# Patient Record
Sex: FEMALE | Race: WHITE | NOT HISPANIC OR LATINO | URBAN - METROPOLITAN AREA
[De-identification: names, ages, dates, MRNs, and addresses within clinical notes are randomized per-mention and may not be internally consistent; named-entity substitution may affect disease eponyms.]

---

## 2017-07-05 ENCOUNTER — OFFICE (OUTPATIENT)
Dept: URBAN - METROPOLITAN AREA CLINIC 75 | Facility: CLINIC | Age: 67
End: 2017-07-05

## 2017-07-05 VITALS
WEIGHT: 129 LBS | HEIGHT: 67 IN | SYSTOLIC BLOOD PRESSURE: 122 MMHG | HEART RATE: 80 BPM | DIASTOLIC BLOOD PRESSURE: 70 MMHG

## 2017-07-05 DIAGNOSIS — Z86.010 PERSONAL HISTORY OF COLONIC POLYPS: ICD-10-CM

## 2017-07-05 DIAGNOSIS — R19.4 CHANGE IN BOWEL HABIT: ICD-10-CM

## 2017-07-05 DIAGNOSIS — R63.4 ABNORMAL WEIGHT LOSS: ICD-10-CM

## 2017-07-05 DIAGNOSIS — K59.1 FUNCTIONAL DIARRHEA: ICD-10-CM

## 2017-07-05 PROCEDURE — 99204 OFFICE O/P NEW MOD 45 MIN: CPT

## 2017-07-05 RX ORDER — CHOLESTYRAMINE 4 G/9G
POWDER, FOR SUSPENSION ORAL
Qty: 90 | Refills: 11 | Status: ACTIVE

## 2017-08-11 ENCOUNTER — OFFICE (OUTPATIENT)
Dept: URBAN - METROPOLITAN AREA CLINIC 75 | Facility: CLINIC | Age: 67
End: 2017-08-11

## 2017-08-11 VITALS
DIASTOLIC BLOOD PRESSURE: 70 MMHG | HEIGHT: 67 IN | WEIGHT: 127 LBS | HEART RATE: 72 BPM | SYSTOLIC BLOOD PRESSURE: 120 MMHG

## 2017-08-11 DIAGNOSIS — R63.4 ABNORMAL WEIGHT LOSS: ICD-10-CM

## 2017-08-11 DIAGNOSIS — Z86.010 PERSONAL HISTORY OF COLONIC POLYPS: ICD-10-CM

## 2017-08-11 DIAGNOSIS — R19.4 CHANGE IN BOWEL HABIT: ICD-10-CM

## 2017-08-11 DIAGNOSIS — K59.1 FUNCTIONAL DIARRHEA: ICD-10-CM

## 2017-08-11 PROCEDURE — 99214 OFFICE O/P EST MOD 30 MIN: CPT

## 2017-10-31 ENCOUNTER — TELEPHONE (OUTPATIENT)
Dept: GENERAL RADIOLOGY | Facility: HOSPITAL | Age: 67
End: 2017-10-31

## 2017-10-31 ENCOUNTER — APPOINTMENT (OUTPATIENT)
Dept: ONCOLOGY | Facility: CLINIC | Age: 67
End: 2017-10-31

## 2017-10-31 ENCOUNTER — LAB (OUTPATIENT)
Dept: LAB | Facility: HOSPITAL | Age: 67
End: 2017-10-31

## 2017-10-31 ENCOUNTER — CONSULT (OUTPATIENT)
Dept: ONCOLOGY | Facility: CLINIC | Age: 67
End: 2017-10-31

## 2017-10-31 VITALS
WEIGHT: 131 LBS | HEIGHT: 66 IN | BODY MASS INDEX: 21.05 KG/M2 | OXYGEN SATURATION: 92 % | RESPIRATION RATE: 20 BRPM | SYSTOLIC BLOOD PRESSURE: 118 MMHG | HEART RATE: 88 BPM | TEMPERATURE: 97.8 F | DIASTOLIC BLOOD PRESSURE: 54 MMHG

## 2017-10-31 DIAGNOSIS — C34.92 ADENOCARCINOMA OF LEFT LUNG (HCC): Primary | ICD-10-CM

## 2017-10-31 DIAGNOSIS — J43.1 PANLOBULAR EMPHYSEMA (HCC): ICD-10-CM

## 2017-10-31 LAB
ALBUMIN SERPL-MCNC: 4.1 G/DL (ref 3.5–5.2)
ALBUMIN/GLOB SERPL: 1.5 G/DL (ref 1.1–2.4)
ALP SERPL-CCNC: 63 U/L (ref 38–116)
ALT SERPL W P-5'-P-CCNC: 11 U/L (ref 0–33)
ANION GAP SERPL CALCULATED.3IONS-SCNC: 11.6 MMOL/L
AST SERPL-CCNC: 16 U/L (ref 0–32)
BASOPHILS # BLD AUTO: 0.02 10*3/MM3 (ref 0–0.1)
BASOPHILS NFR BLD AUTO: 0.2 % (ref 0–1.1)
BILIRUB SERPL-MCNC: 0.6 MG/DL (ref 0.1–1.2)
BUN BLD-MCNC: 8 MG/DL (ref 6–20)
BUN/CREAT SERPL: 9.5 (ref 7.3–30)
CALCIUM SPEC-SCNC: 9.4 MG/DL (ref 8.5–10.2)
CEA SERPL-MCNC: 58.17 NG/ML
CHLORIDE SERPL-SCNC: 98 MMOL/L (ref 98–107)
CO2 SERPL-SCNC: 26.4 MMOL/L (ref 22–29)
CREAT BLD-MCNC: 0.84 MG/DL (ref 0.6–1.1)
DEPRECATED RDW RBC AUTO: 38.6 FL (ref 37–49)
EOSINOPHIL # BLD AUTO: 0.06 10*3/MM3 (ref 0–0.36)
EOSINOPHIL NFR BLD AUTO: 0.7 % (ref 1–5)
ERYTHROCYTE [DISTWIDTH] IN BLOOD BY AUTOMATED COUNT: 12.7 % (ref 11.7–14.5)
GFR SERPL CREATININE-BSD FRML MDRD: 67 ML/MIN/1.73
GLOBULIN UR ELPH-MCNC: 2.7 GM/DL (ref 1.8–3.5)
GLUCOSE BLD-MCNC: 91 MG/DL (ref 74–124)
HCT VFR BLD AUTO: 36 % (ref 34–45)
HGB BLD-MCNC: 12.4 G/DL (ref 11.5–14.9)
IMM GRANULOCYTES # BLD: 0.03 10*3/MM3 (ref 0–0.03)
IMM GRANULOCYTES NFR BLD: 0.4 % (ref 0–0.5)
LYMPHOCYTES # BLD AUTO: 1.15 10*3/MM3 (ref 1–3.5)
LYMPHOCYTES NFR BLD AUTO: 13.9 % (ref 20–49)
MCH RBC QN AUTO: 29 PG (ref 27–33)
MCHC RBC AUTO-ENTMCNC: 34.4 G/DL (ref 32–35)
MCV RBC AUTO: 84.3 FL (ref 83–97)
MONOCYTES # BLD AUTO: 0.89 10*3/MM3 (ref 0.25–0.8)
MONOCYTES NFR BLD AUTO: 10.7 % (ref 4–12)
NEUTROPHILS # BLD AUTO: 6.13 10*3/MM3 (ref 1.5–7)
NEUTROPHILS NFR BLD AUTO: 74.1 % (ref 39–75)
NRBC BLD MANUAL-RTO: 0 /100 WBC (ref 0–0)
PLATELET # BLD AUTO: 205 10*3/MM3 (ref 150–375)
PMV BLD AUTO: 10.2 FL (ref 8.9–12.1)
POTASSIUM BLD-SCNC: 4 MMOL/L (ref 3.5–4.7)
PROT SERPL-MCNC: 6.8 G/DL (ref 6.3–8)
RBC # BLD AUTO: 4.27 10*6/MM3 (ref 3.9–5)
SODIUM BLD-SCNC: 136 MMOL/L (ref 134–145)
WBC NRBC COR # BLD: 8.28 10*3/MM3 (ref 4–10)

## 2017-10-31 PROCEDURE — 80053 COMPREHEN METABOLIC PANEL: CPT | Performed by: INTERNAL MEDICINE

## 2017-10-31 PROCEDURE — 99205 OFFICE O/P NEW HI 60 MIN: CPT | Performed by: INTERNAL MEDICINE

## 2017-10-31 PROCEDURE — 36416 COLLJ CAPILLARY BLOOD SPEC: CPT | Performed by: INTERNAL MEDICINE

## 2017-10-31 PROCEDURE — 82378 CARCINOEMBRYONIC ANTIGEN: CPT | Performed by: INTERNAL MEDICINE

## 2017-10-31 PROCEDURE — 85025 COMPLETE CBC W/AUTO DIFF WBC: CPT | Performed by: INTERNAL MEDICINE

## 2017-10-31 PROCEDURE — 36415 COLL VENOUS BLD VENIPUNCTURE: CPT | Performed by: INTERNAL MEDICINE

## 2017-10-31 RX ORDER — BUPROPION HYDROCHLORIDE 300 MG/1
300 TABLET ORAL EVERY EVENING
Status: ON HOLD | COMMUNITY
End: 2019-01-01

## 2017-10-31 RX ORDER — ALBUTEROL SULFATE 90 UG/1
AEROSOL, METERED RESPIRATORY (INHALATION)
Refills: 0 | COMMUNITY
Start: 2017-10-26 | End: 2018-01-05 | Stop reason: SDUPTHER

## 2017-10-31 RX ORDER — ASPIRIN 81 MG/1
81 TABLET ORAL DAILY
COMMUNITY
End: 2018-05-02

## 2017-10-31 RX ORDER — ESCITALOPRAM OXALATE 20 MG/1
20 TABLET ORAL EVERY EVENING
COMMUNITY
End: 2018-01-01 | Stop reason: HOSPADM

## 2017-10-31 RX ORDER — IPRATROPIUM BROMIDE 17 UG/1
AEROSOL, METERED RESPIRATORY (INHALATION)
Refills: 3 | Status: ON HOLD | COMMUNITY
Start: 2017-10-11 | End: 2017-12-06

## 2017-10-31 RX ORDER — CHOLECALCIFEROL (VITAMIN D3) 50 MCG
2000 TABLET ORAL EVERY OTHER DAY
COMMUNITY

## 2017-10-31 RX ORDER — SIMVASTATIN 20 MG
20 TABLET ORAL NIGHTLY
Status: ON HOLD | COMMUNITY
End: 2019-01-01

## 2017-10-31 RX ORDER — DIPHENOXYLATE HYDROCHLORIDE AND ATROPINE SULFATE 2.5; .025 MG/1; MG/1
1 TABLET ORAL
Status: ON HOLD | COMMUNITY
End: 2017-12-06

## 2017-10-31 RX ORDER — INFLUENZA A VIRUS A/MICHIGAN/45/2015 X-275 (H1N1) ANTIGEN (FORMALDEHYDE INACTIVATED), INFLUENZA A VIRUS A/SINGAPORE/INFIMH-16-0019/2016 IVR-186 (H3N2) ANTIGEN (FORMALDEHYDE INACTIVATED), AND INFLUENZA B VIRUS B/MARYLAND/15/2016 BX-69A (A B/COLORADO/6/2017-LIKE VIRUS) ANTIGEN (FORMALDEHYDE INACTIVATED) 60; 60; 60 UG/.5ML; UG/.5ML; UG/.5ML
INJECTION, SUSPENSION INTRAMUSCULAR
Refills: 0 | COMMUNITY
Start: 2017-10-04 | End: 2018-01-05

## 2017-10-31 NOTE — TELEPHONE ENCOUNTER
----- Message from Christin Guzmán RN sent at 10/31/2017 12:35 PM EDT -----  Regarding: PATIENT JUST SCHED TODAY FOR FRIDAY WILL NEED TO MOVE ANANDT  CHET LUX PET SCHEDULED JUST TODAY FOR THIS PATIENT FOR THIS Friday 11/03/17.     PATIENT HAS [facility]. AND I WILL NOT HAVE ANSWER MOST LIKELY UNTIL THE END OF THE BUSINESS DAY Friday.    PLEASE MOVE PATIENT TO NEXT Tuesday 11/07/2017 IF POSSIBLE.      THANK YOU ~

## 2017-11-07 ENCOUNTER — HOSPITAL ENCOUNTER (OUTPATIENT)
Dept: PET IMAGING | Facility: HOSPITAL | Age: 67
Discharge: HOME | End: 2017-11-07
Attending: INTERNAL MEDICINE

## 2017-11-07 ENCOUNTER — HOSPITAL ENCOUNTER (OUTPATIENT)
Dept: PET IMAGING | Facility: HOSPITAL | Age: 67
Discharge: HOME | End: 2017-11-07
Attending: INTERNAL MEDICINE | Admitting: INTERNAL MEDICINE

## 2017-11-07 DIAGNOSIS — C34.92 ADENOCARCINOMA OF LEFT LUNG (HCC): ICD-10-CM

## 2017-11-07 PROCEDURE — 0 FLUDEOXYGLUCOSE F18 SOLUTION: Performed by: INTERNAL MEDICINE

## 2017-11-07 PROCEDURE — 78815 PET IMAGE W/CT SKULL-THIGH: CPT

## 2017-11-07 PROCEDURE — 82962 GLUCOSE BLOOD TEST: CPT

## 2017-11-07 PROCEDURE — A9552 F18 FDG: HCPCS | Performed by: INTERNAL MEDICINE

## 2017-11-07 RX ADMIN — FLUDEOXYGLUCOSE F18 1 DOSE: 300 INJECTION INTRAVENOUS at 09:56

## 2017-11-08 LAB — GLUCOSE BLDC GLUCOMTR-MCNC: 94 MG/DL (ref 70–130)

## 2017-11-14 ENCOUNTER — APPOINTMENT (OUTPATIENT)
Dept: ONCOLOGY | Facility: CLINIC | Age: 67
End: 2017-11-14

## 2017-11-14 ENCOUNTER — OFFICE VISIT (OUTPATIENT)
Dept: ONCOLOGY | Facility: CLINIC | Age: 67
End: 2017-11-14

## 2017-11-14 ENCOUNTER — LAB (OUTPATIENT)
Dept: LAB | Facility: HOSPITAL | Age: 67
End: 2017-11-14

## 2017-11-14 VITALS
BODY MASS INDEX: 20.7 KG/M2 | OXYGEN SATURATION: 93 % | HEIGHT: 66 IN | RESPIRATION RATE: 16 BRPM | TEMPERATURE: 98.1 F | WEIGHT: 128.8 LBS | HEART RATE: 82 BPM | SYSTOLIC BLOOD PRESSURE: 144 MMHG | DIASTOLIC BLOOD PRESSURE: 72 MMHG

## 2017-11-14 DIAGNOSIS — R93.89 ABNORMAL FINDING ON IMAGING: ICD-10-CM

## 2017-11-14 DIAGNOSIS — J43.1 PANLOBULAR EMPHYSEMA (HCC): ICD-10-CM

## 2017-11-14 DIAGNOSIS — C34.92 ADENOCARCINOMA OF LEFT LUNG (HCC): Primary | ICD-10-CM

## 2017-11-14 DIAGNOSIS — C34.92 ADENOCARCINOMA OF LEFT LUNG (HCC): ICD-10-CM

## 2017-11-14 LAB
ALBUMIN SERPL-MCNC: 4.2 G/DL (ref 3.5–5.2)
ALBUMIN/GLOB SERPL: 1.7 G/DL (ref 1.1–2.4)
ALP SERPL-CCNC: 60 U/L (ref 38–116)
ALT SERPL W P-5'-P-CCNC: 12 U/L (ref 0–33)
ANION GAP SERPL CALCULATED.3IONS-SCNC: 12.3 MMOL/L
AST SERPL-CCNC: 18 U/L (ref 0–32)
BASOPHILS # BLD AUTO: 0.05 10*3/MM3 (ref 0–0.1)
BASOPHILS NFR BLD AUTO: 0.8 % (ref 0–1.1)
BILIRUB SERPL-MCNC: 0.3 MG/DL (ref 0.1–1.2)
BUN BLD-MCNC: 13 MG/DL (ref 6–20)
BUN/CREAT SERPL: 13.7 (ref 7.3–30)
CALCIUM SPEC-SCNC: 9.7 MG/DL (ref 8.5–10.2)
CHLORIDE SERPL-SCNC: 98 MMOL/L (ref 98–107)
CO2 SERPL-SCNC: 27.7 MMOL/L (ref 22–29)
CREAT BLD-MCNC: 0.95 MG/DL (ref 0.6–1.1)
DEPRECATED RDW RBC AUTO: 40.4 FL (ref 37–49)
EOSINOPHIL # BLD AUTO: 0.21 10*3/MM3 (ref 0–0.36)
EOSINOPHIL NFR BLD AUTO: 3.2 % (ref 1–5)
ERYTHROCYTE [DISTWIDTH] IN BLOOD BY AUTOMATED COUNT: 12.8 % (ref 11.7–14.5)
GFR SERPL CREATININE-BSD FRML MDRD: 58 ML/MIN/1.73
GLOBULIN UR ELPH-MCNC: 2.5 GM/DL (ref 1.8–3.5)
GLUCOSE BLD-MCNC: 94 MG/DL (ref 74–124)
HCT VFR BLD AUTO: 39.8 % (ref 34–45)
HGB BLD-MCNC: 13.4 G/DL (ref 11.5–14.9)
IMM GRANULOCYTES # BLD: 0.02 10*3/MM3 (ref 0–0.03)
IMM GRANULOCYTES NFR BLD: 0.3 % (ref 0–0.5)
LYMPHOCYTES # BLD AUTO: 1.3 10*3/MM3 (ref 1–3.5)
LYMPHOCYTES NFR BLD AUTO: 19.7 % (ref 20–49)
MCH RBC QN AUTO: 29 PG (ref 27–33)
MCHC RBC AUTO-ENTMCNC: 33.7 G/DL (ref 32–35)
MCV RBC AUTO: 86.1 FL (ref 83–97)
MONOCYTES # BLD AUTO: 0.71 10*3/MM3 (ref 0.25–0.8)
MONOCYTES NFR BLD AUTO: 10.7 % (ref 4–12)
NEUTROPHILS # BLD AUTO: 4.32 10*3/MM3 (ref 1.5–7)
NEUTROPHILS NFR BLD AUTO: 65.3 % (ref 39–75)
NRBC BLD MANUAL-RTO: 0 /100 WBC (ref 0–0)
PLATELET # BLD AUTO: 238 10*3/MM3 (ref 150–375)
PMV BLD AUTO: 9.9 FL (ref 8.9–12.1)
POTASSIUM BLD-SCNC: 4.4 MMOL/L (ref 3.5–4.7)
PROT SERPL-MCNC: 6.7 G/DL (ref 6.3–8)
RBC # BLD AUTO: 4.62 10*6/MM3 (ref 3.9–5)
SODIUM BLD-SCNC: 138 MMOL/L (ref 134–145)
WBC NRBC COR # BLD: 6.61 10*3/MM3 (ref 4–10)

## 2017-11-14 PROCEDURE — 36415 COLL VENOUS BLD VENIPUNCTURE: CPT | Performed by: INTERNAL MEDICINE

## 2017-11-14 PROCEDURE — 85025 COMPLETE CBC W/AUTO DIFF WBC: CPT | Performed by: INTERNAL MEDICINE

## 2017-11-14 PROCEDURE — 99214 OFFICE O/P EST MOD 30 MIN: CPT | Performed by: INTERNAL MEDICINE

## 2017-11-14 PROCEDURE — 80053 COMPREHEN METABOLIC PANEL: CPT | Performed by: INTERNAL MEDICINE

## 2017-11-14 RX ORDER — HYDROCODONE BITARTRATE AND ACETAMINOPHEN 7.5; 325 MG/1; MG/1
TABLET ORAL
Refills: 0 | COMMUNITY
Start: 2017-11-03 | End: 2018-01-05 | Stop reason: SDUPTHER

## 2017-11-14 RX ORDER — DIAZEPAM 5 MG/1
TABLET ORAL
Refills: 2 | Status: ON HOLD | COMMUNITY
Start: 2017-11-02 | End: 2018-01-09 | Stop reason: SDUPTHER

## 2017-11-14 NOTE — PROGRESS NOTES
Subjective     REASON FOR FOLLOW UP:   1.  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 Moon Bowen on 10/6/2017.   2.  Tumor is negative for PDL 1.  Also negative for ALK, EGFR, and ROS 1 mutations.  3.  Abnormal uptake in the distal esophagus noted on PET scan 11/7/2017.    HISTORY OF PRESENT ILLNESS:  The patient is a 67 y.o. year old female who has a history of smoking and COPD.  She had an abnormal CT scan in February of this year showing a 3.5 cm mass in the left suprahilar area.  A repeat CT scan was performed at [facility] 7/13/2017 and show that the mass had increased in size from about 3.5 cm up to 6.5 cm.    She underwent navigational bronchoscopy and endobronchial ultrasound and biopsy with Dr. Moon Bowen on 10/6/2017.  The pathology on the biopsy showed well-differentiated adenocarcinoma.  She is referred to us now by her pulmonologist Dr. Daniel Oconnor for further staging evaluation and treatment recommendations.    She has not had a PET scan for staging.  She had a PDL 1 staining on her bronchoscopy specimen which was negative for PDL 1.  EGFR, ALK, and ROS 1 were also negative.    She is still smoking but is trying to quit.  She had pulmonary function tests performed on 10/25/2017 showing a DLCO of 33% and FEV1 37%.    History of Present Illness     Past Medical History:   Diagnosis Date   • CAD (coronary artery disease)    • COPD (chronic obstructive pulmonary disease)    • Depression    • Diabetes    • Emphysema of lung    • H/O Pulmonary nodule    • History of chronic pain    • History of colon polyps    • History of pneumonia    • Hyperlipidemia    • Hypertension    • Lung cancer 2017   • RLS (restless legs syndrome)    • Sleep apnea         Past Surgical History:   Procedure Laterality Date   • BRONCHOSCOPY     • CHOLECYSTECTOMY  1999   • COLON SURGERY  2016   • COLONOSCOPY     • FOOT SURGERY  10/2010    [place]   • KNEE  SURGERY     • SALIVARY GLAND SURGERY     • SHOULDER ARTHROSCOPY Left 1995. 2001   • VASCULAR SURGERY      stent        Current Outpatient Prescriptions on File Prior to Visit   Medication Sig Dispense Refill   • aspirin 81 MG EC tablet Take 81 mg by mouth.     • ATROVENT HFA 17 MCG/ACT inhaler INHALE 2 PUFFS PO BID  3   • buPROPion XL (WELLBUTRIN XL) 300 MG 24 hr tablet Take 300 mg by mouth.     • Cholecalciferol (VITAMIN D) 2000 units tablet Take 2,000 Units by mouth Daily.     • cyanocobalamin (VITAMIN B-12) 2000 MCG tablet Take 2,000 mcg by mouth Daily.     • diphenoxylate-atropine (LOMOTIL) 2.5-0.025 MG per tablet Take 1 tablet by mouth.     • escitalopram (LEXAPRO) 20 MG tablet Take 20 mg by mouth.     • FLUZONE HIGH-DOSE 0.5 ML suspension prefilled syringe injection ADM 0.5ML IM UTD  0   • Multiple Vitamins-Minerals (CENTRUM SILVER PO) Take  by mouth.     • Probiotic Product (PROBIOTIC PO) Take  by mouth.     • Psyllium (METAMUCIL FIBER PO) Take 3 capsules by mouth.     • simvastatin (ZOCOR) 20 MG tablet Take 20 mg by mouth.     • VENTOLIN  (90 Base) MCG/ACT inhaler INL 2 PFS PO Q 6 H PRN  0     No current facility-administered medications on file prior to visit.         ALLERGIES:    Allergies   Allergen Reactions   • Penicillins Hives        Social History     Social History   • Marital status:      Spouse name: N/A   • Number of children: N/A   • Years of education: High school     Occupational History   •  Retired     Social History Main Topics   • Smoking status: Former Smoker     Packs/day: 1.00     Years: 30.00     Types: Cigarettes   • Smokeless tobacco: Never Used   • Alcohol use No   • Drug use: No   • Sexual activity: Not on file     Other Topics Concern   • Not on file     Social History Narrative        Family History   Problem Relation Age of Onset   • Cancer Sister    • Leukemia Cousin         Review of Systems   Constitutional: Negative for activity change, appetite change,  "fatigue, fever and unexpected weight change.   HENT: Negative for hearing loss, nosebleeds, trouble swallowing and voice change.    Eyes: Negative for visual disturbance.   Respiratory: Positive for cough and shortness of breath. Negative for wheezing.    Cardiovascular: Negative for chest pain and palpitations.   Gastrointestinal: Positive for diarrhea. Negative for abdominal pain, nausea and vomiting.        Short bowel symptoms after partial colectomy   Genitourinary: Negative for difficulty urinating, frequency, hematuria and urgency.   Musculoskeletal: Negative for back pain and neck pain.   Skin: Negative for rash.   Neurological: Negative for dizziness, seizures, syncope and headaches.   Hematological: Negative for adenopathy. Does not bruise/bleed easily.   Psychiatric/Behavioral: Negative for behavioral problems. The patient is not nervous/anxious.         Objective     Vitals:    11/14/17 1021   BP: 144/72   Pulse: 82   Resp: 16   Temp: 98.1 °F (36.7 °C)   TempSrc: Oral   SpO2: 93%   Weight: 128 lb 12.8 oz (58.4 kg)   Height: 65.55\" (166.5 cm)   PainSc: 0-No pain     Current Status 11/14/2017   ECOG score 0       Physical Exam   Constitutional: She is oriented to person, place, and time. She appears well-developed and well-nourished. No distress.   HENT:   Head: Normocephalic.   Eyes: Conjunctivae and EOM are normal. Pupils are equal, round, and reactive to light. No scleral icterus.   Neck: Normal range of motion. Neck supple. No JVD present. No thyromegaly present.   Cardiovascular: Normal rate and regular rhythm.  Exam reveals no gallop and no friction rub.    No murmur heard.  Pulmonary/Chest: Effort normal and breath sounds normal. She has no wheezes. She has no rales.   Kyphosis and decreased air movement bilaterally.   Abdominal: Soft. She exhibits no distension and no mass. There is no tenderness.   Musculoskeletal: Normal range of motion. She exhibits no edema or deformity.   Lymphadenopathy:     " "She has no cervical adenopathy.   Neurological: She is alert and oriented to person, place, and time. She has normal reflexes. No cranial nerve deficit.   Skin: Skin is warm and dry. No rash noted. No erythema.   Psychiatric: She has a normal mood and affect. Her behavior is normal. Judgment normal.         RECENT LABS:  Hematology WBC   Date Value Ref Range Status   11/14/2017 6.61 4.00 - 10.00 10*3/mm3 Final     RBC   Date Value Ref Range Status   11/14/2017 4.62 3.90 - 5.00 10*6/mm3 Final     Hemoglobin   Date Value Ref Range Status   11/14/2017 13.4 11.5 - 14.9 g/dL Final     Hematocrit   Date Value Ref Range Status   11/14/2017 39.8 34.0 - 45.0 % Final     Platelets   Date Value Ref Range Status   11/14/2017 238 150 - 375 10*3/mm3 Final        PET 11/7/2017  IMPRESSION:  Left upper lobe lung carcinoma showing significant increase  in size since the preceding CT scan of the chest dated 07/13/2017. The  tumor demonstrates moderate hypermetabolism. There is no metabolic  evidence of metastatic disease within the neck, chest, abdomen or  pelvis. Heart, there are is a tiny left parotid nodule that demonstrates  intense hypermetabolism. Further evaluation with ultrasound-guided  biopsy is suggested. Also noted is localized soft tissue fullness at the  gastroesophageal junction extending focally into the cardia of the  stomach that demonstrates moderate hypermetabolism. Esophageal carcinoma  is suspected. Further evaluation with endoscopy is recommended.      PATHOLOGY FROM BRONCH AND EBUS 10/6/2017  Patient Name: MARYCHUY SHELL  Accession#: [id number]    DIAGNOSIS:  A.     SUBMITTED AS \"LEFT UPPER LOBE BIOPSY\":       1.     NEGATIVE FOR MALIGNANCY.       2.     PREDOMINANTLY BRONCHIAL TISSUE.         B.     SUBMITTED AS \"LEFT UPPER LOBE BIOPSY\":       1.     WELL DIFFERENTIATED ADENOCARCINOMA, PREDOMINANTLY WITH LIPIDIC  GROWTH IN           MATERIAL REVIEWED.       2.     NO LYMPHVASCULAR INVASION IDENTIFIED.       " "3.      MOLECULAR STUDIES AND PDL-1 STATUS WILL FOLLOW IN ADDENDUM.     C.     SUBMITTED AS \"LEFT UPPER LOBE BIOPSY\":       1.     WELL DIFFERENTIATED ADENOCARCINOMA, PREDOMINANTLY WITH LIPIDIC  GROWTH IN             MATERIAL REVIEWED.       2.     NO LYMPHVASCULAR INVASION IDENTIFIED.    PD-L1 Testing Results:  Tumor Proportion Score (TPS):           0  Intensity of staining of tumor cells:           N/A     Reference Range:  TPS d 50% - High PD-L1 expression  TPS 1-49% - Low PD-L1 expression  TPS < 1% - No PD-L1 expression    PFTs 10/25/2017 scanned under media  FEV1 37%  DLCO 33%      Assessment/Plan   1.  Well differentiated adenocarcinoma presumably from lung origin in a long-time smoker.  The biopsy was made from navigational bronchoscopy and endobronchial ultrasound at [place] on 10/6/2017.  Staging PET scan showed no mediastinal adenopathy or obvious distant metastases.  She did have uptake in the lower esophagus which will need to be evaluated  2.  Emphysema due to smoking.  3.  Tumor tissue was negative for PDL 1 expression, EGFR, ALK, or ROS 1 mutations.      Plan  1.  We discussed the results of the PET scan with the patient and her sister at length in the office today and reviewed the images with them on the computer.  2.  We also shared with them that we have presented her case in the multidisciplinary thoracic oncology conference last week and it was determined that she was not a good surgical candidate due to her poor lung function.  3.  We plan to refer her to radiation oncology for radiation therapy to the left lung mass.  4.  She also will be referred to gastroenterology for EGD assessment of the hypermetabolic gastroesophageal junction noted on PET scan to rule out a second malignancy in that area.  5.  She'll be scheduled to return to our office in 4 weeks for reassessment and review of the EGD results.  6.  Most likely she'll need to undergo follow-up imaging about 4-6 weeks after completion of " radiation therapy.

## 2017-11-16 ENCOUNTER — DOCUMENTATION (OUTPATIENT)
Dept: ONCOLOGY | Facility: CLINIC | Age: 67
End: 2017-11-16

## 2017-11-16 NOTE — PROGRESS NOTES
JYOTSNA met with the patient and her sister Gricelda after her consultation with Dr. Collier on 17. She has lung cancer in an upper lobe, she could not recall if it was in the left or right lung. A radiation appointment is being set up, as she is not a surgical candidate. A GI referral is also being arranged to check her esophagus. A sister had cancer 20 years ago, and recovered well from that. She apparently  of another cause 1 year ago.     Pt had colon surgery 1 year ago, and has had problems since then. 1 year ago, she had a very high fever and apparently passed out while waiting to  her grandchildren from school. Her grandchildren were able to get someone to call 911 for her. This episode led to her eventual colon cancer diagnosis and surgery.    Pt has been a  for 20 years. She has 1 son, with whom she does not get along. She has 4 grandchildren, and seems devoted to them. Her goal is to live long enough to see them all grow up.     Pt said she has always been a nervous type of person. Since her   20 years ago, she has been depressed. She takes several antidepressant medications, which are prescribed by her general practitioner. Her sister pressed the patient to discontinue her antidepressants, and the pt was quite firm in telling her that she benefits from the medication, and that this certainly was not the time to stop them.     Pt lives along in her own home. She is retired. 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     Pt initially seemed disengaged or distracted during our visit. However, with a few questions, she began to self-disclose comfortably. She said there is so much to think about from her consultation with Dr. Collier. She was quite animated during her disagreement with her sister over her medications. Pt was neatly groomed and had tasteful make-up. She is oriented x 3.  services were  explained and assistance offered as needed.

## 2017-11-20 ENCOUNTER — OFFICE VISIT (OUTPATIENT)
Dept: GASTROENTEROLOGY | Facility: CLINIC | Age: 67
End: 2017-11-20

## 2017-11-20 VITALS
BODY MASS INDEX: 20.59 KG/M2 | SYSTOLIC BLOOD PRESSURE: 132 MMHG | WEIGHT: 131.2 LBS | HEIGHT: 67 IN | TEMPERATURE: 98.3 F | DIASTOLIC BLOOD PRESSURE: 74 MMHG

## 2017-11-20 DIAGNOSIS — R93.89 ABNORMAL FINDING ON IMAGING: Primary | ICD-10-CM

## 2017-11-20 PROCEDURE — 99204 OFFICE O/P NEW MOD 45 MIN: CPT | Performed by: INTERNAL MEDICINE

## 2017-11-20 NOTE — PROGRESS NOTES
Chief Complaint   Patient presents with   • discuss possible EGD     lung cancer     Subjective   HPI     Dilma Abad is a 67 y.o. female who presents for evaluation of abnormal PET scan.  The patient was recently diagnosed with primary lung cancer, and PET scan showed hypermatabolic activity at the distal esophagus extending in to the cardia.  Pt denies any upper GI symptoms. She denies any dysphagia/odynophgia.  She is uncertain if she has had prior upper endoscopy.  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  She is on nocturnal oxygen at 2L due to moderately severe emphysema.      Past Medical History:   Diagnosis Date   • Adenocarcinoma of lung    • CAD (coronary artery disease)    • COPD (chronic obstructive pulmonary disease)    • Depression    • Diabetes    • Emphysema of lung    • H/O Pulmonary nodule    • History of chronic pain    • History of colon polyps    • History of pneumonia    • Hyperlipidemia    • Hypertension    • Lung cancer 2017   • RLS (restless legs syndrome)    • Sleep apnea        Current Outpatient Prescriptions:   •  ANORO ELLIPTA 62.5-25 MCG/INH aerosol powder , INL 1 PUFF PO QD, Disp: , Rfl: 5  •  aspirin 81 MG EC tablet, Take 81 mg by mouth., Disp: , Rfl:   •  buPROPion XL (WELLBUTRIN XL) 300 MG 24 hr tablet, Take 300 mg by mouth., Disp: , Rfl:   •  Cholecalciferol (VITAMIN D) 2000 units tablet, Take 2,000 Units by mouth Daily., Disp: , Rfl:   •  cyanocobalamin (VITAMIN B-12) 2000 MCG tablet, Take 2,000 mcg by mouth Daily., Disp: , Rfl:   •  diazePAM (VALIUM) 5 MG tablet, TK ONE T PO QID, Disp: , Rfl: 2  •  escitalopram (LEXAPRO) 20 MG tablet, Take 20 mg by mouth., Disp: , Rfl:   •  FLUZONE HIGH-DOSE 0.5 ML suspension prefilled syringe injection, ADM 0.5ML IM UTD, Disp: , Rfl: 0  •  HYDROcodone-acetaminophen (NORCO) 7.5-325 MG per tablet, TK 1 T PO  QID PRN P, Disp: , Rfl: 0  •  Loperamide HCl (IMODIUM A-D PO), Take  by mouth., Disp: , Rfl:   •   Multiple Vitamins-Minerals (CENTRUM SILVER PO), Take  by mouth., Disp: , Rfl:   •  Probiotic Product (PROBIOTIC PO), Take  by mouth., Disp: , Rfl:   •  Psyllium (METAMUCIL FIBER PO), Take 3 capsules by mouth., Disp: , Rfl:   •  simvastatin (ZOCOR) 20 MG tablet, Take 20 mg by mouth., Disp: , Rfl:   •  VENTOLIN  (90 Base) MCG/ACT inhaler, INL 2 PFS PO Q 6 H PRN, Disp: , Rfl: 0  •  ATROVENT HFA 17 MCG/ACT inhaler, INHALE 2 PUFFS PO BID, Disp: , Rfl: 3  •  diphenoxylate-atropine (LOMOTIL) 2.5-0.025 MG per tablet, Take 1 tablet by mouth., Disp: , Rfl:   Allergies   Allergen Reactions   • Penicillins Hives     Social History     Social History   • Marital status:      Spouse name: N/A   • Number of children: N/A   • Years of education: High school     Occupational History   •  Retired     Social History Main Topics   • Smoking status: Former Smoker     Packs/day: 1.00     Years: 30.00     Types: Cigarettes     Quit date: 10/2017   • Smokeless tobacco: Never Used   • Alcohol use No   • Drug use: No   • Sexual activity: Not on file     Other Topics Concern   • Not on file     Social History Narrative     Family History   Problem Relation Age of Onset   • Cancer Sister    • Leukemia Cousin      Review of Systems   Constitutional: Positive for fatigue and unexpected weight change.   HENT: Negative for trouble swallowing.    Respiratory: Positive for shortness of breath.    Gastrointestinal: Negative.    All other systems reviewed and are negative.    Objective   Vitals:    11/20/17 1410   BP: 132/74   Temp: 98.3 °F (36.8 °C)     Physical Exam   Constitutional: She is oriented to person, place, and time. She appears well-developed and well-nourished.   HENT:   Head: Normocephalic and atraumatic.   Mouth/Throat: Oropharynx is clear and moist.   Eyes: EOM are normal. No scleral icterus.   Neck: Normal range of motion. Neck supple. No thyromegaly present.   Cardiovascular: Normal rate, regular rhythm and normal  heart sounds.  Exam reveals no gallop and no friction rub.    No murmur heard.  Pulmonary/Chest: Effort normal. She has no wheezes. She has no rales. She exhibits no tenderness.   Decreased air exchange bilaterally   Abdominal: Soft. Bowel sounds are normal. She exhibits no distension. There is no tenderness. There is no rebound and no guarding. No hernia.   Musculoskeletal: Normal range of motion. She exhibits no edema.   Lymphadenopathy:     She has no cervical adenopathy.   Neurological: She is alert and oriented to person, place, and time.   Skin: Skin is warm and dry.   Psychiatric: She has a normal mood and affect. Judgment and thought content normal.   Vitals reviewed.    Assessment/Plan   Assessment:     1. Abnormal finding on imaging    2.      Lung cancer    Plan:   EGD will be scheduled to evaluate the area of hypermetabolic activity at distal esophagus on recent PET scan.          Jayant Robles M.D.  [facility]  [address] [place] [address]  Office: [phone]

## 2017-11-21 ENCOUNTER — APPOINTMENT (OUTPATIENT)
Dept: RADIATION ONCOLOGY | Facility: HOSPITAL | Age: 67
End: 2017-11-21

## 2017-11-21 ENCOUNTER — CONSULT (OUTPATIENT)
Dept: RADIATION ONCOLOGY | Facility: HOSPITAL | Age: 67
End: 2017-11-21

## 2017-11-21 VITALS
SYSTOLIC BLOOD PRESSURE: 134 MMHG | DIASTOLIC BLOOD PRESSURE: 88 MMHG | TEMPERATURE: 98.7 F | RESPIRATION RATE: 16 BRPM | HEART RATE: 87 BPM | WEIGHT: 131 LBS | BODY MASS INDEX: 20.52 KG/M2 | OXYGEN SATURATION: 97 %

## 2017-11-21 DIAGNOSIS — C34.92 ADENOCARCINOMA OF LEFT LUNG (HCC): Primary | ICD-10-CM

## 2017-11-21 PROCEDURE — 77290 THER RAD SIMULAJ FIELD CPLX: CPT | Performed by: RADIOLOGY

## 2017-11-21 PROCEDURE — 77334 RADIATION TREATMENT AID(S): CPT | Performed by: RADIOLOGY

## 2017-11-21 PROCEDURE — G0463 HOSPITAL OUTPT CLINIC VISIT: HCPCS | Performed by: RADIOLOGY

## 2017-11-21 PROCEDURE — 77263 THER RADIOLOGY TX PLNG CPLX: CPT | Performed by: RADIOLOGY

## 2017-11-21 PROCEDURE — 77370 RADIATION PHYSICS CONSULT: CPT | Performed by: RADIOLOGY

## 2017-11-21 PROCEDURE — 99204 OFFICE O/P NEW MOD 45 MIN: CPT | Performed by: RADIOLOGY

## 2017-11-21 NOTE — PROGRESS NOTES
DIAGNOSIS and REASON FOR CONSULTATION: Adenocarcinoma of left lung - for advice and recommendations regarding the diagnosis    Referring Provider:  Bienvenido Collier MD  Patient Care Team:  Mary Taylor MD as PCP - General (Family Medicine)  Bienvenido Collier MD as Consulting Physician (Hematology and Oncology)  Daniel Oconnor MD as Referring Physician (Pulmonary Disease)  Shazia Mcdonald MD as Consulting Physician (Radiation Oncology)    CHIEF COMPLAINT:  For advice and recommendations regarding Adenocarcinoma of left lung  HISTORY OF PRESENT ILLNESS:  The patient is a 67 y.o. year old female who was found to have an abnormality in the left upper lobe on CAT scan in February, 2017. Surveillance was recommended and on CAT scan dated July 13, 2017 the left upper lobe lesion measured 4.2 x 6.4 cm, increased from 3.4 x 3.6 cm in February, 2017.    She underwent bronchoscopy on October 6, 2017 which revealed bronchial brushings highly suspicious for well-differentiated adenocarcinoma and biopsy from the left upper lobe returned positive for well-differentiated adenocarcinoma predominantly with lipidic growth, no lymphovascular space invasion was identified.    She was sent for pulmonary function tests which revealed an FVC of 2.09 L, and FEV1 of 0.96 L and an FEV1 over FEC of 46%.    She was seen by the [facility] physicians who recommended a PET scan and referral to the multidisciplinary thoracic oncology clinic.  Her PET scan was completed on November 7, 2017 and showed a 5.3 x 5.2 cm lobulated mass in the left upper lobe with intense hypermetabolism with an SUV of 11.4, significantly increased from the previous CT where measured 3.7 x 4.0 cm.  No other evidence of metastatic disease was appreciated within the chest, particularly in the mediastinum or hilar regions.  Note was also made of hypermetabolism with in the GE junction region and an area of soft tissue fullness extending into the cardia of the stomach which  was suspicious.  Additionally, she was found to have a 7 mm nodule in the superficial lobe of the left parotid gland with hypermetabolism up to 12.5.    It was determined that she was not a good surgical or chemotherapy therapy candidate.  She also required further workup for the esophageal abnormality and was referred on to gastroenterology for EGD. She is scheduled to have that on December 6, 2017.  I was asked to see the patient at the request of the referring provider noted above for advice and recommendations regarding the lung diagnosis.     Clinically, she is doing very well. She has stable shortness of breath and wears oxygen at 2L at night which is not new. She is able to eat without difficulty and denies any new cough, hemoptysis, dysphagia, weight loss. She is anxious about the parotid abnormality as well but I have assured her we will investigate that once the lung and esophagus are dealt with and she agrees.    Past Medical History: she  has a past medical history of Adenocarcinoma of lung; Benign parotid tumor (2012); CAD (coronary artery disease); COPD (chronic obstructive pulmonary disease); Depression; Diabetes; Emphysema of lung; H/O Pulmonary nodule; History of chronic pain; History of colon polyps; History of pneumonia; Hyperlipidemia; Hypertension; Lung cancer (2017); RLS (restless legs syndrome); and Sleep apnea.    Past Surgical History:  she has a past surgical history that includes Bronchoscopy; Colonoscopy (04/13/2016); Salivary gland surgery; Shoulder arthroscopy (Left, 1995. 2001); Knee surgery; Vascular surgery; Foot surgery (10/2010); Cholecystectomy (1999); Colon surgery (2016); and Colonoscopy (06/14/2016).        Meds:    Current Outpatient Prescriptions:   •  ANORO ELLIPTA 62.5-25 MCG/INH aerosol powder , INL 1 PUFF PO QD, Disp: , Rfl: 5  •  aspirin 81 MG EC tablet, Take 81 mg by mouth., Disp: , Rfl:   •  ATROVENT HFA 17 MCG/ACT inhaler, INHALE 2 PUFFS PO BID, Disp: , Rfl: 3  •   buPROPion XL (WELLBUTRIN XL) 300 MG 24 hr tablet, Take 300 mg by mouth., Disp: , Rfl:   •  Cholecalciferol (VITAMIN D) 2000 units tablet, Take 2,000 Units by mouth Daily., Disp: , Rfl:   •  cyanocobalamin (VITAMIN B-12) 2000 MCG tablet, Take 2,000 mcg by mouth Daily., Disp: , Rfl:   •  diazePAM (VALIUM) 5 MG tablet, TK ONE T PO QID, Disp: , Rfl: 2  •  diphenoxylate-atropine (LOMOTIL) 2.5-0.025 MG per tablet, Take 1 tablet by mouth., Disp: , Rfl:   •  escitalopram (LEXAPRO) 20 MG tablet, Take 20 mg by mouth., Disp: , Rfl:   •  FLUZONE HIGH-DOSE 0.5 ML suspension prefilled syringe injection, ADM 0.5ML IM UTD, Disp: , Rfl: 0  •  HYDROcodone-acetaminophen (NORCO) 7.5-325 MG per tablet, TK 1 T PO  QID PRN P, Disp: , Rfl: 0  •  Loperamide HCl (IMODIUM A-D PO), Take  by mouth., Disp: , Rfl:   •  Multiple Vitamins-Minerals (CENTRUM SILVER PO), Take  by mouth., Disp: , Rfl:   •  Probiotic Product (PROBIOTIC PO), Take  by mouth., Disp: , Rfl:   •  Psyllium (METAMUCIL FIBER PO), Take 3 capsules by mouth., Disp: , Rfl:   •  simvastatin (ZOCOR) 20 MG tablet, Take 20 mg by mouth., Disp: , Rfl:   •  VENTOLIN  (90 Base) MCG/ACT inhaler, INL 2 PFS PO Q 6 H PRN, Disp: , Rfl: 0    Allergies:    Allergies   Allergen Reactions   • Penicillins Hives       Family History:  her family history includes Cancer in her sister; Leukemia in her cousin.    Social History:  she  reports that she quit smoking about 7 weeks ago. Her smoking use included Cigarettes. She has a 30.00 pack-year smoking history. She has never used smokeless tobacco. She reports that she does not drink alcohol or use illicit drugs.    Pertinent Findings on   Review of Systems   Constitutional: Positive for fatigue. Negative for appetite change, chills, diaphoresis, fever and unexpected weight change.   HENT:   Negative for hearing loss, lump/mass, mouth sores, nosebleeds, sore throat, tinnitus, trouble swallowing and voice change.    Eyes: Negative for eye problems  and icterus.   Respiratory: Positive for shortness of breath and wheezing. Negative for chest tightness, cough, dizziness on exertion and hemoptysis.    Cardiovascular: Negative for chest pain, leg swelling and palpitations.   Gastrointestinal: Negative for abdominal distention, abdominal pain, blood in stool, constipation, diarrhea, nausea, rectal pain and vomiting.   Endocrine: Negative for hot flashes.   Genitourinary: Negative for bladder incontinence, difficulty urinating, dyspareunia, dysuria, frequency, hematuria, menstrual problem, nocturia, pelvic pain, vaginal bleeding and vaginal discharge.    Musculoskeletal: Negative for arthralgias, back pain, flank pain, gait problem, myalgias, neck pain and neck stiffness.   Skin: Negative for itching, rash and wound.   Neurological: Negative for dizziness, extremity weakness, gait problem, headaches, light-headedness, numbness, seizures and speech difficulty.   Hematological: Negative for adenopathy. Does not bruise/bleed easily.   Psychiatric/Behavioral: Negative for confusion, decreased concentration, depression, sleep disturbance and suicidal ideas. The patient is nervous/anxious.    :  Vitals:    11/21/17 1012   BP: 134/88   Pulse: 87   Resp: 16   Temp: 98.7 °F (37.1 °C)   TempSrc: Oral   SpO2: 97%   Weight: 131 lb (59.4 kg)   PainSc: 0-No pain       Performance Status: (2) Ambulatory and capable of self care, unable to carry out work activity, up and about > 50% or waking hours    Pertinent Findings on:  Physical Exam   Constitutional: She is oriented to person, place, and time. She appears well-developed and well-nourished. She is active and cooperative. No distress.   HENT:   Head: Normocephalic and atraumatic.   Nose: Nose normal.   Mouth/Throat: Mucous membranes are normal. Normal dentition.   Eyes: Conjunctivae and EOM are normal.   Neck: Normal range of motion.   No palpable parotid abnormality   Pulmonary/Chest: Effort normal.   Abdominal: Normal  appearance. There is no hepatosplenomegaly.   Musculoskeletal: Normal range of motion.       Vascular Status -  Her exam exhibits no right foot edema. Her exam exhibits no left foot edema.  Neurological: She is alert and oriented to person, place, and time.   Skin: Skin is warm and dry.   Psychiatric: She has a normal mood and affect. Her behavior is normal. Judgment and thought content normal.       Assessment:   1. Adenocarcinoma of left lung     T2 N0  This assessment comes from my review of the imaging, pathology, physician notes and other pertinent information as mentioned.    Plan:   We reviewed today the details of the pathology and imaging studies up to this point and the clinical stage of the disease and all questions were answered in that regard. We discussed the treatment options for this early stage lung cancer along with the effects of surgery and chemotherapy on the respiratory and performance status. We then discussed the role of stereotactic radiation therapy given to this solitary lesion and I do believe this is a perfect treatment option in this case, especially given the location and size of the tumor.    I recommended a definitive course of stereotactic treatment, delivering approximately 4500 cGy to the solitary nodule in 3 treatments.  We discussed the logistics of the daily treatment as well as our treatment planning process.  We then discussed the acute side effects, specifically mild irritation of the skin in the treatment area and the small likelihood of increased cough and decreased appetite and fatigue.  We discussed the anticipated time frame for the resolution of the above-mentioned symptoms. We then discussed the small but possible long-term possibility of radiation pneumonitis, fibrosis and the possibility of being more short of air at the conclusion of the radiation therapy simply from those benign changes.  We again discussed the importance of our treatment planning process as well  as our respiratory gating procedure in limiting the volume of lung treated as much as possible and I believe all questions were answered.     We will still need to evaluate the esophagus and parotid and we can adjust or add treatment plans as needed thereafter. We were able to complete our initial treatment planning scan today. I will be fusing the most recent CT and PET scans onto our treatment planning scan to insure adequate coverage of the mass. We will get back to the patient with the treatment number and schedule once the plan is developed and approved. I anticipate that will be within a week to ten days.    I spent greater than 45 minutes in face-to-face time with the patient and 30 minutes of that time was spent in counseling and coordination of care, including review of imaging and pathology; prognosis and differential diagnosis; indications, goals, logistics, benefits and risks of treatment as well as alternatives and surveillance options.

## 2017-11-28 PROCEDURE — 77293 RESPIRATOR MOTION MGMT SIMUL: CPT | Performed by: RADIOLOGY

## 2017-11-28 PROCEDURE — 77301 RADIOTHERAPY DOSE PLAN IMRT: CPT | Performed by: RADIOLOGY

## 2017-11-29 PROCEDURE — 77300 RADIATION THERAPY DOSE PLAN: CPT | Performed by: RADIOLOGY

## 2017-11-29 PROCEDURE — 77470 SPECIAL RADIATION TREATMENT: CPT | Performed by: RADIOLOGY

## 2017-12-04 ENCOUNTER — APPOINTMENT (OUTPATIENT)
Dept: RADIATION ONCOLOGY | Facility: HOSPITAL | Age: 67
End: 2017-12-04

## 2017-12-04 ENCOUNTER — RADIATION ONCOLOGY WEEKLY ASSESSMENT (OUTPATIENT)
Dept: RADIATION ONCOLOGY | Facility: HOSPITAL | Age: 67
End: 2017-12-04

## 2017-12-04 DIAGNOSIS — C34.92 ADENOCARCINOMA OF LEFT LUNG (HCC): Primary | ICD-10-CM

## 2017-12-04 PROCEDURE — 77338 DESIGN MLC DEVICE FOR IMRT: CPT | Performed by: RADIOLOGY

## 2017-12-04 PROCEDURE — 77373 STRTCTC BDY RAD THER TX DLVR: CPT | Performed by: RADIOLOGY

## 2017-12-04 PROCEDURE — 77435 SBRT MANAGEMENT: CPT | Performed by: RADIOLOGY

## 2017-12-04 NOTE — PROGRESS NOTES
"  Physician Stereotactic Management Note    Diagnosis:     1. Adenocarcinoma of left lung      Doing well pretreatment, no problems.    Treatment Number and Dose: 1/5 - 1000/5000    I was personally present at the machine for the delivery of the above treatment.     I provided direct supervision of the patient's set up, treatment parameters and image guidance. I provided corrections and approval of the above prior to the treatment, in real time. I was present for any adjustments required due to patient motion, target movement or equipment issues.    The ongoing images for localization, tumor tracking, gating and beam's eye view localization images will also be approved.     Notes on treatment course, films, medical progress and plan:    Doing well. Tolerated treatment without difficulty. No problems or questions.    Problem added:  None  Medications added:   None  Ancillary referrals made: None    I have reviewed and marked as \"reviewed\" the current medications, allergies and problem list in the patients EMR.    Patient's Care Team:  Patient Care Team:  Mary Taylor MD as PCP - General (Family Medicine)  Bienvenido Collier MD as Consulting Physician (Hematology and Oncology)  Daniel Oconnor MD as Referring Physician (Pulmonary Disease)  Shazia Mcdonald MD as Consulting Physician (Radiation Oncology)    Seen and approved by:  Shazia Mcdonald MD, 12/04/2017  "

## 2017-12-05 ENCOUNTER — TELEPHONE (OUTPATIENT)
Dept: RADIATION ONCOLOGY | Facility: HOSPITAL | Age: 67
End: 2017-12-05

## 2017-12-05 RX ORDER — FLUTICASONE PROPIONATE 50 MCG
SPRAY, SUSPENSION (ML) NASAL
Refills: 2 | COMMUNITY
Start: 2017-11-15 | End: 2018-01-05 | Stop reason: SDUPTHER

## 2017-12-05 NOTE — TELEPHONE ENCOUNTER
Received call from patient today at 201pm regarding nausea. Attempted calling her back at number she left and there was no voice mail available only message saying not available.

## 2017-12-06 ENCOUNTER — ANESTHESIA EVENT (OUTPATIENT)
Dept: GASTROENTEROLOGY | Facility: HOSPITAL | Age: 67
End: 2017-12-06

## 2017-12-06 ENCOUNTER — ANESTHESIA (OUTPATIENT)
Dept: GASTROENTEROLOGY | Facility: HOSPITAL | Age: 67
End: 2017-12-06

## 2017-12-06 ENCOUNTER — HOSPITAL ENCOUNTER (OUTPATIENT)
Facility: HOSPITAL | Age: 67
Setting detail: HOSPITAL OUTPATIENT SURGERY
Discharge: HOME | End: 2017-12-06
Attending: INTERNAL MEDICINE | Admitting: INTERNAL MEDICINE

## 2017-12-06 VITALS
DIASTOLIC BLOOD PRESSURE: 85 MMHG | TEMPERATURE: 98.1 F | WEIGHT: 128.13 LBS | RESPIRATION RATE: 16 BRPM | BODY MASS INDEX: 20.07 KG/M2 | HEART RATE: 82 BPM | SYSTOLIC BLOOD PRESSURE: 124 MMHG | OXYGEN SATURATION: 98 %

## 2017-12-06 DIAGNOSIS — R93.89 ABNORMAL FINDING ON IMAGING: ICD-10-CM

## 2017-12-06 PROCEDURE — 25010000002 PROPOFOL 10 MG/ML EMULSION: Performed by: NURSE ANESTHETIST, CERTIFIED REGISTERED

## 2017-12-06 PROCEDURE — 88305 TISSUE EXAM BY PATHOLOGIST: CPT | Performed by: INTERNAL MEDICINE

## 2017-12-06 PROCEDURE — 43239 EGD BIOPSY SINGLE/MULTIPLE: CPT | Performed by: INTERNAL MEDICINE

## 2017-12-06 RX ORDER — LIDOCAINE HYDROCHLORIDE 20 MG/ML
INJECTION, SOLUTION INFILTRATION; PERINEURAL AS NEEDED
Status: DISCONTINUED | OUTPATIENT
Start: 2017-12-06 | End: 2017-12-06 | Stop reason: SURG

## 2017-12-06 RX ORDER — PROPOFOL 10 MG/ML
VIAL (ML) INTRAVENOUS AS NEEDED
Status: DISCONTINUED | OUTPATIENT
Start: 2017-12-06 | End: 2017-12-06 | Stop reason: SURG

## 2017-12-06 RX ORDER — SODIUM CHLORIDE, SODIUM LACTATE, POTASSIUM CHLORIDE, CALCIUM CHLORIDE 600; 310; 30; 20 MG/100ML; MG/100ML; MG/100ML; MG/100ML
1000 INJECTION, SOLUTION INTRAVENOUS CONTINUOUS PRN
Status: DISCONTINUED | OUTPATIENT
Start: 2017-12-06 | End: 2017-12-06 | Stop reason: HOSPADM

## 2017-12-06 RX ADMIN — PROPOFOL 20 MG: 10 INJECTION, EMULSION INTRAVENOUS at 10:10

## 2017-12-06 RX ADMIN — SODIUM CHLORIDE, POTASSIUM CHLORIDE, SODIUM LACTATE AND CALCIUM CHLORIDE 1000 ML: 600; 310; 30; 20 INJECTION, SOLUTION INTRAVENOUS at 09:30

## 2017-12-06 RX ADMIN — LIDOCAINE HYDROCHLORIDE 20 MG: 20 INJECTION, SOLUTION INFILTRATION; PERINEURAL at 10:00

## 2017-12-06 RX ADMIN — PROPOFOL 50 MG: 10 INJECTION, EMULSION INTRAVENOUS at 10:02

## 2017-12-06 RX ADMIN — PROPOFOL 20 MG: 10 INJECTION, EMULSION INTRAVENOUS at 10:12

## 2017-12-06 RX ADMIN — PROPOFOL 20 MG: 10 INJECTION, EMULSION INTRAVENOUS at 10:08

## 2017-12-06 RX ADMIN — PROPOFOL 20 MG: 10 INJECTION, EMULSION INTRAVENOUS at 10:06

## 2017-12-06 RX ADMIN — PROPOFOL 20 MG: 10 INJECTION, EMULSION INTRAVENOUS at 10:04

## 2017-12-06 NOTE — DISCHARGE INSTRUCTIONS
For the next 24 hours patient needs to be with a responsible adult.    For 24 hours DO NOT drive, operate machinery, appliances, drink alcohol, make important decisions or sign legal documents.    Start with a light or bland diet and advance to regular diet as tolerated.    Follow recommendations on procedure report provided by your doctor.    Call Dr Robles for problems [phone]    Problems may include but not limited to: large amounts of bleeding, trouble breathing, repeated vomiting, severe unrelieved pain, fever or chills.

## 2017-12-06 NOTE — ANESTHESIA POSTPROCEDURE EVALUATION
Patient: Dilma Abad    Procedure Summary     Date Anesthesia Start Anesthesia Stop Room / Location    12/06/17 0956 1016  [facility] /  [facility]       Procedure Diagnosis Surgeon Provider    ESOPHAGOGASTRODUODENOSCOPY WITH BIOPSY (N/A Esophagus) Abnormal finding on imaging  (Abnormal finding on imaging [R93.8]) MD Priti Velazco MD          Anesthesia Type: MAC  Last vitals  BP   115/69 (12/06/17 1032)   Temp   36.7 °C (98.1 °F) (12/06/17 1032)   Pulse   78 (12/06/17 1032)   Resp   16 (12/06/17 1032)     SpO2   94 % (12/06/17 1032)     Post Anesthesia Care and Evaluation    Patient location during evaluation: bedside  Patient participation: complete - patient participated  Level of consciousness: awake  Pain score: 2  Pain management: adequate  Airway patency: patent  Anesthetic complications: No anesthetic complications  PONV Status: none  Cardiovascular status: acceptable  Respiratory status: acceptable  Hydration status: acceptable    Comments: /69 (BP Location: Left arm, Patient Position: Lying)  Pulse 78  Temp 36.7 °C (98.1 °F) (Oral)   Resp 16  Wt 58.1 kg (128 lb 2 oz)  SpO2 94%  BMI 20.07 kg/m2

## 2017-12-06 NOTE — PLAN OF CARE
Problem: Patient Care Overview (Adult)  Goal: Plan of Care Review  Outcome: Ongoing (interventions implemented as appropriate)    12/06/17 0904   Coping/Psychosocial Response Interventions   Plan Of Care Reviewed With patient       Goal: Adult Individualization and Mutuality  Outcome: Ongoing (interventions implemented as appropriate)    12/06/17 0904   Individualization   Patient Specific Preferences none       Goal: Discharge Needs Assessment  Outcome: Ongoing (interventions implemented as appropriate)    12/06/17 0904   Discharge Needs Assessment   Concerns To Be Addressed no discharge needs identified   Living Environment   Transportation Available family or friend will provide         Problem: GI Endoscopy (Adult)  Goal: Signs and Symptoms of Listed Potential Problems Will be Absent or Manageable (GI Endoscopy)  Outcome: Ongoing (interventions implemented as appropriate)    12/06/17 0904   GI Endoscopy   Problems Assessed (GI Endoscopy) pain   Problems Present (GI Endoscopy) none

## 2017-12-06 NOTE — H&P (VIEW-ONLY)
Chief Complaint   Patient presents with   • discuss possible EGD     lung cancer     Subjective   HPI     Dilma Abad is a 67 y.o. female who presents for evaluation of abnormal PET scan.  The patient was recently diagnosed with primary lung cancer, and PET scan showed hypermatabolic activity at the distal esophagus extending in to the cardia.  Pt denies any upper GI symptoms. She denies any dysphagia/odynophgia.  She is uncertain if she has had prior upper endoscopy.  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  She is on nocturnal oxygen at 2L due to moderately severe emphysema.      Past Medical History:   Diagnosis Date   • Adenocarcinoma of lung    • CAD (coronary artery disease)    • COPD (chronic obstructive pulmonary disease)    • Depression    • Diabetes    • Emphysema of lung    • H/O Pulmonary nodule    • History of chronic pain    • History of colon polyps    • History of pneumonia    • Hyperlipidemia    • Hypertension    • Lung cancer 2017   • RLS (restless legs syndrome)    • Sleep apnea        Current Outpatient Prescriptions:   •  ANORO ELLIPTA 62.5-25 MCG/INH aerosol powder , INL 1 PUFF PO QD, Disp: , Rfl: 5  •  aspirin 81 MG EC tablet, Take 81 mg by mouth., Disp: , Rfl:   •  buPROPion XL (WELLBUTRIN XL) 300 MG 24 hr tablet, Take 300 mg by mouth., Disp: , Rfl:   •  Cholecalciferol (VITAMIN D) 2000 units tablet, Take 2,000 Units by mouth Daily., Disp: , Rfl:   •  cyanocobalamin (VITAMIN B-12) 2000 MCG tablet, Take 2,000 mcg by mouth Daily., Disp: , Rfl:   •  diazePAM (VALIUM) 5 MG tablet, TK ONE T PO QID, Disp: , Rfl: 2  •  escitalopram (LEXAPRO) 20 MG tablet, Take 20 mg by mouth., Disp: , Rfl:   •  FLUZONE HIGH-DOSE 0.5 ML suspension prefilled syringe injection, ADM 0.5ML IM UTD, Disp: , Rfl: 0  •  HYDROcodone-acetaminophen (NORCO) 7.5-325 MG per tablet, TK 1 T PO  QID PRN P, Disp: , Rfl: 0  •  Loperamide HCl (IMODIUM A-D PO), Take  by mouth., Disp: , Rfl:   •   Multiple Vitamins-Minerals (CENTRUM SILVER PO), Take  by mouth., Disp: , Rfl:   •  Probiotic Product (PROBIOTIC PO), Take  by mouth., Disp: , Rfl:   •  Psyllium (METAMUCIL FIBER PO), Take 3 capsules by mouth., Disp: , Rfl:   •  simvastatin (ZOCOR) 20 MG tablet, Take 20 mg by mouth., Disp: , Rfl:   •  VENTOLIN  (90 Base) MCG/ACT inhaler, INL 2 PFS PO Q 6 H PRN, Disp: , Rfl: 0  •  ATROVENT HFA 17 MCG/ACT inhaler, INHALE 2 PUFFS PO BID, Disp: , Rfl: 3  •  diphenoxylate-atropine (LOMOTIL) 2.5-0.025 MG per tablet, Take 1 tablet by mouth., Disp: , Rfl:   Allergies   Allergen Reactions   • Penicillins Hives     Social History     Social History   • Marital status:      Spouse name: N/A   • Number of children: N/A   • Years of education: High school     Occupational History   •  Retired     Social History Main Topics   • Smoking status: Former Smoker     Packs/day: 1.00     Years: 30.00     Types: Cigarettes     Quit date: 10/2017   • Smokeless tobacco: Never Used   • Alcohol use No   • Drug use: No   • Sexual activity: Not on file     Other Topics Concern   • Not on file     Social History Narrative     Family History   Problem Relation Age of Onset   • Cancer Sister    • Leukemia Cousin      Review of Systems   Constitutional: Positive for fatigue and unexpected weight change.   HENT: Negative for trouble swallowing.    Respiratory: Positive for shortness of breath.    Gastrointestinal: Negative.    All other systems reviewed and are negative.    Objective   Vitals:    11/20/17 1410   BP: 132/74   Temp: 98.3 °F (36.8 °C)     Physical Exam   Constitutional: She is oriented to person, place, and time. She appears well-developed and well-nourished.   HENT:   Head: Normocephalic and atraumatic.   Mouth/Throat: Oropharynx is clear and moist.   Eyes: EOM are normal. No scleral icterus.   Neck: Normal range of motion. Neck supple. No thyromegaly present.   Cardiovascular: Normal rate, regular rhythm and normal  heart sounds.  Exam reveals no gallop and no friction rub.    No murmur heard.  Pulmonary/Chest: Effort normal. She has no wheezes. She has no rales. She exhibits no tenderness.   Decreased air exchange bilaterally   Abdominal: Soft. Bowel sounds are normal. She exhibits no distension. There is no tenderness. There is no rebound and no guarding. No hernia.   Musculoskeletal: Normal range of motion. She exhibits no edema.   Lymphadenopathy:     She has no cervical adenopathy.   Neurological: She is alert and oriented to person, place, and time.   Skin: Skin is warm and dry.   Psychiatric: She has a normal mood and affect. Judgment and thought content normal.   Vitals reviewed.    Assessment/Plan   Assessment:     1. Abnormal finding on imaging    2.      Lung cancer    Plan:   EGD will be scheduled to evaluate the area of hypermetabolic activity at distal esophagus on recent PET scan.          Jayant Robles M.D.  [facility]  [address] [place] [address]  Office: [phone]

## 2017-12-06 NOTE — ANESTHESIA PREPROCEDURE EVALUATION
Anesthesia Evaluation            Airway   Mallampati: III  TM distance: >3 FB  Neck ROM: full  no difficulty expected  Dental    (+) upper dentures and lower dentures    Pulmonary - normal exam   (+) COPD, sleep apnea,     ROS comment: Oxygen hs  Cardiovascular - normal exam    (+) hypertension, hyperlipidemia      Neuro/Psych  (+) CVA, psychiatric history,    GI/Hepatic/Renal/Endo      Musculoskeletal     Abdominal    Substance History      OB/GYN          Other   (+) arthritis   history of cancer                                    Anesthesia Plan    ASA 3     MAC     Anesthetic plan and risks discussed with patient.

## 2017-12-07 ENCOUNTER — RADIATION ONCOLOGY WEEKLY ASSESSMENT (OUTPATIENT)
Dept: RADIATION ONCOLOGY | Facility: HOSPITAL | Age: 67
End: 2017-12-07

## 2017-12-07 DIAGNOSIS — C34.92 ADENOCARCINOMA OF LEFT LUNG (HCC): Primary | ICD-10-CM

## 2017-12-07 PROCEDURE — 77373 STRTCTC BDY RAD THER TX DLVR: CPT | Performed by: RADIOLOGY

## 2017-12-07 NOTE — PROGRESS NOTES
"  Physician Stereotactic Management Note    Diagnosis:     1. Adenocarcinoma of left lung        Doing well pretreatment, no problems.    Treatment Number and Dose: fx 2/5 dose 2000cGy    I was personally present at the machine for the delivery of the above treatment.     I provided direct supervision of the patient's set up, treatment parameters and image guidance. I provided corrections and approval of the above prior to the treatment, in real time. I was present for any adjustments required due to patient motion, target movement or equipment issues.    The ongoing images for localization, tumor tracking, gating and beam's eye view localization images will also be approved.     Notes on treatment course, films, medical progress and plan:    Doing well. Tolerated treatment without difficulty. No problems or questions.    Problem added:  None  Medications added:   None  Ancillary referrals made: None    I have reviewed and marked as \"reviewed\" the current medications, allergies and problem list in the patients EMR.    Patient's Care Team:  Patient Care Team:  Mary Taylor MD as PCP - General (Family Medicine)  Bienvenido Collier MD as Consulting Physician (Hematology and Oncology)  Daniel Oconnor MD as Referring Physician (Pulmonary Disease)  Shazia Mcdonald MD as Consulting Physician (Radiation Oncology)    Seen and approved by:  Hali Fong MD, 12/07/2017  "

## 2017-12-08 ENCOUNTER — TELEPHONE (OUTPATIENT)
Dept: GASTROENTEROLOGY | Facility: CLINIC | Age: 67
End: 2017-12-08

## 2017-12-08 PROBLEM — C15.9 ESOPHAGEAL CARCINOMA (HCC): Status: ACTIVE | Noted: 2017-12-08

## 2017-12-08 NOTE — TELEPHONE ENCOUNTER
----- Message from Jayant Robles MD sent at 12/7/2017  5:47 PM EST -----  Results d/w patient.   She has appt with her oncologist next week.

## 2017-12-11 ENCOUNTER — RADIATION ONCOLOGY WEEKLY ASSESSMENT (OUTPATIENT)
Dept: RADIATION ONCOLOGY | Facility: HOSPITAL | Age: 67
End: 2017-12-11

## 2017-12-11 DIAGNOSIS — C34.92 ADENOCARCINOMA OF LEFT LUNG (HCC): Primary | ICD-10-CM

## 2017-12-11 PROCEDURE — 77336 RADIATION PHYSICS CONSULT: CPT | Performed by: RADIOLOGY

## 2017-12-11 PROCEDURE — 77373 STRTCTC BDY RAD THER TX DLVR: CPT | Performed by: RADIOLOGY

## 2017-12-11 PROCEDURE — 77263 THER RADIOLOGY TX PLNG CPLX: CPT | Performed by: RADIOLOGY

## 2017-12-11 PROCEDURE — FACE2FACE: Performed by: RADIOLOGY

## 2017-12-11 NOTE — PROGRESS NOTES
"  Physician Stereotactic Management Note    Diagnosis:     1. Adenocarcinoma of left lung        Doing well pretreatment, no problems.    Treatment Number and Dose: Treatment #3/5    Dose: 15/25 Gy       I was personally present at the machine for the delivery of the above treatment.     I provided direct supervision of the patient's set up, treatment parameters and image guidance. I provided corrections and approval of the above prior to the treatment, in real time. I was present for any adjustments required due to patient motion, target movement or equipment issues.    The ongoing images for localization, tumor tracking, gating and beam's eye view localization images will also be approved.     Notes on treatment course, films, medical progress and plan:    Doing well. Tolerated treatment without difficulty. No problems or questions.    Problem added:  None  Medications added:   None  Ancillary referrals made: None    I have reviewed and marked as \"reviewed\" the current medications, allergies and problem list in the patients EMR.    Patient's Care Team:  Patient Care Team:  Mary Taylor MD as PCP - General (Family Medicine)  Bienvenido Collier MD as Consulting Physician (Hematology and Oncology)  Daniel Oconnor MD as Referring Physician (Pulmonary Disease)  Shazia Mcdonald MD as Consulting Physician (Radiation Oncology)    Seen and approved by:  Nilesh Orta MD, 12/11/2017  "

## 2017-12-12 ENCOUNTER — LAB (OUTPATIENT)
Dept: LAB | Facility: HOSPITAL | Age: 67
End: 2017-12-12

## 2017-12-12 ENCOUNTER — OFFICE VISIT (OUTPATIENT)
Dept: ONCOLOGY | Facility: CLINIC | Age: 67
End: 2017-12-12

## 2017-12-12 VITALS
RESPIRATION RATE: 16 BRPM | BODY MASS INDEX: 21.24 KG/M2 | TEMPERATURE: 98.1 F | DIASTOLIC BLOOD PRESSURE: 70 MMHG | WEIGHT: 132.2 LBS | HEART RATE: 86 BPM | HEIGHT: 66 IN | SYSTOLIC BLOOD PRESSURE: 120 MMHG

## 2017-12-12 DIAGNOSIS — J43.1 PANLOBULAR EMPHYSEMA (HCC): ICD-10-CM

## 2017-12-12 DIAGNOSIS — R93.89 ABNORMAL FINDING ON IMAGING: ICD-10-CM

## 2017-12-12 DIAGNOSIS — C34.92 ADENOCARCINOMA OF LEFT LUNG (HCC): ICD-10-CM

## 2017-12-12 DIAGNOSIS — C34.92 ADENOCARCINOMA OF LEFT LUNG (HCC): Primary | ICD-10-CM

## 2017-12-12 DIAGNOSIS — C15.9 ESOPHAGEAL CARCINOMA (HCC): ICD-10-CM

## 2017-12-12 LAB
ALBUMIN SERPL-MCNC: 4.2 G/DL (ref 3.5–5.2)
ALBUMIN/GLOB SERPL: 1.8 G/DL (ref 1.1–2.4)
ALP SERPL-CCNC: 60 U/L (ref 38–116)
ALT SERPL W P-5'-P-CCNC: 13 U/L (ref 0–33)
ANION GAP SERPL CALCULATED.3IONS-SCNC: 12.5 MMOL/L
AST SERPL-CCNC: 20 U/L (ref 0–32)
BASOPHILS # BLD AUTO: 0.03 10*3/MM3 (ref 0–0.1)
BASOPHILS NFR BLD AUTO: 0.5 % (ref 0–1.1)
BILIRUB SERPL-MCNC: 0.4 MG/DL (ref 0.1–1.2)
BUN BLD-MCNC: 10 MG/DL (ref 6–20)
BUN/CREAT SERPL: 10.8 (ref 7.3–30)
CALCIUM SPEC-SCNC: 9.4 MG/DL (ref 8.5–10.2)
CEA SERPL-MCNC: 54.68 NG/ML
CHLORIDE SERPL-SCNC: 98 MMOL/L (ref 98–107)
CO2 SERPL-SCNC: 27.5 MMOL/L (ref 22–29)
CREAT BLD-MCNC: 0.93 MG/DL (ref 0.6–1.1)
DEPRECATED RDW RBC AUTO: 40.7 FL (ref 37–49)
EOSINOPHIL # BLD AUTO: 0.18 10*3/MM3 (ref 0–0.36)
EOSINOPHIL NFR BLD AUTO: 3.2 % (ref 1–5)
ERYTHROCYTE [DISTWIDTH] IN BLOOD BY AUTOMATED COUNT: 13 % (ref 11.7–14.5)
GFR SERPL CREATININE-BSD FRML MDRD: 60 ML/MIN/1.73
GLOBULIN UR ELPH-MCNC: 2.3 GM/DL (ref 1.8–3.5)
GLUCOSE BLD-MCNC: 113 MG/DL (ref 74–124)
HCT VFR BLD AUTO: 39.3 % (ref 34–45)
HGB BLD-MCNC: 13.2 G/DL (ref 11.5–14.9)
HOLD SPECIMEN: NORMAL
IMM GRANULOCYTES # BLD: 0.02 10*3/MM3 (ref 0–0.03)
IMM GRANULOCYTES NFR BLD: 0.4 % (ref 0–0.5)
LYMPHOCYTES # BLD AUTO: 0.79 10*3/MM3 (ref 1–3.5)
LYMPHOCYTES NFR BLD AUTO: 14.1 % (ref 20–49)
MCH RBC QN AUTO: 29 PG (ref 27–33)
MCHC RBC AUTO-ENTMCNC: 33.6 G/DL (ref 32–35)
MCV RBC AUTO: 86.4 FL (ref 83–97)
MONOCYTES # BLD AUTO: 0.5 10*3/MM3 (ref 0.25–0.8)
MONOCYTES NFR BLD AUTO: 8.9 % (ref 4–12)
NEUTROPHILS # BLD AUTO: 4.07 10*3/MM3 (ref 1.5–7)
NEUTROPHILS NFR BLD AUTO: 72.9 % (ref 39–75)
NRBC BLD MANUAL-RTO: 0 /100 WBC (ref 0–0)
PLATELET # BLD AUTO: 187 10*3/MM3 (ref 150–375)
PMV BLD AUTO: 10 FL (ref 8.9–12.1)
POTASSIUM BLD-SCNC: 4.5 MMOL/L (ref 3.5–4.7)
PROT SERPL-MCNC: 6.5 G/DL (ref 6.3–8)
RBC # BLD AUTO: 4.55 10*6/MM3 (ref 3.9–5)
SODIUM BLD-SCNC: 138 MMOL/L (ref 134–145)
WBC NRBC COR # BLD: 5.59 10*3/MM3 (ref 4–10)

## 2017-12-12 PROCEDURE — 99215 OFFICE O/P EST HI 40 MIN: CPT | Performed by: INTERNAL MEDICINE

## 2017-12-12 PROCEDURE — 80053 COMPREHEN METABOLIC PANEL: CPT | Performed by: INTERNAL MEDICINE

## 2017-12-12 PROCEDURE — 85025 COMPLETE CBC W/AUTO DIFF WBC: CPT

## 2017-12-12 PROCEDURE — 82378 CARCINOEMBRYONIC ANTIGEN: CPT | Performed by: INTERNAL MEDICINE

## 2017-12-12 PROCEDURE — 36415 COLL VENOUS BLD VENIPUNCTURE: CPT | Performed by: INTERNAL MEDICINE

## 2017-12-12 NOTE — PROGRESS NOTES
Subjective     REASON FOR FOLLOW UP:   1.  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 Moon Bowen on 10/6/2017.   2.  Tumor is negative for PDL 1.  Also negative for ALK, EGFR, and ROS 1 mutations.  3.  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 year old female who has a history of smoking and COPD.  She had an abnormal CT scan in February of this year showing a 3.5 cm mass in the left suprahilar area.  A repeat CT scan was performed at [facility] 7/13/2017 and show that the mass had increased in size from about 3.5 cm up to 6.5 cm.    She underwent navigational bronchoscopy and endobronchial ultrasound and biopsy with Dr. Moon Bowen on 10/6/2017.  The pathology on the biopsy showed well-differentiated adenocarcinoma.  She is referred to us now by her pulmonologist Dr. Daniel Oconnor for further staging evaluation and treatment recommendations.    She has not had a PET scan for staging.  She had a PDL 1 staining on her bronchoscopy specimen which was negative for PDL 1.  EGFR, ALK, and ROS 1 were also negative.    She is still smoking but is trying to quit.  She had pulmonary function tests performed on 10/25/2017 showing a DLCO of 33% and FEV1 37%.    She was referred to the [facility] radiation Center and currently is receiving primary radiation to the left upper lobe lung mass.  Because of abnormal uptake in the esophagus noted on her staging PET scan, she also underwent an EGD and biopsy which unfortunately revealed adenocarcinoma of the GE junction.  Molecular studies for HER-2/elizabeth and PDL 1 on the tumor tissue have been requested but are pending.  She has 2 more radiation treatments to complete her lung therapy.  We discussed with the patient today the option of further radiation to the GE junction along with weekly carbo Taxol.  She currently is relatively  asymptomatic and it may be prudent to await the results of the molecular testing on her pathology prior to making a definite treatment decision.    The patient and her sister brought to our attention that she did have several diagnoses in her past medical history that are not pertinent including a history of coronary artery disease and diabetes.  Also in her surgical history she had history of vascular stent in the leg which she has never required.  They're concerned that some of these diagnoses may have been part of the reason she was considered not a candidate for surgery and are interested in having her re-evaluated by pulmonary and cardiology here at [facility] (the previous evaluations were at [facility]).  With this new diagnosis of esophageal cancer-related is reasonable to undergo complete reevaluation to determine whether or not she potentially could be a candidate for resection.    History of Present Illness     Past Medical History:   Diagnosis Date   • Adenocarcinoma of lung    • Arthritis    • Benign parotid tumor 2012    removed by Dr Vickers told it was benign   • COPD (chronic obstructive pulmonary disease)    • Depression    • Emphysema of lung    • H/O Pulmonary nodule    • History of chronic pain    • History of colon polyps    • History of pneumonia    • Hyperlipidemia    • Hypertension    • Lung cancer 2017   • RLS (restless legs syndrome)    • Sleep apnea    • Stroke         Past Surgical History:   Procedure Laterality Date   • BRONCHOSCOPY     • CHOLECYSTECTOMY  1999   • COLON SURGERY  2016   • COLONOSCOPY  04/13/2016    TA w/low grade dysplasia x 3, serrated adenoma x 2   • COLONOSCOPY  06/14/2016    TA w/high grade dysplasia   • ENDOSCOPY N/A 12/6/2017    Procedure: ESOPHAGOGASTRODUODENOSCOPY WITH BIOPSY;  Surgeon: Jayant Robles MD;  Location: [facility] ENDOSCOPY;  Service:    • FOOT SURGERY  10/2010    [facility]   • KNEE SURGERY     • SALIVARY GLAND SURGERY     • SHOULDER  ARTHROSCOPY Left 1995. 2001        Current Outpatient Prescriptions on File Prior to Visit   Medication Sig Dispense Refill   • ANORO ELLIPTA 62.5-25 MCG/INH aerosol powder  INL 1 PUFF PO QD  5   • aspirin 81 MG EC tablet Take 81 mg by mouth.     • buPROPion XL (WELLBUTRIN XL) 300 MG 24 hr tablet Take 300 mg by mouth.     • Cholecalciferol (VITAMIN D) 2000 units tablet Take 2,000 Units by mouth Daily.     • cyanocobalamin (VITAMIN B-12) 2000 MCG tablet Take 2,000 mcg by mouth Daily.     • diazePAM (VALIUM) 5 MG tablet TK ONE T PO QID  2   • escitalopram (LEXAPRO) 20 MG tablet Take 20 mg by mouth.     • fluticasone (FLONASE) 50 MCG/ACT nasal spray INT 2 SPRAYS IEN QD  2   • FLUZONE HIGH-DOSE 0.5 ML suspension prefilled syringe injection ADM 0.5ML IM UTD  0   • HYDROcodone-acetaminophen (NORCO) 7.5-325 MG per tablet TK 1 T PO  QID PRN P  0   • Loperamide HCl (IMODIUM A-D PO) Take  by mouth.     • Multiple Vitamins-Minerals (CENTRUM SILVER PO) Take  by mouth.     • Probiotic Product (PROBIOTIC PO) Take  by mouth.     • Psyllium (METAMUCIL FIBER PO) Take 3 capsules by mouth.     • simvastatin (ZOCOR) 20 MG tablet Take 20 mg by mouth.     • VENTOLIN  (90 Base) MCG/ACT inhaler INL 2 PFS PO Q 6 H PRN  0     No current facility-administered medications on file prior to visit.         ALLERGIES:    Allergies   Allergen Reactions   • Penicillins Hives        Social History     Social History   • Marital status:      Spouse name: N/A   • Number of children: N/A   • Years of education: High school     Occupational History   •  Retired     Social History Main Topics   • Smoking status: Former Smoker     Packs/day: 1.00     Years: 30.00     Types: Cigarettes     Quit date: 10/2017   • Smokeless tobacco: Never Used   • Alcohol use No   • Drug use: No   • Sexual activity: Defer     Other Topics Concern   • Not on file     Social History Narrative    Lives alone.        Family History   Problem Relation Age of Onset  "  • Cancer Sister    • Leukemia Cousin         Review of Systems   Constitutional: Negative for activity change, appetite change, fatigue, fever and unexpected weight change.   HENT: Negative for hearing loss, nosebleeds, trouble swallowing and voice change.    Eyes: Negative for visual disturbance.   Respiratory: Positive for cough and shortness of breath. Negative for wheezing.    Cardiovascular: Negative for chest pain and palpitations.   Gastrointestinal: Positive for diarrhea. Negative for abdominal pain, nausea and vomiting.        Short bowel symptoms after partial colectomy   Genitourinary: Negative for difficulty urinating, frequency, hematuria and urgency.   Musculoskeletal: Negative for back pain and neck pain.   Skin: Negative for rash.   Neurological: Negative for dizziness, seizures, syncope and headaches.   Hematological: Negative for adenopathy. Does not bruise/bleed easily.   Psychiatric/Behavioral: Negative for behavioral problems. The patient is not nervous/anxious.         Objective     Vitals:    12/12/17 0951   BP: 120/70   Pulse: 86   Resp: 16   Temp: 98.1 °F (36.7 °C)   Weight: 60 kg (132 lb 3.2 oz)   Height: 166.5 cm (65.55\")   PainSc: 0-No pain     Current Status 12/12/2017   ECOG score 0       Physical Exam   Constitutional: She is oriented to person, place, and time. She appears well-developed and well-nourished. No distress.   HENT:   Head: Normocephalic.   Eyes: Conjunctivae and EOM are normal. Pupils are equal, round, and reactive to light. No scleral icterus.   Neck: Normal range of motion. Neck supple. No JVD present. No thyromegaly present.   Cardiovascular: Normal rate and regular rhythm.  Exam reveals no gallop and no friction rub.    No murmur heard.  Pulmonary/Chest: Effort normal and breath sounds normal. She has no wheezes. She has no rales.   Kyphosis and decreased air movement bilaterally.   Abdominal: Soft. She exhibits no distension and no mass. There is no tenderness. "   Musculoskeletal: Normal range of motion. She exhibits no edema or deformity.   Lymphadenopathy:     She has no cervical adenopathy.   Neurological: She is alert and oriented to person, place, and time. She has normal reflexes. No cranial nerve deficit.   Skin: Skin is warm and dry. No rash noted. No erythema.   Psychiatric: She has a normal mood and affect. Her behavior is normal. Judgment normal.         RECENT LABS:  Hematology WBC   Date Value Ref Range Status   12/12/2017 5.59 4.00 - 10.00 10*3/mm3 Final     RBC   Date Value Ref Range Status   12/12/2017 4.55 3.90 - 5.00 10*6/mm3 Final     Hemoglobin   Date Value Ref Range Status   12/12/2017 13.2 11.5 - 14.9 g/dL Final     Hematocrit   Date Value Ref Range Status   12/12/2017 39.3 34.0 - 45.0 % Final     Platelets   Date Value Ref Range Status   12/12/2017 187 150 - 375 10*3/mm3 Final        PET 11/7/2017  IMPRESSION:  Left upper lobe lung carcinoma showing significant increase  in size since the preceding CT scan of the chest dated 07/13/2017. The  tumor demonstrates moderate hypermetabolism. There is no metabolic  evidence of metastatic disease within the neck, chest, abdomen or  pelvis. Heart, there are is a tiny left parotid nodule that demonstrates  intense hypermetabolism. Further evaluation with ultrasound-guided  biopsy is suggested. Also noted is localized soft tissue fullness at the  gastroesophageal junction extending focally into the cardia of the  stomach that demonstrates moderate hypermetabolism. Esophageal carcinoma  is suspected. Further evaluation with endoscopy is recommended.    PATHOLOGY FROM EGD 12/6/2017  Final Diagnosis   1.  GASTROESOPHAGEAL JUNCTION, BIOPSIES:             INVASIVE ADENOCARCINOMA.            NO IDENTIFIED INTESTINAL METAPLASIA.      COMMENT: In this specimen tumor is identified extending into submucosal tissue. Deeper extension cannot be excluded in these superficial biopsies.     Assessment/Plan   1.  Well  differentiated adenocarcinoma presumably from lung origin in a long-time smoker.  The biopsy was made from navigational bronchoscopy and endobronchial ultrasound at [place] on 10/6/2017.  Staging PET scan showed no mediastinal adenopathy or obvious distant metastases.  She did have uptake in the lower esophagus which was evaluated with EGD and biopsy on 12/6/2017 and unfortunately was found to be adenocarcinoma at the GE junction.  2.  Emphysema due to smoking.  3.  LungTumor tissue was negative for PDL 1 expression, EGFR, ALK, or ROS 1 mutations.  Molecular testing on the esophageal adenocarcinoma is pending (we requested HER-2/elizabeth and PDL 1).  4.  She is nearing completion of her stereotactic radiation to the lung tumor.  She most likely will need additional radiation with possible weekly low dose carbo Taxol chemotherapy for her esophageal adenocarcinoma.  5.  She was not considered a candidate for lobectomy due to her poor pulmonary function but we plan to have her reevaluated by cardiology and pulmonology prior to determining if she is a potential candidate for esophageal resection.      Plan  1.  We had a lengthy discussion with the patient and her sister regarding the new diagnosis and various treatment options.  As noted above we plan to reevaluate reevaluate her for potential surgical clearance cardiology and pulmonology.  2.  Referral will be made to [facility]  3.  Referral will also be made to pulmonology for potential surgical clearance and repeat PFTs following radiation.  4.  We discussed the option of radiation therapy along with weekly low dose carbo Taxol.  If the patient is a potential surgical candidate this could be given in preoperative fashion.  If she is not felt to be a surgical candidate due to other comorbidities then this would be her primary treatment.  5.  In preparation for chemotherapy we'll set up an appointment with our nurse practitioners for formal chemotherapy education  regarding weekly low dose carbo Taxol chemotherapy along with radiation.  6.  We will also schedule a port placement with vascular surgery.  7.  We have discussed her case with Dr. Mcdonald radiation Center.  We had tentatively set up a follow-up schedule in our office for M.D. follow-up and potentially her first dose of weekly low-dose carboplatin and Taxol in early January around 1/11/2017.

## 2017-12-13 ENCOUNTER — RADIATION ONCOLOGY WEEKLY ASSESSMENT (OUTPATIENT)
Dept: RADIATION ONCOLOGY | Facility: HOSPITAL | Age: 67
End: 2017-12-13

## 2017-12-13 DIAGNOSIS — C34.92 ADENOCARCINOMA OF LEFT LUNG (HCC): Primary | ICD-10-CM

## 2017-12-13 PROCEDURE — 77334 RADIATION TREATMENT AID(S): CPT | Performed by: RADIOLOGY

## 2017-12-13 PROCEDURE — 77373 STRTCTC BDY RAD THER TX DLVR: CPT | Performed by: RADIOLOGY

## 2017-12-13 NOTE — PROGRESS NOTES
"  Physician Stereotactic Management Note    Diagnosis:     1. Adenocarcinoma of left lung        Doing well pretreatment, no problems.    Treatment Number and Dose:fx 4/5    I was personally present at the machine for the delivery of the above treatment.     I provided direct supervision of the patient's set up, treatment parameters and image guidance. I provided corrections and approval of the above prior to the treatment, in real time. I was present for any adjustments required due to patient motion, target movement or equipment issues.    The ongoing images for localization, tumor tracking, gating and beam's eye view localization images will also be approved.     Notes on treatment course, films, medical progress and plan:    Doing well. Tolerated treatment without difficulty. No problems or questions.    Problem added:  None  Medications added:   None  Ancillary referrals made: None    I have reviewed and marked as \"reviewed\" the current medications, allergies and problem list in the patients EMR.    Patient's Care Team:  Patient Care Team:  Mary Taylor MD as PCP - General (Family Medicine)  Bienvenido Collier MD as Consulting Physician (Hematology and Oncology)  Daniel Oconnor MD as Referring Physician (Pulmonary Disease)  Shazia Mcdonald MD as Consulting Physician (Radiation Oncology)    Seen and approved by:  Hali Fong MD, 12/13/2017  "

## 2017-12-15 ENCOUNTER — RADIATION ONCOLOGY WEEKLY ASSESSMENT (OUTPATIENT)
Dept: RADIATION ONCOLOGY | Facility: HOSPITAL | Age: 67
End: 2017-12-15

## 2017-12-15 DIAGNOSIS — C34.92 ADENOCARCINOMA OF LEFT LUNG (HCC): Primary | ICD-10-CM

## 2017-12-15 LAB
CYTO UR: NORMAL
LAB AP CASE REPORT: NORMAL
LAB AP INTRADEPARTMENTAL CONSULT: NORMAL
Lab: NORMAL
Lab: NORMAL
PATH REPORT.ADDENDUM SPEC: NORMAL
PATH REPORT.FINAL DX SPEC: NORMAL
PATH REPORT.GROSS SPEC: NORMAL

## 2017-12-15 PROCEDURE — 77373 STRTCTC BDY RAD THER TX DLVR: CPT | Performed by: RADIOLOGY

## 2017-12-15 NOTE — PROGRESS NOTES
"  Physician Stereotactic Management Note    Diagnosis:   No diagnosis found.    Doing well pretreatment, no problems.    Treatment Number and Dose: fx 5/5 [id number]    I was personally present at the machine for the delivery of the above treatment.     I provided direct supervision of the patient's set up, treatment parameters and image guidance. I provided corrections and approval of the above prior to the treatment, in real time. I was present for any adjustments required due to patient motion, target movement or equipment issues.    The ongoing images for localization, tumor tracking, gating and beam's eye view localization images will also be approved.     Notes on treatment course, films, medical progress and plan:    Doing well. Tolerated treatment without difficulty. No problems or questions.    Problem added:  None  Medications added:   None  Ancillary referrals made: None    I have reviewed and marked as \"reviewed\" the current medications, allergies and problem list in the patients EMR.    Patient's Care Team:  Patient Care Team:  Mary Taylor MD as PCP - General (Family Medicine)  Bienvenido Collier MD as Consulting Physician (Hematology and Oncology)  Daniel Oconnor MD as Referring Physician (Pulmonary Disease)  Shazia Mcdonald MD as Consulting Physician (Radiation Oncology)    Seen and approved by:  Hali Fong MD, 12/15/2017    "

## 2017-12-29 ENCOUNTER — APPOINTMENT (OUTPATIENT)
Dept: LAB | Facility: HOSPITAL | Age: 67
End: 2017-12-29

## 2017-12-29 ENCOUNTER — OFFICE VISIT (OUTPATIENT)
Dept: ONCOLOGY | Facility: CLINIC | Age: 67
End: 2017-12-29

## 2017-12-29 VITALS — WEIGHT: 130.3 LBS | BODY MASS INDEX: 21.32 KG/M2

## 2017-12-29 DIAGNOSIS — C15.9 ESOPHAGEAL CARCINOMA (HCC): ICD-10-CM

## 2017-12-29 DIAGNOSIS — C34.92 ADENOCARCINOMA OF LEFT LUNG (HCC): Primary | ICD-10-CM

## 2017-12-29 PROCEDURE — 99215 OFFICE O/P EST HI 40 MIN: CPT | Performed by: NURSE PRACTITIONER

## 2017-12-29 RX ORDER — ONDANSETRON HYDROCHLORIDE 8 MG/1
8 TABLET, FILM COATED ORAL EVERY 8 HOURS PRN
Qty: 30 TABLET | Refills: 5 | Status: SHIPPED | OUTPATIENT
Start: 2017-12-29 | End: 2018-02-02 | Stop reason: SDUPTHER

## 2017-12-29 NOTE — PROGRESS NOTES
Subjective chemotherapy education    PATIENT NAME:  Dilma Abad  YOB: 1950  PATIENTS AGE:  67 y.o.  PATIENTS SEX:  female  DATE OF SERVICE:  12/29/2017  PROVIDER:  GILMAR Mejia      ____________________PATIENT EDUCATION____________________    PATIENT EDUCATION:  Today I met with the patient to discuss the chemotherapy regimen recommended for treatment of her disease.  The patient was given explanation of treatment premed side effects including office policy that prohibits patients to drive if sedating medications are administered, MD explanation given regarding benefits, side effects, toxicities and goals of treatment.  The patient received a Chemotherapy/Biotherapy Plan Summary including diagnosis and specific treatment plan.    SIDE EFFECTS:  Common side effects were discussed with the patient and/or significant other.  Discussion included hair loss/discoloration, anemia/fatigue, infection/chills/fever, appetite, bleeding risk/precautions, constipation, diarrhea, mouth sores, taste alteration, loss of appetite,nausea/vomiting, peripheral neuropathy, skin/nail changes, rash, muscle aches/weakness, photosensitivity, weight gain/loss, hearing loss, dizziness, menopausal symptoms, menstrrual irregularity, sterility, high blood pressure, heart damage, liver damage, lung damage, kidney damage, DVT/PE risk, fluid retention, pleural/pericardial effusion, somnolence, electrolyte/LFT imbalance, vein exercises and/or the possible need for vascular access/port placement.  The patient was advice that although uncommon, leakage of an infused medication from the vein or venous access device (port) may lead to skin breakdown and/or other tissue damage.  The patient was advised that he/she may have pain, bleeding, and/or bruising from the insertion of a needle in their vein or venous access device (port).  The patient was further advised that, in spite of proper technique, infection with redness  and irritation may rarely occur at the site where the needle was inserted.  The patient was advised that if complications occur, additional medical treatment is available.    Discussion also included side effects specific to drugs in the treatment plan, specifically .  Carboplatin, Taxol    Reproductive risks were discussed, including appropriate use of birth control and protection during sexual relations.    A total of 60 minutes were spent with the patient, with 100% of time spent in education and counseling.

## 2018-01-01 ENCOUNTER — TELEPHONE (OUTPATIENT)
Dept: ONCOLOGY | Facility: CLINIC | Age: 68
End: 2018-01-01

## 2018-01-01 ENCOUNTER — READMISSION MANAGEMENT (OUTPATIENT)
Dept: CALL CENTER | Facility: HOSPITAL | Age: 68
End: 2018-01-01

## 2018-01-01 ENCOUNTER — HOSPITAL ENCOUNTER (OUTPATIENT)
Dept: GENERAL RADIOLOGY | Facility: HOSPITAL | Age: 68
Discharge: HOME | End: 2018-09-10
Admitting: FAMILY MEDICINE

## 2018-01-01 ENCOUNTER — RESULTS ENCOUNTER (OUTPATIENT)
Dept: ONCOLOGY | Facility: CLINIC | Age: 68
End: 2018-01-01

## 2018-01-01 ENCOUNTER — APPOINTMENT (OUTPATIENT)
Dept: ONCOLOGY | Facility: HOSPITAL | Age: 68
End: 2018-01-01

## 2018-01-01 ENCOUNTER — APPOINTMENT (OUTPATIENT)
Dept: GENERAL RADIOLOGY | Facility: HOSPITAL | Age: 68
End: 2018-01-01

## 2018-01-01 ENCOUNTER — INFUSION (OUTPATIENT)
Dept: ONCOLOGY | Facility: HOSPITAL | Age: 68
End: 2018-01-01

## 2018-01-01 ENCOUNTER — TELEPHONE (OUTPATIENT)
Dept: ONCOLOGY | Facility: HOSPITAL | Age: 68
End: 2018-01-01

## 2018-01-01 ENCOUNTER — APPOINTMENT (OUTPATIENT)
Dept: ONCOLOGY | Facility: CLINIC | Age: 68
End: 2018-01-01

## 2018-01-01 ENCOUNTER — HOSPITAL ENCOUNTER (INPATIENT)
Facility: HOSPITAL | Age: 68
LOS: 8 days | Discharge: HOME HEALTH | End: 2018-08-08
Attending: EMERGENCY MEDICINE | Admitting: HOSPITALIST

## 2018-01-01 ENCOUNTER — OFFICE VISIT (OUTPATIENT)
Dept: GASTROENTEROLOGY | Facility: CLINIC | Age: 68
End: 2018-01-01

## 2018-01-01 ENCOUNTER — OFFICE VISIT (OUTPATIENT)
Dept: ONCOLOGY | Facility: CLINIC | Age: 68
End: 2018-01-01

## 2018-01-01 ENCOUNTER — APPOINTMENT (OUTPATIENT)
Dept: CT IMAGING | Facility: HOSPITAL | Age: 68
End: 2018-01-01

## 2018-01-01 ENCOUNTER — LAB (OUTPATIENT)
Dept: LAB | Facility: HOSPITAL | Age: 68
End: 2018-01-01

## 2018-01-01 ENCOUNTER — TELEPHONE (OUTPATIENT)
Dept: SOCIAL WORK | Facility: HOSPITAL | Age: 68
End: 2018-01-01

## 2018-01-01 ENCOUNTER — APPOINTMENT (OUTPATIENT)
Dept: GENERAL RADIOLOGY | Facility: HOSPITAL | Age: 68
End: 2018-01-01
Attending: INTERNAL MEDICINE

## 2018-01-01 ENCOUNTER — CLINICAL SUPPORT (OUTPATIENT)
Dept: ONCOLOGY | Facility: HOSPITAL | Age: 68
End: 2018-01-01

## 2018-01-01 ENCOUNTER — HOSPITAL ENCOUNTER (OUTPATIENT)
Dept: CT IMAGING | Facility: HOSPITAL | Age: 68
Discharge: HOME | End: 2018-11-08
Attending: INTERNAL MEDICINE | Admitting: INTERNAL MEDICINE

## 2018-01-01 ENCOUNTER — LAB REQUISITION (OUTPATIENT)
Dept: LAB | Facility: HOSPITAL | Age: 68
End: 2018-01-01

## 2018-01-01 VITALS
SYSTOLIC BLOOD PRESSURE: 129 MMHG | DIASTOLIC BLOOD PRESSURE: 70 MMHG | BODY MASS INDEX: 16.64 KG/M2 | WEIGHT: 106 LBS | RESPIRATION RATE: 16 BRPM | TEMPERATURE: 97.9 F | OXYGEN SATURATION: 94 % | HEIGHT: 67 IN | HEART RATE: 83 BPM

## 2018-01-01 VITALS
SYSTOLIC BLOOD PRESSURE: 155 MMHG | DIASTOLIC BLOOD PRESSURE: 81 MMHG | BODY MASS INDEX: 16.7 KG/M2 | HEART RATE: 83 BPM | TEMPERATURE: 98.6 F | WEIGHT: 106.6 LBS | OXYGEN SATURATION: 97 %

## 2018-01-01 VITALS
TEMPERATURE: 97.7 F | RESPIRATION RATE: 16 BRPM | BODY MASS INDEX: 16.42 KG/M2 | WEIGHT: 104.6 LBS | OXYGEN SATURATION: 93 % | SYSTOLIC BLOOD PRESSURE: 150 MMHG | HEART RATE: 69 BPM | DIASTOLIC BLOOD PRESSURE: 74 MMHG | HEIGHT: 67 IN

## 2018-01-01 VITALS
RESPIRATION RATE: 18 BRPM | HEART RATE: 88 BPM | DIASTOLIC BLOOD PRESSURE: 83 MMHG | OXYGEN SATURATION: 86 % | SYSTOLIC BLOOD PRESSURE: 151 MMHG | TEMPERATURE: 98.1 F | BODY MASS INDEX: 18.75 KG/M2 | WEIGHT: 119.49 LBS | HEIGHT: 67 IN

## 2018-01-01 VITALS
HEART RATE: 94 BPM | DIASTOLIC BLOOD PRESSURE: 84 MMHG | BODY MASS INDEX: 16.47 KG/M2 | SYSTOLIC BLOOD PRESSURE: 157 MMHG | WEIGHT: 105.2 LBS

## 2018-01-01 VITALS — BODY MASS INDEX: 16.57 KG/M2 | WEIGHT: 105.8 LBS

## 2018-01-01 VITALS
HEIGHT: 67 IN | WEIGHT: 108 LBS | BODY MASS INDEX: 16.95 KG/M2 | DIASTOLIC BLOOD PRESSURE: 64 MMHG | OXYGEN SATURATION: 97 % | SYSTOLIC BLOOD PRESSURE: 96 MMHG | RESPIRATION RATE: 16 BRPM | HEART RATE: 91 BPM | TEMPERATURE: 98.2 F

## 2018-01-01 VITALS
HEIGHT: 67 IN | SYSTOLIC BLOOD PRESSURE: 114 MMHG | TEMPERATURE: 97.8 F | DIASTOLIC BLOOD PRESSURE: 70 MMHG | BODY MASS INDEX: 16.67 KG/M2 | WEIGHT: 106.2 LBS

## 2018-01-01 DIAGNOSIS — K20.80 RADIATION ESOPHAGITIS: ICD-10-CM

## 2018-01-01 DIAGNOSIS — Z00.00 ENCOUNTER FOR GENERAL ADULT MEDICAL EXAMINATION WITHOUT ABNORMAL FINDINGS: ICD-10-CM

## 2018-01-01 DIAGNOSIS — C15.9 ESOPHAGEAL CARCINOMA (HCC): ICD-10-CM

## 2018-01-01 DIAGNOSIS — Z45.2 FITTING AND ADJUSTMENT OF VASCULAR CATHETER: ICD-10-CM

## 2018-01-01 DIAGNOSIS — D80.1 HYPOGAMMAGLOBULINEMIA, ACQUIRED (HCC): Primary | ICD-10-CM

## 2018-01-01 DIAGNOSIS — J44.9 CHRONIC OBSTRUCTIVE PULMONARY DISEASE, UNSPECIFIED COPD TYPE (HCC): ICD-10-CM

## 2018-01-01 DIAGNOSIS — T66.XXXA RADIATION ESOPHAGITIS: ICD-10-CM

## 2018-01-01 DIAGNOSIS — C34.92 ADENOCARCINOMA OF LEFT LUNG (HCC): ICD-10-CM

## 2018-01-01 DIAGNOSIS — R19.7 DIARRHEA, UNSPECIFIED TYPE: ICD-10-CM

## 2018-01-01 DIAGNOSIS — E87.6 HYPOKALEMIA: ICD-10-CM

## 2018-01-01 DIAGNOSIS — J43.1 PANLOBULAR EMPHYSEMA (HCC): ICD-10-CM

## 2018-01-01 DIAGNOSIS — C15.9 ESOPHAGEAL CARCINOMA (HCC): Primary | ICD-10-CM

## 2018-01-01 DIAGNOSIS — R26.2 DIFFICULTY WALKING: ICD-10-CM

## 2018-01-01 DIAGNOSIS — D80.1 HYPOGAMMAGLOBULINEMIA, ACQUIRED (HCC): ICD-10-CM

## 2018-01-01 DIAGNOSIS — J18.9 PNEUMONIA DUE TO INFECTIOUS ORGANISM, UNSPECIFIED LATERALITY, UNSPECIFIED PART OF LUNG: ICD-10-CM

## 2018-01-01 DIAGNOSIS — C34.92 ADENOCARCINOMA OF LEFT LUNG (HCC): Primary | ICD-10-CM

## 2018-01-01 DIAGNOSIS — A41.9 SEPSIS, DUE TO UNSPECIFIED ORGANISM: Primary | ICD-10-CM

## 2018-01-01 DIAGNOSIS — Z09 FOLLOW UP: ICD-10-CM

## 2018-01-01 DIAGNOSIS — Z45.2 FITTING AND ADJUSTMENT OF VASCULAR CATHETER: Primary | ICD-10-CM

## 2018-01-01 DIAGNOSIS — R13.12 OROPHARYNGEAL DYSPHAGIA: ICD-10-CM

## 2018-01-01 LAB
ALBUMIN SERPL-MCNC: 2.6 G/DL (ref 3.5–5.2)
ALBUMIN SERPL-MCNC: 2.6 G/DL (ref 3.5–5.2)
ALBUMIN SERPL-MCNC: 3.2 G/DL (ref 3.5–5.2)
ALBUMIN SERPL-MCNC: 3.8 G/DL (ref 3.5–5.2)
ALBUMIN SERPL-MCNC: 3.9 G/DL (ref 3.5–5.2)
ALBUMIN SERPL-MCNC: 4.1 G/DL (ref 3.5–5.2)
ALBUMIN/GLOB SERPL: 0.9 G/DL
ALBUMIN/GLOB SERPL: 1.2 G/DL
ALBUMIN/GLOB SERPL: 1.3 G/DL
ALBUMIN/GLOB SERPL: 1.6 G/DL
ALBUMIN/GLOB SERPL: 1.6 G/DL (ref 1.1–2.4)
ALBUMIN/GLOB SERPL: 1.6 G/DL (ref 1.1–2.4)
ALP SERPL-CCNC: 61 U/L (ref 39–117)
ALP SERPL-CCNC: 63 U/L (ref 38–116)
ALP SERPL-CCNC: 65 U/L (ref 38–116)
ALP SERPL-CCNC: 65 U/L (ref 39–117)
ALP SERPL-CCNC: 80 U/L (ref 39–117)
ALP SERPL-CCNC: 88 U/L (ref 39–117)
ALT SERPL W P-5'-P-CCNC: 10 U/L (ref 0–33)
ALT SERPL W P-5'-P-CCNC: 17 U/L (ref 1–33)
ALT SERPL W P-5'-P-CCNC: 18 U/L (ref 0–33)
ALT SERPL W P-5'-P-CCNC: 24 U/L (ref 1–33)
ALT SERPL W P-5'-P-CCNC: 26 U/L (ref 1–33)
ALT SERPL W P-5'-P-CCNC: 34 U/L (ref 1–33)
ANION GAP SERPL CALCULATED.3IONS-SCNC: 10.5 MMOL/L
ANION GAP SERPL CALCULATED.3IONS-SCNC: 10.9 MMOL/L
ANION GAP SERPL CALCULATED.3IONS-SCNC: 11.7 MMOL/L
ANION GAP SERPL CALCULATED.3IONS-SCNC: 12 MMOL/L
ANION GAP SERPL CALCULATED.3IONS-SCNC: 12.1 MMOL/L
ANION GAP SERPL CALCULATED.3IONS-SCNC: 14.8 MMOL/L
ANION GAP SERPL CALCULATED.3IONS-SCNC: 15.3 MMOL/L
ANION GAP SERPL CALCULATED.3IONS-SCNC: 18.4 MMOL/L
ANION GAP SERPL CALCULATED.3IONS-SCNC: 19 MMOL/L
ANION GAP SERPL CALCULATED.3IONS-SCNC: 9.5 MMOL/L
ANION GAP SERPL CALCULATED.3IONS-SCNC: 9.9 MMOL/L
ANISOCYTOSIS BLD QL: ABNORMAL
ARTERIAL PATENCY WRIST A: POSITIVE
AST SERPL-CCNC: 15 U/L (ref 1–32)
AST SERPL-CCNC: 16 U/L (ref 1–32)
AST SERPL-CCNC: 18 U/L (ref 1–32)
AST SERPL-CCNC: 19 U/L (ref 0–32)
AST SERPL-CCNC: 23 U/L (ref 1–32)
AST SERPL-CCNC: 25 U/L (ref 0–32)
ATMOSPHERIC PRESS: 751.6 MMHG
ATMOSPHERIC PRESS: 752.4 MMHG
ATMOSPHERIC PRESS: 754 MMHG
ATMOSPHERIC PRESS: 754.5 MMHG
ATMOSPHERIC PRESS: 756.7 MMHG
B PERT DNA SPEC QL NAA+PROBE: NOT DETECTED
BACTERIA SPEC AEROBE CULT: NORMAL
BACTERIA SPEC RESP CULT: ABNORMAL
BACTERIA SPEC RESP CULT: ABNORMAL
BACTERIA UR QL AUTO: ABNORMAL /HPF
BASE EXCESS BLDA CALC-SCNC: -2.1 MMOL/L (ref 0–2)
BASE EXCESS BLDA CALC-SCNC: -2.8 MMOL/L (ref 0–2)
BASE EXCESS BLDA CALC-SCNC: -5.4 MMOL/L (ref 0–2)
BASE EXCESS BLDA CALC-SCNC: -6.8 MMOL/L (ref 0–2)
BASE EXCESS BLDA CALC-SCNC: 0.2 MMOL/L (ref 0–2)
BASOPHILS # BLD AUTO: 0.01 10*3/MM3 (ref 0–0.1)
BASOPHILS # BLD AUTO: 0.01 10*3/MM3 (ref 0–0.1)
BASOPHILS # BLD AUTO: 0.01 10*3/MM3 (ref 0–0.2)
BASOPHILS # BLD AUTO: 0.01 10*3/MM3 (ref 0–0.2)
BASOPHILS # BLD AUTO: 0.02 10*3/MM3 (ref 0–0.1)
BASOPHILS NFR BLD AUTO: 0.1 % (ref 0–1.5)
BASOPHILS NFR BLD AUTO: 0.2 % (ref 0–1.1)
BASOPHILS NFR BLD AUTO: 0.2 % (ref 0–1.1)
BASOPHILS NFR BLD AUTO: 0.2 % (ref 0–1.5)
BASOPHILS NFR BLD AUTO: 0.4 % (ref 0–1.1)
BDY SITE: ABNORMAL
BDY SITE: NORMAL
BILIRUB SERPL-MCNC: 0.2 MG/DL (ref 0.1–1.2)
BILIRUB SERPL-MCNC: 0.3 MG/DL (ref 0.1–1.2)
BILIRUB SERPL-MCNC: 0.4 MG/DL (ref 0.1–1.2)
BILIRUB SERPL-MCNC: 0.4 MG/DL (ref 0.1–1.2)
BILIRUB SERPL-MCNC: 1 MG/DL (ref 0.1–1.2)
BILIRUB SERPL-MCNC: <0.2 MG/DL (ref 0.1–1.2)
BILIRUB UR QL STRIP: NEGATIVE
BUN BLD-MCNC: 10 MG/DL (ref 8–23)
BUN BLD-MCNC: 11 MG/DL (ref 6–20)
BUN BLD-MCNC: 12 MG/DL (ref 6–20)
BUN BLD-MCNC: 12 MG/DL (ref 8–23)
BUN BLD-MCNC: 13 MG/DL (ref 8–23)
BUN BLD-MCNC: 14 MG/DL (ref 8–23)
BUN BLD-MCNC: 17 MG/DL (ref 8–23)
BUN BLD-MCNC: 22 MG/DL (ref 8–23)
BUN BLD-MCNC: 24 MG/DL (ref 8–23)
BUN/CREAT SERPL: 12.6 (ref 7.3–30)
BUN/CREAT SERPL: 13.3 (ref 7–25)
BUN/CREAT SERPL: 14.1 (ref 7.3–30)
BUN/CREAT SERPL: 16.3 (ref 7–25)
BUN/CREAT SERPL: 16.5 (ref 7–25)
BUN/CREAT SERPL: 18.2 (ref 7–25)
BUN/CREAT SERPL: 19.7 (ref 7–25)
BUN/CREAT SERPL: 25 (ref 7–25)
BUN/CREAT SERPL: 30.4 (ref 7–25)
BUN/CREAT SERPL: 31.9 (ref 7–25)
BUN/CREAT SERPL: 39.3 (ref 7–25)
C DIFF TOX GENS STL QL NAA+PROBE: NEGATIVE
C PNEUM DNA NPH QL NAA+NON-PROBE: NOT DETECTED
CALCIUM SPEC-SCNC: 7.4 MG/DL (ref 8.6–10.5)
CALCIUM SPEC-SCNC: 7.8 MG/DL (ref 8.6–10.5)
CALCIUM SPEC-SCNC: 8.1 MG/DL (ref 8.6–10.5)
CALCIUM SPEC-SCNC: 8.4 MG/DL (ref 8.6–10.5)
CALCIUM SPEC-SCNC: 8.4 MG/DL (ref 8.6–10.5)
CALCIUM SPEC-SCNC: 8.5 MG/DL (ref 8.6–10.5)
CALCIUM SPEC-SCNC: 8.8 MG/DL (ref 8.6–10.5)
CALCIUM SPEC-SCNC: 8.8 MG/DL (ref 8.6–10.5)
CALCIUM SPEC-SCNC: 9 MG/DL (ref 8.5–10.2)
CALCIUM SPEC-SCNC: 9.1 MG/DL (ref 8.5–10.2)
CALCIUM SPEC-SCNC: 9.9 MG/DL (ref 8.6–10.5)
CHLORIDE SERPL-SCNC: 101 MMOL/L (ref 98–107)
CHLORIDE SERPL-SCNC: 102 MMOL/L (ref 98–107)
CHLORIDE SERPL-SCNC: 102 MMOL/L (ref 98–107)
CHLORIDE SERPL-SCNC: 103 MMOL/L (ref 98–107)
CHLORIDE SERPL-SCNC: 106 MMOL/L (ref 98–107)
CHLORIDE SERPL-SCNC: 107 MMOL/L (ref 98–107)
CHLORIDE SERPL-SCNC: 88 MMOL/L (ref 98–107)
CHLORIDE SERPL-SCNC: 94 MMOL/L (ref 98–107)
CHLORIDE SERPL-SCNC: 98 MMOL/L (ref 98–107)
CHLORIDE SERPL-SCNC: 98 MMOL/L (ref 98–107)
CHLORIDE SERPL-SCNC: 99 MMOL/L (ref 98–107)
CLARITY UR: CLEAR
CLUMPED PLATELETS: PRESENT
CO2 SERPL-SCNC: 21.2 MMOL/L (ref 22–29)
CO2 SERPL-SCNC: 21.6 MMOL/L (ref 22–29)
CO2 SERPL-SCNC: 23.1 MMOL/L (ref 22–29)
CO2 SERPL-SCNC: 23.3 MMOL/L (ref 22–29)
CO2 SERPL-SCNC: 24.5 MMOL/L (ref 22–29)
CO2 SERPL-SCNC: 26 MMOL/L (ref 22–29)
CO2 SERPL-SCNC: 26.7 MMOL/L (ref 22–29)
CO2 SERPL-SCNC: 27.1 MMOL/L (ref 22–29)
CO2 SERPL-SCNC: 28.9 MMOL/L (ref 22–29)
CO2 SERPL-SCNC: 30 MMOL/L (ref 22–29)
CO2 SERPL-SCNC: 31.5 MMOL/L (ref 22–29)
COLOR UR: YELLOW
CREAT BLD-MCNC: 0.55 MG/DL (ref 0.57–1)
CREAT BLD-MCNC: 0.56 MG/DL (ref 0.57–1)
CREAT BLD-MCNC: 0.56 MG/DL (ref 0.57–1)
CREAT BLD-MCNC: 0.61 MG/DL (ref 0.57–1)
CREAT BLD-MCNC: 0.61 MG/DL (ref 0.57–1)
CREAT BLD-MCNC: 0.69 MG/DL (ref 0.57–1)
CREAT BLD-MCNC: 0.78 MG/DL (ref 0.6–1.1)
CREAT BLD-MCNC: 0.8 MG/DL (ref 0.57–1)
CREAT BLD-MCNC: 0.85 MG/DL (ref 0.57–1)
CREAT BLD-MCNC: 0.95 MG/DL (ref 0.6–1.1)
CREAT BLD-MCNC: 1.05 MG/DL (ref 0.57–1)
CREAT BLDA-MCNC: 0.8 MG/DL (ref 0.6–1.3)
CRP SERPL-MCNC: 0.84 MG/DL (ref 0–0.5)
CRP SERPL-MCNC: 20 MG/DL (ref 0–0.5)
D-LACTATE SERPL-SCNC: 1.4 MMOL/L (ref 0.5–2)
D-LACTATE SERPL-SCNC: 2.1 MMOL/L (ref 0.5–2)
D-LACTATE SERPL-SCNC: 2.5 MMOL/L (ref 0.5–2)
DEPRECATED RDW RBC AUTO: 40.5 FL (ref 37–49)
DEPRECATED RDW RBC AUTO: 42 FL (ref 37–49)
DEPRECATED RDW RBC AUTO: 42.1 FL (ref 37–49)
DEPRECATED RDW RBC AUTO: 42.1 FL (ref 37–54)
DEPRECATED RDW RBC AUTO: 42.9 FL (ref 37–54)
DEPRECATED RDW RBC AUTO: 43.3 FL (ref 37–49)
DEPRECATED RDW RBC AUTO: 43.6 FL (ref 37–54)
DEPRECATED RDW RBC AUTO: 44 FL (ref 37–54)
DEPRECATED RDW RBC AUTO: 44 FL (ref 37–54)
DEPRECATED RDW RBC AUTO: 44.5 FL (ref 37–54)
DEPRECATED RDW RBC AUTO: 44.8 FL (ref 37–54)
DEPRECATED RDW RBC AUTO: 45 FL (ref 37–54)
DEPRECATED RDW RBC AUTO: 45.5 FL (ref 37–49)
DEPRECATED RDW RBC AUTO: 45.6 FL (ref 37–54)
DEPRECATED RDW RBC AUTO: 45.7 FL (ref 37–49)
DEPRECATED RDW RBC AUTO: 45.9 FL (ref 37–54)
DEPRECATED RDW RBC AUTO: 54.3 FL (ref 37–54)
ELLIPTOCYTES BLD QL SMEAR: ABNORMAL
EOSINOPHIL # BLD AUTO: 0.01 10*3/MM3 (ref 0–0.7)
EOSINOPHIL # BLD AUTO: 0.03 10*3/MM3 (ref 0–0.7)
EOSINOPHIL # BLD AUTO: 0.05 10*3/MM3 (ref 0–0.36)
EOSINOPHIL # BLD AUTO: 0.05 10*3/MM3 (ref 0–0.36)
EOSINOPHIL # BLD AUTO: 0.08 10*3/MM3 (ref 0–0.36)
EOSINOPHIL # BLD AUTO: 0.09 10*3/MM3 (ref 0–0.36)
EOSINOPHIL # BLD AUTO: 0.09 10*3/MM3 (ref 0–0.36)
EOSINOPHIL NFR BLD AUTO: 0.1 % (ref 0.3–6.2)
EOSINOPHIL NFR BLD AUTO: 0.5 % (ref 0.3–6.2)
EOSINOPHIL NFR BLD AUTO: 1 % (ref 1–5)
EOSINOPHIL NFR BLD AUTO: 1 % (ref 1–5)
EOSINOPHIL NFR BLD AUTO: 1.7 % (ref 1–5)
EOSINOPHIL NFR BLD AUTO: 1.9 % (ref 1–5)
EOSINOPHIL NFR BLD AUTO: 2 % (ref 1–5)
ERYTHROCYTE [DISTWIDTH] IN BLOOD BY AUTOMATED COUNT: 12.8 % (ref 11.7–14.5)
ERYTHROCYTE [DISTWIDTH] IN BLOOD BY AUTOMATED COUNT: 13 % (ref 11.7–13)
ERYTHROCYTE [DISTWIDTH] IN BLOOD BY AUTOMATED COUNT: 13.2 % (ref 11.7–13)
ERYTHROCYTE [DISTWIDTH] IN BLOOD BY AUTOMATED COUNT: 13.2 % (ref 11.7–14.5)
ERYTHROCYTE [DISTWIDTH] IN BLOOD BY AUTOMATED COUNT: 13.4 % (ref 11.7–14.5)
ERYTHROCYTE [DISTWIDTH] IN BLOOD BY AUTOMATED COUNT: 13.5 % (ref 11.7–14.5)
ERYTHROCYTE [DISTWIDTH] IN BLOOD BY AUTOMATED COUNT: 13.7 % (ref 11.7–13)
ERYTHROCYTE [DISTWIDTH] IN BLOOD BY AUTOMATED COUNT: 13.7 % (ref 11.7–13)
ERYTHROCYTE [DISTWIDTH] IN BLOOD BY AUTOMATED COUNT: 13.8 % (ref 11.7–13)
ERYTHROCYTE [DISTWIDTH] IN BLOOD BY AUTOMATED COUNT: 13.8 % (ref 11.7–13)
ERYTHROCYTE [DISTWIDTH] IN BLOOD BY AUTOMATED COUNT: 13.9 % (ref 11.7–13)
ERYTHROCYTE [DISTWIDTH] IN BLOOD BY AUTOMATED COUNT: 14 % (ref 11.7–13)
ERYTHROCYTE [DISTWIDTH] IN BLOOD BY AUTOMATED COUNT: 14.1 % (ref 11.7–14.5)
ERYTHROCYTE [DISTWIDTH] IN BLOOD BY AUTOMATED COUNT: 14.4 % (ref 11.7–14.5)
ERYTHROCYTE [DISTWIDTH] IN BLOOD BY AUTOMATED COUNT: 16.2 % (ref 11.7–13)
FLUAV H1 2009 PAND RNA NPH QL NAA+PROBE: NOT DETECTED
FLUAV H1 HA GENE NPH QL NAA+PROBE: NOT DETECTED
FLUAV H3 RNA NPH QL NAA+PROBE: NOT DETECTED
FLUAV SUBTYP SPEC NAA+PROBE: NOT DETECTED
FLUBV RNA ISLT QL NAA+PROBE: NOT DETECTED
GAS FLOW AIRWAY: 15 LPM
GAS FLOW AIRWAY: 6 LPM
GFR SERPL CREATININE-BSD FRML MDRD: 107 ML/MIN/1.73
GFR SERPL CREATININE-BSD FRML MDRD: 107 ML/MIN/1.73
GFR SERPL CREATININE-BSD FRML MDRD: 109 ML/MIN/1.73
GFR SERPL CREATININE-BSD FRML MDRD: 52 ML/MIN/1.73
GFR SERPL CREATININE-BSD FRML MDRD: 58 ML/MIN/1.73
GFR SERPL CREATININE-BSD FRML MDRD: 66 ML/MIN/1.73
GFR SERPL CREATININE-BSD FRML MDRD: 71 ML/MIN/1.73
GFR SERPL CREATININE-BSD FRML MDRD: 73 ML/MIN/1.73
GFR SERPL CREATININE-BSD FRML MDRD: 84 ML/MIN/1.73
GFR SERPL CREATININE-BSD FRML MDRD: 97 ML/MIN/1.73
GFR SERPL CREATININE-BSD FRML MDRD: 97 ML/MIN/1.73
GLOBULIN UR ELPH-MCNC: 2 GM/DL
GLOBULIN UR ELPH-MCNC: 2.1 GM/DL
GLOBULIN UR ELPH-MCNC: 2.4 GM/DL (ref 1.8–3.5)
GLOBULIN UR ELPH-MCNC: 2.4 GM/DL (ref 1.8–3.5)
GLOBULIN UR ELPH-MCNC: 2.8 GM/DL
GLOBULIN UR ELPH-MCNC: 3.2 GM/DL
GLUCOSE BLD-MCNC: 111 MG/DL (ref 65–99)
GLUCOSE BLD-MCNC: 119 MG/DL (ref 74–124)
GLUCOSE BLD-MCNC: 138 MG/DL (ref 65–99)
GLUCOSE BLD-MCNC: 139 MG/DL (ref 65–99)
GLUCOSE BLD-MCNC: 142 MG/DL (ref 65–99)
GLUCOSE BLD-MCNC: 161 MG/DL (ref 65–99)
GLUCOSE BLD-MCNC: 170 MG/DL (ref 74–124)
GLUCOSE BLD-MCNC: 66 MG/DL (ref 65–99)
GLUCOSE BLD-MCNC: 87 MG/DL (ref 65–99)
GLUCOSE BLD-MCNC: 88 MG/DL (ref 65–99)
GLUCOSE BLD-MCNC: 97 MG/DL (ref 65–99)
GLUCOSE BLDC GLUCOMTR-MCNC: 120 MG/DL (ref 70–130)
GLUCOSE BLDC GLUCOMTR-MCNC: 129 MG/DL (ref 70–130)
GLUCOSE BLDC GLUCOMTR-MCNC: 130 MG/DL (ref 70–130)
GLUCOSE BLDC GLUCOMTR-MCNC: 132 MG/DL (ref 70–130)
GLUCOSE BLDC GLUCOMTR-MCNC: 134 MG/DL (ref 70–130)
GLUCOSE BLDC GLUCOMTR-MCNC: 145 MG/DL (ref 70–130)
GLUCOSE BLDC GLUCOMTR-MCNC: 152 MG/DL (ref 70–130)
GLUCOSE BLDC GLUCOMTR-MCNC: 165 MG/DL (ref 70–130)
GLUCOSE BLDC GLUCOMTR-MCNC: 246 MG/DL (ref 70–130)
GLUCOSE UR STRIP-MCNC: ABNORMAL MG/DL
GRAM STN SPEC: ABNORMAL
HADV DNA SPEC NAA+PROBE: NOT DETECTED
HCO3 BLDA-SCNC: 23.5 MMOL/L (ref 22–28)
HCO3 BLDA-SCNC: 23.6 MMOL/L (ref 22–28)
HCO3 BLDA-SCNC: 24.7 MMOL/L (ref 22–28)
HCO3 BLDA-SCNC: 25.4 MMOL/L (ref 22–28)
HCO3 BLDA-SCNC: 26.2 MMOL/L (ref 22–28)
HCOV 229E RNA SPEC QL NAA+PROBE: NOT DETECTED
HCOV HKU1 RNA SPEC QL NAA+PROBE: NOT DETECTED
HCOV NL63 RNA SPEC QL NAA+PROBE: NOT DETECTED
HCOV OC43 RNA SPEC QL NAA+PROBE: NOT DETECTED
HCT VFR BLD AUTO: 25.3 % (ref 35.6–45.5)
HCT VFR BLD AUTO: 27.8 % (ref 35.6–45.5)
HCT VFR BLD AUTO: 28.3 % (ref 35.6–45.5)
HCT VFR BLD AUTO: 28.7 % (ref 35.6–45.5)
HCT VFR BLD AUTO: 29.7 % (ref 35.6–45.5)
HCT VFR BLD AUTO: 30.7 % (ref 35.6–45.5)
HCT VFR BLD AUTO: 31.1 % (ref 35.6–45.5)
HCT VFR BLD AUTO: 32.6 % (ref 35.6–45.5)
HCT VFR BLD AUTO: 33.3 % (ref 35.6–45.5)
HCT VFR BLD AUTO: 34.5 % (ref 35.6–45.5)
HCT VFR BLD AUTO: 35.4 % (ref 34–45)
HCT VFR BLD AUTO: 36.2 % (ref 34–45)
HCT VFR BLD AUTO: 36.6 % (ref 34–45)
HCT VFR BLD AUTO: 37 % (ref 34–45)
HCT VFR BLD AUTO: 37.1 % (ref 34–45)
HCT VFR BLD AUTO: 37.5 % (ref 35.6–45.5)
HCT VFR BLD AUTO: 37.6 % (ref 34–45)
HGB BLD-MCNC: 10.5 G/DL (ref 11.9–15.5)
HGB BLD-MCNC: 10.7 G/DL (ref 11.9–15.5)
HGB BLD-MCNC: 10.9 G/DL (ref 11.9–15.5)
HGB BLD-MCNC: 11.3 G/DL (ref 11.9–15.5)
HGB BLD-MCNC: 11.8 G/DL (ref 11.5–14.9)
HGB BLD-MCNC: 11.9 G/DL (ref 11.5–14.9)
HGB BLD-MCNC: 12 G/DL (ref 11.9–15.5)
HGB BLD-MCNC: 12.1 G/DL (ref 11.5–14.9)
HGB BLD-MCNC: 12.3 G/DL (ref 11.5–14.9)
HGB BLD-MCNC: 12.3 G/DL (ref 11.5–14.9)
HGB BLD-MCNC: 12.5 G/DL (ref 11.5–14.9)
HGB BLD-MCNC: 8.2 G/DL (ref 11.9–15.5)
HGB BLD-MCNC: 9 G/DL (ref 11.9–15.5)
HGB BLD-MCNC: 9.2 G/DL (ref 11.9–15.5)
HGB BLD-MCNC: 9.3 G/DL (ref 11.9–15.5)
HGB BLD-MCNC: 9.6 G/DL (ref 11.9–15.5)
HGB BLD-MCNC: 9.9 G/DL (ref 11.9–15.5)
HGB UR QL STRIP.AUTO: NEGATIVE
HMPV RNA NPH QL NAA+NON-PROBE: NOT DETECTED
HOLD SPECIMEN: NORMAL
HOLD SPECIMEN: NORMAL
HOROWITZ INDEX BLD+IHG-RTO: 100 %
HOROWITZ INDEX BLD+IHG-RTO: 40 %
HOROWITZ INDEX BLD+IHG-RTO: 60 %
HPIV1 RNA SPEC QL NAA+PROBE: NOT DETECTED
HPIV2 RNA SPEC QL NAA+PROBE: NOT DETECTED
HPIV3 RNA NPH QL NAA+PROBE: NOT DETECTED
HPIV4 P GENE NPH QL NAA+PROBE: NOT DETECTED
HYALINE CASTS UR QL AUTO: ABNORMAL /LPF
HYPOCHROMIA BLD QL: ABNORMAL
IGA1 MFR SER: 100 MG/DL (ref 70–400)
IGA1 MFR SER: 117 MG/DL (ref 70–400)
IGA1 MFR SER: 124 MG/DL (ref 70–400)
IGA1 MFR SER: 133 MG/DL (ref 70–400)
IGG1 SER-MCNC: 544 MG/DL (ref 700–1600)
IGG1 SER-MCNC: 596 MG/DL (ref 700–1600)
IGG1 SER-MCNC: 625 MG/DL (ref 700–1600)
IGG1 SER-MCNC: 634 MG/DL (ref 700–1600)
IGM SERPL-MCNC: 45 MG/DL (ref 40–230)
IGM SERPL-MCNC: 47 MG/DL (ref 40–230)
IGM SERPL-MCNC: 49 MG/DL (ref 40–230)
IGM SERPL-MCNC: 51 MG/DL (ref 40–230)
IMM GRANULOCYTES # BLD AUTO: 0.01 10*3/MM3 (ref 0–0.03)
IMM GRANULOCYTES # BLD: 0.01 10*3/MM3 (ref 0–0.03)
IMM GRANULOCYTES # BLD: 0.02 10*3/MM3 (ref 0–0.03)
IMM GRANULOCYTES # BLD: 0.02 10*3/MM3 (ref 0–0.03)
IMM GRANULOCYTES # BLD: 0.03 10*3/MM3 (ref 0–0.03)
IMM GRANULOCYTES # BLD: 0.04 10*3/MM3 (ref 0–0.03)
IMM GRANULOCYTES # BLD: 0.08 10*3/MM3 (ref 0–0.03)
IMM GRANULOCYTES NFR BLD AUTO: 0.2 % (ref 0–0.5)
IMM GRANULOCYTES NFR BLD: 0.2 % (ref 0–0.5)
IMM GRANULOCYTES NFR BLD: 0.4 % (ref 0–0.5)
IMM GRANULOCYTES NFR BLD: 0.4 % (ref 0–0.5)
IMM GRANULOCYTES NFR BLD: 0.6 % (ref 0–0.5)
KETONES UR QL STRIP: NEGATIVE
LEUKOCYTE ESTERASE UR QL STRIP.AUTO: ABNORMAL
LYMPHOCYTES # BLD AUTO: 0.47 10*3/MM3 (ref 0.9–4.8)
LYMPHOCYTES # BLD AUTO: 0.59 10*3/MM3 (ref 1–3.5)
LYMPHOCYTES # BLD AUTO: 0.64 10*3/MM3 (ref 0.9–4.8)
LYMPHOCYTES # BLD AUTO: 0.66 10*3/MM3 (ref 1–3.5)
LYMPHOCYTES # BLD AUTO: 0.78 10*3/MM3 (ref 1–3.5)
LYMPHOCYTES # BLD AUTO: 0.78 10*3/MM3 (ref 1–3.5)
LYMPHOCYTES # BLD AUTO: 0.91 10*3/MM3 (ref 1–3.5)
LYMPHOCYTES # BLD MANUAL: 1.06 10*3/MM3 (ref 0.9–4.8)
LYMPHOCYTES NFR BLD AUTO: 12.5 % (ref 20–49)
LYMPHOCYTES NFR BLD AUTO: 13.3 % (ref 20–49)
LYMPHOCYTES NFR BLD AUTO: 16.1 % (ref 20–49)
LYMPHOCYTES NFR BLD AUTO: 17.3 % (ref 20–49)
LYMPHOCYTES NFR BLD AUTO: 17.6 % (ref 20–49)
LYMPHOCYTES NFR BLD AUTO: 5 % (ref 19.6–45.3)
LYMPHOCYTES NFR BLD AUTO: 7.3 % (ref 19.6–45.3)
LYMPHOCYTES NFR BLD MANUAL: 10 % (ref 19.6–45.3)
LYMPHOCYTES NFR BLD MANUAL: 5 % (ref 5–12)
M PNEUMO IGG SER IA-ACNC: NOT DETECTED
MAGNESIUM SERPL-MCNC: 2.1 MG/DL (ref 1.6–2.4)
MCH RBC QN AUTO: 28.3 PG (ref 26.9–32)
MCH RBC QN AUTO: 28.5 PG (ref 26.9–32)
MCH RBC QN AUTO: 28.5 PG (ref 27–33)
MCH RBC QN AUTO: 28.6 PG (ref 26.9–32)
MCH RBC QN AUTO: 28.6 PG (ref 26.9–32)
MCH RBC QN AUTO: 28.6 PG (ref 27–33)
MCH RBC QN AUTO: 28.7 PG (ref 27–33)
MCH RBC QN AUTO: 29.1 PG (ref 26.9–32)
MCH RBC QN AUTO: 29.1 PG (ref 27–33)
MCH RBC QN AUTO: 29.2 PG (ref 26.9–32)
MCH RBC QN AUTO: 29.2 PG (ref 27–33)
MCH RBC QN AUTO: 29.3 PG (ref 26.9–32)
MCH RBC QN AUTO: 29.3 PG (ref 27–33)
MCH RBC QN AUTO: 29.6 PG (ref 26.9–32)
MCHC RBC AUTO-ENTMCNC: 32 G/DL (ref 32.4–36.3)
MCHC RBC AUTO-ENTMCNC: 32.1 G/DL (ref 32.4–36.3)
MCHC RBC AUTO-ENTMCNC: 32.2 G/DL (ref 32.4–36.3)
MCHC RBC AUTO-ENTMCNC: 32.3 G/DL (ref 32.4–36.3)
MCHC RBC AUTO-ENTMCNC: 32.4 G/DL (ref 32.4–36.3)
MCHC RBC AUTO-ENTMCNC: 32.5 G/DL (ref 32.4–36.3)
MCHC RBC AUTO-ENTMCNC: 32.6 G/DL (ref 32–35)
MCHC RBC AUTO-ENTMCNC: 32.8 G/DL (ref 32.4–36.3)
MCHC RBC AUTO-ENTMCNC: 33.1 G/DL (ref 32–35)
MCHC RBC AUTO-ENTMCNC: 33.2 G/DL (ref 32–35)
MCHC RBC AUTO-ENTMCNC: 33.4 G/DL (ref 32.4–36.3)
MCHC RBC AUTO-ENTMCNC: 33.6 G/DL (ref 32–35)
MCHC RBC AUTO-ENTMCNC: 33.8 G/DL (ref 32.4–36.3)
MCV RBC AUTO: 86.2 FL (ref 83–97)
MCV RBC AUTO: 86.5 FL (ref 83–97)
MCV RBC AUTO: 86.9 FL (ref 80.5–98.2)
MCV RBC AUTO: 87 FL (ref 83–97)
MCV RBC AUTO: 87.4 FL (ref 83–97)
MCV RBC AUTO: 87.6 FL (ref 80.5–98.2)
MCV RBC AUTO: 87.6 FL (ref 80.5–98.2)
MCV RBC AUTO: 87.9 FL (ref 83–97)
MCV RBC AUTO: 88 FL (ref 80.5–98.2)
MCV RBC AUTO: 88.1 FL (ref 80.5–98.2)
MCV RBC AUTO: 88.1 FL (ref 80.5–98.2)
MCV RBC AUTO: 88.2 FL (ref 80.5–98.2)
MCV RBC AUTO: 88.3 FL (ref 80.5–98.2)
MCV RBC AUTO: 88.3 FL (ref 83–97)
MCV RBC AUTO: 88.5 FL (ref 80.5–98.2)
MCV RBC AUTO: 89.1 FL (ref 80.5–98.2)
MCV RBC AUTO: 91.5 FL (ref 80.5–98.2)
MODALITY: ABNORMAL
MODALITY: NORMAL
MONOCYTES # BLD AUTO: 0.46 10*3/MM3 (ref 0.25–0.8)
MONOCYTES # BLD AUTO: 0.53 10*3/MM3 (ref 0.2–1.2)
MONOCYTES # BLD AUTO: 0.54 10*3/MM3 (ref 0.25–0.8)
MONOCYTES # BLD AUTO: 0.55 10*3/MM3 (ref 0.25–0.8)
MONOCYTES # BLD AUTO: 0.55 10*3/MM3 (ref 0.25–0.8)
MONOCYTES # BLD AUTO: 0.57 10*3/MM3 (ref 0.25–0.8)
MONOCYTES # BLD AUTO: 0.9 10*3/MM3 (ref 0.2–1.2)
MONOCYTES # BLD AUTO: 1.26 10*3/MM3 (ref 0.2–1.2)
MONOCYTES NFR BLD AUTO: 10.8 % (ref 4–12)
MONOCYTES NFR BLD AUTO: 11 % (ref 4–12)
MONOCYTES NFR BLD AUTO: 11.4 % (ref 4–12)
MONOCYTES NFR BLD AUTO: 12.2 % (ref 4–12)
MONOCYTES NFR BLD AUTO: 14 % (ref 5–12)
MONOCYTES NFR BLD AUTO: 9.7 % (ref 4–12)
MONOCYTES NFR BLD AUTO: 9.8 % (ref 5–12)
MRSA SPEC QL CULT: NORMAL
NEUTROPHILS # BLD AUTO: 10.92 10*3/MM3 (ref 1.9–8.1)
NEUTROPHILS # BLD AUTO: 3.06 10*3/MM3 (ref 1.5–7)
NEUTROPHILS # BLD AUTO: 3.37 10*3/MM3 (ref 1.5–7)
NEUTROPHILS # BLD AUTO: 3.57 10*3/MM3 (ref 1.5–7)
NEUTROPHILS # BLD AUTO: 3.58 10*3/MM3 (ref 1.5–7)
NEUTROPHILS # BLD AUTO: 3.7 10*3/MM3 (ref 1.5–7)
NEUTROPHILS # BLD AUTO: 5 10*3/MM3 (ref 1.9–8.1)
NEUTROPHILS # BLD AUTO: 9.04 10*3/MM3 (ref 1.9–8.1)
NEUTROPHILS NFR BLD AUTO: 67.9 % (ref 39–75)
NEUTROPHILS NFR BLD AUTO: 69.4 % (ref 39–75)
NEUTROPHILS NFR BLD AUTO: 69.8 % (ref 39–75)
NEUTROPHILS NFR BLD AUTO: 74.3 % (ref 39–75)
NEUTROPHILS NFR BLD AUTO: 75.7 % (ref 39–75)
NEUTROPHILS NFR BLD AUTO: 77.4 % (ref 42.7–76)
NEUTROPHILS NFR BLD AUTO: 84.4 % (ref 42.7–76)
NEUTROPHILS NFR BLD MANUAL: 85 % (ref 42.7–76)
NEUTS HYPERSEG # BLD: ABNORMAL 10*3/UL
NITRITE UR QL STRIP: NEGATIVE
NRBC BLD AUTO-RTO: 0 /100 WBC (ref 0–0)
NRBC BLD MANUAL-RTO: 0 /100 WBC (ref 0–0)
NT-PROBNP SERPL-MCNC: 3433 PG/ML (ref 5–900)
NT-PROBNP SERPL-MCNC: 6525 PG/ML (ref 0–900)
O2 A-A PPRESDIFF RESPIRATORY: 0.2 MMHG
O2 A-A PPRESDIFF RESPIRATORY: 0.3 MMHG
O2 A-A PPRESDIFF RESPIRATORY: 0.3 MMHG
OVALOCYTES BLD QL SMEAR: ABNORMAL
PCO2 BLDA: 107.4 MM HG (ref 35–45)
PCO2 BLDA: 38.9 MM HG (ref 35–45)
PCO2 BLDA: 43.5 MM HG (ref 35–45)
PCO2 BLDA: 46.3 MM HG (ref 35–45)
PCO2 BLDA: 80.4 MM HG (ref 35–45)
PEEP RESPIRATORY: 5 CM[H2O]
PEEP RESPIRATORY: 5 CM[H2O]
PEEP RESPIRATORY: 9 CM[H2O]
PH BLDA: 7 PH UNITS (ref 7.35–7.45)
PH BLDA: 7.11 PH UNITS (ref 7.35–7.45)
PH BLDA: 7.31 PH UNITS (ref 7.35–7.45)
PH BLDA: 7.34 PH UNITS (ref 7.35–7.45)
PH BLDA: 7.41 PH UNITS (ref 7.35–7.45)
PH UR STRIP.AUTO: 6 [PH] (ref 5–8)
PHOSPHATE SERPL-MCNC: 4.3 MG/DL (ref 2.5–4.5)
PHOSPHATE SERPL-MCNC: 7 MG/DL (ref 2.5–4.5)
PLATELET # BLD AUTO: 130 10*3/MM3 (ref 140–500)
PLATELET # BLD AUTO: 144 10*3/MM3 (ref 140–500)
PLATELET # BLD AUTO: 159 10*3/MM3 (ref 140–500)
PLATELET # BLD AUTO: 171 10*3/MM3 (ref 150–375)
PLATELET # BLD AUTO: 174 10*3/MM3 (ref 150–375)
PLATELET # BLD AUTO: 179 10*3/MM3 (ref 150–375)
PLATELET # BLD AUTO: 184 10*3/MM3 (ref 140–500)
PLATELET # BLD AUTO: 190 10*3/MM3 (ref 150–375)
PLATELET # BLD AUTO: 205 10*3/MM3 (ref 150–375)
PLATELET # BLD AUTO: 207 10*3/MM3 (ref 140–500)
PLATELET # BLD AUTO: 226 10*3/MM3 (ref 150–375)
PLATELET # BLD AUTO: 229 10*3/MM3 (ref 140–500)
PLATELET # BLD AUTO: 231 10*3/MM3 (ref 140–500)
PLATELET # BLD AUTO: 234 10*3/MM3 (ref 140–500)
PLATELET # BLD AUTO: 265 10*3/MM3 (ref 140–500)
PLATELET # BLD AUTO: 273 10*3/MM3 (ref 140–500)
PLATELET # BLD AUTO: 314 10*3/MM3 (ref 140–500)
PMV BLD AUTO: 10 FL (ref 6–12)
PMV BLD AUTO: 10.1 FL (ref 8.9–12.1)
PMV BLD AUTO: 10.2 FL (ref 6–12)
PMV BLD AUTO: 10.3 FL (ref 6–12)
PMV BLD AUTO: 10.5 FL (ref 6–12)
PMV BLD AUTO: 8.9 FL (ref 6–12)
PMV BLD AUTO: 9 FL (ref 8.9–12.1)
PMV BLD AUTO: 9.2 FL (ref 8.9–12.1)
PMV BLD AUTO: 9.5 FL (ref 6–12)
PMV BLD AUTO: 9.5 FL (ref 8.9–12.1)
PMV BLD AUTO: 9.5 FL (ref 8.9–12.1)
PMV BLD AUTO: 9.7 FL (ref 6–12)
PMV BLD AUTO: 9.7 FL (ref 6–12)
PMV BLD AUTO: 9.7 FL (ref 8.9–12.1)
PMV BLD AUTO: 9.8 FL (ref 6–12)
PMV BLD AUTO: 9.9 FL (ref 6–12)
PMV BLD AUTO: 9.9 FL (ref 6–12)
PO2 BLDA: 58.4 MM HG (ref 80–100)
PO2 BLDA: 68.3 MM HG (ref 80–100)
PO2 BLDA: 87.9 MM HG (ref 80–100)
PO2 BLDA: 99.3 MM HG (ref 80–100)
PO2 BLDA: 99.4 MM HG (ref 80–100)
POTASSIUM BLD-SCNC: 3.1 MMOL/L (ref 3.5–5.2)
POTASSIUM BLD-SCNC: 3.4 MMOL/L (ref 3.5–5.2)
POTASSIUM BLD-SCNC: 3.7 MMOL/L (ref 3.5–4.7)
POTASSIUM BLD-SCNC: 3.7 MMOL/L (ref 3.5–5.2)
POTASSIUM BLD-SCNC: 3.8 MMOL/L (ref 3.5–5.2)
POTASSIUM BLD-SCNC: 3.9 MMOL/L (ref 3.5–5.2)
POTASSIUM BLD-SCNC: 4 MMOL/L (ref 3.5–4.7)
POTASSIUM BLD-SCNC: 4.1 MMOL/L (ref 3.5–5.2)
POTASSIUM BLD-SCNC: 4.1 MMOL/L (ref 3.5–5.2)
POTASSIUM BLD-SCNC: 4.2 MMOL/L (ref 3.5–5.2)
POTASSIUM BLD-SCNC: 4.4 MMOL/L (ref 3.5–5.2)
POTASSIUM BLD-SCNC: 4.7 MMOL/L (ref 3.5–5.2)
PROCALCITONIN SERPL-MCNC: 0.59 NG/ML (ref 0.1–0.25)
PROT SERPL-MCNC: 4.7 G/DL (ref 6–8.5)
PROT SERPL-MCNC: 5.2 G/DL (ref 6–8.5)
PROT SERPL-MCNC: 5.4 G/DL (ref 6–8.5)
PROT SERPL-MCNC: 6.2 G/DL (ref 6.3–8)
PROT SERPL-MCNC: 6.3 G/DL (ref 6.3–8)
PROT SERPL-MCNC: 7.3 G/DL (ref 6–8.5)
PROT UR QL STRIP: ABNORMAL
RBC # BLD AUTO: 2.87 10*6/MM3 (ref 3.9–5.2)
RBC # BLD AUTO: 3.15 10*6/MM3 (ref 3.9–5.2)
RBC # BLD AUTO: 3.23 10*6/MM3 (ref 3.9–5.2)
RBC # BLD AUTO: 3.26 10*6/MM3 (ref 3.9–5.2)
RBC # BLD AUTO: 3.37 10*6/MM3 (ref 3.9–5.2)
RBC # BLD AUTO: 3.47 10*6/MM3 (ref 3.9–5.2)
RBC # BLD AUTO: 3.55 10*6/MM3 (ref 3.9–5.2)
RBC # BLD AUTO: 3.75 10*6/MM3 (ref 3.9–5.2)
RBC # BLD AUTO: 3.78 10*6/MM3 (ref 3.9–5.2)
RBC # BLD AUTO: 3.87 10*6/MM3 (ref 3.9–5.2)
RBC # BLD AUTO: 4.07 10*6/MM3 (ref 3.9–5)
RBC # BLD AUTO: 4.1 10*6/MM3 (ref 3.9–5.2)
RBC # BLD AUTO: 4.14 10*6/MM3 (ref 3.9–5)
RBC # BLD AUTO: 4.22 10*6/MM3 (ref 3.9–5)
RBC # BLD AUTO: 4.23 10*6/MM3 (ref 3.9–5)
RBC # BLD AUTO: 4.26 10*6/MM3 (ref 3.9–5)
RBC # BLD AUTO: 4.29 10*6/MM3 (ref 3.9–5)
RBC # UR: ABNORMAL /HPF
REF LAB TEST METHOD: ABNORMAL
RHINOVIRUS RNA SPEC NAA+PROBE: NOT DETECTED
RSV RNA NPH QL NAA+NON-PROBE: NOT DETECTED
S PNEUM AG SPEC QL LA: NEGATIVE
SAO2 % BLDCOA: 79.2 % (ref 92–99)
SAO2 % BLDCOA: 88.2 % (ref 92–99)
SAO2 % BLDCOA: 94.2 % (ref 92–99)
SAO2 % BLDCOA: 96.9 % (ref 92–99)
SAO2 % BLDCOA: 97 % (ref 92–99)
SET MECH RESP RATE: 22
SET MECH RESP RATE: 30
SODIUM BLD-SCNC: 130 MMOL/L (ref 136–145)
SODIUM BLD-SCNC: 134 MMOL/L (ref 136–145)
SODIUM BLD-SCNC: 136 MMOL/L (ref 134–145)
SODIUM BLD-SCNC: 138 MMOL/L (ref 136–145)
SODIUM BLD-SCNC: 139 MMOL/L (ref 136–145)
SODIUM BLD-SCNC: 140 MMOL/L (ref 134–145)
SODIUM BLD-SCNC: 140 MMOL/L (ref 136–145)
SODIUM BLD-SCNC: 142 MMOL/L (ref 136–145)
SODIUM BLD-SCNC: 143 MMOL/L (ref 136–145)
SP GR UR STRIP: 1.01 (ref 1–1.03)
SQUAMOUS #/AREA URNS HPF: ABNORMAL /HPF
TOTAL RATE: 22 BREATHS/MINUTE
TOTAL RATE: 30 BREATHS/MINUTE
TROPONIN T SERPL-MCNC: 0.05 NG/ML (ref 0–0.03)
TSH SERPL DL<=0.05 MIU/L-ACNC: 2.04 MIU/ML (ref 0.27–4.2)
UROBILINOGEN UR QL STRIP: ABNORMAL
VANCOMYCIN TROUGH SERPL-MCNC: 10.1 MCG/ML (ref 5–20)
VENTILATOR MODE: ABNORMAL
VENTILATOR MODE: AC
VT ON VENT VENT: 450 ML
VT ON VENT VENT: 500 ML
VT ON VENT VENT: 522 ML
WBC NRBC COR # BLD: 10.64 10*3/MM3 (ref 4.5–10.7)
WBC NRBC COR # BLD: 12.92 10*3/MM3 (ref 4.5–10.7)
WBC NRBC COR # BLD: 18.16 10*3/MM3 (ref 4.5–10.7)
WBC NRBC COR # BLD: 19.17 10*3/MM3 (ref 4.5–10.7)
WBC NRBC COR # BLD: 4.51 10*3/MM3 (ref 4–10)
WBC NRBC COR # BLD: 4.72 10*3/MM3 (ref 4–10)
WBC NRBC COR # BLD: 4.83 10*3/MM3 (ref 4–10)
WBC NRBC COR # BLD: 4.98 10*3/MM3 (ref 4–10)
WBC NRBC COR # BLD: 5.16 10*3/MM3 (ref 4–10)
WBC NRBC COR # BLD: 5.17 10*3/MM3 (ref 4.5–10.7)
WBC NRBC COR # BLD: 5.57 10*3/MM3 (ref 4.5–10.7)
WBC NRBC COR # BLD: 5.61 10*3/MM3 (ref 4–10)
WBC NRBC COR # BLD: 5.95 10*3/MM3 (ref 4.5–10.7)
WBC NRBC COR # BLD: 6.27 10*3/MM3 (ref 4.5–10.7)
WBC NRBC COR # BLD: 6.45 10*3/MM3 (ref 4.5–10.7)
WBC NRBC COR # BLD: 7.47 10*3/MM3 (ref 4.5–10.7)
WBC NRBC COR # BLD: 7.61 10*3/MM3 (ref 4.5–10.7)
WBC UR QL AUTO: ABNORMAL /HPF
WHOLE BLOOD HOLD SPECIMEN: NORMAL
WHOLE BLOOD HOLD SPECIMEN: NORMAL

## 2018-01-01 PROCEDURE — 85027 COMPLETE CBC AUTOMATED: CPT | Performed by: INTERNAL MEDICINE

## 2018-01-01 PROCEDURE — 82962 GLUCOSE BLOOD TEST: CPT

## 2018-01-01 PROCEDURE — 25010000002 PIPERACILLIN SOD-TAZOBACTAM PER 1 G: Performed by: INTERNAL MEDICINE

## 2018-01-01 PROCEDURE — 84145 PROCALCITONIN (PCT): CPT | Performed by: INTERNAL MEDICINE

## 2018-01-01 PROCEDURE — 25010000002 IOPAMIDOL 61 % SOLUTION: Performed by: INTERNAL MEDICINE

## 2018-01-01 PROCEDURE — 5A1945Z RESPIRATORY VENTILATION, 24-96 CONSECUTIVE HOURS: ICD-10-PCS | Performed by: INTERNAL MEDICINE

## 2018-01-01 PROCEDURE — 82803 BLOOD GASES ANY COMBINATION: CPT

## 2018-01-01 PROCEDURE — 83735 ASSAY OF MAGNESIUM: CPT | Performed by: INTERNAL MEDICINE

## 2018-01-01 PROCEDURE — 83880 ASSAY OF NATRIURETIC PEPTIDE: CPT | Performed by: INTERNAL MEDICINE

## 2018-01-01 PROCEDURE — 25010000002 HEPARIN FLUSH (PORCINE) 100 UNIT/ML SOLUTION: Performed by: INTERNAL MEDICINE

## 2018-01-01 PROCEDURE — 94799 UNLISTED PULMONARY SVC/PX: CPT

## 2018-01-01 PROCEDURE — 92610 EVALUATE SWALLOWING FUNCTION: CPT

## 2018-01-01 PROCEDURE — 25010000002 VANCOMYCIN 750 MG RECONSTITUTED SOLUTION: Performed by: HOSPITALIST

## 2018-01-01 PROCEDURE — 25010000002 PROPOFOL 10 MG/ML EMULSION: Performed by: INTERNAL MEDICINE

## 2018-01-01 PROCEDURE — 25010000002 ONDANSETRON PER 1 MG: Performed by: HOSPITALIST

## 2018-01-01 PROCEDURE — 96523 IRRIG DRUG DELIVERY DEVICE: CPT | Performed by: INTERNAL MEDICINE

## 2018-01-01 PROCEDURE — 99215 OFFICE O/P EST HI 40 MIN: CPT | Performed by: INTERNAL MEDICINE

## 2018-01-01 PROCEDURE — 82784 ASSAY IGA/IGD/IGG/IGM EACH: CPT | Performed by: INTERNAL MEDICINE

## 2018-01-01 PROCEDURE — 86140 C-REACTIVE PROTEIN: CPT | Performed by: EMERGENCY MEDICINE

## 2018-01-01 PROCEDURE — 85025 COMPLETE CBC W/AUTO DIFF WBC: CPT | Performed by: INTERNAL MEDICINE

## 2018-01-01 PROCEDURE — 87205 SMEAR GRAM STAIN: CPT | Performed by: INTERNAL MEDICINE

## 2018-01-01 PROCEDURE — 74176 CT ABD & PELVIS W/O CONTRAST: CPT

## 2018-01-01 PROCEDURE — 87486 CHLMYD PNEUM DNA AMP PROBE: CPT | Performed by: INTERNAL MEDICINE

## 2018-01-01 PROCEDURE — 74018 RADEX ABDOMEN 1 VIEW: CPT

## 2018-01-01 PROCEDURE — 36600 WITHDRAWAL OF ARTERIAL BLOOD: CPT

## 2018-01-01 PROCEDURE — 87798 DETECT AGENT NOS DNA AMP: CPT | Performed by: INTERNAL MEDICINE

## 2018-01-01 PROCEDURE — 97110 THERAPEUTIC EXERCISES: CPT

## 2018-01-01 PROCEDURE — 80048 BASIC METABOLIC PNL TOTAL CA: CPT | Performed by: INTERNAL MEDICINE

## 2018-01-01 PROCEDURE — 97161 PT EVAL LOW COMPLEX 20 MIN: CPT

## 2018-01-01 PROCEDURE — 84100 ASSAY OF PHOSPHORUS: CPT | Performed by: INTERNAL MEDICINE

## 2018-01-01 PROCEDURE — 84443 ASSAY THYROID STIM HORMONE: CPT | Performed by: HOSPITALIST

## 2018-01-01 PROCEDURE — 87046 STOOL CULTR AEROBIC BACT EA: CPT | Performed by: EMERGENCY MEDICINE

## 2018-01-01 PROCEDURE — 25010000002 VANCOMYCIN PER 500 MG: Performed by: EMERGENCY MEDICINE

## 2018-01-01 PROCEDURE — 81001 URINALYSIS AUTO W/SCOPE: CPT | Performed by: EMERGENCY MEDICINE

## 2018-01-01 PROCEDURE — 36415 COLL VENOUS BLD VENIPUNCTURE: CPT | Performed by: INTERNAL MEDICINE

## 2018-01-01 PROCEDURE — 83605 ASSAY OF LACTIC ACID: CPT | Performed by: EMERGENCY MEDICINE

## 2018-01-01 PROCEDURE — 63710000001 PREDNISONE PER 1 MG: Performed by: INTERNAL MEDICINE

## 2018-01-01 PROCEDURE — 99214 OFFICE O/P EST MOD 30 MIN: CPT | Performed by: INTERNAL MEDICINE

## 2018-01-01 PROCEDURE — 71045 X-RAY EXAM CHEST 1 VIEW: CPT

## 2018-01-01 PROCEDURE — 96365 THER/PROPH/DIAG IV INF INIT: CPT | Performed by: INTERNAL MEDICINE

## 2018-01-01 PROCEDURE — 25010000002 FENTANYL CITRATE (PF) 100 MCG/2ML SOLUTION: Performed by: INTERNAL MEDICINE

## 2018-01-01 PROCEDURE — 94002 VENT MGMT INPAT INIT DAY: CPT

## 2018-01-01 PROCEDURE — 80202 ASSAY OF VANCOMYCIN: CPT | Performed by: INTERNAL MEDICINE

## 2018-01-01 PROCEDURE — 25010000002 METHYLPREDNISOLONE PER 125 MG: Performed by: INTERNAL MEDICINE

## 2018-01-01 PROCEDURE — 96366 THER/PROPH/DIAG IV INF ADDON: CPT | Performed by: INTERNAL MEDICINE

## 2018-01-01 PROCEDURE — 87899 AGENT NOS ASSAY W/OPTIC: CPT | Performed by: EMERGENCY MEDICINE

## 2018-01-01 PROCEDURE — 85027 COMPLETE CBC AUTOMATED: CPT

## 2018-01-01 PROCEDURE — 83605 ASSAY OF LACTIC ACID: CPT | Performed by: INTERNAL MEDICINE

## 2018-01-01 PROCEDURE — 25010000002 ENOXAPARIN PER 10 MG: Performed by: INTERNAL MEDICINE

## 2018-01-01 PROCEDURE — 85025 COMPLETE CBC W/AUTO DIFF WBC: CPT | Performed by: EMERGENCY MEDICINE

## 2018-01-01 PROCEDURE — 87045 FECES CULTURE AEROBIC BACT: CPT | Performed by: EMERGENCY MEDICINE

## 2018-01-01 PROCEDURE — 84484 ASSAY OF TROPONIN QUANT: CPT | Performed by: INTERNAL MEDICINE

## 2018-01-01 PROCEDURE — 25010000002 METHYLPREDNISOLONE PER 40 MG: Performed by: INTERNAL MEDICINE

## 2018-01-01 PROCEDURE — 94003 VENT MGMT INPAT SUBQ DAY: CPT

## 2018-01-01 PROCEDURE — 74230 X-RAY XM SWLNG FUNCJ C+: CPT

## 2018-01-01 PROCEDURE — 87633 RESP VIRUS 12-25 TARGETS: CPT | Performed by: INTERNAL MEDICINE

## 2018-01-01 PROCEDURE — 92611 MOTION FLUOROSCOPY/SWALLOW: CPT

## 2018-01-01 PROCEDURE — 25010000002 FUROSEMIDE PER 20 MG: Performed by: INTERNAL MEDICINE

## 2018-01-01 PROCEDURE — 0BH17EZ INSERTION OF ENDOTRACHEAL AIRWAY INTO TRACHEA, VIA NATURAL OR ARTIFICIAL OPENING: ICD-10-PCS | Performed by: INTERNAL MEDICINE

## 2018-01-01 PROCEDURE — 25010000002 IMMUNE GLOBULIN (HUMAN) 10 GM/100ML SOLUTION: Performed by: INTERNAL MEDICINE

## 2018-01-01 PROCEDURE — 71046 X-RAY EXAM CHEST 2 VIEWS: CPT

## 2018-01-01 PROCEDURE — 25010000002 PROPOFOL 1000 MG/ML EMULSION: Performed by: INTERNAL MEDICINE

## 2018-01-01 PROCEDURE — 25010000002 IMMUNE GLOBULIN (HUMAN) 20 GM/200ML SOLUTION: Performed by: INTERNAL MEDICINE

## 2018-01-01 PROCEDURE — 99285 EMERGENCY DEPT VISIT HI MDM: CPT

## 2018-01-01 PROCEDURE — 86140 C-REACTIVE PROTEIN: CPT | Performed by: INTERNAL MEDICINE

## 2018-01-01 PROCEDURE — 25010000002 VANCOMYCIN PER 500 MG: Performed by: HOSPITALIST

## 2018-01-01 PROCEDURE — 97535 SELF CARE MNGMENT TRAINING: CPT

## 2018-01-01 PROCEDURE — 80053 COMPREHEN METABOLIC PANEL: CPT | Performed by: EMERGENCY MEDICINE

## 2018-01-01 PROCEDURE — 80053 COMPREHEN METABOLIC PANEL: CPT | Performed by: INTERNAL MEDICINE

## 2018-01-01 PROCEDURE — 74177 CT ABD & PELVIS W/CONTRAST: CPT

## 2018-01-01 PROCEDURE — 63710000001 DIPHENHYDRAMINE PER 50 MG: Performed by: INTERNAL MEDICINE

## 2018-01-01 PROCEDURE — 87081 CULTURE SCREEN ONLY: CPT | Performed by: INTERNAL MEDICINE

## 2018-01-01 PROCEDURE — 71260 CT THORAX DX C+: CPT

## 2018-01-01 PROCEDURE — 87040 BLOOD CULTURE FOR BACTERIA: CPT | Performed by: EMERGENCY MEDICINE

## 2018-01-01 PROCEDURE — 85007 BL SMEAR W/DIFF WBC COUNT: CPT | Performed by: INTERNAL MEDICINE

## 2018-01-01 PROCEDURE — 36415 COLL VENOUS BLD VENIPUNCTURE: CPT

## 2018-01-01 PROCEDURE — 87899 AGENT NOS ASSAY W/OPTIC: CPT | Performed by: INTERNAL MEDICINE

## 2018-01-01 PROCEDURE — 94640 AIRWAY INHALATION TREATMENT: CPT

## 2018-01-01 PROCEDURE — 92526 ORAL FUNCTION THERAPY: CPT

## 2018-01-01 PROCEDURE — 85025 COMPLETE CBC W/AUTO DIFF WBC: CPT

## 2018-01-01 PROCEDURE — 80048 BASIC METABOLIC PNL TOTAL CA: CPT | Performed by: HOSPITALIST

## 2018-01-01 PROCEDURE — 84132 ASSAY OF SERUM POTASSIUM: CPT | Performed by: INTERNAL MEDICINE

## 2018-01-01 PROCEDURE — 82565 ASSAY OF CREATININE: CPT

## 2018-01-01 PROCEDURE — 97166 OT EVAL MOD COMPLEX 45 MIN: CPT

## 2018-01-01 PROCEDURE — 87581 M.PNEUMON DNA AMP PROBE: CPT | Performed by: INTERNAL MEDICINE

## 2018-01-01 PROCEDURE — 80053 COMPREHEN METABOLIC PANEL: CPT

## 2018-01-01 PROCEDURE — 87070 CULTURE OTHR SPECIMN AEROBIC: CPT | Performed by: INTERNAL MEDICINE

## 2018-01-01 PROCEDURE — 99213 OFFICE O/P EST LOW 20 MIN: CPT | Performed by: INTERNAL MEDICINE

## 2018-01-01 PROCEDURE — 85027 COMPLETE CBC AUTOMATED: CPT | Performed by: HOSPITALIST

## 2018-01-01 PROCEDURE — 87493 C DIFF AMPLIFIED PROBE: CPT | Performed by: EMERGENCY MEDICINE

## 2018-01-01 RX ORDER — SODIUM CHLORIDE 0.9 % (FLUSH) 0.9 %
10 SYRINGE (ML) INJECTION AS NEEDED
Status: DISCONTINUED | OUTPATIENT
Start: 2018-01-01 | End: 2018-01-01 | Stop reason: HOSPADM

## 2018-01-01 RX ORDER — HYDROCODONE BITARTRATE AND ACETAMINOPHEN 7.5; 325 MG/1; MG/1
1 TABLET ORAL EVERY 4 HOURS PRN
Status: DISCONTINUED | OUTPATIENT
Start: 2018-01-01 | End: 2018-01-01 | Stop reason: ALTCHOICE

## 2018-01-01 RX ORDER — SODIUM CHLORIDE 9 MG/ML
100 INJECTION, SOLUTION INTRAVENOUS CONTINUOUS
Status: DISCONTINUED | OUTPATIENT
Start: 2018-01-01 | End: 2018-01-01

## 2018-01-01 RX ORDER — POTASSIUM CHLORIDE 29.8 MG/ML
20 INJECTION INTRAVENOUS
Status: DISCONTINUED | OUTPATIENT
Start: 2018-01-01 | End: 2018-01-01 | Stop reason: HOSPADM

## 2018-01-01 RX ORDER — METHYLPREDNISOLONE SODIUM SUCCINATE 40 MG/ML
40 INJECTION, POWDER, LYOPHILIZED, FOR SOLUTION INTRAMUSCULAR; INTRAVENOUS ONCE
Status: COMPLETED | OUTPATIENT
Start: 2018-01-01 | End: 2018-01-01

## 2018-01-01 RX ORDER — METHYLPREDNISOLONE SODIUM SUCCINATE 125 MG/2ML
80 INJECTION, POWDER, LYOPHILIZED, FOR SOLUTION INTRAMUSCULAR; INTRAVENOUS ONCE
Status: COMPLETED | OUTPATIENT
Start: 2018-01-01 | End: 2018-01-01

## 2018-01-01 RX ORDER — PREDNISONE 20 MG/1
40 TABLET ORAL
Status: DISCONTINUED | OUTPATIENT
Start: 2018-01-01 | End: 2018-01-01 | Stop reason: HOSPADM

## 2018-01-01 RX ORDER — ONDANSETRON HYDROCHLORIDE 8 MG/1
8 TABLET, FILM COATED ORAL EVERY 8 HOURS PRN
Status: DISCONTINUED | OUTPATIENT
Start: 2018-01-01 | End: 2018-01-01

## 2018-01-01 RX ORDER — FAMOTIDINE 10 MG/ML
20 INJECTION, SOLUTION INTRAVENOUS AS NEEDED
Status: CANCELLED | OUTPATIENT
Start: 2018-01-01

## 2018-01-01 RX ORDER — ALBUTEROL SULFATE 2.5 MG/3ML
2.5 SOLUTION RESPIRATORY (INHALATION) ONCE AS NEEDED
Status: DISCONTINUED | OUTPATIENT
Start: 2018-01-01 | End: 2018-01-01 | Stop reason: HOSPADM

## 2018-01-01 RX ORDER — SODIUM CHLORIDE 0.9 % (FLUSH) 0.9 %
10 SYRINGE (ML) INJECTION AS NEEDED
Status: CANCELLED | OUTPATIENT
Start: 2018-01-01

## 2018-01-01 RX ORDER — ACETAMINOPHEN 325 MG/1
650 TABLET ORAL ONCE
Status: COMPLETED | OUTPATIENT
Start: 2018-01-01 | End: 2018-01-01

## 2018-01-01 RX ORDER — LIDOCAINE 50 MG/G
1 PATCH TOPICAL
Status: DISCONTINUED | OUTPATIENT
Start: 2018-01-01 | End: 2018-01-01 | Stop reason: HOSPADM

## 2018-01-01 RX ORDER — SODIUM CHLORIDE 9 MG/ML
250 INJECTION, SOLUTION INTRAVENOUS ONCE
Status: CANCELLED | OUTPATIENT
Start: 2018-01-01

## 2018-01-01 RX ORDER — ALBUTEROL SULFATE 2.5 MG/3ML
2.5 SOLUTION RESPIRATORY (INHALATION) ONCE AS NEEDED
Qty: 350 ML | Refills: 12 | Status: SHIPPED | OUTPATIENT
Start: 2018-01-01 | End: 2019-01-01

## 2018-01-01 RX ORDER — ONDANSETRON 4 MG/1
4 TABLET, FILM COATED ORAL EVERY 6 HOURS PRN
Status: DISCONTINUED | OUTPATIENT
Start: 2018-01-01 | End: 2018-01-01 | Stop reason: ALTCHOICE

## 2018-01-01 RX ORDER — DEXTROSE MONOHYDRATE 50 MG/ML
250 INJECTION, SOLUTION INTRAVENOUS ONCE
Status: CANCELLED | OUTPATIENT
Start: 2018-01-01

## 2018-01-01 RX ORDER — FENTANYL CITRATE 50 UG/ML
100 INJECTION, SOLUTION INTRAMUSCULAR; INTRAVENOUS
Status: DISCONTINUED | OUTPATIENT
Start: 2018-01-01 | End: 2018-01-01

## 2018-01-01 RX ORDER — POTASSIUM CHLORIDE 750 MG/1
40 CAPSULE, EXTENDED RELEASE ORAL AS NEEDED
Status: DISCONTINUED | OUTPATIENT
Start: 2018-01-01 | End: 2018-01-01 | Stop reason: HOSPADM

## 2018-01-01 RX ORDER — ALBUTEROL SULFATE 90 UG/1
6 AEROSOL, METERED RESPIRATORY (INHALATION)
Status: DISCONTINUED | OUTPATIENT
Start: 2018-01-01 | End: 2018-01-01

## 2018-01-01 RX ORDER — PANTOPRAZOLE SODIUM 40 MG/1
40 TABLET, DELAYED RELEASE ORAL DAILY
Qty: 30 TABLET | Refills: 5 | Status: SHIPPED | OUTPATIENT
Start: 2018-01-01 | End: 2019-01-01 | Stop reason: HOSPADM

## 2018-01-01 RX ORDER — ONDANSETRON HYDROCHLORIDE 8 MG/1
TABLET, FILM COATED ORAL
Qty: 30 TABLET | Refills: 0 | Status: SHIPPED | OUTPATIENT
Start: 2018-01-01 | End: 2018-01-01 | Stop reason: SDUPTHER

## 2018-01-01 RX ORDER — DIPHENHYDRAMINE HYDROCHLORIDE 50 MG/ML
50 INJECTION INTRAMUSCULAR; INTRAVENOUS AS NEEDED
Status: CANCELLED | OUTPATIENT
Start: 2018-01-01

## 2018-01-01 RX ORDER — SODIUM CHLORIDE FOR INHALATION 7 %
4 VIAL, NEBULIZER (ML) INHALATION ONCE AS NEEDED
Status: COMPLETED | OUTPATIENT
Start: 2018-01-01 | End: 2018-01-01

## 2018-01-01 RX ORDER — PANTOPRAZOLE SODIUM 40 MG/1
40 TABLET, DELAYED RELEASE ORAL DAILY
Status: DISCONTINUED | OUTPATIENT
Start: 2018-01-01 | End: 2018-01-01

## 2018-01-01 RX ORDER — ONDANSETRON HYDROCHLORIDE 8 MG/1
8 TABLET, FILM COATED ORAL EVERY 8 HOURS PRN
Qty: 90 TABLET | Refills: 1 | Status: ON HOLD | OUTPATIENT
Start: 2018-01-01 | End: 2019-01-01

## 2018-01-01 RX ORDER — L.ACID,PARA/B.BIFIDUM/S.THERM 8B CELL
1 CAPSULE ORAL DAILY
Status: DISCONTINUED | OUTPATIENT
Start: 2018-01-01 | End: 2018-01-01 | Stop reason: HOSPADM

## 2018-01-01 RX ORDER — METHYLPREDNISOLONE SODIUM SUCCINATE 125 MG/2ML
125 INJECTION, POWDER, LYOPHILIZED, FOR SOLUTION INTRAMUSCULAR; INTRAVENOUS ONCE
Status: CANCELLED | OUTPATIENT
Start: 2018-01-01

## 2018-01-01 RX ORDER — SODIUM CHLORIDE 9 MG/ML
250 INJECTION, SOLUTION INTRAVENOUS ONCE
Status: COMPLETED | OUTPATIENT
Start: 2018-01-01 | End: 2018-01-01

## 2018-01-01 RX ORDER — DIPHENHYDRAMINE HCL 25 MG
25 CAPSULE ORAL ONCE
Status: COMPLETED | OUTPATIENT
Start: 2018-01-01 | End: 2018-01-01

## 2018-01-01 RX ORDER — ACETAMINOPHEN 325 MG/1
650 TABLET ORAL EVERY 4 HOURS PRN
Status: DISCONTINUED | OUTPATIENT
Start: 2018-01-01 | End: 2018-01-01 | Stop reason: HOSPADM

## 2018-01-01 RX ORDER — VANCOMYCIN HYDROCHLORIDE 1 G/200ML
20 INJECTION, SOLUTION INTRAVENOUS ONCE
Status: COMPLETED | OUTPATIENT
Start: 2018-01-01 | End: 2018-01-01

## 2018-01-01 RX ORDER — MEPERIDINE HYDROCHLORIDE 50 MG/ML
25 INJECTION INTRAMUSCULAR; INTRAVENOUS; SUBCUTANEOUS
Status: CANCELLED | OUTPATIENT
Start: 2018-01-01 | End: 2018-01-01

## 2018-01-01 RX ORDER — DIPHENHYDRAMINE HYDROCHLORIDE 50 MG/ML
25 INJECTION INTRAMUSCULAR; INTRAVENOUS AS NEEDED
Status: CANCELLED | OUTPATIENT
Start: 2018-01-01

## 2018-01-01 RX ORDER — DIPHENHYDRAMINE HCL 25 MG
25 CAPSULE ORAL ONCE
Status: CANCELLED | OUTPATIENT
Start: 2018-01-01

## 2018-01-01 RX ORDER — AMOXICILLIN AND CLAVULANATE POTASSIUM 875; 125 MG/1; MG/1
1 TABLET, FILM COATED ORAL EVERY 12 HOURS SCHEDULED
Qty: 1 TABLET | Refills: 0 | Status: SHIPPED | OUTPATIENT
Start: 2018-01-01 | End: 2018-01-01

## 2018-01-01 RX ORDER — ONDANSETRON 4 MG/1
4 TABLET, ORALLY DISINTEGRATING ORAL EVERY 8 HOURS PRN
Qty: 21 TABLET | Refills: 2 | Status: SHIPPED | OUTPATIENT
Start: 2018-01-01

## 2018-01-01 RX ORDER — ACETAMINOPHEN 325 MG/1
650 TABLET ORAL ONCE
Status: CANCELLED | OUTPATIENT
Start: 2018-01-01

## 2018-01-01 RX ORDER — CISATRACURIUM BESYLATE 2 MG/ML
100 INJECTION, SOLUTION INTRAVENOUS ONCE
Status: COMPLETED | OUTPATIENT
Start: 2018-01-01 | End: 2018-01-01

## 2018-01-01 RX ORDER — FAMOTIDINE 10 MG/ML
20 INJECTION, SOLUTION INTRAVENOUS EVERY 12 HOURS SCHEDULED
Status: DISCONTINUED | OUTPATIENT
Start: 2018-01-01 | End: 2018-01-01 | Stop reason: CLARIF

## 2018-01-01 RX ORDER — HYDROCODONE BITARTRATE AND ACETAMINOPHEN 7.5; 325 MG/1; MG/1
1 TABLET ORAL ONCE
Status: COMPLETED | OUTPATIENT
Start: 2018-01-01 | End: 2018-01-01

## 2018-01-01 RX ORDER — IPRATROPIUM BROMIDE AND ALBUTEROL SULFATE 2.5; .5 MG/3ML; MG/3ML
3 SOLUTION RESPIRATORY (INHALATION)
Status: DISCONTINUED | OUTPATIENT
Start: 2018-01-01 | End: 2018-01-01

## 2018-01-01 RX ORDER — SUCRALFATE 1 G/10ML
SUSPENSION ORAL
Qty: 420 ML | Refills: 2 | Status: SHIPPED | OUTPATIENT
Start: 2018-01-01 | End: 2019-01-01

## 2018-01-01 RX ORDER — DIAZEPAM 5 MG/1
5 TABLET ORAL 4 TIMES DAILY PRN
Status: DISCONTINUED | OUTPATIENT
Start: 2018-01-01 | End: 2018-01-01 | Stop reason: HOSPADM

## 2018-01-01 RX ORDER — FENTANYL CITRATE 50 UG/ML
50 INJECTION, SOLUTION INTRAMUSCULAR; INTRAVENOUS
Status: DISCONTINUED | OUTPATIENT
Start: 2018-01-01 | End: 2018-01-01

## 2018-01-01 RX ORDER — DIPHENOXYLATE HYDROCHLORIDE AND ATROPINE SULFATE 2.5; .025 MG/1; MG/1
1 TABLET ORAL 4 TIMES DAILY PRN
Status: DISCONTINUED | OUTPATIENT
Start: 2018-01-01 | End: 2018-01-01 | Stop reason: HOSPADM

## 2018-01-01 RX ORDER — ONDANSETRON 4 MG/1
4 TABLET, ORALLY DISINTEGRATING ORAL EVERY 6 HOURS PRN
Status: DISCONTINUED | OUTPATIENT
Start: 2018-01-01 | End: 2018-01-01 | Stop reason: HOSPADM

## 2018-01-01 RX ORDER — MALTODEXTRIN/XANTHAN GUM
POWDER IN PACKET (EA) ORAL
Qty: 125 G | Refills: 1 | Status: SHIPPED | OUTPATIENT
Start: 2018-01-01 | End: 2019-01-01

## 2018-01-01 RX ORDER — MEPERIDINE HYDROCHLORIDE 50 MG/ML
25 INJECTION INTRAMUSCULAR; INTRAVENOUS; SUBCUTANEOUS
Status: CANCELLED | OUTPATIENT
Start: 2018-01-01

## 2018-01-01 RX ORDER — POTASSIUM CHLORIDE 1.5 G/1.77G
40 POWDER, FOR SOLUTION ORAL AS NEEDED
Status: DISCONTINUED | OUTPATIENT
Start: 2018-01-01 | End: 2018-01-01 | Stop reason: HOSPADM

## 2018-01-01 RX ORDER — ONDANSETRON HYDROCHLORIDE 8 MG/1
8 TABLET, FILM COATED ORAL EVERY 8 HOURS PRN
Status: DISCONTINUED | OUTPATIENT
Start: 2018-01-01 | End: 2018-01-01 | Stop reason: HOSPADM

## 2018-01-01 RX ORDER — METHYLPREDNISOLONE SODIUM SUCCINATE 125 MG/2ML
60 INJECTION, POWDER, LYOPHILIZED, FOR SOLUTION INTRAMUSCULAR; INTRAVENOUS EVERY 12 HOURS
Status: DISCONTINUED | OUTPATIENT
Start: 2018-01-01 | End: 2018-01-01

## 2018-01-01 RX ORDER — ONDANSETRON 2 MG/ML
4 INJECTION INTRAMUSCULAR; INTRAVENOUS EVERY 6 HOURS PRN
Status: DISCONTINUED | OUTPATIENT
Start: 2018-01-01 | End: 2018-01-01 | Stop reason: HOSPADM

## 2018-01-01 RX ORDER — POTASSIUM CHLORIDE 750 MG/1
40 CAPSULE, EXTENDED RELEASE ORAL ONCE
Status: COMPLETED | OUTPATIENT
Start: 2018-01-01 | End: 2018-01-01

## 2018-01-01 RX ORDER — IPRATROPIUM BROMIDE AND ALBUTEROL SULFATE 2.5; .5 MG/3ML; MG/3ML
3 SOLUTION RESPIRATORY (INHALATION) EVERY 4 HOURS PRN
Status: DISCONTINUED | OUTPATIENT
Start: 2018-01-01 | End: 2018-01-01 | Stop reason: HOSPADM

## 2018-01-01 RX ORDER — ALBUTEROL SULFATE 90 UG/1
AEROSOL, METERED RESPIRATORY (INHALATION)
Status: COMPLETED
Start: 2018-01-01 | End: 2018-01-01

## 2018-01-01 RX ORDER — SODIUM CHLORIDE 0.9 % (FLUSH) 0.9 %
1-10 SYRINGE (ML) INJECTION AS NEEDED
Status: DISCONTINUED | OUTPATIENT
Start: 2018-01-01 | End: 2018-01-01 | Stop reason: HOSPADM

## 2018-01-01 RX ORDER — PREDNISONE 10 MG/1
TABLET ORAL
Qty: 18 TABLET | Refills: 0 | Status: SHIPPED | OUTPATIENT
Start: 2018-01-01 | End: 2019-01-01

## 2018-01-01 RX ORDER — FUROSEMIDE 10 MG/ML
40 INJECTION INTRAMUSCULAR; INTRAVENOUS ONCE
Status: COMPLETED | OUTPATIENT
Start: 2018-01-01 | End: 2018-01-01

## 2018-01-01 RX ORDER — CETIRIZINE HYDROCHLORIDE 10 MG/1
10 TABLET ORAL ONCE
Status: CANCELLED | OUTPATIENT
Start: 2018-01-01

## 2018-01-01 RX ORDER — NITROGLYCERIN 0.4 MG/1
0.4 TABLET SUBLINGUAL
Status: DISCONTINUED | OUTPATIENT
Start: 2018-01-01 | End: 2018-01-01

## 2018-01-01 RX ORDER — AMOXICILLIN AND CLAVULANATE POTASSIUM 875; 125 MG/1; MG/1
1 TABLET, FILM COATED ORAL EVERY 12 HOURS SCHEDULED
Status: DISCONTINUED | OUTPATIENT
Start: 2018-01-01 | End: 2018-01-01 | Stop reason: HOSPADM

## 2018-01-01 RX ORDER — FAMOTIDINE 10 MG/ML
20 INJECTION, SOLUTION INTRAVENOUS ONCE
Status: CANCELLED | OUTPATIENT
Start: 2018-01-01

## 2018-01-01 RX ORDER — PANTOPRAZOLE SODIUM 40 MG/1
40 TABLET, DELAYED RELEASE ORAL DAILY
Qty: 30 TABLET | Refills: 0 | Status: SHIPPED | OUTPATIENT
Start: 2018-01-01 | End: 2018-01-01 | Stop reason: SDUPTHER

## 2018-01-01 RX ORDER — IPRATROPIUM BROMIDE AND ALBUTEROL SULFATE 2.5; .5 MG/3ML; MG/3ML
3 SOLUTION RESPIRATORY (INHALATION)
Status: DISCONTINUED | OUTPATIENT
Start: 2018-01-01 | End: 2018-01-01 | Stop reason: HOSPADM

## 2018-01-01 RX ORDER — HYDROCODONE BITARTRATE AND ACETAMINOPHEN 7.5; 325 MG/1; MG/1
1 TABLET ORAL 4 TIMES DAILY PRN
Status: DISCONTINUED | OUTPATIENT
Start: 2018-01-01 | End: 2018-01-01 | Stop reason: HOSPADM

## 2018-01-01 RX ORDER — METHYLPREDNISOLONE SODIUM SUCCINATE 125 MG/2ML
125 INJECTION, POWDER, LYOPHILIZED, FOR SOLUTION INTRAMUSCULAR; INTRAVENOUS ONCE
Status: DISCONTINUED | OUTPATIENT
Start: 2018-01-01 | End: 2018-01-01

## 2018-01-01 RX ORDER — ONDANSETRON HYDROCHLORIDE 8 MG/1
TABLET, FILM COATED ORAL
Qty: 30 TABLET | Refills: 1 | Status: SHIPPED | OUTPATIENT
Start: 2018-01-01 | End: 2018-01-01

## 2018-01-01 RX ORDER — FAMOTIDINE 20 MG/1
20 TABLET, FILM COATED ORAL 2 TIMES DAILY
Status: DISCONTINUED | OUTPATIENT
Start: 2018-01-01 | End: 2018-01-01 | Stop reason: HOSPADM

## 2018-01-01 RX ADMIN — SODIUM CHLORIDE 4 ML: 7 NEBU SOLN,3 % NEBU at 12:01

## 2018-01-01 RX ADMIN — SODIUM CHLORIDE 100 ML/HR: 9 INJECTION, SOLUTION INTRAVENOUS at 00:57

## 2018-01-01 RX ADMIN — IPRATROPIUM BROMIDE AND ALBUTEROL SULFATE 3 ML: .5; 3 SOLUTION RESPIRATORY (INHALATION) at 00:34

## 2018-01-01 RX ADMIN — ALBUTEROL SULFATE 6 PUFF: 90 AEROSOL, METERED RESPIRATORY (INHALATION) at 12:06

## 2018-01-01 RX ADMIN — FENTANYL CITRATE 100 MCG: 50 INJECTION INTRAMUSCULAR; INTRAVENOUS at 03:06

## 2018-01-01 RX ADMIN — IPRATROPIUM BROMIDE AND ALBUTEROL SULFATE 3 ML: .5; 3 SOLUTION RESPIRATORY (INHALATION) at 15:43

## 2018-01-01 RX ADMIN — HYDROCODONE BITARTRATE AND ACETAMINOPHEN 1 TABLET: 7.5; 325 TABLET ORAL at 17:38

## 2018-01-01 RX ADMIN — ONDANSETRON HYDROCHLORIDE 4 MG: 2 INJECTION, SOLUTION INTRAMUSCULAR; INTRAVENOUS at 04:37

## 2018-01-01 RX ADMIN — DIPHENOXYLATE HYDROCHLORIDE AND ATROPINE SULFATE 1 TABLET: 2.5; .025 TABLET ORAL at 23:22

## 2018-01-01 RX ADMIN — HYDROCODONE BITARTRATE AND ACETAMINOPHEN 1 TABLET: 7.5; 325 TABLET ORAL at 09:26

## 2018-01-01 RX ADMIN — HYDROCODONE BITARTRATE AND ACETAMINOPHEN 1 TABLET: 7.5; 325 TABLET ORAL at 23:10

## 2018-01-01 RX ADMIN — LIDOCAINE 1 PATCH: 50 PATCH CUTANEOUS at 09:22

## 2018-01-01 RX ADMIN — ENOXAPARIN SODIUM 40 MG: 40 INJECTION SUBCUTANEOUS at 16:06

## 2018-01-01 RX ADMIN — AZTREONAM 1 G: 1 INJECTION, POWDER, LYOPHILIZED, FOR SOLUTION INTRAMUSCULAR; INTRAVENOUS at 14:24

## 2018-01-01 RX ADMIN — LIDOCAINE 1 PATCH: 50 PATCH CUTANEOUS at 09:09

## 2018-01-01 RX ADMIN — VANCOMYCIN HYDROCHLORIDE 500 MG: 500 INJECTION, POWDER, LYOPHILIZED, FOR SOLUTION INTRAVENOUS at 18:00

## 2018-01-01 RX ADMIN — FAMOTIDINE 20 MG: 20 TABLET, FILM COATED ORAL at 09:16

## 2018-01-01 RX ADMIN — VANCOMYCIN HYDROCHLORIDE 500 MG: 500 INJECTION, POWDER, LYOPHILIZED, FOR SOLUTION INTRAVENOUS at 16:54

## 2018-01-01 RX ADMIN — LIDOCAINE 1 PATCH: 50 PATCH CUTANEOUS at 08:31

## 2018-01-01 RX ADMIN — TAZOBACTAM SODIUM AND PIPERACILLIN SODIUM 3.38 G: 375; 3 INJECTION, SOLUTION INTRAVENOUS at 18:39

## 2018-01-01 RX ADMIN — TAZOBACTAM SODIUM AND PIPERACILLIN SODIUM 3.38 G: 375; 3 INJECTION, SOLUTION INTRAVENOUS at 11:58

## 2018-01-01 RX ADMIN — PROPOFOL 40 MCG/KG/MIN: 10 INJECTION, EMULSION INTRAVENOUS at 00:01

## 2018-01-01 RX ADMIN — Medication 1 CAPSULE: at 09:10

## 2018-01-01 RX ADMIN — VANCOMYCIN HYDROCHLORIDE 1000 MG: 1 INJECTION, SOLUTION INTRAVENOUS at 23:43

## 2018-01-01 RX ADMIN — MINERAL OIL AND PETROLATUM: 150; 830 OINTMENT OPHTHALMIC at 02:01

## 2018-01-01 RX ADMIN — NYSTATIN 500000 UNITS: 100000 SUSPENSION ORAL at 21:16

## 2018-01-01 RX ADMIN — HYDROCODONE BITARTRATE AND ACETAMINOPHEN 1 TABLET: 7.5; 325 TABLET ORAL at 12:24

## 2018-01-01 RX ADMIN — IMMUNE GLOBULIN INFUSION (HUMAN) 20 G: 100 INJECTION, SOLUTION INTRAVENOUS; SUBCUTANEOUS at 11:35

## 2018-01-01 RX ADMIN — TAZOBACTAM SODIUM AND PIPERACILLIN SODIUM 3.38 G: 375; 3 INJECTION, SOLUTION INTRAVENOUS at 17:35

## 2018-01-01 RX ADMIN — SODIUM CHLORIDE 3 MCG/KG/MIN: 9 INJECTION, SOLUTION INTRAVENOUS at 00:51

## 2018-01-01 RX ADMIN — NOREPINEPHRINE BITARTRATE 0.02 MCG/KG/MIN: 8 SOLUTION at 01:25

## 2018-01-01 RX ADMIN — ENOXAPARIN SODIUM 40 MG: 40 INJECTION SUBCUTANEOUS at 14:13

## 2018-01-01 RX ADMIN — SODIUM CHLORIDE, PRESERVATIVE FREE 500 UNITS: 5 INJECTION INTRAVENOUS at 14:14

## 2018-01-01 RX ADMIN — ACETAMINOPHEN 650 MG: 325 TABLET, FILM COATED ORAL at 10:57

## 2018-01-01 RX ADMIN — Medication 10 ML: at 13:43

## 2018-01-01 RX ADMIN — HYDROCODONE BITARTRATE AND ACETAMINOPHEN 1 TABLET: 7.5; 325 TABLET ORAL at 18:44

## 2018-01-01 RX ADMIN — DIPHENHYDRAMINE HYDROCHLORIDE 25 MG: 25 CAPSULE ORAL at 10:57

## 2018-01-01 RX ADMIN — PROPOFOL 25 MCG/KG/MIN: 10 INJECTION, EMULSION INTRAVENOUS at 06:35

## 2018-01-01 RX ADMIN — IPRATROPIUM BROMIDE AND ALBUTEROL SULFATE 3 ML: .5; 3 SOLUTION RESPIRATORY (INHALATION) at 07:00

## 2018-01-01 RX ADMIN — HYDROCODONE BITARTRATE AND ACETAMINOPHEN 1 TABLET: 7.5; 325 TABLET ORAL at 09:10

## 2018-01-01 RX ADMIN — TAZOBACTAM SODIUM AND PIPERACILLIN SODIUM 3.38 G: 375; 3 INJECTION, SOLUTION INTRAVENOUS at 01:21

## 2018-01-01 RX ADMIN — IPRATROPIUM BROMIDE AND ALBUTEROL SULFATE 3 ML: .5; 3 SOLUTION RESPIRATORY (INHALATION) at 09:24

## 2018-01-01 RX ADMIN — METHYLPREDNISOLONE SODIUM SUCCINATE 40 MG: 40 INJECTION, POWDER, FOR SOLUTION INTRAMUSCULAR; INTRAVENOUS at 22:39

## 2018-01-01 RX ADMIN — TAZOBACTAM SODIUM AND PIPERACILLIN SODIUM 3.38 G: 375; 3 INJECTION, SOLUTION INTRAVENOUS at 01:51

## 2018-01-01 RX ADMIN — TAZOBACTAM SODIUM AND PIPERACILLIN SODIUM 3.38 G: 375; 3 INJECTION, SOLUTION INTRAVENOUS at 18:35

## 2018-01-01 RX ADMIN — HYDROCODONE BITARTRATE AND ACETAMINOPHEN 1 TABLET: 7.5; 325 TABLET ORAL at 02:00

## 2018-01-01 RX ADMIN — FAMOTIDINE 20 MG: 10 INJECTION, SOLUTION INTRAVENOUS at 21:47

## 2018-01-01 RX ADMIN — IPRATROPIUM BROMIDE AND ALBUTEROL SULFATE 3 ML: .5; 3 SOLUTION RESPIRATORY (INHALATION) at 20:26

## 2018-01-01 RX ADMIN — POTASSIUM CHLORIDE 40 MEQ: 750 CAPSULE, EXTENDED RELEASE ORAL at 09:26

## 2018-01-01 RX ADMIN — IPRATROPIUM BROMIDE AND ALBUTEROL SULFATE 3 ML: .5; 3 SOLUTION RESPIRATORY (INHALATION) at 06:54

## 2018-01-01 RX ADMIN — ACETAMINOPHEN 650 MG: 325 TABLET, FILM COATED ORAL at 10:54

## 2018-01-01 RX ADMIN — IPRATROPIUM BROMIDE AND ALBUTEROL SULFATE 3 ML: .5; 3 SOLUTION RESPIRATORY (INHALATION) at 15:39

## 2018-01-01 RX ADMIN — ALBUTEROL SULFATE 6 PUFF: 90 AEROSOL, METERED RESPIRATORY (INHALATION) at 23:58

## 2018-01-01 RX ADMIN — HYDROCODONE BITARTRATE AND ACETAMINOPHEN 1 TABLET: 7.5; 325 TABLET ORAL at 03:54

## 2018-01-01 RX ADMIN — HYDROCODONE BITARTRATE AND ACETAMINOPHEN 1 TABLET: 7.5; 325 TABLET ORAL at 23:23

## 2018-01-01 RX ADMIN — HYDROCODONE BITARTRATE AND ACETAMINOPHEN 1 TABLET: 7.5; 325 TABLET ORAL at 14:28

## 2018-01-01 RX ADMIN — ONDANSETRON 4 MG: 4 TABLET, ORALLY DISINTEGRATING ORAL at 08:31

## 2018-01-01 RX ADMIN — ONDANSETRON HYDROCHLORIDE 4 MG: 2 INJECTION, SOLUTION INTRAMUSCULAR; INTRAVENOUS at 09:22

## 2018-01-01 RX ADMIN — IPRATROPIUM BROMIDE 6 PUFF: 17 AEROSOL, METERED RESPIRATORY (INHALATION) at 03:15

## 2018-01-01 RX ADMIN — POTASSIUM CHLORIDE 40 MEQ: 750 CAPSULE, EXTENDED RELEASE ORAL at 22:57

## 2018-01-01 RX ADMIN — Medication 1 CAPSULE: at 09:22

## 2018-01-01 RX ADMIN — AZTREONAM 2 G: 2 INJECTION, POWDER, LYOPHILIZED, FOR SOLUTION INTRAMUSCULAR; INTRAVENOUS at 22:59

## 2018-01-01 RX ADMIN — TAZOBACTAM SODIUM AND PIPERACILLIN SODIUM 3.38 G: 375; 3 INJECTION, SOLUTION INTRAVENOUS at 09:02

## 2018-01-01 RX ADMIN — HYDROCODONE BITARTRATE AND ACETAMINOPHEN 1 TABLET: 7.5; 325 TABLET ORAL at 03:50

## 2018-01-01 RX ADMIN — IOPAMIDOL 85 ML: 612 INJECTION, SOLUTION INTRAVENOUS at 12:25

## 2018-01-01 RX ADMIN — IPRATROPIUM BROMIDE 6 PUFF: 17 AEROSOL, METERED RESPIRATORY (INHALATION) at 07:58

## 2018-01-01 RX ADMIN — PREDNISONE 40 MG: 20 TABLET ORAL at 14:27

## 2018-01-01 RX ADMIN — PROPOFOL 25 MCG/KG/MIN: 10 INJECTION, EMULSION INTRAVENOUS at 12:30

## 2018-01-01 RX ADMIN — IPRATROPIUM BROMIDE AND ALBUTEROL SULFATE 3 ML: .5; 3 SOLUTION RESPIRATORY (INHALATION) at 19:34

## 2018-01-01 RX ADMIN — Medication 10 ML: at 11:22

## 2018-01-01 RX ADMIN — IPRATROPIUM BROMIDE 6 PUFF: 17 AEROSOL, METERED RESPIRATORY (INHALATION) at 06:34

## 2018-01-01 RX ADMIN — FAMOTIDINE 20 MG: 10 INJECTION, SOLUTION INTRAVENOUS at 08:30

## 2018-01-01 RX ADMIN — FENTANYL CITRATE 50 MCG: 50 INJECTION INTRAMUSCULAR; INTRAVENOUS at 21:35

## 2018-01-01 RX ADMIN — IPRATROPIUM BROMIDE AND ALBUTEROL SULFATE 3 ML: .5; 3 SOLUTION RESPIRATORY (INHALATION) at 16:40

## 2018-01-01 RX ADMIN — PANTOPRAZOLE SODIUM 40 MG: 40 TABLET, DELAYED RELEASE ORAL at 08:16

## 2018-01-01 RX ADMIN — IMMUNE GLOBULIN INFUSION (HUMAN) 20 G: 100 INJECTION, SOLUTION INTRAVENOUS; SUBCUTANEOUS at 11:24

## 2018-01-01 RX ADMIN — HYDROCODONE BITARTRATE AND ACETAMINOPHEN 1 TABLET: 7.5; 325 TABLET ORAL at 23:55

## 2018-01-01 RX ADMIN — VANCOMYCIN HYDROCHLORIDE 500 MG: 500 INJECTION, POWDER, LYOPHILIZED, FOR SOLUTION INTRAVENOUS at 05:38

## 2018-01-01 RX ADMIN — SODIUM CHLORIDE, PRESERVATIVE FREE 500 UNITS: 5 INJECTION INTRAVENOUS at 12:09

## 2018-01-01 RX ADMIN — ENOXAPARIN SODIUM 40 MG: 40 INJECTION SUBCUTANEOUS at 15:00

## 2018-01-01 RX ADMIN — FAMOTIDINE 20 MG: 10 INJECTION, SOLUTION INTRAVENOUS at 00:37

## 2018-01-01 RX ADMIN — HYDROCODONE BITARTRATE AND ACETAMINOPHEN 1 TABLET: 7.5; 325 TABLET ORAL at 06:30

## 2018-01-01 RX ADMIN — NYSTATIN 500000 UNITS: 100000 SUSPENSION ORAL at 18:41

## 2018-01-01 RX ADMIN — IPRATROPIUM BROMIDE 6 PUFF: 17 AEROSOL, METERED RESPIRATORY (INHALATION) at 10:10

## 2018-01-01 RX ADMIN — IPRATROPIUM BROMIDE 6 PUFF: 17 AEROSOL, METERED RESPIRATORY (INHALATION) at 12:06

## 2018-01-01 RX ADMIN — DIAZEPAM 5 MG: 5 TABLET ORAL at 19:52

## 2018-01-01 RX ADMIN — IPRATROPIUM BROMIDE 6 PUFF: 17 AEROSOL, METERED RESPIRATORY (INHALATION) at 19:44

## 2018-01-01 RX ADMIN — ENOXAPARIN SODIUM 40 MG: 40 INJECTION SUBCUTANEOUS at 14:50

## 2018-01-01 RX ADMIN — MINERAL OIL AND PETROLATUM: 150; 830 OINTMENT OPHTHALMIC at 18:44

## 2018-01-01 RX ADMIN — AZTREONAM 1 G: 1 INJECTION, POWDER, LYOPHILIZED, FOR SOLUTION INTRAMUSCULAR; INTRAVENOUS at 08:16

## 2018-01-01 RX ADMIN — IPRATROPIUM BROMIDE AND ALBUTEROL SULFATE 3 ML: .5; 3 SOLUTION RESPIRATORY (INHALATION) at 07:58

## 2018-01-01 RX ADMIN — MINERAL OIL AND PETROLATUM: 150; 830 OINTMENT OPHTHALMIC at 08:42

## 2018-01-01 RX ADMIN — ALBUTEROL SULFATE 6 PUFF: 90 AEROSOL, METERED RESPIRATORY (INHALATION) at 19:44

## 2018-01-01 RX ADMIN — METHYLPREDNISOLONE SODIUM SUCCINATE 60 MG: 125 INJECTION, POWDER, FOR SOLUTION INTRAMUSCULAR; INTRAVENOUS at 21:47

## 2018-01-01 RX ADMIN — DIPHENOXYLATE HYDROCHLORIDE AND ATROPINE SULFATE 1 TABLET: 2.5; .025 TABLET ORAL at 17:12

## 2018-01-01 RX ADMIN — HYDROCODONE BITARTRATE AND ACETAMINOPHEN 1 TABLET: 7.5; 325 TABLET ORAL at 15:03

## 2018-01-01 RX ADMIN — TAZOBACTAM SODIUM AND PIPERACILLIN SODIUM 3.38 G: 375; 3 INJECTION, SOLUTION INTRAVENOUS at 10:59

## 2018-01-01 RX ADMIN — HYDROCODONE BITARTRATE AND ACETAMINOPHEN 1 TABLET: 7.5; 325 TABLET ORAL at 13:31

## 2018-01-01 RX ADMIN — TAZOBACTAM SODIUM AND PIPERACILLIN SODIUM 3.38 G: 375; 3 INJECTION, SOLUTION INTRAVENOUS at 09:09

## 2018-01-01 RX ADMIN — FAMOTIDINE 20 MG: 10 INJECTION, SOLUTION INTRAVENOUS at 20:44

## 2018-01-01 RX ADMIN — ONDANSETRON HYDROCHLORIDE 4 MG: 2 INJECTION, SOLUTION INTRAMUSCULAR; INTRAVENOUS at 11:21

## 2018-01-01 RX ADMIN — Medication 1 CAPSULE: at 09:16

## 2018-01-01 RX ADMIN — LIDOCAINE 1 PATCH: 50 PATCH CUTANEOUS at 09:25

## 2018-01-01 RX ADMIN — ENOXAPARIN SODIUM 40 MG: 40 INJECTION SUBCUTANEOUS at 14:27

## 2018-01-01 RX ADMIN — PROPOFOL 40 MCG/KG/MIN: 10 INJECTION, EMULSION INTRAVENOUS at 21:38

## 2018-01-01 RX ADMIN — BARIUM SULFATE 65 ML: 960 POWDER, FOR SUSPENSION ORAL at 08:31

## 2018-01-01 RX ADMIN — TAZOBACTAM SODIUM AND PIPERACILLIN SODIUM 3.38 G: 375; 3 INJECTION, SOLUTION INTRAVENOUS at 18:55

## 2018-01-01 RX ADMIN — SODIUM CHLORIDE 1000 ML: 9 INJECTION, SOLUTION INTRAVENOUS at 21:52

## 2018-01-01 RX ADMIN — METHYLPREDNISOLONE SODIUM SUCCINATE 80 MG: 125 INJECTION, POWDER, FOR SOLUTION INTRAMUSCULAR; INTRAVENOUS at 20:57

## 2018-01-01 RX ADMIN — SODIUM CHLORIDE, PRESERVATIVE FREE 500 UNITS: 5 INJECTION INTRAVENOUS at 13:32

## 2018-01-01 RX ADMIN — DIPHENHYDRAMINE HYDROCHLORIDE 25 MG: 25 CAPSULE ORAL at 10:52

## 2018-01-01 RX ADMIN — NYSTATIN 500000 UNITS: 100000 SUSPENSION ORAL at 18:40

## 2018-01-01 RX ADMIN — FAMOTIDINE 20 MG: 10 INJECTION, SOLUTION INTRAVENOUS at 09:10

## 2018-01-01 RX ADMIN — FENTANYL CITRATE 50 MCG: 50 INJECTION INTRAMUSCULAR; INTRAVENOUS at 01:12

## 2018-01-01 RX ADMIN — TAZOBACTAM SODIUM AND PIPERACILLIN SODIUM 3.38 G: 375; 3 INJECTION, SOLUTION INTRAVENOUS at 01:59

## 2018-01-01 RX ADMIN — MINERAL OIL AND PETROLATUM: 150; 830 OINTMENT OPHTHALMIC at 05:14

## 2018-01-01 RX ADMIN — TAZOBACTAM SODIUM AND PIPERACILLIN SODIUM 3.38 G: 375; 3 INJECTION, SOLUTION INTRAVENOUS at 03:15

## 2018-01-01 RX ADMIN — PREDNISONE 40 MG: 20 TABLET ORAL at 09:16

## 2018-01-01 RX ADMIN — Medication 10 ML: at 14:14

## 2018-01-01 RX ADMIN — FAMOTIDINE 20 MG: 10 INJECTION, SOLUTION INTRAVENOUS at 21:16

## 2018-01-01 RX ADMIN — HYDROCODONE BITARTRATE AND ACETAMINOPHEN 1 TABLET: 7.5; 325 TABLET ORAL at 23:22

## 2018-01-01 RX ADMIN — METHYLPREDNISOLONE SODIUM SUCCINATE 60 MG: 125 INJECTION, POWDER, FOR SOLUTION INTRAMUSCULAR; INTRAVENOUS at 21:31

## 2018-01-01 RX ADMIN — Medication 1 CAPSULE: at 08:30

## 2018-01-01 RX ADMIN — IPRATROPIUM BROMIDE AND ALBUTEROL SULFATE 3 ML: .5; 3 SOLUTION RESPIRATORY (INHALATION) at 11:07

## 2018-01-01 RX ADMIN — BARIUM SULFATE 50 ML: 400 SUSPENSION ORAL at 08:31

## 2018-01-01 RX ADMIN — ONDANSETRON HYDROCHLORIDE 4 MG: 2 INJECTION, SOLUTION INTRAMUSCULAR; INTRAVENOUS at 05:50

## 2018-01-01 RX ADMIN — NYSTATIN 500000 UNITS: 100000 SUSPENSION ORAL at 09:17

## 2018-01-01 RX ADMIN — PANTOPRAZOLE SODIUM 40 MG: 40 TABLET, DELAYED RELEASE ORAL at 07:38

## 2018-01-01 RX ADMIN — ALBUTEROL SULFATE 6 PUFF: 90 AEROSOL, METERED RESPIRATORY (INHALATION) at 23:20

## 2018-01-01 RX ADMIN — Medication 1 CAPSULE: at 09:26

## 2018-01-01 RX ADMIN — IPRATROPIUM BROMIDE AND ALBUTEROL SULFATE 3 ML: .5; 3 SOLUTION RESPIRATORY (INHALATION) at 10:57

## 2018-01-01 RX ADMIN — IPRATROPIUM BROMIDE AND ALBUTEROL SULFATE 3 ML: .5; 3 SOLUTION RESPIRATORY (INHALATION) at 12:18

## 2018-01-01 RX ADMIN — SODIUM CHLORIDE, PRESERVATIVE FREE 500 UNITS: 5 INJECTION INTRAVENOUS at 11:22

## 2018-01-01 RX ADMIN — IPRATROPIUM BROMIDE AND ALBUTEROL SULFATE 3 ML: .5; 3 SOLUTION RESPIRATORY (INHALATION) at 15:14

## 2018-01-01 RX ADMIN — AMOXICILLIN AND CLAVULANATE POTASSIUM 1 TABLET: 875; 125 TABLET, FILM COATED ORAL at 13:09

## 2018-01-01 RX ADMIN — FAMOTIDINE 20 MG: 10 INJECTION, SOLUTION INTRAVENOUS at 08:44

## 2018-01-01 RX ADMIN — IPRATROPIUM BROMIDE AND ALBUTEROL SULFATE 3 ML: .5; 3 SOLUTION RESPIRATORY (INHALATION) at 15:53

## 2018-01-01 RX ADMIN — DIPHENHYDRAMINE HYDROCHLORIDE 25 MG: 25 CAPSULE ORAL at 10:54

## 2018-01-01 RX ADMIN — AMOXICILLIN AND CLAVULANATE POTASSIUM 1 TABLET: 875; 125 TABLET, FILM COATED ORAL at 09:16

## 2018-01-01 RX ADMIN — ALBUTEROL SULFATE 6 PUFF: 90 AEROSOL, METERED RESPIRATORY (INHALATION) at 03:15

## 2018-01-01 RX ADMIN — PREDNISONE 40 MG: 20 TABLET ORAL at 09:26

## 2018-01-01 RX ADMIN — VANCOMYCIN HYDROCHLORIDE 750 MG: 750 INJECTION, POWDER, LYOPHILIZED, FOR SOLUTION INTRAVENOUS at 05:14

## 2018-01-01 RX ADMIN — ONDANSETRON HYDROCHLORIDE 4 MG: 2 INJECTION, SOLUTION INTRAMUSCULAR; INTRAVENOUS at 17:01

## 2018-01-01 RX ADMIN — HYDROCODONE BITARTRATE AND ACETAMINOPHEN 1 TABLET: 7.5; 325 TABLET ORAL at 04:49

## 2018-01-01 RX ADMIN — FAMOTIDINE 20 MG: 20 TABLET, FILM COATED ORAL at 22:11

## 2018-01-01 RX ADMIN — Medication 1 CAPSULE: at 11:20

## 2018-01-01 RX ADMIN — ACETAMINOPHEN 650 MG: 325 TABLET, FILM COATED ORAL at 10:52

## 2018-01-01 RX ADMIN — TAZOBACTAM SODIUM AND PIPERACILLIN SODIUM 3.38 G: 375; 3 INJECTION, SOLUTION INTRAVENOUS at 02:00

## 2018-01-01 RX ADMIN — HYDROCODONE BITARTRATE AND ACETAMINOPHEN 1 TABLET: 7.5; 325 TABLET ORAL at 18:41

## 2018-01-01 RX ADMIN — AZTREONAM 2 G: 2 INJECTION, POWDER, LYOPHILIZED, FOR SOLUTION INTRAMUSCULAR; INTRAVENOUS at 05:13

## 2018-01-01 RX ADMIN — SODIUM CHLORIDE, PRESERVATIVE FREE 500 UNITS: 5 INJECTION INTRAVENOUS at 13:43

## 2018-01-01 RX ADMIN — IMMUNE GLOBULIN INFUSION (HUMAN) 20 G: 100 INJECTION, SOLUTION INTRAVENOUS; SUBCUTANEOUS at 11:23

## 2018-01-01 RX ADMIN — AMOXICILLIN AND CLAVULANATE POTASSIUM 1 TABLET: 875; 125 TABLET, FILM COATED ORAL at 22:12

## 2018-01-01 RX ADMIN — SODIUM CHLORIDE 750 MG: 900 INJECTION, SOLUTION INTRAVENOUS at 23:46

## 2018-01-01 RX ADMIN — MINERAL OIL AND PETROLATUM: 150; 830 OINTMENT OPHTHALMIC at 13:30

## 2018-01-01 RX ADMIN — DIPHENOXYLATE HYDROCHLORIDE AND ATROPINE SULFATE 1 TABLET: 2.5; .025 TABLET ORAL at 13:11

## 2018-01-01 RX ADMIN — FAMOTIDINE 20 MG: 10 INJECTION, SOLUTION INTRAVENOUS at 11:21

## 2018-01-01 RX ADMIN — SODIUM CHLORIDE 250 ML: 9 INJECTION, SOLUTION INTRAVENOUS at 10:51

## 2018-01-01 RX ADMIN — TAZOBACTAM SODIUM AND PIPERACILLIN SODIUM 3.38 G: 375; 3 INJECTION, SOLUTION INTRAVENOUS at 18:20

## 2018-01-01 RX ADMIN — PREDNISONE 40 MG: 20 TABLET ORAL at 11:20

## 2018-01-01 RX ADMIN — IPRATROPIUM BROMIDE AND ALBUTEROL SULFATE 3 ML: .5; 3 SOLUTION RESPIRATORY (INHALATION) at 07:54

## 2018-01-01 RX ADMIN — ALBUTEROL SULFATE 6 PUFF: 90 AEROSOL, METERED RESPIRATORY (INHALATION) at 10:10

## 2018-01-01 RX ADMIN — HYDROCODONE BITARTRATE AND ACETAMINOPHEN 1 TABLET: 7.5; 325 TABLET ORAL at 14:45

## 2018-01-01 RX ADMIN — IPRATROPIUM BROMIDE AND ALBUTEROL SULFATE 3 ML: .5; 3 SOLUTION RESPIRATORY (INHALATION) at 11:52

## 2018-01-01 RX ADMIN — FENTANYL CITRATE 50 MCG: 50 INJECTION INTRAMUSCULAR; INTRAVENOUS at 00:37

## 2018-01-01 RX ADMIN — IPRATROPIUM BROMIDE AND ALBUTEROL SULFATE 3 ML: .5; 3 SOLUTION RESPIRATORY (INHALATION) at 19:58

## 2018-01-01 RX ADMIN — ALBUTEROL SULFATE 6 PUFF: 90 AEROSOL, METERED RESPIRATORY (INHALATION) at 03:41

## 2018-01-01 RX ADMIN — LIDOCAINE 1 PATCH: 50 PATCH CUTANEOUS at 08:44

## 2018-01-01 RX ADMIN — NYSTATIN 500000 UNITS: 100000 SUSPENSION ORAL at 09:25

## 2018-01-01 RX ADMIN — IPRATROPIUM BROMIDE 6 PUFF: 17 AEROSOL, METERED RESPIRATORY (INHALATION) at 14:22

## 2018-01-01 RX ADMIN — HYDROCODONE BITARTRATE AND ACETAMINOPHEN 1 TABLET: 7.5; 325 TABLET ORAL at 17:12

## 2018-01-01 RX ADMIN — MINERAL OIL AND PETROLATUM: 150; 830 OINTMENT OPHTHALMIC at 03:29

## 2018-01-01 RX ADMIN — MINERAL OIL AND PETROLATUM: 150; 830 OINTMENT OPHTHALMIC at 21:31

## 2018-01-01 RX ADMIN — HYDROCODONE BITARTRATE AND ACETAMINOPHEN 1 TABLET: 7.5; 325 TABLET ORAL at 11:15

## 2018-01-01 RX ADMIN — Medication 1 CAPSULE: at 08:16

## 2018-01-01 RX ADMIN — Medication 10 ML: at 12:09

## 2018-01-01 RX ADMIN — IPRATROPIUM BROMIDE 6 PUFF: 17 AEROSOL, METERED RESPIRATORY (INHALATION) at 23:20

## 2018-01-01 RX ADMIN — HYDROCODONE BITARTRATE AND ACETAMINOPHEN 1 TABLET: 7.5; 325 TABLET ORAL at 07:37

## 2018-01-01 RX ADMIN — FAMOTIDINE 20 MG: 10 INJECTION, SOLUTION INTRAVENOUS at 09:25

## 2018-01-01 RX ADMIN — METHYLPREDNISOLONE SODIUM SUCCINATE 60 MG: 125 INJECTION, POWDER, FOR SOLUTION INTRAMUSCULAR; INTRAVENOUS at 08:30

## 2018-01-01 RX ADMIN — NYSTATIN 500000 UNITS: 100000 SUSPENSION ORAL at 22:12

## 2018-01-01 RX ADMIN — HYDROCODONE BITARTRATE AND ACETAMINOPHEN 1 TABLET: 7.5; 325 TABLET ORAL at 08:32

## 2018-01-01 RX ADMIN — NYSTATIN 500000 UNITS: 100000 SUSPENSION ORAL at 13:09

## 2018-01-01 RX ADMIN — SODIUM CHLORIDE 250 ML: 9 INJECTION, SOLUTION INTRAVENOUS at 10:53

## 2018-01-01 RX ADMIN — BARIUM SULFATE 4 ML: 980 POWDER, FOR SUSPENSION ORAL at 08:31

## 2018-01-01 RX ADMIN — FAMOTIDINE 20 MG: 10 INJECTION, SOLUTION INTRAVENOUS at 03:15

## 2018-01-01 RX ADMIN — ALBUTEROL SULFATE 6 PUFF: 90 AEROSOL, METERED RESPIRATORY (INHALATION) at 14:21

## 2018-01-01 RX ADMIN — Medication 10 ML: at 13:32

## 2018-01-01 RX ADMIN — CISATRACURIUM BESYLATE 5080 MCG: 2 INJECTION INTRAVENOUS at 00:50

## 2018-01-01 RX ADMIN — LIDOCAINE 1 PATCH: 50 PATCH CUTANEOUS at 16:52

## 2018-01-01 RX ADMIN — METHYLPREDNISOLONE SODIUM SUCCINATE 60 MG: 125 INJECTION, POWDER, FOR SOLUTION INTRAMUSCULAR; INTRAVENOUS at 08:48

## 2018-01-01 RX ADMIN — FUROSEMIDE 40 MG: 10 INJECTION, SOLUTION INTRAMUSCULAR; INTRAVENOUS at 22:38

## 2018-01-01 RX ADMIN — IPRATROPIUM BROMIDE AND ALBUTEROL SULFATE 3 ML: .5; 3 SOLUTION RESPIRATORY (INHALATION) at 20:22

## 2018-01-01 RX ADMIN — METHYLPREDNISOLONE SODIUM SUCCINATE 60 MG: 125 INJECTION, POWDER, FOR SOLUTION INTRAMUSCULAR; INTRAVENOUS at 09:09

## 2018-01-01 RX ADMIN — DIAZEPAM 5 MG: 5 TABLET ORAL at 22:12

## 2018-01-01 RX ADMIN — ALBUTEROL SULFATE 6 PUFF: 90 AEROSOL, METERED RESPIRATORY (INHALATION) at 07:58

## 2018-01-01 RX ADMIN — SODIUM CHLORIDE 250 ML: 9 INJECTION, SOLUTION INTRAVENOUS at 10:50

## 2018-01-01 RX ADMIN — AZTREONAM 1 G: 1 INJECTION, POWDER, LYOPHILIZED, FOR SOLUTION INTRAMUSCULAR; INTRAVENOUS at 23:52

## 2018-01-01 RX ADMIN — ALBUTEROL SULFATE 6 PUFF: 90 AEROSOL, METERED RESPIRATORY (INHALATION) at 06:34

## 2018-01-01 RX ADMIN — HYDROCODONE BITARTRATE AND ACETAMINOPHEN 1 TABLET: 7.5; 325 TABLET ORAL at 22:27

## 2018-01-01 RX ADMIN — ONDANSETRON HYDROCHLORIDE 4 MG: 2 INJECTION, SOLUTION INTRAMUSCULAR; INTRAVENOUS at 06:30

## 2018-01-01 RX ADMIN — IPRATROPIUM BROMIDE AND ALBUTEROL SULFATE 3 ML: .5; 3 SOLUTION RESPIRATORY (INHALATION) at 08:42

## 2018-01-01 RX ADMIN — ONDANSETRON HYDROCHLORIDE 4 MG: 2 INJECTION, SOLUTION INTRAMUSCULAR; INTRAVENOUS at 08:41

## 2018-01-01 RX ADMIN — SODIUM CHLORIDE 524 ML: 9 INJECTION, SOLUTION INTRAVENOUS at 22:56

## 2018-01-05 ENCOUNTER — APPOINTMENT (OUTPATIENT)
Dept: RADIATION ONCOLOGY | Facility: HOSPITAL | Age: 68
End: 2018-01-05

## 2018-01-05 ENCOUNTER — HOSPITAL ENCOUNTER (OUTPATIENT)
Dept: GENERAL RADIOLOGY | Facility: HOSPITAL | Age: 68
Discharge: HOME | End: 2018-01-05
Admitting: SURGERY

## 2018-01-05 ENCOUNTER — APPOINTMENT (OUTPATIENT)
Dept: PREADMISSION TESTING | Facility: HOSPITAL | Age: 68
End: 2018-01-05

## 2018-01-05 VITALS
DIASTOLIC BLOOD PRESSURE: 77 MMHG | OXYGEN SATURATION: 94 % | BODY MASS INDEX: 21.43 KG/M2 | TEMPERATURE: 98.2 F | WEIGHT: 136.5 LBS | RESPIRATION RATE: 20 BRPM | SYSTOLIC BLOOD PRESSURE: 137 MMHG | HEART RATE: 87 BPM | HEIGHT: 67 IN

## 2018-01-05 LAB
ALBUMIN SERPL-MCNC: 4.3 G/DL (ref 3.5–5.2)
ALBUMIN/GLOB SERPL: 1.5 G/DL
ALP SERPL-CCNC: 67 U/L (ref 39–117)
ALT SERPL W P-5'-P-CCNC: 11 U/L (ref 1–33)
ANION GAP SERPL CALCULATED.3IONS-SCNC: 13.1 MMOL/L
APTT PPP: 26.6 SECONDS (ref 22.7–35.4)
AST SERPL-CCNC: 16 U/L (ref 1–32)
BILIRUB SERPL-MCNC: 0.3 MG/DL (ref 0.1–1.2)
BILIRUB UR QL STRIP: NEGATIVE
BUN BLD-MCNC: 10 MG/DL (ref 8–23)
BUN/CREAT SERPL: 9.7 (ref 7–25)
CALCIUM SPEC-SCNC: 9.3 MG/DL (ref 8.6–10.5)
CHLORIDE SERPL-SCNC: 94 MMOL/L (ref 98–107)
CLARITY UR: ABNORMAL
CO2 SERPL-SCNC: 26.9 MMOL/L (ref 22–29)
COLOR UR: YELLOW
CREAT BLD-MCNC: 1.03 MG/DL (ref 0.57–1)
DEPRECATED RDW RBC AUTO: 41.9 FL (ref 37–54)
ERYTHROCYTE [DISTWIDTH] IN BLOOD BY AUTOMATED COUNT: 13.3 % (ref 11.7–13)
GFR SERPL CREATININE-BSD FRML MDRD: 53 ML/MIN/1.73
GLOBULIN UR ELPH-MCNC: 2.8 GM/DL
GLUCOSE BLD-MCNC: 104 MG/DL (ref 65–99)
GLUCOSE UR STRIP-MCNC: NEGATIVE MG/DL
HCT VFR BLD AUTO: 36 % (ref 35.6–45.5)
HGB BLD-MCNC: 12.1 G/DL (ref 11.9–15.5)
HGB UR QL STRIP.AUTO: NEGATIVE
INR PPP: 1.02 (ref 0.9–1.1)
KETONES UR QL STRIP: NEGATIVE
LEUKOCYTE ESTERASE UR QL STRIP.AUTO: ABNORMAL
MCH RBC QN AUTO: 28.9 PG (ref 26.9–32)
MCHC RBC AUTO-ENTMCNC: 33.6 G/DL (ref 32.4–36.3)
MCV RBC AUTO: 86.1 FL (ref 80.5–98.2)
NITRITE UR QL STRIP: NEGATIVE
PH UR STRIP.AUTO: 6 [PH] (ref 5–8)
PLATELET # BLD AUTO: 198 10*3/MM3 (ref 140–500)
PMV BLD AUTO: 10.1 FL (ref 6–12)
POTASSIUM BLD-SCNC: 3.6 MMOL/L (ref 3.5–5.2)
PROT SERPL-MCNC: 7.1 G/DL (ref 6–8.5)
PROT UR QL STRIP: NEGATIVE
PROTHROMBIN TIME: 13 SECONDS (ref 11.7–14.2)
RBC # BLD AUTO: 4.18 10*6/MM3 (ref 3.9–5.2)
SODIUM BLD-SCNC: 134 MMOL/L (ref 136–145)
SP GR UR STRIP: 1.01 (ref 1–1.03)
UROBILINOGEN UR QL STRIP: ABNORMAL
WBC NRBC COR # BLD: 5.97 10*3/MM3 (ref 4.5–10.7)

## 2018-01-05 PROCEDURE — 71046 X-RAY EXAM CHEST 2 VIEWS: CPT

## 2018-01-05 PROCEDURE — 36415 COLL VENOUS BLD VENIPUNCTURE: CPT

## 2018-01-05 PROCEDURE — 80053 COMPREHEN METABOLIC PANEL: CPT | Performed by: SURGERY

## 2018-01-05 PROCEDURE — 85610 PROTHROMBIN TIME: CPT | Performed by: SURGERY

## 2018-01-05 PROCEDURE — 85730 THROMBOPLASTIN TIME PARTIAL: CPT | Performed by: SURGERY

## 2018-01-05 PROCEDURE — 81003 URINALYSIS AUTO W/O SCOPE: CPT | Performed by: SURGERY

## 2018-01-05 PROCEDURE — 93005 ELECTROCARDIOGRAM TRACING: CPT

## 2018-01-05 PROCEDURE — 85027 COMPLETE CBC AUTOMATED: CPT | Performed by: SURGERY

## 2018-01-05 PROCEDURE — 93010 ELECTROCARDIOGRAM REPORT: CPT | Performed by: INTERNAL MEDICINE

## 2018-01-05 RX ORDER — DIAZEPAM 5 MG/1
5 TABLET ORAL 4 TIMES DAILY PRN
COMMUNITY

## 2018-01-05 RX ORDER — ALBUTEROL SULFATE 90 UG/1
2 AEROSOL, METERED RESPIRATORY (INHALATION) EVERY 6 HOURS PRN
COMMUNITY
End: 2018-05-02

## 2018-01-05 RX ORDER — HYDROCODONE BITARTRATE AND ACETAMINOPHEN 7.5; 325 MG/1; MG/1
1 TABLET ORAL 4 TIMES DAILY PRN
COMMUNITY

## 2018-01-05 RX ORDER — FLUTICASONE PROPIONATE 50 MCG
2 SPRAY, SUSPENSION (ML) NASAL DAILY PRN
COMMUNITY
End: 2018-05-02

## 2018-01-05 NOTE — DISCHARGE INSTRUCTIONS
Take the following medications the morning of surgery with a small sip of water:  ANORO    Arrive to hospital on your day of surgery at 8:30 AM.    General Instructions:  • Do not eat solid food after midnight the night before surgery.  • You may drink clear liquids day of surgery but must stop at least one hour before your hospital arrival time 7:30 AM.  • It is beneficial for you to have a clear drink that contains carbohydrates the day of surgery.  We suggest a 12 to 20 ounce bottle of Gatorade or Powerade for non-diabetic patients or a 12 to 20 ounce bottle of G2 or Powerade Zero for diabetic patients. (Pediatric patients, are not advised to drink a 12 to 20 ounce carbohydrate drink)    Clear liquids are liquids you can see through.  Nothing red in color.     Plain water                               Sports drinks  Sodas                                   Gelatin (Jell-O)  Fruit juices without pulp such as white grape juice and apple juice  Popsicles that contain no fruit or yogurt  Tea or coffee (no cream or milk added)  Gatorade / Powerade  G2 / Powerade Zero    • Infants may have breast milk up to four hours before surgery.  • Infants drinking formula may drink formula up to six hours before surgery.   • Patients who avoid smoking, chewing tobacco and alcohol for 4 weeks prior to surgery have a reduced risk of post-operative complications.  Quit smoking as many days before surgery as you can.  • Do not smoke, use chewing tobacco or drink alcohol the day of surgery.   • If applicable bring your C-PAP/ BI-PAP machine.  • Bring any papers given to you in the doctor’s office.  • Wear clean comfortable clothes and socks.  • Do not wear contact lenses or make-up.  Bring a case for your glasses.   • Bring crutches or walker if applicable.  • Remove all piercings.  Leave jewelry and any other valuables at home.  • Hair extensions with metal clips must be removed prior to surgery.  • The Pre-Admission Testing nurse  will instruct you to bring medications if unable to obtain an accurate list in Pre-Admission Testing.        If you were given a blood bank ID arm band remember to bring it with you the day of surgery.    Preventing a Surgical Site Infection:  • For 2 to 3 days before surgery, avoid shaving with a razor because the razor can irritate skin and make it easier to develop an infection.  • The night prior to surgery sleep in a clean bed with clean clothing.  Do not allow pets to sleep with you.  • Shower on the morning of surgery using a fresh bar of anti-bacterial soap (such as Dial) and clean washcloth.  Dry with a clean towel and dress in clean clothing.  • Ask your surgeon if you will be receiving antibiotics prior to surgery.  • Make sure you, your family, and all healthcare providers clean their hands with soap and water or an alcohol based hand  before caring for you or your wound.    Day of surgery:  Upon arrival, a Pre-op nurse and Anesthesiologist will review your health history, obtain vital signs, and answer questions you may have.  The only belongings needed at this time will be your home medications and if applicable your C-PAP/BI-PAP machine.  If you are staying overnight your family can leave the rest of your belongings in the car and bring them to your room later.  A Pre-op nurse will start an IV and you may receive medication in preparation for surgery, including something to help you relax.  Your family will be able to see you in the Pre-op area.  While you are in surgery your family should notify the waiting room  if they leave the waiting room area and provide a contact phone number.    Please be aware that surgery does come with discomfort.  We want to make every effort to control your discomfort so please discuss any uncontrolled symptoms with your nurse.   Your doctor will most likely have prescribed pain medications.      If you are going home after surgery you will receive  individualized written care instructions before being discharged.  A responsible adult must drive you to and from the hospital on the day of your surgery and stay with you for 24 hours.    If you are staying overnight following surgery, you will be transported to your hospital room following the recovery period.  [facility] has all private rooms.    If you have any questions please call Pre-Admission Testing at [phone].  Deductibles and co-payments are collected on the day of service. Please be prepared to pay the required co-pay, deductible or deposit on the day of service as defined by your plan.

## 2018-01-07 ENCOUNTER — DOCUMENTATION (OUTPATIENT)
Dept: RADIATION ONCOLOGY | Facility: HOSPITAL | Age: 68
End: 2018-01-07

## 2018-01-07 DIAGNOSIS — C34.92 ADENOCARCINOMA OF LEFT LUNG (HCC): Primary | ICD-10-CM

## 2018-01-07 NOTE — PROGRESS NOTES
RADIATION THERAPY TREATMENT SUMMARY    DIAGNOSIS:   Adenocarcinoma of left lung    Staging T2, N0, M0    Patient Care Team:  Mary Taylor MD as PCP - General (Family Medicine)  Bienvenido Collier MD as Consulting Physician (Hematology and Oncology)  Daniel Oconnor MD as Referring Physician (Pulmonary Disease)  Shazia Mcdonald MD as Consulting Physician (Radiation Oncology)    The patient is a 67 y.o. year old female who has recently completed radiation therapy treatment for the above mentioned diagnosis.     Please see the specifics of the course of treatment below.    Dates of treatment:  12/4/2017 - 12/15/2017.    Treatment Site - Left Lung  Treatment Intent - Curative  Total Dose in cGy - 5000  Number of Treatments - 5  Dose per fraction - 1000 cGy per fraction  Fractions per day - 1 fx/day  Fractions per week - 3 fx/week  Treatment Type - Stereotactic, Rapid Arc  Energy - 6 MVP  Normalization - Volumetric Normalization-- see below  Imaging/Field Verification - IGRT using CBCT daily  Additional comments: - 100% covers 90% PTV REV  CBCT bony auto adjust soft tissue to GTV/PTVrev and chestwall      Tolerance and Toxicities:  she tolerated the treatments well, with no problems. She required no treatment breaks and the above course of treatments was completed in 11 elapsed days    F/U plans:  I have asked her to return to see me in 6-8 weeks with a CT chest prior.

## 2018-01-08 PROBLEM — Z45.2 FITTING AND ADJUSTMENT OF VASCULAR CATHETER: Status: ACTIVE | Noted: 2018-01-08

## 2018-01-08 RX ORDER — SODIUM CHLORIDE 0.9 % (FLUSH) 0.9 %
10 SYRINGE (ML) INJECTION AS NEEDED
Status: CANCELLED | OUTPATIENT
Start: 2018-01-11

## 2018-01-09 ENCOUNTER — HOSPITAL ENCOUNTER (OUTPATIENT)
Facility: HOSPITAL | Age: 68
Setting detail: HOSPITAL OUTPATIENT SURGERY
Discharge: HOME | End: 2018-01-09
Attending: SURGERY | Admitting: SURGERY

## 2018-01-09 ENCOUNTER — APPOINTMENT (OUTPATIENT)
Dept: GENERAL RADIOLOGY | Facility: HOSPITAL | Age: 68
End: 2018-01-09

## 2018-01-09 ENCOUNTER — ANESTHESIA EVENT (OUTPATIENT)
Dept: PERIOP | Facility: HOSPITAL | Age: 68
End: 2018-01-09

## 2018-01-09 ENCOUNTER — ANESTHESIA (OUTPATIENT)
Dept: PERIOP | Facility: HOSPITAL | Age: 68
End: 2018-01-09

## 2018-01-09 VITALS
WEIGHT: 130.56 LBS | BODY MASS INDEX: 22.29 KG/M2 | SYSTOLIC BLOOD PRESSURE: 145 MMHG | HEIGHT: 64 IN | RESPIRATION RATE: 16 BRPM | TEMPERATURE: 97.5 F | HEART RATE: 72 BPM | DIASTOLIC BLOOD PRESSURE: 96 MMHG | OXYGEN SATURATION: 97 %

## 2018-01-09 PROCEDURE — 25010000002 PROPOFOL 10 MG/ML EMULSION: Performed by: NURSE ANESTHETIST, CERTIFIED REGISTERED

## 2018-01-09 PROCEDURE — 77001 FLUOROGUIDE FOR VEIN DEVICE: CPT

## 2018-01-09 PROCEDURE — 71045 X-RAY EXAM CHEST 1 VIEW: CPT

## 2018-01-09 PROCEDURE — 25010000002 VANCOMYCIN PER 500 MG: Performed by: SURGERY

## 2018-01-09 PROCEDURE — 25010000002 MIDAZOLAM PER 1 MG: Performed by: ANESTHESIOLOGY

## 2018-01-09 PROCEDURE — 25010000002 HEPARIN (PORCINE) PER 1000 UNITS: Performed by: SURGERY

## 2018-01-09 PROCEDURE — C1788 PORT, INDWELLING, IMP: HCPCS | Performed by: SURGERY

## 2018-01-09 PROCEDURE — 25010000002 FENTANYL CITRATE (PF) 100 MCG/2ML SOLUTION: Performed by: NURSE ANESTHETIST, CERTIFIED REGISTERED

## 2018-01-09 DEVICE — POWERPORT ISP M.R.I. IMPLANTABLE PORT WITH ATTACHABLE 8F CHRONOFLEX OPEN-ENDED SINGLE-LUMEN VENOUS CATHETER INTERMEDIATE KIT (WITH SUTURE PLUGS)
Type: IMPLANTABLE DEVICE | Status: FUNCTIONAL
Brand: POWERPORT M.R.I., CHRONOFLEX

## 2018-01-09 RX ORDER — PROMETHAZINE HYDROCHLORIDE 25 MG/1
12.5 TABLET ORAL ONCE AS NEEDED
Status: CANCELLED | OUTPATIENT
Start: 2018-01-09 | End: 2018-01-10

## 2018-01-09 RX ORDER — MIDAZOLAM HYDROCHLORIDE 1 MG/ML
1 INJECTION INTRAMUSCULAR; INTRAVENOUS
Status: DISCONTINUED | OUTPATIENT
Start: 2018-01-09 | End: 2018-01-09 | Stop reason: HOSPADM

## 2018-01-09 RX ORDER — FAMOTIDINE 10 MG/ML
20 INJECTION, SOLUTION INTRAVENOUS ONCE
Status: COMPLETED | OUTPATIENT
Start: 2018-01-09 | End: 2018-01-09

## 2018-01-09 RX ORDER — NALOXONE HCL 0.4 MG/ML
0.2 VIAL (ML) INJECTION AS NEEDED
Status: CANCELLED | OUTPATIENT
Start: 2018-01-09

## 2018-01-09 RX ORDER — PROPOFOL 10 MG/ML
VIAL (ML) INTRAVENOUS CONTINUOUS PRN
Status: DISCONTINUED | OUTPATIENT
Start: 2018-01-09 | End: 2018-01-09 | Stop reason: SURG

## 2018-01-09 RX ORDER — PROMETHAZINE HYDROCHLORIDE 25 MG/ML
12.5 INJECTION, SOLUTION INTRAMUSCULAR; INTRAVENOUS ONCE AS NEEDED
Status: CANCELLED | OUTPATIENT
Start: 2018-01-09

## 2018-01-09 RX ORDER — FLUMAZENIL 0.1 MG/ML
0.2 INJECTION INTRAVENOUS AS NEEDED
Status: CANCELLED | OUTPATIENT
Start: 2018-01-09

## 2018-01-09 RX ORDER — EPHEDRINE SULFATE 50 MG/ML
5 INJECTION, SOLUTION INTRAVENOUS ONCE AS NEEDED
Status: CANCELLED | OUTPATIENT
Start: 2018-01-09

## 2018-01-09 RX ORDER — HYDRALAZINE HYDROCHLORIDE 20 MG/ML
5 INJECTION INTRAMUSCULAR; INTRAVENOUS
Status: CANCELLED | OUTPATIENT
Start: 2018-01-09

## 2018-01-09 RX ORDER — MIDAZOLAM HYDROCHLORIDE 1 MG/ML
2 INJECTION INTRAMUSCULAR; INTRAVENOUS
Status: DISCONTINUED | OUTPATIENT
Start: 2018-01-09 | End: 2018-01-09 | Stop reason: HOSPADM

## 2018-01-09 RX ORDER — OXYCODONE AND ACETAMINOPHEN 7.5; 325 MG/1; MG/1
1 TABLET ORAL ONCE AS NEEDED
Status: CANCELLED | OUTPATIENT
Start: 2018-01-09 | End: 2018-01-10

## 2018-01-09 RX ORDER — FENTANYL CITRATE 50 UG/ML
INJECTION, SOLUTION INTRAMUSCULAR; INTRAVENOUS AS NEEDED
Status: DISCONTINUED | OUTPATIENT
Start: 2018-01-09 | End: 2018-01-09 | Stop reason: SURG

## 2018-01-09 RX ORDER — HYDROCODONE BITARTRATE AND ACETAMINOPHEN 7.5; 325 MG/1; MG/1
1 TABLET ORAL ONCE AS NEEDED
Status: CANCELLED | OUTPATIENT
Start: 2018-01-09 | End: 2018-01-10

## 2018-01-09 RX ORDER — LABETALOL HYDROCHLORIDE 5 MG/ML
5 INJECTION, SOLUTION INTRAVENOUS
Status: CANCELLED | OUTPATIENT
Start: 2018-01-09

## 2018-01-09 RX ORDER — ONDANSETRON 2 MG/ML
4 INJECTION INTRAMUSCULAR; INTRAVENOUS ONCE AS NEEDED
Status: CANCELLED | OUTPATIENT
Start: 2018-01-09

## 2018-01-09 RX ORDER — PROMETHAZINE HYDROCHLORIDE 25 MG/1
25 SUPPOSITORY RECTAL ONCE AS NEEDED
Status: CANCELLED | OUTPATIENT
Start: 2018-01-09

## 2018-01-09 RX ORDER — VANCOMYCIN HYDROCHLORIDE 1 G/200ML
1 INJECTION, SOLUTION INTRAVENOUS ONCE
Status: COMPLETED | OUTPATIENT
Start: 2018-01-09 | End: 2018-01-09

## 2018-01-09 RX ORDER — HYDROMORPHONE HYDROCHLORIDE 1 MG/ML
0.5 INJECTION, SOLUTION INTRAMUSCULAR; INTRAVENOUS; SUBCUTANEOUS
Status: CANCELLED | OUTPATIENT
Start: 2018-01-09

## 2018-01-09 RX ORDER — HYDROCODONE BITARTRATE AND ACETAMINOPHEN 5; 325 MG/1; MG/1
1-2 TABLET ORAL EVERY 4 HOURS PRN
Qty: 40 TABLET | Refills: 0 | Status: SHIPPED | OUTPATIENT
Start: 2018-01-09 | End: 2018-02-09 | Stop reason: DRUGHIGH

## 2018-01-09 RX ORDER — FENTANYL CITRATE 50 UG/ML
50 INJECTION, SOLUTION INTRAMUSCULAR; INTRAVENOUS
Status: CANCELLED | OUTPATIENT
Start: 2018-01-09

## 2018-01-09 RX ORDER — PROMETHAZINE HYDROCHLORIDE 25 MG/1
25 TABLET ORAL ONCE AS NEEDED
Status: CANCELLED | OUTPATIENT
Start: 2018-01-09

## 2018-01-09 RX ORDER — PROPOFOL 10 MG/ML
VIAL (ML) INTRAVENOUS AS NEEDED
Status: DISCONTINUED | OUTPATIENT
Start: 2018-01-09 | End: 2018-01-09 | Stop reason: SURG

## 2018-01-09 RX ORDER — SODIUM CHLORIDE 0.9 % (FLUSH) 0.9 %
1-10 SYRINGE (ML) INJECTION AS NEEDED
Status: DISCONTINUED | OUTPATIENT
Start: 2018-01-09 | End: 2018-01-09 | Stop reason: HOSPADM

## 2018-01-09 RX ORDER — DIPHENHYDRAMINE HYDROCHLORIDE 50 MG/ML
12.5 INJECTION INTRAMUSCULAR; INTRAVENOUS
Status: CANCELLED | OUTPATIENT
Start: 2018-01-09

## 2018-01-09 RX ORDER — SODIUM CHLORIDE, SODIUM LACTATE, POTASSIUM CHLORIDE, CALCIUM CHLORIDE 600; 310; 30; 20 MG/100ML; MG/100ML; MG/100ML; MG/100ML
9 INJECTION, SOLUTION INTRAVENOUS CONTINUOUS
Status: DISCONTINUED | OUTPATIENT
Start: 2018-01-09 | End: 2018-01-09 | Stop reason: HOSPADM

## 2018-01-09 RX ADMIN — FENTANYL CITRATE 25 MCG: 50 INJECTION INTRAMUSCULAR; INTRAVENOUS at 10:31

## 2018-01-09 RX ADMIN — FAMOTIDINE 20 MG: 10 INJECTION, SOLUTION INTRAVENOUS at 09:38

## 2018-01-09 RX ADMIN — SODIUM CHLORIDE, POTASSIUM CHLORIDE, SODIUM LACTATE AND CALCIUM CHLORIDE: 600; 310; 30; 20 INJECTION, SOLUTION INTRAVENOUS at 10:24

## 2018-01-09 RX ADMIN — VANCOMYCIN HYDROCHLORIDE 1 G: 1 INJECTION, SOLUTION INTRAVENOUS at 10:10

## 2018-01-09 RX ADMIN — Medication 1 MG: at 10:11

## 2018-01-09 RX ADMIN — SODIUM CHLORIDE, POTASSIUM CHLORIDE, SODIUM LACTATE AND CALCIUM CHLORIDE 9 ML/HR: 600; 310; 30; 20 INJECTION, SOLUTION INTRAVENOUS at 09:38

## 2018-01-09 RX ADMIN — FENTANYL CITRATE 25 MCG: 50 INJECTION INTRAMUSCULAR; INTRAVENOUS at 10:48

## 2018-01-09 RX ADMIN — PROPOFOL 20 MG: 10 INJECTION, EMULSION INTRAVENOUS at 10:31

## 2018-01-09 RX ADMIN — PROPOFOL 75 MCG/KG/MIN: 10 INJECTION, EMULSION INTRAVENOUS at 10:31

## 2018-01-09 NOTE — BRIEF OP NOTE
INSERTION VENOUS ACCESS DEVICE  Progress Note    Dilma Abad  1/9/2018    Pre-op Diagnosis:   Lung cancer  Post-Op Diagnosis Codes:   Same    Procedure/CPT® Codes:      Procedure(s):  MEDIPORT PLACEMENT right, with duplex direction and fluoroscopic guidance    Surgeon(s):  Damaso Germain MD    Anesthesia: Monitor Anesthesia Care    Staff:   Circulator: Dagmar Gandhi RN  Scrub Person: Prisca Pena; Soniya Fenton  Assistant: Hiram Connolly  Orientee: Melisa Hoskins RN  Vascular Radiology Technician: Kaitlyn Ragland    Estimated Blood Loss: minimal    Urine Voided: * No values recorded between 1/9/2018 10:25 AM and 1/9/2018 11:06 AM *    Specimens:                None      Drains:           Findings: see cict    Complications: none      Damaso Germain MD     Date: 1/9/2018  Time: 11:09 AM       Head atraumatic, normal cephalic shape.

## 2018-01-09 NOTE — ANESTHESIA PREPROCEDURE EVALUATION
Anesthesia Evaluation            Airway   Mallampati: I  TM distance: >3 FB  Neck ROM: full  no difficulty expected  Dental    (+) upper dentures    Pulmonary    (+) a smoker Former, COPD,   (-) asthma, shortness of breath    ROS comment: Lung Ca, S/P radiation  Cardiovascular     (+) hyperlipidemia  (-) past MI, dysrhythmias, angina      Neuro/Psych  (+) CVA,     GI/Hepatic/Renal/Endo    (-) liver disease, no renal disease, diabetes    Musculoskeletal     Abdominal    Substance History      OB/GYN          Other      history of cancer (Esophageal CA)                                            Anesthesia Plan    ASA 3     MAC     intravenous induction   Anesthetic plan and risks discussed with patient.

## 2018-01-09 NOTE — DISCHARGE INSTRUCTIONS
[facility]  Oscar Laboy, Darren Germain Rachel, Scherrer, Thomas  [address]  [place] [address]  [phone]  Discharge Instructions for Port Placement    1. Go home, rest and take it easy today.      2. You may experience some dizziness or memory loss from the anesthesia.  This may last for the next 24 hours.  Someone should plan on staying with you for the first 24 hours for your safety.    3. Do not make any important legal decisions or sign any legal papers for the next 24 hours.      4. Eat and drink lightly today.  Start off with liquids, jello, soup, crackers or other bland foods at first. You may advance your diet tomorrow as tolerated as long as you do not experience any nausea or vomiting.     5. If skin glue was used, your incision will be open to air.  No care is required.  If you have a dressing, you may remove it in 2-3 days or until your first treatment whichever comes first. If you have the little white tapes known as steri-strips, leave them alone.  They usually fall off in 1-2 weeks.  Do not worry if they come off sooner.     6. You may notice some bleeding/drainage. A little bloody drainage is normal.  Some bruising is also normal.    7. You may shower tomorrow.  No tub baths until your incisions are completely healed.    8. You have received a prescription for a narcotic pain medicine, as you may have some pain/discomfort following surgery.   You will not be totally pain free, but your pain medicine should make the pain tolerable.  Please take your pain medicine as prescribed and always take your pills with food to prevent nausea. If you are having severe pain that cannot be controlled by the pain medicine, please contact me.  If the pain is such that narcotic pain medicine is not required, you may take Tylenol or Ibuprofen as directed unless indicated otherwise.      9. No driving for 24 hours and for as long as you are taking your prescription pain  medicine.      10. You should call the office at [phone] to schedule a follow-up in about 2 weeks.      11. Remember to contact me for any of the following:    • Fever> 101 degrees  • Severe pain that cannot be controlled by taking your pain pills  • Severe nausea or vomiting   • Significant bleeding from your incision  • Drainage that has a bad smell or is yellow or green in appearance  Any other questions or concerns        ************SEDATION DISCHARGE INSTRUCTIONS**************    IMPORTANT: The following information will help you return to your best level of health.  Sedation.  You have had a procedure that called for some medicine to reduce anxiety and pain. This medicine (or medicines) is called  sedation. After receiving the medicine, you may be sleepy, but able to breathe on your own. The effects of the medicine may last for several hours.    Follow these instructions after sedation:   Go right home. Rest quietly at home today, then you can be up and about.   Do not drink alcohol, drive or operate machinery for 24 hours.   Do not do anything where light-headedness or clumsiness would be dangerous.   Do not make important decisions or sign any legal papers for the next 24 hours.   Make sure A RESPONSIBLE PERSON stays with you the rest of today and overnight for your protection and safety.   Start your diet with fluids and light foods (jello, soup, juice, toast). Then, slowly progress to your usual diet if you are not sick to your stomach.    Call your doctor if you have:   a gray or blue skin color.   excess sleepiness.   repeated vomiting.   trouble breathing.  •  any new problems or concerns.

## 2018-01-09 NOTE — PLAN OF CARE
Problem: Patient Care Overview (Adult)  Goal: Plan of Care Review  Outcome: Outcome(s) achieved Date Met: 01/09/18 01/09/18 1207   Coping/Psychosocial Response Interventions   Plan Of Care Reviewed With patient;friend   Patient Care Overview   Progress improving   Outcome Evaluation   Outcome Summary/Follow up Plan Awake and ready for D/C after CXR results resulted.      Goal: Adult Individualization and Mutuality  Outcome: Outcome(s) achieved Date Met: 01/09/18    Goal: Discharge Needs Assessment  Outcome: Outcome(s) achieved Date Met: 01/09/18      Problem: Perioperative Period (Adult)  Goal: Signs and Symptoms of Listed Potential Problems Will be Absent or Manageable (Perioperative Period)  Outcome: Outcome(s) achieved Date Met: 01/09/18

## 2018-01-09 NOTE — H&P
Patient Care Team:  Mary Taylor MD as PCP - General (Family Medicine)  Bienvenido Collier MD as Consulting Physician (Hematology and Oncology)  Daniel Oconnor MD as Referring Physician (Pulmonary Disease)  Shazia Mcdonald MD as Consulting Physician (Radiation Oncology)    Chief complaint lung cancer    Subjective     History of Present Illness  67-year-old woman with lung cancer needing access for chemotherapy cc office note dictation  Review of Systems   No complaints of dysuria fever or pain  Past Medical History:   Diagnosis Date   • Adenocarcinoma of lung 2017    HAD RADIATION    • Anxiety and depression    • Arthritis    • Benign parotid tumor 2012    removed by Dr Vickers told it was benign   • COPD (chronic obstructive pulmonary disease)    • Diarrhea    • Early cataract    • Emphysema of lung    • Esophageal cancer 2017   • History of chronic pain    • History of colon polyps    • History of pneumonia    • Hyperlipidemia    • Joint pain    • Stroke      Past Surgical History:   Procedure Laterality Date   • BRONCHOSCOPY     • CHOLECYSTECTOMY  1999   • COLON SURGERY  2015    COLON POLYPS   • COLONOSCOPY  04/13/2016    TA w/low grade dysplasia x 3, serrated adenoma x 2   • COLONOSCOPY  06/14/2016    TA w/high grade dysplasia   • ENDOSCOPY N/A 12/6/2017    Procedure: ESOPHAGOGASTRODUODENOSCOPY WITH BIOPSY;  Surgeon: Jayant Robles MD;  Location: [facility];  Service:    • FOOT SURGERY  10/2010    [facility]   • HYSTERECTOMY     • SALIVARY GLAND SURGERY      PAROTID MASS REMOVED BENIGN   • SHOULDER ARTHROSCOPY Left 1995. 2001     Family History   Problem Relation Age of Onset   • Cancer Sister    • Leukemia Cousin    • Malig Hyperthermia Neg Hx      Social History   Substance Use Topics   • Smoking status: Former Smoker     Packs/day: 1.00     Years: 30.00     Types: Cigarettes     Quit date: 10/2017   • Smokeless tobacco: Never Used   • Alcohol use No     Prescriptions Prior to  Admission   Medication Sig Dispense Refill Last Dose   • aspirin 81 MG EC tablet Take 81 mg by mouth Daily.   1/8/2018 at 0900   • buPROPion XL (WELLBUTRIN XL) 300 MG 24 hr tablet Take 300 mg by mouth Every Evening.   1/8/2018 at 2200   • escitalopram (LEXAPRO) 20 MG tablet Take 20 mg by mouth Every Evening.   1/8/2018 at 2200   • fluticasone (FLONASE) 50 MCG/ACT nasal spray 2 sprays into each nostril Daily As Needed for Rhinitis.   Past Week at Unknown time   • Loperamide HCl (IMODIUM A-D PO) Take 1-2 tablets by mouth 2 (Two) Times a Day. 1 IN THE AM AND 2 IN THE PM   1/8/2018 at 1000   • Multiple Vitamins-Minerals (CENTRUM SILVER PO) Take 1 tablet by mouth Daily.   1/8/2018 at 1000   • Probiotic Product (PROBIOTIC PO) Take 1 capsule by mouth Daily.   1/8/2018 at 1000   • Psyllium (METAMUCIL FIBER PO) Take 1-2 capsules by mouth 2 (Two) Times a Day. 1 IN THE AM AND 2 IN THE PM   1/8/2018 at 2200   • simvastatin (ZOCOR) 20 MG tablet Take 20 mg by mouth.   1/8/2018 at 2200   • umeclidinium-vilanterol (ANORO ELLIPTA) 62.5-25 MCG/INH aerosol powder  inhaler Inhale 1 puff Daily.   1/8/2018 at 1000   • albuterol (VENTOLIN HFA) 108 (90 Base) MCG/ACT inhaler Inhale 2 puffs Every 6 (Six) Hours As Needed for Wheezing.   1/7/2018   • Cholecalciferol (VITAMIN D) 2000 units tablet Take 2,000 Units by mouth Every Other Day.   1/7/2018   • cyanocobalamin (VITAMIN B-12) 2000 MCG tablet Take 2,000 mcg by mouth Every Other Day.   1/7/2018   • diazePAM (VALIUM) 5 MG tablet Take 5 mg by mouth 4 (Four) Times a Day As Needed for Anxiety.   1/5/2018   • HYDROcodone-acetaminophen (NORCO) 7.5-325 MG per tablet Take 1 tablet by mouth 4 (Four) Times a Day As Needed for Moderate Pain .   1/6/2018   • ondansetron (ZOFRAN) 8 MG tablet Take 1 tablet by mouth Every 8 (Eight) Hours As Needed for Nausea or Vomiting. 30 tablet 5 1/7/2018     Allergies:  Penicillins    Objective      Vital Signs  Temp:  [98 °F (36.7 °C)] 98 °F (36.7 °C)  Heart Rate:   [88] 88  Resp:  [20] 20  BP: (134)/(72) 134/72    Physical Exam    Results Review:   I reviewed the patient's new clinical results.      Assessment/Plan     Active Problems:    * No active hospital problems. *      Assessment & Plan    I discussed the patients findings and my recommendations with patient and family    Damaso Germain MD  01/09/18  10:27 AM

## 2018-01-09 NOTE — ANESTHESIA POSTPROCEDURE EVALUATION
"Patient: Dilma Abad    Procedure Summary     Date Anesthesia Start Anesthesia Stop Room / Location    01/09/18 1024 1115  [facility] OR 18 INV /  [facility] HYBRID OR 18/19       Procedure Diagnosis Provider Provider    MEDIPORT PLACEMENT right (Right ) No diagnosis on file. MD Aly Devries MD          Anesthesia Type: MAC  Last vitals  BP   122/75 (01/09/18 1113)   Temp   36.4 °C (97.5 °F) (01/09/18 1113)   Pulse   80 (01/09/18 1113)   Resp   16 (01/09/18 1113)     SpO2   95 % (01/09/18 1113)     Post Anesthesia Care and Evaluation    Patient location during evaluation: bedside  Patient participation: complete - patient participated  Level of consciousness: awake  Pain score: 2  Pain management: adequate  Airway patency: patent  Anesthetic complications: No anesthetic complications    Cardiovascular status: acceptable  Respiratory status: acceptable  Hydration status: acceptable    Comments: /75 (BP Location: Left arm, Patient Position: Lying)  Pulse 80  Temp 36.4 °C (97.5 °F) (Oral)   Resp 16  Ht 162.6 cm (64\")  Wt 59.2 kg (130 lb 9 oz)  SpO2 95%  BMI 22.41 kg/m2        "

## 2018-01-09 NOTE — OP NOTE
Operative Note  Date of Admission:  1/9/2018  OR Date: 1/9/2018    Pre-op Diagnosis:   Lung cancer    Post-Op Diagnosis Codes:   Same    Procedure:   1) duplex directed access with right internal jugular PowerPort catheter placement under fluoroscopic guidance    Surgeon: Raphael Germain MD    Assistant: Ajay BRAVO    Anesthesia: Monitor Anesthesia Care    Staff:   Circulator: Dagmar Gandhi RN  Scrub Person: Prisca Pena; Soniya Fenton  Assistant: Hiram Connolly  Orientee: Melisa Hoskins RN  Vascular Radiology Technician: Kaitlyn Ragland    Estimated Blood Loss: minimal    Specimens: * No orders in the log *     Complications: None    Findings: See dictation    Indications:    The patient is an 67 y.o. female seen for evaluation lung cancer with need for access for chemotherapy.  Patient is undergoing placement of port understands risks benefits and complications and agrees to the procedure.       Procedure:    Patient was prepped and draped sterilely.  Using duplex direction we accessed the right internal jugular vein under Trendelenburg.  Overall wire a tear way sheath was placed and a pocket was formed in the right anterior chest wall for an ISP power port .   The PowerPort tubing was tunneled subcutaneously and then centrally to the right atriocaval junction under fluoroscopic guidance.  Was then connected to the PowerPort reservoir which was sutured into place and the pocket 3-0 Prolene suture.  Port flushed in flow to well.  It was primed with heparinized saline.  The port pocket was closed with 3-0 Vicryl and 4-0 Vicryl subcuticular closure and skin glue.  Patient tolerated procedure well.    Radiographic Findings:  Duplex direction showed wide patency to the jugular vein and carotid artery.  Fluoroscopic guidance was used to direct the placement of the catheter tip right atriocaval junction  Active Hospital Problems (** Indicates Principal Problem)    Diagnosis Date Noted   •  Esophageal carcinoma [C15.9] 12/08/2017   • Adenocarcinoma of left lung [C34.92] 10/31/2017      Resolved Hospital Problems    Diagnosis Date Noted Date Resolved   No resolved problems to display.      Damaso Germain MD     Date: 1/9/2018  Time: 6:59 PM

## 2018-01-09 NOTE — PLAN OF CARE
Problem: Patient Care Overview (Adult)  Goal: Plan of Care Review  Outcome: Ongoing (interventions implemented as appropriate)   01/09/18 0918   Coping/Psychosocial Response Interventions   Plan Of Care Reviewed With patient   Patient Care Overview   Progress no change     Goal: Adult Individualization and Mutuality  Outcome: Ongoing (interventions implemented as appropriate)   01/09/18 0918   Individualization   Patient Specific Preferences Dilma     Goal: Discharge Needs Assessment  Outcome: Ongoing (interventions implemented as appropriate)      Problem: Perioperative Period (Adult)  Goal: Signs and Symptoms of Listed Potential Problems Will be Absent or Manageable (Perioperative Period)  Outcome: Ongoing (interventions implemented as appropriate)

## 2018-01-10 PROCEDURE — 77300 RADIATION THERAPY DOSE PLAN: CPT | Performed by: RADIOLOGY

## 2018-01-10 PROCEDURE — 77370 RADIATION PHYSICS CONSULT: CPT | Performed by: RADIOLOGY

## 2018-01-10 PROCEDURE — 77470 SPECIAL RADIATION TREATMENT: CPT | Performed by: RADIOLOGY

## 2018-01-11 ENCOUNTER — INFUSION (OUTPATIENT)
Dept: ONCOLOGY | Facility: HOSPITAL | Age: 68
End: 2018-01-11

## 2018-01-11 ENCOUNTER — OFFICE VISIT (OUTPATIENT)
Dept: ONCOLOGY | Facility: CLINIC | Age: 68
End: 2018-01-11

## 2018-01-11 ENCOUNTER — DOCUMENTATION (OUTPATIENT)
Dept: NUTRITION | Facility: HOSPITAL | Age: 68
End: 2018-01-11

## 2018-01-11 ENCOUNTER — DOCUMENTATION (OUTPATIENT)
Dept: RADIATION ONCOLOGY | Facility: HOSPITAL | Age: 68
End: 2018-01-11

## 2018-01-11 VITALS
WEIGHT: 127 LBS | HEART RATE: 79 BPM | DIASTOLIC BLOOD PRESSURE: 64 MMHG | BODY MASS INDEX: 20.41 KG/M2 | OXYGEN SATURATION: 98 % | TEMPERATURE: 98.1 F | HEIGHT: 66 IN | SYSTOLIC BLOOD PRESSURE: 118 MMHG | RESPIRATION RATE: 12 BRPM

## 2018-01-11 VITALS — HEART RATE: 86 BPM | DIASTOLIC BLOOD PRESSURE: 82 MMHG | SYSTOLIC BLOOD PRESSURE: 157 MMHG

## 2018-01-11 DIAGNOSIS — C15.9 ESOPHAGEAL CARCINOMA (HCC): ICD-10-CM

## 2018-01-11 DIAGNOSIS — Z45.2 FITTING AND ADJUSTMENT OF VASCULAR CATHETER: ICD-10-CM

## 2018-01-11 DIAGNOSIS — C15.9 ESOPHAGEAL CARCINOMA (HCC): Primary | ICD-10-CM

## 2018-01-11 DIAGNOSIS — C34.92 ADENOCARCINOMA OF LEFT LUNG (HCC): ICD-10-CM

## 2018-01-11 DIAGNOSIS — J43.1 PANLOBULAR EMPHYSEMA (HCC): ICD-10-CM

## 2018-01-11 DIAGNOSIS — J43.1 PANLOBULAR EMPHYSEMA (HCC): Primary | ICD-10-CM

## 2018-01-11 LAB
ALBUMIN SERPL-MCNC: 4.2 G/DL (ref 3.5–5.2)
ALBUMIN/GLOB SERPL: 1.6 G/DL (ref 1.1–2.4)
ALP SERPL-CCNC: 64 U/L (ref 38–116)
ALT SERPL W P-5'-P-CCNC: 10 U/L (ref 0–33)
ANION GAP SERPL CALCULATED.3IONS-SCNC: 11.8 MMOL/L
AST SERPL-CCNC: 15 U/L (ref 0–32)
BASOPHILS # BLD AUTO: 0.02 10*3/MM3 (ref 0–0.1)
BASOPHILS NFR BLD AUTO: 0.3 % (ref 0–1.1)
BILIRUB SERPL-MCNC: 0.4 MG/DL (ref 0.1–1.2)
BUN BLD-MCNC: 12 MG/DL (ref 6–20)
BUN/CREAT SERPL: 12.1 (ref 7.3–30)
CALCIUM SPEC-SCNC: 9.5 MG/DL (ref 8.5–10.2)
CHLORIDE SERPL-SCNC: 99 MMOL/L (ref 98–107)
CO2 SERPL-SCNC: 27.2 MMOL/L (ref 22–29)
CREAT BLD-MCNC: 0.99 MG/DL (ref 0.6–1.1)
DEPRECATED RDW RBC AUTO: 40.3 FL (ref 37–49)
EOSINOPHIL # BLD AUTO: 0.31 10*3/MM3 (ref 0–0.36)
EOSINOPHIL NFR BLD AUTO: 4.9 % (ref 1–5)
ERYTHROCYTE [DISTWIDTH] IN BLOOD BY AUTOMATED COUNT: 13 % (ref 11.7–14.5)
GFR SERPL CREATININE-BSD FRML MDRD: 56 ML/MIN/1.73
GLOBULIN UR ELPH-MCNC: 2.6 GM/DL (ref 1.8–3.5)
GLUCOSE BLD-MCNC: 107 MG/DL (ref 74–124)
HCT VFR BLD AUTO: 38.4 % (ref 34–45)
HGB BLD-MCNC: 12.8 G/DL (ref 11.5–14.9)
IMM GRANULOCYTES # BLD: 0.01 10*3/MM3 (ref 0–0.03)
IMM GRANULOCYTES NFR BLD: 0.2 % (ref 0–0.5)
LYMPHOCYTES # BLD AUTO: 0.66 10*3/MM3 (ref 1–3.5)
LYMPHOCYTES NFR BLD AUTO: 10.5 % (ref 20–49)
MCH RBC QN AUTO: 28.4 PG (ref 27–33)
MCHC RBC AUTO-ENTMCNC: 33.3 G/DL (ref 32–35)
MCV RBC AUTO: 85.3 FL (ref 83–97)
MONOCYTES # BLD AUTO: 0.64 10*3/MM3 (ref 0.25–0.8)
MONOCYTES NFR BLD AUTO: 10.2 % (ref 4–12)
NEUTROPHILS # BLD AUTO: 4.64 10*3/MM3 (ref 1.5–7)
NEUTROPHILS NFR BLD AUTO: 73.9 % (ref 39–75)
NRBC BLD MANUAL-RTO: 0 /100 WBC (ref 0–0)
PLATELET # BLD AUTO: 218 10*3/MM3 (ref 150–375)
PMV BLD AUTO: 9.6 FL (ref 8.9–12.1)
POTASSIUM BLD-SCNC: 4.2 MMOL/L (ref 3.5–4.7)
PROT SERPL-MCNC: 6.8 G/DL (ref 6.3–8)
RBC # BLD AUTO: 4.5 10*6/MM3 (ref 3.9–5)
SODIUM BLD-SCNC: 138 MMOL/L (ref 134–145)
WBC NRBC COR # BLD: 6.28 10*3/MM3 (ref 4–10)

## 2018-01-11 PROCEDURE — 96413 CHEMO IV INFUSION 1 HR: CPT | Performed by: INTERNAL MEDICINE

## 2018-01-11 PROCEDURE — 77386: CPT | Performed by: RADIOLOGY

## 2018-01-11 PROCEDURE — 77386 CHG INTENSITY MODULATED RADIATION TX DLVR COMPLEX: CPT | Performed by: RADIOLOGY

## 2018-01-11 PROCEDURE — 25010000002 CARBOPLATIN PER 50 MG: Performed by: INTERNAL MEDICINE

## 2018-01-11 PROCEDURE — 25010000002 DEXAMETHASONE SODIUM PHOSPHATE 10 MG/ML SOLUTION 1 ML VIAL: Performed by: INTERNAL MEDICINE

## 2018-01-11 PROCEDURE — 96417 CHEMO IV INFUS EACH ADDL SEQ: CPT | Performed by: INTERNAL MEDICINE

## 2018-01-11 PROCEDURE — 96415 CHEMO IV INFUSION ADDL HR: CPT | Performed by: INTERNAL MEDICINE

## 2018-01-11 PROCEDURE — 25010000002 PALONOSETRON PER 25 MCG: Performed by: INTERNAL MEDICINE

## 2018-01-11 PROCEDURE — 80053 COMPREHEN METABOLIC PANEL: CPT

## 2018-01-11 PROCEDURE — 99214 OFFICE O/P EST MOD 30 MIN: CPT | Performed by: INTERNAL MEDICINE

## 2018-01-11 PROCEDURE — 85025 COMPLETE CBC W/AUTO DIFF WBC: CPT

## 2018-01-11 PROCEDURE — 25010000002 PACLITAXEL PER 30 MG: Performed by: INTERNAL MEDICINE

## 2018-01-11 PROCEDURE — 96375 TX/PRO/DX INJ NEW DRUG ADDON: CPT | Performed by: INTERNAL MEDICINE

## 2018-01-11 PROCEDURE — 77338 DESIGN MLC DEVICE FOR IMRT: CPT | Performed by: RADIOLOGY

## 2018-01-11 PROCEDURE — 25010000002 DIPHENHYDRAMINE PER 50 MG: Performed by: INTERNAL MEDICINE

## 2018-01-11 PROCEDURE — 77427 RADIATION TX MANAGEMENT X5: CPT | Performed by: RADIOLOGY

## 2018-01-11 PROCEDURE — 77014 CHG CT GUIDANCE RADIATION THERAPY FLDS PLACEMENT: CPT | Performed by: RADIOLOGY

## 2018-01-11 RX ORDER — FAMOTIDINE 10 MG/ML
20 INJECTION, SOLUTION INTRAVENOUS ONCE
Status: CANCELLED | OUTPATIENT
Start: 2018-01-11

## 2018-01-11 RX ORDER — PALONOSETRON 0.05 MG/ML
0.25 INJECTION, SOLUTION INTRAVENOUS ONCE
Status: CANCELLED | OUTPATIENT
Start: 2018-01-25

## 2018-01-11 RX ORDER — FAMOTIDINE 10 MG/ML
20 INJECTION, SOLUTION INTRAVENOUS ONCE
Status: COMPLETED | OUTPATIENT
Start: 2018-01-11 | End: 2018-01-11

## 2018-01-11 RX ORDER — DOXYCYCLINE HYCLATE 100 MG
100 TABLET ORAL 2 TIMES DAILY
Qty: 14 TABLET | Refills: 0 | Status: SHIPPED | OUTPATIENT
Start: 2018-01-11 | End: 2018-01-18

## 2018-01-11 RX ORDER — PALONOSETRON 0.05 MG/ML
0.25 INJECTION, SOLUTION INTRAVENOUS ONCE
Status: CANCELLED | OUTPATIENT
Start: 2018-01-18

## 2018-01-11 RX ORDER — FAMOTIDINE 10 MG/ML
20 INJECTION, SOLUTION INTRAVENOUS ONCE
Status: CANCELLED | OUTPATIENT
Start: 2018-01-25

## 2018-01-11 RX ORDER — FAMOTIDINE 10 MG/ML
20 INJECTION, SOLUTION INTRAVENOUS ONCE
Status: CANCELLED | OUTPATIENT
Start: 2018-01-18

## 2018-01-11 RX ORDER — PALONOSETRON 0.05 MG/ML
0.25 INJECTION, SOLUTION INTRAVENOUS ONCE
Status: CANCELLED | OUTPATIENT
Start: 2018-01-11

## 2018-01-11 RX ORDER — PALONOSETRON 0.05 MG/ML
0.25 INJECTION, SOLUTION INTRAVENOUS ONCE
Status: COMPLETED | OUTPATIENT
Start: 2018-01-11 | End: 2018-01-11

## 2018-01-11 RX ADMIN — PALONOSETRON HYDROCHLORIDE 0.25 MG: 0.25 INJECTION INTRAVENOUS at 09:10

## 2018-01-11 RX ADMIN — SODIUM CHLORIDE 250 ML: 900 INJECTION, SOLUTION INTRAVENOUS at 09:00

## 2018-01-11 RX ADMIN — DEXAMETHASONE SODIUM PHOSPHATE 12 MG: 10 INJECTION, SOLUTION INTRAMUSCULAR; INTRAVENOUS at 09:15

## 2018-01-11 RX ADMIN — CARBOPLATIN 150 MG: 10 INJECTION, SOLUTION INTRAVENOUS at 12:26

## 2018-01-11 RX ADMIN — FAMOTIDINE 20 MG: 10 INJECTION, SOLUTION INTRAVENOUS at 09:14

## 2018-01-11 RX ADMIN — PACLITAXEL 85 MG: 6 INJECTION, SOLUTION INTRAVENOUS at 10:30

## 2018-01-11 RX ADMIN — DIPHENHYDRAMINE HYDROCHLORIDE 50 MG: 50 INJECTION, SOLUTION INTRAMUSCULAR; INTRAVENOUS at 09:32

## 2018-01-11 NOTE — PROGRESS NOTES
Subjective     REASON FOR FOLLOW UP:   1.  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 Moon Bowen on 10/6/2017.   2.  Tumor is negative for PDL 1.  Also negative for ALK, EGFR, and ROS 1 mutations.  3.  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   4.  Esophageal adenocarcinoma was negative for HER-2/elizabeth overexpression but positive for PDL 1  5.  Initiation of combined radiation and low-dose weekly carboplatin and Taxol chemotherapy for treatment of esophageal adenocarcinoma.  First treatment delivered 1/11/2018.    HISTORY OF PRESENT ILLNESS:  The patient is a 67 y.o. year old female who has a history of smoking and COPD.  She had an abnormal CT scan in February of this year showing a 3.5 cm mass in the left suprahilar area.  A repeat CT scan was performed at [facility] 7/13/2017 and show that the mass had increased in size from about 3.5 cm up to 6.5 cm.    She underwent navigational bronchoscopy and endobronchial ultrasound and biopsy with Dr. Moon Bowen on 10/6/2017.  The pathology on the biopsy showed well-differentiated adenocarcinoma.  She is referred to us now by her pulmonologist Dr. Daniel Oconnor for further staging evaluation and treatment recommendations.    She has not had a PET scan for staging.  She had a PDL 1 staining on her bronchoscopy specimen which was negative for PDL 1.  EGFR, ALK, and ROS 1 were also negative.    She is still smoking but is trying to quit.  She had pulmonary function tests performed on 10/25/2017 showing a DLCO of 33% and FEV1 37%.    She was referred to the [facility] and currently is receiving primary radiation to the left upper lobe lung mass.  Because of abnormal uptake in the esophagus noted on her staging PET scan, she also underwent an EGD and biopsy which unfortunately revealed adenocarcinoma of the GE junction.  Molecular studies for HER-2/elizabeth and PDL 1  on the tumor tissue have been requested but are pending.  She has 2 more radiation treatments to complete her lung therapy.  We discussed with the patient today the option of further radiation to the GE junction along with weekly carbo Taxol.      Dilma is here today to receive her first dose of weekly carboplatin and Taxol chemotherapy in conjunction with radiation therapy for her esophageal adenocarcinoma.  She underwent a right internal jugular MediPort placement with Dr. Raphael Germain on 11/9/2017.  Area slightly red today which I think is just due to inflammation and healing but it is a little swollen and we most likely will give her treatment through a peripheral IV today and treat her with one week of doxycycline.    She also is undergoing reevaluation by pulmonary and cardiology to determine if she is a surgical candidate for esophagectomy in the future.    History of Present Illness     Past Medical History:   Diagnosis Date   • Adenocarcinoma of lung 2017    HAD RADIATION    • Anxiety and depression    • Arthritis    • Benign parotid tumor 2012    removed by Dr Vickers told it was benign   • COPD (chronic obstructive pulmonary disease)    • Depression    • Diarrhea    • Early cataract    • Emphysema of lung    • Esophageal cancer 2017   • History of chronic pain    • History of colon polyps    • History of pneumonia    • Hyperlipidemia    • Joint pain    • Stroke         Past Surgical History:   Procedure Laterality Date   • BRONCHOSCOPY     • CHOLECYSTECTOMY  1999   • COLON SURGERY  2015    COLON POLYPS   • COLONOSCOPY  04/13/2016    TA w/low grade dysplasia x 3, serrated adenoma x 2   • COLONOSCOPY  06/14/2016    TA w/high grade dysplasia   • ENDOSCOPY N/A 12/6/2017    Procedure: ESOPHAGOGASTRODUODENOSCOPY WITH BIOPSY;  Surgeon: Jayant Robles MD;  Location: [facility] ENDOSCOPY;  Service:    • FOOT SURGERY  10/2010    [facility]   • HYSTERECTOMY     • OH INSJ TUNNELED CVC W/O SUBQ PORT/ AGE 3 YR/> Right  "1/9/2018    Procedure: MEDIPORT PLACEMENT right;  Surgeon: Damaso Germain MD;  Location: [facility] OR 18/19;  Service: Vascular   • SALIVARY GLAND SURGERY      PAROTID MASS REMOVED BENIGN   • SHOULDER ARTHROSCOPY Left 1995. 2001        ALLERGIES:    Allergies   Allergen Reactions   • Penicillins Hives         Review of Systems   Constitutional: Negative for activity change, appetite change, fatigue, fever and unexpected weight change.   HENT: Negative for hearing loss, nosebleeds, trouble swallowing and voice change.    Eyes: Negative for visual disturbance.   Respiratory: Positive for cough and shortness of breath. Negative for wheezing.    Cardiovascular: Negative for chest pain and palpitations.   Gastrointestinal: Positive for diarrhea. Negative for abdominal pain, nausea and vomiting.        Short bowel symptoms after partial colectomy   Genitourinary: Negative for difficulty urinating, frequency, hematuria and urgency.   Musculoskeletal: Negative for back pain and neck pain.   Skin: Negative for rash.   Neurological: Negative for dizziness, seizures, syncope and headaches.   Hematological: Negative for adenopathy. Does not bruise/bleed easily.   Psychiatric/Behavioral: Negative for behavioral problems. The patient is not nervous/anxious.         Objective     Vitals:    01/11/18 0755   BP: 118/64   Pulse: 79   Resp: 12   Temp: 98.1 °F (36.7 °C)   TempSrc: Oral   SpO2: 98%   Weight: 57.6 kg (127 lb)   Height: 167.5 cm (65.95\")  Comment: new height   PainSc: 0-No pain     Current Status 1/11/2018   ECOG score 0       Physical Exam   Constitutional: She is oriented to person, place, and time. She appears well-developed and well-nourished. No distress.   HENT:   Head: Normocephalic.   Eyes: Conjunctivae and EOM are normal. Pupils are equal, round, and reactive to light. No scleral icterus.   Neck: Normal range of motion. Neck supple. No JVD present. No thyromegaly present.   Cardiovascular: Normal rate and " regular rhythm.  Exam reveals no gallop and no friction rub.    No murmur heard.  Pulmonary/Chest: Effort normal and breath sounds normal. She has no wheezes. She has no rales.   Kyphosis and decreased air movement bilaterally.  Right IJ Mediport the right chest wall.   Abdominal: Soft. She exhibits no distension and no mass. There is no tenderness.   Musculoskeletal: Normal range of motion. She exhibits no edema or deformity.   Lymphadenopathy:     She has no cervical adenopathy.   Neurological: She is alert and oriented to person, place, and time. She has normal reflexes. No cranial nerve deficit.   Skin: Skin is warm and dry. No rash noted. No erythema.   Psychiatric: She has a normal mood and affect. Her behavior is normal. Judgment normal.         RECENT LABS:  Hematology WBC   Date Value Ref Range Status   01/11/2018 6.28 4.00 - 10.00 10*3/mm3 Final     RBC   Date Value Ref Range Status   01/11/2018 4.50 3.90 - 5.00 10*6/mm3 Final     Hemoglobin   Date Value Ref Range Status   01/11/2018 12.8 11.5 - 14.9 g/dL Final     Hematocrit   Date Value Ref Range Status   01/11/2018 38.4 34.0 - 45.0 % Final     Platelets   Date Value Ref Range Status   01/11/2018 218 150 - 375 10*3/mm3 Final        Lab Results   Component Value Date    GLUCOSE 107 01/11/2018    BUN 12 01/11/2018    CREATININE 0.99 01/11/2018    EGFRIFNONA 56 (L) 01/11/2018    BCR 12.1 01/11/2018    K 4.2 01/11/2018    CO2 27.2 01/11/2018    CALCIUM 9.5 01/11/2018    ALBUMIN 4.20 01/11/2018    LABIL2 1.6 01/11/2018    AST 15 01/11/2018    ALT 10 01/11/2018       PET 11/7/2017  IMPRESSION:  Left upper lobe lung carcinoma showing significant increase  in size since the preceding CT scan of the chest dated 07/13/2017. The  tumor demonstrates moderate hypermetabolism. There is no metabolic  evidence of metastatic disease within the neck, chest, abdomen or  pelvis. Heart, there are is a tiny left parotid nodule that demonstrates  intense hypermetabolism.  Further evaluation with ultrasound-guided  biopsy is suggested. Also noted is localized soft tissue fullness at the  gastroesophageal junction extending focally into the cardia of the  stomach that demonstrates moderate hypermetabolism. Esophageal carcinoma  is suspected. Further evaluation with endoscopy is recommended.    PATHOLOGY FROM EGD 12/6/2017  Final Diagnosis   1.  GASTROESOPHAGEAL JUNCTION, BIOPSIES:             INVASIVE ADENOCARCINOMA.            NO IDENTIFIED INTESTINAL METAPLASIA.      COMMENT: In this specimen tumor is identified extending into submucosal tissue. Deeper extension cannot be excluded in these superficial biopsies.   Her 2 Elizabeth negative  PD L1 positive ( > 1 ).      Assessment/Plan   1.  Well differentiated adenocarcinoma presumably from lung origin in a long-time smoker.  The biopsy was made from navigational bronchoscopy and endobronchial ultrasound at [facility] on 10/6/2017.  Staging PET scan showed no mediastinal adenopathy or obvious distant metastases.  She did have uptake in the lower esophagus which was evaluated with EGD and biopsy on 12/6/2017 and unfortunately was found to be adenocarcinoma at the GE junction.  2.  Emphysema due to smoking.  3.  LungTumor tissue was negative for PDL 1 expression, EGFR, ALK, or ROS 1 mutations.  Molecular testing on the esophageal adenocarcinoma is pending (we requested HER-2/elizabeth and PDL 1).  4.  She completed stereotactic radiation to the lung tumor 12/15/2017.  5.  Plan to begin combined radiation therapy to the esophagus along with weekly low-dose carboplatin and Taxol chemotherapy.  First treatment will be delivered today in our office 1/11/2018.    6.  She underwent placement of right internal jugular MediPort by Dr. Raphael Germain of vascular surgery 1/9/2018.  5.  She was not considered a candidate for lobectomy due to her poor pulmonary function but we plan to have her reevaluated by cardiology and pulmonology prior to determining if she is a  potential candidate for esophageal resection.  She is currently scheduled to see Dr. Puneet Treviño of cardiology 1/12/2018.  She also has been seen by pulmonology but we are awaiting their formal report.      Plan  1.  Dilma will proceed with her first cycle of carboplatin and Taxol in the office today with plans to deliver weekly treatment during her radiation for esophageal adenocarcinoma.  2.  She'll be scheduled return in one week for nurse practitioner evaluation and week #2 of carboplatin and Taxol.  3.  M.D. follow-up in 2 weeks to assess toxicity and deliver her third week of carboplatin and Taxol.  4.  We will await the recommendations from pulmonary and cardiology regarding whether or not she is a candidate for future esophagectomy.  5.  Because of the redness around her MediPort we we will place her on antibiotic therapy with doxycycline 100 mg by mouth twice a day for 7 days and deliver treatment today through a peripheral IV.  6.  She is already scheduled for CT scan of the chest in late February for reevaluation of her previously treated lung adenocarcinoma.  When she completes treatment for her esophageal carcinoma we will also need to eventually refer her back to have endoscopic follow-up.  Her initial EGD and biopsy was performed by gastroenterology (Dr. Robles).  If she is a candidate for potential surgical resection in the future we may have thoracic surgery perform her next endoscopic evaluation.

## 2018-01-11 NOTE — PROGRESS NOTES
Oncology Nutrition Screening    Patient Name:  Dilma Abad  YOB: 1950  MRN: [id number]  Date:  01/11/18  Physician: Tamara Collier    Notes:   Met patient in CBC during beginning of chemotherapy.  She states she has been having diarrhea due to h/o colon surgery.  Appetite is slightly decreased but no weight loss.  Patient was a bit groggy due to benedryl and she had just woken up.   Provided Eating Hints booklet and handout for head and neck radiation. Encouraged intake. Will follow closely through course of treatment.      Type of Cancer Treatment:   Radiation/Cyberknife: begins today 1/11  Chemotherapy:  Type: carbotaxol  Treatment Schedule: weekly    Patient Active Problem List   Diagnosis   • Adenocarcinoma of left lung   • Panlobular emphysema   • Abnormal finding on imaging   • Esophageal carcinoma   • Fitting and adjustment of vascular catheter       Current Outpatient Prescriptions   Medication Sig Dispense Refill   • albuterol (VENTOLIN HFA) 108 (90 Base) MCG/ACT inhaler Inhale 2 puffs Every 6 (Six) Hours As Needed for Wheezing.     • aspirin 81 MG EC tablet Take 81 mg by mouth Daily.     • buPROPion XL (WELLBUTRIN XL) 300 MG 24 hr tablet Take 300 mg by mouth Every Evening.     • Cholecalciferol (VITAMIN D) 2000 units tablet Take 2,000 Units by mouth Every Other Day.     • cyanocobalamin (VITAMIN B-12) 2000 MCG tablet Take 2,000 mcg by mouth Every Other Day.     • diazePAM (VALIUM) 5 MG tablet Take 5 mg by mouth 4 (Four) Times a Day As Needed for Anxiety.     • doxycycline (VIBRAMYICN) 100 MG tablet Take 1 tablet by mouth 2 (Two) Times a Day for 7 days. 14 tablet 0   • escitalopram (LEXAPRO) 20 MG tablet Take 20 mg by mouth Every Evening.     • fluticasone (FLONASE) 50 MCG/ACT nasal spray 2 sprays into each nostril Daily As Needed for Rhinitis.     • HYDROcodone-acetaminophen (NORCO) 5-325 MG per tablet Take 1-2 tablets by mouth Every 4 (Four) Hours As Needed (Pain). 40 tablet 0   •  "HYDROcodone-acetaminophen (NORCO) 7.5-325 MG per tablet Take 1 tablet by mouth 4 (Four) Times a Day As Needed for Moderate Pain .     • Loperamide HCl (IMODIUM A-D PO) Take 1-2 tablets by mouth 2 (Two) Times a Day. 1 IN THE AM AND 2 IN THE PM     • Multiple Vitamins-Minerals (CENTRUM SILVER PO) Take 1 tablet by mouth Daily.     • ondansetron (ZOFRAN) 8 MG tablet Take 1 tablet by mouth Every 8 (Eight) Hours As Needed for Nausea or Vomiting. 30 tablet 5   • Probiotic Product (PROBIOTIC PO) Take 1 capsule by mouth Daily.     • Psyllium (METAMUCIL FIBER PO) Take 1-2 capsules by mouth 2 (Two) Times a Day. 1 IN THE AM AND 2 IN THE PM     • simvastatin (ZOCOR) 20 MG tablet Take 20 mg by mouth.     • umeclidinium-vilanterol (ANORO ELLIPTA) 62.5-25 MCG/INH aerosol powder  inhaler Inhale 1 puff Daily.       No current facility-administered medications for this visit.      Facility-Administered Medications Ordered in Other Visits   Medication Dose Route Frequency Provider Last Rate Last Dose   • CARBOplatin (PARAPLATIN) 150 mg in dextrose (D5W) 5 % 115 mL chemo IVPB  150 mg Intravenous Once Bienvenido Collier MD       • PACLitaxel (TAXOL) 85 mg in sodium chloride 0.9 % 264.1667 mL chemo IVPB  50 mg/m2 (Treatment Plan Recorded) Intravenous Once Bienvenido Collier MD   85 mg at 01/11/18 1030       Glycemic Risk:   Steriods    Weight:   Height: 65\"  Weight:127 lbs.  Usual Body Weight: 125 lbs.   BMI: 20.5   Weight has been stable    Oral Food Intake:  Regular Diet - No Restrictions  Compared to normal intake, current food intake is less than normal    Hydration Status:   How many 8 ounce glass of water of fluid do you drink per day?  6    Enteral Feeding:   n/a    Nutrition Symptoms:   Altered Apetite  Diarrhea    Activity:   Not my normal self, but able to be up and about with fairly normal activities     reports that she quit smoking about 3 months ago. Her smoking use included Cigarettes. She has a 30.00 pack-year smoking history. " She has never used smokeless tobacco. She reports that she does not drink alcohol or use illicit drugs.    Evaluation of Nutritional Risk:   Patient identified to be at nutritional risk and/or for nutritional consultation; will follow patient through course of treatment.   Diagnosis  Weight Change  Nutrition Impact Symptoms  Patient Education        Electronically signed by:  Ruby Kellogg RD  11:50 AM

## 2018-01-11 NOTE — PROGRESS NOTES
Pts port not used today due to redness at site.  MD sent antibiotic to pharm.  Pt treated with peripheral IV today.    10:35  Taxol started at 140cc/hr  157/82-86  No c/o noted  1043  158/87-81  No c/o noted     1048  146/75-82   No c/o noted    1118  No c/o noted  Up to BR  1130  No c/o noted  152/82-80  1200  No c/o noted  1320  PT denies any complaints  Treatment finished without any problems.  Pt discharged with her sister

## 2018-01-15 PROCEDURE — 77386 CHG INTENSITY MODULATED RADIATION TX DLVR COMPLEX: CPT | Performed by: RADIOLOGY

## 2018-01-15 PROCEDURE — 77386: CPT | Performed by: RADIOLOGY

## 2018-01-15 PROCEDURE — 77014 CHG CT GUIDANCE RADIATION THERAPY FLDS PLACEMENT: CPT | Performed by: RADIOLOGY

## 2018-01-16 PROCEDURE — 77336 RADIATION PHYSICS CONSULT: CPT | Performed by: RADIOLOGY

## 2018-01-17 PROCEDURE — 77386 CHG INTENSITY MODULATED RADIATION TX DLVR COMPLEX: CPT | Performed by: RADIOLOGY

## 2018-01-17 PROCEDURE — 77014 CHG CT GUIDANCE RADIATION THERAPY FLDS PLACEMENT: CPT | Performed by: RADIOLOGY

## 2018-01-17 PROCEDURE — 77386: CPT | Performed by: RADIOLOGY

## 2018-01-18 ENCOUNTER — DOCUMENTATION (OUTPATIENT)
Dept: ONCOLOGY | Facility: CLINIC | Age: 68
End: 2018-01-18

## 2018-01-18 ENCOUNTER — INFUSION (OUTPATIENT)
Dept: ONCOLOGY | Facility: HOSPITAL | Age: 68
End: 2018-01-18

## 2018-01-18 ENCOUNTER — OFFICE VISIT (OUTPATIENT)
Dept: ONCOLOGY | Facility: CLINIC | Age: 68
End: 2018-01-18

## 2018-01-18 ENCOUNTER — DOCUMENTATION (OUTPATIENT)
Dept: NUTRITION | Facility: HOSPITAL | Age: 68
End: 2018-01-18

## 2018-01-18 VITALS
BODY MASS INDEX: 20.34 KG/M2 | HEIGHT: 66 IN | DIASTOLIC BLOOD PRESSURE: 60 MMHG | HEART RATE: 92 BPM | SYSTOLIC BLOOD PRESSURE: 120 MMHG | TEMPERATURE: 98 F | WEIGHT: 126.6 LBS | RESPIRATION RATE: 16 BRPM

## 2018-01-18 VITALS — DIASTOLIC BLOOD PRESSURE: 77 MMHG | SYSTOLIC BLOOD PRESSURE: 127 MMHG | HEART RATE: 77 BPM

## 2018-01-18 DIAGNOSIS — C34.92 ADENOCARCINOMA OF LEFT LUNG (HCC): ICD-10-CM

## 2018-01-18 DIAGNOSIS — C15.9 ESOPHAGEAL CARCINOMA (HCC): Primary | ICD-10-CM

## 2018-01-18 DIAGNOSIS — C34.92 ADENOCARCINOMA OF LEFT LUNG (HCC): Primary | ICD-10-CM

## 2018-01-18 DIAGNOSIS — C15.9 ESOPHAGEAL CARCINOMA (HCC): ICD-10-CM

## 2018-01-18 LAB
ALBUMIN SERPL-MCNC: 3.8 G/DL (ref 3.5–5.2)
ALBUMIN/GLOB SERPL: 1.5 G/DL (ref 1.1–2.4)
ALP SERPL-CCNC: 57 U/L (ref 38–116)
ALT SERPL W P-5'-P-CCNC: 8 U/L (ref 0–33)
ANION GAP SERPL CALCULATED.3IONS-SCNC: 11.4 MMOL/L
AST SERPL-CCNC: 14 U/L (ref 0–32)
BASOPHILS # BLD AUTO: 0.02 10*3/MM3 (ref 0–0.1)
BASOPHILS NFR BLD AUTO: 0.4 % (ref 0–1.1)
BILIRUB SERPL-MCNC: 0.4 MG/DL (ref 0.1–1.2)
BUN BLD-MCNC: 12 MG/DL (ref 6–20)
BUN/CREAT SERPL: 14.8 (ref 7.3–30)
CALCIUM SPEC-SCNC: 9.2 MG/DL (ref 8.5–10.2)
CHLORIDE SERPL-SCNC: 98 MMOL/L (ref 98–107)
CO2 SERPL-SCNC: 28.6 MMOL/L (ref 22–29)
CREAT BLD-MCNC: 0.81 MG/DL (ref 0.6–1.1)
DEPRECATED RDW RBC AUTO: 39.8 FL (ref 37–49)
EOSINOPHIL # BLD AUTO: 0.13 10*3/MM3 (ref 0–0.36)
EOSINOPHIL NFR BLD AUTO: 2.6 % (ref 1–5)
ERYTHROCYTE [DISTWIDTH] IN BLOOD BY AUTOMATED COUNT: 12.8 % (ref 11.7–14.5)
GFR SERPL CREATININE-BSD FRML MDRD: 70 ML/MIN/1.73
GLOBULIN UR ELPH-MCNC: 2.5 GM/DL (ref 1.8–3.5)
GLUCOSE BLD-MCNC: 104 MG/DL (ref 74–124)
HCT VFR BLD AUTO: 36 % (ref 34–45)
HGB BLD-MCNC: 11.9 G/DL (ref 11.5–14.9)
HOLD SPECIMEN: NORMAL
IMM GRANULOCYTES # BLD: 0.04 10*3/MM3 (ref 0–0.03)
IMM GRANULOCYTES NFR BLD: 0.8 % (ref 0–0.5)
LYMPHOCYTES # BLD AUTO: 0.47 10*3/MM3 (ref 1–3.5)
LYMPHOCYTES NFR BLD AUTO: 9.5 % (ref 20–49)
MCH RBC QN AUTO: 28.3 PG (ref 27–33)
MCHC RBC AUTO-ENTMCNC: 33.1 G/DL (ref 32–35)
MCV RBC AUTO: 85.7 FL (ref 83–97)
MONOCYTES # BLD AUTO: 0.5 10*3/MM3 (ref 0.25–0.8)
MONOCYTES NFR BLD AUTO: 10.1 % (ref 4–12)
NEUTROPHILS # BLD AUTO: 3.8 10*3/MM3 (ref 1.5–7)
NEUTROPHILS NFR BLD AUTO: 76.6 % (ref 39–75)
NRBC BLD MANUAL-RTO: 0 /100 WBC (ref 0–0)
PLATELET # BLD AUTO: 214 10*3/MM3 (ref 150–375)
PMV BLD AUTO: 10 FL (ref 8.9–12.1)
POTASSIUM BLD-SCNC: 4.2 MMOL/L (ref 3.5–4.7)
PROT SERPL-MCNC: 6.3 G/DL (ref 6.3–8)
RBC # BLD AUTO: 4.2 10*6/MM3 (ref 3.9–5)
SODIUM BLD-SCNC: 138 MMOL/L (ref 134–145)
WBC NRBC COR # BLD: 4.96 10*3/MM3 (ref 4–10)

## 2018-01-18 PROCEDURE — 96417 CHEMO IV INFUS EACH ADDL SEQ: CPT | Performed by: NURSE PRACTITIONER

## 2018-01-18 PROCEDURE — 25010000002 DIPHENHYDRAMINE PER 50 MG: Performed by: INTERNAL MEDICINE

## 2018-01-18 PROCEDURE — 25010000002 DEXAMETHASONE SODIUM PHOSPHATE 10 MG/ML SOLUTION 1 ML VIAL: Performed by: INTERNAL MEDICINE

## 2018-01-18 PROCEDURE — 25010000002 PACLITAXEL PER 30 MG: Performed by: INTERNAL MEDICINE

## 2018-01-18 PROCEDURE — 77386 CHG INTENSITY MODULATED RADIATION TX DLVR COMPLEX: CPT | Performed by: RADIOLOGY

## 2018-01-18 PROCEDURE — 25010000002 PALONOSETRON PER 25 MCG: Performed by: INTERNAL MEDICINE

## 2018-01-18 PROCEDURE — 77386: CPT | Performed by: RADIOLOGY

## 2018-01-18 PROCEDURE — 99212-NC PR NO CHARGE CBC OFFICE OUTPATIENT VISIT 10 MINUTES: Performed by: NURSE PRACTITIONER

## 2018-01-18 PROCEDURE — 96375 TX/PRO/DX INJ NEW DRUG ADDON: CPT | Performed by: NURSE PRACTITIONER

## 2018-01-18 PROCEDURE — 96415 CHEMO IV INFUSION ADDL HR: CPT | Performed by: NURSE PRACTITIONER

## 2018-01-18 PROCEDURE — 80053 COMPREHEN METABOLIC PANEL: CPT

## 2018-01-18 PROCEDURE — 77014 CHG CT GUIDANCE RADIATION THERAPY FLDS PLACEMENT: CPT | Performed by: RADIOLOGY

## 2018-01-18 PROCEDURE — 96413 CHEMO IV INFUSION 1 HR: CPT | Performed by: NURSE PRACTITIONER

## 2018-01-18 PROCEDURE — 25010000002 CARBOPLATIN PER 50 MG: Performed by: INTERNAL MEDICINE

## 2018-01-18 PROCEDURE — 85025 COMPLETE CBC W/AUTO DIFF WBC: CPT

## 2018-01-18 RX ORDER — DIPHENOXYLATE HYDROCHLORIDE AND ATROPINE SULFATE 2.5; .025 MG/1; MG/1
TABLET ORAL
Refills: 1 | Status: ON HOLD | COMMUNITY
Start: 2018-01-12 | End: 2019-01-01

## 2018-01-18 RX ORDER — PALONOSETRON 0.05 MG/ML
0.25 INJECTION, SOLUTION INTRAVENOUS ONCE
Status: COMPLETED | OUTPATIENT
Start: 2018-01-18 | End: 2018-01-18

## 2018-01-18 RX ORDER — FAMOTIDINE 10 MG/ML
20 INJECTION, SOLUTION INTRAVENOUS ONCE
Status: COMPLETED | OUTPATIENT
Start: 2018-01-18 | End: 2018-01-18

## 2018-01-18 RX ADMIN — PACLITAXEL 85 MG: 6 INJECTION, SOLUTION INTRAVENOUS at 11:11

## 2018-01-18 RX ADMIN — PALONOSETRON HYDROCHLORIDE 0.25 MG: 0.25 INJECTION INTRAVENOUS at 10:03

## 2018-01-18 RX ADMIN — SODIUM CHLORIDE 250 ML: 900 INJECTION, SOLUTION INTRAVENOUS at 09:55

## 2018-01-18 RX ADMIN — DIPHENHYDRAMINE HYDROCHLORIDE 25 MG: 50 INJECTION, SOLUTION INTRAMUSCULAR; INTRAVENOUS at 10:05

## 2018-01-18 RX ADMIN — DEXAMETHASONE SODIUM PHOSPHATE 12 MG: 10 INJECTION, SOLUTION INTRAMUSCULAR; INTRAVENOUS at 10:23

## 2018-01-18 RX ADMIN — CARBOPLATIN 170 MG: 10 INJECTION, SOLUTION INTRAVENOUS at 12:50

## 2018-01-18 RX ADMIN — FAMOTIDINE 20 MG: 10 INJECTION, SOLUTION INTRAVENOUS at 10:04

## 2018-01-18 NOTE — PROGRESS NOTES
ONC Nutrition Follow Up:     Weight 126.9#, stable.    Current treatment: Carbo/ taxol and radiation.  Patient was sleeping at time of visit.    Per chart review, patient has no complaints and is eating and drinking well.     Will continue to follow.

## 2018-01-18 NOTE — PROGRESS NOTES
Subjective     REASON FOR FOLLOW UP:   1.  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 Moon Bowen on 10/6/2017.   2.  Tumor is negative for PDL 1.  Also negative for ALK, EGFR, and ROS 1 mutations.  3.  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   4.  Esophageal adenocarcinoma was negative for HER-2/elizabeth overexpression but positive for PDL 1  5.  Initiation of combined radiation and low-dose weekly carboplatin and Taxol chemotherapy for treatment of esophageal adenocarcinoma.  First treatment delivered 1/11/2018.    HISTORY OF PRESENT ILLNESS:  The patient is a 67 y.o. year old female who has a history of smoking and COPD.  She had an abnormal CT scan in February of this year showing a 3.5 cm mass in the left suprahilar area.  A repeat CT scan was performed at [facility] 7/13/2017 and show that the mass had increased in size from about 3.5 cm up to 6.5 cm.    She underwent navigational bronchoscopy and endobronchial ultrasound and biopsy with Dr. Moon Bowen on 10/6/2017.  The pathology on the biopsy showed well-differentiated adenocarcinoma.  She is referred to us now by her pulmonologist Dr. Daniel Oconnor for further staging evaluation and treatment recommendations.    She has not had a PET scan for staging.  She had a PDL 1 staining on her bronchoscopy specimen which was negative for PDL 1.  EGFR, ALK, and ROS 1 were also negative.    She is still smoking but is trying to quit.  She had pulmonary function tests performed on 10/25/2017 showing a DLCO of 33% and FEV1 37%.    She was referred to the [facility] radiation [facility] and currently is receiving primary radiation to the left upper lobe lung mass.  Because of abnormal uptake in the esophagus noted on her staging PET scan, she also underwent an EGD and biopsy which unfortunately revealed adenocarcinoma of the GE junction.  Molecular studies for HER-2/elizabeth and PDL 1  on the tumor tissue have been requested but are pending.  She has 2 more radiation treatments to complete her lung therapy.  We discussed the option of further radiation to the GE junction along with weekly carbo Taxol.      She returns back today due for her second dose of Carbo/Taxol.  Her only real complaint today is fatigue.  She did not have any trouble with the actual infusion and denies any significant trouble with nausea, changes in her bowels, or neuropathy.    She does point out a new tiny nodule in the right neck which is new to her.  This appears to be under a scar. Per review of the chart, she had a benign right parotid tumor removed in 2012.  She denies any pain in this area.    Presently, she continues to eat and drink well, with no difficulty swallowing.  She denies other concerns today.    History of Present Illness     Past Medical History:   Diagnosis Date   • Adenocarcinoma of lung 2017    HAD RADIATION    • Anxiety and depression    • Arthritis    • Benign parotid tumor 2012    removed by Dr Vickers told it was benign   • COPD (chronic obstructive pulmonary disease)    • Depression    • Diarrhea    • Early cataract    • Emphysema of lung    • Esophageal cancer 2017   • History of chronic pain    • History of colon polyps    • History of pneumonia    • Hyperlipidemia    • Joint pain    • Stroke         Past Surgical History:   Procedure Laterality Date   • BRONCHOSCOPY     • CHOLECYSTECTOMY  1999   • COLON SURGERY  2015    COLON POLYPS   • COLONOSCOPY  04/13/2016    TA w/low grade dysplasia x 3, serrated adenoma x 2   • COLONOSCOPY  06/14/2016    TA w/high grade dysplasia   • ENDOSCOPY N/A 12/6/2017    Procedure: ESOPHAGOGASTRODUODENOSCOPY WITH BIOPSY;  Surgeon: Jayant Robles MD;  Location: [facility] ENDOSCOPY;  Service:    • FOOT SURGERY  10/2010    [facility]   • HYSTERECTOMY     • ND INSJ TUNNELED CVC W/O SUBQ PORT/ AGE 3 YR/> Right 1/9/2018    Procedure: MEDIPORT PLACEMENT right;   "Surgeon: Damaso Germain MD;  Location: [facility] OR 18/19;  Service: Vascular   • SALIVARY GLAND SURGERY      PAROTID MASS REMOVED BENIGN   • SHOULDER ARTHROSCOPY Left 1995. 2001        ALLERGIES:    Allergies   Allergen Reactions   • Penicillins Hives         Review of Systems   Constitutional: Positive for fatigue. Negative for activity change, appetite change, fever and unexpected weight change.   HENT: Negative for hearing loss, nosebleeds, trouble swallowing and voice change.    Eyes: Negative for visual disturbance.   Respiratory: Negative for wheezing.    Cardiovascular: Negative for chest pain and palpitations.   Gastrointestinal: Positive for diarrhea. Negative for abdominal pain, nausea and vomiting.        Short bowel symptoms after partial colectomy   Genitourinary: Negative for difficulty urinating, frequency, hematuria and urgency.   Musculoskeletal: Negative for back pain and neck pain.   Skin: Negative for rash.   Neurological: Negative for dizziness, seizures, syncope and headaches.   Hematological: Negative for adenopathy. Does not bruise/bleed easily.   Psychiatric/Behavioral: Negative for behavioral problems. The patient is not nervous/anxious.         Objective     Vitals:    01/18/18 0905   BP: 120/60   Pulse: 92   Resp: 16   Temp: 98 °F (36.7 °C)   Weight: 57.4 kg (126 lb 9.6 oz)   Height: 167.5 cm (65.95\")   PainSc: 0-No pain     Current Status 1/18/2018   ECOG score 0       Physical Exam   Constitutional: She is oriented to person, place, and time. She appears well-developed and well-nourished. No distress.   HENT:   Head: Normocephalic.   Eyes: Conjunctivae and EOM are normal. Pupils are equal, round, and reactive to light. No scleral icterus.   Neck: Normal range of motion. Neck supple. No JVD present. No thyromegaly present.   Tiny, <5mm, nodule palpated in the right upper neck, beneath scar from previous parotid tumor removal. Mobile, non-tender.   Cardiovascular: Normal rate and " regular rhythm.  Exam reveals no gallop and no friction rub.    No murmur heard.  Pulmonary/Chest: Effort normal and breath sounds normal. She has no wheezes. She has no rales.   Kyphosis and decreased air movement bilaterally.  Right IJ Mediport the right chest wall.   Abdominal: Soft. She exhibits no distension and no mass. There is no tenderness.   Musculoskeletal: Normal range of motion. She exhibits no edema or deformity.   Lymphadenopathy:     She has no cervical adenopathy.   Neurological: She is alert and oriented to person, place, and time. She has normal reflexes. No cranial nerve deficit.   Skin: Skin is warm and dry. No rash noted. No erythema.   Psychiatric: She has a normal mood and affect. Her behavior is normal. Judgment normal.         RECENT LABS:  Hematology WBC   Date Value Ref Range Status   01/18/2018 4.96 4.00 - 10.00 10*3/mm3 Final     RBC   Date Value Ref Range Status   01/18/2018 4.20 3.90 - 5.00 10*6/mm3 Final     Hemoglobin   Date Value Ref Range Status   01/18/2018 11.9 11.5 - 14.9 g/dL Final     Hematocrit   Date Value Ref Range Status   01/18/2018 36.0 34.0 - 45.0 % Final     Platelets   Date Value Ref Range Status   01/18/2018 214 150 - 375 10*3/mm3 Final        Lab Results   Component Value Date    GLUCOSE 104 01/18/2018    BUN 12 01/18/2018    CREATININE 0.81 01/18/2018    EGFRIFNONA 70 01/18/2018    BCR 14.8 01/18/2018    K 4.2 01/18/2018    CO2 28.6 01/18/2018    CALCIUM 9.2 01/18/2018    ALBUMIN 3.80 01/18/2018    LABIL2 1.5 01/18/2018    AST 14 01/18/2018    ALT 8 01/18/2018       PET 11/7/2017  IMPRESSION:  Left upper lobe lung carcinoma showing significant increase  in size since the preceding CT scan of the chest dated 07/13/2017. The  tumor demonstrates moderate hypermetabolism. There is no metabolic  evidence of metastatic disease within the neck, chest, abdomen or  pelvis. Heart, there are is a tiny left parotid nodule that demonstrates  intense hypermetabolism. Further  evaluation with ultrasound-guided  biopsy is suggested. Also noted is localized soft tissue fullness at the  gastroesophageal junction extending focally into the cardia of the  stomach that demonstrates moderate hypermetabolism. Esophageal carcinoma  is suspected. Further evaluation with endoscopy is recommended.    PATHOLOGY FROM EGD 12/6/2017  Final Diagnosis   1.  GASTROESOPHAGEAL JUNCTION, BIOPSIES:             INVASIVE ADENOCARCINOMA.            NO IDENTIFIED INTESTINAL METAPLASIA.      COMMENT: In this specimen tumor is identified extending into submucosal tissue. Deeper extension cannot be excluded in these superficial biopsies.   Her 2 Elizabeth negative  PD L1 positive ( > 1 ).      Assessment/Plan   1.  Well differentiated adenocarcinoma presumably from lung origin in a long-time smoker.  The biopsy was made from navigational bronchoscopy and endobronchial ultrasound at [place] on 10/6/2017.  Staging PET scan showed no mediastinal adenopathy or obvious distant metastases.  She did have uptake in the lower esophagus which was evaluated with EGD and biopsy on 12/6/2017 and unfortunately was found to be adenocarcinoma at the GE junction.   · LungTumor tissue was negative for PDL 1 expression, EGFR, ALK, or ROS 1 mutations.   · She completed stereotactic radiation to the lung tumor 12/15/2017.  2.  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   · Esophageal adenocarcinoma was negative for HER-2/elizabeth overexpression but positive for PDL 1.  · Initiated combined radiation therapy to the esophagus along with weekly low-dose carboplatin and Taxol chemotherapy 1/11/18.  3. Emphysema due to smoking.  4. Placement of right internal jugular MediPort by Dr. Raphael Germain of vascular surgery 1/9/2018.  5.  She was not considered a candidate for lobectomy due to her poor pulmonary function but we plan to have her reevaluated by cardiology and pulmonology prior to determining if she is a potential candidate  for esophageal resection.  She is currently scheduled to see Dr. Puneet Treviño of cardiology 2/9/2018.  She also has been seen by pulmonology but we are awaiting their formal report.  6.  History of benign right parotid tumor in 2012, surgically removed per Dr. Vickers.  Patient is noted to have very tiny nodule below the scar, new to her, but possibly just scar tissue. Encouraged her to watch closely, as will we, for any enlargement.    Plan  1.  Proceed with cycle 2 of carboplatin and Taxol.  2.  Continue radiation therapy under the direction of Dr. Mcdonald.  3.  Return in 1 week for M.D. follow-up and cycle 3 of therapy. Reassess tiny neck nodule at this time.  4.  We will await the recommendations from pulmonary and cardiology regarding whether or not she is a candidate for future esophagectomy.  5.  She is already scheduled for CT scan of the chest in late February for reevaluation of her previously treated lung adenocarcinoma.  When she completes treatment for her esophageal carcinoma we will also need to eventually refer her back to have endoscopic follow-up.  Her initial EGD and biopsy was performed by gastroenterology (Dr. Robles).  If she is a candidate for potential surgical resection in the future we may have thoracic surgery perform her next endoscopic evaluation.

## 2018-01-18 NOTE — PROGRESS NOTES
Pt was provided with the [facility] financial assistance application when she was in a chemo bed, being prepped for her 2nd treatment. She has a $50 copay here. JYOTSNA reviewed how to complete it and gave her contact information for Shamika and me.

## 2018-01-19 ENCOUNTER — RADIATION ONCOLOGY WEEKLY ASSESSMENT (OUTPATIENT)
Dept: RADIATION ONCOLOGY | Facility: HOSPITAL | Age: 68
End: 2018-01-19

## 2018-01-19 VITALS
DIASTOLIC BLOOD PRESSURE: 76 MMHG | TEMPERATURE: 97.8 F | SYSTOLIC BLOOD PRESSURE: 136 MMHG | WEIGHT: 127 LBS | HEART RATE: 86 BPM | RESPIRATION RATE: 16 BRPM | OXYGEN SATURATION: 98 % | BODY MASS INDEX: 20.53 KG/M2

## 2018-01-19 DIAGNOSIS — C15.9 ESOPHAGEAL CARCINOMA (HCC): Primary | ICD-10-CM

## 2018-01-19 PROCEDURE — 77386 CHG INTENSITY MODULATED RADIATION TX DLVR COMPLEX: CPT | Performed by: RADIOLOGY

## 2018-01-19 PROCEDURE — 77014 CHG CT GUIDANCE RADIATION THERAPY FLDS PLACEMENT: CPT | Performed by: RADIOLOGY

## 2018-01-19 PROCEDURE — 77386: CPT | Performed by: RADIOLOGY

## 2018-01-19 NOTE — PROGRESS NOTES
Physician Weekly Management Note    Diagnosis:   Esophageal Ca  Reason for Visit:  Radiation (5/30)    Concurrent Chemo:   Yes    Notes on Treatment course, Films, Medical progress and Plan:  Doing well. Eating well, some decreased appetite but weight stable. No problems or questions, cont on.    ROS - Other than as listed above, as follows:  Constitutional - Normal - no complaints of fatigue, denies lack of appetite, fever, night sweats or change in weight.  Neck - Normal - denies neck masses, muscle weakness, neck pain, decreased range of motion or swelling.  Cardiovascular - Normal - denies arrhythmias, chest pain, dyspnea, edema, orthopnea or palpitations.  Respiratory - Normal - denies cough, dyspnea, hemoptysis, hiccoughs, pleuritic chest pain or wheezing.  Gastrointestinal - Normal - no complaints of constipation, abdominal pain, diarrhea, heartburn/dyspepsia, hematemesis, hemorrhoids, melena or GI bleeding, nausea, pain or cramping or vomiting.    PHYSICAL EXAM - Other than listed above, as follows:  Vitals:    01/19/18 1026   BP: 136/76   Pulse: 86   Resp: 16   Temp: 97.8 °F (36.6 °C)   TempSrc: Oral   SpO2: 98%   Weight: 57.6 kg (127 lb)   PainSc: 0-No pain       Constitutional - Normal - no evidence of impaired alertness, inadequate appearance, premature or advanced chronologic age, uncooperativeness, altered mood, affect or disorientation.  Neck - Normal - no evidence of tender or enlarged lymph nodes, neck abnormalities, restricted range of motion or enlarged thyroid.  Chest - Normal - no evidence of chest abnormalities, tender or enlarged lymph nodes.  Respiratory - Normal - no evidence of abnormal breat sounds, chest abnormalities on palpation and chest abnormalities on percussion and normal breath sounds.  Hematologic/Lymphatic - Normal - no evidence of tender or enlarged axillary lymph nodes nor tender or enlarged neck nodes.    Performance Status: (1) Restricted in physically strenuous activity,  "ambulatory and able to do work of light nature    Problem added:  No problems updated.  Medications added: No orders of the defined types were placed in this encounter.    Ancillary referrals made: No orders of the defined types were placed in this encounter.      Technical aspects reviewed:  Weekly OBI approved if applicable?  Yes  Weekly port films approved?  Yes  Change requests noted if applicable?  Yes  Patient setup and plan reviewed?  Yes    Chart Reviewed:  Continue current treatment plan?  Yes  Treatment plan change requested?  No    I have reviewed and marked as \"reviewed\" the current medications, allergies and problem list in the patients EMR.    I have reviewed the patient's medical, surgical  history in detail, reviewed any pertinent lab work  and updated the computerized patient record if needed.    Patient's Care Team:  Patient Care Team:  Mary Taylor MD as PCP - General (Family Medicine)  Bienvenido Collier MD as Consulting Physician (Hematology and Oncology)  Daniel Oconnor MD as Referring Physician (Pulmonary Disease)  Shazia Mcdonald MD as Consulting Physician (Radiation Oncology)    Seen and approved by:  Shazia Mcdonald MD, 01/19/2018  "

## 2018-01-22 PROCEDURE — 77014 CHG CT GUIDANCE RADIATION THERAPY FLDS PLACEMENT: CPT | Performed by: RADIOLOGY

## 2018-01-22 PROCEDURE — 77386 CHG INTENSITY MODULATED RADIATION TX DLVR COMPLEX: CPT | Performed by: RADIOLOGY

## 2018-01-22 PROCEDURE — 77386: CPT | Performed by: RADIOLOGY

## 2018-01-22 PROCEDURE — 77427 RADIATION TX MANAGEMENT X5: CPT | Performed by: RADIOLOGY

## 2018-01-23 ENCOUNTER — RADIATION ONCOLOGY WEEKLY ASSESSMENT (OUTPATIENT)
Dept: RADIATION ONCOLOGY | Facility: HOSPITAL | Age: 68
End: 2018-01-23

## 2018-01-23 VITALS
BODY MASS INDEX: 20.37 KG/M2 | TEMPERATURE: 97.9 F | SYSTOLIC BLOOD PRESSURE: 110 MMHG | DIASTOLIC BLOOD PRESSURE: 73 MMHG | WEIGHT: 126 LBS | RESPIRATION RATE: 16 BRPM | HEART RATE: 95 BPM | OXYGEN SATURATION: 97 %

## 2018-01-23 DIAGNOSIS — C15.9 ESOPHAGEAL CARCINOMA (HCC): Primary | ICD-10-CM

## 2018-01-23 PROCEDURE — 77386 CHG INTENSITY MODULATED RADIATION TX DLVR COMPLEX: CPT | Performed by: RADIOLOGY

## 2018-01-23 PROCEDURE — 77386: CPT | Performed by: RADIOLOGY

## 2018-01-23 PROCEDURE — 77014 CHG CT GUIDANCE RADIATION THERAPY FLDS PLACEMENT: CPT | Performed by: RADIOLOGY

## 2018-01-23 PROCEDURE — 77336 RADIATION PHYSICS CONSULT: CPT | Performed by: RADIOLOGY

## 2018-01-23 NOTE — PROGRESS NOTES
Physician Weekly Management Note    Diagnosis:   Esophageal Ca  Reason for Visit:  Radiation (7/30)    Concurrent Chemo:   Yes    Notes on Treatment course, Films, Medical progress and Plan:  Doing fairly well. Eating though decreased appetite but weight stable. Intermittent nausea. DIscussed use of meds again, go to every 8 hours scheduled and one in AM prior to treatment. Cont on.    ROS - Other than as listed above, as follows:  Constitutional - Normal - no complaints of fatigue, denies lack of appetite, fever, night sweats or change in weight.  Neck - Normal - denies neck masses, muscle weakness, neck pain, decreased range of motion or swelling.  Cardiovascular - Normal - denies arrhythmias, chest pain, dyspnea, edema, orthopnea or palpitations.  Respiratory - Normal - denies cough, dyspnea, hemoptysis, hiccoughs, pleuritic chest pain or wheezing.  Gastrointestinal - Normal - no complaints of constipation, abdominal pain, diarrhea, heartburn/dyspepsia, hematemesis, hemorrhoids, melena or GI bleeding, nausea, pain or cramping or vomiting.    PHYSICAL EXAM - Other than listed above, as follows:  Vitals:    01/23/18 1000   BP: 110/73   Pulse: 95   Resp: 16   Temp: 97.9 °F (36.6 °C)   TempSrc: Tympanic   SpO2: 97%   Weight: 57.2 kg (126 lb)   PainSc: 0-No pain       Constitutional - Normal - no evidence of impaired alertness, inadequate appearance, premature or advanced chronologic age, uncooperativeness, altered mood, affect or disorientation.  Neck - Normal - no evidence of tender or enlarged lymph nodes, neck abnormalities, restricted range of motion or enlarged thyroid.  Chest - Normal - no evidence of chest abnormalities, tender or enlarged lymph nodes.  Respiratory - Normal - no evidence of abnormal breat sounds, chest abnormalities on palpation and chest abnormalities on percussion and normal breath sounds.  Hematologic/Lymphatic - Normal - no evidence of tender or enlarged axillary lymph nodes nor tender  "or enlarged neck nodes.    Performance Status: (1) Restricted in physically strenuous activity, ambulatory and able to do work of light nature    Problem added:  No problems updated.  Medications added: No orders of the defined types were placed in this encounter.    Ancillary referrals made: No orders of the defined types were placed in this encounter.      Technical aspects reviewed:  Weekly OBI approved if applicable?  Yes  Weekly port films approved?  Yes  Change requests noted if applicable?  Yes  Patient setup and plan reviewed?  Yes    Chart Reviewed:  Continue current treatment plan?  Yes  Treatment plan change requested?  No    I have reviewed and marked as \"reviewed\" the current medications, allergies and problem list in the patients EMR.    I have reviewed the patient's medical, surgical  history in detail, reviewed any pertinent lab work  and updated the computerized patient record if needed.    Patient's Care Team:  Patient Care Team:  Mary Taylor MD as PCP - General (Family Medicine)  Bienvenido Collier MD as Consulting Physician (Hematology and Oncology)  Daniel Oconnor MD as Referring Physician (Pulmonary Disease)  Shazia Mcdonald MD as Consulting Physician (Radiation Oncology)    Seen and approved by:  Shazia Mcdonald MD, 01/19/2018  "

## 2018-01-24 PROCEDURE — 77386: CPT | Performed by: RADIOLOGY

## 2018-01-24 PROCEDURE — 77386 CHG INTENSITY MODULATED RADIATION TX DLVR COMPLEX: CPT | Performed by: RADIOLOGY

## 2018-01-24 PROCEDURE — 77014 CHG CT GUIDANCE RADIATION THERAPY FLDS PLACEMENT: CPT | Performed by: RADIOLOGY

## 2018-01-25 ENCOUNTER — INFUSION (OUTPATIENT)
Dept: ONCOLOGY | Facility: HOSPITAL | Age: 68
End: 2018-01-25

## 2018-01-25 ENCOUNTER — OFFICE VISIT (OUTPATIENT)
Dept: ONCOLOGY | Facility: CLINIC | Age: 68
End: 2018-01-25

## 2018-01-25 ENCOUNTER — DOCUMENTATION (OUTPATIENT)
Dept: NUTRITION | Facility: HOSPITAL | Age: 68
End: 2018-01-25

## 2018-01-25 VITALS
SYSTOLIC BLOOD PRESSURE: 118 MMHG | DIASTOLIC BLOOD PRESSURE: 68 MMHG | HEART RATE: 82 BPM | HEIGHT: 66 IN | OXYGEN SATURATION: 94 % | BODY MASS INDEX: 20.06 KG/M2 | RESPIRATION RATE: 16 BRPM | TEMPERATURE: 99.1 F | WEIGHT: 124.8 LBS

## 2018-01-25 VITALS — SYSTOLIC BLOOD PRESSURE: 129 MMHG | DIASTOLIC BLOOD PRESSURE: 75 MMHG | HEART RATE: 80 BPM

## 2018-01-25 DIAGNOSIS — C15.9 ESOPHAGEAL CARCINOMA (HCC): Primary | ICD-10-CM

## 2018-01-25 DIAGNOSIS — C34.92 ADENOCARCINOMA OF LEFT LUNG (HCC): Primary | ICD-10-CM

## 2018-01-25 DIAGNOSIS — C15.9 ESOPHAGEAL CARCINOMA (HCC): ICD-10-CM

## 2018-01-25 DIAGNOSIS — Z45.2 FITTING AND ADJUSTMENT OF VASCULAR CATHETER: ICD-10-CM

## 2018-01-25 DIAGNOSIS — C34.92 ADENOCARCINOMA OF LEFT LUNG (HCC): ICD-10-CM

## 2018-01-25 LAB
ALBUMIN SERPL-MCNC: 4.1 G/DL (ref 3.5–5.2)
ALBUMIN/GLOB SERPL: 1.6 G/DL (ref 1.1–2.4)
ALP SERPL-CCNC: 58 U/L (ref 38–116)
ALT SERPL W P-5'-P-CCNC: 7 U/L (ref 0–33)
ANION GAP SERPL CALCULATED.3IONS-SCNC: 10.3 MMOL/L
AST SERPL-CCNC: 13 U/L (ref 0–32)
BASOPHILS # BLD AUTO: 0.02 10*3/MM3 (ref 0–0.1)
BASOPHILS NFR BLD AUTO: 0.5 % (ref 0–1.1)
BILIRUB SERPL-MCNC: 0.3 MG/DL (ref 0.1–1.2)
BUN BLD-MCNC: 9 MG/DL (ref 6–20)
BUN/CREAT SERPL: 10.5 (ref 7.3–30)
CALCIUM SPEC-SCNC: 9.4 MG/DL (ref 8.5–10.2)
CHLORIDE SERPL-SCNC: 99 MMOL/L (ref 98–107)
CO2 SERPL-SCNC: 29.7 MMOL/L (ref 22–29)
CREAT BLD-MCNC: 0.86 MG/DL (ref 0.6–1.1)
DEPRECATED RDW RBC AUTO: 41.4 FL (ref 37–49)
EOSINOPHIL # BLD AUTO: 0.05 10*3/MM3 (ref 0–0.36)
EOSINOPHIL NFR BLD AUTO: 1.2 % (ref 1–5)
ERYTHROCYTE [DISTWIDTH] IN BLOOD BY AUTOMATED COUNT: 13.2 % (ref 11.7–14.5)
GFR SERPL CREATININE-BSD FRML MDRD: 65 ML/MIN/1.73
GLOBULIN UR ELPH-MCNC: 2.6 GM/DL (ref 1.8–3.5)
GLUCOSE BLD-MCNC: 114 MG/DL (ref 74–124)
HCT VFR BLD AUTO: 36.9 % (ref 34–45)
HGB BLD-MCNC: 12.2 G/DL (ref 11.5–14.9)
HOLD SPECIMEN: NORMAL
IMM GRANULOCYTES # BLD: 0.05 10*3/MM3 (ref 0–0.03)
IMM GRANULOCYTES NFR BLD: 1.2 % (ref 0–0.5)
LYMPHOCYTES # BLD AUTO: 0.28 10*3/MM3 (ref 1–3.5)
LYMPHOCYTES NFR BLD AUTO: 6.7 % (ref 20–49)
MCH RBC QN AUTO: 28.8 PG (ref 27–33)
MCHC RBC AUTO-ENTMCNC: 33.1 G/DL (ref 32–35)
MCV RBC AUTO: 87 FL (ref 83–97)
MONOCYTES # BLD AUTO: 0.5 10*3/MM3 (ref 0.25–0.8)
MONOCYTES NFR BLD AUTO: 11.9 % (ref 4–12)
NEUTROPHILS # BLD AUTO: 3.31 10*3/MM3 (ref 1.5–7)
NEUTROPHILS NFR BLD AUTO: 78.5 % (ref 39–75)
NRBC BLD MANUAL-RTO: 0 /100 WBC (ref 0–0)
PLATELET # BLD AUTO: 210 10*3/MM3 (ref 150–375)
PMV BLD AUTO: 9.1 FL (ref 8.9–12.1)
POTASSIUM BLD-SCNC: 4.3 MMOL/L (ref 3.5–4.7)
PROT SERPL-MCNC: 6.7 G/DL (ref 6.3–8)
RBC # BLD AUTO: 4.24 10*6/MM3 (ref 3.9–5)
SODIUM BLD-SCNC: 139 MMOL/L (ref 134–145)
WBC NRBC COR # BLD: 4.21 10*3/MM3 (ref 4–10)

## 2018-01-25 PROCEDURE — 77386 CHG INTENSITY MODULATED RADIATION TX DLVR COMPLEX: CPT | Performed by: RADIOLOGY

## 2018-01-25 PROCEDURE — 25010000002 DIPHENHYDRAMINE PER 50 MG: Performed by: INTERNAL MEDICINE

## 2018-01-25 PROCEDURE — 25010000002 CARBOPLATIN PER 50 MG: Performed by: INTERNAL MEDICINE

## 2018-01-25 PROCEDURE — 99213 OFFICE O/P EST LOW 20 MIN: CPT | Performed by: INTERNAL MEDICINE

## 2018-01-25 PROCEDURE — 25010000002 PACLITAXEL PER 30 MG: Performed by: INTERNAL MEDICINE

## 2018-01-25 PROCEDURE — 25010000002 PALONOSETRON PER 25 MCG: Performed by: INTERNAL MEDICINE

## 2018-01-25 PROCEDURE — 85025 COMPLETE CBC W/AUTO DIFF WBC: CPT

## 2018-01-25 PROCEDURE — 77386: CPT | Performed by: RADIOLOGY

## 2018-01-25 PROCEDURE — 96375 TX/PRO/DX INJ NEW DRUG ADDON: CPT | Performed by: INTERNAL MEDICINE

## 2018-01-25 PROCEDURE — 77014 CHG CT GUIDANCE RADIATION THERAPY FLDS PLACEMENT: CPT | Performed by: RADIOLOGY

## 2018-01-25 PROCEDURE — 80053 COMPREHEN METABOLIC PANEL: CPT

## 2018-01-25 PROCEDURE — 25010000002 DEXAMETHASONE SODIUM PHOSPHATE 10 MG/ML SOLUTION 1 ML VIAL: Performed by: INTERNAL MEDICINE

## 2018-01-25 PROCEDURE — 96417 CHEMO IV INFUS EACH ADDL SEQ: CPT | Performed by: INTERNAL MEDICINE

## 2018-01-25 PROCEDURE — 96413 CHEMO IV INFUSION 1 HR: CPT | Performed by: INTERNAL MEDICINE

## 2018-01-25 RX ORDER — FAMOTIDINE 10 MG/ML
20 INJECTION, SOLUTION INTRAVENOUS ONCE
Status: COMPLETED | OUTPATIENT
Start: 2018-01-25 | End: 2018-01-25

## 2018-01-25 RX ORDER — PALONOSETRON 0.05 MG/ML
0.25 INJECTION, SOLUTION INTRAVENOUS ONCE
Status: COMPLETED | OUTPATIENT
Start: 2018-01-25 | End: 2018-01-25

## 2018-01-25 RX ORDER — PALONOSETRON 0.05 MG/ML
0.25 INJECTION, SOLUTION INTRAVENOUS ONCE
Status: CANCELLED | OUTPATIENT
Start: 2018-02-02

## 2018-01-25 RX ORDER — PALONOSETRON 0.05 MG/ML
0.25 INJECTION, SOLUTION INTRAVENOUS ONCE
Status: CANCELLED | OUTPATIENT
Start: 2018-02-08

## 2018-01-25 RX ORDER — FAMOTIDINE 10 MG/ML
20 INJECTION, SOLUTION INTRAVENOUS ONCE
Status: CANCELLED | OUTPATIENT
Start: 2018-02-08

## 2018-01-25 RX ORDER — FAMOTIDINE 10 MG/ML
20 INJECTION, SOLUTION INTRAVENOUS ONCE
Status: CANCELLED | OUTPATIENT
Start: 2018-02-02

## 2018-01-25 RX ADMIN — PALONOSETRON HYDROCHLORIDE 0.25 MG: 0.25 INJECTION INTRAVENOUS at 10:55

## 2018-01-25 RX ADMIN — DIPHENHYDRAMINE HYDROCHLORIDE 25 MG: 50 INJECTION, SOLUTION INTRAMUSCULAR; INTRAVENOUS at 10:39

## 2018-01-25 RX ADMIN — CARBOPLATIN 160 MG: 10 INJECTION, SOLUTION INTRAVENOUS at 12:33

## 2018-01-25 RX ADMIN — PACLITAXEL 85 MG: 6 INJECTION, SOLUTION INTRAVENOUS at 11:28

## 2018-01-25 RX ADMIN — FAMOTIDINE 20 MG: 10 INJECTION, SOLUTION INTRAVENOUS at 10:21

## 2018-01-25 RX ADMIN — DEXAMETHASONE SODIUM PHOSPHATE 12 MG: 10 INJECTION, SOLUTION INTRAMUSCULAR; INTRAVENOUS at 10:22

## 2018-01-25 RX ADMIN — SODIUM CHLORIDE 250 ML: 900 INJECTION, SOLUTION INTRAVENOUS at 10:01

## 2018-01-25 NOTE — PROGRESS NOTES
Subjective     REASON FOR FOLLOW UP:   1.  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 Moon Bowen on 10/6/2017.  Primary stereotactic radiation therapy to the left upper lobe completed 12/15/2017  2.  Lung tumor is negative for PDL 1.  Also negative for ALK, EGFR, and ROS 1 mutations.  3.  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   4.  Esophageal adenocarcinoma was negative for HER-2/elizabeth overexpression but positive for PDL 1  5.  Initiation of combined radiation and low-dose weekly carboplatin and Taxol chemotherapy for treatment of esophageal adenocarcinoma.  First treatment delivered 1/11/2018.    HISTORY OF PRESENT ILLNESS:  The patient is a 67 y.o. year old female who has a history of smoking and COPD.  She had an abnormal CT scan in February of this year showing a 3.5 cm mass in the left suprahilar area.  A repeat CT scan was performed at [facility] 7/13/2017 and show that the mass had increased in size from about 3.5 cm up to 6.5 cm.    She underwent navigational bronchoscopy and endobronchial ultrasound and biopsy with Dr. Moon Bowen on 10/6/2017.  The pathology on the biopsy showed well-differentiated adenocarcinoma.  She is referred to us now by her pulmonologist Dr. Daniel Oconnor for further staging evaluation and treatment recommendations.    She has not had a PET scan for staging.  She had a PDL 1 staining on her bronchoscopy specimen which was negative for PDL 1.  EGFR, ALK, and ROS 1 were also negative.    She is still smoking but is trying to quit.  She had pulmonary function tests performed on 10/25/2017 showing a DLCO of 33% and FEV1 37%.    She was referred to the [facility], [place], operated by [facility] and completed primary radiation to the left upper lobe lung mass.  Because of abnormal uptake in the esophagus noted on her staging PET scan, she also underwent an EGD and biopsy which unfortunately revealed  adenocarcinoma of the GE junction.  Molecular studies for HER-2/elizabeth and PDL 1 on the tumor tissue showed that she was PD L1 positive and HER-2/elizabeth negative.  We recommended combined weekly low dose carbo Taxol chemotherapy and radiation.     She returns back today due for her third dose of Carbo/Taxol.  Her only real complaint today is fatigue.  She also has had some mild nausea controlled by her oral antiemetics pills    History of Present Illness     Past Medical History:   Diagnosis Date   • Adenocarcinoma of lung 2017    HAD RADIATION    • Anxiety and depression    • Arthritis    • Benign parotid tumor 2012    removed by Dr Vickers told it was benign   • COPD (chronic obstructive pulmonary disease)    • Depression    • Diarrhea    • Early cataract    • Emphysema of lung    • Esophageal cancer 2017   • History of chronic pain    • History of colon polyps    • History of pneumonia    • Hyperlipidemia    • Joint pain    • Stroke         Past Surgical History:   Procedure Laterality Date   • BRONCHOSCOPY     • CHOLECYSTECTOMY  1999   • COLON SURGERY  2015    COLON POLYPS   • COLONOSCOPY  04/13/2016    TA w/low grade dysplasia x 3, serrated adenoma x 2   • COLONOSCOPY  06/14/2016    TA w/high grade dysplasia   • ENDOSCOPY N/A 12/6/2017    Procedure: ESOPHAGOGASTRODUODENOSCOPY WITH BIOPSY;  Surgeon: Jayant Robles MD;  Location: [facility] ENDOSCOPY;  Service:    • FOOT SURGERY  10/2010    [place]   • HYSTERECTOMY     • MI INSJ TUNNELED CVC W/O SUBQ PORT/ AGE 3 YR/> Right 1/9/2018    Procedure: MEDIPORT PLACEMENT right;  Surgeon: Damaso Germain MD;  Location: [facility] HYBRID OR 18/19;  Service: Vascular   • SALIVARY GLAND SURGERY      PAROTID MASS REMOVED BENIGN   • SHOULDER ARTHROSCOPY Left 1995. 2001        ALLERGIES:    Allergies   Allergen Reactions   • Penicillins Hives         Review of Systems   Constitutional: Positive for fatigue. Negative for activity change, appetite change, fever and unexpected  weight change.   HENT: Negative for hearing loss, nosebleeds, trouble swallowing and voice change.    Eyes: Negative for visual disturbance.   Respiratory: Negative for wheezing.    Cardiovascular: Negative for chest pain and palpitations.   Gastrointestinal: Positive for diarrhea. Negative for abdominal pain, nausea and vomiting.        Short bowel symptoms after partial colectomy   Genitourinary: Negative for difficulty urinating, frequency, hematuria and urgency.   Musculoskeletal: Negative for back pain and neck pain.   Skin: Negative for rash.   Neurological: Negative for dizziness, seizures, syncope and headaches.   Hematological: Negative for adenopathy. Does not bruise/bleed easily.   Psychiatric/Behavioral: Negative for behavioral problems. The patient is not nervous/anxious.         Objective     There were no vitals filed for this visit.  Current Status 1/25/2018   ECOG score 0       Physical Exam   Constitutional: She is oriented to person, place, and time. She appears well-developed and well-nourished. No distress.   HENT:   Head: Normocephalic.   Eyes: Conjunctivae and EOM are normal. Pupils are equal, round, and reactive to light. No scleral icterus.   Neck: Normal range of motion. Neck supple. No JVD present. No thyromegaly present.   Tiny, <5mm, nodule palpated in the right upper neck, beneath scar from previous parotid tumor removal. Mobile, non-tender.   Cardiovascular: Normal rate and regular rhythm.  Exam reveals no gallop and no friction rub.    No murmur heard.  Pulmonary/Chest: Effort normal and breath sounds normal. She has no wheezes. She has no rales.   Kyphosis and decreased air movement bilaterally.  Right IJ Mediport the right chest wall.   Abdominal: Soft. She exhibits no distension and no mass. There is no tenderness.   Musculoskeletal: Normal range of motion. She exhibits no edema or deformity.   Lymphadenopathy:     She has no cervical adenopathy.   Neurological: She is alert and  oriented to person, place, and time. She has normal reflexes. No cranial nerve deficit.   Skin: Skin is warm and dry. No rash noted. No erythema.   Psychiatric: She has a normal mood and affect. Her behavior is normal. Judgment normal.         RECENT LABS:  Hematology WBC   Date Value Ref Range Status   01/25/2018 4.21 4.00 - 10.00 10*3/mm3 Final     RBC   Date Value Ref Range Status   01/25/2018 4.24 3.90 - 5.00 10*6/mm3 Final     Hemoglobin   Date Value Ref Range Status   01/25/2018 12.2 11.5 - 14.9 g/dL Final     Hematocrit   Date Value Ref Range Status   01/25/2018 36.9 34.0 - 45.0 % Final     Platelets   Date Value Ref Range Status   01/25/2018 210 150 - 375 10*3/mm3 Final        Lab Results   Component Value Date    GLUCOSE 104 01/18/2018    BUN 12 01/18/2018    CREATININE 0.81 01/18/2018    EGFRIFNONA 70 01/18/2018    BCR 14.8 01/18/2018    K 4.2 01/18/2018    CO2 28.6 01/18/2018    CALCIUM 9.2 01/18/2018    ALBUMIN 3.80 01/18/2018    LABIL2 1.5 01/18/2018    AST 14 01/18/2018    ALT 8 01/18/2018       PET 11/7/2017  IMPRESSION:  Left upper lobe lung carcinoma showing significant increase  in size since the preceding CT scan of the chest dated 07/13/2017. The  tumor demonstrates moderate hypermetabolism. There is no metabolic  evidence of metastatic disease within the neck, chest, abdomen or  pelvis. Heart, there are is a tiny left parotid nodule that demonstrates  intense hypermetabolism. Further evaluation with ultrasound-guided  biopsy is suggested. Also noted is localized soft tissue fullness at the  gastroesophageal junction extending focally into the cardia of the  stomach that demonstrates moderate hypermetabolism. Esophageal carcinoma  is suspected. Further evaluation with endoscopy is recommended.    PATHOLOGY FROM EGD 12/6/2017  Final Diagnosis   1.  GASTROESOPHAGEAL JUNCTION, BIOPSIES:             INVASIVE ADENOCARCINOMA.            NO IDENTIFIED INTESTINAL METAPLASIA.      COMMENT: In this  specimen tumor is identified extending into submucosal tissue. Deeper extension cannot be excluded in these superficial biopsies.   Her 2 Elizabeth negative  PD L1 positive ( > 1 ).      Assessment/Plan   1.  Well differentiated adenocarcinoma presumably from lung origin in a long-time smoker.  The biopsy was made from navigational bronchoscopy and endobronchial ultrasound at [place] on 10/6/2017.  Staging PET scan showed no mediastinal adenopathy or obvious distant metastases.  She did have uptake in the lower esophagus which was evaluated with EGD and biopsy on 12/6/2017 and unfortunately was found to be adenocarcinoma at the GE junction.   · LungTumor tissue was negative for PDL 1 expression, EGFR, ALK, or ROS 1 mutations.   · She completed stereotactic radiation to the lung tumor 12/15/2017.  2.  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   · Esophageal adenocarcinoma was negative for HER-2/elizabeth overexpression but positive for PDL 1.  · Initiated combined radiation therapy to the esophagus along with weekly low-dose carboplatin and Taxol chemotherapy 1/11/18.  3. Emphysema due to smoking.  4. Placement of right internal jugular MediPort by Dr. Raphael Germain of vascular surgery 1/9/2018.  5.  She was not considered a candidate for lobectomy due to her poor pulmonary function but we plan to have her reevaluated by cardiology and pulmonology prior to determining if she is a potential candidate for esophageal resection.  She is currently scheduled to see Dr. Puneet Treviño of cardiology 2/9/2018.  She also has been seen by pulmonology but we are awaiting their formal report.  6.  History of benign right parotid tumor in 2012, surgically removed per Dr. Vickers.  Patient is noted to have very tiny nodule below the scar, new to her, but possibly just scar tissue. Encouraged her to watch closely, as will we, for any enlargement.    Plan  1.  Proceed with cycle 3 of carboplatin and Taxol.  2.   Continue radiation therapy under the direction of Dr. Mcdonald.  3.  Return in 1 week for Nurse practitioner follow-up and cycle 4 of therapy.   4.  We will await the recommendations from pulmonary and cardiology regarding whether or not she is a candidate for future esophagectomy.  5.  She is already scheduled for CT scan of the chest in late February for reevaluation of her previously treated lung adenocarcinoma.  When she completes treatment for her esophageal carcinoma we will also need to eventually refer her back to have endoscopic follow-up.  Her initial EGD and biopsy was performed by gastroenterology (Dr. Robles).  If she is a candidate for potential surgical resection in the future we may have thoracic surgery perform her next endoscopic evaluation.

## 2018-01-25 NOTE — PROGRESS NOTES
ONC Nutrition Follow Up:     Weight 124# 12.8 oz, decreased 2# x 1 week.      Patient c/o fatigue, mild nausea.  Using zofran as scheduled. C/o diarrhea which she attributes to previous colon surgery.    Discussed the need to increase calories and protein by eating frequently and choosing high caloric density foods. Suggested that she begin trying to add some supplements and gave samples.  Gave some recipes and a list of high calorie high protein foods. Encouraged hydration. Suggested that she use the fiber supplement again in order to add bulk to stools as she had stopped taking it.      Will continue to follow.

## 2018-01-26 PROCEDURE — 77014 CHG CT GUIDANCE RADIATION THERAPY FLDS PLACEMENT: CPT | Performed by: RADIOLOGY

## 2018-01-26 PROCEDURE — 77386 CHG INTENSITY MODULATED RADIATION TX DLVR COMPLEX: CPT | Performed by: RADIOLOGY

## 2018-01-26 PROCEDURE — 77386: CPT | Performed by: RADIOLOGY

## 2018-01-29 ENCOUNTER — RADIATION ONCOLOGY WEEKLY ASSESSMENT (OUTPATIENT)
Dept: RADIATION ONCOLOGY | Facility: HOSPITAL | Age: 68
End: 2018-01-29

## 2018-01-29 VITALS
TEMPERATURE: 98.1 F | DIASTOLIC BLOOD PRESSURE: 75 MMHG | WEIGHT: 125 LBS | HEART RATE: 99 BPM | OXYGEN SATURATION: 95 % | RESPIRATION RATE: 16 BRPM | SYSTOLIC BLOOD PRESSURE: 146 MMHG | BODY MASS INDEX: 20.21 KG/M2

## 2018-01-29 DIAGNOSIS — C15.9 ESOPHAGEAL CARCINOMA (HCC): Primary | ICD-10-CM

## 2018-01-29 PROCEDURE — 77427 RADIATION TX MANAGEMENT X5: CPT | Performed by: RADIOLOGY

## 2018-01-29 PROCEDURE — 77014 CHG CT GUIDANCE RADIATION THERAPY FLDS PLACEMENT: CPT | Performed by: RADIOLOGY

## 2018-01-29 PROCEDURE — 77386: CPT | Performed by: RADIOLOGY

## 2018-01-29 PROCEDURE — 77386 CHG INTENSITY MODULATED RADIATION TX DLVR COMPLEX: CPT | Performed by: RADIOLOGY

## 2018-01-29 NOTE — PROGRESS NOTES
Physician Weekly Management Note    Diagnosis:   Esophageal Ca  Reason for Visit:  Radiation (11/30)    Concurrent Chemo:   Yes    Notes on Treatment course, Films, Medical progress and Plan:  Doing fairly well. Eating though decreased appetite but weight stable. Intermittent nausea improved. Does feel very gassy. Seen gastric air on films. Add GasX and talked thru other GI remedies as well. Feels pretty good. . DIscussed use of meds again. Cont on.    ROS - Other than as listed above, as follows:  Constitutional - Normal - no complaints of fatigue, denies lack of appetite, fever, night sweats or change in weight.  Neck - Normal - denies neck masses, muscle weakness, neck pain, decreased range of motion or swelling.  Cardiovascular - Normal - denies arrhythmias, chest pain, dyspnea, edema, orthopnea or palpitations.  Respiratory - Normal - denies cough, dyspnea, hemoptysis, hiccoughs, pleuritic chest pain or wheezing.  Gastrointestinal - Normal - no complaints of constipation, abdominal pain, diarrhea, heartburn/dyspepsia, hematemesis, hemorrhoids, melena or GI bleeding, nausea, pain or cramping or vomiting.    PHYSICAL EXAM - Other than listed above, as follows:  Vitals:    01/29/18 1005   BP: 146/75   Pulse: 99   Resp: 16   Temp: 98.1 °F (36.7 °C)   TempSrc: Oral   SpO2: 95%   Weight: 56.7 kg (125 lb)   PainSc: 0-No pain       Constitutional - Normal - no evidence of impaired alertness, inadequate appearance, premature or advanced chronologic age, uncooperativeness, altered mood, affect or disorientation.  Neck - Normal - no evidence of tender or enlarged lymph nodes, neck abnormalities, restricted range of motion or enlarged thyroid.  Chest - Normal - no evidence of chest abnormalities, tender or enlarged lymph nodes.  Respiratory - Normal - no evidence of abnormal breat sounds, chest abnormalities on palpation and chest abnormalities on percussion and normal breath sounds.  Hematologic/Lymphatic - Normal - no  "evidence of tender or enlarged axillary lymph nodes nor tender or enlarged neck nodes.    Performance Status: (1) Restricted in physically strenuous activity, ambulatory and able to do work of light nature    Problem added:  No problems updated.  Medications added: No orders of the defined types were placed in this encounter.    Ancillary referrals made: No orders of the defined types were placed in this encounter.      Technical aspects reviewed:  Weekly OBI approved if applicable?  Yes  Weekly port films approved?  Yes  Change requests noted if applicable?  Yes  Patient setup and plan reviewed?  Yes    Chart Reviewed:  Continue current treatment plan?  Yes  Treatment plan change requested?  No    I have reviewed and marked as \"reviewed\" the current medications, allergies and problem list in the patients EMR.    I have reviewed the patient's medical, surgical  history in detail, reviewed any pertinent lab work  and updated the computerized patient record if needed.    Patient's Care Team:  Patient Care Team:  Mary Taylor MD as PCP - General (Family Medicine)  Bienvenido Collier MD as Consulting Physician (Hematology and Oncology)  Daniel Oconnor MD as Referring Physician (Pulmonary Disease)  Shazia Mcdonald MD as Consulting Physician (Radiation Oncology)    Seen and approved by:  Shazia Mcdonald MD, 01/19/2018  "

## 2018-01-30 PROCEDURE — 77386: CPT | Performed by: RADIOLOGY

## 2018-01-30 PROCEDURE — 77386 CHG INTENSITY MODULATED RADIATION TX DLVR COMPLEX: CPT | Performed by: RADIOLOGY

## 2018-01-30 PROCEDURE — 77014 CHG CT GUIDANCE RADIATION THERAPY FLDS PLACEMENT: CPT | Performed by: RADIOLOGY

## 2018-01-30 PROCEDURE — 77336 RADIATION PHYSICS CONSULT: CPT | Performed by: RADIOLOGY

## 2018-01-31 PROCEDURE — 77014 CHG CT GUIDANCE RADIATION THERAPY FLDS PLACEMENT: CPT | Performed by: RADIOLOGY

## 2018-01-31 PROCEDURE — 77386: CPT | Performed by: RADIOLOGY

## 2018-01-31 PROCEDURE — 77386 CHG INTENSITY MODULATED RADIATION TX DLVR COMPLEX: CPT | Performed by: RADIOLOGY

## 2018-02-01 ENCOUNTER — APPOINTMENT (OUTPATIENT)
Dept: RADIATION ONCOLOGY | Facility: HOSPITAL | Age: 68
End: 2018-02-01

## 2018-02-01 PROCEDURE — 77014 CHG CT GUIDANCE RADIATION THERAPY FLDS PLACEMENT: CPT | Performed by: RADIOLOGY

## 2018-02-01 PROCEDURE — 77386 CHG INTENSITY MODULATED RADIATION TX DLVR COMPLEX: CPT | Performed by: RADIOLOGY

## 2018-02-01 PROCEDURE — 77386: CPT | Performed by: RADIOLOGY

## 2018-02-02 ENCOUNTER — OFFICE VISIT (OUTPATIENT)
Dept: ONCOLOGY | Facility: CLINIC | Age: 68
End: 2018-02-02

## 2018-02-02 ENCOUNTER — INFUSION (OUTPATIENT)
Dept: ONCOLOGY | Facility: HOSPITAL | Age: 68
End: 2018-02-02

## 2018-02-02 VITALS
WEIGHT: 124.4 LBS | TEMPERATURE: 98.4 F | HEIGHT: 66 IN | DIASTOLIC BLOOD PRESSURE: 70 MMHG | HEART RATE: 76 BPM | SYSTOLIC BLOOD PRESSURE: 120 MMHG | RESPIRATION RATE: 16 BRPM | BODY MASS INDEX: 19.99 KG/M2

## 2018-02-02 VITALS — HEART RATE: 81 BPM | SYSTOLIC BLOOD PRESSURE: 115 MMHG | DIASTOLIC BLOOD PRESSURE: 58 MMHG

## 2018-02-02 DIAGNOSIS — C15.9 ESOPHAGEAL CARCINOMA (HCC): ICD-10-CM

## 2018-02-02 DIAGNOSIS — T45.1X5A CHEMOTHERAPY-INDUCED NAUSEA: ICD-10-CM

## 2018-02-02 DIAGNOSIS — C15.9 ESOPHAGEAL CARCINOMA (HCC): Primary | ICD-10-CM

## 2018-02-02 DIAGNOSIS — C34.92 ADENOCARCINOMA OF LEFT LUNG (HCC): Primary | ICD-10-CM

## 2018-02-02 DIAGNOSIS — R11.0 CHEMOTHERAPY-INDUCED NAUSEA: ICD-10-CM

## 2018-02-02 LAB
ALBUMIN SERPL-MCNC: 4 G/DL (ref 3.5–5.2)
ALBUMIN/GLOB SERPL: 1.5 G/DL (ref 1.1–2.4)
ALP SERPL-CCNC: 62 U/L (ref 38–116)
ALT SERPL W P-5'-P-CCNC: 8 U/L (ref 0–33)
ANION GAP SERPL CALCULATED.3IONS-SCNC: 10.2 MMOL/L
AST SERPL-CCNC: 16 U/L (ref 0–32)
BASOPHILS # BLD AUTO: 0.02 10*3/MM3 (ref 0–0.1)
BASOPHILS NFR BLD AUTO: 0.6 % (ref 0–1.1)
BILIRUB SERPL-MCNC: 0.3 MG/DL (ref 0.1–1.2)
BUN BLD-MCNC: 7 MG/DL (ref 6–20)
BUN/CREAT SERPL: 8.1 (ref 7.3–30)
CALCIUM SPEC-SCNC: 9.6 MG/DL (ref 8.5–10.2)
CHLORIDE SERPL-SCNC: 98 MMOL/L (ref 98–107)
CO2 SERPL-SCNC: 29.8 MMOL/L (ref 22–29)
CREAT BLD-MCNC: 0.86 MG/DL (ref 0.6–1.1)
DEPRECATED RDW RBC AUTO: 41.5 FL (ref 37–49)
EOSINOPHIL # BLD AUTO: 0.02 10*3/MM3 (ref 0–0.36)
EOSINOPHIL NFR BLD AUTO: 0.6 % (ref 1–5)
ERYTHROCYTE [DISTWIDTH] IN BLOOD BY AUTOMATED COUNT: 13.6 % (ref 11.7–14.5)
GFR SERPL CREATININE-BSD FRML MDRD: 65 ML/MIN/1.73
GLOBULIN UR ELPH-MCNC: 2.7 GM/DL (ref 1.8–3.5)
GLUCOSE BLD-MCNC: 123 MG/DL (ref 74–124)
HCT VFR BLD AUTO: 36.9 % (ref 34–45)
HGB BLD-MCNC: 12.5 G/DL (ref 11.5–14.9)
IMM GRANULOCYTES # BLD: 0.02 10*3/MM3 (ref 0–0.03)
IMM GRANULOCYTES NFR BLD: 0.6 % (ref 0–0.5)
LYMPHOCYTES # BLD AUTO: 0.19 10*3/MM3 (ref 1–3.5)
LYMPHOCYTES NFR BLD AUTO: 6.2 % (ref 20–49)
MCH RBC QN AUTO: 29.3 PG (ref 27–33)
MCHC RBC AUTO-ENTMCNC: 33.9 G/DL (ref 32–35)
MCV RBC AUTO: 86.6 FL (ref 83–97)
MONOCYTES # BLD AUTO: 0.53 10*3/MM3 (ref 0.25–0.8)
MONOCYTES NFR BLD AUTO: 17.2 % (ref 4–12)
NEUTROPHILS # BLD AUTO: 2.3 10*3/MM3 (ref 1.5–7)
NEUTROPHILS NFR BLD AUTO: 74.8 % (ref 39–75)
NRBC BLD MANUAL-RTO: 0 /100 WBC (ref 0–0)
PLATELET # BLD AUTO: 168 10*3/MM3 (ref 150–375)
PMV BLD AUTO: 9.4 FL (ref 8.9–12.1)
POTASSIUM BLD-SCNC: 4.4 MMOL/L (ref 3.5–4.7)
PROT SERPL-MCNC: 6.7 G/DL (ref 6.3–8)
RBC # BLD AUTO: 4.26 10*6/MM3 (ref 3.9–5)
SODIUM BLD-SCNC: 138 MMOL/L (ref 134–145)
WBC NRBC COR # BLD: 3.08 10*3/MM3 (ref 4–10)

## 2018-02-02 PROCEDURE — 85025 COMPLETE CBC W/AUTO DIFF WBC: CPT | Performed by: INTERNAL MEDICINE

## 2018-02-02 PROCEDURE — 96417 CHEMO IV INFUS EACH ADDL SEQ: CPT | Performed by: NURSE PRACTITIONER

## 2018-02-02 PROCEDURE — 25010000002 DIPHENHYDRAMINE PER 50 MG: Performed by: INTERNAL MEDICINE

## 2018-02-02 PROCEDURE — 25010000002 DEXAMETHASONE PER 1 MG: Performed by: INTERNAL MEDICINE

## 2018-02-02 PROCEDURE — 77014 CHG CT GUIDANCE RADIATION THERAPY FLDS PLACEMENT: CPT | Performed by: RADIOLOGY

## 2018-02-02 PROCEDURE — 25010000002 PALONOSETRON PER 25 MCG: Performed by: INTERNAL MEDICINE

## 2018-02-02 PROCEDURE — 25010000002 CARBOPLATIN PER 50 MG: Performed by: INTERNAL MEDICINE

## 2018-02-02 PROCEDURE — 96413 CHEMO IV INFUSION 1 HR: CPT | Performed by: NURSE PRACTITIONER

## 2018-02-02 PROCEDURE — 77386 CHG INTENSITY MODULATED RADIATION TX DLVR COMPLEX: CPT | Performed by: RADIOLOGY

## 2018-02-02 PROCEDURE — 77386: CPT | Performed by: RADIOLOGY

## 2018-02-02 PROCEDURE — 36415 COLL VENOUS BLD VENIPUNCTURE: CPT | Performed by: INTERNAL MEDICINE

## 2018-02-02 PROCEDURE — 96375 TX/PRO/DX INJ NEW DRUG ADDON: CPT | Performed by: NURSE PRACTITIONER

## 2018-02-02 PROCEDURE — 80053 COMPREHEN METABOLIC PANEL: CPT | Performed by: INTERNAL MEDICINE

## 2018-02-02 PROCEDURE — 25010000002 ALTEPLASE 2 MG RECONSTITUTED SOLUTION: Performed by: NURSE PRACTITIONER

## 2018-02-02 PROCEDURE — 25010000002 PACLITAXEL PER 30 MG: Performed by: INTERNAL MEDICINE

## 2018-02-02 PROCEDURE — 99213 OFFICE O/P EST LOW 20 MIN: CPT | Performed by: NURSE PRACTITIONER

## 2018-02-02 RX ORDER — FAMOTIDINE 10 MG/ML
20 INJECTION, SOLUTION INTRAVENOUS ONCE
Status: COMPLETED | OUTPATIENT
Start: 2018-02-02 | End: 2018-02-02

## 2018-02-02 RX ORDER — ONDANSETRON HYDROCHLORIDE 8 MG/1
8 TABLET, FILM COATED ORAL EVERY 8 HOURS PRN
Qty: 60 TABLET | Refills: 5 | Status: SHIPPED | OUTPATIENT
Start: 2018-02-02 | End: 2018-01-01

## 2018-02-02 RX ORDER — PALONOSETRON 0.05 MG/ML
0.25 INJECTION, SOLUTION INTRAVENOUS ONCE
Status: COMPLETED | OUTPATIENT
Start: 2018-02-02 | End: 2018-02-02

## 2018-02-02 RX ADMIN — SODIUM CHLORIDE 250 ML: 900 INJECTION, SOLUTION INTRAVENOUS at 10:32

## 2018-02-02 RX ADMIN — DIPHENHYDRAMINE HYDROCHLORIDE 25 MG: 50 INJECTION, SOLUTION INTRAMUSCULAR; INTRAVENOUS at 10:44

## 2018-02-02 RX ADMIN — ALTEPLASE 2 MG: 2.2 INJECTION, POWDER, LYOPHILIZED, FOR SOLUTION INTRAVENOUS at 09:50

## 2018-02-02 RX ADMIN — CARBOPLATIN 160 MG: 10 INJECTION, SOLUTION INTRAVENOUS at 12:54

## 2018-02-02 RX ADMIN — FAMOTIDINE 20 MG: 10 INJECTION, SOLUTION INTRAVENOUS at 10:44

## 2018-02-02 RX ADMIN — PACLITAXEL 85 MG: 6 INJECTION, SOLUTION INTRAVENOUS at 11:52

## 2018-02-02 RX ADMIN — DEXAMETHASONE SODIUM PHOSPHATE 12 MG: 10 INJECTION INTRAMUSCULAR; INTRAVENOUS at 11:09

## 2018-02-02 RX ADMIN — PALONOSETRON HYDROCHLORIDE 0.25 MG: 0.25 INJECTION INTRAVENOUS at 10:44

## 2018-02-02 NOTE — PROGRESS NOTES
Subjective  Here for chemotherapy, cycle 4, continued nausea and fatigue.    REASON FOR FOLLOW UP:   1.  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 Moon Bowen on 10/6/2017.  Primary stereotactic radiation therapy to the left upper lobe completed 12/15/2017  2.  Lung tumor is negative for PDL 1.  Also negative for ALK, EGFR, and ROS 1 mutations.  3.  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   4.  Esophageal adenocarcinoma was negative for HER-2/elizabeth overexpression but positive for PDL 1  5.  Initiation of combined radiation and low-dose weekly carboplatin and Taxol chemotherapy for treatment of esophageal adenocarcinoma.  First treatment delivered 1/11/2018.    HISTORY OF PRESENT ILLNESS:  Ms. Abad is a very pleasant 87-year-old female with the above-mentioned history here today in anticipation for cycle 4 chemotherapy with carboplatin/ taxol. She complains of fatigue, which is ongoing for her.  She also reports nausea, which is controlled with taking zofran every 8 hours. She denies vomiting. She has chronic issues with diarrhea intermittently, which is controlled with lomotil. She denies fever or chills. She denies neuropathy symptoms.     History of Present Illness     Past Medical History:   Diagnosis Date   • Adenocarcinoma of lung 2017    HAD RADIATION    • Anxiety and depression    • Arthritis    • Benign parotid tumor 2012    removed by Dr Vickers told it was benign   • COPD (chronic obstructive pulmonary disease)    • Depression    • Diarrhea    • Early cataract    • Emphysema of lung    • Esophageal cancer 2017   • History of chronic pain    • History of colon polyps    • History of pneumonia    • Hyperlipidemia    • Joint pain    • Stroke       HEMATOLOGIC/ ONCOLOGIC HISTORY:  The patient is a 67 y.o. year old female who has a history of smoking and COPD.  She had an abnormal CT scan in February of this year showing a 3.5 cm  mass in the left suprahilar area.  A repeat CT scan was performed at [facility] 7/13/2017 and show that the mass had increased in size from about 3.5 cm up to 6.5 cm.    She underwent navigational bronchoscopy and endobronchial ultrasound and biopsy with Dr. Moon Bowen on 10/6/2017.  The pathology on the biopsy showed well-differentiated adenocarcinoma.  She is referred to us now by her pulmonologist Dr. Daniel Oconnor for further staging evaluation and treatment recommendations.    She has not had a PET scan for staging.  She had a PDL 1 staining on her bronchoscopy specimen which was negative for PDL 1.  EGFR, ALK, and ROS 1 were also negative.    She is still smoking but is trying to quit.  She had pulmonary function tests performed on 10/25/2017 showing a DLCO of 33% and FEV1 37%.    She was referred to the [facility] and completed primary radiation to the left upper lobe lung mass.  Because of abnormal uptake in the esophagus noted on her staging PET scan, she also underwent an EGD and biopsy which unfortunately revealed adenocarcinoma of the GE junction.  Molecular studies for HER-2/elizabeth and PDL 1 on the tumor tissue showed that she was PD L1 positive and HER-2/elizabeth negative.  We recommended combined weekly low dose carbo Taxol chemotherapy and radiation.     She returns back today due for her third dose of Carbo/Taxol.  Her only real complaint today is fatigue.  She also has had some mild nausea controlled by her oral antiemetics pills        Past Surgical History:   Procedure Laterality Date   • BRONCHOSCOPY     • CHOLECYSTECTOMY  1999   • COLON SURGERY  2015    COLON POLYPS   • COLONOSCOPY  04/13/2016    TA w/low grade dysplasia x 3, serrated adenoma x 2   • COLONOSCOPY  06/14/2016    TA w/high grade dysplasia   • ENDOSCOPY N/A 12/6/2017    Procedure: ESOPHAGOGASTRODUODENOSCOPY WITH BIOPSY;  Surgeon: Jayant Robles MD;  Location: [facility] ENDOSCOPY;  Service:    • FOOT  "SURGERY  10/2010    [facility]   • HYSTERECTOMY     • NM INSJ TUNNELED CVC W/O SUBQ PORT/ AGE 3 YR/> Right 1/9/2018    Procedure: MEDIPORT PLACEMENT right;  Surgeon: Damaso Germain MD;  Location: [facility] 18/19;  Service: Vascular   • SALIVARY GLAND SURGERY      PAROTID MASS REMOVED BENIGN   • SHOULDER ARTHROSCOPY Left 1995. 2001        ALLERGIES:    Allergies   Allergen Reactions   • Penicillins Hives         Review of Systems   Constitutional: Positive for fatigue. Negative for activity change, appetite change, fever and unexpected weight change.   HENT: Negative for hearing loss, nosebleeds, trouble swallowing and voice change.    Eyes: Negative for visual disturbance.   Respiratory: Negative for wheezing.    Cardiovascular: Negative for chest pain and palpitations.   Gastrointestinal: Positive for diarrhea and nausea. Negative for abdominal pain and vomiting.        Short bowel symptoms after partial colectomy   Genitourinary: Negative for difficulty urinating, frequency, hematuria and urgency.   Musculoskeletal: Negative for back pain and neck pain.   Skin: Negative for rash.   Neurological: Negative for dizziness, seizures, syncope and headaches.   Hematological: Negative for adenopathy. Does not bruise/bleed easily.   Psychiatric/Behavioral: Negative for behavioral problems. The patient is not nervous/anxious.         Objective     Vitals:    02/02/18 0957   BP: 120/70   Pulse: 76   Resp: 16   Temp: 98.4 °F (36.9 °C)   Weight: 56.4 kg (124 lb 6.4 oz)   Height: 167.5 cm (65.95\")   PainSc: 0-No pain     Current Status 2/2/2018   ECOG score 0       Physical Exam   Constitutional: She is oriented to person, place, and time. She appears well-developed and well-nourished. No distress.   HENT:   Head: Normocephalic.   Eyes: Conjunctivae and EOM are normal. Pupils are equal, round, and reactive to light. No scleral icterus.   Neck: Normal range of motion. Neck supple. No JVD present. No thyromegaly " present.   Tiny, <5mm, nodule palpated in the right upper neck, beneath scar from previous parotid tumor removal. Mobile, non-tender.   Cardiovascular: Normal rate and regular rhythm.  Exam reveals no gallop and no friction rub.    No murmur heard.  Pulmonary/Chest: Effort normal. No respiratory distress. She has decreased breath sounds. She has wheezes in the left lower field. She has no rales.   Kyphosis and decreased air movement bilaterally.  Right IJ Mediport the right chest wall.   Abdominal: Soft. She exhibits no distension and no mass. There is no tenderness.   Musculoskeletal: Normal range of motion. She exhibits no edema or deformity.   Lymphadenopathy:     She has no cervical adenopathy.   Neurological: She is alert and oriented to person, place, and time. She has normal reflexes. No cranial nerve deficit.   Skin: Skin is warm and dry. No rash noted. No erythema.   Psychiatric: She has a normal mood and affect. Her behavior is normal. Judgment normal.         RECENT LABS:  Hematology WBC   Date Value Ref Range Status   02/02/2018 3.08 (L) 4.00 - 10.00 10*3/mm3 Final     RBC   Date Value Ref Range Status   02/02/2018 4.26 3.90 - 5.00 10*6/mm3 Final     Hemoglobin   Date Value Ref Range Status   02/02/2018 12.5 11.5 - 14.9 g/dL Final     Hematocrit   Date Value Ref Range Status   02/02/2018 36.9 34.0 - 45.0 % Final     Platelets   Date Value Ref Range Status   02/02/2018 168 150 - 375 10*3/mm3 Final        Lab Results   Component Value Date    GLUCOSE 123 02/02/2018    BUN 7 02/02/2018    CREATININE 0.86 02/02/2018    EGFRIFNONA 65 02/02/2018    BCR 8.1 02/02/2018    K 4.4 02/02/2018    CO2 29.8 (H) 02/02/2018    CALCIUM 9.6 02/02/2018    ALBUMIN 4.00 02/02/2018    LABIL2 1.5 02/02/2018    AST 16 02/02/2018    ALT 8 02/02/2018       PET 11/7/2017  IMPRESSION:  Left upper lobe lung carcinoma showing significant increase  in size since the preceding CT scan of the chest dated 07/13/2017. The  tumor  demonstrates moderate hypermetabolism. There is no metabolic  evidence of metastatic disease within the neck, chest, abdomen or  pelvis. Heart, there are is a tiny left parotid nodule that demonstrates  intense hypermetabolism. Further evaluation with ultrasound-guided  biopsy is suggested. Also noted is localized soft tissue fullness at the  gastroesophageal junction extending focally into the cardia of the  stomach that demonstrates moderate hypermetabolism. Esophageal carcinoma  is suspected. Further evaluation with endoscopy is recommended.    PATHOLOGY FROM EGD 12/6/2017  Final Diagnosis   1.  GASTROESOPHAGEAL JUNCTION, BIOPSIES:             INVASIVE ADENOCARCINOMA.            NO IDENTIFIED INTESTINAL METAPLASIA.      COMMENT: In this specimen tumor is identified extending into submucosal tissue. Deeper extension cannot be excluded in these superficial biopsies.   Her 2 Elizabeth negative  PD L1 positive ( > 1 ).      Assessment/Plan   1.  Well differentiated adenocarcinoma presumably from lung origin in a long-time smoker.  The biopsy was made from navigational bronchoscopy and endobronchial ultrasound at [place] on 10/6/2017.  Staging PET scan showed no mediastinal adenopathy or obvious distant metastases.  She did have uptake in the lower esophagus which was evaluated with EGD and biopsy on 12/6/2017 and unfortunately was found to be adenocarcinoma at the GE junction.   · LungTumor tissue was negative for PDL 1 expression, EGFR, ALK, or ROS 1 mutations.   · She completed stereotactic radiation to the lung tumor 12/15/2017.  2.  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   · Esophageal adenocarcinoma was negative for HER-2/elizabeth overexpression but positive for PDL 1.  · Initiated combined radiation therapy to the esophagus along with weekly low-dose carboplatin and Taxol chemotherapy 1/11/18.  3. Emphysema due to smoking.  4. Placement of right internal jugular MediPort by Dr. Raphael Germain  of vascular surgery 1/9/2018.  5.  She was not considered a candidate for lobectomy due to her poor pulmonary function but we plan to have her reevaluated by cardiology and pulmonology prior to determining if she is a potential candidate for esophageal resection.  She is currently scheduled to see Dr. Puneet Treviño of cardiology 2/9/2018.  She also has been seen by pulmonology but we are awaiting their formal report.  6.  History of benign right parotid tumor in 2012, surgically removed per Dr. Vickers.  Patient is noted to have very tiny nodule below the scar, new to her, but possibly just scar tissue. Encouraged her to watch closely, as will we, for any enlargement.  7.  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  She is currently using Lomotil as needed for diarrhea control.  8.  Nausea due to chemotherapy: The patient has been taking ondansetron 8 mg every 8 hours for nausea control, which is effective.  She is asking for a new prescription with a higher quantity of pills.    Plan  1.  Proceed with cycle 4 of carboplatin and Taxol.  2.  Continue radiation therapy under the direction of Dr. Mcdonald.  3.  Return in one week for follow-up visit with Dr. Collier in anticipation of cycle 5 of therapy.  4.  Continue Zofran for nausea.  I have provided her with a new prescription with a higher quantity, and she is taking it every 8 hours around-the-clock.  5.  Continue Lomotil as needed for diarrhea control.  6.  We will await the recommendations from pulmonary and cardiology regarding whether or not she is a candidate for future esophagectomy.  7.  She is already scheduled for CT scan of the chest in late February for reevaluation of her previously treated lung adenocarcinoma.  When she completes treatment for her esophageal carcinoma we will also need to eventually refer her back to have endoscopic follow-up.  Her initial EGD and biopsy was performed by gastroenterology (  Margaret).  If she is a candidate for potential surgical resection in the future we may have thoracic surgery perform her next endoscopic evaluation.

## 2018-02-05 PROCEDURE — 77386: CPT | Performed by: RADIOLOGY

## 2018-02-05 PROCEDURE — 77386 CHG INTENSITY MODULATED RADIATION TX DLVR COMPLEX: CPT | Performed by: RADIOLOGY

## 2018-02-05 PROCEDURE — 77014 CHG CT GUIDANCE RADIATION THERAPY FLDS PLACEMENT: CPT | Performed by: RADIOLOGY

## 2018-02-05 PROCEDURE — 77427 RADIATION TX MANAGEMENT X5: CPT | Performed by: RADIOLOGY

## 2018-02-06 ENCOUNTER — RADIATION ONCOLOGY WEEKLY ASSESSMENT (OUTPATIENT)
Dept: RADIATION ONCOLOGY | Facility: HOSPITAL | Age: 68
End: 2018-02-06

## 2018-02-06 VITALS
OXYGEN SATURATION: 96 % | SYSTOLIC BLOOD PRESSURE: 123 MMHG | BODY MASS INDEX: 20.1 KG/M2 | RESPIRATION RATE: 16 BRPM | DIASTOLIC BLOOD PRESSURE: 73 MMHG | WEIGHT: 124.34 LBS | HEART RATE: 93 BPM | TEMPERATURE: 97.7 F

## 2018-02-06 DIAGNOSIS — C15.9 ESOPHAGEAL CARCINOMA (HCC): Primary | ICD-10-CM

## 2018-02-06 PROCEDURE — 77336 RADIATION PHYSICS CONSULT: CPT | Performed by: RADIOLOGY

## 2018-02-06 PROCEDURE — 77014 CHG CT GUIDANCE RADIATION THERAPY FLDS PLACEMENT: CPT | Performed by: RADIOLOGY

## 2018-02-06 PROCEDURE — 77386 CHG INTENSITY MODULATED RADIATION TX DLVR COMPLEX: CPT | Performed by: RADIOLOGY

## 2018-02-06 PROCEDURE — 77386: CPT | Performed by: RADIOLOGY

## 2018-02-06 NOTE — PROGRESS NOTES
Physician Weekly Management Note    Diagnosis:   Esophageal Ca  Reason for Visit:  Radiation (17/30)    Concurrent Chemo:   Yes    Notes on Treatment course, Films, Medical progress and Plan:  Doing fairly well. Eating, no dysphagia or esophagitis. Weight ok. Controlling nausea well. Gas improved. Feels pretty good. Cont on.    ROS - Other than as listed above, as follows:  Constitutional - Normal - no complaints of fatigue, denies lack of appetite, fever, night sweats or change in weight.  Neck - Normal - denies neck masses, muscle weakness, neck pain, decreased range of motion or swelling.  Cardiovascular - Normal - denies arrhythmias, chest pain, dyspnea, edema, orthopnea or palpitations.  Respiratory - Normal - denies cough, dyspnea, hemoptysis, hiccoughs, pleuritic chest pain or wheezing.  Gastrointestinal - Normal - no complaints of constipation, abdominal pain, diarrhea, heartburn/dyspepsia, hematemesis, hemorrhoids, melena or GI bleeding, nausea, pain or cramping or vomiting.    PHYSICAL EXAM - Other than listed above, as follows:  Vitals:    02/06/18 1003   BP: 123/73   Pulse: 93   Resp: 16   Temp: 97.7 °F (36.5 °C)   TempSrc: Oral   SpO2: 96%   Weight: 56.4 kg (124 lb 5.4 oz)   PainSc: 0-No pain       Constitutional - Normal - no evidence of impaired alertness, inadequate appearance, premature or advanced chronologic age, uncooperativeness, altered mood, affect or disorientation.  Neck - Normal - no evidence of tender or enlarged lymph nodes, neck abnormalities, restricted range of motion or enlarged thyroid.  Chest - Normal - no evidence of chest abnormalities, tender or enlarged lymph nodes.  Respiratory - Normal - no evidence of abnormal breat sounds, chest abnormalities on palpation and chest abnormalities on percussion and normal breath sounds.  Hematologic/Lymphatic - Normal - no evidence of tender or enlarged axillary lymph nodes nor tender or enlarged neck nodes.    Performance Status: (1)  "Restricted in physically strenuous activity, ambulatory and able to do work of light nature    Problem added:  No problems updated.  Medications added: No orders of the defined types were placed in this encounter.    Ancillary referrals made: No orders of the defined types were placed in this encounter.      Technical aspects reviewed:  Weekly OBI approved if applicable?  Yes  Weekly port films approved?  Yes  Change requests noted if applicable?  Yes  Patient setup and plan reviewed?  Yes    Chart Reviewed:  Continue current treatment plan?  Yes  Treatment plan change requested?  No    I have reviewed and marked as \"reviewed\" the current medications, allergies and problem list in the patients EMR.    I have reviewed the patient's medical, surgical  history in detail, reviewed any pertinent lab work  and updated the computerized patient record if needed.    Patient's Care Team:  Patient Care Team:  Mary Taylor MD as PCP - General (Family Medicine)  Bienvenido Collier MD as Consulting Physician (Hematology and Oncology)  Daniel Oconnor MD as Referring Physician (Pulmonary Disease)  Shazia Mcdonald MD as Consulting Physician (Radiation Oncology)    Seen and approved by:  Shazia Mcdonald MD, 01/19/2018  "

## 2018-02-07 ENCOUNTER — INFUSION (OUTPATIENT)
Dept: ONCOLOGY | Facility: HOSPITAL | Age: 68
End: 2018-02-07

## 2018-02-07 ENCOUNTER — DOCUMENTATION (OUTPATIENT)
Dept: NUTRITION | Facility: HOSPITAL | Age: 68
End: 2018-02-07

## 2018-02-07 ENCOUNTER — OFFICE VISIT (OUTPATIENT)
Dept: ONCOLOGY | Facility: CLINIC | Age: 68
End: 2018-02-07

## 2018-02-07 VITALS
WEIGHT: 125 LBS | OXYGEN SATURATION: 96 % | DIASTOLIC BLOOD PRESSURE: 64 MMHG | RESPIRATION RATE: 16 BRPM | TEMPERATURE: 98.4 F | SYSTOLIC BLOOD PRESSURE: 118 MMHG | HEART RATE: 101 BPM | BODY MASS INDEX: 20.09 KG/M2 | HEIGHT: 66 IN

## 2018-02-07 DIAGNOSIS — R19.7 DIARRHEA, UNSPECIFIED TYPE: ICD-10-CM

## 2018-02-07 DIAGNOSIS — C34.92 ADENOCARCINOMA OF LEFT LUNG (HCC): Primary | ICD-10-CM

## 2018-02-07 DIAGNOSIS — T45.1X5A CHEMOTHERAPY-INDUCED NAUSEA: ICD-10-CM

## 2018-02-07 DIAGNOSIS — C15.9 ESOPHAGEAL CARCINOMA (HCC): Primary | ICD-10-CM

## 2018-02-07 DIAGNOSIS — R11.0 CHEMOTHERAPY-INDUCED NAUSEA: ICD-10-CM

## 2018-02-07 DIAGNOSIS — Z45.2 FITTING AND ADJUSTMENT OF VASCULAR CATHETER: ICD-10-CM

## 2018-02-07 LAB
ALBUMIN SERPL-MCNC: 3.8 G/DL (ref 3.5–5.2)
ALBUMIN/GLOB SERPL: 1.5 G/DL (ref 1.1–2.4)
ALP SERPL-CCNC: 59 U/L (ref 38–116)
ALT SERPL W P-5'-P-CCNC: 10 U/L (ref 0–33)
ANION GAP SERPL CALCULATED.3IONS-SCNC: 12.1 MMOL/L
AST SERPL-CCNC: 14 U/L (ref 0–32)
BASOPHILS # BLD AUTO: 0.01 10*3/MM3 (ref 0–0.1)
BASOPHILS NFR BLD AUTO: 0.4 % (ref 0–1.1)
BILIRUB SERPL-MCNC: 0.4 MG/DL (ref 0.1–1.2)
BUN BLD-MCNC: 10 MG/DL (ref 6–20)
BUN/CREAT SERPL: 12.2 (ref 7.3–30)
CALCIUM SPEC-SCNC: 9.3 MG/DL (ref 8.5–10.2)
CHLORIDE SERPL-SCNC: 99 MMOL/L (ref 98–107)
CO2 SERPL-SCNC: 26.9 MMOL/L (ref 22–29)
CREAT BLD-MCNC: 0.82 MG/DL (ref 0.6–1.1)
DEPRECATED RDW RBC AUTO: 41.9 FL (ref 37–49)
EOSINOPHIL # BLD AUTO: 0.02 10*3/MM3 (ref 0–0.36)
EOSINOPHIL NFR BLD AUTO: 0.8 % (ref 1–5)
ERYTHROCYTE [DISTWIDTH] IN BLOOD BY AUTOMATED COUNT: 13.7 % (ref 11.7–14.5)
GFR SERPL CREATININE-BSD FRML MDRD: 69 ML/MIN/1.73
GLOBULIN UR ELPH-MCNC: 2.5 GM/DL (ref 1.8–3.5)
GLUCOSE BLD-MCNC: 131 MG/DL (ref 74–124)
HCT VFR BLD AUTO: 35.1 % (ref 34–45)
HGB BLD-MCNC: 11.8 G/DL (ref 11.5–14.9)
IMM GRANULOCYTES # BLD: 0.01 10*3/MM3 (ref 0–0.03)
IMM GRANULOCYTES NFR BLD: 0.4 % (ref 0–0.5)
LYMPHOCYTES # BLD AUTO: 0.16 10*3/MM3 (ref 1–3.5)
LYMPHOCYTES NFR BLD AUTO: 6.4 % (ref 20–49)
MCH RBC QN AUTO: 29 PG (ref 27–33)
MCHC RBC AUTO-ENTMCNC: 33.6 G/DL (ref 32–35)
MCV RBC AUTO: 86.2 FL (ref 83–97)
MONOCYTES # BLD AUTO: 0.26 10*3/MM3 (ref 0.25–0.8)
MONOCYTES NFR BLD AUTO: 10.4 % (ref 4–12)
NEUTROPHILS # BLD AUTO: 2.04 10*3/MM3 (ref 1.5–7)
NEUTROPHILS NFR BLD AUTO: 81.6 % (ref 39–75)
NRBC BLD MANUAL-RTO: 0 /100 WBC (ref 0–0)
PLATELET # BLD AUTO: 111 10*3/MM3 (ref 150–375)
PMV BLD AUTO: 9.2 FL (ref 8.9–12.1)
POTASSIUM BLD-SCNC: 3.8 MMOL/L (ref 3.5–4.7)
PROT SERPL-MCNC: 6.3 G/DL (ref 6.3–8)
RBC # BLD AUTO: 4.07 10*6/MM3 (ref 3.9–5)
SODIUM BLD-SCNC: 138 MMOL/L (ref 134–145)
WBC NRBC COR # BLD: 2.5 10*3/MM3 (ref 4–10)

## 2018-02-07 PROCEDURE — 99213 OFFICE O/P EST LOW 20 MIN: CPT | Performed by: NURSE PRACTITIONER

## 2018-02-07 PROCEDURE — 80053 COMPREHEN METABOLIC PANEL: CPT | Performed by: INTERNAL MEDICINE

## 2018-02-07 PROCEDURE — 77386 CHG INTENSITY MODULATED RADIATION TX DLVR COMPLEX: CPT | Performed by: RADIOLOGY

## 2018-02-07 PROCEDURE — 77386: CPT | Performed by: RADIOLOGY

## 2018-02-07 PROCEDURE — 85025 COMPLETE CBC W/AUTO DIFF WBC: CPT | Performed by: INTERNAL MEDICINE

## 2018-02-07 PROCEDURE — 96523 IRRIG DRUG DELIVERY DEVICE: CPT | Performed by: NURSE PRACTITIONER

## 2018-02-07 PROCEDURE — 36415 COLL VENOUS BLD VENIPUNCTURE: CPT | Performed by: INTERNAL MEDICINE

## 2018-02-07 RX ORDER — SODIUM CHLORIDE 0.9 % (FLUSH) 0.9 %
10 SYRINGE (ML) INJECTION AS NEEDED
Status: DISCONTINUED | OUTPATIENT
Start: 2018-02-07 | End: 2018-02-07 | Stop reason: HOSPADM

## 2018-02-07 RX ORDER — SODIUM CHLORIDE 0.9 % (FLUSH) 0.9 %
10 SYRINGE (ML) INJECTION AS NEEDED
Status: CANCELLED | OUTPATIENT
Start: 2018-02-07

## 2018-02-07 NOTE — PROGRESS NOTES
Subjective  toxicity check with continued nausea, occasional diarrhea, and fatigue.    REASON FOR FOLLOW UP:   1.  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 Moon Bowen on 10/6/2017.  Primary stereotactic radiation therapy to the left upper lobe completed 12/15/2017  2.  Lung tumor is negative for PDL 1.  Also negative for ALK, EGFR, and ROS 1 mutations.  3.  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   4.  Esophageal adenocarcinoma was negative for HER-2/elizabeth overexpression but positive for PDL 1  5.  Initiation of combined radiation and low-dose weekly carboplatin and Taxol chemotherapy for treatment of esophageal adenocarcinoma.  First treatment delivered 1/11/2018.    HISTORY OF PRESENT ILLNESS: Ms. Abad is a very pleasant 67-year-old female with the above-mentioned history who is here today for toxicity check.  She has received 4 cycles of chemotherapy with weekly carboplatin and Taxol, with concurrent radiation.  She is due for her fifth cycle tomorrow.  She continues to have issues with nausea.  She is taking Zofran 8 mg every 8 hours, which does help.  She reports occasional vomiting.  She also has diarrhea from her previous colectomy, and short bowel syndrome.  This is generally controlled with Imodium or Lomotil.  She denies fevers or chills.  She denies any increase in shortness of breath or cough.  She denies any neuropathy symptoms.    History of Present Illness     Past Medical History:   Diagnosis Date   • Adenocarcinoma of lung 2017    HAD RADIATION    • Anxiety and depression    • Arthritis    • Benign parotid tumor 2012    removed by Dr Vickers told it was benign   • COPD (chronic obstructive pulmonary disease)    • Depression    • Diarrhea    • Early cataract    • Emphysema of lung    • Esophageal cancer 2017   • History of chronic pain    • History of colon polyps    • History of pneumonia    • Hyperlipidemia    •  Joint pain    • Stroke       HEMATOLOGIC/ ONCOLOGIC HISTORY:  The patient is a 67 y.o. year old female who has a history of smoking and COPD.  She had an abnormal CT scan in February of this year showing a 3.5 cm mass in the left suprahilar area.  A repeat CT scan was performed at [facility] 7/13/2017 and show that the mass had increased in size from about 3.5 cm up to 6.5 cm.    She underwent navigational bronchoscopy and endobronchial ultrasound and biopsy with Dr. Moon Bowen on 10/6/2017.  The pathology on the biopsy showed well-differentiated adenocarcinoma.  She is referred to us now by her pulmonologist Dr. Daniel Oconnor for further staging evaluation and treatment recommendations.    She has not had a PET scan for staging.  She had a PDL 1 staining on her bronchoscopy specimen which was negative for PDL 1.  EGFR, ALK, and ROS 1 were also negative.    She is still smoking but is trying to quit.  She had pulmonary function tests performed on 10/25/2017 showing a DLCO of 33% and FEV1 37%.    She was referred to the [facility] radiation [facility] and completed primary radiation to the left upper lobe lung mass.  Because of abnormal uptake in the esophagus noted on her staging PET scan, she also underwent an EGD and biopsy which unfortunately revealed adenocarcinoma of the GE junction.  Molecular studies for HER-2/elizabeth and PDL 1 on the tumor tissue showed that she was PD L1 positive and HER-2/elizabeth negative.  We recommended combined weekly low dose carbo Taxol chemotherapy and radiation.     She returns back today due for her third dose of Carbo/Taxol.  Her only real complaint today is fatigue.  She also has had some mild nausea controlled by her oral antiemetics pills        Past Surgical History:   Procedure Laterality Date   • BRONCHOSCOPY     • CHOLECYSTECTOMY  1999   • COLON SURGERY  2015    COLON POLYPS   • COLONOSCOPY  04/13/2016    TA w/low grade dysplasia x 3, serrated adenoma x 2   •  "COLONOSCOPY  06/14/2016    TA w/high grade dysplasia   • ENDOSCOPY N/A 12/6/2017    Procedure: ESOPHAGOGASTRODUODENOSCOPY WITH BIOPSY;  Surgeon: Jayant Robles MD;  Location: [facility] ENDOSCOPY;  Service:    • FOOT SURGERY  10/2010    [place]   • HYSTERECTOMY     • HI INSJ TUNNELED CVC W/O SUBQ PORT/ AGE 3 YR/> Right 1/9/2018    Procedure: MEDIPORT PLACEMENT right;  Surgeon: Damaso Germain MD;  Location: [facility] HYBRID OR 18/19;  Service: Vascular   • SALIVARY GLAND SURGERY      PAROTID MASS REMOVED BENIGN   • SHOULDER ARTHROSCOPY Left 1995. 2001        ALLERGIES:    Allergies   Allergen Reactions   • Penicillins Hives         Review of Systems   Constitutional: Positive for fatigue. Negative for activity change, appetite change, fever and unexpected weight change.   HENT: Negative for hearing loss, nosebleeds, trouble swallowing and voice change.    Eyes: Negative for visual disturbance.   Respiratory: Negative for cough, shortness of breath and wheezing.    Cardiovascular: Negative for chest pain and palpitations.   Gastrointestinal: Positive for diarrhea and nausea. Negative for abdominal pain and vomiting.        Short bowel symptoms after partial colectomy   Genitourinary: Negative for difficulty urinating, frequency, hematuria and urgency.   Musculoskeletal: Negative for back pain and neck pain.   Skin: Negative for rash.   Neurological: Negative for dizziness, seizures, syncope and headaches.   Hematological: Negative for adenopathy. Does not bruise/bleed easily.   Psychiatric/Behavioral: Negative for behavioral problems. The patient is not nervous/anxious.         Objective     Vitals:    02/07/18 0811   BP: 118/64   Pulse: 101   Resp: 16   Temp: 98.4 °F (36.9 °C)   TempSrc: Oral   SpO2: 96%   Weight: 56.7 kg (125 lb)   Height: 167.5 cm (65.95\")   PainSc: 0-No pain     Current Status 2/7/2018   ECOG score 0       Physical Exam   Constitutional: She is oriented to person, place, and time. She " appears well-developed and well-nourished. No distress.   HENT:   Head: Normocephalic.   Eyes: Conjunctivae and EOM are normal. Pupils are equal, round, and reactive to light. No scleral icterus.   Neck: Normal range of motion. Neck supple. No JVD present. No thyromegaly present.   Cardiovascular: Normal rate and regular rhythm.  Exam reveals no gallop and no friction rub.    No murmur heard.  Pulmonary/Chest: Effort normal. No respiratory distress. She has decreased breath sounds. She has no wheezes. She has no rhonchi. She has no rales.   Right IJ Mediport the right chest wall.   Abdominal: Soft. She exhibits no distension and no mass. There is no tenderness.   Musculoskeletal: Normal range of motion. She exhibits no edema or deformity.   Neurological: She is alert and oriented to person, place, and time. She has normal reflexes. No cranial nerve deficit.   Skin: Skin is warm and dry. No rash noted. No erythema.   Psychiatric: She has a normal mood and affect. Her behavior is normal. Judgment normal.         RECENT LABS:  Hematology WBC   Date Value Ref Range Status   02/07/2018 2.50 (L) 4.00 - 10.00 10*3/mm3 Final     RBC   Date Value Ref Range Status   02/07/2018 4.07 3.90 - 5.00 10*6/mm3 Final     Hemoglobin   Date Value Ref Range Status   02/07/2018 11.8 11.5 - 14.9 g/dL Final     Hematocrit   Date Value Ref Range Status   02/07/2018 35.1 34.0 - 45.0 % Final     Platelets   Date Value Ref Range Status   02/07/2018 111 (L) 150 - 375 10*3/mm3 Final        Lab Results   Component Value Date    GLUCOSE 131 (H) 02/07/2018    BUN 10 02/07/2018    CREATININE 0.82 02/07/2018    EGFRIFNONA 69 02/07/2018    BCR 12.2 02/07/2018    K 3.8 02/07/2018    CO2 26.9 02/07/2018    CALCIUM 9.3 02/07/2018    ALBUMIN 3.80 02/07/2018    LABIL2 1.5 02/07/2018    AST 14 02/07/2018    ALT 10 02/07/2018       PET 11/7/2017  IMPRESSION:  Left upper lobe lung carcinoma showing significant increase  in size since the preceding CT scan of  the chest dated 07/13/2017. The  tumor demonstrates moderate hypermetabolism. There is no metabolic  evidence of metastatic disease within the neck, chest, abdomen or  pelvis. Heart, there are is a tiny left parotid nodule that demonstrates  intense hypermetabolism. Further evaluation with ultrasound-guided  biopsy is suggested. Also noted is localized soft tissue fullness at the  gastroesophageal junction extending focally into the cardia of the  stomach that demonstrates moderate hypermetabolism. Esophageal carcinoma  is suspected. Further evaluation with endoscopy is recommended.    PATHOLOGY FROM EGD 12/6/2017  Final Diagnosis   1.  GASTROESOPHAGEAL JUNCTION, BIOPSIES:             INVASIVE ADENOCARCINOMA.            NO IDENTIFIED INTESTINAL METAPLASIA.      COMMENT: In this specimen tumor is identified extending into submucosal tissue. Deeper extension cannot be excluded in these superficial biopsies.   Her 2 Elizabeth negative  PD L1 positive ( > 1 ).      Assessment/Plan   1.  Well differentiated adenocarcinoma presumably from lung origin in a long-time smoker.  The biopsy was made from navigational bronchoscopy and endobronchial ultrasound at [place] on 10/6/2017.  Staging PET scan showed no mediastinal adenopathy or obvious distant metastases.  She did have uptake in the lower esophagus which was evaluated with EGD and biopsy on 12/6/2017 and unfortunately was found to be adenocarcinoma at the GE junction.   · LungTumor tissue was negative for PDL 1 expression, EGFR, ALK, or ROS 1 mutations.   · She completed stereotactic radiation to the lung tumor 12/15/2017.  2.  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   · Esophageal adenocarcinoma was negative for HER-2/elizabeth overexpression but positive for PDL 1.  · Initiated combined radiation therapy to the esophagus along with weekly low-dose carboplatin and Taxol chemotherapy 1/11/18.  3. Emphysema due to smoking.  4. Placement of right  internal jugular MediPort by Dr. Raphael Germain of vascular surgery 1/9/2018.  5.  She was not considered a candidate for lobectomy due to her poor pulmonary function but we plan to have her reevaluated by cardiology and pulmonology prior to determining if she is a potential candidate for esophageal resection.  She is currently scheduled to see Dr. Puneet Treviño of cardiology 2/9/2018.  She also has been seen by pulmonology but we are awaiting their formal report.  6.  History of benign right parotid tumor in 2012, surgically removed per Dr. Vickers.  Patient is noted to have very tiny nodule below the scar, new to her, but possibly just scar tissue. Encouraged her to watch closely, as will we, for any enlargement.  7.  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  She is currently using Lomotil and Imodium as needed for diarrhea control.  8.  Nausea due to chemotherapy: The patient has been taking ondansetron 8 mg every 8 hours for nausea control, which is effective.      Plan  1.  Proceed tomorrow with cycle 5 of carboplatin and Taxol.  2.  Continue radiation therapy under the direction of Dr. Mcdonald.  3.  Return in one week for follow-up visit with Dr. Collier in anticipation of cycle 6 of therapy.  4.  Continue Zofran for nausea.   5.  Continue Lomotil or Imodium as needed for diarrhea control.  6.  We will await the recommendations from pulmonary and cardiology regarding whether or not she is a candidate for future esophagectomy.  7.  She is already scheduled for CT scan of the chest in late February for reevaluation of her previously treated lung adenocarcinoma.  When she completes treatment for her esophageal carcinoma we will also need to eventually refer her back to have endoscopic follow-up.  Her initial EGD and biopsy was performed by gastroenterology (Dr. Robles).  If she is a candidate for potential surgical resection in the future we may have thoracic surgery perform  her next endoscopic evaluation.

## 2018-02-07 NOTE — PROGRESS NOTES
Oncology Nutrition - Follow up    Patient Name:  Dilma Abad  YOB: 1950  MRN: [id number]  Date:  02/07/18  Physician:  Tamara Collier    Follow up: Weight remains overall stable. Patient is drinking Boost. Taking antiemetics on scheduled basis, intermittent diarrhea continues. Continue to follow and encourage intake.       Type of Cancer Treatment:   Radiation/Cyberknife: Number of Treatments:  Completes radiation 2/23  Chemotherapy:  Type: Carbo/taxol    Patient Active Problem List   Diagnosis   • Adenocarcinoma of left lung   • Panlobular emphysema   • Abnormal finding on imaging   • Esophageal carcinoma   • Fitting and adjustment of vascular catheter       Current Outpatient Prescriptions   Medication Sig Dispense Refill   • albuterol (VENTOLIN HFA) 108 (90 Base) MCG/ACT inhaler Inhale 2 puffs Every 6 (Six) Hours As Needed for Wheezing.     • aspirin 81 MG EC tablet Take 81 mg by mouth Daily.     • buPROPion XL (WELLBUTRIN XL) 300 MG 24 hr tablet Take 300 mg by mouth Every Evening.     • Cholecalciferol (VITAMIN D) 2000 units tablet Take 2,000 Units by mouth Every Other Day.     • cyanocobalamin (VITAMIN B-12) 2000 MCG tablet Take 2,000 mcg by mouth Every Other Day.     • diazePAM (VALIUM) 5 MG tablet Take 5 mg by mouth 4 (Four) Times a Day As Needed for Anxiety.     • diphenoxylate-atropine (LOMOTIL) 2.5-0.025 MG per tablet TK 1 T PO  QID PRN  1   • escitalopram (LEXAPRO) 20 MG tablet Take 20 mg by mouth Every Evening.     • fluticasone (FLONASE) 50 MCG/ACT nasal spray 2 sprays into each nostril Daily As Needed for Rhinitis.     • HYDROcodone-acetaminophen (NORCO) 5-325 MG per tablet Take 1-2 tablets by mouth Every 4 (Four) Hours As Needed (Pain). 40 tablet 0   • HYDROcodone-acetaminophen (NORCO) 7.5-325 MG per tablet Take 1 tablet by mouth 4 (Four) Times a Day As Needed for Moderate Pain .     • Loperamide HCl (IMODIUM A-D PO) Take 1-2 tablets by mouth 2 (Two) Times a Day. 1 IN THE AM AND 2 IN  "THE PM     • Multiple Vitamins-Minerals (CENTRUM SILVER PO) Take 1 tablet by mouth Daily.     • ondansetron (ZOFRAN) 8 MG tablet Take 1 tablet by mouth Every 8 (Eight) Hours As Needed for Nausea or Vomiting. 60 tablet 5   • Probiotic Product (PROBIOTIC PO) Take 1 capsule by mouth Daily.     • Psyllium (METAMUCIL FIBER PO) Take 1-2 capsules by mouth 2 (Two) Times a Day. 1 IN THE AM AND 2 IN THE PM     • simvastatin (ZOCOR) 20 MG tablet Take 20 mg by mouth.     • umeclidinium-vilanterol (ANORO ELLIPTA) 62.5-25 MCG/INH aerosol powder  inhaler Inhale 1 puff Daily.       No current facility-administered medications for this visit.        Glycemic Risk:   n/a    Weight:   Height: 65\"  Weight: 125 lbs.  Usual Body Weight: 127 lbs.   BMI: 20.2   Weight has been stable    Oral Food Intake:  Regular Diet - No Restrictions  Compared to normal intake, current food intake is less than normal    Hydration Status:   How many 8 ounce glass of water of fluid do you drink per day?  6    Enteral Feeding:   n/a    Nutrition Symptoms:   Altered Apetite  Nausea  Diarrhea  Fatigue    Activity:   Not my normal self, but able to be up and about with fairly normal activities     reports that she quit smoking about 4 months ago. Her smoking use included Cigarettes. She has a 30.00 pack-year smoking history. She has never used smokeless tobacco. She reports that she does not drink alcohol or use illicit drugs.    Evaluation of Nutritional Risk:   Patient identified to be at nutritional risk and/or for nutritional consultation; will follow patient through course of treatment.   Diagnosis  Weight Change  Nutrition Impact Symptoms  Patient Education        Electronically signed by:  Ruby Kellogg RD  9:29 AM  "

## 2018-02-08 ENCOUNTER — APPOINTMENT (OUTPATIENT)
Dept: ONCOLOGY | Facility: HOSPITAL | Age: 68
End: 2018-02-08

## 2018-02-08 ENCOUNTER — INFUSION (OUTPATIENT)
Dept: ONCOLOGY | Facility: HOSPITAL | Age: 68
End: 2018-02-08

## 2018-02-08 ENCOUNTER — APPOINTMENT (OUTPATIENT)
Dept: ONCOLOGY | Facility: CLINIC | Age: 68
End: 2018-02-08

## 2018-02-08 VITALS
SYSTOLIC BLOOD PRESSURE: 122 MMHG | WEIGHT: 133.2 LBS | HEART RATE: 83 BPM | BODY MASS INDEX: 21.53 KG/M2 | DIASTOLIC BLOOD PRESSURE: 74 MMHG | TEMPERATURE: 98 F

## 2018-02-08 DIAGNOSIS — C15.9 ESOPHAGEAL CARCINOMA (HCC): Primary | ICD-10-CM

## 2018-02-08 PROCEDURE — 77386 CHG INTENSITY MODULATED RADIATION TX DLVR COMPLEX: CPT | Performed by: RADIOLOGY

## 2018-02-08 PROCEDURE — 25010000002 PACLITAXEL PER 30 MG: Performed by: INTERNAL MEDICINE

## 2018-02-08 PROCEDURE — 25010000002 DIPHENHYDRAMINE PER 50 MG: Performed by: INTERNAL MEDICINE

## 2018-02-08 PROCEDURE — 96417 CHEMO IV INFUS EACH ADDL SEQ: CPT | Performed by: INTERNAL MEDICINE

## 2018-02-08 PROCEDURE — 25010000002 PALONOSETRON PER 25 MCG: Performed by: INTERNAL MEDICINE

## 2018-02-08 PROCEDURE — 96375 TX/PRO/DX INJ NEW DRUG ADDON: CPT | Performed by: INTERNAL MEDICINE

## 2018-02-08 PROCEDURE — 77386: CPT | Performed by: RADIOLOGY

## 2018-02-08 PROCEDURE — 25010000002 CARBOPLATIN PER 50 MG: Performed by: INTERNAL MEDICINE

## 2018-02-08 PROCEDURE — 77014 CHG CT GUIDANCE RADIATION THERAPY FLDS PLACEMENT: CPT | Performed by: RADIOLOGY

## 2018-02-08 PROCEDURE — 25010000002 DEXAMETHASONE SODIUM PHOSPHATE 10 MG/ML SOLUTION 1 ML VIAL: Performed by: INTERNAL MEDICINE

## 2018-02-08 PROCEDURE — 96413 CHEMO IV INFUSION 1 HR: CPT | Performed by: INTERNAL MEDICINE

## 2018-02-08 RX ORDER — FAMOTIDINE 10 MG/ML
20 INJECTION, SOLUTION INTRAVENOUS ONCE
Status: COMPLETED | OUTPATIENT
Start: 2018-02-08 | End: 2018-02-08

## 2018-02-08 RX ORDER — PALONOSETRON 0.05 MG/ML
0.25 INJECTION, SOLUTION INTRAVENOUS ONCE
Status: COMPLETED | OUTPATIENT
Start: 2018-02-08 | End: 2018-02-08

## 2018-02-08 RX ADMIN — CARBOPLATIN 170 MG: 10 INJECTION, SOLUTION INTRAVENOUS at 13:00

## 2018-02-08 RX ADMIN — DEXAMETHASONE SODIUM PHOSPHATE 12 MG: 10 INJECTION, SOLUTION INTRAMUSCULAR; INTRAVENOUS at 11:05

## 2018-02-08 RX ADMIN — PACLITAXEL 85 MG: 6 INJECTION, SOLUTION INTRAVENOUS at 11:55

## 2018-02-08 RX ADMIN — DIPHENHYDRAMINE HYDROCHLORIDE 25 MG: 50 INJECTION, SOLUTION INTRAMUSCULAR; INTRAVENOUS at 10:49

## 2018-02-08 RX ADMIN — PALONOSETRON HYDROCHLORIDE 0.25 MG: 0.25 INJECTION INTRAVENOUS at 11:02

## 2018-02-08 RX ADMIN — SODIUM CHLORIDE 250 ML: 900 INJECTION, SOLUTION INTRAVENOUS at 10:47

## 2018-02-08 RX ADMIN — FAMOTIDINE 20 MG: 10 INJECTION, SOLUTION INTRAVENOUS at 10:48

## 2018-02-09 ENCOUNTER — OFFICE VISIT (OUTPATIENT)
Dept: CARDIOLOGY | Facility: CLINIC | Age: 68
End: 2018-02-09

## 2018-02-09 VITALS
BODY MASS INDEX: 19.78 KG/M2 | DIASTOLIC BLOOD PRESSURE: 80 MMHG | SYSTOLIC BLOOD PRESSURE: 132 MMHG | WEIGHT: 126 LBS | HEIGHT: 67 IN | HEART RATE: 88 BPM | OXYGEN SATURATION: 98 % | RESPIRATION RATE: 20 BRPM

## 2018-02-09 DIAGNOSIS — R06.09 DOE (DYSPNEA ON EXERTION): ICD-10-CM

## 2018-02-09 DIAGNOSIS — C15.9 ESOPHAGEAL CARCINOMA (HCC): ICD-10-CM

## 2018-02-09 DIAGNOSIS — C34.92 ADENOCARCINOMA OF LEFT LUNG (HCC): Primary | ICD-10-CM

## 2018-02-09 DIAGNOSIS — Z01.810 PREOP CARDIOVASCULAR EXAM: ICD-10-CM

## 2018-02-09 PROCEDURE — 93000 ELECTROCARDIOGRAM COMPLETE: CPT | Performed by: INTERNAL MEDICINE

## 2018-02-09 PROCEDURE — 77386 CHG INTENSITY MODULATED RADIATION TX DLVR COMPLEX: CPT | Performed by: RADIOLOGY

## 2018-02-09 PROCEDURE — 77386: CPT | Performed by: RADIOLOGY

## 2018-02-09 PROCEDURE — 77014 CHG CT GUIDANCE RADIATION THERAPY FLDS PLACEMENT: CPT | Performed by: RADIOLOGY

## 2018-02-09 PROCEDURE — 99204 OFFICE O/P NEW MOD 45 MIN: CPT | Performed by: INTERNAL MEDICINE

## 2018-02-09 RX ORDER — WHEAT DEXTRIN/ASPARTAME 3 G/6 G
POWDER IN PACKET (EA) ORAL DAILY
Status: ON HOLD | COMMUNITY
End: 2019-01-01

## 2018-02-09 NOTE — PROGRESS NOTES
Dilma Abad  1950  Date of Office Visit: 02/09/2018  Encounter Provider: Puneet Treviño MD  Place of Service: [facility] CARDIOLOGY      CHIEF COMPLAINT:  Preoperative risk stratification  Lung adenocarcinoma  Adenocarcinoma of the GE junction    HISTORY OF PRESENT ILLNESS:Dear Dr. Taylor and Dr. Collier:     I had the pleasure of seeing your patient. 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 She states that she is not able to do much as far as heavy activity. She can walk up 1 to 2 flights of stairs with no chest pain and just mild dyspnea. She states that she is able to walk a block to 2 blocks without dyspnea on flat ground. She denies any chest pain. She has not had any recent myocardial infarction and clinically has no evidence of heart failure. She has not had any syncopal episodes that would be concerning for ventricular arrhythmia.       Review of Systems   Constitution: Negative for fever, weakness and malaise/fatigue.   HENT: Negative for nosebleeds and sore throat.    Eyes: Negative for blurred vision and double vision.   Cardiovascular: Negative for chest pain, claudication, palpitations and syncope.   Respiratory: Negative for cough, shortness of breath and snoring.    Endocrine: Negative for cold intolerance, heat intolerance and polydipsia.   Skin: Negative for itching, poor wound healing and rash.   Musculoskeletal: Negative for joint pain, joint swelling, muscle weakness and myalgias.   Gastrointestinal: Negative for abdominal pain, melena, nausea and vomiting.   Neurological: Negative for light-headedness, loss of balance, seizures and vertigo.   Psychiatric/Behavioral: Negative for altered mental status and depression.       Past Medical  History:   Diagnosis Date   • Adenocarcinoma of lung 2017    HAD RADIATION    • Anxiety and depression    • Arthritis    • Benign parotid tumor 2012    removed by Dr Vickers told it was benign   • COPD (chronic obstructive pulmonary disease)    • Depression    • Diarrhea    • Early cataract    • Emphysema of lung    • Esophageal cancer 2017   • History of chronic pain    • History of colon polyps    • History of pneumonia    • Hyperlipidemia    • Joint pain    • Stroke        The following portions of the patient's history were reviewed and updated as appropriate: Social history , Family history and Surgical history     Current Outpatient Prescriptions on File Prior to Visit   Medication Sig Dispense Refill   • albuterol (VENTOLIN HFA) 108 (90 Base) MCG/ACT inhaler Inhale 2 puffs Every 6 (Six) Hours As Needed for Wheezing.     • aspirin 81 MG EC tablet Take 81 mg by mouth Daily.     • buPROPion XL (WELLBUTRIN XL) 300 MG 24 hr tablet Take 300 mg by mouth Every Evening.     • Cholecalciferol (VITAMIN D) 2000 units tablet Take 2,000 Units by mouth Every Other Day.     • cyanocobalamin (VITAMIN B-12) 2000 MCG tablet Take 2,000 mcg by mouth Every Other Day.     • diazePAM (VALIUM) 5 MG tablet Take 5 mg by mouth 4 (Four) Times a Day As Needed for Anxiety.     • diphenoxylate-atropine (LOMOTIL) 2.5-0.025 MG per tablet TK 1 T PO  QID PRN  1   • escitalopram (LEXAPRO) 20 MG tablet Take 20 mg by mouth Every Evening.     • fluticasone (FLONASE) 50 MCG/ACT nasal spray 2 sprays into each nostril Daily As Needed for Rhinitis.     • HYDROcodone-acetaminophen (NORCO) 7.5-325 MG per tablet Take 1 tablet by mouth 4 (Four) Times a Day As Needed for Moderate Pain .     • Loperamide HCl (IMODIUM A-D PO) Take 1-2 tablets by mouth 2 (Two) Times a Day. 1 IN THE AM AND 2 IN THE PM     • Multiple Vitamins-Minerals (CENTRUM SILVER PO) Take 1 tablet by mouth Daily.     • ondansetron (ZOFRAN) 8 MG tablet Take 1 tablet by mouth Every 8 (Eight)  "Hours As Needed for Nausea or Vomiting. 60 tablet 5   • Probiotic Product (PROBIOTIC PO) Take 1 capsule by mouth Daily.     • simvastatin (ZOCOR) 20 MG tablet Take 20 mg by mouth.     • umeclidinium-vilanterol (ANORO ELLIPTA) 62.5-25 MCG/INH aerosol powder  inhaler Inhale 1 puff Daily.     • [DISCONTINUED] HYDROcodone-acetaminophen (NORCO) 5-325 MG per tablet Take 1-2 tablets by mouth Every 4 (Four) Hours As Needed (Pain). 40 tablet 0   • [DISCONTINUED] Psyllium (METAMUCIL FIBER PO) Take 1-2 capsules by mouth 2 (Two) Times a Day. 1 IN THE AM AND 2 IN THE PM       Current Facility-Administered Medications on File Prior to Visit   Medication Dose Route Frequency Provider Last Rate Last Dose   • [COMPLETED] CARBOplatin (PARAPLATIN) 170 mg in dextrose (D5W) 5 % 117 mL chemo IVPB  170 mg Intravenous Once Bienvenido Collier MD   Stopped at 02/08/18 1350   • [COMPLETED] PACLitaxel (TAXOL) 85 mg in sodium chloride 0.9 % 264.1667 mL chemo IVPB  50 mg/m2 (Treatment Plan Recorded) Intravenous Once Bienvenido Collier MD   Stopped at 02/08/18 1259   • [COMPLETED] sodium chloride 0.9 % 250 mL infusion  250 mL Intravenous Once Bienvenido Collier MD   Stopped at 02/08/18 1350       Allergies   Allergen Reactions   • Penicillins Hives       Vitals:    02/09/18 1136 02/09/18 1138   BP: 148/72 132/80   BP Location: Right arm Left arm   Pulse:  88   Resp:  20   SpO2:  98%   Weight:  57.2 kg (126 lb)   Height:  170.2 cm (67\")     Physical Exam   Constitutional: She is oriented to person, place, and time. She appears well-developed and well-nourished.   HENT:   Head: Normocephalic and atraumatic.   Eyes: Conjunctivae and EOM are normal. No scleral icterus.   Neck: Normal range of motion. Neck supple. Normal carotid pulses, no hepatojugular reflux and no JVD present. Carotid bruit is not present. No tracheal deviation present. No thyromegaly present.   Cardiovascular: Normal rate and regular rhythm.  Exam reveals no gallop and no friction rub.  "   No murmur heard.  Pulses:       Carotid pulses are 2+ on the right side, and 2+ on the left side.       Radial pulses are 2+ on the right side, and 2+ on the left side.        Femoral pulses are 2+ on the right side, and 2+ on the left side.       Dorsalis pedis pulses are 2+ on the right side, and 2+ on the left side.        Posterior tibial pulses are 2+ on the right side, and 2+ on the left side.   Pulmonary/Chest: Breath sounds normal. No respiratory distress. She has no decreased breath sounds. She has no wheezes. She has no rhonchi. She has no rales. She exhibits no tenderness.   Abdominal: Soft. Bowel sounds are normal. She exhibits no distension. There is no tenderness. There is no rebound.   Musculoskeletal: She exhibits no edema or deformity.   Neurological: She is alert and oriented to person, place, and time. She has normal strength. No sensory deficit.   Skin: No rash noted. No erythema.   Psychiatric: She has a normal mood and affect. Her behavior is normal.       No components found for: CBC  No results found for: CMP  No components found for: LIPID  No results found for: BMP      ECG 12 Lead  Date/Time: 2/9/2018 12:17 PM  Performed by: JOE SIEGEL  Authorized by: JOE SIEGEL   Comparison: compared with previous ECG from 1/5/2018  Comparison to previous ECG: PACs are new  Rhythm: sinus rhythm  Rate: normal  QRS axis: normal  Comments: Premature atrial complexes, infrequent.  Nonspecific ST-T abnormalities inferior leads            DISCUSSION/SUMMARYVery pleasant 67-year-old female with a medical history of lung and esophageal adenocarcinoma, who presents to me for preoperative risk evaluation prior to possible esophagectomy.     She denies any chest pain that would be consistent with angina. She does have mild dyspnea on exertion when she walks up a couple of flights of stairs. I do not think she is able to achieve 4 metabolic equivalents without symptoms. However, she has no  angina or evidence of heart failure. No recent myocardial infarction.     1.  Preoperative risk stratification:        -I am unable to get a good assessment of her exercise capacity. I think that this is probably diminished and associated with shortness of breath.        -I think a Lexiscan stress test would be in order considering the type of surgery that she is set to have. This would not be a low risk surgical procedure.        -If she has only a small area of ischemia or normal stress test, I would move her forward with this procedure at a low to intermediate risk.        -Due to her PACs, there is some risk of her going into atrial fibrillation around the time of the operation. If that is the case, then we can deal with it at that time. I would not preemptively start beta blockade.

## 2018-02-13 DIAGNOSIS — C15.9 ESOPHAGEAL CARCINOMA (HCC): ICD-10-CM

## 2018-02-13 PROCEDURE — 77014 CHG CT GUIDANCE RADIATION THERAPY FLDS PLACEMENT: CPT | Performed by: RADIOLOGY

## 2018-02-13 PROCEDURE — 77336 RADIATION PHYSICS CONSULT: CPT | Performed by: RADIOLOGY

## 2018-02-13 PROCEDURE — 77427 RADIATION TX MANAGEMENT X5: CPT | Performed by: RADIOLOGY

## 2018-02-13 PROCEDURE — 77386: CPT | Performed by: RADIOLOGY

## 2018-02-13 PROCEDURE — 77386 CHG INTENSITY MODULATED RADIATION TX DLVR COMPLEX: CPT | Performed by: RADIOLOGY

## 2018-02-14 PROCEDURE — 77014 CHG CT GUIDANCE RADIATION THERAPY FLDS PLACEMENT: CPT | Performed by: RADIOLOGY

## 2018-02-14 PROCEDURE — 77386 CHG INTENSITY MODULATED RADIATION TX DLVR COMPLEX: CPT | Performed by: RADIOLOGY

## 2018-02-14 PROCEDURE — 77386: CPT | Performed by: RADIOLOGY

## 2018-02-15 ENCOUNTER — INFUSION (OUTPATIENT)
Dept: ONCOLOGY | Facility: HOSPITAL | Age: 68
End: 2018-02-15

## 2018-02-15 ENCOUNTER — TELEPHONE (OUTPATIENT)
Dept: ONCOLOGY | Facility: HOSPITAL | Age: 68
End: 2018-02-15

## 2018-02-15 ENCOUNTER — OFFICE VISIT (OUTPATIENT)
Dept: ONCOLOGY | Facility: CLINIC | Age: 68
End: 2018-02-15

## 2018-02-15 ENCOUNTER — DOCUMENTATION (OUTPATIENT)
Dept: ONCOLOGY | Facility: CLINIC | Age: 68
End: 2018-02-15

## 2018-02-15 ENCOUNTER — TELEPHONE (OUTPATIENT)
Dept: RADIATION ONCOLOGY | Facility: HOSPITAL | Age: 68
End: 2018-02-15

## 2018-02-15 VITALS
TEMPERATURE: 98.4 F | DIASTOLIC BLOOD PRESSURE: 76 MMHG | SYSTOLIC BLOOD PRESSURE: 124 MMHG | HEIGHT: 66 IN | RESPIRATION RATE: 16 BRPM | HEART RATE: 98 BPM | WEIGHT: 124.2 LBS | OXYGEN SATURATION: 93 % | BODY MASS INDEX: 19.96 KG/M2

## 2018-02-15 DIAGNOSIS — C34.92 ADENOCARCINOMA OF LEFT LUNG (HCC): ICD-10-CM

## 2018-02-15 DIAGNOSIS — Z45.2 FITTING AND ADJUSTMENT OF VASCULAR CATHETER: ICD-10-CM

## 2018-02-15 DIAGNOSIS — J43.1 PANLOBULAR EMPHYSEMA (HCC): ICD-10-CM

## 2018-02-15 DIAGNOSIS — C15.9 ESOPHAGEAL CARCINOMA (HCC): Primary | ICD-10-CM

## 2018-02-15 DIAGNOSIS — K20.80 RADIATION ESOPHAGITIS: ICD-10-CM

## 2018-02-15 DIAGNOSIS — T66.XXXA RADIATION ESOPHAGITIS: ICD-10-CM

## 2018-02-15 LAB
ALBUMIN SERPL-MCNC: 3.6 G/DL (ref 3.5–5.2)
ALBUMIN/GLOB SERPL: 1.5 G/DL (ref 1.1–2.4)
ALP SERPL-CCNC: 54 U/L (ref 38–116)
ALT SERPL W P-5'-P-CCNC: 10 U/L (ref 0–33)
ANION GAP SERPL CALCULATED.3IONS-SCNC: 11.2 MMOL/L
AST SERPL-CCNC: 13 U/L (ref 0–32)
BASOPHILS # BLD AUTO: 0.01 10*3/MM3 (ref 0–0.1)
BASOPHILS NFR BLD AUTO: 0.4 % (ref 0–1.1)
BILIRUB SERPL-MCNC: 0.3 MG/DL (ref 0.1–1.2)
BUN BLD-MCNC: 10 MG/DL (ref 6–20)
BUN/CREAT SERPL: 12.8 (ref 7.3–30)
CALCIUM SPEC-SCNC: 9.2 MG/DL (ref 8.5–10.2)
CHLORIDE SERPL-SCNC: 100 MMOL/L (ref 98–107)
CO2 SERPL-SCNC: 25.8 MMOL/L (ref 22–29)
CREAT BLD-MCNC: 0.78 MG/DL (ref 0.6–1.1)
DEPRECATED RDW RBC AUTO: 42.9 FL (ref 37–49)
EOSINOPHIL # BLD AUTO: 0 10*3/MM3 (ref 0–0.36)
EOSINOPHIL NFR BLD AUTO: 0 % (ref 1–5)
ERYTHROCYTE [DISTWIDTH] IN BLOOD BY AUTOMATED COUNT: 14 % (ref 11.7–14.5)
GFR SERPL CREATININE-BSD FRML MDRD: 73 ML/MIN/1.73
GLOBULIN UR ELPH-MCNC: 2.4 GM/DL (ref 1.8–3.5)
GLUCOSE BLD-MCNC: 150 MG/DL (ref 74–124)
HCT VFR BLD AUTO: 31.2 % (ref 34–45)
HGB BLD-MCNC: 10.6 G/DL (ref 11.5–14.9)
IMM GRANULOCYTES # BLD: 0.01 10*3/MM3 (ref 0–0.03)
IMM GRANULOCYTES NFR BLD: 0.4 % (ref 0–0.5)
LYMPHOCYTES # BLD AUTO: 0.17 10*3/MM3 (ref 1–3.5)
LYMPHOCYTES NFR BLD AUTO: 7.1 % (ref 20–49)
MCH RBC QN AUTO: 29.2 PG (ref 27–33)
MCHC RBC AUTO-ENTMCNC: 34 G/DL (ref 32–35)
MCV RBC AUTO: 86 FL (ref 83–97)
MONOCYTES # BLD AUTO: 0.27 10*3/MM3 (ref 0.25–0.8)
MONOCYTES NFR BLD AUTO: 11.3 % (ref 4–12)
NEUTROPHILS # BLD AUTO: 1.93 10*3/MM3 (ref 1.5–7)
NEUTROPHILS NFR BLD AUTO: 80.8 % (ref 39–75)
NRBC BLD MANUAL-RTO: 0 /100 WBC (ref 0–0)
PLATELET # BLD AUTO: 75 10*3/MM3 (ref 150–375)
PMV BLD AUTO: 10.2 FL (ref 8.9–12.1)
POTASSIUM BLD-SCNC: 3.8 MMOL/L (ref 3.5–4.7)
PROT SERPL-MCNC: 6 G/DL (ref 6.3–8)
RBC # BLD AUTO: 3.63 10*6/MM3 (ref 3.9–5)
SODIUM BLD-SCNC: 137 MMOL/L (ref 134–145)
WBC NRBC COR # BLD: 2.39 10*3/MM3 (ref 4–10)

## 2018-02-15 PROCEDURE — 85025 COMPLETE CBC W/AUTO DIFF WBC: CPT

## 2018-02-15 PROCEDURE — 80053 COMPREHEN METABOLIC PANEL: CPT

## 2018-02-15 PROCEDURE — 96523 IRRIG DRUG DELIVERY DEVICE: CPT | Performed by: INTERNAL MEDICINE

## 2018-02-15 PROCEDURE — 99214 OFFICE O/P EST MOD 30 MIN: CPT | Performed by: INTERNAL MEDICINE

## 2018-02-15 PROCEDURE — 25010000002 HEPARIN FLUSH (PORCINE) 100 UNIT/ML SOLUTION: Performed by: INTERNAL MEDICINE

## 2018-02-15 RX ORDER — SUCRALFATE ORAL 1 G/10ML
1 SUSPENSION ORAL EVERY 6 HOURS SCHEDULED
Qty: 420 ML | Refills: 1 | OUTPATIENT
Start: 2018-02-15 | End: 2018-01-01 | Stop reason: SDUPTHER

## 2018-02-15 RX ORDER — PALONOSETRON 0.05 MG/ML
0.25 INJECTION, SOLUTION INTRAVENOUS ONCE
Status: CANCELLED | OUTPATIENT
Start: 2018-03-01

## 2018-02-15 RX ORDER — FAMOTIDINE 10 MG/ML
20 INJECTION, SOLUTION INTRAVENOUS 2 TIMES DAILY
Status: CANCELLED
Start: 2018-02-16

## 2018-02-15 RX ORDER — SODIUM CHLORIDE 0.9 % (FLUSH) 0.9 %
10 SYRINGE (ML) INJECTION AS NEEDED
Status: CANCELLED | OUTPATIENT
Start: 2018-02-15

## 2018-02-15 RX ORDER — FAMOTIDINE 10 MG/ML
20 INJECTION, SOLUTION INTRAVENOUS ONCE
Status: CANCELLED | OUTPATIENT
Start: 2018-03-01

## 2018-02-15 RX ORDER — SUCRALFATE ORAL 1 G/10ML
1 SUSPENSION ORAL EVERY 6 HOURS SCHEDULED
Status: CANCELLED | OUTPATIENT
Start: 2018-02-15

## 2018-02-15 RX ORDER — SODIUM CHLORIDE 0.9 % (FLUSH) 0.9 %
10 SYRINGE (ML) INJECTION AS NEEDED
Status: DISCONTINUED | OUTPATIENT
Start: 2018-02-15 | End: 2018-02-15 | Stop reason: HOSPADM

## 2018-02-15 RX ORDER — PANTOPRAZOLE SODIUM 40 MG/1
40 TABLET, DELAYED RELEASE ORAL DAILY
Qty: 30 TABLET | Refills: 3 | Status: SHIPPED | OUTPATIENT
Start: 2018-02-15 | End: 2018-03-15

## 2018-02-15 RX ORDER — PROMETHAZINE HYDROCHLORIDE 12.5 MG/1
12.5 TABLET ORAL EVERY 6 HOURS PRN
Qty: 30 TABLET | Refills: 1 | Status: SHIPPED | OUTPATIENT
Start: 2018-02-15 | End: 2018-03-12 | Stop reason: SDUPTHER

## 2018-02-15 RX ADMIN — SODIUM CHLORIDE, PRESERVATIVE FREE 500 UNITS: 5 INJECTION INTRAVENOUS at 08:26

## 2018-02-15 RX ADMIN — Medication 10 ML: at 08:26

## 2018-02-15 NOTE — PROGRESS NOTES
Fax rec from [employer] stating Thomas has started a PA for Promethazine. I have completed the request and submitted to [facility] through [email or web address]    Awaiting decision.

## 2018-02-15 NOTE — TELEPHONE ENCOUNTER
Dr. Collier called to inform Tamara Duvall that he was holding up Mrs. Pollo Toro at this time and wanted to clear with Dr. Mcdonald with hold her XRT fir the next two days. Information passed on to Dr. Mcdonald she was ok with Dr. Collier's request on holding XRT for today and tomorrow to resume on Monday. Information passed on to Juana at the [employer]

## 2018-02-15 NOTE — TELEPHONE ENCOUNTER
PT CALLING [facility] SHE THOUGHT SHE WOULD HAVE 3 RX'S CALLED IN TODAY. PER DR. VALENZUELA CALL IN CARAFATE SUSP 1G/10ML 1 G Q 6 H. RX CALLED TO PT'S PHARMACY AND PT MADE AWARE.

## 2018-02-15 NOTE — PROGRESS NOTES
Subjective  toxicity check with continued nausea, occasional diarrhea, and fatigue.    REASON FOR FOLLOW UP:   1.  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 Moon Bowen on 10/6/2017.  Primary stereotactic radiation therapy to the left upper lobe completed 12/15/2017  2.  Lung tumor is negative for PDL 1.  Also negative for ALK, EGFR, and ROS 1 mutations.  3.  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   4.  Esophageal adenocarcinoma was negative for HER-2/elizabeth overexpression but positive for PDL 1  5.  Initiation of combined radiation and low-dose weekly carboplatin and Taxol chemotherapy for treatment of esophageal adenocarcinoma.  First treatment delivered 1/11/2018.  6.  Development of thrombocytopenia and radiation esophagitis on the visit of 2/15/2018.    HISTORY OF PRESENT ILLNESS: Ms. Abad is a very pleasant 67 y.o. female with the above-mentioned history who is here today for toxicity check.  She has received 5 cycles of chemotherapy with weekly carboplatin and Taxol, with concurrent radiation.  She is due for her 6th cycle tomorrow.     Her blood counts today show that she is thrombocytopenic with a platelet count of 75,000.    She has been evaluated by both pulmonary and cardiology.  It appears that she may actually be a medical candidate for esophagectomy.    She is scheduled to undergo a follow-up CT scan of the chest later this month to follow-up on her lung cancer.  She should be finishing her radiation therapy around 2/23/2018.    Upon completion of her combined radiation and chemotherapy for esophageal cancer we may consider referring her for evaluation by Dr. Norwood for potential esophagectomy.  The other option would be to continue to observe her expectantly after radiation and chemotherapy and consider future Opdivo therapy if she demonstrates disease progression since her disease was PDL 1 positive.    On today's  visit, she is having much more problems with radiation esophagitis with complaints of nausea and pain with swallowing and severe reflux.  Her labs do not appear to be 2 abnormal.  Because of her current radiation toxicity symptoms and her myelosuppression we will plan to delay her chemotherapy until next week and will discuss with Dr. Mcdonald about possibly holding radiation until Monday, February 19.    History of Present Illness     Past Medical History:   Diagnosis Date   • Adenocarcinoma of lung 2017    HAD RADIATION    • Anxiety and depression    • Arthritis    • Benign parotid tumor 2012    removed by Dr Vickers told it was benign   • COPD (chronic obstructive pulmonary disease)    • Depression    • Diarrhea    • Early cataract    • Emphysema of lung    • Esophageal cancer 2017   • History of chronic pain    • History of colon polyps    • History of pneumonia    • Hyperlipidemia    • Joint pain    • Stroke       HEMATOLOGIC/ ONCOLOGIC HISTORY:  The patient is a 67 y.o. year old female who has a history of smoking and COPD.  She had an abnormal CT scan in February of this year showing a 3.5 cm mass in the left suprahilar area.  A repeat CT scan was performed at [facility] 7/13/2017 and show that the mass had increased in size from about 3.5 cm up to 6.5 cm.    She underwent navigational bronchoscopy and endobronchial ultrasound and biopsy with Dr. Moon Bowen on 10/6/2017.  The pathology on the biopsy showed well-differentiated adenocarcinoma.  She is referred to us now by her pulmonologist Dr. Daniel Oconnor for further staging evaluation and treatment recommendations.    She has not had a PET scan for staging.  She had a PDL 1 staining on her bronchoscopy specimen which was negative for PDL 1.  EGFR, ALK, and ROS 1 were also negative.    She is still smoking but is trying to quit.  She had pulmonary function tests performed on 10/25/2017 showing a DLCO of 33% and FEV1 37%.    She was referred  to the [facility] radiation [place] and completed primary radiation to the left upper lobe lung mass.  Because of abnormal uptake in the esophagus noted on her staging PET scan, she also underwent an EGD and biopsy which unfortunately revealed adenocarcinoma of the GE junction.  Molecular studies for HER-2/elizabeth and PDL 1 on the tumor tissue showed that she was PD L1 positive and HER-2/elizabeth negative.  We recommended combined weekly low dose carbo Taxol chemotherapy and radiation.     She returns back today due for her third dose of Carbo/Taxol.  Her only real complaint today is fatigue.  She also has had some mild nausea controlled by her oral antiemetics pills        Past Surgical History:   Procedure Laterality Date   • BRONCHOSCOPY     • CHOLECYSTECTOMY  1999   • COLON SURGERY  2015    COLON POLYPS   • COLONOSCOPY  04/13/2016    TA w/low grade dysplasia x 3, serrated adenoma x 2   • COLONOSCOPY  06/14/2016    TA w/high grade dysplasia   • ENDOSCOPY N/A 12/6/2017    Procedure: ESOPHAGOGASTRODUODENOSCOPY WITH BIOPSY;  Surgeon: Jayant Robles MD;  Location: [facility] ENDOSCOPY;  Service:    • FOOT SURGERY  10/2010    [place]   • HYSTERECTOMY     • SD INSJ TUNNELED CVC W/O SUBQ PORT/ AGE 3 YR/> Right 1/9/2018    Procedure: MEDIPORT PLACEMENT right;  Surgeon: Damaso Germain MD;  Location: [facility] HYBRID OR 18/19;  Service: Vascular   • SALIVARY GLAND SURGERY      PAROTID MASS REMOVED BENIGN   • SHOULDER ARTHROSCOPY Left 1995. 2001        ALLERGIES:    Allergies   Allergen Reactions   • Penicillins Hives         Review of Systems   Constitutional: Positive for fatigue. Negative for activity change, appetite change, fever and unexpected weight change.   HENT: Negative for hearing loss, nosebleeds, trouble swallowing and voice change.    Eyes: Negative for visual disturbance.   Respiratory: Negative for cough, shortness of breath and wheezing.    Cardiovascular: Negative for chest pain and palpitations.  "  Gastrointestinal: Positive for diarrhea, nausea and vomiting. Negative for abdominal pain.        Short bowel symptoms after partial colectomy.  Severe reflux and painful swallowing most likely due to radiation esophagitis.   Genitourinary: Negative for difficulty urinating, frequency, hematuria and urgency.   Musculoskeletal: Negative for back pain and neck pain.   Skin: Negative for rash.   Neurological: Negative for dizziness, seizures, syncope and headaches.   Hematological: Negative for adenopathy. Does not bruise/bleed easily.   Psychiatric/Behavioral: Negative for behavioral problems. The patient is not nervous/anxious.         Objective     Vitals:    02/15/18 0846   BP: 124/76   Pulse: 98   Resp: 16   Temp: 98.4 °F (36.9 °C)   TempSrc: Oral   SpO2: 93%   Weight: 56.3 kg (124 lb 3.2 oz)   Height: 167.5 cm (65.95\")   PainSc: 8  Comment: heartburn     Current Status 2/15/2018   ECOG score 0       Physical Exam   Constitutional: She is oriented to person, place, and time. She appears well-developed and well-nourished. No distress.   HENT:   Head: Normocephalic.   Eyes: Conjunctivae and EOM are normal. Pupils are equal, round, and reactive to light. No scleral icterus.   Neck: Normal range of motion. Neck supple. No JVD present. No thyromegaly present.   Cardiovascular: Normal rate and regular rhythm.  Exam reveals no gallop and no friction rub.    No murmur heard.  Pulmonary/Chest: Effort normal. No respiratory distress. She has decreased breath sounds. She has no wheezes. She has no rhonchi. She has no rales.   Right IJ Mediport the right chest wall.   Abdominal: Soft. She exhibits no distension and no mass. There is no tenderness.   Musculoskeletal: Normal range of motion. She exhibits no edema or deformity.   Neurological: She is alert and oriented to person, place, and time. She has normal reflexes. No cranial nerve deficit.   Skin: Skin is warm and dry. No rash noted. No erythema.   Psychiatric: She has " a normal mood and affect. Her behavior is normal. Judgment normal.         RECENT LABS:  Hematology WBC   Date Value Ref Range Status   02/15/2018 2.39 (L) 4.00 - 10.00 10*3/mm3 Final     RBC   Date Value Ref Range Status   02/15/2018 3.63 (L) 3.90 - 5.00 10*6/mm3 Final     Hemoglobin   Date Value Ref Range Status   02/15/2018 10.6 (L) 11.5 - 14.9 g/dL Final     Hematocrit   Date Value Ref Range Status   02/15/2018 31.2 (L) 34.0 - 45.0 % Final     Platelets   Date Value Ref Range Status   02/15/2018 75 (L) 150 - 375 10*3/mm3 Final     Comment:     plts mitchell=65        Lab Results   Component Value Date    GLUCOSE 150 (H) 02/15/2018    BUN 10 02/15/2018    CREATININE 0.78 02/15/2018    EGFRIFNONA 73 02/15/2018    BCR 12.8 02/15/2018    K 3.8 02/15/2018    CO2 25.8 02/15/2018    CALCIUM 9.2 02/15/2018    ALBUMIN 3.60 02/15/2018    LABIL2 1.5 02/15/2018    AST 13 02/15/2018    ALT 10 02/15/2018       PET 11/7/2017  IMPRESSION:  Left upper lobe lung carcinoma showing significant increase  in size since the preceding CT scan of the chest dated 07/13/2017. The  tumor demonstrates moderate hypermetabolism. There is no metabolic  evidence of metastatic disease within the neck, chest, abdomen or  pelvis. Heart, there are is a tiny left parotid nodule that demonstrates  intense hypermetabolism. Further evaluation with ultrasound-guided  biopsy is suggested. Also noted is localized soft tissue fullness at the  gastroesophageal junction extending focally into the cardia of the  stomach that demonstrates moderate hypermetabolism. Esophageal carcinoma  is suspected. Further evaluation with endoscopy is recommended.    PATHOLOGY FROM EGD 12/6/2017  Final Diagnosis   1.  GASTROESOPHAGEAL JUNCTION, BIOPSIES:             INVASIVE ADENOCARCINOMA.            NO IDENTIFIED INTESTINAL METAPLASIA.      COMMENT: In this specimen tumor is identified extending into submucosal tissue. Deeper extension cannot be excluded in these superficial  biopsies.   Her 2 Elizabeth negative  PD L1 positive ( > 1 ).      Assessment/Plan   1.  Well differentiated adenocarcinoma presumably from lung origin in a long-time smoker.  The biopsy was made from navigational bronchoscopy and endobronchial ultrasound at [facility] on 10/6/2017.  Staging PET scan showed no mediastinal adenopathy or obvious distant metastases.  She did have uptake in the lower esophagus which was evaluated with EGD and biopsy on 12/6/2017 and unfortunately was found to be adenocarcinoma at the GE junction.   · LungTumor tissue was negative for PDL 1 expression, EGFR, ALK, or ROS 1 mutations.   · She completed stereotactic radiation to the lung tumor 12/15/2017.  2.  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   · Esophageal adenocarcinoma was negative for HER-2/elizabeth overexpression but positive for PDL 1.  · Initiated combined radiation therapy to the esophagus along with weekly low-dose carboplatin and Taxol chemotherapy 1/11/18.  3. Emphysema due to smoking.  4. Placement of right internal jugular MediPort by Dr. Raphael Germain of vascular surgery 1/9/2018.  5.  She was not considered a candidate for lobectomy due to her poor pulmonary function but we plan to have her reevaluated by cardiology and pulmonology prior to determining if she is a potential candidate for esophageal resection.  She is currently scheduled to see Dr. Puneet Treviño of cardiology 2/9/2018.  She also has been seen by pulmonology but we are awaiting their formal report.  6.  History of benign right parotid tumor in 2012, surgically removed per Dr. Vickers.  Patient is noted to have very tiny nodule below the scar, new to her, but possibly just scar tissue. Encouraged her to watch closely, as will we, for any enlargement.  7.  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  She is currently using Lomotil and Imodium as needed for diarrhea control.  8.  Nausea due to  chemotherapy: The patient has been taking ondansetron 8 mg every 8 hours for nausea control, which is effective.  9.  Radiation esophagitis.      Plan  1.  As noted above, we will hold her chemotherapy today this was to be week #6 of carboplatin and Taxol.  This will be rescheduled for next week on 2/23/2018.  2.  We contacted Dr. Mcdonald at the [facility] and also requested that we hold radiation therapy until Monday, 2/19/2018.  3.  We will send in prescriptions for Protonix, Carafate, and Phenergan to try to help manage her symptoms more effectively.  4.  She will return to our office tomorrow but will not receive chemotherapy.  She instead will receive IV fluid hydration through her port.  5.  She'll return next week for nurse practitioner assessment and her sixth and final planned dose of carboplatin and Taxol chemotherapy if her symptoms and blood counts have improved.  6.  She is scheduled for follow-up CT scan of the chest on 2/20/2018.  7.  She also is scheduled for cardiac stress test at the end of the month prior to receiving a final determination from cardiology whether or not she is considered a potential candidate for esophagectomy surgery.  8.  Upon completion of combined radiation and chemotherapy we will plan to schedule assessment with Dr. Norwood for follow-up EGD and discussion of potential esophagectomy surgery.  We also discussed with the patient that the other option would be to observe her following completion of chemotherapy and radiation with plans to initiate palliative Opdivo therapy in the future if she develops further progression of esophageal cancer since her esophageal tumor was PDL 1 positive.  9.  M.D. follow-up at [facility] office will be scheduled in 2 weeks.  By that time she should've completed her radiation therapy, had her follow-up CT scan, and hopefully had her cardiac stress test and we can discuss where we go from there.

## 2018-02-16 ENCOUNTER — DOCUMENTATION (OUTPATIENT)
Dept: ONCOLOGY | Facility: CLINIC | Age: 68
End: 2018-02-16

## 2018-02-16 ENCOUNTER — INFUSION (OUTPATIENT)
Dept: ONCOLOGY | Facility: HOSPITAL | Age: 68
End: 2018-02-16

## 2018-02-16 VITALS
HEART RATE: 100 BPM | TEMPERATURE: 98.4 F | DIASTOLIC BLOOD PRESSURE: 75 MMHG | OXYGEN SATURATION: 96 % | BODY MASS INDEX: 20.31 KG/M2 | WEIGHT: 125.6 LBS | SYSTOLIC BLOOD PRESSURE: 142 MMHG

## 2018-02-16 DIAGNOSIS — T66.XXXA RADIATION ESOPHAGITIS: Primary | ICD-10-CM

## 2018-02-16 DIAGNOSIS — K20.80 RADIATION ESOPHAGITIS: Primary | ICD-10-CM

## 2018-02-16 DIAGNOSIS — C15.9 ESOPHAGEAL CARCINOMA (HCC): ICD-10-CM

## 2018-02-16 PROCEDURE — 96375 TX/PRO/DX INJ NEW DRUG ADDON: CPT | Performed by: NURSE PRACTITIONER

## 2018-02-16 PROCEDURE — 96360 HYDRATION IV INFUSION INIT: CPT | Performed by: NURSE PRACTITIONER

## 2018-02-16 PROCEDURE — 96361 HYDRATE IV INFUSION ADD-ON: CPT | Performed by: NURSE PRACTITIONER

## 2018-02-16 RX ORDER — FAMOTIDINE 10 MG/ML
20 INJECTION, SOLUTION INTRAVENOUS 2 TIMES DAILY
Status: DISCONTINUED | OUTPATIENT
Start: 2018-02-16 | End: 2018-02-16 | Stop reason: HOSPADM

## 2018-02-16 RX ADMIN — FAMOTIDINE 20 MG: 10 INJECTION, SOLUTION INTRAVENOUS at 12:17

## 2018-02-16 RX ADMIN — SODIUM CHLORIDE 1000 ML: 9 INJECTION, SOLUTION INTRAVENOUS at 10:18

## 2018-02-16 NOTE — PROGRESS NOTES
"Fax rec from [facility]-They have approved the Promethazine from 2/15/18 to \"until further notice\".  Thomas notified [phone]  "

## 2018-02-19 PROCEDURE — 77386: CPT | Performed by: RADIOLOGY

## 2018-02-19 PROCEDURE — 77014 CHG CT GUIDANCE RADIATION THERAPY FLDS PLACEMENT: CPT | Performed by: RADIOLOGY

## 2018-02-19 PROCEDURE — 77386 CHG INTENSITY MODULATED RADIATION TX DLVR COMPLEX: CPT | Performed by: RADIOLOGY

## 2018-02-20 ENCOUNTER — TELEPHONE (OUTPATIENT)
Dept: RADIATION ONCOLOGY | Facility: HOSPITAL | Age: 68
End: 2018-02-20

## 2018-02-20 ENCOUNTER — APPOINTMENT (OUTPATIENT)
Dept: CT IMAGING | Facility: HOSPITAL | Age: 68
End: 2018-02-20
Attending: RADIOLOGY

## 2018-02-20 DIAGNOSIS — C15.9 ESOPHAGEAL CARCINOMA (HCC): ICD-10-CM

## 2018-02-20 NOTE — TELEPHONE ENCOUNTER
Call taken by Radiation Therapist Immanuel Guzmán. 2/20/18  Pt. cancelled due to nausea and vomitting. -gm-

## 2018-02-21 PROCEDURE — 77386 CHG INTENSITY MODULATED RADIATION TX DLVR COMPLEX: CPT | Performed by: RADIOLOGY

## 2018-02-21 PROCEDURE — 77386: CPT | Performed by: RADIOLOGY

## 2018-02-21 PROCEDURE — 77014 CHG CT GUIDANCE RADIATION THERAPY FLDS PLACEMENT: CPT | Performed by: RADIOLOGY

## 2018-02-21 PROCEDURE — 77338 DESIGN MLC DEVICE FOR IMRT: CPT | Performed by: RADIOLOGY

## 2018-02-22 DIAGNOSIS — C15.9 ESOPHAGEAL CARCINOMA (HCC): ICD-10-CM

## 2018-02-22 PROCEDURE — 77386 CHG INTENSITY MODULATED RADIATION TX DLVR COMPLEX: CPT | Performed by: RADIOLOGY

## 2018-02-22 PROCEDURE — 77014 CHG CT GUIDANCE RADIATION THERAPY FLDS PLACEMENT: CPT | Performed by: RADIOLOGY

## 2018-02-22 PROCEDURE — 77386: CPT | Performed by: RADIOLOGY

## 2018-02-23 ENCOUNTER — INFUSION (OUTPATIENT)
Dept: ONCOLOGY | Facility: HOSPITAL | Age: 68
End: 2018-02-23

## 2018-02-23 ENCOUNTER — OFFICE VISIT (OUTPATIENT)
Dept: ONCOLOGY | Facility: CLINIC | Age: 68
End: 2018-02-23

## 2018-02-23 VITALS
RESPIRATION RATE: 18 BRPM | SYSTOLIC BLOOD PRESSURE: 90 MMHG | OXYGEN SATURATION: 99 % | HEART RATE: 100 BPM | BODY MASS INDEX: 19.44 KG/M2 | HEIGHT: 66 IN | DIASTOLIC BLOOD PRESSURE: 70 MMHG | TEMPERATURE: 98.3 F | WEIGHT: 121 LBS

## 2018-02-23 DIAGNOSIS — K20.80 RADIATION ESOPHAGITIS: ICD-10-CM

## 2018-02-23 DIAGNOSIS — R11.2 CHEMOTHERAPY INDUCED NAUSEA AND VOMITING: ICD-10-CM

## 2018-02-23 DIAGNOSIS — T45.1X5A CHEMOTHERAPY INDUCED NAUSEA AND VOMITING: ICD-10-CM

## 2018-02-23 DIAGNOSIS — T66.XXXA RADIATION ESOPHAGITIS: ICD-10-CM

## 2018-02-23 DIAGNOSIS — C15.9 ESOPHAGEAL CARCINOMA (HCC): ICD-10-CM

## 2018-02-23 DIAGNOSIS — C34.92 ADENOCARCINOMA OF LEFT LUNG (HCC): Primary | ICD-10-CM

## 2018-02-23 LAB
ALBUMIN SERPL-MCNC: 3.6 G/DL (ref 3.5–5.2)
ALBUMIN/GLOB SERPL: 1.4 G/DL (ref 1.1–2.4)
ALP SERPL-CCNC: 57 U/L (ref 38–116)
ALT SERPL W P-5'-P-CCNC: 9 U/L (ref 0–33)
ANION GAP SERPL CALCULATED.3IONS-SCNC: 13.2 MMOL/L
AST SERPL-CCNC: 16 U/L (ref 0–32)
BASOPHILS # BLD AUTO: 0.02 10*3/MM3 (ref 0–0.1)
BASOPHILS NFR BLD AUTO: 0.8 % (ref 0–1.1)
BILIRUB SERPL-MCNC: 0.3 MG/DL (ref 0.1–1.2)
BUN BLD-MCNC: 7 MG/DL (ref 6–20)
BUN/CREAT SERPL: 8.3 (ref 7.3–30)
CALCIUM SPEC-SCNC: 9.2 MG/DL (ref 8.5–10.2)
CHLORIDE SERPL-SCNC: 98 MMOL/L (ref 98–107)
CO2 SERPL-SCNC: 26.8 MMOL/L (ref 22–29)
CREAT BLD-MCNC: 0.84 MG/DL (ref 0.6–1.1)
DEPRECATED RDW RBC AUTO: 47.1 FL (ref 37–49)
EOSINOPHIL # BLD AUTO: 0.01 10*3/MM3 (ref 0–0.36)
EOSINOPHIL NFR BLD AUTO: 0.4 % (ref 1–5)
ERYTHROCYTE [DISTWIDTH] IN BLOOD BY AUTOMATED COUNT: 15.9 % (ref 11.7–14.5)
GFR SERPL CREATININE-BSD FRML MDRD: 67 ML/MIN/1.73
GLOBULIN UR ELPH-MCNC: 2.6 GM/DL (ref 1.8–3.5)
GLUCOSE BLD-MCNC: 200 MG/DL (ref 74–124)
HCT VFR BLD AUTO: 32.9 % (ref 34–45)
HGB BLD-MCNC: 11.4 G/DL (ref 11.5–14.9)
IMM GRANULOCYTES # BLD: 0.04 10*3/MM3 (ref 0–0.03)
IMM GRANULOCYTES NFR BLD: 1.5 % (ref 0–0.5)
LYMPHOCYTES # BLD AUTO: 0.33 10*3/MM3 (ref 1–3.5)
LYMPHOCYTES NFR BLD AUTO: 12.6 % (ref 20–49)
MCH RBC QN AUTO: 30 PG (ref 27–33)
MCHC RBC AUTO-ENTMCNC: 34.7 G/DL (ref 32–35)
MCV RBC AUTO: 86.6 FL (ref 83–97)
MONOCYTES # BLD AUTO: 0.65 10*3/MM3 (ref 0.25–0.8)
MONOCYTES NFR BLD AUTO: 24.9 % (ref 4–12)
NEUTROPHILS # BLD AUTO: 1.56 10*3/MM3 (ref 1.5–7)
NEUTROPHILS NFR BLD AUTO: 59.8 % (ref 39–75)
NRBC BLD MANUAL-RTO: 0 /100 WBC (ref 0–0)
PLATELET # BLD AUTO: 161 10*3/MM3 (ref 150–375)
PMV BLD AUTO: 9.5 FL (ref 8.9–12.1)
POTASSIUM BLD-SCNC: 3.5 MMOL/L (ref 3.5–4.7)
PROT SERPL-MCNC: 6.2 G/DL (ref 6.3–8)
RBC # BLD AUTO: 3.8 10*6/MM3 (ref 3.9–5)
SODIUM BLD-SCNC: 138 MMOL/L (ref 134–145)
WBC NRBC COR # BLD: 2.61 10*3/MM3 (ref 4–10)

## 2018-02-23 PROCEDURE — 85025 COMPLETE CBC W/AUTO DIFF WBC: CPT

## 2018-02-23 PROCEDURE — 96361 HYDRATE IV INFUSION ADD-ON: CPT | Performed by: NURSE PRACTITIONER

## 2018-02-23 PROCEDURE — 99213 OFFICE O/P EST LOW 20 MIN: CPT | Performed by: NURSE PRACTITIONER

## 2018-02-23 PROCEDURE — 25010000002 PALONOSETRON PER 25 MCG: Performed by: NURSE PRACTITIONER

## 2018-02-23 PROCEDURE — 96374 THER/PROPH/DIAG INJ IV PUSH: CPT | Performed by: NURSE PRACTITIONER

## 2018-02-23 PROCEDURE — 80053 COMPREHEN METABOLIC PANEL: CPT

## 2018-02-23 RX ORDER — PALONOSETRON 0.05 MG/ML
0.25 INJECTION, SOLUTION INTRAVENOUS ONCE
Status: COMPLETED | OUTPATIENT
Start: 2018-02-23 | End: 2018-02-23

## 2018-02-23 RX ORDER — SODIUM CHLORIDE 9 MG/ML
500 INJECTION, SOLUTION INTRAVENOUS ONCE
Status: COMPLETED | OUTPATIENT
Start: 2018-02-23 | End: 2018-02-23

## 2018-02-23 RX ADMIN — PALONOSETRON HYDROCHLORIDE 0.25 MG: 0.25 INJECTION INTRAVENOUS at 10:27

## 2018-02-23 RX ADMIN — SODIUM CHLORIDE 500 ML: 9 INJECTION, SOLUTION INTRAVENOUS at 10:26

## 2018-02-23 RX ADMIN — SODIUM CHLORIDE 500 ML: 9 INJECTION, SOLUTION INTRAVENOUS at 11:27

## 2018-02-23 NOTE — PROGRESS NOTES
Subjective  toxicity check with continued nausea, occasional diarrhea, and fatigue.    REASON FOR FOLLOW UP:   1.  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 Moon Bowen on 10/6/2017.  Primary stereotactic radiation therapy to the left upper lobe completed 12/15/2017  2.  Lung tumor is negative for PDL 1.  Also negative for ALK, EGFR, and ROS 1 mutations.  3.  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   4.  Esophageal adenocarcinoma was negative for HER-2/elizabeth overexpression but positive for PDL 1  5.  Initiation of combined radiation and low-dose weekly carboplatin and Taxol chemotherapy for treatment of esophageal adenocarcinoma.  First treatment delivered 1/11/2018.  6.  Development of thrombocytopenia and radiation esophagitis on the visit of 2/15/2018.    HISTORY OF PRESENT ILLNESS: Ms. Abad is a very pleasant 67 y.o. female with the above-mentioned history who is here today for toxicity check.  She has received a total of 5 cycles of chemotherapy with weekly carboplatin and Taxol, with concurrent radiation.  She was due last week for her sixth cycle, but it was delayed due to esophagitis, myelosuppression, nausea and vomiting.  She is here today for reevaluation in anticipation of proceeding with her sixth cycle of weekly carboplatin and Taxol.    She comes in today reporting weakness and persistent issues with nausea, vomiting, and diarrhea.  She states she is just not felt well for about 2 weeks.  She is having difficulty eating and keeping foods down  due to the vomiting.  She's been taking Phenergan for nausea.  She continues to have issues with diarrhea, particularly due to her previous colectomy. She is taking Imodium as needed for this.  She has had some weight loss over the past week due to decreased oral intake.  She has been dizzy a times, denies syncopal episodes or falls.  She states that she may have had a fever  yesterday, but is not sure.  She continues to have some esophagitis symptoms and is taking Carafate.    She was supposed to have a CT scan of her chest earlier this week, but states that it was canceled by Dr. Mcdonald for now.  She is scheduled for a stress test on Monday.     Upon completion of her combined radiation and chemotherapy for esophageal cancer we may consider referring her for evaluation by Dr. Norwood for potential esophagectomy.  The other option would be to continue to observe her expectantly after radiation and chemotherapy and consider future Opdivo therapy if she demonstrates disease progression since her disease was PDL 1 positive.    History of Present Illness     Past Medical History:   Diagnosis Date   • Adenocarcinoma of lung 2017    HAD RADIATION    • Anxiety and depression    • Arthritis    • Benign parotid tumor 2012    removed by Dr Vickers told it was benign   • COPD (chronic obstructive pulmonary disease)    • Depression    • Diarrhea    • Early cataract    • Emphysema of lung    • Esophageal cancer 2017   • History of chronic pain    • History of colon polyps    • History of pneumonia    • Hyperlipidemia    • Joint pain    • Stroke       HEMATOLOGIC/ ONCOLOGIC HISTORY:  The patient is a 67 y.o. year old female who has a history of smoking and COPD.  She had an abnormal CT scan in February of this year showing a 3.5 cm mass in the left suprahilar area.  A repeat CT scan was performed at [facility] 7/13/2017 and show that the mass had increased in size from about 3.5 cm up to 6.5 cm.    She underwent navigational bronchoscopy and endobronchial ultrasound and biopsy with Dr. Moon Bowen on 10/6/2017.  The pathology on the biopsy showed well-differentiated adenocarcinoma.  She is referred to us now by her pulmonologist Dr. Daniel Oconnor for further staging evaluation and treatment recommendations.    She has not had a PET scan for staging.  She had a PDL 1 staining on her  bronchoscopy specimen which was negative for PDL 1.  EGFR, ALK, and ROS 1 were also negative.    She is still smoking but is trying to quit.  She had pulmonary function tests performed on 10/25/2017 showing a DLCO of 33% and FEV1 37%.    She was referred to the [facility] and completed primary radiation to the left upper lobe lung mass.  Because of abnormal uptake in the esophagus noted on her staging PET scan, she also underwent an EGD and biopsy which unfortunately revealed adenocarcinoma of the GE junction.  Molecular studies for HER-2/elizabeth and PDL 1 on the tumor tissue showed that she was PD L1 positive and HER-2/elizabeth negative.  We recommended combined weekly low dose carbo Taxol chemotherapy and radiation.     She returns back today due for her third dose of Carbo/Taxol.  Her only real complaint today is fatigue.  She also has had some mild nausea controlled by her oral antiemetics pills        Past Surgical History:   Procedure Laterality Date   • BRONCHOSCOPY     • CHOLECYSTECTOMY  1999   • COLON SURGERY  2015    COLON POLYPS   • COLONOSCOPY  04/13/2016    TA w/low grade dysplasia x 3, serrated adenoma x 2   • COLONOSCOPY  06/14/2016    TA w/high grade dysplasia   • ENDOSCOPY N/A 12/6/2017    Procedure: ESOPHAGOGASTRODUODENOSCOPY WITH BIOPSY;  Surgeon: Jayant Robles MD;  Location: [facility] ENDOSCOPY;  Service:    • FOOT SURGERY  10/2010    [place]   • HYSTERECTOMY     • OR INSJ TUNNELED CVC W/O SUBQ PORT/ AGE 3 YR/> Right 1/9/2018    Procedure: MEDIPORT PLACEMENT right;  Surgeon: Damaso Germain MD;  Location: [facility] HYBRID OR 18/19;  Service: Vascular   • SALIVARY GLAND SURGERY      PAROTID MASS REMOVED BENIGN   • SHOULDER ARTHROSCOPY Left 1995. 2001        ALLERGIES:    Allergies   Allergen Reactions   • Penicillins Hives         Review of Systems   Constitutional: Positive for fatigue. Negative for activity change, appetite change, fever and unexpected weight change.   HENT: Negative  "for hearing loss, nosebleeds, trouble swallowing and voice change.    Eyes: Negative for visual disturbance.   Respiratory: Negative for cough, shortness of breath and wheezing.    Cardiovascular: Negative for chest pain and palpitations.   Gastrointestinal: Positive for diarrhea, nausea and vomiting. Negative for abdominal pain.        Short bowel symptoms after partial colectomy.  Severe reflux and painful swallowing most likely due to radiation esophagitis.   Genitourinary: Negative for difficulty urinating, frequency, hematuria and urgency.   Musculoskeletal: Negative for back pain and neck pain.   Skin: Negative for rash.   Neurological: Positive for weakness. Negative for dizziness, seizures, syncope and headaches.   Hematological: Negative for adenopathy. Does not bruise/bleed easily.   Psychiatric/Behavioral: Negative for behavioral problems. The patient is not nervous/anxious.         Objective     Vitals:    02/23/18 0929   BP: 90/70   Pulse: 100   Resp: 18   Temp: 98.3 °F (36.8 °C)   TempSrc: Oral   SpO2: 99%   Weight: 54.9 kg (121 lb)   Height: 167.5 cm (65.95\")   PainSc: 0-No pain     Current Status 2/23/2018   ECOG score 0       Physical Exam   Constitutional: She is oriented to person, place, and time. She appears well-developed. No distress.   Weak appearing   HENT:   Head: Normocephalic.   Eyes: Conjunctivae and EOM are normal. Pupils are equal, round, and reactive to light. No scleral icterus.   Neck: Normal range of motion. Neck supple. No JVD present. No thyromegaly present.   Cardiovascular: Normal rate and regular rhythm.  Exam reveals no gallop and no friction rub.    No murmur heard.  Pulmonary/Chest: Effort normal. No respiratory distress. She has decreased breath sounds. She has no wheezes. She has no rhonchi. She has no rales.   Right IJ Mediport the right chest wall.   Abdominal: Soft. She exhibits no distension and no mass. There is no tenderness.   Musculoskeletal: Normal range of " motion. She exhibits no edema or deformity.   Neurological: She is alert and oriented to person, place, and time. She has normal reflexes. No cranial nerve deficit.   Skin: Skin is warm and dry. No rash noted. No erythema.   Psychiatric: She has a normal mood and affect. Her behavior is normal. Judgment normal.         RECENT LABS:  Hematology WBC   Date Value Ref Range Status   02/23/2018 2.61 (L) 4.00 - 10.00 10*3/mm3 Final     RBC   Date Value Ref Range Status   02/23/2018 3.80 (L) 3.90 - 5.00 10*6/mm3 Final     Hemoglobin   Date Value Ref Range Status   02/23/2018 11.4 (L) 11.5 - 14.9 g/dL Final     Hematocrit   Date Value Ref Range Status   02/23/2018 32.9 (L) 34.0 - 45.0 % Final     Platelets   Date Value Ref Range Status   02/23/2018 161 150 - 375 10*3/mm3 Final        Lab Results   Component Value Date    GLUCOSE 200 (H) 02/23/2018    BUN 7 02/23/2018    CREATININE 0.84 02/23/2018    EGFRIFNONA 67 02/23/2018    BCR 8.3 02/23/2018    K 3.5 02/23/2018    CO2 26.8 02/23/2018    CALCIUM 9.2 02/23/2018    ALBUMIN 3.60 02/23/2018    LABIL2 1.4 02/23/2018    AST 16 02/23/2018    ALT 9 02/23/2018       PET 11/7/2017  IMPRESSION:  Left upper lobe lung carcinoma showing significant increase  in size since the preceding CT scan of the chest dated 07/13/2017. The  tumor demonstrates moderate hypermetabolism. There is no metabolic  evidence of metastatic disease within the neck, chest, abdomen or  pelvis. Heart, there are is a tiny left parotid nodule that demonstrates  intense hypermetabolism. Further evaluation with ultrasound-guided  biopsy is suggested. Also noted is localized soft tissue fullness at the  gastroesophageal junction extending focally into the cardia of the  stomach that demonstrates moderate hypermetabolism. Esophageal carcinoma  is suspected. Further evaluation with endoscopy is recommended.    PATHOLOGY FROM EGD 12/6/2017  Final Diagnosis   1.  GASTROESOPHAGEAL JUNCTION, BIOPSIES:              INVASIVE ADENOCARCINOMA.            NO IDENTIFIED INTESTINAL METAPLASIA.      COMMENT: In this specimen tumor is identified extending into submucosal tissue. Deeper extension cannot be excluded in these superficial biopsies.   Her 2 Elizabeth negative  PD L1 positive ( > 1 ).      Assessment/Plan   1.  Well differentiated adenocarcinoma presumably from lung origin in a long-time smoker.  The biopsy was made from navigational bronchoscopy and endobronchial ultrasound at [place] on 10/6/2017.  Staging PET scan showed no mediastinal adenopathy or obvious distant metastases.  She did have uptake in the lower esophagus which was evaluated with EGD and biopsy on 12/6/2017 and unfortunately was found to be adenocarcinoma at the GE junction.   · LungTumor tissue was negative for PDL 1 expression, EGFR, ALK, or ROS 1 mutations.   · She completed stereotactic radiation to the lung tumor 12/15/2017.  2.  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   · Esophageal adenocarcinoma was negative for HER-2/elizabeth overexpression but positive for PDL 1.  · Initiated combined radiation therapy to the esophagus along with weekly low-dose carboplatin and Taxol chemotherapy 1/11/18.  · 2/23/2018 patient presents for her sixth cycle of weekly carboplatin and Taxol.  This is actually delayed because of issues with weakness, nausea, vomiting and myelosuppression.  Unfortunately she is not improved symptomatically.  Her platelet count has improved.  She has had some weight loss, continues to struggle with nausea and vomiting, and is hypotensive today.  I plan on holding her sixth cycle of carbotaxol today due to her multiple issues.  3. Emphysema due to smoking.  4. Placement of right internal jugular MediPort by Dr. Raphael Germain of vascular surgery 1/9/2018.  5.  She was not considered a candidate for lobectomy due to her poor pulmonary function but we plan to have her reevaluated by cardiology and pulmonology prior to  determining if she is a potential candidate for esophageal resection.  She is currently scheduled to see Dr. Puneet Treviño of cardiology 2/9/2018.  She also has been seen by pulmonology but we are awaiting their formal report.  6.  History of benign right parotid tumor in 2012, surgically removed per Dr. Vickers.  Patient is noted to have very tiny nodule below the scar, new to her, but possibly just scar tissue. Encouraged her to watch closely, as will we, for any enlargement.  7.  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  She is currently using Lomotil and Imodium as needed for diarrhea control.  8.  Nausea due to chemotherapy: Patient is currently taking Phenergan for nausea.  She continues to have issues with nausea, vomiting, and diarrhea.  She is now extremely weak.  9.  Radiation esophagitis.      Plan  1.  Hold chemotherapy cycle 6 today.  2.  One liter IV normal saline today and IV antiemetics.  I encouraged her to continue her oral antiemetics at home on a scheduled basis, and try to increase her fluid intake.  3.  Return on 2/28/2018 for follow-up with Dr. Collier for reevaluation.    4.  Continue Phenergan, Protonix, and Carafate.  5.  She also is scheduled for cardiac stress test next week prior to receiving a final determination from cardiology whether or not she is considered a potential candidate for esophagectomy surgery.  6.  Upon completion of combined radiation and chemotherapy we will plan to schedule assessment with Dr. Norwood for follow-up EGD and discussion of potential esophagectomy surgery.  We also discussed with the patient that the other option would be to observe her following completion of chemotherapy and radiation with plans to initiate palliative Opdivo therapy in the future if she develops further progression of esophageal cancer since her esophageal tumor was PDL 1 positive.  8.  M.D. follow-up at [facility] office will be scheduled in 2 weeks.   By that time she should've completed her radiation therapy, had her follow-up CT scan, and hopefully had her cardiac stress test and we can discuss where we go from there.

## 2018-02-23 NOTE — PROGRESS NOTES
"Pt to infusion area for scheduled treatment . Pt reports \"feeling terrible, unable to eat , constant nausea and some vomiting.  everytime I try to eat something it comes back up\"  Pt seen by Chiqui SCHAFER, orders received for IV aloxi and 1 Liter IV\"S ..  Labs noted and also noted per JORDEN Mejia.  Pt will not receive chemo today , will return next week for MD visit and re-evaluation.   Pt instructed to call if symptoms worsen. Pt V/U  Pharmacy, Melani castrejon. Informed that patient not receiving treatment today.  "

## 2018-02-26 ENCOUNTER — HOSPITAL ENCOUNTER (OUTPATIENT)
Dept: CARDIOLOGY | Facility: HOSPITAL | Age: 68
Discharge: HOME | End: 2018-02-26
Attending: INTERNAL MEDICINE

## 2018-02-26 ENCOUNTER — RADIATION ONCOLOGY WEEKLY ASSESSMENT (OUTPATIENT)
Dept: RADIATION ONCOLOGY | Facility: HOSPITAL | Age: 68
End: 2018-02-26

## 2018-02-26 VITALS
DIASTOLIC BLOOD PRESSURE: 78 MMHG | HEART RATE: 98 BPM | TEMPERATURE: 97 F | WEIGHT: 121 LBS | OXYGEN SATURATION: 99 % | RESPIRATION RATE: 16 BRPM | SYSTOLIC BLOOD PRESSURE: 118 MMHG | BODY MASS INDEX: 19.56 KG/M2

## 2018-02-26 DIAGNOSIS — C34.92 ADENOCARCINOMA OF LEFT LUNG (HCC): ICD-10-CM

## 2018-02-26 DIAGNOSIS — C15.9 ESOPHAGEAL CARCINOMA (HCC): ICD-10-CM

## 2018-02-26 DIAGNOSIS — C15.9 ESOPHAGEAL CARCINOMA (HCC): Primary | ICD-10-CM

## 2018-02-26 DIAGNOSIS — Z01.810 PREOP CARDIOVASCULAR EXAM: ICD-10-CM

## 2018-02-26 DIAGNOSIS — R06.09 DOE (DYSPNEA ON EXERTION): ICD-10-CM

## 2018-02-26 PROCEDURE — FACE2FACE: Performed by: RADIOLOGY

## 2018-02-26 NOTE — PROGRESS NOTES
Physician Weekly Management Note    Diagnosis:   Esophageal Ca  Reason for Visit:  Radiation (26/30)    Concurrent Chemo:   Yes    Notes on Treatment course, Films, Medical progress and Plan:  Still struggling with po intake. Vitals stable, weight unchanged but struggling to get food down. Encouraged and reviewed tactics and foods to try. I recommended we hold RT until Wednesday, give her 2 more days for the stomach to settle. She will see Dr. Collier, have blood work and then hopefully we can finish last 4 treatments - with a weekend in between.     ROS - Other than as listed above, as follows:  Constitutional - Normal - no complaints of fatigue, denies lack of appetite, fever, night sweats or change in weight.  Neck - Normal - denies neck masses, muscle weakness, neck pain, decreased range of motion or swelling.  Cardiovascular - Normal - denies arrhythmias, chest pain, dyspnea, edema, orthopnea or palpitations.  Respiratory - Normal - denies cough, dyspnea, hemoptysis, hiccoughs, pleuritic chest pain or wheezing.  Gastrointestinal - Normal - no complaints of constipation, abdominal pain, diarrhea, heartburn/dyspepsia, hematemesis, hemorrhoids, melena or GI bleeding, nausea, pain or cramping or vomiting.    PHYSICAL EXAM - Other than listed above, as follows:  Vitals:    02/26/18 1022   BP: 118/78   Pulse: 98   Resp: 16   Temp: 97 °F (36.1 °C)   TempSrc: Oral   SpO2: 99%   Weight: 54.9 kg (121 lb)   PainSc: 0-No pain       Constitutional - Normal - no evidence of impaired alertness, inadequate appearance, premature or advanced chronologic age, uncooperativeness, altered mood, affect or disorientation.  Neck - Normal - no evidence of tender or enlarged lymph nodes, neck abnormalities, restricted range of motion or enlarged thyroid.  Chest - Normal - no evidence of chest abnormalities, tender or enlarged lymph nodes.  Respiratory - Normal - no evidence of abnormal breat sounds, chest abnormalities on palpation and  "chest abnormalities on percussion and normal breath sounds.  Hematologic/Lymphatic - Normal - no evidence of tender or enlarged axillary lymph nodes nor tender or enlarged neck nodes.    Performance Status: (1) Restricted in physically strenuous activity, ambulatory and able to do work of light nature    Problem added:  No problems updated.  Medications added: No orders of the defined types were placed in this encounter.    Ancillary referrals made: No orders of the defined types were placed in this encounter.      Technical aspects reviewed:  Weekly OBI approved if applicable?  Yes  Weekly port films approved?  Yes  Change requests noted if applicable?  Yes  Patient setup and plan reviewed?  Yes    Chart Reviewed:  Continue current treatment plan?  Yes  Treatment plan change requested?  No    I have reviewed and marked as \"reviewed\" the current medications, allergies and problem list in the patients EMR.    I have reviewed the patient's medical, surgical  history in detail, reviewed any pertinent lab work  and updated the computerized patient record if needed.    Patient's Care Team:  Patient Care Team:  Mary Taylor MD as PCP - General (Family Medicine)  Bienvenido Collier MD as Consulting Physician (Hematology and Oncology)  Daniel Oconnor MD as Referring Physician (Pulmonary Disease)  Shazia Mcdonald MD as Consulting Physician (Radiation Oncology)    Seen and approved by:  Shazia Mcdonald MD, 01/19/2018  "

## 2018-02-28 ENCOUNTER — OFFICE VISIT (OUTPATIENT)
Dept: ONCOLOGY | Facility: CLINIC | Age: 68
End: 2018-02-28

## 2018-02-28 ENCOUNTER — INFUSION (OUTPATIENT)
Dept: ONCOLOGY | Facility: HOSPITAL | Age: 68
End: 2018-02-28

## 2018-02-28 VITALS
DIASTOLIC BLOOD PRESSURE: 62 MMHG | SYSTOLIC BLOOD PRESSURE: 110 MMHG | WEIGHT: 121.4 LBS | TEMPERATURE: 98.8 F | HEART RATE: 94 BPM | BODY MASS INDEX: 19.51 KG/M2 | OXYGEN SATURATION: 95 % | HEIGHT: 66 IN | RESPIRATION RATE: 18 BRPM

## 2018-02-28 DIAGNOSIS — C15.9 ESOPHAGEAL CARCINOMA (HCC): ICD-10-CM

## 2018-02-28 DIAGNOSIS — Z45.2 FITTING AND ADJUSTMENT OF VASCULAR CATHETER: ICD-10-CM

## 2018-02-28 DIAGNOSIS — C34.92 ADENOCARCINOMA OF LEFT LUNG (HCC): Primary | ICD-10-CM

## 2018-02-28 DIAGNOSIS — C15.9 ESOPHAGEAL CARCINOMA (HCC): Primary | ICD-10-CM

## 2018-02-28 LAB
ALBUMIN SERPL-MCNC: 3.6 G/DL (ref 3.5–5.2)
ALBUMIN/GLOB SERPL: 1.4 G/DL (ref 1.1–2.4)
ALP SERPL-CCNC: 56 U/L (ref 38–116)
ALT SERPL W P-5'-P-CCNC: 9 U/L (ref 0–33)
ANION GAP SERPL CALCULATED.3IONS-SCNC: 12.2 MMOL/L
AST SERPL-CCNC: 15 U/L (ref 0–32)
BASOPHILS # BLD AUTO: 0.02 10*3/MM3 (ref 0–0.1)
BASOPHILS NFR BLD AUTO: 0.5 % (ref 0–1.1)
BILIRUB SERPL-MCNC: 0.2 MG/DL (ref 0.1–1.2)
BUN BLD-MCNC: 10 MG/DL (ref 6–20)
BUN/CREAT SERPL: 13.5 (ref 7.3–30)
CALCIUM SPEC-SCNC: 9.4 MG/DL (ref 8.5–10.2)
CHLORIDE SERPL-SCNC: 98 MMOL/L (ref 98–107)
CO2 SERPL-SCNC: 27.8 MMOL/L (ref 22–29)
CREAT BLD-MCNC: 0.74 MG/DL (ref 0.6–1.1)
DEPRECATED RDW RBC AUTO: 52.7 FL (ref 37–49)
EOSINOPHIL # BLD AUTO: 0.03 10*3/MM3 (ref 0–0.36)
EOSINOPHIL NFR BLD AUTO: 0.8 % (ref 1–5)
ERYTHROCYTE [DISTWIDTH] IN BLOOD BY AUTOMATED COUNT: 16.9 % (ref 11.7–14.5)
GFR SERPL CREATININE-BSD FRML MDRD: 78 ML/MIN/1.73
GLOBULIN UR ELPH-MCNC: 2.6 GM/DL (ref 1.8–3.5)
GLUCOSE BLD-MCNC: 153 MG/DL (ref 74–124)
HCT VFR BLD AUTO: 33.3 % (ref 34–45)
HGB BLD-MCNC: 11.3 G/DL (ref 11.5–14.9)
IMM GRANULOCYTES # BLD: 0.05 10*3/MM3 (ref 0–0.03)
IMM GRANULOCYTES NFR BLD: 1.4 % (ref 0–0.5)
LYMPHOCYTES # BLD AUTO: 0.37 10*3/MM3 (ref 1–3.5)
LYMPHOCYTES NFR BLD AUTO: 10.1 % (ref 20–49)
MCH RBC QN AUTO: 29.7 PG (ref 27–33)
MCHC RBC AUTO-ENTMCNC: 33.9 G/DL (ref 32–35)
MCV RBC AUTO: 87.6 FL (ref 83–97)
MONOCYTES # BLD AUTO: 0.87 10*3/MM3 (ref 0.25–0.8)
MONOCYTES NFR BLD AUTO: 23.8 % (ref 4–12)
NEUTROPHILS # BLD AUTO: 2.31 10*3/MM3 (ref 1.5–7)
NEUTROPHILS NFR BLD AUTO: 63.4 % (ref 39–75)
NRBC BLD MANUAL-RTO: 0 /100 WBC (ref 0–0)
PLATELET # BLD AUTO: 240 10*3/MM3 (ref 150–375)
PMV BLD AUTO: 9.5 FL (ref 8.9–12.1)
POTASSIUM BLD-SCNC: 3.7 MMOL/L (ref 3.5–4.7)
PROT SERPL-MCNC: 6.2 G/DL (ref 6.3–8)
RBC # BLD AUTO: 3.8 10*6/MM3 (ref 3.9–5)
SODIUM BLD-SCNC: 138 MMOL/L (ref 134–145)
WBC NRBC COR # BLD: 3.65 10*3/MM3 (ref 4–10)

## 2018-02-28 PROCEDURE — 77386: CPT | Performed by: RADIOLOGY

## 2018-02-28 PROCEDURE — 77427 RADIATION TX MANAGEMENT X5: CPT | Performed by: RADIOLOGY

## 2018-02-28 PROCEDURE — 80053 COMPREHEN METABOLIC PANEL: CPT | Performed by: INTERNAL MEDICINE

## 2018-02-28 PROCEDURE — 77014 CHG CT GUIDANCE RADIATION THERAPY FLDS PLACEMENT: CPT | Performed by: RADIOLOGY

## 2018-02-28 PROCEDURE — 77386 CHG INTENSITY MODULATED RADIATION TX DLVR COMPLEX: CPT | Performed by: RADIOLOGY

## 2018-02-28 PROCEDURE — 96523 IRRIG DRUG DELIVERY DEVICE: CPT | Performed by: INTERNAL MEDICINE

## 2018-02-28 PROCEDURE — 99213 OFFICE O/P EST LOW 20 MIN: CPT | Performed by: INTERNAL MEDICINE

## 2018-02-28 PROCEDURE — 85025 COMPLETE CBC W/AUTO DIFF WBC: CPT | Performed by: INTERNAL MEDICINE

## 2018-02-28 PROCEDURE — 36415 COLL VENOUS BLD VENIPUNCTURE: CPT | Performed by: INTERNAL MEDICINE

## 2018-02-28 PROCEDURE — 25010000002 HEPARIN FLUSH (PORCINE) 100 UNIT/ML SOLUTION: Performed by: INTERNAL MEDICINE

## 2018-02-28 RX ORDER — SODIUM CHLORIDE 0.9 % (FLUSH) 0.9 %
10 SYRINGE (ML) INJECTION AS NEEDED
Status: DISCONTINUED | OUTPATIENT
Start: 2018-02-28 | End: 2018-02-28 | Stop reason: HOSPADM

## 2018-02-28 RX ORDER — SODIUM CHLORIDE 0.9 % (FLUSH) 0.9 %
10 SYRINGE (ML) INJECTION AS NEEDED
Status: CANCELLED | OUTPATIENT
Start: 2018-02-28

## 2018-02-28 RX ADMIN — Medication 10 ML: at 07:49

## 2018-02-28 RX ADMIN — SODIUM CHLORIDE, PRESERVATIVE FREE 500 UNITS: 5 INJECTION INTRAVENOUS at 07:49

## 2018-02-28 NOTE — PROGRESS NOTES
Subjective  toxicity check with continued nausea, occasional diarrhea, and fatigue.    REASON FOR FOLLOW UP:   1.  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 Moon Bowen on 10/6/2017.  Primary stereotactic radiation therapy to the left upper lobe completed 12/15/2017  2.  Lung tumor is negative for PDL 1.  Also negative for ALK, EGFR, and ROS 1 mutations.  3.  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   4.  Esophageal adenocarcinoma was negative for HER-2/elizabeth overexpression but positive for PDL 1  5.  Initiation of combined radiation and low-dose weekly carboplatin and Taxol chemotherapy for treatment of esophageal adenocarcinoma.  First treatment delivered 1/11/2018.  6.  Development of thrombocytopenia and radiation esophagitis on the visit of 2/15/2018.    HISTORY OF PRESENT ILLNESS: Ms. Abad is a very pleasant 67 y.o. female with the above-mentioned history who is here today for evaluation near the end of her combined radiation and low-dose weekly carbo Taxol chemotherapy for adenocarcinoma of the esophagus.  She has received a total of 5 cycles of chemotherapy with weekly carboplatin and Taxol, with concurrent radiation.  She was due 2 weeks ago for her sixth cycle, but it was delayed due to esophagitis, myelosuppression, nausea and vomiting.  She was seen by one of her nurse practitioners last week and again her treatment was held and she received supportive care in the office with IV fluids and antiemetics.    She was seen by Dr. Mcdonald at the [facility] Monday and it was decided to continue to give her a break from radiation for a couple of more days with plans to possibly resume radiation later this week and complete her radiation early next week.    She was supposed to have a CT scan of her chest last week, but states that it was canceled by Dr. Mcdonald for now.  She also was scheduled for a cardiac stress test on  2/26/2018 but this was canceled due to her weakness and she is scheduled to undergo a stress test on Monday, 3/5/2018.    She continues to have some issues with nausea and esophagitis/gastritis.  She is feeling better today and will resume her radiation.  We will deliver her sixth and final cycle of carboplatin and Taxol chemotherapy tomorrow on 3/1/2018 and this will complete all of her planned chemotherapy.     Upon completion of her combined radiation and chemotherapy for esophageal cancer we may consider referring her for evaluation by Dr. Norwood for potential esophagectomy.  The other option would be to continue to observe her expectantly after radiation and chemotherapy and consider future Opdivo therapy if she demonstrates disease progression since her disease was PDL 1 positive.    History of Present Illness     Past Medical History:   Diagnosis Date   • Adenocarcinoma of lung 2017    HAD RADIATION    • Anxiety and depression    • Arthritis    • Benign parotid tumor 2012    removed by Dr Vickers told it was benign   • COPD (chronic obstructive pulmonary disease)    • Depression    • Diarrhea    • Early cataract    • Emphysema of lung    • Esophageal cancer 2017   • History of chronic pain    • History of colon polyps    • History of pneumonia    • Hyperlipidemia    • Joint pain    • Stroke       HEMATOLOGIC/ ONCOLOGIC HISTORY:  The patient is a 67 y.o. year old female who has a history of smoking and COPD.  She had an abnormal CT scan in February of this year showing a 3.5 cm mass in the left suprahilar area.  A repeat CT scan was performed at [facility] 7/13/2017 and show that the mass had increased in size from about 3.5 cm up to 6.5 cm.    She underwent navigational bronchoscopy and endobronchial ultrasound and biopsy with Dr. Moon Bowen on 10/6/2017.  The pathology on the biopsy showed well-differentiated adenocarcinoma.  She is referred to us now by her pulmonologist Dr. Sanchez  Anastasia for further staging evaluation and treatment recommendations.    She has not had a PET scan for staging.  She had a PDL 1 staining on her bronchoscopy specimen which was negative for PDL 1.  EGFR, ALK, and ROS 1 were also negative.    She is still smoking but is trying to quit.  She had pulmonary function tests performed on 10/25/2017 showing a DLCO of 33% and FEV1 37%.    She was referred to the [facility] radiation Center and completed primary radiation to the left upper lobe lung mass.  Because of abnormal uptake in the esophagus noted on her staging PET scan, she also underwent an EGD and biopsy which unfortunately revealed adenocarcinoma of the GE junction.  Molecular studies for HER-2/elizabeth and PDL 1 on the tumor tissue showed that she was PD L1 positive and HER-2/elizabeth negative.  We recommended combined weekly low dose carbo Taxol chemotherapy and radiation.     She had significant difficulty with treatment particularly with radiation esophagitis near the end of her course of therapy.  She required some dose delay and is expected to complete her radiation treatments the first week of March 2018.      Past Surgical History:   Procedure Laterality Date   • BRONCHOSCOPY     • CHOLECYSTECTOMY  1999   • COLON SURGERY  2015    COLON POLYPS   • COLONOSCOPY  04/13/2016    TA w/low grade dysplasia x 3, serrated adenoma x 2   • COLONOSCOPY  06/14/2016    TA w/high grade dysplasia   • ENDOSCOPY N/A 12/6/2017    Procedure: ESOPHAGOGASTRODUODENOSCOPY WITH BIOPSY;  Surgeon: Jayant Robles MD;  Location: [facility] ENDOSCOPY;  Service:    • FOOT SURGERY  10/2010    [place]   • HYSTERECTOMY     • MO INSJ TUNNELED CVC W/O SUBQ PORT/ AGE 3 YR/> Right 1/9/2018    Procedure: MEDIPORT PLACEMENT right;  Surgeon: Damaso Germain MD;  Location: [facility] HYBRID OR 18/19;  Service: Vascular   • SALIVARY GLAND SURGERY      PAROTID MASS REMOVED BENIGN   • SHOULDER ARTHROSCOPY Left 1995. 2001        ALLERGIES:    Allergies   Allergen  "Reactions   • Penicillins Hives         Review of Systems   Constitutional: Positive for fatigue. Negative for activity change, appetite change, fever and unexpected weight change.   HENT: Negative for hearing loss, nosebleeds, trouble swallowing and voice change.    Eyes: Negative for visual disturbance.   Respiratory: Negative for cough, shortness of breath and wheezing.    Cardiovascular: Negative for chest pain and palpitations.   Gastrointestinal: Positive for diarrhea, nausea and vomiting. Negative for abdominal pain.        Short bowel symptoms after partial colectomy.  Severe reflux and painful swallowing most likely due to radiation esophagitis.   Genitourinary: Negative for difficulty urinating, frequency, hematuria and urgency.   Musculoskeletal: Negative for back pain and neck pain.   Skin: Negative for rash.   Neurological: Positive for weakness. Negative for dizziness, seizures, syncope and headaches.   Hematological: Negative for adenopathy. Does not bruise/bleed easily.   Psychiatric/Behavioral: Negative for behavioral problems. The patient is not nervous/anxious.         Objective     Vitals:    02/28/18 0749   BP: 110/62   Pulse: 94   Resp: 18   Temp: 98.8 °F (37.1 °C)   TempSrc: Oral   SpO2: 95%   Weight: 55.1 kg (121 lb 6.4 oz)   Height: 167.5 cm (65.95\")   PainSc: 0-No pain     Current Status 2/28/2018   ECOG score 0       Physical Exam   Constitutional: She is oriented to person, place, and time. She appears well-developed. No distress.   Weak appearing   HENT:   Head: Normocephalic.   Eyes: Conjunctivae and EOM are normal. Pupils are equal, round, and reactive to light. No scleral icterus.   Neck: Normal range of motion. Neck supple. No JVD present. No thyromegaly present.   Cardiovascular: Normal rate and regular rhythm.  Exam reveals no gallop and no friction rub.    No murmur heard.  Pulmonary/Chest: Effort normal. No respiratory distress. She has decreased breath sounds. She has no " wheezes. She has no rhonchi. She has no rales.   Right IJ Mediport the right chest wall.   Abdominal: Soft. She exhibits no distension and no mass. There is no tenderness.   Musculoskeletal: Normal range of motion. She exhibits no edema or deformity.   Neurological: She is alert and oriented to person, place, and time. She has normal reflexes. No cranial nerve deficit.   Skin: Skin is warm and dry. No rash noted. No erythema.   Psychiatric: She has a normal mood and affect. Her behavior is normal. Judgment normal.         RECENT LABS:  Hematology WBC   Date Value Ref Range Status   02/28/2018 3.65 (L) 4.00 - 10.00 10*3/mm3 Final     RBC   Date Value Ref Range Status   02/28/2018 3.80 (L) 3.90 - 5.00 10*6/mm3 Final     Hemoglobin   Date Value Ref Range Status   02/28/2018 11.3 (L) 11.5 - 14.9 g/dL Final     Hematocrit   Date Value Ref Range Status   02/28/2018 33.3 (L) 34.0 - 45.0 % Final     Platelets   Date Value Ref Range Status   02/28/2018 240 150 - 375 10*3/mm3 Final        Lab Results   Component Value Date    GLUCOSE 200 (H) 02/23/2018    BUN 7 02/23/2018    CREATININE 0.84 02/23/2018    EGFRIFNONA 67 02/23/2018    BCR 8.3 02/23/2018    K 3.5 02/23/2018    CO2 26.8 02/23/2018    CALCIUM 9.2 02/23/2018    ALBUMIN 3.60 02/23/2018    LABIL2 1.4 02/23/2018    AST 16 02/23/2018    ALT 9 02/23/2018       PATHOLOGY FROM EGD 12/6/2017  Final Diagnosis   1.  GASTROESOPHAGEAL JUNCTION, BIOPSIES:             INVASIVE ADENOCARCINOMA.            NO IDENTIFIED INTESTINAL METAPLASIA.      COMMENT: In this specimen tumor is identified extending into submucosal tissue. Deeper extension cannot be excluded in these superficial biopsies.   Her 2 Brody negative  PD L1 positive ( > 1 ).      Assessment/Plan   1.  Well differentiated adenocarcinoma presumably from lung origin in a long-time smoker.  The biopsy was made from navigational bronchoscopy and endobronchial ultrasound at [place] on 10/6/2017.  Staging PET scan showed no  mediastinal adenopathy or obvious distant metastases.  She did have uptake in the lower esophagus which was evaluated with EGD and biopsy on 12/6/2017 and unfortunately was found to be adenocarcinoma at the GE junction.   · LungTumor tissue was negative for PDL 1 expression, EGFR, ALK, or ROS 1 mutations.   · She completed stereotactic radiation to the lung tumor 12/15/2017.  2.  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   · Esophageal adenocarcinoma was negative for HER-2/elizabeth overexpression but positive for PDL 1.  · Initiated combined radiation therapy to the esophagus along with weekly low-dose carboplatin and Taxol chemotherapy 1/11/18.  · 2/23/2018 patient presents for her sixth cycle of weekly carboplatin and Taxol.  This is actually delayed because of issues with weakness, nausea, vomiting and myelosuppression.    3. Emphysema due to smoking.  4. Placement of right internal jugular MediPort by Dr. Raphael Germain of vascular surgery 1/9/2018.  5.  She was not considered a candidate for lobectomy due to her poor pulmonary function but we plan to have her reevaluated by cardiology and pulmonology prior to determining if she is a potential candidate for esophageal resection.  Sheseen by Dr. Puneet Treviño of cardiology 2/9/2018.  She also has been seen by pulmonology.  6.  History of benign right parotid tumor in 2012, surgically removed per Dr. Vickers.  Patient is noted to have very tiny nodule below the scar, new to her, but possibly just scar tissue. Encouraged her to watch closely, as will we, for any enlargement.  7.  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  She is currently using Lomotil and Imodium as needed for diarrhea control.  8.  Nausea due to chemotherapy: Patient is currently taking Phenergan for nausea.  She continues to have issues with nausea, vomiting, and diarrhea.  She is now extremely weak.  9.  Radiation esophagitis.   Symptoms have improved with a break from treatment.  She will be resuming her radiation today and will receive her last cycle of carboplatin and Taxol chemotherapy on 3/1/2018.    Plan  1.  Dilma will be resuming her radiation therapy with Dr. Mcdonald later today.  She will return to our office tomorrow for her sixth and final dose of carboplatin and Taxol chemotherapy.  2.  She will be completing her radiation therapy on 3/5/2018.  3.  She also was scheduled for a cardiac stress test on 3/5/2018 to help determine if she is a potential candidate for esophagectomy.  4. She will be scheduled for M.D. follow-up in 2 weeks.  Hopefully by that time she will be feeling better following completion of her treatment.  If she continues to have major issues between now and her next appointment we may arrange for her to come in for additional supportive care with IV fluids and antiemetics as necessary.  5.  Upon completion of combined radiation and chemotherapy we will plan to schedule assessment with Dr. Norwood for follow-up EGD and discussion of potential esophagectomy surgery.  We also discussed with the patient that the other option would be to observe her following completion of chemotherapy and radiation with plans to initiate palliative Opdivo therapy in the future if she develops further progression of esophageal cancer since her esophageal tumor was PDL 1 positive.

## 2018-03-01 ENCOUNTER — INFUSION (OUTPATIENT)
Dept: ONCOLOGY | Facility: HOSPITAL | Age: 68
End: 2018-03-01

## 2018-03-01 ENCOUNTER — APPOINTMENT (OUTPATIENT)
Dept: RADIATION ONCOLOGY | Facility: HOSPITAL | Age: 68
End: 2018-03-01

## 2018-03-01 VITALS
HEART RATE: 89 BPM | TEMPERATURE: 97.8 F | DIASTOLIC BLOOD PRESSURE: 76 MMHG | SYSTOLIC BLOOD PRESSURE: 138 MMHG | OXYGEN SATURATION: 97 %

## 2018-03-01 DIAGNOSIS — C15.9 ESOPHAGEAL CARCINOMA (HCC): Primary | ICD-10-CM

## 2018-03-01 PROCEDURE — 77386: CPT | Performed by: RADIOLOGY

## 2018-03-01 PROCEDURE — 25010000002 DIPHENHYDRAMINE PER 50 MG: Performed by: INTERNAL MEDICINE

## 2018-03-01 PROCEDURE — 25010000002 DEXAMETHASONE SODIUM PHOSPHATE 10 MG/ML SOLUTION 1 ML VIAL: Performed by: INTERNAL MEDICINE

## 2018-03-01 PROCEDURE — 77014 CHG CT GUIDANCE RADIATION THERAPY FLDS PLACEMENT: CPT | Performed by: RADIOLOGY

## 2018-03-01 PROCEDURE — 96413 CHEMO IV INFUSION 1 HR: CPT | Performed by: INTERNAL MEDICINE

## 2018-03-01 PROCEDURE — 25010000002 PACLITAXEL PER 30 MG: Performed by: INTERNAL MEDICINE

## 2018-03-01 PROCEDURE — 25010000002 PROMETHAZINE PER 50 MG: Performed by: INTERNAL MEDICINE

## 2018-03-01 PROCEDURE — 77386 CHG INTENSITY MODULATED RADIATION TX DLVR COMPLEX: CPT | Performed by: RADIOLOGY

## 2018-03-01 PROCEDURE — 25010000002 PALONOSETRON PER 25 MCG: Performed by: INTERNAL MEDICINE

## 2018-03-01 PROCEDURE — 25010000002 CARBOPLATIN PER 50 MG: Performed by: INTERNAL MEDICINE

## 2018-03-01 PROCEDURE — 96417 CHEMO IV INFUS EACH ADDL SEQ: CPT | Performed by: INTERNAL MEDICINE

## 2018-03-01 PROCEDURE — 96375 TX/PRO/DX INJ NEW DRUG ADDON: CPT | Performed by: INTERNAL MEDICINE

## 2018-03-01 RX ORDER — FAMOTIDINE 10 MG/ML
20 INJECTION, SOLUTION INTRAVENOUS ONCE
Status: COMPLETED | OUTPATIENT
Start: 2018-03-01 | End: 2018-03-01

## 2018-03-01 RX ORDER — PALONOSETRON 0.05 MG/ML
0.25 INJECTION, SOLUTION INTRAVENOUS ONCE
Status: COMPLETED | OUTPATIENT
Start: 2018-03-01 | End: 2018-03-01

## 2018-03-01 RX ADMIN — PROMETHAZINE HYDROCHLORIDE 12.5 MG: 25 INJECTION INTRAMUSCULAR; INTRAVENOUS at 12:31

## 2018-03-01 RX ADMIN — PALONOSETRON HYDROCHLORIDE 0.25 MG: 0.25 INJECTION INTRAVENOUS at 10:33

## 2018-03-01 RX ADMIN — DEXAMETHASONE SODIUM PHOSPHATE 12 MG: 10 INJECTION, SOLUTION INTRAMUSCULAR; INTRAVENOUS at 10:42

## 2018-03-01 RX ADMIN — FAMOTIDINE 20 MG: 10 INJECTION, SOLUTION INTRAVENOUS at 10:39

## 2018-03-01 RX ADMIN — DIPHENHYDRAMINE HYDROCHLORIDE 25 MG: 50 INJECTION, SOLUTION INTRAMUSCULAR; INTRAVENOUS at 11:04

## 2018-03-01 RX ADMIN — CARBOPLATIN 170 MG: 10 INJECTION, SOLUTION INTRAVENOUS at 13:22

## 2018-03-01 RX ADMIN — SODIUM CHLORIDE 250 ML: 900 INJECTION, SOLUTION INTRAVENOUS at 10:23

## 2018-03-01 RX ADMIN — PACLITAXEL 85 MG: 6 INJECTION, SOLUTION INTRAVENOUS at 12:12

## 2018-03-02 PROCEDURE — 77386 CHG INTENSITY MODULATED RADIATION TX DLVR COMPLEX: CPT | Performed by: RADIOLOGY

## 2018-03-02 PROCEDURE — 77386: CPT | Performed by: RADIOLOGY

## 2018-03-05 ENCOUNTER — HOSPITAL ENCOUNTER (OUTPATIENT)
Dept: CARDIOLOGY | Facility: HOSPITAL | Age: 68
Discharge: HOME | End: 2018-03-05
Attending: INTERNAL MEDICINE | Admitting: INTERNAL MEDICINE

## 2018-03-05 ENCOUNTER — TELEPHONE (OUTPATIENT)
Dept: RADIATION ONCOLOGY | Facility: HOSPITAL | Age: 68
End: 2018-03-05

## 2018-03-05 ENCOUNTER — RADIATION ONCOLOGY WEEKLY ASSESSMENT (OUTPATIENT)
Dept: RADIATION ONCOLOGY | Facility: HOSPITAL | Age: 68
End: 2018-03-05

## 2018-03-05 VITALS
RESPIRATION RATE: 16 BRPM | DIASTOLIC BLOOD PRESSURE: 71 MMHG | BODY MASS INDEX: 18.75 KG/M2 | OXYGEN SATURATION: 97 % | WEIGHT: 116 LBS | TEMPERATURE: 96.9 F | SYSTOLIC BLOOD PRESSURE: 120 MMHG | HEART RATE: 102 BPM

## 2018-03-05 DIAGNOSIS — C34.92 ADENOCARCINOMA OF LEFT LUNG (HCC): Primary | ICD-10-CM

## 2018-03-05 DIAGNOSIS — C15.9 ESOPHAGEAL CARCINOMA (HCC): ICD-10-CM

## 2018-03-05 LAB
BH CV NUCLEAR PRIOR STUDY: 2
BH CV STRESS BP STAGE 1: NORMAL
BH CV STRESS COMMENTS STAGE 1: NORMAL
BH CV STRESS DOSE REGADENOSON STAGE 1: 0.4
BH CV STRESS DURATION MIN STAGE 1: 0
BH CV STRESS DURATION SEC STAGE 1: 10
BH CV STRESS HR STAGE 1: 111
BH CV STRESS PROTOCOL 1: NORMAL
BH CV STRESS RECOVERY BP: NORMAL MMHG
BH CV STRESS RECOVERY HR: 112 BPM
BH CV STRESS STAGE 1: 1
LV EF NUC BP: 66 %
MAXIMAL PREDICTED HEART RATE: 151 BPM
PERCENT MAX PREDICTED HR: 73.51 %
STRESS BASELINE BP: NORMAL MMHG
STRESS BASELINE HR: 94 BPM
STRESS PERCENT HR: 86 %
STRESS POST EXERCISE DUR SEC: 10 SEC
STRESS POST PEAK BP: NORMAL MMHG
STRESS POST PEAK HR: 111 BPM
STRESS TARGET HR: 128 BPM

## 2018-03-05 PROCEDURE — 78452 HT MUSCLE IMAGE SPECT MULT: CPT | Performed by: INTERNAL MEDICINE

## 2018-03-05 PROCEDURE — A9502 TC99M TETROFOSMIN: HCPCS | Performed by: INTERNAL MEDICINE

## 2018-03-05 PROCEDURE — 77014 CHG CT GUIDANCE RADIATION THERAPY FLDS PLACEMENT: CPT | Performed by: RADIOLOGY

## 2018-03-05 PROCEDURE — 77386 CHG INTENSITY MODULATED RADIATION TX DLVR COMPLEX: CPT | Performed by: RADIOLOGY

## 2018-03-05 PROCEDURE — 0 TECHNETIUM TETROFOSMIN KIT: Performed by: INTERNAL MEDICINE

## 2018-03-05 PROCEDURE — 78452 HT MUSCLE IMAGE SPECT MULT: CPT

## 2018-03-05 PROCEDURE — 93016 CV STRESS TEST SUPVJ ONLY: CPT | Performed by: INTERNAL MEDICINE

## 2018-03-05 PROCEDURE — 77386: CPT | Performed by: RADIOLOGY

## 2018-03-05 PROCEDURE — 93018 CV STRESS TEST I&R ONLY: CPT | Performed by: INTERNAL MEDICINE

## 2018-03-05 PROCEDURE — 93017 CV STRESS TEST TRACING ONLY: CPT

## 2018-03-05 PROCEDURE — 25010000002 REGADENOSON 0.4 MG/5ML SOLUTION: Performed by: INTERNAL MEDICINE

## 2018-03-05 RX ADMIN — TETROFOSMIN 1 DOSE: 1.38 INJECTION, POWDER, LYOPHILIZED, FOR SOLUTION INTRAVENOUS at 13:10

## 2018-03-05 RX ADMIN — TETROFOSMIN 1 DOSE: 1.38 INJECTION, POWDER, LYOPHILIZED, FOR SOLUTION INTRAVENOUS at 12:05

## 2018-03-05 RX ADMIN — REGADENOSON 0.4 MG: 0.08 INJECTION, SOLUTION INTRAVENOUS at 13:10

## 2018-03-05 NOTE — TELEPHONE ENCOUNTER
"Ms Abad said she has not been out of bed this weekend . Today she has a \"heart test \" at 11am and asks if it is too late to come after her test. I told her to come for treatment after if she is able. She will call and let us know.  "

## 2018-03-05 NOTE — PROGRESS NOTES
Physician Weekly Management Note    Diagnosis:   Esophageal Ca  Reason for Visit:  Radiation (29/30)    Concurrent Chemo:   Yes    Notes on Treatment course, Films, Medical progress and Plan:  Still struggling with po intake. Vitals stable, got thru cardiac eval this morning. Just tired. Getting supplements down. Will finish up. F/U from here discussed - CBC next week. Plans for imaging and surgical eval thereafter.    ROS - Other than as listed above, as follows:  Constitutional - Normal - no complaints of fatigue, denies lack of appetite, fever, night sweats or change in weight.  Neck - Normal - denies neck masses, muscle weakness, neck pain, decreased range of motion or swelling.  Cardiovascular - Normal - denies arrhythmias, chest pain, dyspnea, edema, orthopnea or palpitations.  Respiratory - Normal - denies cough, dyspnea, hemoptysis, hiccoughs, pleuritic chest pain or wheezing.  Gastrointestinal - Normal - no complaints of constipation, abdominal pain, diarrhea, heartburn/dyspepsia, hematemesis, hemorrhoids, melena or GI bleeding, nausea, pain or cramping or vomiting.    PHYSICAL EXAM - Other than listed above, as follows:  Vitals:    03/05/18 1447   BP: 120/71   Pulse: 102   Resp: 16   Temp: 96.9 °F (36.1 °C)   TempSrc: Oral   SpO2: 97%   Weight: 52.6 kg (116 lb)   PainSc: 0-No pain       Constitutional - Normal - no evidence of impaired alertness, inadequate appearance, premature or advanced chronologic age, uncooperativeness, altered mood, affect or disorientation.  Neck - Normal - no evidence of tender or enlarged lymph nodes, neck abnormalities, restricted range of motion or enlarged thyroid.  Chest - Normal - no evidence of chest abnormalities, tender or enlarged lymph nodes.  Respiratory - Normal - no evidence of abnormal breat sounds, chest abnormalities on palpation and chest abnormalities on percussion and normal breath sounds.  Hematologic/Lymphatic - Normal - no evidence of tender or enlarged  "axillary lymph nodes nor tender or enlarged neck nodes.    Performance Status: (1) Restricted in physically strenuous activity, ambulatory and able to do work of light nature    Problem added:  No problems updated.  Medications added: No orders of the defined types were placed in this encounter.    Ancillary referrals made: No orders of the defined types were placed in this encounter.      Technical aspects reviewed:  Weekly OBI approved if applicable?  Yes  Weekly port films approved?  Yes  Change requests noted if applicable?  Yes  Patient setup and plan reviewed?  Yes    Chart Reviewed:  Continue current treatment plan?  Yes  Treatment plan change requested?  No    I have reviewed and marked as \"reviewed\" the current medications, allergies and problem list in the patients EMR.    I have reviewed the patient's medical, surgical  history in detail, reviewed any pertinent lab work  and updated the computerized patient record if needed.    Patient's Care Team:  Patient Care Team:  Mary Taylor MD as PCP - General (Family Medicine)  Bienvenido Collier MD as Consulting Physician (Hematology and Oncology)  Daneil Oconnor MD as Referring Physician (Pulmonary Disease)  Shazia Mcdonald MD as Consulting Physician (Radiation Oncology)    Seen and approved by:  Shazia Mcdonald MD, 01/19/2018  "

## 2018-03-06 ENCOUNTER — DOCUMENTATION (OUTPATIENT)
Dept: NUTRITION | Facility: HOSPITAL | Age: 68
End: 2018-03-06

## 2018-03-06 PROCEDURE — 77014 CHG CT GUIDANCE RADIATION THERAPY FLDS PLACEMENT: CPT | Performed by: RADIOLOGY

## 2018-03-06 PROCEDURE — 77386 CHG INTENSITY MODULATED RADIATION TX DLVR COMPLEX: CPT | Performed by: RADIOLOGY

## 2018-03-06 PROCEDURE — 77386: CPT | Performed by: RADIOLOGY

## 2018-03-06 PROCEDURE — 77336 RADIATION PHYSICS CONSULT: CPT | Performed by: RADIOLOGY

## 2018-03-06 NOTE — PROGRESS NOTES
ONC Nutrition Follow Up:     Weight 116#, decreased 5#.  Struggling with po intake due to constant nausea. States that she is hungry and wants to eat but when she tries she can only take a few bites then feels sick at her stomach.  She sleeps most of the day after taking phenergan. Asked her if she has tried taking the zofran on a schedule and she does not know if she has filled that prescription.  She is not taking the benefiber as she is adding it to her coffee and can't drink much of it after she makes it.  She drank an ensure on the way in today and feels very nauseated now.    Encouraged patient to check to see if she has the zofran at home and to take it on a schedule q 8 hours.   Today is last day of radiation.  Will follow up with patient either at future appointments or via phone to encourage intake.

## 2018-03-12 DIAGNOSIS — C15.9 ESOPHAGEAL CARCINOMA (HCC): ICD-10-CM

## 2018-03-12 DIAGNOSIS — J43.1 PANLOBULAR EMPHYSEMA (HCC): ICD-10-CM

## 2018-03-12 DIAGNOSIS — C34.92 ADENOCARCINOMA OF LEFT LUNG (HCC): ICD-10-CM

## 2018-03-12 DIAGNOSIS — K20.80 RADIATION ESOPHAGITIS: ICD-10-CM

## 2018-03-12 DIAGNOSIS — T66.XXXA RADIATION ESOPHAGITIS: ICD-10-CM

## 2018-03-12 DIAGNOSIS — Z45.2 FITTING AND ADJUSTMENT OF VASCULAR CATHETER: ICD-10-CM

## 2018-03-13 RX ORDER — PROMETHAZINE HYDROCHLORIDE 12.5 MG/1
TABLET ORAL
Qty: 30 TABLET | Refills: 0 | Status: SHIPPED | OUTPATIENT
Start: 2018-03-13 | End: 2018-04-09 | Stop reason: SDUPTHER

## 2018-03-15 ENCOUNTER — OFFICE VISIT (OUTPATIENT)
Dept: ONCOLOGY | Facility: CLINIC | Age: 68
End: 2018-03-15

## 2018-03-15 ENCOUNTER — HOSPITAL ENCOUNTER (INPATIENT)
Facility: HOSPITAL | Age: 68
LOS: 5 days | Discharge: HOME | End: 2018-03-20
Attending: INTERNAL MEDICINE | Admitting: INTERNAL MEDICINE

## 2018-03-15 ENCOUNTER — APPOINTMENT (OUTPATIENT)
Dept: ONCOLOGY | Facility: HOSPITAL | Age: 68
End: 2018-03-15

## 2018-03-15 ENCOUNTER — INFUSION (OUTPATIENT)
Dept: ONCOLOGY | Facility: HOSPITAL | Age: 68
End: 2018-03-15

## 2018-03-15 VITALS
HEART RATE: 92 BPM | RESPIRATION RATE: 14 BRPM | SYSTOLIC BLOOD PRESSURE: 121 MMHG | TEMPERATURE: 97.4 F | OXYGEN SATURATION: 97 % | DIASTOLIC BLOOD PRESSURE: 70 MMHG | HEIGHT: 66 IN

## 2018-03-15 DIAGNOSIS — C34.90 ADENOCARCINOMA OF LUNG, UNSPECIFIED LATERALITY (HCC): Primary | ICD-10-CM

## 2018-03-15 DIAGNOSIS — T66.XXXA RADIATION ESOPHAGITIS: ICD-10-CM

## 2018-03-15 DIAGNOSIS — J43.1 PANLOBULAR EMPHYSEMA (HCC): ICD-10-CM

## 2018-03-15 DIAGNOSIS — C15.9 ESOPHAGEAL CARCINOMA (HCC): ICD-10-CM

## 2018-03-15 DIAGNOSIS — C34.92 ADENOCARCINOMA OF LEFT LUNG (HCC): ICD-10-CM

## 2018-03-15 DIAGNOSIS — K20.80 RADIATION ESOPHAGITIS: ICD-10-CM

## 2018-03-15 DIAGNOSIS — Z45.2 FITTING AND ADJUSTMENT OF VASCULAR CATHETER: ICD-10-CM

## 2018-03-15 DIAGNOSIS — C15.9 ESOPHAGEAL CARCINOMA (HCC): Primary | ICD-10-CM

## 2018-03-15 LAB
ALBUMIN SERPL-MCNC: 3.7 G/DL (ref 3.5–5.2)
ALBUMIN/GLOB SERPL: 1.4 G/DL (ref 1.1–2.4)
ALP SERPL-CCNC: 57 U/L (ref 38–116)
ALT SERPL W P-5'-P-CCNC: 15 U/L (ref 0–33)
ANION GAP SERPL CALCULATED.3IONS-SCNC: 16.4 MMOL/L
AST SERPL-CCNC: 18 U/L (ref 0–32)
BASOPHILS # BLD AUTO: 0.03 10*3/MM3 (ref 0–0.1)
BASOPHILS NFR BLD AUTO: 0.4 % (ref 0–1.1)
BILIRUB SERPL-MCNC: 0.4 MG/DL (ref 0.1–1.2)
BUN BLD-MCNC: 11 MG/DL (ref 6–20)
BUN/CREAT SERPL: 12.2 (ref 7.3–30)
CALCIUM SPEC-SCNC: 9.2 MG/DL (ref 8.5–10.2)
CHLORIDE SERPL-SCNC: 98 MMOL/L (ref 98–107)
CO2 SERPL-SCNC: 23.6 MMOL/L (ref 22–29)
CREAT BLD-MCNC: 0.9 MG/DL (ref 0.6–1.1)
DEPRECATED RDW RBC AUTO: 55.4 FL (ref 37–49)
EOSINOPHIL # BLD AUTO: 0.03 10*3/MM3 (ref 0–0.36)
EOSINOPHIL NFR BLD AUTO: 0.4 % (ref 1–5)
ERYTHROCYTE [DISTWIDTH] IN BLOOD BY AUTOMATED COUNT: 17.2 % (ref 11.7–14.5)
GFR SERPL CREATININE-BSD FRML MDRD: 62 ML/MIN/1.73
GLOBULIN UR ELPH-MCNC: 2.6 GM/DL (ref 1.8–3.5)
GLUCOSE BLD-MCNC: 119 MG/DL (ref 74–124)
HCT VFR BLD AUTO: 35.9 % (ref 34–45)
HGB BLD-MCNC: 12.1 G/DL (ref 11.5–14.9)
IMM GRANULOCYTES # BLD: 0.13 10*3/MM3 (ref 0–0.03)
IMM GRANULOCYTES NFR BLD: 1.7 % (ref 0–0.5)
LYMPHOCYTES # BLD AUTO: 0.68 10*3/MM3 (ref 1–3.5)
LYMPHOCYTES NFR BLD AUTO: 8.7 % (ref 20–49)
MAGNESIUM SERPL-MCNC: 1.8 MG/DL (ref 1.8–2.5)
MCH RBC QN AUTO: 30.2 PG (ref 27–33)
MCHC RBC AUTO-ENTMCNC: 33.7 G/DL (ref 32–35)
MCV RBC AUTO: 89.5 FL (ref 83–97)
MONOCYTES # BLD AUTO: 1.26 10*3/MM3 (ref 0.25–0.8)
MONOCYTES NFR BLD AUTO: 16.1 % (ref 4–12)
NEUTROPHILS # BLD AUTO: 5.69 10*3/MM3 (ref 1.5–7)
NEUTROPHILS NFR BLD AUTO: 72.7 % (ref 39–75)
NRBC BLD MANUAL-RTO: 0 /100 WBC (ref 0–0)
PLATELET # BLD AUTO: 176 10*3/MM3 (ref 150–375)
PMV BLD AUTO: 9.6 FL (ref 8.9–12.1)
POTASSIUM BLD-SCNC: 3.9 MMOL/L (ref 3.5–4.7)
PROT SERPL-MCNC: 6.3 G/DL (ref 6.3–8)
RBC # BLD AUTO: 4.01 10*6/MM3 (ref 3.9–5)
SODIUM BLD-SCNC: 138 MMOL/L (ref 134–145)
WBC NRBC COR # BLD: 7.82 10*3/MM3 (ref 4–10)

## 2018-03-15 PROCEDURE — 25010000002 MORPHINE PER 10 MG: Performed by: INTERNAL MEDICINE

## 2018-03-15 PROCEDURE — 99215 OFFICE O/P EST HI 40 MIN: CPT | Performed by: INTERNAL MEDICINE

## 2018-03-15 PROCEDURE — 85025 COMPLETE CBC W/AUTO DIFF WBC: CPT | Performed by: INTERNAL MEDICINE

## 2018-03-15 PROCEDURE — 25010000002 ENOXAPARIN PER 10 MG: Performed by: INTERNAL MEDICINE

## 2018-03-15 PROCEDURE — 96374 THER/PROPH/DIAG INJ IV PUSH: CPT | Performed by: INTERNAL MEDICINE

## 2018-03-15 PROCEDURE — 96361 HYDRATE IV INFUSION ADD-ON: CPT | Performed by: INTERNAL MEDICINE

## 2018-03-15 PROCEDURE — 25010000002 HEPARIN FLUSH (PORCINE) 100 UNIT/ML SOLUTION: Performed by: INTERNAL MEDICINE

## 2018-03-15 PROCEDURE — 36415 COLL VENOUS BLD VENIPUNCTURE: CPT | Performed by: INTERNAL MEDICINE

## 2018-03-15 PROCEDURE — 83735 ASSAY OF MAGNESIUM: CPT | Performed by: INTERNAL MEDICINE

## 2018-03-15 PROCEDURE — 25010000002 ONDANSETRON PER 1 MG: Performed by: INTERNAL MEDICINE

## 2018-03-15 PROCEDURE — 25810000003 DEXTROSE-NACL PER 500 ML: Performed by: INTERNAL MEDICINE

## 2018-03-15 PROCEDURE — 80053 COMPREHEN METABOLIC PANEL: CPT | Performed by: INTERNAL MEDICINE

## 2018-03-15 RX ORDER — SODIUM CHLORIDE 9 MG/ML
1000 INJECTION, SOLUTION INTRAVENOUS ONCE
Status: DISCONTINUED | OUTPATIENT
Start: 2018-03-15 | End: 2018-03-20 | Stop reason: HOSPADM

## 2018-03-15 RX ORDER — BUPROPION HYDROCHLORIDE 300 MG/1
300 TABLET ORAL EVERY EVENING
Status: DISCONTINUED | OUTPATIENT
Start: 2018-03-15 | End: 2018-03-20 | Stop reason: HOSPADM

## 2018-03-15 RX ORDER — ASPIRIN 81 MG/1
81 TABLET ORAL DAILY
Status: DISCONTINUED | OUTPATIENT
Start: 2018-03-15 | End: 2018-03-20 | Stop reason: HOSPADM

## 2018-03-15 RX ORDER — TEMAZEPAM 15 MG/1
15 CAPSULE ORAL NIGHTLY PRN
Status: CANCELLED | OUTPATIENT
Start: 2018-03-15 | End: 2018-03-25

## 2018-03-15 RX ORDER — TEMAZEPAM 15 MG/1
15 CAPSULE ORAL NIGHTLY PRN
Status: DISCONTINUED | OUTPATIENT
Start: 2018-03-15 | End: 2018-03-20 | Stop reason: HOSPADM

## 2018-03-15 RX ORDER — SODIUM CHLORIDE 0.9 % (FLUSH) 0.9 %
10 SYRINGE (ML) INJECTION AS NEEDED
Status: DISCONTINUED | OUTPATIENT
Start: 2018-03-15 | End: 2018-03-20 | Stop reason: HOSPADM

## 2018-03-15 RX ORDER — NALOXONE HCL 0.4 MG/ML
0.4 VIAL (ML) INJECTION
Status: DISCONTINUED | OUTPATIENT
Start: 2018-03-15 | End: 2018-03-20 | Stop reason: HOSPADM

## 2018-03-15 RX ORDER — ALBUTEROL SULFATE 2.5 MG/3ML
2.5 SOLUTION RESPIRATORY (INHALATION) EVERY 4 HOURS PRN
Status: DISCONTINUED | OUTPATIENT
Start: 2018-03-15 | End: 2018-03-20 | Stop reason: HOSPADM

## 2018-03-15 RX ORDER — ACETAMINOPHEN 325 MG/1
650 TABLET ORAL EVERY 4 HOURS PRN
Status: DISCONTINUED | OUTPATIENT
Start: 2018-03-15 | End: 2018-03-20 | Stop reason: HOSPADM

## 2018-03-15 RX ORDER — SODIUM CHLORIDE 0.9 % (FLUSH) 0.9 %
10 SYRINGE (ML) INJECTION EVERY 12 HOURS SCHEDULED
Status: CANCELLED | OUTPATIENT
Start: 2018-03-15

## 2018-03-15 RX ORDER — ONDANSETRON 4 MG/1
8 TABLET, FILM COATED ORAL EVERY 8 HOURS PRN
Status: DISCONTINUED | OUTPATIENT
Start: 2018-03-15 | End: 2018-03-20 | Stop reason: HOSPADM

## 2018-03-15 RX ORDER — ACETAMINOPHEN 325 MG/1
650 TABLET ORAL EVERY 4 HOURS PRN
Status: CANCELLED | OUTPATIENT
Start: 2018-03-15

## 2018-03-15 RX ORDER — ALBUTEROL SULFATE 90 UG/1
2 AEROSOL, METERED RESPIRATORY (INHALATION) EVERY 4 HOURS PRN
Status: DISCONTINUED | OUTPATIENT
Start: 2018-03-15 | End: 2018-03-15 | Stop reason: CLARIF

## 2018-03-15 RX ORDER — MORPHINE SULFATE 2 MG/ML
2 INJECTION, SOLUTION INTRAMUSCULAR; INTRAVENOUS EVERY 4 HOURS PRN
Status: DISCONTINUED | OUTPATIENT
Start: 2018-03-15 | End: 2018-03-19

## 2018-03-15 RX ORDER — MORPHINE SULFATE 2 MG/ML
2 INJECTION, SOLUTION INTRAMUSCULAR; INTRAVENOUS EVERY 4 HOURS PRN
Status: CANCELLED | OUTPATIENT
Start: 2018-03-15 | End: 2018-03-25

## 2018-03-15 RX ORDER — ONDANSETRON 2 MG/ML
4 INJECTION INTRAMUSCULAR; INTRAVENOUS EVERY 6 HOURS PRN
Status: CANCELLED | OUTPATIENT
Start: 2018-03-15

## 2018-03-15 RX ORDER — HYDROCODONE BITARTRATE AND ACETAMINOPHEN 5; 325 MG/1; MG/1
1 TABLET ORAL EVERY 4 HOURS PRN
Status: DISCONTINUED | OUTPATIENT
Start: 2018-03-15 | End: 2018-03-20 | Stop reason: HOSPADM

## 2018-03-15 RX ORDER — DEXTROSE AND SODIUM CHLORIDE 5; .9 G/100ML; G/100ML
125 INJECTION, SOLUTION INTRAVENOUS CONTINUOUS
Status: CANCELLED | OUTPATIENT
Start: 2018-03-15

## 2018-03-15 RX ORDER — HYDROCODONE BITARTRATE AND ACETAMINOPHEN 5; 325 MG/1; MG/1
1 TABLET ORAL EVERY 4 HOURS PRN
Status: CANCELLED | OUTPATIENT
Start: 2018-03-15

## 2018-03-15 RX ORDER — NALOXONE HCL 0.4 MG/ML
0.4 VIAL (ML) INJECTION
Status: CANCELLED | OUTPATIENT
Start: 2018-03-15

## 2018-03-15 RX ORDER — HYDROCODONE BITARTRATE AND ACETAMINOPHEN 7.5; 325 MG/1; MG/1
1 TABLET ORAL 4 TIMES DAILY PRN
Status: DISCONTINUED | OUTPATIENT
Start: 2018-03-15 | End: 2018-03-20 | Stop reason: HOSPADM

## 2018-03-15 RX ORDER — ONDANSETRON 2 MG/ML
4 INJECTION INTRAMUSCULAR; INTRAVENOUS EVERY 6 HOURS PRN
Status: DISCONTINUED | OUTPATIENT
Start: 2018-03-15 | End: 2018-03-20 | Stop reason: HOSPADM

## 2018-03-15 RX ORDER — SODIUM CHLORIDE 0.9 % (FLUSH) 0.9 %
10 SYRINGE (ML) INJECTION EVERY 12 HOURS SCHEDULED
Status: DISCONTINUED | OUTPATIENT
Start: 2018-03-15 | End: 2018-03-20 | Stop reason: HOSPADM

## 2018-03-15 RX ORDER — FAMOTIDINE 10 MG/ML
20 INJECTION, SOLUTION INTRAVENOUS 2 TIMES DAILY
Status: CANCELLED | OUTPATIENT
Start: 2018-03-15

## 2018-03-15 RX ORDER — DEXTROSE AND SODIUM CHLORIDE 5; .9 G/100ML; G/100ML
50 INJECTION, SOLUTION INTRAVENOUS CONTINUOUS
Status: DISCONTINUED | OUTPATIENT
Start: 2018-03-15 | End: 2018-03-20 | Stop reason: HOSPADM

## 2018-03-15 RX ORDER — SUCRALFATE ORAL 1 G/10ML
1 SUSPENSION ORAL EVERY 6 HOURS SCHEDULED
Status: DISCONTINUED | OUTPATIENT
Start: 2018-03-15 | End: 2018-03-20 | Stop reason: HOSPADM

## 2018-03-15 RX ORDER — PROMETHAZINE HYDROCHLORIDE 12.5 MG/1
12.5 TABLET ORAL EVERY 6 HOURS PRN
Status: DISCONTINUED | OUTPATIENT
Start: 2018-03-15 | End: 2018-03-16

## 2018-03-15 RX ORDER — LOPERAMIDE HYDROCHLORIDE 2 MG/1
2 CAPSULE ORAL 4 TIMES DAILY PRN
Status: DISCONTINUED | OUTPATIENT
Start: 2018-03-15 | End: 2018-03-20 | Stop reason: HOSPADM

## 2018-03-15 RX ORDER — FAMOTIDINE 10 MG/ML
20 INJECTION, SOLUTION INTRAVENOUS 2 TIMES DAILY
Status: DISCONTINUED | OUTPATIENT
Start: 2018-03-15 | End: 2018-03-16

## 2018-03-15 RX ORDER — SODIUM CHLORIDE 0.9 % (FLUSH) 0.9 %
10 SYRINGE (ML) INJECTION AS NEEDED
Status: CANCELLED | OUTPATIENT
Start: 2018-03-15

## 2018-03-15 RX ORDER — DIAZEPAM 5 MG/1
5 TABLET ORAL 4 TIMES DAILY PRN
Status: DISCONTINUED | OUTPATIENT
Start: 2018-03-15 | End: 2018-03-20 | Stop reason: HOSPADM

## 2018-03-15 RX ADMIN — ONDANSETRON 8 MG: 2 INJECTION, SOLUTION INTRAMUSCULAR; INTRAVENOUS at 11:55

## 2018-03-15 RX ADMIN — MORPHINE SULFATE 2 MG: 2 INJECTION, SOLUTION INTRAMUSCULAR; INTRAVENOUS at 23:24

## 2018-03-15 RX ADMIN — SUCRALFATE 1 G: 1 SUSPENSION ORAL at 23:24

## 2018-03-15 RX ADMIN — ASPIRIN 81 MG: 81 TABLET ORAL at 19:09

## 2018-03-15 RX ADMIN — BUPROPION HYDROCHLORIDE 300 MG: 300 TABLET, EXTENDED RELEASE ORAL at 19:10

## 2018-03-15 RX ADMIN — Medication 10 ML: at 14:54

## 2018-03-15 RX ADMIN — ENOXAPARIN SODIUM 30 MG: 30 INJECTION SUBCUTANEOUS at 15:00

## 2018-03-15 RX ADMIN — SUCRALFATE 1 G: 1 SUSPENSION ORAL at 19:10

## 2018-03-15 RX ADMIN — Medication 10 ML: at 21:55

## 2018-03-15 RX ADMIN — DEXTROSE AND SODIUM CHLORIDE 125 ML/HR: 5; 900 INJECTION, SOLUTION INTRAVENOUS at 23:24

## 2018-03-15 RX ADMIN — Medication 500 UNITS: at 20:45

## 2018-03-15 RX ADMIN — DEXTROSE AND SODIUM CHLORIDE 125 ML/HR: 5; 900 INJECTION, SOLUTION INTRAVENOUS at 15:24

## 2018-03-15 RX ADMIN — SODIUM CHLORIDE 1000 ML: 900 INJECTION, SOLUTION INTRAVENOUS at 11:50

## 2018-03-15 RX ADMIN — SODIUM CHLORIDE 500 ML: 9 INJECTION, SOLUTION INTRAVENOUS at 14:53

## 2018-03-15 RX ADMIN — FAMOTIDINE 20 MG: 10 INJECTION, SOLUTION INTRAVENOUS at 20:45

## 2018-03-15 NOTE — PLAN OF CARE
Problem: Patient Care Overview  Goal: Plan of Care Review  Outcome: Ongoing (interventions implemented as appropriate)   03/15/18 5942   Coping/Psychosocial   Plan of Care Reviewed With patient   Plan of Care Review   Progress no change   OTHER   Outcome Summary Pt direct admit from CBC office, rec'd 500 cc bolus, N/V resolved, pt rec'd zofran dose at CBC office, plan to monitor hydration level & nutritional status CTM further work-up       Problem: Oncology Care (Adult)  Goal: Signs and Symptoms of Listed Potential Problems Will be Absent, Minimized or Managed (Oncology Care)  Outcome: Ongoing (interventions implemented as appropriate)      Problem: Nausea/Vomiting (Adult)  Goal: Symptom Relief  Outcome: Ongoing (interventions implemented as appropriate)    Goal: Adequate Hydration  Outcome: Ongoing (interventions implemented as appropriate)

## 2018-03-15 NOTE — PROGRESS NOTES
Subjective  toxicity check with continued nausea, occasional diarrhea, and fatigue.    REASON FOR FOLLOW UP:   1.  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 Moon Bowen on 10/6/2017.  Primary stereotactic radiation therapy to the left upper lobe completed 12/15/2017  2.  Lung tumor is negative for PDL 1.  Also negative for ALK, EGFR, and ROS 1 mutations.  3.  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   4.  Esophageal adenocarcinoma was negative for HER-2/elizabeth overexpression but positive for PDL 1  5.  Initiation of combined radiation and low-dose weekly carboplatin and Taxol chemotherapy for treatment of esophageal adenocarcinoma.  First treatment delivered 1/11/2018.  6.  Development of thrombocytopenia and radiation esophagitis on the visit of 2/15/2018.    HISTORY OF PRESENT ILLNESS: Ms. Abad is a very pleasant 68 y.o. female with the above-mentioned history who is here today for evaluation near the end of her combined radiation and low-dose weekly carbo Taxol chemotherapy for adenocarcinoma of the esophagus.  She has received a total of 5 cycles of chemotherapy with weekly carboplatin and Taxol, with concurrent radiation.  She was due 2 weeks ago for her sixth cycle, but it was delayed due to esophagitis, myelosuppression, nausea and vomiting.  She was seen by one of her nurse practitioners last week and again her treatment was held and she received supportive care in the office with IV fluids and antiemetics.    She completed her weekly carboplatin and Taxol chemotherapy on 3/1/2018 and completed her radiation therapy about a week ago.  She was scheduled return today for follow-up and get her scheduled for post treatment scans and endoscopic evaluation.  Unfortunately when she presented to the office today she was profoundly weak.  She's been unable to  the office for a weight.  The patient and family report that she  really has not been able to keep anything down for several days.  She also has had pain most likely due to radiation esophagitis.  She will require admission to the hospital today for further symptom control.    Upon completion of her combined radiation and chemotherapy for esophageal cancer we may consider referring her for evaluation by Dr. Norwood for potential esophagectomy.  The other option would be to continue to observe her expectantly after radiation and chemotherapy and consider future Opdivo therapy if she demonstrates disease progression since her disease was PDL 1 positive.    History of Present Illness     Past Medical History:   Diagnosis Date   • Adenocarcinoma of lung 2017    HAD RADIATION    • Anxiety and depression    • Arthritis    • Benign parotid tumor 2012    removed by Dr Vickers told it was benign   • COPD (chronic obstructive pulmonary disease)    • Depression    • Diarrhea    • Early cataract    • Emphysema of lung    • Esophageal cancer 2017   • History of chronic pain    • History of colon polyps    • History of pneumonia    • Hyperlipidemia    • Joint pain    • Stroke       HEMATOLOGIC/ ONCOLOGIC HISTORY:  The patient is a 67 y.o. year old female who has a history of smoking and COPD.  She had an abnormal CT scan in February of this year showing a 3.5 cm mass in the left suprahilar area.  A repeat CT scan was performed at [facility] 7/13/2017 and show that the mass had increased in size from about 3.5 cm up to 6.5 cm.    She underwent navigational bronchoscopy and endobronchial ultrasound and biopsy with Dr. Moon Bowen on 10/6/2017.  The pathology on the biopsy showed well-differentiated adenocarcinoma.  She is referred to us now by her pulmonologist Dr. Daniel Oconnor for further staging evaluation and treatment recommendations.    She has not had a PET scan for staging.  She had a PDL 1 staining on her bronchoscopy specimen which was negative for PDL 1.  EGFR, ALK,  and ROS 1 were also negative.    She is still smoking but is trying to quit.  She had pulmonary function tests performed on 10/25/2017 showing a DLCO of 33% and FEV1 37%.    She was referred to the [facility] and completed primary radiation to the left upper lobe lung mass.  Because of abnormal uptake in the esophagus noted on her staging PET scan, she also underwent an EGD and biopsy which unfortunately revealed adenocarcinoma of the GE junction.  Molecular studies for HER-2/elizabeth and PDL 1 on the tumor tissue showed that she was PD L1 positive and HER-2/elizabeth negative.  We recommended combined weekly low dose carbo Taxol chemotherapy and radiation.     She had significant difficulty with treatment particularly with radiation esophagitis near the end of her course of therapy.  She required some dose delay and is expected to complete her radiation treatments the first week of March 2018.  Last dose of carboplatin and Taxol chemotherapy was delivered 3/1/2018.      Past Surgical History:   Procedure Laterality Date   • BRONCHOSCOPY     • CHOLECYSTECTOMY  1999   • COLON SURGERY  2015    COLON POLYPS   • COLONOSCOPY  04/13/2016    TA w/low grade dysplasia x 3, serrated adenoma x 2   • COLONOSCOPY  06/14/2016    TA w/high grade dysplasia   • ENDOSCOPY N/A 12/6/2017    Procedure: ESOPHAGOGASTRODUODENOSCOPY WITH BIOPSY;  Surgeon: Jayant Robles MD;  Location: [facility] ENDOSCOPY;  Service:    • FOOT SURGERY  10/2010    [facility]   • HYSTERECTOMY     • NM INSJ TUNNELED CVC W/O SUBQ PORT/ AGE 3 YR/> Right 1/9/2018    Procedure: MEDIPORT PLACEMENT right;  Surgeon: Damaso Germain MD;  Location: [facility] HYBRID OR 18/19;  Service: Vascular   • SALIVARY GLAND SURGERY      PAROTID MASS REMOVED BENIGN   • SHOULDER ARTHROSCOPY Left 1995. 2001        ALLERGIES:    Allergies   Allergen Reactions   • Penicillins Hives         Review of Systems   Constitutional: Positive for activity change, appetite change, fatigue  "and unexpected weight change. Negative for fever.   HENT: Negative for hearing loss, nosebleeds, trouble swallowing and voice change.    Eyes: Negative for visual disturbance.   Respiratory: Negative for cough, shortness of breath and wheezing.    Cardiovascular: Positive for chest pain. Negative for palpitations.   Gastrointestinal: Positive for diarrhea, nausea and vomiting. Negative for abdominal pain.        Short bowel symptoms after partial colectomy.  Severe reflux and painful swallowing most likely due to radiation esophagitis.   Genitourinary: Negative for difficulty urinating, frequency, hematuria and urgency.   Musculoskeletal: Negative for back pain and neck pain.   Skin: Negative for rash.   Neurological: Positive for weakness. Negative for dizziness, seizures, syncope and headaches.   Hematological: Negative for adenopathy. Does not bruise/bleed easily.   Psychiatric/Behavioral: Negative for behavioral problems. The patient is not nervous/anxious.         Objective     Vitals:    03/15/18 1059   BP: 121/70   Pulse: 92   Resp: 14   Temp: 97.4 °F (36.3 °C)   SpO2: 97%   Weight: Comment: not able to stand   Height: 167.5 cm (65.95\")   PainSc: 8  Comment: stomach     Current Status 3/15/2018   ECOG score 0       Physical Exam   Constitutional: She is oriented to person, place, and time. She appears well-developed. No distress.   Weak appearing   HENT:   Head: Normocephalic.   Eyes: Conjunctivae and EOM are normal. Pupils are equal, round, and reactive to light. No scleral icterus.   Neck: Normal range of motion. Neck supple. No JVD present. No thyromegaly present.   Cardiovascular: Normal rate and regular rhythm.  Exam reveals no gallop and no friction rub.    No murmur heard.  Pulmonary/Chest: Effort normal. No respiratory distress. She has decreased breath sounds. She has no wheezes. She has no rhonchi. She has no rales.   Right IJ Mediport the right chest wall.   Abdominal: Soft. She exhibits no " distension and no mass. There is no tenderness.   Musculoskeletal: Normal range of motion. She exhibits no edema or deformity.   Neurological: She is alert and oriented to person, place, and time. She has normal reflexes. No cranial nerve deficit.   Skin: Skin is warm and dry. No rash noted. No erythema.   Psychiatric: She has a normal mood and affect. Her behavior is normal. Judgment normal.         RECENT LABS:  Hematology WBC   Date Value Ref Range Status   02/28/2018 3.65 (L) 4.00 - 10.00 10*3/mm3 Final     RBC   Date Value Ref Range Status   02/28/2018 3.80 (L) 3.90 - 5.00 10*6/mm3 Final     Hemoglobin   Date Value Ref Range Status   02/28/2018 11.3 (L) 11.5 - 14.9 g/dL Final     Hematocrit   Date Value Ref Range Status   02/28/2018 33.3 (L) 34.0 - 45.0 % Final     Platelets   Date Value Ref Range Status   02/28/2018 240 150 - 375 10*3/mm3 Final        Lab Results   Component Value Date    GLUCOSE 153 (H) 02/28/2018    BUN 10 02/28/2018    CREATININE 0.74 02/28/2018    EGFRIFNONA 78 02/28/2018    BCR 13.5 02/28/2018    K 3.7 02/28/2018    CO2 27.8 02/28/2018    CALCIUM 9.4 02/28/2018    ALBUMIN 3.60 02/28/2018    LABIL2 1.4 02/28/2018    AST 15 02/28/2018    ALT 9 02/28/2018       PATHOLOGY FROM EGD 12/6/2017  Final Diagnosis   1.  GASTROESOPHAGEAL JUNCTION, BIOPSIES:             INVASIVE ADENOCARCINOMA.            NO IDENTIFIED INTESTINAL METAPLASIA.      COMMENT: In this specimen tumor is identified extending into submucosal tissue. Deeper extension cannot be excluded in these superficial biopsies.   Her 2 Brody negative  PD L1 positive ( > 1 ).    STRESS TEST 3/5/2018  Interpretation Summary     · Myocardial perfusion imaging indicates a normal myocardial perfusion study with no evidence of ischemia.  · Left ventricular ejection fraction is normal (Calculated EF = 66%).  · Impressions are consistent with a low risk study.  · There is no prior study available for comparison       Assessment/Plan   1.  Well  differentiated adenocarcinoma presumably from lung origin in a long-time smoker.  The biopsy was made from navigational bronchoscopy and endobronchial ultrasound at [place] on 10/6/2017.  Staging PET scan showed no mediastinal adenopathy or obvious distant metastases.  She did have uptake in the lower esophagus which was evaluated with EGD and biopsy on 12/6/2017 and unfortunately was found to be adenocarcinoma at the GE junction.   · LungTumor tissue was negative for PDL 1 expression, EGFR, ALK, or ROS 1 mutations.   · She completed stereotactic radiation to the lung tumor 12/15/2017.  2.  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   · Esophageal adenocarcinoma was negative for HER-2/elizabeth overexpression but positive for PDL 1.  · Initiated combined radiation therapy to the esophagus along with weekly low-dose carboplatin and Taxol chemotherapy 1/11/18.  · She completed her carboplatin and Taxol chemotherapy on 3/1/2018 and completed her radiation therapy 3/7/2018    3. Emphysema due to smoking.  4. Placement of right internal jugular MediPort by Dr. Raphael Germain of vascular surgery 1/9/2018.  5.  She was not considered a candidate for lobectomy due to her poor pulmonary function but we plan to have her reevaluated by cardiology and pulmonology prior to determining if she is a potential candidate for esophageal resection.  Sheseen by Dr. Puneet Treviño of cardiology 2/9/2018.  She also has been seen by pulmonology.  6.  History of benign right parotid tumor in 2012, surgically removed per Dr. Vickers.  Patient is noted to have very tiny nodule below the scar, new to her, but possibly just scar tissue. Encouraged her to watch closely, as will we, for any enlargement.  7. Profound weakness and dehydration due to toxicity from her treatment.  Presumably this is in part due to radiation esophagitis although she could also have some radiation stricturing of the esophagus.  She will require  admission to the hospital for management of her dehydration and toxicities from treatment.  She also will be undergoing follow-up scans and endoscopic assessment.    Plan  1.  Patient will be admitted to 3 [facility] oncology floor for further supportive care including   IV fluids  IV antiemetics  IV pain medication  2.  The patient initially had been considered not a candidate for surgery but on further follow-up by pulmonary and cardiology it appears that she could potentially be a candidate for surgical resection of the esophagus although given her degree of frailty and the fact that her esophageal tumor was PD L1 positive, I think in this situation may be best to continue to observe her without surgery and plan to utilize immunotherapy in the future if she has  disease recurrence.  3.  If her symptoms improve and she is able to maintain oral hydration and nutrition in the next few days then she could be discharged home with further appointments scheduled as an outpatient for posttreatment CT scans and endoscopic assessment.  If her swallowing issues do not improve significantly that she may have to undergo endoscopic evaluation on this admission.

## 2018-03-15 NOTE — H&P
Subjective .     REASON FOR ADMISSION:  Radiation esophagitis with very poor oral intake leading to dehydration and malnutrition.    HISTORY OF PRESENT ILLNESS:  The patient is a 68 y.o. year old female who is here for follow-up with the above-mentioned history.  She completed her weekly carboplatin and Taxol chemotherapy on 3/1/2018 and completed her radiation therapy about a week ago.  She was scheduled return today for follow-up and get her scheduled for post treatment scans and endoscopic evaluation.      Unfortunately when she presented to the office today she was profoundly weak.  She's been unable to  the office for a weight.  The patient and family report that she really has not been able to keep anything down for several days.  She also has had pain most likely due to radiation esophagitis.  She will require admission to the hospital today for further symptom control.     Upon completion of her combined radiation and chemotherapy for esophageal cancer we may consider referring her for evaluation by Dr. Norwood for potential esophagectomy.  The other option would be to continue to observe her expectantly after radiation and chemotherapy and consider future Opdivo therapy if she demonstrates disease progression since her disease was PDL 1 positive.  History of Present Illness        Past Medical History:   Diagnosis Date   • Adenocarcinoma of lung 2017    HAD RADIATION    • Anxiety and depression    • Arthritis    • Benign parotid tumor 2012    removed by Dr Vickers told it was benign   • COPD (chronic obstructive pulmonary disease)    • Depression    • Diarrhea    • Early cataract    • Emphysema of lung    • Esophageal cancer 2017   • History of chronic pain    • History of colon polyps    • History of pneumonia    • Hyperlipidemia    • Joint pain    • Stroke        ONCOLOGIC HISTORY:  (History from previous dates can be found in the separate document.)    No current facility-administered medications  on file prior to visit.      Current Outpatient Prescriptions on File Prior to Visit   Medication Sig Dispense Refill   • albuterol (VENTOLIN HFA) 108 (90 Base) MCG/ACT inhaler Inhale 2 puffs Every 6 (Six) Hours As Needed for Wheezing.     • aspirin 81 MG EC tablet Take 81 mg by mouth Daily.     • buPROPion XL (WELLBUTRIN XL) 300 MG 24 hr tablet Take 300 mg by mouth Every Evening.     • Cholecalciferol (VITAMIN D) 2000 units tablet Take 2,000 Units by mouth Every Other Day.     • cyanocobalamin (VITAMIN B-12) 2000 MCG tablet Take 2,000 mcg by mouth Every Other Day.     • diazePAM (VALIUM) 5 MG tablet Take 5 mg by mouth 4 (Four) Times a Day As Needed for Anxiety.     • diphenoxylate-atropine (LOMOTIL) 2.5-0.025 MG per tablet TK 1 T PO  QID PRN  1   • escitalopram (LEXAPRO) 20 MG tablet Take 20 mg by mouth Every Evening.     • fluticasone (FLONASE) 50 MCG/ACT nasal spray 2 sprays into each nostril Daily As Needed for Rhinitis.     • HYDROcodone-acetaminophen (NORCO) 7.5-325 MG per tablet Take 1 tablet by mouth 4 (Four) Times a Day As Needed for Moderate Pain .     • Loperamide HCl (IMODIUM A-D PO) Take 1-2 tablets by mouth 2 (Two) Times a Day. 1 IN THE AM AND 2 IN THE PM     • Multiple Vitamins-Minerals (CENTRUM SILVER PO) Take 1 tablet by mouth Daily.     • ondansetron (ZOFRAN) 8 MG tablet Take 1 tablet by mouth Every 8 (Eight) Hours As Needed for Nausea or Vomiting. 60 tablet 5   • Probiotic Product (PROBIOTIC PO) Take 1 capsule by mouth Daily.     • promethazine (PHENERGAN) 12.5 MG tablet TAKE 1 TABLET BY MOUTH EVERY 6 HOURS AS NEEDED FOR NAUSEA OR VOMITING 30 tablet 0   • simvastatin (ZOCOR) 20 MG tablet Take 20 mg by mouth Every Night.     • sucralfate (CARAFATE) 1 GM/10ML suspension Take 10 mL by mouth Every 6 (Six) Hours. 420 mL 1   • umeclidinium-vilanterol (ANORO ELLIPTA) 62.5-25 MCG/INH aerosol powder  inhaler Inhale 1 puff Daily.     • Wheat Dextrin (BENEFIBER DRINK MIX) pack Take  by mouth Daily.     •  [DISCONTINUED] pantoprazole (PROTONIX) 40 MG EC tablet Take 1 tablet by mouth Daily. 30 tablet 3       ALLERGIES:     Allergies   Allergen Reactions   • Penicillins Hives       Social History     Social History   • Marital status:      Spouse name: N/A   • Number of children: N/A   • Years of education: High school     Occupational History   •  Retired     Social History Main Topics   • Smoking status: Former Smoker     Packs/day: 1.00     Years: 30.00     Types: Cigarettes     Quit date: 1973   • Smokeless tobacco: Never Used      Comment: Caffeine-2 daily   • Alcohol use No   • Drug use: No   • Sexual activity: Defer     Other Topics Concern   • Not on file     Social History Narrative    Lives alone.         Cancer-related family history includes Cancer in her sister and sister; Lung cancer in her sister.     Review of Systems   Constitutional: Positive for activity change, appetite change, fatigue and unexpected weight change. Negative for fever.   HENT: Negative for hearing loss, nosebleeds, trouble swallowing and voice change.    Eyes: Negative for visual disturbance.   Respiratory: Negative for cough, shortness of breath and wheezing.    Cardiovascular: Positive for chest pain. Negative for palpitations.   Gastrointestinal: Positive for diarrhea, nausea and vomiting. Negative for abdominal pain.        Short bowel symptoms after partial colectomy.  Severe reflux and painful swallowing most likely due to radiation esophagitis.   Genitourinary: Negative for difficulty urinating, frequency, hematuria and urgency.   Musculoskeletal: Negative for back pain and neck pain.   Skin: Negative for rash.   Neurological: Positive for weakness. Negative for dizziness, seizures, syncope and headaches.   Hematological: Negative for adenopathy. Does not bruise/bleed easily.   Psychiatric/Behavioral: Negative for behavioral problems. The patient is not nervous/anxious.    A comprehensive 14 point review of systems was  performed and was negative except as mentioned.    Objective      Vitals:    03/15/18 1059   BP: 121/70   Pulse: 92   Resp: 14   Temp: 97.4 °F (36.3 °C)   SpO2: 97%      Current Status 3/15/2018   ECOG score 0       Physical Exam    Constitutional: She is oriented to person, place, and time. She appears well-developed. No distress.   Weak appearing   HENT:   Head: Normocephalic.   Eyes: Conjunctivae and EOM are normal. Pupils are equal, round, and reactive to light. No scleral icterus.   Neck: Normal range of motion. Neck supple. No JVD present. No thyromegaly present.   Cardiovascular: Normal rate and regular rhythm.  Exam reveals no gallop and no friction rub.    No murmur heard.  Pulmonary/Chest: Effort normal. No respiratory distress. She has decreased breath sounds. She has no wheezes. She has no rhonchi. She has no rales.   Right IJ Mediport the right chest wall.   Abdominal: Soft. She exhibits no distension and no mass. There is no tenderness.   Musculoskeletal: Normal range of motion. She exhibits no edema or deformity.   Neurological: She is alert and oriented to person, place, and time. She has normal reflexes. No cranial nerve deficit.   Skin: Skin is warm and dry. No rash noted. No erythema.   Psychiatric: She has a normal mood and affect. Her behavior is normal. Judgment normal.    RECENT LABS:  Hematology WBC   Date Value Ref Range Status   03/15/2018 7.82 4.00 - 10.00 10*3/mm3 Final     RBC   Date Value Ref Range Status   03/15/2018 4.01 3.90 - 5.00 10*6/mm3 Final     Hemoglobin   Date Value Ref Range Status   03/15/2018 12.1 11.5 - 14.9 g/dL Final     Hematocrit   Date Value Ref Range Status   03/15/2018 35.9 34.0 - 45.0 % Final     Platelets   Date Value Ref Range Status   03/15/2018 176 150 - 375 10*3/mm3 Final        Assessment/Plan   1.  Well differentiated adenocarcinoma presumably from lung origin in a long-time smoker.  The biopsy was made from navigational bronchoscopy and endobronchial  ultrasound at [place] on 10/6/2017.  Staging PET scan showed no mediastinal adenopathy or obvious distant metastases.  She did have uptake in the lower esophagus which was evaluated with EGD and biopsy on 12/6/2017 and unfortunately was found to be adenocarcinoma at the GE junction.   · LungTumor tissue was negative for PDL 1 expression, EGFR, ALK, or ROS 1 mutations.   · She completed stereotactic radiation to the lung tumor 12/15/2017.  2.  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   · Esophageal adenocarcinoma was negative for HER-2/elizabeth overexpression but positive for PDL 1.  · Initiated combined radiation therapy to the esophagus along with weekly low-dose carboplatin and Taxol chemotherapy 1/11/18.  · She completed her carboplatin and Taxol chemotherapy on 3/1/2018 and completed her radiation therapy 3/7/2018    3. Emphysema due to smoking.  4. Placement of right internal jugular MediPort by Dr. Raphael Germain of vascular surgery 1/9/2018.  5.  She was not considered a candidate for lobectomy due to her poor pulmonary function but we plan to have her reevaluated by cardiology and pulmonology prior to determining if she is a potential candidate for esophageal resection.  Sheseen by Dr. Puneet Treviño of cardiology 2/9/2018.  She also has been seen by pulmonology.  6.  History of benign right parotid tumor in 2012, surgically removed per Dr. Vickers.  Patient is noted to have very tiny nodule below the scar, new to her, but possibly just scar tissue. Encouraged her to watch closely, as will we, for any enlargement.  7. Profound weakness and dehydration due to toxicity from her treatment.  Presumably this is in part due to radiation esophagitis although she could also have some radiation stricturing of the esophagus.  She will require admission to the hospital for management of her dehydration and toxicities from treatment.  She also will be undergoing follow-up scans and endoscopic  assessment.     Plan  1.  Patient will be admitted to 3 [facility] oncology floor for further supportive care including   IV fluids  IV antiemetics  IV pain medication  2.  The patient initially had been considered not a candidate for surgery but on further follow-up by pulmonary and cardiology it appears that she could potentially be a candidate for surgical resection of the esophagus although given her degree of frailty and the fact that her esophageal tumor was PD L1 positive, I think in this situation may be best to continue to observe her without surgery and plan to utilize immunotherapy in the future if she has  disease recurrence.  3.  If her symptoms improve and she is able to maintain oral hydration and nutrition in the next few days then she could be discharged home with further appointments scheduled as an outpatient for posttreatment CT scans and endoscopic assessment.  If her swallowing issues do not improve significantly that she may have to undergo endoscopic evaluation on this admission.  4.  We will consult gastroenterology.                       Cc:  Daniel Oconnor MD

## 2018-03-16 LAB
ALBUMIN SERPL-MCNC: 3.3 G/DL (ref 3.5–5.2)
ALBUMIN/GLOB SERPL: 2.2 G/DL
ALP SERPL-CCNC: 43 U/L (ref 39–117)
ALT SERPL W P-5'-P-CCNC: 9 U/L (ref 1–33)
ANION GAP SERPL CALCULATED.3IONS-SCNC: 8.6 MMOL/L
ANISOCYTOSIS BLD QL: NORMAL
AST SERPL-CCNC: 12 U/L (ref 1–32)
BASOPHILS # BLD AUTO: 0.03 10*3/MM3 (ref 0–0.2)
BASOPHILS NFR BLD AUTO: 1 % (ref 0–1.5)
BILIRUB SERPL-MCNC: 0.2 MG/DL (ref 0.1–1.2)
BUN BLD-MCNC: 6 MG/DL (ref 8–23)
BUN/CREAT SERPL: 8.6 (ref 7–25)
CALCIUM SPEC-SCNC: 8 MG/DL (ref 8.6–10.5)
CHLORIDE SERPL-SCNC: 105 MMOL/L (ref 98–107)
CO2 SERPL-SCNC: 24.4 MMOL/L (ref 22–29)
CREAT BLD-MCNC: 0.7 MG/DL (ref 0.57–1)
DEPRECATED RDW RBC AUTO: 56.9 FL (ref 37–54)
ELLIPTOCYTES BLD QL SMEAR: NORMAL
EOSINOPHIL # BLD AUTO: 0.04 10*3/MM3 (ref 0–0.7)
EOSINOPHIL NFR BLD AUTO: 1.3 % (ref 0.3–6.2)
ERYTHROCYTE [DISTWIDTH] IN BLOOD BY AUTOMATED COUNT: 17.3 % (ref 11.7–13)
GFR SERPL CREATININE-BSD FRML MDRD: 83 ML/MIN/1.73
GLOBULIN UR ELPH-MCNC: 1.5 GM/DL
GLUCOSE BLD-MCNC: 117 MG/DL (ref 65–99)
HCT VFR BLD AUTO: 27.9 % (ref 35.6–45.5)
HGB BLD-MCNC: 9.4 G/DL (ref 11.9–15.5)
IMM GRANULOCYTES # BLD: 0.02 10*3/MM3 (ref 0–0.03)
IMM GRANULOCYTES NFR BLD: 0.6 % (ref 0–0.5)
LYMPHOCYTES # BLD AUTO: 0.64 10*3/MM3 (ref 0.9–4.8)
LYMPHOCYTES NFR BLD AUTO: 20.7 % (ref 19.6–45.3)
MCH RBC QN AUTO: 30.2 PG (ref 26.9–32)
MCHC RBC AUTO-ENTMCNC: 33.7 G/DL (ref 32.4–36.3)
MCV RBC AUTO: 89.7 FL (ref 80.5–98.2)
MONOCYTES # BLD AUTO: 0.7 10*3/MM3 (ref 0.2–1.2)
MONOCYTES NFR BLD AUTO: 22.7 % (ref 5–12)
NEUTROPHILS # BLD AUTO: 1.66 10*3/MM3 (ref 1.9–8.1)
NEUTROPHILS NFR BLD AUTO: 53.7 % (ref 42.7–76)
PLAT MORPH BLD: NORMAL
PLATELET # BLD AUTO: 151 10*3/MM3 (ref 140–500)
PMV BLD AUTO: 11 FL (ref 6–12)
POTASSIUM BLD-SCNC: 3.5 MMOL/L (ref 3.5–5.2)
PROT SERPL-MCNC: 4.8 G/DL (ref 6–8.5)
RBC # BLD AUTO: 3.11 10*6/MM3 (ref 3.9–5.2)
SODIUM BLD-SCNC: 138 MMOL/L (ref 136–145)
WBC MORPH BLD: NORMAL
WBC NRBC COR # BLD: 3.09 10*3/MM3 (ref 4.5–10.7)

## 2018-03-16 PROCEDURE — 99232 SBSQ HOSP IP/OBS MODERATE 35: CPT | Performed by: INTERNAL MEDICINE

## 2018-03-16 PROCEDURE — 85007 BL SMEAR W/DIFF WBC COUNT: CPT | Performed by: INTERNAL MEDICINE

## 2018-03-16 PROCEDURE — 63710000001 PROMETHAZINE PER 12.5 MG: Performed by: INTERNAL MEDICINE

## 2018-03-16 PROCEDURE — 25010000002 ENOXAPARIN PER 10 MG: Performed by: INTERNAL MEDICINE

## 2018-03-16 PROCEDURE — 85025 COMPLETE CBC W/AUTO DIFF WBC: CPT | Performed by: INTERNAL MEDICINE

## 2018-03-16 PROCEDURE — 80053 COMPREHEN METABOLIC PANEL: CPT | Performed by: INTERNAL MEDICINE

## 2018-03-16 PROCEDURE — 99231 SBSQ HOSP IP/OBS SF/LOW 25: CPT | Performed by: INTERNAL MEDICINE

## 2018-03-16 PROCEDURE — 25810000003 DEXTROSE-NACL PER 500 ML: Performed by: INTERNAL MEDICINE

## 2018-03-16 PROCEDURE — 25010000002 ONDANSETRON PER 1 MG: Performed by: INTERNAL MEDICINE

## 2018-03-16 RX ORDER — SUCRALFATE ORAL 1 G/10ML
1 SUSPENSION ORAL EVERY 6 HOURS SCHEDULED
Status: DISCONTINUED | OUTPATIENT
Start: 2018-03-16 | End: 2018-03-16 | Stop reason: SDUPTHER

## 2018-03-16 RX ORDER — PANTOPRAZOLE SODIUM 40 MG/10ML
40 INJECTION, POWDER, LYOPHILIZED, FOR SOLUTION INTRAVENOUS
Status: DISCONTINUED | OUTPATIENT
Start: 2018-03-17 | End: 2018-03-20 | Stop reason: HOSPADM

## 2018-03-16 RX ORDER — SODIUM CHLORIDE, SODIUM LACTATE, POTASSIUM CHLORIDE, CALCIUM CHLORIDE 600; 310; 30; 20 MG/100ML; MG/100ML; MG/100ML; MG/100ML
30 INJECTION, SOLUTION INTRAVENOUS CONTINUOUS
Status: DISCONTINUED | OUTPATIENT
Start: 2018-03-16 | End: 2018-03-19

## 2018-03-16 RX ORDER — PROMETHAZINE HYDROCHLORIDE 12.5 MG/1
6.25 TABLET ORAL EVERY 6 HOURS
Status: DISCONTINUED | OUTPATIENT
Start: 2018-03-16 | End: 2018-03-20 | Stop reason: HOSPADM

## 2018-03-16 RX ADMIN — ASPIRIN 81 MG: 81 TABLET ORAL at 09:09

## 2018-03-16 RX ADMIN — DEXTROSE AND SODIUM CHLORIDE 125 ML/HR: 5; 900 INJECTION, SOLUTION INTRAVENOUS at 23:35

## 2018-03-16 RX ADMIN — ENOXAPARIN SODIUM 30 MG: 30 INJECTION SUBCUTANEOUS at 13:26

## 2018-03-16 RX ADMIN — SUCRALFATE 1 G: 1 SUSPENSION ORAL at 17:43

## 2018-03-16 RX ADMIN — PROMETHAZINE HYDROCHLORIDE 6.25 MG: 12.5 TABLET ORAL at 14:06

## 2018-03-16 RX ADMIN — ONDANSETRON 4 MG: 2 INJECTION INTRAMUSCULAR; INTRAVENOUS at 17:46

## 2018-03-16 RX ADMIN — SUCRALFATE 1 G: 1 SUSPENSION ORAL at 06:55

## 2018-03-16 RX ADMIN — DEXTROSE AND SODIUM CHLORIDE 125 ML/HR: 5; 900 INJECTION, SOLUTION INTRAVENOUS at 14:56

## 2018-03-16 RX ADMIN — DEXTROSE AND SODIUM CHLORIDE 125 ML/HR: 5; 900 INJECTION, SOLUTION INTRAVENOUS at 06:54

## 2018-03-16 RX ADMIN — HYDROCODONE BITARTRATE AND ACETAMINOPHEN 1 TABLET: 7.5; 325 TABLET ORAL at 09:16

## 2018-03-16 RX ADMIN — SUCRALFATE 1 G: 1 SUSPENSION ORAL at 13:26

## 2018-03-16 RX ADMIN — Medication 10 ML: at 09:09

## 2018-03-16 RX ADMIN — BUPROPION HYDROCHLORIDE 300 MG: 300 TABLET, EXTENDED RELEASE ORAL at 17:43

## 2018-03-16 RX ADMIN — FAMOTIDINE 20 MG: 10 INJECTION, SOLUTION INTRAVENOUS at 09:08

## 2018-03-16 RX ADMIN — ONDANSETRON 4 MG: 2 INJECTION INTRAMUSCULAR; INTRAVENOUS at 09:08

## 2018-03-16 RX ADMIN — PROMETHAZINE HYDROCHLORIDE 6.25 MG: 12.5 TABLET ORAL at 21:51

## 2018-03-16 NOTE — PROGRESS NOTES
Subjective  mildly improved from admission though still with nausea and pain    REASON FOR FOLLOW UP:   1.  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 Moon Bowen on 10/6/2017.  Primary stereotactic radiation therapy to the left upper lobe completed 12/15/2017  2.  Lung tumor is negative for PDL 1.  Also negative for ALK, EGFR, and ROS 1 mutations.  3.  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   4.  Esophageal adenocarcinoma was negative for HER-2/elizabeth overexpression but positive for PDL 1  5.  Initiation of combined radiation and low-dose weekly carboplatin and Taxol chemotherapy for treatment of esophageal adenocarcinoma.  First treatment delivered 1/11/2018.  6.  Development of thrombocytopenia and radiation esophagitis on the visit of 2/15/2018.    HISTORY OF PRESENT ILLNESS: Ms. Abad is a very pleasant 68 y.o. female with the above-mentioned history who presented March 15 for evaluation near the end of her combined radiation and low-dose weekly carbo Taxol chemotherapy for adenocarcinoma of the esophagus.  She has received a total of 5 cycles of chemotherapy with weekly carboplatin and Taxol, with concurrent radiation.  She was due 2 weeks ago for her sixth cycle, but it was delayed due to esophagitis, myelosuppression, nausea and vomiting.  She was seen by one of her nurse practitioners last week and again her treatment was held and she received supportive care in the office with IV fluids and antiemetics.    She completed her weekly carboplatin and Taxol chemotherapy on 3/1/2018 having completed her radiation therapy about a week prior.  She was scheduled return 3/15 for follow-up and get her scheduled for post treatment scans and endoscopic evaluation.  Unfortunately when she presented to the office  she was profoundly weak.  She's been unable to  the office for a weight.  The patient and family reporedt that she  really has not been able to keep anything down for several days.  She also has had pain most likely due to radiation esophagitis.  She will require admission to the hospital today for further symptom control.    Upon completion of her combined radiation and chemotherapy for esophageal cancer we may consider referring her for evaluation by Dr. Norwood for potential esophagectomy.  The other option would be to continue to observe her expectantly after radiation and chemotherapy and consider future Opdivo therapy if she demonstrates disease progression since her disease was PDL 1 positive.   The patient was admitted for IV fluids, antiemetics and IV pain medications as needed as she is seen March 16 she is slightly improved discussed trying to adjust her medications yet again to improve her status.    History of Present Illness     Past Medical History:   Diagnosis Date   • Adenocarcinoma of lung 2017    HAD RADIATION    • Anxiety and depression    • Arthritis    • Benign parotid tumor 2012    removed by Dr Vickers told it was benign   • COPD (chronic obstructive pulmonary disease)    • Depression    • Diarrhea    • Early cataract    • Emphysema of lung    • Esophageal cancer 2017   • History of chronic pain    • History of colon polyps    • History of pneumonia    • Hyperlipidemia    • Joint pain    • Stroke       HEMATOLOGIC/ ONCOLOGIC HISTORY:  The patient is a 67 y.o. year old female who has a history of smoking and COPD.  She had an abnormal CT scan in February of this year showing a 3.5 cm mass in the left suprahilar area.  A repeat CT scan was performed at [facility] 7/13/2017 and show that the mass had increased in size from about 3.5 cm up to 6.5 cm.    She underwent navigational bronchoscopy and endobronchial ultrasound and biopsy with Dr. Moon Bowen on 10/6/2017.  The pathology on the biopsy showed well-differentiated adenocarcinoma.  She is referred to us now by her pulmonologist   Daniel Oconnor for further staging evaluation and treatment recommendations.    She has not had a PET scan for staging.  She had a PDL 1 staining on her bronchoscopy specimen which was negative for PDL 1.  EGFR, ALK, and ROS 1 were also negative.    She is still smoking but is trying to quit.  She had pulmonary function tests performed on 10/25/2017 showing a DLCO of 33% and FEV1 37%.    She was referred to the [facility] and completed primary radiation to the left upper lobe lung mass.  Because of abnormal uptake in the esophagus noted on her staging PET scan, she also underwent an EGD and biopsy which unfortunately revealed adenocarcinoma of the GE junction.  Molecular studies for HER-2/elizabeth and PDL 1 on the tumor tissue showed that she was PD L1 positive and HER-2/elizabeth negative.  We recommended combined weekly low dose carbo Taxol chemotherapy and radiation.     She had significant difficulty with treatment particularly with radiation esophagitis near the end of her course of therapy.  She required some dose delay and is expected to complete her radiation treatments the first week of March 2018.  Last dose of carboplatin and Taxol chemotherapy was delivered 3/1/2018.      Past Surgical History:   Procedure Laterality Date   • BRONCHOSCOPY     • CHOLECYSTECTOMY  1999   • COLON SURGERY  2015    COLON POLYPS   • COLONOSCOPY  04/13/2016    TA w/low grade dysplasia x 3, serrated adenoma x 2   • COLONOSCOPY  06/14/2016    TA w/high grade dysplasia   • ENDOSCOPY N/A 12/6/2017    Procedure: ESOPHAGOGASTRODUODENOSCOPY WITH BIOPSY;  Surgeon: Jayant Robles MD;  [place] [facility] [place];  Service:    • FOOT SURGERY  10/2010    [place]   • HYSTERECTOMY     • SC INSJ TUNNELED CVC W/O SUBQ PORT/ AGE 3 YR/> Right 1/9/2018    Procedure: MEDIPORT PLACEMENT right;  Surgeon: Damaso Germain MD;  [place] [facility] [place] 18/19;  Service: Vascular   • SALIVARY GLAND SURGERY      PAROTID MASS REMOVED BENIGN  "  • SHOULDER ARTHROSCOPY Left 1995. 2001        ALLERGIES:    Allergies   Allergen Reactions   • Penicillins Hives         Review of Systems   Constitutional: Positive for activity change, appetite change, fatigue and unexpected weight change. Negative for fever.   HENT: Negative for hearing loss, nosebleeds, trouble swallowing and voice change.    Eyes: Negative for visual disturbance.   Respiratory: Negative for cough, shortness of breath and wheezing.    Cardiovascular: Positive for chest pain. Negative for palpitations.   Gastrointestinal: Positive for diarrhea, nausea and vomiting. Negative for abdominal pain.        Short bowel symptoms after partial colectomy.  Severe reflux and painful swallowing most likely due to radiation esophagitis.   Genitourinary: Negative for difficulty urinating, frequency, hematuria and urgency.   Musculoskeletal: Negative for back pain and neck pain.   Skin: Negative for rash.   Neurological: Positive for weakness. Negative for dizziness, seizures, syncope and headaches.   Hematological: Negative for adenopathy. Does not bruise/bleed easily.   Psychiatric/Behavioral: Negative for behavioral problems. The patient is not nervous/anxious.         Objective     Vitals:    03/15/18 1342 03/15/18 2206 03/16/18 0549   BP: 98/57 133/73 141/82   BP Location: Right arm Left arm Right arm   Patient Position: Lying Lying Lying   Pulse: 79 75 75   Resp: 18 16 16   Temp: 97.3 °F (36.3 °C) 98.3 °F (36.8 °C) 98.2 °F (36.8 °C)   TempSrc: Oral Oral Oral   SpO2: 97% 97% 97%   Weight: 49.4 kg (108 lb 12.8 oz)     Height: 170.2 cm (67\")       Current Status 3/15/2018   ECOG score 0     Repeat physical exam March 16 is unchanged from previous  Physical Exam   Constitutional: She is oriented to person, place, and time. She appears well-developed. No distress.   Weak appearing   HENT:   Head: Normocephalic.   Eyes: Conjunctivae and EOM are normal. Pupils are equal, round, and reactive to light. No " scleral icterus.   Neck: Normal range of motion. Neck supple. No JVD present. No thyromegaly present.   Cardiovascular: Normal rate and regular rhythm.  Exam reveals no gallop and no friction rub.    No murmur heard.  Pulmonary/Chest: Effort normal. No respiratory distress. She has decreased breath sounds. She has no wheezes. She has no rhonchi. She has no rales.   Right IJ Mediport the right chest wall.   Abdominal: Soft. She exhibits no distension and no mass. There is no tenderness.   Musculoskeletal: Normal range of motion. She exhibits no edema or deformity.   Neurological: She is alert and oriented to person, place, and time. She has normal reflexes. No cranial nerve deficit.   Skin: Skin is warm and dry. No rash noted. No erythema.   Psychiatric: She has a normal mood and affect. Her behavior is normal. Judgment normal.         RECENT LABS:  Hematology WBC   Date Value Ref Range Status   03/16/2018 3.09 (L) 4.50 - 10.70 10*3/mm3 Final     RBC   Date Value Ref Range Status   03/16/2018 3.11 (L) 3.90 - 5.20 10*6/mm3 Final     Hemoglobin   Date Value Ref Range Status   03/16/2018 9.4 (L) 11.9 - 15.5 g/dL Final     Hematocrit   Date Value Ref Range Status   03/16/2018 27.9 (L) 35.6 - 45.5 % Final     Platelets   Date Value Ref Range Status   03/16/2018 151 140 - 500 10*3/mm3 Final        Lab Results   Component Value Date    GLUCOSE 117 (H) 03/16/2018    BUN 6 (L) 03/16/2018    CREATININE 0.70 03/16/2018    EGFRIFNONA 83 03/16/2018    BCR 8.6 03/16/2018    K 3.5 03/16/2018    CO2 24.4 03/16/2018    CALCIUM 8.0 (L) 03/16/2018    ALBUMIN 3.30 (L) 03/16/2018    LABIL2 2.2 03/16/2018    AST 12 03/16/2018    ALT 9 03/16/2018       PATHOLOGY FROM EGD 12/6/2017  Final Diagnosis   1.  GASTROESOPHAGEAL JUNCTION, BIOPSIES:             INVASIVE ADENOCARCINOMA.            NO IDENTIFIED INTESTINAL METAPLASIA.      COMMENT: In this specimen tumor is identified extending into submucosal tissue. Deeper extension cannot be  excluded in these superficial biopsies.   Her 2 Elizabeth negative  PD L1 positive ( > 1 ).    STRESS TEST 3/5/2018  Interpretation Summary     · Myocardial perfusion imaging indicates a normal myocardial perfusion study with no evidence of ischemia.  · Left ventricular ejection fraction is normal (Calculated EF = 66%).  · Impressions are consistent with a low risk study.  · There is no prior study available for comparison       Assessment/Plan   1.  Well differentiated adenocarcinoma presumably from lung origin in a long-time smoker.  The biopsy was made from navigational bronchoscopy and endobronchial ultrasound at [place] on 10/6/2017.  Staging PET scan showed no mediastinal adenopathy or obvious distant metastases.  She did have uptake in the lower esophagus which was evaluated with EGD and biopsy on 12/6/2017 and unfortunately was found to be adenocarcinoma at the GE junction.   · LungTumor tissue was negative for PDL 1 expression, EGFR, ALK, or ROS 1 mutations.   · She completed stereotactic radiation to the lung tumor 12/15/2017.      2.  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   · Esophageal adenocarcinoma was negative for HER-2/elizabeth overexpression but positive for PDL 1.  · Initiated combined radiation therapy to the esophagus along with weekly low-dose carboplatin and Taxol chemotherapy 1/11/18.  · She completed her carboplatin and Taxol chemotherapy on 3/1/2018 and completed her radiation therapy 3/7/2018        3. Emphysema due to smoking.    4. Placement of right internal jugular MediPort by Dr. Raphael Germain of vascular surgery 1/9/2018.    5.  She was not considered a candidate for lobectomy due to her poor pulmonary function but we plan to have her reevaluated by cardiology and pulmonology prior to determining if she is a potential candidate for esophageal resection.  Sheseen by Dr. Puneet Treviño of cardiology 2/9/2018.  She also has been seen by pulmonology.    6.  History  of benign right parotid tumor in 2012, surgically removed per Dr. Vickers.  Patient is noted to have very tiny nodule below the scar, new to her, but possibly just scar tissue. Encouraged her to watch closely, as will we, for any enlargement.    7. Profound weakness and dehydration due to toxicity from her treatment.  Presumably this is in part due to radiation esophagitis although she could also have some radiation stricturing of the esophagus.  She was admitted  for management of her dehydration and toxicities from treatment.  She also will be undergoing follow-up scans and endoscopic assessment.    Plan  1.  Plan to continue- circumstances discussed with patient- adjust  IV fluids  IV antiemetics  IV pain medication  2.  The patient initially had been considered not a candidate for surgery but on further follow-up by pulmonary and cardiology it appears that she could potentially be a candidate for surgical resection of the esophagus although given her degree of frailty and the fact that her esophageal tumor was PD L1 positive, I think in this situation may be best to continue to observe her without surgery and plan to utilize immunotherapy in the future if she has  disease recurrence.  3.  If her symptoms improve and she is able to maintain oral hydration and nutrition in the next few days then she could be discharged home with further appointments scheduled as an outpatient for posttreatment CT scans and endoscopic assessment.  If her swallowing issues do not improve significantly that she may have to undergo endoscopic evaluation on this admission.

## 2018-03-16 NOTE — CONSULTS
Adult Nutrition  Assessment/PES    Patient Name:  Dilma Abad  YOB: 1950  MRN: [id number]  Admit Date:  3/15/2018    Assessment Date:  3/16/2018   Assessment completed. Patient stated minimal po intake for the past 2 weeks, about 16# weight loss in this time frame. Severe malnutrition  Provided nutrition therapy-went over ways to meet nutritional needs-agreed to carnation instant breakfast, ensure and milkshake with protein powder. Patient stated tolerating liquids.  Will continue to follow     Reason for Assessment   Reason For Assessment nurse/nurse practitioner consult   Diagnosis   Primary Problem:  Radiation esophagitis    Identified At Risk by Screening Criteria MST SCORE 2+;unintentional loss of 10 lbs or more in the past 2 mos;reduced oral intake over the last month           Adult Nutrition Assessment     Row Name 03/16/18 1208       Calculation Measurements    Weight Used For Calculations 49 kg (108 lb)       Estimated/Assessed Needs    Additional Documentation KCAL/KG (Group);Protein Requirements (Group);Fluid Requirements (Group)       KCAL/KG                                                      kcal/kg (Specify)   7159-7138       Kelly-St. Jeor Equation    RMR (Kelly-St. Jeor Equation) 1042.51       Protein Requirements    Est Protein Requirement Amount (gms/kg) 1.0 gm protein    Estimated Protein Requirements (gms/day) 48.99       Fluid Requirements    Estimated Fluid Requirements (mL/day) 1300    RDA Method (mL) 1300    Naomi-Segar Method (over 20 kg) 2479.76       Nutrition Prescription PO    Current PO Diet Full Liquid       Malnutrition Severity Assessment    Malnutrition Type Chronic Illness Malnutrition       Physical Signs of Malnutrition (Chronic)    Muscle Wasting Severe    Fat Loss Severe       Weight Status (Chronic)    BMI Mod (<17)   BMI-17    %IBW Mod <80%   80%    Weight Loss Severe (>5% / 1 mo)   13% weight loss in 1 month    Energy Intake Severe (< or equal to  50% / > or equal to 1 mo)       Criteria Met (Must meet criteria for severity in at least 2 of these categories: M Wasting, Fat Loss, Fluid, Secondary Signs, Wt. Status, Intake)    Patient meets criteria for  Severe malnutrition    Row Name 03/16/18 1207                             Usual Body Weight (UBW)    Weight Loss unintentional    Weight Loss Time Frame --   16# weight loss in 1 month       Body Mass Index (BMI)    BMI Assessment BMI 17-18.4: protein-energy malnutrition grade I       Labs/Procedures/Meds    Lab Results Reviewed reviewed, pertinent       Physical Findings    Overall Physical Appearance loss of muscle mass;loss of subcutaneous fat;underweight          Problem/Interventions:        Problem 1     Row Name 03/16/18 1209       Nutrition Diagnoses Problem 1    Problem 1 Malnutrition    Etiology (related to) MNT for Treatment/Condition    Signs/Symptoms (evidenced by) Report of Mnimal PO Intake;Unintended Weight Change    Unintended Weight Change Loss    Number of Pounds Lost 16#    Weight loss time period 1 month                    Intervention Goal     Row Name 03/16/18 1209       Intervention Goal    General Maintain nutrition;Meet nutritional needs for age/condition    PO Tolerate PO;Increase intake;Advance diet    Weight Appropriate weight gain            Nutrition Intervention     Row Name 03/16/18 1210       Nutrition Intervention    RD/Tech Action Interview for preference;Follow Tx progress;Care plan reviewd;Encourage intake;Recommend/ordered;Supplement provided    Recommended/Ordered Supplement            Nutrition Prescription     Row Name 03/16/18 1210       Nutrition Prescription PO    PO Prescription Begin/change supplement    Supplement [place] Breakfast Essentials;Ensure Enlive;Milkshake;PRO powder    Supplement Frequency 3 times a day    New PO Prescription Ordered? Yes            Education/Evaluation     Row Name 03/16/18 1210       Education    Education Will Instruct as  appropriate       Monitor/Evaluation    Monitor Per protocol    Education Follow-up Reinforce PRN        Electronically signed by:  Darshana Denise RD  03/16/18 12:10 PM

## 2018-03-16 NOTE — PAYOR COMM NOTE
"Dilma Shell (68 y.o. Female)     ATTN:  AROLDO LEMUS   ID#[id number]    ICD 10 CODE=C15.9;K20.8  PLEASE CALL BACK TO ELIAS [phone] OR [phone]  THANKS!   ELIAS      Date of Birth Social Security Number Address Home Phone MRN    1950  [id number] [id number] [phone] [id number]    Religious Marital Status          None        Admission Date Admission Type Admitting Provider Attending Provider Department, Room/Bed    3/15/18 Urgent Bienvenido Collier MD Webb, Charles D., MD [address], P396/1    Discharge Date Discharge Disposition Discharge Destination                       Attending Provider:  Bienvenido Collier MD    Allergies:  Penicillins    Isolation:  None   Infection:  None   Code Status:  FULL    Ht:  170.2 cm (67\")   Wt:  49.4 kg (108 lb 12.8 oz)    Admission Cmt:  None   Principal Problem:  None                Active Insurance as of 3/15/2018     Primary Coverage     Payor Plan Insurance Group Employer/Plan Group    [employer] [place] MED[employer]ARE REPLACEMENT [facility]      Payor Plan Address Payor Plan Phone Number Effective From Effective To    [address] [address] [phone] 10/20/2017     [place] [address]       Subscriber Name Subscriber Birth Date Member ID       DILMA SHELL [id number]                 Emergency Contacts      (Rel.) Home Phone Work Phone Mobile Phone    Gricelda Fuentes (Sister) [phone] -- --            History & Physical     No notes of this type exist for this encounter.        Emergency Department Notes     No notes of this type exist for this encounter.        Hospital Medications (all)       Dose Frequency Start End    acetaminophen (TYLENOL) tablet 650 mg 650 mg Every 4 Hours PRN 3/15/2018     Sig - Route: Take 2 tablets by mouth Every 4 (Four) Hours As Needed for Mild Pain  or Fever (temperature greater than 101 F). - Oral    albuterol (PROVENTIL) nebulizer solution 0.083% " 2.5 mg/3mL 2.5 mg Every 4 Hours PRN 3/15/2018     Sig - Route: Take 2.5 mg by nebulization Every 4 (Four) Hours As Needed for Shortness of Air. - Nebulization    aspirin EC tablet 81 mg 81 mg Daily 3/15/2018     Sig - Route: Take 1 tablet by mouth Daily. - Oral    buPROPion XL (WELLBUTRIN XL) 24 hr tablet 300 mg 300 mg Every Evening 3/15/2018     Sig - Route: Take 1 tablet by mouth Every Evening. - Oral    dextrose 5 % and sodium chloride 0.9 % infusion 125 mL/hr Continuous 3/15/2018     Sig - Route: Infuse 125 mL/hr into a venous catheter Continuous. - Intravenous    diazePAM (VALIUM) tablet 5 mg 5 mg 4 Times Daily PRN 3/15/2018     Sig - Route: Take 1 tablet by mouth 4 (Four) Times a Day As Needed for Anxiety. - Oral    enoxaparin (LOVENOX) syringe 30 mg 30 mg Every 24 Hours 3/15/2018     Sig - Route: Inject 0.3 mL under the skin Daily. - Subcutaneous    heparin flush (porcine) 100 UNIT/ML injection 500 Units 5 mL Every 12 Hours Scheduled 3/15/2018     Sig - Route: 5 mL by Intracatheter route Every 12 (Twelve) Hours. - Intracatheter    heparin flush (porcine) 100 UNIT/ML injection 500 Units 5 mL As Needed 3/15/2018     Sig - Route: 5 mL by Intracatheter route As Needed for Line Care (After Medication Administration or Blood Draw If Not Going to Be Utilized Immediately). - Intracatheter    HYDROcodone-acetaminophen (NORCO) 5-325 MG per tablet 1 tablet 1 tablet Every 4 Hours PRN 3/15/2018     Sig - Route: Take 1 tablet by mouth Every 4 (Four) Hours As Needed for Moderate Pain . - Oral    HYDROcodone-acetaminophen (NORCO) 7.5-325 MG per tablet 1 tablet 1 tablet 4 Times Daily PRN 3/15/2018     Sig - Route: Take 1 tablet by mouth 4 (Four) Times a Day As Needed for Moderate Pain . - Oral    lactated ringers infusion 30 mL/hr Continuous 3/16/2018     Sig - Route: Infuse 30 mL/hr into a venous catheter Continuous. - Intravenous    loperamide (IMODIUM) capsule 2 mg 2 mg 4 Times Daily PRN 3/15/2018     Sig - Route: Take 1  "capsule by mouth 4 (Four) Times a Day As Needed for Diarrhea. - Oral    morphine injection 2 mg 2 mg Every 4 Hours PRN 3/15/2018 3/25/2018    Sig - Route: Infuse 1 mL into a venous catheter Every 4 (Four) Hours As Needed for Severe Pain . - Intravenous    Linked Group 1:  \"And\" Linked Group Details        naloxone (NARCAN) injection 0.4 mg 0.4 mg Every 5 Minutes PRN 3/15/2018     Sig - Route: Infuse 1 mL into a venous catheter Every 5 (Five) Minutes As Needed for Respiratory Depression. - Intravenous    Linked Group 1:  \"And\" Linked Group Details        ondansetron (ZOFRAN) injection 4 mg 4 mg Every 6 Hours PRN 3/15/2018     Sig - Route: Infuse 2 mL into a venous catheter Every 6 (Six) Hours As Needed for Nausea or Vomiting. - Intravenous    ondansetron (ZOFRAN) tablet 8 mg 8 mg Every 8 Hours PRN 3/15/2018     Sig - Route: Take 2 tablets by mouth Every 8 (Eight) Hours As Needed for Nausea or Vomiting. - Oral    pantoprazole (PROTONIX) injection 40 mg 40 mg Every Early Morning 3/17/2018     Sig - Route: Infuse 10 mL into a venous catheter Every Morning. - Intravenous    promethazine (PHENERGAN) tablet 6.25 mg 6.25 mg Every 6 Hours 3/16/2018     Sig - Route: Take 0.5 tablets by mouth Every 6 (Six) Hours. - Oral    sodium chloride 0.9 % bolus 500 mL 500 mL Once 3/15/2018 3/15/2018    Sig - Route: Infuse 500 mL into a venous catheter 1 (One) Time. - Intravenous    sodium chloride 0.9 % flush 10 mL 10 mL Every 12 Hours Scheduled 3/15/2018     Sig - Route: 10 mL by Intracatheter route Every 12 (Twelve) Hours. - Intracatheter    sodium chloride 0.9 % flush 10 mL 10 mL As Needed 3/15/2018     Sig - Route: 10 mL by Intracatheter route As Needed for Line Care (After Medication Administration or Blood Draw). - Intracatheter    sucralfate (CARAFATE) 1 GM/10ML suspension 1 g 1 g Every 6 Hours Scheduled 3/15/2018     Sig - Route: Take 10 mL by mouth Every 6 (Six) Hours. - Oral    temazepam (RESTORIL) capsule 15 mg 15 mg Nightly " PRN 3/15/2018 3/25/2018    Sig - Route: Take 1 capsule by mouth At Night As Needed for Sleep. - Oral    albuterol (PROVENTIL HFA;VENTOLIN HFA) inhaler 2 puff (Discontinued) 2 puff Every 4 Hours PRN 3/15/2018 3/15/2018    Sig - Route: Inhale 2 puffs Every 4 (Four) Hours As Needed for Shortness of Air. - Inhalation    Reason for Discontinue: Formulary change    famotidine (PEPCID) injection 20 mg (Discontinued) 20 mg 2 Times Daily 3/15/2018 3/16/2018    Sig - Route: Infuse 2 mL into a venous catheter 2 (Two) Times a Day. - Intravenous    promethazine (PHENERGAN) tablet 12.5 mg (Discontinued) 12.5 mg Every 6 Hours PRN 3/15/2018 3/16/2018    Sig - Route: Take 1 tablet by mouth Every 6 (Six) Hours As Needed for Nausea or Vomiting. - Oral    sucralfate (CARAFATE) 1 GM/10ML suspension 1 g (Discontinued) 1 g Every 6 Hours Scheduled 3/16/2018 3/16/2018    Sig - Route: Take 10 mL by mouth Every 6 (Six) Hours. - Oral    Reason for Discontinue: Duplicate order             Physician Progress Notes (last 72 hours) (Notes from 3/13/2018  2:34 PM through 3/16/2018  2:34 PM)      Pa Davis MD at 3/16/2018  1:07 PM            Subjective  mildly improved from admission though still with nausea and pain    REASON FOR FOLLOW UP:   1.  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 Moon Bowen on 10/6/2017.  Primary stereotactic radiation therapy to the left upper lobe completed 12/15/2017  2.  Lung tumor is negative for PDL 1.  Also negative for ALK, EGFR, and ROS 1 mutations.  3.  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   4.  Esophageal adenocarcinoma was negative for HER-2/elizabeth overexpression but positive for PDL 1  5.  Initiation of combined radiation and low-dose weekly carboplatin and Taxol chemotherapy for treatment of esophageal adenocarcinoma.  First treatment delivered 1/11/2018.  6.  Development of thrombocytopenia and radiation esophagitis on  the visit of 2/15/2018.    HISTORY OF PRESENT ILLNESS: Ms. Abad is a very pleasant 68 y.o. female with the above-mentioned history who presented March 15 for evaluation near the end of her combined radiation and low-dose weekly carbo Taxol chemotherapy for adenocarcinoma of the esophagus.  She has received a total of 5 cycles of chemotherapy with weekly carboplatin and Taxol, with concurrent radiation.  She was due 2 weeks ago for her sixth cycle, but it was delayed due to esophagitis, myelosuppression, nausea and vomiting.  She was seen by one of her nurse practitioners last week and again her treatment was held and she received supportive care in the office with IV fluids and antiemetics.    She completed her weekly carboplatin and Taxol chemotherapy on 3/1/2018 having completed her radiation therapy about a week prior.  She was scheduled return 3/15 for follow-up and get her scheduled for post treatment scans and endoscopic evaluation.  Unfortunately when she presented to the office  she was profoundly weak.  She's been unable to  the office for a weight.  The patient and family reporedt that she really has not been able to keep anything down for several days.  She also has had pain most likely due to radiation esophagitis.  She will require admission to the hospital today for further symptom control.    Upon completion of her combined radiation and chemotherapy for esophageal cancer we may consider referring her for evaluation by Dr. Norwood for potential esophagectomy.  The other option would be to continue to observe her expectantly after radiation and chemotherapy and consider future Opdivo therapy if she demonstrates disease progression since her disease was PDL 1 positive.   The patient was admitted for IV fluids, antiemetics and IV pain medications as needed as she is seen March 16 she is slightly improved discussed trying to adjust her medications yet again to improve her status.    History of  Present Illness     Past Medical History:   Diagnosis Date   • Adenocarcinoma of lung 2017    HAD RADIATION    • Anxiety and depression    • Arthritis    • Benign parotid tumor 2012    removed by Dr Vickers told it was benign   • COPD (chronic obstructive pulmonary disease)    • Depression    • Diarrhea    • Early cataract    • Emphysema of lung    • Esophageal cancer 2017   • History of chronic pain    • History of colon polyps    • History of pneumonia    • Hyperlipidemia    • Joint pain    • Stroke       HEMATOLOGIC/ ONCOLOGIC HISTORY:  The patient is a 67 y.o. year old female who has a history of smoking and COPD.  She had an abnormal CT scan in February of this year showing a 3.5 cm mass in the left suprahilar area.  A repeat CT scan was performed at [facility] 7/13/2017 and show that the mass had increased in size from about 3.5 cm up to 6.5 cm.    She underwent navigational bronchoscopy and endobronchial ultrasound and biopsy with Dr. Moon Bowen on 10/6/2017.  The pathology on the biopsy showed well-differentiated adenocarcinoma.  She is referred to us now by her pulmonologist Dr. Daniel Oconnor for further staging evaluation and treatment recommendations.    She has not had a PET scan for staging.  She had a PDL 1 staining on her bronchoscopy specimen which was negative for PDL 1.  EGFR, ALK, and ROS 1 were also negative.    She is still smoking but is trying to quit.  She had pulmonary function tests performed on 10/25/2017 showing a DLCO of 33% and FEV1 37%.    She was referred to the [facility] [place], operated by [facility] and completed primary radiation to the left upper lobe lung mass.  Because of abnormal uptake in the esophagus noted on her staging PET scan, she also underwent an EGD and biopsy which unfortunately revealed adenocarcinoma of the GE junction.  Molecular studies for HER-2/elizabeth and PDL 1 on the tumor tissue showed that she was PD L1 positive and HER-2/elizabeth negative.  We recommended  combined weekly low dose carbo Taxol chemotherapy and radiation.     She had significant difficulty with treatment particularly with radiation esophagitis near the end of her course of therapy.  She required some dose delay and is expected to complete her radiation treatments the first week of March 2018.  Last dose of carboplatin and Taxol chemotherapy was delivered 3/1/2018.      Past Surgical History:   Procedure Laterality Date   • BRONCHOSCOPY     • CHOLECYSTECTOMY  1999   • COLON SURGERY  2015    COLON POLYPS   • COLONOSCOPY  04/13/2016    TA w/low grade dysplasia x 3, serrated adenoma x 2   • COLONOSCOPY  06/14/2016    TA w/high grade dysplasia   • ENDOSCOPY N/A 12/6/2017    Procedure: ESOPHAGOGASTRODUODENOSCOPY WITH BIOPSY;  Surgeon: Jayant Robles MD;  Location: [facility] ENDOSCOPY;  Service:    • FOOT SURGERY  10/2010    Hammertoe   • HYSTERECTOMY     • MT INSJ TUNNELED CVC W/O SUBQ PORT/ AGE 3 YR/> Right 1/9/2018    Procedure: MEDIPORT PLACEMENT right;  Surgeon: Damaso Germain MD;  Location: [facility] HYBRID OR 18/19;  Service: Vascular   • SALIVARY GLAND SURGERY      PAROTID MASS REMOVED BENIGN   • SHOULDER ARTHROSCOPY Left 1995. 2001        ALLERGIES:    Allergies   Allergen Reactions   • Penicillins Hives         Review of Systems   Constitutional: Positive for activity change, appetite change, fatigue and unexpected weight change. Negative for fever.   HENT: Negative for hearing loss, nosebleeds, trouble swallowing and voice change.    Eyes: Negative for visual disturbance.   Respiratory: Negative for cough, shortness of breath and wheezing.    Cardiovascular: Positive for chest pain. Negative for palpitations.   Gastrointestinal: Positive for diarrhea, nausea and vomiting. Negative for abdominal pain.        Short bowel symptoms after partial colectomy.  Severe reflux and painful swallowing most likely due to radiation esophagitis.   Genitourinary: Negative for difficulty urinating, frequency,  "hematuria and urgency.   Musculoskeletal: Negative for back pain and neck pain.   Skin: Negative for rash.   Neurological: Positive for weakness. Negative for dizziness, seizures, syncope and headaches.   Hematological: Negative for adenopathy. Does not bruise/bleed easily.   Psychiatric/Behavioral: Negative for behavioral problems. The patient is not nervous/anxious.         Objective     Vitals:    03/15/18 1342 03/15/18 2206 03/16/18 0549   BP: 98/57 133/73 141/82   BP Location: Right arm Left arm Right arm   Patient Position: Lying Lying Lying   Pulse: 79 75 75   Resp: 18 16 16   Temp: 97.3 °F (36.3 °C) 98.3 °F (36.8 °C) 98.2 °F (36.8 °C)   TempSrc: Oral Oral Oral   SpO2: 97% 97% 97%   Weight: 49.4 kg (108 lb 12.8 oz)     Height: 170.2 cm (67\")       Current Status 3/15/2018   ECOG score 0     Repeat physical exam March 16 is unchanged from previous  Physical Exam   Constitutional: She is oriented to person, place, and time. She appears well-developed. No distress.   Weak appearing   HENT:   Head: Normocephalic.   Eyes: Conjunctivae and EOM are normal. Pupils are equal, round, and reactive to light. No scleral icterus.   Neck: Normal range of motion. Neck supple. No JVD present. No thyromegaly present.   Cardiovascular: Normal rate and regular rhythm.  Exam reveals no gallop and no friction rub.    No murmur heard.  Pulmonary/Chest: Effort normal. No respiratory distress. She has decreased breath sounds. She has no wheezes. She has no rhonchi. She has no rales.   Right IJ Mediport the right chest wall.   Abdominal: Soft. She exhibits no distension and no mass. There is no tenderness.   Musculoskeletal: Normal range of motion. She exhibits no edema or deformity.   Neurological: She is alert and oriented to person, place, and time. She has normal reflexes. No cranial nerve deficit.   Skin: Skin is warm and dry. No rash noted. No erythema.   Psychiatric: She has a normal mood and affect. Her behavior is normal. " Judgment normal.         RECENT LABS:  Hematology WBC   Date Value Ref Range Status   03/16/2018 3.09 (L) 4.50 - 10.70 10*3/mm3 Final     RBC   Date Value Ref Range Status   03/16/2018 3.11 (L) 3.90 - 5.20 10*6/mm3 Final     Hemoglobin   Date Value Ref Range Status   03/16/2018 9.4 (L) 11.9 - 15.5 g/dL Final     Hematocrit   Date Value Ref Range Status   03/16/2018 27.9 (L) 35.6 - 45.5 % Final     Platelets   Date Value Ref Range Status   03/16/2018 151 140 - 500 10*3/mm3 Final        Lab Results   Component Value Date    GLUCOSE 117 (H) 03/16/2018    BUN 6 (L) 03/16/2018    CREATININE 0.70 03/16/2018    EGFRIFNONA 83 03/16/2018    BCR 8.6 03/16/2018    K 3.5 03/16/2018    CO2 24.4 03/16/2018    CALCIUM 8.0 (L) 03/16/2018    ALBUMIN 3.30 (L) 03/16/2018    LABIL2 2.2 03/16/2018    AST 12 03/16/2018    ALT 9 03/16/2018       PATHOLOGY FROM EGD 12/6/2017  Final Diagnosis   1.  GASTROESOPHAGEAL JUNCTION, BIOPSIES:             INVASIVE ADENOCARCINOMA.            NO IDENTIFIED INTESTINAL METAPLASIA.      COMMENT: In this specimen tumor is identified extending into submucosal tissue. Deeper extension cannot be excluded in these superficial biopsies.   Her 2 Brody negative  PD L1 positive ( > 1 ).    STRESS TEST 3/5/2018  Interpretation Summary     · Myocardial perfusion imaging indicates a normal myocardial perfusion study with no evidence of ischemia.  · Left ventricular ejection fraction is normal (Calculated EF = 66%).  · Impressions are consistent with a low risk study.  · There is no prior study available for comparison       Assessment/Plan   1.  Well differentiated adenocarcinoma presumably from lung origin in a long-time smoker.  The biopsy was made from navigational bronchoscopy and endobronchial ultrasound at [place] on 10/6/2017.  Staging PET scan showed no mediastinal adenopathy or obvious distant metastases.  She did have uptake in the lower esophagus which was evaluated with EGD and biopsy on 12/6/2017 and  unfortunately was found to be adenocarcinoma at the GE junction.   · LungTumor tissue was negative for PDL 1 expression, EGFR, ALK, or ROS 1 mutations.   · She completed stereotactic radiation to the lung tumor 12/15/2017.      2.  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   · Esophageal adenocarcinoma was negative for HER-2/elizabeth overexpression but positive for PDL 1.  · Initiated combined radiation therapy to the esophagus along with weekly low-dose carboplatin and Taxol chemotherapy 1/11/18.  · She completed her carboplatin and Taxol chemotherapy on 3/1/2018 and completed her radiation therapy 3/7/2018        3. Emphysema due to smoking.    4. Placement of right internal jugular MediPort by Dr. Raphael Germain of vascular surgery 1/9/2018.    5.  She was not considered a candidate for lobectomy due to her poor pulmonary function but we plan to have her reevaluated by cardiology and pulmonology prior to determining if she is a potential candidate for esophageal resection.  Sheseen by Dr. Puneet Treviño of cardiology 2/9/2018.  She also has been seen by pulmonology.    6.  History of benign right parotid tumor in 2012, surgically removed per Dr. Vickers.  Patient is noted to have very tiny nodule below the scar, new to her, but possibly just scar tissue. Encouraged her to watch closely, as will we, for any enlargement.    7. Profound weakness and dehydration due to toxicity from her treatment.  Presumably this is in part due to radiation esophagitis although she could also have some radiation stricturing of the esophagus.  She was admitted  for management of her dehydration and toxicities from treatment.  She also will be undergoing follow-up scans and endoscopic assessment.    Plan  1.  Plan to continue- circumstances discussed with patient- adjust  IV fluids  IV antiemetics  IV pain medication  2.  The patient initially had been considered not a candidate for surgery but on further  follow-up by pulmonary and cardiology it appears that she could potentially be a candidate for surgical resection of the esophagus although given her degree of frailty and the fact that her esophageal tumor was PD L1 positive, I think in this situation may be best to continue to observe her without surgery and plan to utilize immunotherapy in the future if she has  disease recurrence.  3.  If her symptoms improve and she is able to maintain oral hydration and nutrition in the next few days then she could be discharged home with further appointments scheduled as an outpatient for posttreatment CT scans and endoscopic assessment.  If her swallowing issues do not improve significantly that she may have to undergo endoscopic evaluation on this admission.          Electronically signed by Pa Davis MD at 3/16/2018  1:30 PM          Consult Notes (last 72 hours) (Notes from 3/13/2018  2:34 PM through 3/16/2018  2:34 PM)      Pa MOISE MD at 3/16/2018  1:47 PM      Consult Orders:    1. Inpatient Gastroenterology Consult [[id number]] ordered by Bienvenido Collier MD at 03/15/18 1331                [facility]  Initial Inpatient Consult Note    Referring Provider: Dr. Bienvenido Collier    Reason for Consultation: Nausea and epigastric pain, esophageal cancer possible radiation esophagitis    Subjective     History of present illness:    68 y.o. female patient seen by Dr. Robles in December at which time she underwent upper endoscopic evaluation defining an adenocarcinoma at the level of the GE junction.  She denies difficulty with swallowing liquids or solids but has overall poor oral intake. Due to her overall weakness, she was admitted for further evaluation and treatment.  She was going to be considered for possible esophagectomy post radiation and chemotherapy.  We are being asked to reassess her endoscopically.    Past Medical History:  Past Medical History:   Diagnosis Date   •  Adenocarcinoma of lung 2017    HAD RADIATION    • Anxiety and depression    • Arthritis    • Benign parotid tumor 2012    removed by Dr Vickers told it was benign   • COPD (chronic obstructive pulmonary disease)    • Depression    • Diarrhea    • Early cataract    • Emphysema of lung    • Esophageal cancer 2017   • History of chronic pain    • History of colon polyps    • History of pneumonia    • Hyperlipidemia    • Joint pain    • Stroke      Past Surgical History:  Past Surgical History:   Procedure Laterality Date   • BRONCHOSCOPY     • CHOLECYSTECTOMY  1999   • COLON SURGERY  2015    COLON POLYPS   • COLONOSCOPY  04/13/2016    TA w/low grade dysplasia x 3, serrated adenoma x 2   • COLONOSCOPY  06/14/2016    TA w/high grade dysplasia   • ENDOSCOPY N/A 12/6/2017    Procedure: ESOPHAGOGASTRODUODENOSCOPY WITH BIOPSY;  Surgeon: Jayant Robles MD;  Location: [facility] ENDOSCOPY;  Service:    • FOOT SURGERY  10/2010    Hammertoe   • HYSTERECTOMY     • CO INSJ TUNNELED CVC W/O SUBQ PORT/ AGE 3 YR/> Right 1/9/2018    Procedure: MEDIPORT PLACEMENT right;  Surgeon: Damaso Germain MD;  Location: [facility] HYBRID OR 18/19;  Service: Vascular   • SALIVARY GLAND SURGERY      PAROTID MASS REMOVED BENIGN   • SHOULDER ARTHROSCOPY Left 1995. 2001      Social History:   Social History   Substance Use Topics   • Smoking status: Former Smoker     Packs/day: 1.00     Years: 30.00     Types: Cigarettes     Quit date: 1973   • Smokeless tobacco: Never Used      Comment: Caffeine-2 daily   • Alcohol use No      Family History:  Family History   Problem Relation Age of Onset   • Cancer Sister    • Lung cancer Sister    • Leukemia Cousin    • Heart disease Father    • Cancer Sister    • Malig Hyperthermia Neg Hx        Home Meds:  Facility-Administered Medications Prior to Admission   Medication Dose Route Frequency Provider Last Rate Last Dose   • [COMPLETED] sodium chloride 0.9 % infusion 1,000 mL  1,000 mL Intravenous Once Bienvenido  NICOL Collier MD   Stopped at 03/15/18 1238     Prescriptions Prior to Admission   Medication Sig Dispense Refill Last Dose   • albuterol (VENTOLIN HFA) 108 (90 Base) MCG/ACT inhaler Inhale 2 puffs Every 6 (Six) Hours As Needed for Wheezing.   Taking   • aspirin 81 MG EC tablet Take 81 mg by mouth Daily.   Taking   • buPROPion XL (WELLBUTRIN XL) 300 MG 24 hr tablet Take 300 mg by mouth Every Evening.   Taking   • Cholecalciferol (VITAMIN D) 2000 units tablet Take 2,000 Units by mouth Every Other Day.   Taking   • cyanocobalamin (VITAMIN B-12) 2000 MCG tablet Take 2,000 mcg by mouth Every Other Day.   Taking   • diazePAM (VALIUM) 5 MG tablet Take 5 mg by mouth 4 (Four) Times a Day As Needed for Anxiety.   Taking   • diphenoxylate-atropine (LOMOTIL) 2.5-0.025 MG per tablet TK 1 T PO  QID PRN  1 Taking   • escitalopram (LEXAPRO) 20 MG tablet Take 20 mg by mouth Every Evening.   Taking   • fluticasone (FLONASE) 50 MCG/ACT nasal spray 2 sprays into each nostril Daily As Needed for Rhinitis.   Taking   • HYDROcodone-acetaminophen (NORCO) 7.5-325 MG per tablet Take 1 tablet by mouth 4 (Four) Times a Day As Needed for Moderate Pain .   Taking   • Loperamide HCl (IMODIUM A-D PO) Take 1-2 tablets by mouth 2 (Two) Times a Day. 1 IN THE AM AND 2 IN THE PM   Taking   • Multiple Vitamins-Minerals (CENTRUM SILVER PO) Take 1 tablet by mouth Daily.   Taking   • ondansetron (ZOFRAN) 8 MG tablet Take 1 tablet by mouth Every 8 (Eight) Hours As Needed for Nausea or Vomiting. 60 tablet 5 Taking   • Probiotic Product (PROBIOTIC PO) Take 1 capsule by mouth Daily.   Taking   • promethazine (PHENERGAN) 12.5 MG tablet TAKE 1 TABLET BY MOUTH EVERY 6 HOURS AS NEEDED FOR NAUSEA OR VOMITING 30 tablet 0 Taking   • simvastatin (ZOCOR) 20 MG tablet Take 20 mg by mouth Every Night.   Taking   • umeclidinium-vilanterol (ANORO ELLIPTA) 62.5-25 MCG/INH aerosol powder  inhaler Inhale 1 puff Daily.   Taking   • Wheat Dextrin (BENEFIBER DRINK MIX) pack Take  by  mouth Daily.   Taking   • sucralfate (CARAFATE) 1 GM/10ML suspension Take 10 mL by mouth Every 6 (Six) Hours. 420 mL 1 Taking     Current Meds:     aspirin 81 mg Oral Daily   buPROPion  mg Oral Q PM   enoxaparin 30 mg Subcutaneous Q24H   heparin flush (porcine) 5 mL Intracatheter Q12H   [START ON 3/17/2018] pantoprazole 40 mg Intravenous Q AM   promethazine 6.25 mg Oral Q6H   sodium chloride 10 mL Intracatheter Q12H   sucralfate 1 g Oral Q6H     Allergies:  Allergies   Allergen Reactions   • Penicillins Hives     Review of Systems  Pertinent items are noted in HPI, all other systems reviewed and negative     Objective     Vital Signs  Temp:  [98.2 °F (36.8 °C)-98.3 °F (36.8 °C)] 98.2 °F (36.8 °C)  Heart Rate:  [75] 75  Resp:  [16] 16  BP: (133-141)/(73-82) 141/82  Physical Exam:  General Appearance:    Alert, cooperative, in no acute distress   Head:    Normocephalic, without obvious abnormality, atraumatic   Eyes:            Lids and lashes normal, conjunctivae and sclerae normal, no   icterus   Throat:   No oral lesions, no thrush, oral mucosa moist   Neck:   No adenopathy, supple, trachea midline, no thyromegaly, no   carotid bruit, no JVD   Lungs:     Clear to auscultation,respirations regular, even and                   unlabored    Heart:    Regular rhythm and normal rate, normal S1 and S2, no            murmur, no gallop, no rub, no click   Chest Wall:    No abnormalities observed   Abdomen:     Normal bowel sounds, no masses, no organomegaly, soft        non-tender, non-distended, no guarding, no rebound                 tenderness   Rectal:     Deferred   Extremities:   no edema, no cyanosis, no redness   Skin:   No bleeding, bruising or rash   Lymph nodes:   No palpable adenopathy   Psychiatric:  Judgement and insight: normal   Orientation to person place and time: normal   Mood and affect: normal   Results Review:   I reviewed the patient's new clinical results.      Results from last 7 days  Lab  Units 03/16/18  0509 03/15/18  1140   WBC 10*3/mm3 3.09* 7.82   HEMOGLOBIN g/dL 9.4* 12.1   HEMATOCRIT % 27.9* 35.9   PLATELETS 10*3/mm3 151 176       Results from last 7 days  Lab Units 03/16/18  0509 03/15/18  1140   SODIUM mmol/L 138 138   POTASSIUM mmol/L 3.5 3.9   CHLORIDE mmol/L 105 98   CO2 mmol/L 24.4 23.6   BUN mg/dL 6* 11   CREATININE mg/dL 0.70 0.90   CALCIUM mg/dL 8.0* 9.2   BILIRUBIN mg/dL 0.2 0.4   ALK PHOS U/L 43 57   ALT (SGPT) U/L 9 15   AST (SGOT) U/L 12 18   GLUCOSE mg/dL 117* 119         No results found for: LIPASE    Radiology:  No orders to display       Assessment/Plan   Patient Active Problem List   Diagnosis   • Adenocarcinoma of left lung   • Panlobular emphysema   • Abnormal finding on imaging   • Esophageal carcinoma   • Fitting and adjustment of vascular catheter   • OROZCO (dyspnea on exertion)   • Radiation esophagitis   Impression  #1 adenocarcinoma of the GE junction, positive for PDL 1  #2 well-differentiated adenocarcinoma of presumed lung origin  #3 emphysema  #4 profound weakness and dehydration      Recommendation  Offered EGD to assess the status of her esophageal tumor  Discussed procedure with patient in regards of potential risk i.e. bleeding, perforation, adverse anesthetic effect.  She voiced understanding and agrees to proceed    I discussed the patients findings and my recommendations with patient and primary care team.    Pa Horton MD            Electronically signed by Pa MOISE MD at 3/16/2018  1:55 PM

## 2018-03-16 NOTE — PROGRESS NOTES
Malnutrition Severity Assessment    Patient Name:  Dilma Abad  YOB: 1950  MRN: [id number]  Admit Date:  3/15/2018    Patient meets criteria for : Severe malnutrition    Severe malnutrition-13% weight loss in the past month due to minimal po intake.  From nutrition focused physical exam, severe muscle loss to temporalis, clavicle and acromion bone region, and patellar region.    Malnutrition Type: Chronic Illness Malnutrition     Malnutrition Type (last 8 hours)      Malnutrition Severity Assessment     Row Name 03/16/18 1208       Malnutrition Severity Assessment    Malnutrition Type Chronic Illness Malnutrition    Row Name 03/16/18 1208       Physical Signs of Malnutrition (Chronic)    Muscle Wasting Severe    Fat Loss Severe    Row Name 03/16/18 1208       Weight Status (Chronic)    BMI Mod (<17)   BMI-17    %IBW Mod <80%   80%    Weight Loss Severe (>5% / 1 mo)   13% weight loss in 1 month    Row Name 03/16/18 1208       Energy Intake Status (Chronic)    Energy Intake Severe (< or equal to 50% / > or equal to 1 mo)    Row Name 03/16/18 1208       Criteria Met (Must meet criteria for severity in at least 2 of these categories: M Wasting, Fat Loss, Fluid, Secondary Signs, Wt. Status, Intake)    Patient meets criteria for  Severe malnutrition          Electronically signed by:  Darshana Denise RD  03/16/18 12:13 PM

## 2018-03-16 NOTE — PROGRESS NOTES
Discharge Planning Assessment  [facility]     Patient Name: Dilma Abad  MRN: [id number]  Today's Date: 3/16/2018    Admit Date: 3/15/2018          Discharge Needs Assessment     Row Name 03/16/18 1201       Living Environment    Lives With alone    Current Living Arrangements home/apartment/condo    Primary Care Provided by self    Able to Return to Prior Arrangements yes       Resource/Environmental Concerns    Transportation Concerns car, none       Transition Planning    Patient/Family Anticipates Transition to home    Transportation Anticipated family or friend will provide       Discharge Needs Assessment    Readmission Within the Last 30 Days no previous admission in last 30 days    Concerns to be Addressed no discharge needs identified    Equipment Currently Used at Home none    Equipment Needed After Discharge none            Discharge Plan     Row Name 03/16/18 1210       Plan    Plan Home denies any needs     Plan Comments Spoke with patient at bedside, face sheet verified. IMM 3/16 given. Patient lives alone in a house with a basement. Patient stated she is independent with ADL's. Patient denies any home health or rehab history. Patient stated she has help from her children if she needs it. [facility] provided patient with [facility] and a list of Home Health agencies. Patient plans to return home and denies any discharge needs. Laury Gonzalez RN        Destination     No service coordination in this encounter.      Durable Medical Equipment     No service coordination in this encounter.      Dialysis/Infusion     No service coordination in this encounter.      Home Medical Care     No service coordination in this encounter.      Social Care     No service coordination in this encounter.                Demographic Summary     Row Name 03/16/18 1156       General Information    Admission Type inpatient    Required Notices Provided Important Message from Medicare    Referral Source admission list     Reason for Consult discharge planning    Preferred Language English            Functional Status     Row Name 03/16/18 1202       Functional Status    Usual Activity Tolerance moderate    Current Activity Tolerance moderate       Functional Status, IADL    Medications independent    Meal Preparation independent    Housekeeping independent    Laundry independent    Shopping independent            Psychosocial     Row Name 03/16/18 1200       Behavior WDL    Behavior WDL WDL            Abuse/Neglect    No documentation.           Legal    No documentation.           Substance Abuse    No documentation.           Patient Forms    No documentation.         Laury Gonzalez RN

## 2018-03-16 NOTE — PLAN OF CARE
Problem: Nutrition, Imbalanced: Inadequate Oral Intake (Adult)  Intervention: Promote/Optimize Nutrition   03/16/18 1215   Nutrition Interventions   Oral Nutrition Promotion nutritional therapy counseling provided;calorie dense liquids provided

## 2018-03-16 NOTE — PLAN OF CARE
Problem: Patient Care Overview  Goal: Plan of Care Review  Outcome: Ongoing (interventions implemented as appropriate)   03/15/18 1803 03/15/18 2000 03/16/18 0153   Coping/Psychosocial   Plan of Care Reviewed With --  patient --    Plan of Care Review   Progress no change --  --    OTHER   Outcome Summary --  --  Steady gait. Morphine for throat and abdominal pain. Nausea at times. Poor appetite.      Goal: Individualization and Mutuality  Outcome: Ongoing (interventions implemented as appropriate)    Goal: Discharge Needs Assessment  Outcome: Ongoing (interventions implemented as appropriate)    Goal: Interprofessional Rounds/Family Conf  Outcome: Ongoing (interventions implemented as appropriate)      Problem: Oncology Care (Adult)  Goal: Signs and Symptoms of Listed Potential Problems Will be Absent, Minimized or Managed (Oncology Care)  Outcome: Ongoing (interventions implemented as appropriate)      Problem: Nausea/Vomiting (Adult)  Goal: Identify Related Risk Factors and Signs and Symptoms  Outcome: Ongoing (interventions implemented as appropriate)    Goal: Symptom Relief  Outcome: Ongoing (interventions implemented as appropriate)    Goal: Adequate Hydration  Outcome: Ongoing (interventions implemented as appropriate)

## 2018-03-17 ENCOUNTER — ANESTHESIA (OUTPATIENT)
Dept: GASTROENTEROLOGY | Facility: HOSPITAL | Age: 68
End: 2018-03-17

## 2018-03-17 ENCOUNTER — ANESTHESIA EVENT (OUTPATIENT)
Dept: GASTROENTEROLOGY | Facility: HOSPITAL | Age: 68
End: 2018-03-17

## 2018-03-17 LAB
ALBUMIN SERPL-MCNC: 3.3 G/DL (ref 3.5–5.2)
ALBUMIN/GLOB SERPL: 2.1 G/DL
ALP SERPL-CCNC: 44 U/L (ref 39–117)
ALT SERPL W P-5'-P-CCNC: 10 U/L (ref 1–33)
ANION GAP SERPL CALCULATED.3IONS-SCNC: 8.8 MMOL/L
AST SERPL-CCNC: 13 U/L (ref 1–32)
BASOPHILS # BLD AUTO: 0.02 10*3/MM3 (ref 0–0.2)
BASOPHILS NFR BLD AUTO: 0.6 % (ref 0–1.5)
BILIRUB SERPL-MCNC: 0.2 MG/DL (ref 0.1–1.2)
BUN BLD-MCNC: 3 MG/DL (ref 8–23)
BUN/CREAT SERPL: 5.1 (ref 7–25)
CALCIUM SPEC-SCNC: 8.1 MG/DL (ref 8.6–10.5)
CHLORIDE SERPL-SCNC: 104 MMOL/L (ref 98–107)
CO2 SERPL-SCNC: 24.2 MMOL/L (ref 22–29)
CREAT BLD-MCNC: 0.59 MG/DL (ref 0.57–1)
DEPRECATED RDW RBC AUTO: 55 FL (ref 37–54)
EOSINOPHIL # BLD AUTO: 0.06 10*3/MM3 (ref 0–0.7)
EOSINOPHIL NFR BLD AUTO: 1.7 % (ref 0.3–6.2)
ERYTHROCYTE [DISTWIDTH] IN BLOOD BY AUTOMATED COUNT: 17 % (ref 11.7–13)
GFR SERPL CREATININE-BSD FRML MDRD: 101 ML/MIN/1.73
GLOBULIN UR ELPH-MCNC: 1.6 GM/DL
GLUCOSE BLD-MCNC: 116 MG/DL (ref 65–99)
HCT VFR BLD AUTO: 27.6 % (ref 35.6–45.5)
HGB BLD-MCNC: 9.5 G/DL (ref 11.9–15.5)
IMM GRANULOCYTES # BLD: 0.02 10*3/MM3 (ref 0–0.03)
IMM GRANULOCYTES NFR BLD: 0.6 % (ref 0–0.5)
LYMPHOCYTES # BLD AUTO: 0.59 10*3/MM3 (ref 0.9–4.8)
LYMPHOCYTES NFR BLD AUTO: 17.1 % (ref 19.6–45.3)
MCH RBC QN AUTO: 30.6 PG (ref 26.9–32)
MCHC RBC AUTO-ENTMCNC: 34.4 G/DL (ref 32.4–36.3)
MCV RBC AUTO: 89 FL (ref 80.5–98.2)
MONOCYTES # BLD AUTO: 0.67 10*3/MM3 (ref 0.2–1.2)
MONOCYTES NFR BLD AUTO: 19.4 % (ref 5–12)
NEUTROPHILS # BLD AUTO: 2.1 10*3/MM3 (ref 1.9–8.1)
NEUTROPHILS NFR BLD AUTO: 60.6 % (ref 42.7–76)
PLATELET # BLD AUTO: 138 10*3/MM3 (ref 140–500)
PMV BLD AUTO: 10.2 FL (ref 6–12)
POTASSIUM BLD-SCNC: 3.5 MMOL/L (ref 3.5–5.2)
PROT SERPL-MCNC: 4.9 G/DL (ref 6–8.5)
RBC # BLD AUTO: 3.1 10*6/MM3 (ref 3.9–5.2)
SODIUM BLD-SCNC: 137 MMOL/L (ref 136–145)
WBC NRBC COR # BLD: 3.46 10*3/MM3 (ref 4.5–10.7)

## 2018-03-17 PROCEDURE — 25010000002 ONDANSETRON PER 1 MG: Performed by: INTERNAL MEDICINE

## 2018-03-17 PROCEDURE — 80053 COMPREHEN METABOLIC PANEL: CPT | Performed by: INTERNAL MEDICINE

## 2018-03-17 PROCEDURE — 88342 IMHCHEM/IMCYTCHM 1ST ANTB: CPT | Performed by: INTERNAL MEDICINE

## 2018-03-17 PROCEDURE — 99231 SBSQ HOSP IP/OBS SF/LOW 25: CPT | Performed by: INTERNAL MEDICINE

## 2018-03-17 PROCEDURE — 25810000003 DEXTROSE-NACL PER 500 ML: Performed by: INTERNAL MEDICINE

## 2018-03-17 PROCEDURE — 87081 CULTURE SCREEN ONLY: CPT | Performed by: INTERNAL MEDICINE

## 2018-03-17 PROCEDURE — 88305 TISSUE EXAM BY PATHOLOGIST: CPT | Performed by: INTERNAL MEDICINE

## 2018-03-17 PROCEDURE — 0DB48ZX EXCISION OF ESOPHAGOGASTRIC JUNCTION, VIA NATURAL OR ARTIFICIAL OPENING ENDOSCOPIC, DIAGNOSTIC: ICD-10-PCS | Performed by: INTERNAL MEDICINE

## 2018-03-17 PROCEDURE — 0DB68ZX EXCISION OF STOMACH, VIA NATURAL OR ARTIFICIAL OPENING ENDOSCOPIC, DIAGNOSTIC: ICD-10-PCS | Performed by: INTERNAL MEDICINE

## 2018-03-17 PROCEDURE — 36591 DRAW BLOOD OFF VENOUS DEVICE: CPT

## 2018-03-17 PROCEDURE — 63710000001 PROMETHAZINE PER 12.5 MG: Performed by: INTERNAL MEDICINE

## 2018-03-17 PROCEDURE — 25010000002 PROPOFOL 10 MG/ML EMULSION: Performed by: ANESTHESIOLOGY

## 2018-03-17 PROCEDURE — 85025 COMPLETE CBC W/AUTO DIFF WBC: CPT | Performed by: INTERNAL MEDICINE

## 2018-03-17 PROCEDURE — 88312 SPECIAL STAINS GROUP 1: CPT | Performed by: INTERNAL MEDICINE

## 2018-03-17 PROCEDURE — 43239 EGD BIOPSY SINGLE/MULTIPLE: CPT | Performed by: INTERNAL MEDICINE

## 2018-03-17 RX ORDER — PROPOFOL 10 MG/ML
VIAL (ML) INTRAVENOUS CONTINUOUS PRN
Status: DISCONTINUED | OUTPATIENT
Start: 2018-03-17 | End: 2018-03-17 | Stop reason: SURG

## 2018-03-17 RX ORDER — PROPOFOL 10 MG/ML
VIAL (ML) INTRAVENOUS AS NEEDED
Status: DISCONTINUED | OUTPATIENT
Start: 2018-03-17 | End: 2018-03-17 | Stop reason: SURG

## 2018-03-17 RX ADMIN — ONDANSETRON 4 MG: 2 INJECTION INTRAMUSCULAR; INTRAVENOUS at 13:31

## 2018-03-17 RX ADMIN — HYDROCODONE BITARTRATE AND ACETAMINOPHEN 1 TABLET: 7.5; 325 TABLET ORAL at 13:31

## 2018-03-17 RX ADMIN — HYDROCODONE BITARTRATE AND ACETAMINOPHEN 1 TABLET: 7.5; 325 TABLET ORAL at 19:43

## 2018-03-17 RX ADMIN — PANTOPRAZOLE SODIUM 40 MG: 40 INJECTION, POWDER, FOR SOLUTION INTRAVENOUS at 06:57

## 2018-03-17 RX ADMIN — SODIUM CHLORIDE, POTASSIUM CHLORIDE, SODIUM LACTATE AND CALCIUM CHLORIDE 30 ML/HR: 600; 310; 30; 20 INJECTION, SOLUTION INTRAVENOUS at 09:48

## 2018-03-17 RX ADMIN — Medication 10 ML: at 20:08

## 2018-03-17 RX ADMIN — SUCRALFATE 1 G: 1 SUSPENSION ORAL at 17:14

## 2018-03-17 RX ADMIN — HYDROCODONE BITARTRATE AND ACETAMINOPHEN 1 TABLET: 7.5; 325 TABLET ORAL at 18:24

## 2018-03-17 RX ADMIN — PROMETHAZINE HYDROCHLORIDE 6.25 MG: 12.5 TABLET ORAL at 15:10

## 2018-03-17 RX ADMIN — SUCRALFATE 1 G: 1 SUSPENSION ORAL at 12:41

## 2018-03-17 RX ADMIN — DEXTROSE AND SODIUM CHLORIDE 125 ML/HR: 5; 900 INJECTION, SOLUTION INTRAVENOUS at 08:14

## 2018-03-17 RX ADMIN — PROPOFOL 150 MCG/KG/MIN: 10 INJECTION, EMULSION INTRAVENOUS at 10:43

## 2018-03-17 RX ADMIN — PROPOFOL 140 MG: 10 INJECTION, EMULSION INTRAVENOUS at 10:43

## 2018-03-17 RX ADMIN — DEXTROSE AND SODIUM CHLORIDE 125 ML/HR: 5; 900 INJECTION, SOLUTION INTRAVENOUS at 19:56

## 2018-03-17 RX ADMIN — PROMETHAZINE HYDROCHLORIDE 6.25 MG: 12.5 TABLET ORAL at 19:43

## 2018-03-17 RX ADMIN — BUPROPION HYDROCHLORIDE 300 MG: 300 TABLET, EXTENDED RELEASE ORAL at 17:14

## 2018-03-17 NOTE — ANESTHESIA PREPROCEDURE EVALUATION
Anesthesia Evaluation     Patient summary reviewed                Airway   Mallampati: II  TM distance: >3 FB  Neck ROM: full  No difficulty expected  Dental - normal exam     Pulmonary    Cardiovascular   Exercise tolerance: poor (<4 METS)    Rhythm: regular  Rate: normal        Neuro/Psych  GI/Hepatic/Renal/Endo      Musculoskeletal     Abdominal    Substance History      OB/GYN          Other                        Anesthesia Plan    ASA 3     MAC     intravenous induction   Anesthetic plan and risks discussed with patient.

## 2018-03-17 NOTE — PLAN OF CARE
Problem: Patient Care Overview  Goal: Plan of Care Review  Outcome: Ongoing (interventions implemented as appropriate)   03/17/18 0414   Coping/Psychosocial   Plan of Care Reviewed With patient   Plan of Care Review   Progress no change   OTHER   Outcome Summary Patient did not complain of pain or nausea. Scheduled phenergan given as ordered before midnight. NPO after midnight for anticpated EGD. Patient still suffering from watery stools.       Problem: Oncology Care (Adult)  Goal: Signs and Symptoms of Listed Potential Problems Will be Absent, Minimized or Managed (Oncology Care)  Outcome: Ongoing (interventions implemented as appropriate)      Problem: Nausea/Vomiting (Adult)  Goal: Identify Related Risk Factors and Signs and Symptoms  Outcome: Ongoing (interventions implemented as appropriate)    Goal: Symptom Relief  Outcome: Ongoing (interventions implemented as appropriate)    Goal: Adequate Hydration  Outcome: Ongoing (interventions implemented as appropriate)      Problem: Nutrition, Imbalanced: Inadequate Oral Intake (Adult)  Goal: Identify Related Risk Factors and Signs and Symptoms  Outcome: Ongoing (interventions implemented as appropriate)    Goal: Improved Oral Intake  Outcome: Ongoing (interventions implemented as appropriate)    Goal: Prevent Further Weight Loss  Outcome: Ongoing (interventions implemented as appropriate)

## 2018-03-17 NOTE — ANESTHESIA POSTPROCEDURE EVALUATION
Patient: Dilma Abad    Procedure Summary     Date:  03/17/18 Room / Location:   [facility]    Anesthesia Start:  1040 Anesthesia Stop:  1059    Procedure:  ESOPHAGOGASTRODUODENOSCOPY WITH BIOPSIES (N/A Esophagus) Diagnosis:       Esophageal carcinoma      Radiation esophagitis      (Esophageal carcinoma [C15.9])      (Radiation esophagitis [K20.8])    Surgeon:  Ambrosio Sapp MD Provider:  Sebastian Ding MD    Anesthesia Type:  MAC ASA Status:  3          Anesthesia Type: MAC  Last vitals  BP   113/71 (03/17/18 1125)   Temp   36.6 °C (97.9 °F) (03/17/18 0946)   Pulse   74 (03/17/18 1125)   Resp   18 (03/17/18 1125)     SpO2   98 % (03/17/18 1125)     Post Anesthesia Care and Evaluation    Patient location during evaluation: bedside  Level of consciousness: awake  Pain score: 0  Pain management: adequate  Airway patency: patent  Anesthetic complications: No anesthetic complications  PONV Status: none  Cardiovascular status: acceptable  Respiratory status: acceptable  Hydration status: acceptable  Post Neuraxial Block status: Motor and sensory function returned to baseline

## 2018-03-17 NOTE — PLAN OF CARE
Problem: Patient Care Overview  Goal: Plan of Care Review  Outcome: Ongoing (interventions implemented as appropriate)   03/17/18 4141   Coping/Psychosocial   Plan of Care Reviewed With patient   Plan of Care Review   Progress improving   OTHER   Outcome Summary EGD Today - Several Biopsies taken, awaiting results. Held Lovenox Injection. Continue IVF; Antiemetics and Pain Medicine. 50 mL of emesis after eating a regular diet lunch tray. Patient c/o nausea & pain - Norco PO & IV Zofran given. Slept in between care on shift. Will continue to monitor.      Goal: Individualization and Mutuality  Outcome: Ongoing (interventions implemented as appropriate)    Goal: Discharge Needs Assessment  Outcome: Ongoing (interventions implemented as appropriate)    Goal: Interprofessional Rounds/Family Conf  Outcome: Ongoing (interventions implemented as appropriate)      Problem: Oncology Care (Adult)  Goal: Signs and Symptoms of Listed Potential Problems Will be Absent, Minimized or Managed (Oncology Care)  Outcome: Ongoing (interventions implemented as appropriate)      Problem: Nausea/Vomiting (Adult)  Goal: Identify Related Risk Factors and Signs and Symptoms  Outcome: Ongoing (interventions implemented as appropriate)    Goal: Symptom Relief  Outcome: Ongoing (interventions implemented as appropriate)    Goal: Adequate Hydration  Outcome: Ongoing (interventions implemented as appropriate)      Problem: Nutrition, Imbalanced: Inadequate Oral Intake (Adult)  Goal: Identify Related Risk Factors and Signs and Symptoms  Outcome: Ongoing (interventions implemented as appropriate)    Goal: Improved Oral Intake  Outcome: Ongoing (interventions implemented as appropriate)    Goal: Prevent Further Weight Loss  Outcome: Ongoing (interventions implemented as appropriate)

## 2018-03-17 NOTE — PROGRESS NOTES
Subjective  patient returning from EGD    REASON FOR FOLLOW UP:   1.  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 Moon Bowen on 10/6/2017.  Primary stereotactic radiation therapy to the left upper lobe completed 12/15/2017  2.  Lung tumor is negative for PDL 1.  Also negative for ALK, EGFR, and ROS 1 mutations.  3.  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   4.  Esophageal adenocarcinoma was negative for HER-2/elizabeth overexpression but positive for PDL 1  5.  Initiation of combined radiation and low-dose weekly carboplatin and Taxol chemotherapy for treatment of esophageal adenocarcinoma.  First treatment delivered 1/11/2018.  6.  Development of thrombocytopenia and radiation esophagitis on the visit of 3/15/2018.    HISTORY OF PRESENT ILLNESS: Ms. Abad is a very pleasant 68 y.o. female with the above-mentioned history who presented March 15 for evaluation near the end of her combined radiation and low-dose weekly carbo Taxol chemotherapy for adenocarcinoma of the esophagus.  She has received a total of 5 cycles of chemotherapy with weekly carboplatin and Taxol, with concurrent radiation.  She was due 2 weeks ago for her sixth cycle, but it was delayed due to esophagitis, myelosuppression, nausea and vomiting.  She was seen by one of her nurse practitioners last week and again her treatment was held and she received supportive care in the office with IV fluids and antiemetics.    She completed her weekly carboplatin and Taxol chemotherapy on 3/1/2018 having completed her radiation therapy about a week prior.  She was scheduled return 3/15 for follow-up and get her scheduled for post treatment scans and endoscopic evaluation.  Unfortunately when she presented to the office  she was profoundly weak.  She's been unable to  the office for a weight.  The patient and family reporedt that she really has not been able to keep anything  down for several days.  She also has had pain most likely due to radiation esophagitis.  She will require admission to the hospital today for further symptom control.    Upon completion of her combined radiation and chemotherapy for esophageal cancer we may consider referring her for evaluation by Dr. Norwood for potential esophagectomy.  The other option would be to continue to observe her expectantly after radiation and chemotherapy and consider future Opdivo therapy if she demonstrates disease progression since her disease was PDL 1 positive.   The patient was admitted for IV fluids, antiemetics and IV pain medications as needed as she is seen March 16 she is slightly improved discussed trying to adjust her medications yet again to improve her status.    The patient is now returned from EGD after discussion with GI medicine.EGD performed  demonstrates ulceration scarring at the esophageal junction and erythematous mucosa in the stomach.  Biopsies were taken and are pending.    History of Present Illness     Past Medical History:   Diagnosis Date   • Adenocarcinoma of lung 2017    HAD RADIATION    • Anxiety and depression    • Arthritis    • Benign parotid tumor 2012    removed by Dr Vickers told it was benign   • COPD (chronic obstructive pulmonary disease)    • Depression    • Diarrhea    • Early cataract    • Emphysema of lung    • Esophageal cancer 2017   • History of chronic pain    • History of colon polyps    • History of pneumonia    • Hyperlipidemia    • Joint pain    • Stroke       HEMATOLOGIC/ ONCOLOGIC HISTORY:  The patient is a 67 y.o. year old female who has a history of smoking and COPD.  She had an abnormal CT scan in February of this year showing a 3.5 cm mass in the left suprahilar area.  A repeat CT scan was performed at [facility] 7/13/2017 and show that the mass had increased in size from about 3.5 cm up to 6.5 cm.    She underwent navigational bronchoscopy and endobronchial  ultrasound and biopsy with Dr. Moon Bowen on 10/6/2017.  The pathology on the biopsy showed well-differentiated adenocarcinoma.  She is referred to us now by her pulmonologist Dr. Daniel Oconnor for further staging evaluation and treatment recommendations.    She has not had a PET scan for staging.  She had a PDL 1 staining on her bronchoscopy specimen which was negative for PDL 1.  EGFR, ALK, and ROS 1 were also negative.    She is still smoking but is trying to quit.  She had pulmonary function tests performed on 10/25/2017 showing a DLCO of 33% and FEV1 37%.    She was referred to the [facility] radiation [facility] and completed primary radiation to the left upper lobe lung mass.  Because of abnormal uptake in the esophagus noted on her staging PET scan, she also underwent an EGD and biopsy which unfortunately revealed adenocarcinoma of the GE junction.  Molecular studies for HER-2/elizabeth and PDL 1 on the tumor tissue showed that she was PD L1 positive and HER-2/elizabeth negative.  We recommended combined weekly low dose carbo Taxol chemotherapy and radiation.     She had significant difficulty with treatment particularly with radiation esophagitis near the end of her course of therapy.  She required some dose delay and is expected to complete her radiation treatments the first week of March 2018.  Last dose of carboplatin and Taxol chemotherapy was delivered 3/1/2018.      Past Surgical History:   Procedure Laterality Date   • BRONCHOSCOPY     • CHOLECYSTECTOMY  1999   • COLON SURGERY  2015    COLON POLYPS   • COLONOSCOPY  04/13/2016    TA w/low grade dysplasia x 3, serrated adenoma x 2   • COLONOSCOPY  06/14/2016    TA w/high grade dysplasia   • ENDOSCOPY N/A 12/6/2017    Procedure: ESOPHAGOGASTRODUODENOSCOPY WITH BIOPSY;  Surgeon: Jayant Robles MD;  Location: [facility] ENDOSCOPY;  Service:    • FOOT SURGERY  10/2010    [facility]   • HYSTERECTOMY     • LA INSJ TUNNELED CVC W/O SUBQ PORT/ AGE 3 YR/> Right 1/9/2018     Procedure: MEDIPORT PLACEMENT right;  Surgeon: Damaso Germain MD;  Location: [facility] 18/19;  Service: Vascular   • SALIVARY GLAND SURGERY      PAROTID MASS REMOVED BENIGN   • SHOULDER ARTHROSCOPY Left 1995. 2001        ALLERGIES:    Allergies   Allergen Reactions   • Penicillins Hives         Review of Systems   Constitutional: Positive for activity change, appetite change, fatigue and unexpected weight change. Negative for fever.   HENT: Negative for hearing loss, nosebleeds, trouble swallowing and voice change.    Eyes: Negative for visual disturbance.   Respiratory: Negative for cough, shortness of breath and wheezing.    Cardiovascular: Positive for chest pain. Negative for palpitations.   Gastrointestinal: Positive for diarrhea, nausea and vomiting. Negative for abdominal pain.        Short bowel symptoms after partial colectomy.  Severe reflux and painful swallowing most likely due to radiation esophagitis.   Genitourinary: Negative for difficulty urinating, frequency, hematuria and urgency.   Musculoskeletal: Negative for back pain and neck pain.   Skin: Negative for rash.   Neurological: Positive for weakness. Negative for dizziness, seizures, syncope and headaches.   Hematological: Negative for adenopathy. Does not bruise/bleed easily.   Psychiatric/Behavioral: Negative for behavioral problems. The patient is not nervous/anxious.         Objective     Vitals:    03/17/18 0946 03/17/18 1105 03/17/18 1115 03/17/18 1125   BP: 119/90 111/57 128/70 113/71   BP Location: Left arm Left arm Left arm Left arm   Patient Position: Sitting Lying Sitting Sitting   Pulse: 77 71 72 74   Resp: 20 16 18 18   Temp: 97.9 °F (36.6 °C)      TempSrc: Oral      SpO2: 100% 96% 92% 98%   Weight:       Height:         Current Status 3/15/2018   ECOG score 0     Repeat physical exam March 17 is unchanged from previous  Physical Exam   Constitutional: She is oriented to person, place, and time. She appears well-developed. No  distress.   Weak appearing   HENT:   Head: Normocephalic.   Eyes: Conjunctivae and EOM are normal. Pupils are equal, round, and reactive to light. No scleral icterus.   Neck: Normal range of motion. Neck supple. No JVD present. No thyromegaly present.   Cardiovascular: Normal rate and regular rhythm.  Exam reveals no gallop and no friction rub.    No murmur heard.  Pulmonary/Chest: Effort normal. No respiratory distress. She has decreased breath sounds. She has no wheezes. She has no rhonchi. She has no rales.   Right IJ Mediport the right chest wall.   Abdominal: Soft. She exhibits no distension and no mass. There is no tenderness.   Musculoskeletal: Normal range of motion. She exhibits no edema or deformity.   Neurological: She is alert and oriented to person, place, and time. She has normal reflexes. No cranial nerve deficit.   Skin: Skin is warm and dry. No rash noted. No erythema.   Psychiatric: She has a normal mood and affect. Her behavior is normal. Judgment normal.         RECENT LABS:  Hematology WBC   Date Value Ref Range Status   03/17/2018 3.46 (L) 4.50 - 10.70 10*3/mm3 Final     RBC   Date Value Ref Range Status   03/17/2018 3.10 (L) 3.90 - 5.20 10*6/mm3 Final     Hemoglobin   Date Value Ref Range Status   03/17/2018 9.5 (L) 11.9 - 15.5 g/dL Final     Hematocrit   Date Value Ref Range Status   03/17/2018 27.6 (L) 35.6 - 45.5 % Final     Platelets   Date Value Ref Range Status   03/17/2018 138 (L) 140 - 500 10*3/mm3 Final        Lab Results   Component Value Date    GLUCOSE 116 (H) 03/17/2018    BUN 3 (L) 03/17/2018    CREATININE 0.59 03/17/2018    EGFRIFNONA 101 03/17/2018    BCR 5.1 (L) 03/17/2018    K 3.5 03/17/2018    CO2 24.2 03/17/2018    CALCIUM 8.1 (L) 03/17/2018    ALBUMIN 3.30 (L) 03/17/2018    LABIL2 2.1 03/17/2018    AST 13 03/17/2018    ALT 10 03/17/2018       PATHOLOGY FROM EGD 12/6/2017  Final Diagnosis   1.  GASTROESOPHAGEAL JUNCTION, BIOPSIES:             INVASIVE ADENOCARCINOMA.             NO IDENTIFIED INTESTINAL METAPLASIA.      COMMENT: In this specimen tumor is identified extending into submucosal tissue. Deeper extension cannot be excluded in these superficial biopsies.   Her 2 Elizabeth negative  PD L1 positive ( > 1 ).    STRESS TEST 3/5/2018  Interpretation Summary     · Myocardial perfusion imaging indicates a normal myocardial perfusion study with no evidence of ischemia.  · Left ventricular ejection fraction is normal (Calculated EF = 66%).  · Impressions are consistent with a low risk study.  · There is no prior study available for comparison       Assessment/Plan   1.  Well differentiated adenocarcinoma presumably from lung origin in a long-time smoker.  The biopsy was made from navigational bronchoscopy and endobronchial ultrasound at [place] on 10/6/2017.  Staging PET scan showed no mediastinal adenopathy or obvious distant metastases.  She did have uptake in the lower esophagus which was evaluated with EGD and biopsy on 12/6/2017 and unfortunately was found to be adenocarcinoma at the GE junction.   · LungTumor tissue was negative for PDL 1 expression, EGFR, ALK, or ROS 1 mutations.   · She completed stereotactic radiation to the lung tumor 12/15/2017.      2.  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   · Esophageal adenocarcinoma was negative for HER-2/elizabeth overexpression but positive for PDL 1.  · Initiated combined radiation therapy to the esophagus along with weekly low-dose carboplatin and Taxol chemotherapy 1/11/18.  · She completed her carboplatin and Taxol chemotherapy on 3/1/2018 and completed her radiation therapy 3/7/2018        3. Emphysema due to smoking.    4. Placement of right internal jugular MediPort by Dr. Raphael Germain of vascular surgery 1/9/2018.    5.  She was not considered a candidate for lobectomy due to her poor pulmonary function but we plan to have her reevaluated by cardiology and pulmonology prior to determining if she is a  potential candidate for esophageal resection.  She was seen by Dr. Puneet Treviño of Cardiology 2/9/2018 and cleared.  She also has been seen by Pulmonology.    6.  History of benign right parotid tumor in 2012, surgically removed per Dr. Vickers.  Patient is noted to have very tiny nodule below the scar, new to her, but possibly just scar tissue. Encouraged her to watch closely, as will we, for any enlargement.    7. Profound weakness and dehydration due to toxicity from her treatment.  Presumably this is in part due to radiation esophagitis although she could also have some radiation stricturing of the esophagus.  She was admitted  for management of her dehydration and toxicities from treatment.  She is just returning from EGD.                 1.  Plan to continue- circumstances discussed with patient- adjust  IV fluids  IV antiemetics  IV pain medication  Now, s/p EGD with repeat biopsies pending.  2.  The patient initially had been considered not a candidate for surgery but on further follow-up by pulmonary and cardiology it appears that she could potentially be a candidate for surgical resection of the esophagus although given her degree of frailty and the fact that her esophageal tumor was PD L1 positive, I think in this situation may be best to continue to observe her without surgery and plan to utilize immunotherapy in the future if she has  disease recurrence.  3.  If her symptoms improve and she is able to maintain oral hydration and nutrition in the next few days then she could be discharged home with further appointments scheduled as an outpatient for posttreatment CT scans and endoscopic assessment.

## 2018-03-18 ENCOUNTER — APPOINTMENT (OUTPATIENT)
Dept: CT IMAGING | Facility: HOSPITAL | Age: 68
End: 2018-03-18

## 2018-03-18 LAB
ALBUMIN SERPL-MCNC: 3.2 G/DL (ref 3.5–5.2)
ALBUMIN/GLOB SERPL: 2 G/DL
ALP SERPL-CCNC: 47 U/L (ref 39–117)
ALT SERPL W P-5'-P-CCNC: 11 U/L (ref 1–33)
ANION GAP SERPL CALCULATED.3IONS-SCNC: 9.2 MMOL/L
AST SERPL-CCNC: 14 U/L (ref 1–32)
BASOPHILS # BLD AUTO: 0.01 10*3/MM3 (ref 0–0.2)
BASOPHILS NFR BLD AUTO: 0.3 % (ref 0–1.5)
BILIRUB SERPL-MCNC: <0.2 MG/DL (ref 0.1–1.2)
BUN BLD-MCNC: 4 MG/DL (ref 8–23)
BUN/CREAT SERPL: 6.8 (ref 7–25)
CALCIUM SPEC-SCNC: 8.1 MG/DL (ref 8.6–10.5)
CHLORIDE SERPL-SCNC: 104 MMOL/L (ref 98–107)
CO2 SERPL-SCNC: 25.8 MMOL/L (ref 22–29)
CREAT BLD-MCNC: 0.59 MG/DL (ref 0.57–1)
DEPRECATED RDW RBC AUTO: 54.2 FL (ref 37–54)
EOSINOPHIL # BLD AUTO: 0.08 10*3/MM3 (ref 0–0.7)
EOSINOPHIL NFR BLD AUTO: 2.3 % (ref 0.3–6.2)
ERYTHROCYTE [DISTWIDTH] IN BLOOD BY AUTOMATED COUNT: 16.9 % (ref 11.7–13)
GFR SERPL CREATININE-BSD FRML MDRD: 101 ML/MIN/1.73
GLOBULIN UR ELPH-MCNC: 1.6 GM/DL
GLUCOSE BLD-MCNC: 117 MG/DL (ref 65–99)
HCT VFR BLD AUTO: 27.9 % (ref 35.6–45.5)
HGB BLD-MCNC: 9.4 G/DL (ref 11.9–15.5)
IMM GRANULOCYTES # BLD: 0.04 10*3/MM3 (ref 0–0.03)
IMM GRANULOCYTES NFR BLD: 1.1 % (ref 0–0.5)
LYMPHOCYTES # BLD AUTO: 0.51 10*3/MM3 (ref 0.9–4.8)
LYMPHOCYTES NFR BLD AUTO: 14.4 % (ref 19.6–45.3)
MCH RBC QN AUTO: 29.8 PG (ref 26.9–32)
MCHC RBC AUTO-ENTMCNC: 33.7 G/DL (ref 32.4–36.3)
MCV RBC AUTO: 88.6 FL (ref 80.5–98.2)
MONOCYTES # BLD AUTO: 0.81 10*3/MM3 (ref 0.2–1.2)
MONOCYTES NFR BLD AUTO: 22.8 % (ref 5–12)
NEUTROPHILS # BLD AUTO: 2.1 10*3/MM3 (ref 1.9–8.1)
NEUTROPHILS NFR BLD AUTO: 59.1 % (ref 42.7–76)
PLATELET # BLD AUTO: 145 10*3/MM3 (ref 140–500)
PMV BLD AUTO: 10.9 FL (ref 6–12)
POTASSIUM BLD-SCNC: 3.3 MMOL/L (ref 3.5–5.2)
PROT SERPL-MCNC: 4.8 G/DL (ref 6–8.5)
RBC # BLD AUTO: 3.15 10*6/MM3 (ref 3.9–5.2)
SODIUM BLD-SCNC: 139 MMOL/L (ref 136–145)
UREASE TISS QL: NEGATIVE
WBC NRBC COR # BLD: 3.55 10*3/MM3 (ref 4.5–10.7)

## 2018-03-18 PROCEDURE — 71260 CT THORAX DX C+: CPT

## 2018-03-18 PROCEDURE — 0 DIATRIZOATE MEGLUMINE & SODIUM PER 1 ML: Performed by: INTERNAL MEDICINE

## 2018-03-18 PROCEDURE — 80053 COMPREHEN METABOLIC PANEL: CPT | Performed by: INTERNAL MEDICINE

## 2018-03-18 PROCEDURE — 36591 DRAW BLOOD OFF VENOUS DEVICE: CPT

## 2018-03-18 PROCEDURE — 85025 COMPLETE CBC W/AUTO DIFF WBC: CPT | Performed by: INTERNAL MEDICINE

## 2018-03-18 PROCEDURE — 25810000003 DEXTROSE-NACL PER 500 ML: Performed by: INTERNAL MEDICINE

## 2018-03-18 PROCEDURE — 25010000002 IOPAMIDOL 61 % SOLUTION: Performed by: INTERNAL MEDICINE

## 2018-03-18 PROCEDURE — 99232 SBSQ HOSP IP/OBS MODERATE 35: CPT | Performed by: INTERNAL MEDICINE

## 2018-03-18 PROCEDURE — 99231 SBSQ HOSP IP/OBS SF/LOW 25: CPT | Performed by: INTERNAL MEDICINE

## 2018-03-18 PROCEDURE — 63710000001 PROMETHAZINE PER 12.5 MG: Performed by: INTERNAL MEDICINE

## 2018-03-18 PROCEDURE — 25010000002 ONDANSETRON PER 1 MG: Performed by: INTERNAL MEDICINE

## 2018-03-18 PROCEDURE — 74177 CT ABD & PELVIS W/CONTRAST: CPT

## 2018-03-18 RX ADMIN — DIATRIZOATE MEGLUMINE AND DIATRIZOATE SODIUM 30 ML: 600; 100 SOLUTION ORAL; RECTAL at 16:46

## 2018-03-18 RX ADMIN — SUCRALFATE 1 G: 1 SUSPENSION ORAL at 16:52

## 2018-03-18 RX ADMIN — Medication 10 ML: at 20:21

## 2018-03-18 RX ADMIN — PROMETHAZINE HYDROCHLORIDE 6.25 MG: 12.5 TABLET ORAL at 14:08

## 2018-03-18 RX ADMIN — PANTOPRAZOLE SODIUM 40 MG: 40 INJECTION, POWDER, FOR SOLUTION INTRAVENOUS at 05:04

## 2018-03-18 RX ADMIN — SUCRALFATE 1 G: 1 SUSPENSION ORAL at 01:33

## 2018-03-18 RX ADMIN — PROMETHAZINE HYDROCHLORIDE 6.25 MG: 12.5 TABLET ORAL at 20:22

## 2018-03-18 RX ADMIN — ONDANSETRON 4 MG: 2 INJECTION INTRAMUSCULAR; INTRAVENOUS at 20:29

## 2018-03-18 RX ADMIN — IOPAMIDOL 85 ML: 612 INJECTION, SOLUTION INTRAVENOUS at 18:36

## 2018-03-18 RX ADMIN — SUCRALFATE 1 G: 1 SUSPENSION ORAL at 05:04

## 2018-03-18 RX ADMIN — SUCRALFATE 1 G: 1 SUSPENSION ORAL at 11:44

## 2018-03-18 RX ADMIN — BUPROPION HYDROCHLORIDE 300 MG: 300 TABLET, EXTENDED RELEASE ORAL at 16:51

## 2018-03-18 RX ADMIN — DEXTROSE AND SODIUM CHLORIDE 125 ML/HR: 5; 900 INJECTION, SOLUTION INTRAVENOUS at 11:48

## 2018-03-18 RX ADMIN — PROMETHAZINE HYDROCHLORIDE 6.25 MG: 12.5 TABLET ORAL at 07:39

## 2018-03-18 RX ADMIN — DEXTROSE AND SODIUM CHLORIDE 125 ML/HR: 5; 900 INJECTION, SOLUTION INTRAVENOUS at 03:57

## 2018-03-18 RX ADMIN — ONDANSETRON 4 MG: 2 INJECTION INTRAMUSCULAR; INTRAVENOUS at 16:46

## 2018-03-18 RX ADMIN — PROMETHAZINE HYDROCHLORIDE 6.25 MG: 12.5 TABLET ORAL at 01:33

## 2018-03-18 RX ADMIN — HYDROCODONE BITARTRATE AND ACETAMINOPHEN 1 TABLET: 7.5; 325 TABLET ORAL at 07:39

## 2018-03-18 RX ADMIN — HYDROCODONE BITARTRATE AND ACETAMINOPHEN 1 TABLET: 7.5; 325 TABLET ORAL at 11:44

## 2018-03-18 RX ADMIN — HYDROCODONE BITARTRATE AND ACETAMINOPHEN 1 TABLET: 7.5; 325 TABLET ORAL at 20:22

## 2018-03-18 RX ADMIN — ASPIRIN 81 MG: 81 TABLET ORAL at 07:40

## 2018-03-18 NOTE — PROGRESS NOTES
Subjective  patient improved this a.m., discussed repeat staging examinations for both her malignancies via CT this afternoon    REASON FOR FOLLOW UP:   1.  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 Moon Bowen on 10/6/2017.  Primary stereotactic radiation therapy to the left upper lobe completed 12/15/2017  2.  Lung tumor is negative for PDL 1.  Also negative for ALK, EGFR, and ROS 1 mutations.  3.  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   4.  Esophageal adenocarcinoma was negative for HER-2/elizabeth overexpression but positive for PDL 1  5.  Initiation of combined radiation and low-dose weekly carboplatin and Taxol chemotherapy for treatment of esophageal adenocarcinoma.  First treatment delivered 1/11/2018.  6.  Development of thrombocytopenia and radiation esophagitis on the visit of 3/15/2018.    HISTORY OF PRESENT ILLNESS: Ms. Abad is a very pleasant 68 y.o. female with the above-mentioned history who presented March 15 for evaluation near the end of her combined radiation and low-dose weekly carbo Taxol chemotherapy for adenocarcinoma of the esophagus.  She has received a total of 5 cycles of chemotherapy with weekly carboplatin and Taxol, with concurrent radiation.  She was due 2 weeks ago for her sixth cycle, but it was delayed due to esophagitis, myelosuppression, nausea and vomiting.  She was seen by one of her nurse practitioners last week and again her treatment was held and she received supportive care in the office with IV fluids and antiemetics.    She completed her weekly carboplatin and Taxol chemotherapy on 3/1/2018 having completed her radiation therapy about a week prior.  She was scheduled return 3/15 for follow-up and get her scheduled for post treatment scans and endoscopic evaluation.  Unfortunately when she presented to the office  she was profoundly weak.  She's been unable to  the office for a  weight.  The patient and family reporedt that she really has not been able to keep anything down for several days.  She also has had pain most likely due to radiation esophagitis.  She will require admission to the hospital today for further symptom control.    Upon completion of her combined radiation and chemotherapy for esophageal cancer we may consider referring her for evaluation by Dr. Norwood for potential esophagectomy.  The other option would be to continue to observe her expectantly after radiation and chemotherapy and consider future Opdivo therapy if she demonstrates disease progression since her disease was PDL 1 positive.   The patient was admitted for IV fluids, antiemetics and IV pain medications as needed as she is seen March 16 she is slightly improved discussed trying to adjust her medications yet again to improve her status.    The patient is now returned from EGD after discussion with GI medicine.EGD performed  demonstrates ulceration scarring at the esophageal junction and erythematous mucosa in the stomach.  Biopsies were taken and are pending.    She is now next seen March 18, 2018.  Her nausea is under better control and she is trying to take by mouth.  In discussing her record with her we had planned to try to proceed with possible surgery after completion of therapy.  As result she would need generalized repeat scanning to determine whether her diseases are under control or not.  During this hospitalization I have suggested a CT scan series that we'll try to do this afternoon.    History of Present Illness     Past Medical History:   Diagnosis Date   • Adenocarcinoma of lung 2017    HAD RADIATION    • Anxiety and depression    • Arthritis    • Benign parotid tumor 2012    removed by Dr iVckers told it was benign   • COPD (chronic obstructive pulmonary disease)    • Depression    • Diarrhea    • Early cataract    • Emphysema of lung    • Esophageal cancer 2017   • History of chronic pain    •  History of colon polyps    • History of pneumonia    • Hyperlipidemia    • Joint pain    • Stroke       HEMATOLOGIC/ ONCOLOGIC HISTORY:  The patient is a 67 y.o. year old female who has a history of smoking and COPD.  She had an abnormal CT scan in February of this year showing a 3.5 cm mass in the left suprahilar area.  A repeat CT scan was performed at [facility] 7/13/2017 and show that the mass had increased in size from about 3.5 cm up to 6.5 cm.    She underwent navigational bronchoscopy and endobronchial ultrasound and biopsy with Dr. Moon Bowen on 10/6/2017.  The pathology on the biopsy showed well-differentiated adenocarcinoma.  She is referred to us now by her pulmonologist Dr. Daniel Oconnor for further staging evaluation and treatment recommendations.    She has not had a PET scan for staging.  She had a PDL 1 staining on her bronchoscopy specimen which was negative for PDL 1.  EGFR, ALK, and ROS 1 were also negative.    She is still smoking but is trying to quit.  She had pulmonary function tests performed on 10/25/2017 showing a DLCO of 33% and FEV1 37%.    She was referred to the [facility] radiation [facility] and completed primary radiation to the left upper lobe lung mass.  Because of abnormal uptake in the esophagus noted on her staging PET scan, she also underwent an EGD and biopsy which unfortunately revealed adenocarcinoma of the GE junction.  Molecular studies for HER-2/elizabeth and PDL 1 on the tumor tissue showed that she was PD L1 positive and HER-2/elizabeth negative.  We recommended combined weekly low dose carbo Taxol chemotherapy and radiation.     She had significant difficulty with treatment particularly with radiation esophagitis near the end of her course of therapy.  She required some dose delay and is expected to complete her radiation treatments the first week of March 2018.  Last dose of carboplatin and Taxol chemotherapy was delivered 3/1/2018.      Past Surgical History:    Procedure Laterality Date   • BRONCHOSCOPY     • CHOLECYSTECTOMY  1999   • COLON SURGERY  2015    COLON POLYPS   • COLONOSCOPY  04/13/2016    TA w/low grade dysplasia x 3, serrated adenoma x 2   • COLONOSCOPY  06/14/2016    TA w/high grade dysplasia   • ENDOSCOPY N/A 12/6/2017    Procedure: ESOPHAGOGASTRODUODENOSCOPY WITH BIOPSY;  Surgeon: Jayant Robles MD;  Location: [facility] ENDOSCOPY;  Service:    • FOOT SURGERY  10/2010    [place]   • HYSTERECTOMY     • AR INSJ TUNNELED CVC W/O SUBQ PORT/ AGE 3 YR/> Right 1/9/2018    Procedure: MEDIPORT PLACEMENT right;  Surgeon: Damaso Germain MD;  Location: [facility] HYBRID OR 18/19;  Service: Vascular   • SALIVARY GLAND SURGERY      PAROTID MASS REMOVED BENIGN   • SHOULDER ARTHROSCOPY Left 1995. 2001        ALLERGIES:    Allergies   Allergen Reactions   • Penicillins Hives         Review of Systems   Constitutional: Positive for activity change, appetite change, fatigue and unexpected weight change. Negative for fever.   HENT: Negative for hearing loss, nosebleeds, trouble swallowing and voice change.    Eyes: Negative for visual disturbance.   Respiratory: Negative for cough, shortness of breath and wheezing.    Cardiovascular: Positive for chest pain. Negative for palpitations.   Gastrointestinal: Positive for diarrhea, nausea and vomiting. Negative for abdominal pain.        Short bowel symptoms after partial colectomy.  Severe reflux and painful swallowing most likely due to radiation esophagitis.   Genitourinary: Negative for difficulty urinating, frequency, hematuria and urgency.   Musculoskeletal: Negative for back pain and neck pain.   Skin: Negative for rash.   Neurological: Positive for weakness. Negative for dizziness, seizures, syncope and headaches.   Hematological: Negative for adenopathy. Does not bruise/bleed easily.   Psychiatric/Behavioral: Negative for behavioral problems. The patient is not nervous/anxious.         Objective     Vitals:     03/17/18 1125 03/17/18 1413 03/17/18 2250 03/18/18 0528   BP: 113/71 127/75 155/77 132/76   BP Location: Left arm Left arm Right arm Right arm   Patient Position: Sitting Lying Lying Lying   Pulse: 74 79 73 74   Resp: 18 18 18 18   Temp:  97 °F (36.1 °C) 98 °F (36.7 °C) 97.7 °F (36.5 °C)   TempSrc:   Oral Oral   SpO2: 98% 96% 98% 95%   Weight:       Height:         Current Status 3/15/2018   ECOG score 0     Repeat physical exam March 18 is unchanged from previous  Physical Exam   Constitutional: She is oriented to person, place, and time. She appears well-developed. No distress.   Weak appearing   HENT:   Head: Normocephalic.   Eyes: Conjunctivae and EOM are normal. Pupils are equal, round, and reactive to light. No scleral icterus.   Neck: Normal range of motion. Neck supple. No JVD present. No thyromegaly present.   Cardiovascular: Normal rate and regular rhythm.  Exam reveals no gallop and no friction rub.    No murmur heard.  Pulmonary/Chest: Effort normal. No respiratory distress. She has decreased breath sounds. She has no wheezes. She has no rhonchi. She has no rales.   Right IJ Mediport the right chest wall.   Abdominal: Soft. She exhibits no distension and no mass. There is no tenderness.   Musculoskeletal: Normal range of motion. She exhibits no edema or deformity.   Neurological: She is alert and oriented to person, place, and time. She has normal reflexes. No cranial nerve deficit.   Skin: Skin is warm and dry. No rash noted. No erythema.   Psychiatric: She has a normal mood and affect. Her behavior is normal. Judgment normal.         RECENT LABS:  Hematology WBC   Date Value Ref Range Status   03/18/2018 3.55 (L) 4.50 - 10.70 10*3/mm3 Final     RBC   Date Value Ref Range Status   03/18/2018 3.15 (L) 3.90 - 5.20 10*6/mm3 Final     Hemoglobin   Date Value Ref Range Status   03/18/2018 9.4 (L) 11.9 - 15.5 g/dL Final     Hematocrit   Date Value Ref Range Status   03/18/2018 27.9 (L) 35.6 - 45.5 % Final      Platelets   Date Value Ref Range Status   03/18/2018 145 140 - 500 10*3/mm3 Final        Lab Results   Component Value Date    GLUCOSE 117 (H) 03/18/2018    BUN 4 (L) 03/18/2018    CREATININE 0.59 03/18/2018    EGFRIFNONA 101 03/18/2018    BCR 6.8 (L) 03/18/2018    K 3.3 (L) 03/18/2018    CO2 25.8 03/18/2018    CALCIUM 8.1 (L) 03/18/2018    ALBUMIN 3.20 (L) 03/18/2018    LABIL2 2.0 03/18/2018    AST 14 03/18/2018    ALT 11 03/18/2018       PATHOLOGY FROM EGD 12/6/2017  Final Diagnosis   1.  GASTROESOPHAGEAL JUNCTION, BIOPSIES:             INVASIVE ADENOCARCINOMA.            NO IDENTIFIED INTESTINAL METAPLASIA.      COMMENT: In this specimen tumor is identified extending into submucosal tissue. Deeper extension cannot be excluded in these superficial biopsies.   Her 2 Elizabeth negative  PD L1 positive ( > 1 ).    STRESS TEST 3/5/2018  Interpretation Summary     · Myocardial perfusion imaging indicates a normal myocardial perfusion study with no evidence of ischemia.  · Left ventricular ejection fraction is normal (Calculated EF = 66%).  · Impressions are consistent with a low risk study.  · There is no prior study available for comparison       Assessment/Plan   1.  Well differentiated adenocarcinoma presumably from lung origin in a long-time smoker.  The biopsy was made from navigational bronchoscopy and endobronchial ultrasound at [place] on 10/6/2017.  Staging PET scan showed no mediastinal adenopathy or obvious distant metastases.  She did have uptake in the lower esophagus which was evaluated with EGD and biopsy on 12/6/2017 and unfortunately was found to be adenocarcinoma at the GE junction.   · LungTumor tissue was negative for PDL 1 expression, EGFR, ALK, or ROS 1 mutations.   · She completed stereotactic radiation to the lung tumor 12/15/2017.      2.  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   · Esophageal adenocarcinoma was negative for HER-2/elizabeth overexpression but positive  for PDL 1.  · Initiated combined radiation therapy to the esophagus along with weekly low-dose carboplatin and Taxol chemotherapy 1/11/18.  · She completed her carboplatin and Taxol chemotherapy on 3/1/2018 and completed her radiation therapy 3/7/2018        3. Emphysema due to smoking.    4. Placement of right internal jugular MediPort by Dr. Raphael Germain of vascular surgery 1/9/2018.    5.  She was not considered a candidate for lobectomy due to her poor pulmonary function but we plan to have her reevaluated by cardiology and pulmonology prior to determining if she is a potential candidate for esophageal resection.  She was seen by Dr. Puneet Treviño of Cardiology 2/9/2018 and cleared.  She also has been seen by Pulmonology.    6.  History of benign right parotid tumor in 2012, surgically removed per Dr. Vickers.  Patient is noted to have very tiny nodule below the scar, new to her, but possibly just scar tissue. Encouraged her to watch closely, as will we, for any enlargement.    7. Profound weakness and dehydration due to toxicity from her treatment.  Presumably this is in part due to radiation esophagitis although she could also have some radiation stricturing of the esophagus.  She was admitted  for management of her dehydration and toxicities from treatment.  Patient now status post EGD with ulceration and scarring at the EGD junction, biopsies are pending              1.  Plan to continue- circumstances discussed with patient- adjust  IV fluids  IV antiemetics  IV pain medication  Now, s/p EGD with repeat biopsies pending.  2.  The patient initially had been considered not a candidate for surgery but on further follow-up by pulmonary and cardiology it appears that she could potentially be a candidate for surgical resection of the esophagus although given her degree of frailty and the fact that her esophageal tumor was PD L1 positive, I think in this situation may be best to continue to observe her without  surgery and plan to utilize immunotherapy in the future if she has  disease recurrence.  3.  As her symptoms are improved, modestly, when seen March 18 plan to proceed with her post treatment scans this hospitalization and consider assessment by thoracic surgery for possible resection versus observation only.

## 2018-03-18 NOTE — PLAN OF CARE
Problem: Patient Care Overview  Goal: Plan of Care Review  Outcome: Ongoing (interventions implemented as appropriate)   03/18/18 5354   Coping/Psychosocial   Plan of Care Reviewed With patient   Plan of Care Review   Progress improving   OTHER   Outcome Summary Waiting on EGD (3/17) biopsy results. Norco 7.5 given 3x before meals to decrease pain after eating - tolerting PO intake better. Continue Scheduled PO Phenergan. 1 BM on shift. CT w/ contrast of abdomen/pelvis & chest today. Zofran IV given as premed. Up Ad Ojelle in room.      Goal: Individualization and Mutuality  Outcome: Ongoing (interventions implemented as appropriate)    Goal: Discharge Needs Assessment  Outcome: Ongoing (interventions implemented as appropriate)    Goal: Interprofessional Rounds/Family Conf  Outcome: Ongoing (interventions implemented as appropriate)      Problem: Oncology Care (Adult)  Goal: Signs and Symptoms of Listed Potential Problems Will be Absent, Minimized or Managed (Oncology Care)  Outcome: Ongoing (interventions implemented as appropriate)      Problem: Nausea/Vomiting (Adult)  Goal: Identify Related Risk Factors and Signs and Symptoms  Outcome: Ongoing (interventions implemented as appropriate)    Goal: Symptom Relief  Outcome: Ongoing (interventions implemented as appropriate)    Goal: Adequate Hydration  Outcome: Ongoing (interventions implemented as appropriate)      Problem: Nutrition, Imbalanced: Inadequate Oral Intake (Adult)  Goal: Identify Related Risk Factors and Signs and Symptoms  Outcome: Ongoing (interventions implemented as appropriate)    Goal: Improved Oral Intake  Outcome: Ongoing (interventions implemented as appropriate)    Goal: Prevent Further Weight Loss  Outcome: Ongoing (interventions implemented as appropriate)

## 2018-03-18 NOTE — NURSING NOTE
Spoke with Dr. Davis regarding K+ 3.3 and asked if he would like to order replacement, stated that he thinks this decrease is d/t emesis yesterday and as long as she is eating her meals today she will be ok, no replacement orders at this time.

## 2018-03-18 NOTE — PLAN OF CARE
Problem: Patient Care Overview  Goal: Plan of Care Review  Outcome: Ongoing (interventions implemented as appropriate)   03/18/18 0340   Coping/Psychosocial   Plan of Care Reviewed With patient   Plan of Care Review   Progress improving   OTHER   Outcome Summary Awaiting biopsy results from EGD. Norco 7.5-325 given once before bed. Patient states pain increases after she tries eating. No bowel movement today. Stools were liquid yesterday. Dressing on port changed on 3-.       Problem: Oncology Care (Adult)  Goal: Signs and Symptoms of Listed Potential Problems Will be Absent, Minimized or Managed (Oncology Care)  Outcome: Ongoing (interventions implemented as appropriate)      Problem: Nausea/Vomiting (Adult)  Goal: Identify Related Risk Factors and Signs and Symptoms  Outcome: Ongoing (interventions implemented as appropriate)    Goal: Symptom Relief  Outcome: Ongoing (interventions implemented as appropriate)    Goal: Adequate Hydration  Outcome: Ongoing (interventions implemented as appropriate)      Problem: Nutrition, Imbalanced: Inadequate Oral Intake (Adult)  Goal: Improved Oral Intake  Outcome: Ongoing (interventions implemented as appropriate)    Goal: Prevent Further Weight Loss  Outcome: Ongoing (interventions implemented as appropriate)

## 2018-03-18 NOTE — PROGRESS NOTES
[facility]  Inpatient Progress Note    Reason for Follow Up:  Nausea and epigastric pain, esophageal cancer possible radiation esophagitis    Subjective     Interval History:   Still with upper abdominal and chest discomfort, and no change since yesterday's EGD.    Current Facility-Administered Medications:   •  acetaminophen (TYLENOL) tablet 650 mg, 650 mg, Oral, Q4H PRN, Bienvenido Collier MD  •  albuterol (PROVENTIL) nebulizer solution 0.083% 2.5 mg/3mL, 2.5 mg, Nebulization, Q4H PRN, Pa Davis MD  •  aspirin EC tablet 81 mg, 81 mg, Oral, Daily, Pa Davis MD, 81 mg at 03/18/18 0740  •  buPROPion XL (WELLBUTRIN XL) 24 hr tablet 300 mg, 300 mg, Oral, Q PM, Pa Davis MD, 300 mg at 03/17/18 1714  •  dextrose 5 % and sodium chloride 0.9 % infusion, 125 mL/hr, Intravenous, Continuous, Bienvenido Collier MD, Last Rate: 125 mL/hr at 03/18/18 1148, 125 mL/hr at 03/18/18 1148  •  diazePAM (VALIUM) tablet 5 mg, 5 mg, Oral, 4x Daily PRN, Pa Davis MD  •  enoxaparin (LOVENOX) syringe 30 mg, 30 mg, Subcutaneous, Q24H, Bienvenido Collier MD, Stopped at 03/17/18 1400  •  heparin flush (porcine) 100 UNIT/ML injection 500 Units, 5 mL, Intracatheter, Q12H, Bienvenido Collier MD, 500 Units at 03/15/18 2045  •  heparin flush (porcine) 100 UNIT/ML injection 500 Units, 5 mL, Intracatheter, PRN, Bienvenido Collier MD  •  HYDROcodone-acetaminophen (NORCO) 5-325 MG per tablet 1 tablet, 1 tablet, Oral, Q4H PRN, Bienvenido Collier MD  •  HYDROcodone-acetaminophen (NORCO) 7.5-325 MG per tablet 1 tablet, 1 tablet, Oral, 4x Daily PRN, Pa Davis MD, 1 tablet at 03/18/18 1144  •  lactated ringers infusion, 30 mL/hr, Intravenous, Continuous, Pa MOISE MD, Last Rate: 30 mL/hr at 03/17/18 0948, 30 mL/hr at 03/17/18 0948  •  loperamide (IMODIUM) capsule 2 mg, 2 mg, Oral, 4x Daily PRN, Pa Davis MD  •  morphine injection 2 mg, 2 mg, Intravenous, Q4H PRN, 2 mg at 03/15/18  2324 **AND** naloxone (NARCAN) injection 0.4 mg, 0.4 mg, Intravenous, Q5 Min PRN, Bienvenido Collier MD  •  ondansetron (ZOFRAN) injection 4 mg, 4 mg, Intravenous, Q6H PRN, Bienvenido Collier MD, 4 mg at 03/17/18 1331  •  ondansetron (ZOFRAN) tablet 8 mg, 8 mg, Oral, Q8H PRN, Pa Davis MD  •  pantoprazole (PROTONIX) injection 40 mg, 40 mg, Intravenous, Q AM, Pa Davis MD, 40 mg at 03/18/18 0504  •  promethazine (PHENERGAN) tablet 6.25 mg, 6.25 mg, Oral, Q6H, Pa Davis MD, 6.25 mg at 03/18/18 0739  •  sodium chloride 0.9 % flush 10 mL, 10 mL, Intracatheter, Q12H, Bienvenido Collier MD, 10 mL at 03/17/18 2008  •  sodium chloride 0.9 % flush 10 mL, 10 mL, Intracatheter, PRN, Bienvenido Collier MD  •  sucralfate (CARAFATE) 1 GM/10ML suspension 1 g, 1 g, Oral, Q6H, Pa Davis MD, 1 g at 03/18/18 1144  •  temazepam (RESTORIL) capsule 15 mg, 15 mg, Oral, Nightly PRN, Bienvenido Collier MD  Review of Systems:    The following systems were reviewed and negative;  respiratory, musculoskeletal and neurological    Objective     Vital Signs  Temp:  [97 °F (36.1 °C)-98 °F (36.7 °C)] 97.6 °F (36.4 °C)  Heart Rate:  [73-79] 76  Resp:  [16-18] 16  BP: (114-155)/(60-77) 114/60  Body mass index is 17.04 kg/m².    Intake/Output Summary (Last 24 hours) at 03/18/18 1329  Last data filed at 03/18/18 1227   Gross per 24 hour   Intake             6691 ml   Output              150 ml   Net             6541 ml     I/O this shift:  In: 3456 [P.O.:480; I.V.:2976]  Out: -      Physical Exam:   General: patient awake, alert and cooperative   Eyes: Normal lids and lashes, no scleral icterus   Neck: supple, normal ROM   Skin: warm and dry, not jaundiced   Cardiovascular: regular rhythm and rate, no murmurs auscultated   Pulm: clear to auscultation bilaterally, regular and unlabored   Abdomen: soft, mild upper abdominal tenderness, nondistended; normal bowel sounds   Rectal: deferred   Extremities: no rash or  edema   Psychiatric: Normal mood and behavior; memory intact     Results Review:     I reviewed the patient's new clinical results.      Results from last 7 days  Lab Units 03/18/18  0505 03/17/18  0504 03/16/18  0509   WBC 10*3/mm3 3.55* 3.46* 3.09*   HEMOGLOBIN g/dL 9.4* 9.5* 9.4*   HEMATOCRIT % 27.9* 27.6* 27.9*   PLATELETS 10*3/mm3 145 138* 151       Results from last 7 days  Lab Units 03/18/18  0505 03/17/18  0504 03/16/18  0509   SODIUM mmol/L 139 137 138   POTASSIUM mmol/L 3.3* 3.5 3.5   CHLORIDE mmol/L 104 104 105   CO2 mmol/L 25.8 24.2 24.4   BUN mg/dL 4* 3* 6*   CREATININE mg/dL 0.59 0.59 0.70   CALCIUM mg/dL 8.1* 8.1* 8.0*   BILIRUBIN mg/dL <0.2 0.2 0.2   ALK PHOS U/L 47 44 43   ALT (SGPT) U/L 11 10 9   AST (SGOT) U/L 14 13 12   GLUCOSE mg/dL 117* 116* 117*         No results found for: LIPASE    Radiology:  CT Chest With Contrast    (Results Pending)   CT Abdomen Pelvis With Contrast    (Results Pending)       Assessment/Plan     Patient Active Problem List   Diagnosis   • Adenocarcinoma of left lung   • Panlobular emphysema   • Abnormal finding on imaging   • Esophageal carcinoma   • Fitting and adjustment of vascular catheter   • OROZCO (dyspnea on exertion)   • Radiation esophagitis       I discussed the patients findings and my recommendations with patient.    Assessment/Recommendations:  1. adenocarcinoma of the GE junction, positive for PDL 1. Await path from the EGD with biopsies of an ulcerated and scarred gastroesophageal junction.     Ambrosio Sapp MD

## 2018-03-19 LAB
ALBUMIN SERPL-MCNC: 3 G/DL (ref 3.5–5.2)
ALBUMIN/GLOB SERPL: 1.6 G/DL
ALP SERPL-CCNC: 47 U/L (ref 39–117)
ALT SERPL W P-5'-P-CCNC: 14 U/L (ref 1–33)
ANION GAP SERPL CALCULATED.3IONS-SCNC: 10.1 MMOL/L
AST SERPL-CCNC: 15 U/L (ref 1–32)
BASOPHILS # BLD AUTO: 0.01 10*3/MM3 (ref 0–0.2)
BASOPHILS NFR BLD AUTO: 0.3 % (ref 0–1.5)
BILIRUB SERPL-MCNC: <0.2 MG/DL (ref 0.1–1.2)
BUN BLD-MCNC: 2 MG/DL (ref 8–23)
BUN/CREAT SERPL: 3.6 (ref 7–25)
CALCIUM SPEC-SCNC: 8.1 MG/DL (ref 8.6–10.5)
CHLORIDE SERPL-SCNC: 104 MMOL/L (ref 98–107)
CO2 SERPL-SCNC: 24.9 MMOL/L (ref 22–29)
CREAT BLD-MCNC: 0.55 MG/DL (ref 0.57–1)
DEPRECATED RDW RBC AUTO: 53.7 FL (ref 37–54)
EOSINOPHIL # BLD AUTO: 0.12 10*3/MM3 (ref 0–0.7)
EOSINOPHIL NFR BLD AUTO: 3.1 % (ref 0.3–6.2)
ERYTHROCYTE [DISTWIDTH] IN BLOOD BY AUTOMATED COUNT: 16.8 % (ref 11.7–13)
GFR SERPL CREATININE-BSD FRML MDRD: 109 ML/MIN/1.73
GLOBULIN UR ELPH-MCNC: 1.9 GM/DL
GLUCOSE BLD-MCNC: 202 MG/DL (ref 65–99)
HCT VFR BLD AUTO: 27.9 % (ref 35.6–45.5)
HGB BLD-MCNC: 9.5 G/DL (ref 11.9–15.5)
IMM GRANULOCYTES # BLD: 0.04 10*3/MM3 (ref 0–0.03)
IMM GRANULOCYTES NFR BLD: 1 % (ref 0–0.5)
LYMPHOCYTES # BLD AUTO: 0.7 10*3/MM3 (ref 0.9–4.8)
LYMPHOCYTES NFR BLD AUTO: 18.2 % (ref 19.6–45.3)
MAGNESIUM SERPL-MCNC: 1.6 MG/DL (ref 1.6–2.4)
MCH RBC QN AUTO: 30.1 PG (ref 26.9–32)
MCHC RBC AUTO-ENTMCNC: 34.1 G/DL (ref 32.4–36.3)
MCV RBC AUTO: 88.3 FL (ref 80.5–98.2)
MONOCYTES # BLD AUTO: 0.75 10*3/MM3 (ref 0.2–1.2)
MONOCYTES NFR BLD AUTO: 19.5 % (ref 5–12)
NEUTROPHILS # BLD AUTO: 2.22 10*3/MM3 (ref 1.9–8.1)
NEUTROPHILS NFR BLD AUTO: 57.9 % (ref 42.7–76)
PLATELET # BLD AUTO: 135 10*3/MM3 (ref 140–500)
PMV BLD AUTO: 10 FL (ref 6–12)
POTASSIUM BLD-SCNC: 3.1 MMOL/L (ref 3.5–5.2)
PROT SERPL-MCNC: 4.9 G/DL (ref 6–8.5)
RBC # BLD AUTO: 3.16 10*6/MM3 (ref 3.9–5.2)
SODIUM BLD-SCNC: 139 MMOL/L (ref 136–145)
WBC NRBC COR # BLD: 3.84 10*3/MM3 (ref 4.5–10.7)

## 2018-03-19 PROCEDURE — 80053 COMPREHEN METABOLIC PANEL: CPT | Performed by: INTERNAL MEDICINE

## 2018-03-19 PROCEDURE — 99233 SBSQ HOSP IP/OBS HIGH 50: CPT | Performed by: INTERNAL MEDICINE

## 2018-03-19 PROCEDURE — 25010000002 ONDANSETRON PER 1 MG: Performed by: INTERNAL MEDICINE

## 2018-03-19 PROCEDURE — 25810000003 DEXTROSE-NACL PER 500 ML: Performed by: INTERNAL MEDICINE

## 2018-03-19 PROCEDURE — 99232 SBSQ HOSP IP/OBS MODERATE 35: CPT | Performed by: INTERNAL MEDICINE

## 2018-03-19 PROCEDURE — 63710000001 PROMETHAZINE PER 12.5 MG: Performed by: INTERNAL MEDICINE

## 2018-03-19 PROCEDURE — 83735 ASSAY OF MAGNESIUM: CPT | Performed by: INTERNAL MEDICINE

## 2018-03-19 PROCEDURE — 85025 COMPLETE CBC W/AUTO DIFF WBC: CPT | Performed by: INTERNAL MEDICINE

## 2018-03-19 PROCEDURE — 25010000002 POTASSIUM CHLORIDE PER 2 MEQ OF POTASSIUM: Performed by: INTERNAL MEDICINE

## 2018-03-19 PROCEDURE — 97161 PT EVAL LOW COMPLEX 20 MIN: CPT

## 2018-03-19 RX ORDER — POTASSIUM CHLORIDE 7.45 MG/ML
10 INJECTION INTRAVENOUS
Status: DISCONTINUED | OUTPATIENT
Start: 2018-03-19 | End: 2018-03-20 | Stop reason: HOSPADM

## 2018-03-19 RX ADMIN — SUCRALFATE 1 G: 1 SUSPENSION ORAL at 05:29

## 2018-03-19 RX ADMIN — PROMETHAZINE HYDROCHLORIDE 6.25 MG: 12.5 TABLET ORAL at 15:51

## 2018-03-19 RX ADMIN — SUCRALFATE 1 G: 1 SUSPENSION ORAL at 00:24

## 2018-03-19 RX ADMIN — Medication 10 ML: at 21:04

## 2018-03-19 RX ADMIN — ASPIRIN 81 MG: 81 TABLET ORAL at 08:13

## 2018-03-19 RX ADMIN — SUCRALFATE 1 G: 1 SUSPENSION ORAL at 12:15

## 2018-03-19 RX ADMIN — HYDROCODONE BITARTRATE AND ACETAMINOPHEN 1 TABLET: 7.5; 325 TABLET ORAL at 17:06

## 2018-03-19 RX ADMIN — SODIUM CHLORIDE 10 MEQ: 9 INJECTION, SOLUTION INTRAVENOUS at 12:15

## 2018-03-19 RX ADMIN — BUPROPION HYDROCHLORIDE 300 MG: 300 TABLET, EXTENDED RELEASE ORAL at 17:06

## 2018-03-19 RX ADMIN — SODIUM CHLORIDE 10 MEQ: 9 INJECTION, SOLUTION INTRAVENOUS at 17:06

## 2018-03-19 RX ADMIN — SODIUM CHLORIDE 10 MEQ: 9 INJECTION, SOLUTION INTRAVENOUS at 14:44

## 2018-03-19 RX ADMIN — Medication 10 ML: at 08:30

## 2018-03-19 RX ADMIN — HYDROCODONE BITARTRATE AND ACETAMINOPHEN 1 TABLET: 7.5; 325 TABLET ORAL at 12:21

## 2018-03-19 RX ADMIN — DEXTROSE AND SODIUM CHLORIDE 50 ML/HR: 5; 900 INJECTION, SOLUTION INTRAVENOUS at 17:10

## 2018-03-19 RX ADMIN — SUCRALFATE 1 G: 1 SUSPENSION ORAL at 17:06

## 2018-03-19 RX ADMIN — PROMETHAZINE HYDROCHLORIDE 6.25 MG: 12.5 TABLET ORAL at 08:13

## 2018-03-19 RX ADMIN — PANTOPRAZOLE SODIUM 40 MG: 40 INJECTION, POWDER, FOR SOLUTION INTRAVENOUS at 05:28

## 2018-03-19 RX ADMIN — SODIUM CHLORIDE 10 MEQ: 9 INJECTION, SOLUTION INTRAVENOUS at 18:43

## 2018-03-19 RX ADMIN — HYDROCODONE BITARTRATE AND ACETAMINOPHEN 1 TABLET: 7.5; 325 TABLET ORAL at 08:13

## 2018-03-19 RX ADMIN — PROMETHAZINE HYDROCHLORIDE 6.25 MG: 12.5 TABLET ORAL at 19:46

## 2018-03-19 RX ADMIN — ONDANSETRON 4 MG: 2 INJECTION INTRAMUSCULAR; INTRAVENOUS at 12:21

## 2018-03-19 RX ADMIN — PROMETHAZINE HYDROCHLORIDE 6.25 MG: 12.5 TABLET ORAL at 02:06

## 2018-03-19 NOTE — THERAPY EVALUATION
Acute Care - Physical Therapy Initial Evaluation/Discharge  [facility]     Patient Name: Dilma Abad  : 1950  MRN: [id number]  Today's Date: 3/19/2018      Date of Referral to PT: 18  Referring Physician: Vinny      Admit Date: 3/15/2018    Visit Dx:     ICD-10-CM ICD-9-CM   1. Esophageal carcinoma C15.9 150.9   2. Fitting and adjustment of vascular catheter Z45.2 V58.81   3. Adenocarcinoma of left lung C34.92 162.9   4. Panlobular emphysema J43.1 492.8   5. Radiation esophagitis K20.8 530.19     E926.9     Patient Active Problem List   Diagnosis   • Adenocarcinoma of left lung   • Panlobular emphysema   • Abnormal finding on imaging   • Esophageal carcinoma   • Fitting and adjustment of vascular catheter   • OROZCO (dyspnea on exertion)   • Radiation esophagitis     Past Medical History:   Diagnosis Date   • Adenocarcinoma of lung 2017    HAD RADIATION    • Anxiety and depression    • Arthritis    • Benign parotid tumor     removed by Dr Vickers told it was benign   • COPD (chronic obstructive pulmonary disease)    • Depression    • Diarrhea    • Early cataract    • Emphysema of lung    • Esophageal cancer    • History of chronic pain    • History of colon polyps    • History of pneumonia    • Hyperlipidemia    • Joint pain    • Stroke      Past Surgical History:   Procedure Laterality Date   • BRONCHOSCOPY     • CHOLECYSTECTOMY     • COLON SURGERY      COLON POLYPS   • COLONOSCOPY  2016    TA w/low grade dysplasia x 3, serrated adenoma x 2   • COLONOSCOPY  2016    TA w/high grade dysplasia   • ENDOSCOPY N/A 2017    Procedure: ESOPHAGOGASTRODUODENOSCOPY WITH BIOPSY;  Surgeon: Jayant Robles MD;  Location: [facility] ENDOSCOPY;  Service:    • ENDOSCOPY N/A 3/17/2018    Procedure: ESOPHAGOGASTRODUODENOSCOPY WITH BIOPSIES;  Surgeon: Ambrosio Sapp MD;  Location: [facility] ENDOSCOPY;  Service: Gastroenterology   • FOOT SURGERY  10/2010    [place]   • HYSTERECTOMY     •  "ID INSJ TUNNELED CVC W/O SUBQ PORT/ AGE 5 YR/> Right 1/9/2018    Procedure: MEDIPORT PLACEMENT right;  Surgeon: Damaso Germain MD;  Location: [facility] 18/19;  Service: Vascular   • SALIVARY GLAND SURGERY      PAROTID MASS REMOVED BENIGN   • SHOULDER ARTHROSCOPY Left 1995. 2001        PT ASSESSMENT (last 72 hours)      Physical Therapy Evaluation     Row Name 03/19/18 1000          PT Evaluation Time/Intention    Subjective Information no complaints   \"feeling much better today\"  -MA     Document Type evaluation  -MA     Mode of Treatment physical therapy  -MA     Patient Effort good  -MA     Symptoms Noted During/After Treatment none  -MA     Row Name 03/19/18 1000          General Information    Patient Profile Reviewed? yes  -MA     Referring Physician Vinny  -MA     Patient Observations alert;cooperative;agree to therapy  -MA     General Observations of Patient Supine in bed with HOB elevated, no acute distress noted at rest  -MA     Prior Level of Function independent:;gait;transfer;ADL's  -MA     Equipment Currently Used at Home none  -MA     Pertinent History of Current Functional Problem Admission for radiation esophagitis  -MA     Limitations/Impairments safety/cognitive  -MA     Risks Reviewed patient:  -MA     Benefits Reviewed patient:  -MA     Barriers to Rehab none identified  -MA     Row Name 03/19/18 1000          Home Main Entrance    Number of Stairs, Main Entrance none  -MA     Row Name 03/19/18 1000          Stairs Within Home, Primary    Number of Stairs, Within Home, Primary ten  -MA     Stairs Comment, Within Home, Primary basement only- voiced not planning to navigate  -MA     Row Name 03/19/18 1000          Cognitive Assessment/Intervention- PT/OT    Orientation Status (Cognition) oriented x 4  -MA     Follows Commands (Cognition) WFL  -MA     Safety Deficit (Cognitive) mild deficit  -MA     Personal Safety Interventions fall prevention program maintained;gait belt;nonskid " shoes/slippers when out of bed  -[facility] Name 03/19/18 1000          Safety Issues, Functional Mobility    Safety Issues Affecting Function (Mobility) --   Good safety awareness  -[facility] Name 03/19/18 1000          Bed Mobility Assessment/Treatment    Bed Mobility Assessment/Treatment supine-sit;sit-supine  -MA     Supine-Sit [place] (Bed Mobility) independent  -MA     Sit-Supine [place] (Bed Mobility) not tested  -MA     Row Name 03/19/18 1000          Transfer Assessment/Treatment    Transfer Assessment/Treatment sit-stand transfer;stand-sit transfer  -MA     Sit-Stand [place] (Transfers) supervision  -MA     Stand-Sit [place] (Transfers) supervision  -MA     Row Name 03/19/18 1000          Sit-Stand Transfer    Assistive Device (Sit-Stand Transfers) --   no AD  -MA     Row Name 03/19/18 1000          Stand-Sit Transfer    Assistive Device (Stand-Sit Transfers) --   no AD  -MA     Row Name 03/19/18 1000          Gait/Stairs Assessment/Training    [place] Level (Gait) supervision  -MA     Assistive Device (Gait) --   no AD  -MA     Distance in Feet (Gait) 200  -MA     Pattern (Gait) swing-through  -MA     Deviations/Abnormal Patterns (Gait) base of support, narrow  -MA     Comment (Gait/Stairs) Good judy- no LOB or veering noted. good turns, very aware of tolerance  -[facility] Name 03/19/18 1000          General ROM    GENERAL ROM COMMENTS B LE WFL  -[facility] 03/19/18 1000          General Assessment (Manual Muscle Testing)    General Manual Muscle Testing (MMT) Assessment no strength deficits identified  -MA     Row Name 03/19/18 1000          Vision Assessment/Intervention    Visual Impairment/Limitations WFL  -MA     Row Name 03/19/18 1000          Pain Assessment    Additional Documentation --   Denies pain  -MA     Row Name 03/19/18 1000          Plan of Care Review    Plan of Care Reviewed With patient  -[facility] Name 03/19/18 1000          Physical Therapy  Clinical Impression    Date of Referral to PT 03/19/18  -MA     Criteria for Skilled Interventions Met (PT Clinical Impression) no;no problems identified which require skilled intervention  -MA     Pathology/Pathophysiology Noted (Describe Specifically for Each System) --   oncology  -MA     Care Plan Review (PT) patient/other agree to care plan  -MA     Row Name 03/19/18 1000          Vital Signs    O2 Delivery Pre Treatment room air  -MA     O2 Delivery Post Treatment room air  -MA     Row Name 03/19/18 1000          Positioning and Restraints    Pre-Treatment Position in bed  -MA     Post Treatment Position chair  -MA     In Bed sitting;with nsg  -MA     Row Name 03/19/18 1000          Living Environment    Home Accessibility stairs to enter home;stairs within home  -MA       User Key  (r) = Recorded By, (t) = Taken By, (c) = Cosigned By    Initials Name Provider Type    JOE Carrillo PT Physical Therapist          Physical Therapy Education     Title: PT OT SLP Therapies (Done)     Topic: Physical Therapy (Done)     Point: Mobility training (Done)    Learning Progress Summary     Learner Status Readiness Method Response Comment Documented by    Patient Done Acceptance E [facility] 03/19/18 1033          Point: Home exercise program (Done)    Learning Progress Summary     Learner Status Readiness Method Response Comment Documented by    Patient Done Acceptance E [facility] 03/19/18 1033          Point: Body mechanics (Done)    Learning Progress Summary     Learner Status Readiness Method Response Comment Documented by    Patient Done Acceptance E [facility] 03/19/18 1033          Point: Precautions (Done)    Learning Progress Summary     Learner Status Readiness Method Response Comment Documented by    Patient Done Acceptance E [facility] 03/19/18 1033                      User Key     Initials Effective Dates Name Provider Type [facility] 03/07/18 -  Felicitas Carrillo PT Physical Therapist PT                PT  Recommendation and Plan  Anticipated Discharge Disposition (PT): home or self care  Therapy Frequency (PT Clinical Impression): evaluation only  Outcome Summary/Treatment Plan (PT)  Anticipated Discharge Disposition (PT): home or self care  Plan of Care Reviewed With: patient  Outcome Summary: Patient is a pleasant 68 y.o. female admitted for radiation esophagitis. Patient lives ind at home and no prior use of AD. All mobility performed with ind-SV and no AD. Good safety awareness demo'd. No stairs to enter home or to access BR. Based on clinical presentation, patient is not appropriate for skilled PT services acutely at this time as patient appears to be at baseline. Encouraged patient to continue to ambulate with nsg for continued strengthening.          Outcome Measures     Row Name 03/19/18 1000             How much help from another person do you currently need...    Turning from your back to your side while in flat bed without using bedrails? 4  -MA      Moving from lying on back to sitting on the side of a flat bed without bedrails? 4  -MA      Moving to and from a bed to a chair (including a wheelchair)? 4  -MA      Standing up from a chair using your arms (e.g., wheelchair, bedside chair)? 4  -MA      Climbing 3-5 steps with a railing? 3  -MA      To walk in hospital room? 4  -MA      AM-PAC 6 Clicks Score 23  -MA         Functional Assessment    Outcome Measure Options AM-PAC 6 Clicks Basic Mobility (PT)  -MA        User Key  (r) = Recorded By, (t) = Taken By, (c) = Cosigned By    Initials Name Provider Type    JOE Carrillo PT Physical Therapist           Time Calculation:         PT Charges     Row Name 03/19/18 1025             Time Calculation    Start Time 1010  -MA      Stop Time 1025  -MA      Time Calculation (min) 15 min  -MA      PT Received On 03/19/18  -MA        User Key  (r) = Recorded By, (t) = Taken By, (c) = Cosigned By    Initials Name Provider Type    JOE Carrillo PT  Physical Therapist          Therapy Charges for Today     Code Description Service Date Service Provider Modifiers Qty    69910646477 HC PT EVAL LOW COMPLEXITY 1 3/19/2018 Felicitas Carrillo, PT GP 1          PT G-Codes  Outcome Measure Options: AM-PAC 6 Clicks Basic Mobility (PT)      Felicitas Carrillo, PT  3/19/2018

## 2018-03-19 NOTE — PROGRESS NOTES
CHIEF COMPLAINT/REASONS FOR FOLLOW-UP:  1.  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 Moon Bowen on 10/6/2017.  Primary stereotactic radiation therapy to the left upper lobe completed 12/15/2017  2.  Adenocarcinoma of the GE junction, HER-2 negative, PDL-1 positive status post treatment with weekly carboplatin and Taxol with radiation; chemotherapy completed 3/1/18  3.  Radiation esophagitis  4.  Volume depletion and malnutrition related to radiation esophagitis  5.  Hypokalemia  6.  Pancytopenia secondary to chemotherapy    HISTORY OF PRESENT ILLNESS:   The patient states that she is feeling a little better this morning but continues to have difficulty swallowing with occasional regurgitation.  She is tolerating some liquids and solid food.  Her strength has improved and she is now ambulating independently.  She denies fever or chills.  Has mild diarrhea but no abdominal pain or cramping.      Past Medical History, Past Surgical History, Social History, Family History have been reviewed and are without significant changes except as mentioned.    Review of Systems   Constitutional: Positive for activity change, appetite change and fatigue. Negative for fever.   HENT: Negative.    Respiratory: Negative.    Cardiovascular: Negative.    Gastrointestinal: Positive for diarrhea and vomiting. Negative for nausea.   Genitourinary: Negative.    Musculoskeletal: Negative.    Skin: Negative.    Neurological: Positive for weakness.   Hematological: Negative for adenopathy.   Psychiatric/Behavioral: Negative.           Medications:  The current medication list was reviewed in the EMR    ALLERGIES:    Allergies   Allergen Reactions   • Penicillins Hives       Objective      Vitals:    03/19/18 0558   BP: 126/76   Pulse: 80   Resp: 18   Temp: 98.7 °F (37.1 °C)   SpO2: 90%          Physical Exam    Con: Well developed no distress  HEENT: no icterus, no thrush, moist membranes  CV:  RRR, S1S2  RESP: diminished bs, no wheezing  GI: soft, nontender, nondistended, +bs  MS: no edema  PSYCH: normal mood    RECENT LABS:  Hematology   Results from last 7 days  Lab Units 03/19/18  0434 03/18/18  0505 03/17/18  0504   WBC 10*3/mm3 3.84* 3.55* 3.46*   HEMOGLOBIN g/dL 9.5* 9.4* 9.5*   HEMATOCRIT % 27.9* 27.9* 27.6*   PLATELETS 10*3/mm3 135* 145 138*     2  Lab Results   Component Value Date    GLUCOSE 202 (H) 03/19/2018    BUN 2 (L) 03/19/2018    CREATININE 0.55 (L) 03/19/2018    EGFRIFNONA 109 03/19/2018    BCR 3.6 (L) 03/19/2018    CO2 24.9 03/19/2018    CALCIUM 8.1 (L) 03/19/2018    ALBUMIN 3.00 (L) 03/19/2018    LABIL2 1.6 03/19/2018    AST 15 03/19/2018    ALT 14 03/19/2018                  CT chest, abdomen, pelvis 3/18/18: I independently reviewed the CT scan of the chest.  The left upper lobe lesion has decreased in size from 5.8 down to 2.8 cm.  There is mild thickening of the lower esophagus at the GE junction.  No evidence of metastatic disease or progression of disease noted.    Assessment/Plan   1.  Well-differentiated adenocarcinoma of the left upper lobe status post stereotactic radiation to the lung 12/15/17.  I reviewed the follow-up CT scan of the chest today and the patient appears to have had a favorable response with decreased size of the left upper lobe tumor and no evidence of new disease.    2.  Adenocarcinoma of the GE junction negative for HER-2 elizabeth, PDL-1 positive:  The patient has been treated with combined radiation with low-dose weekly carboplatin and Taxol; chemotherapy completed 3/1/18 and radiation completed 3/7/18.  She underwent an EGD this admission showing ulceration and scar at the GE junction; biopsies were taken and pending.  CT scan reviewed today shows thickening at the GE junction likely treatment related.  Further treatment of her esophageal cancer will depend on biopsy results, performance status.  If the biopsy is negative, I think she will likely need a PET  scan 4-6 weeks after chemoradiation for more accurate assessment of disease    3.  Radiation esophagitis: Slowly improving.  Continue PPI, sucralfate, pain control    4.  FEN: She presented with dehydration and weakness related to radiation esophagitis.  She is eating a little better.  I will cut back on IV fluids.  Potassium is worse today and she needs potassium replacement.  I will check a magnesium.    5.  Weakness: We will have physical therapy assess her and treat    6.  Mild pancytopenia secondary to chemotherapy: Counts are stable today in no transfusions required    7.  Disposition: Probably home 1-2 days                  3/19/2018      CC:

## 2018-03-19 NOTE — PLAN OF CARE
Problem: Patient Care Overview  Goal: Plan of Care Review   03/19/18 1030   Coping/Psychosocial   Plan of Care Reviewed With patient   OTHER   Outcome Summary Patient is a pleasant 68 y.o. female admitted for radiation esophagitis. Patient lives ind at home and no prior use of AD. All mobility performed with ind-SV and no AD. Good safety awareness demo'd. No stairs to enter home or to access BR. Based on clinical presentation, patient is not appropriate for skilled PT services acutely at this time as patient appears to be at baseline. Will sign off. Encouraged patient to continue to ambulate with nsg for continued strengthening.

## 2018-03-19 NOTE — PLAN OF CARE
Problem: Patient Care Overview  Goal: Plan of Care Review   03/19/18 1728   Coping/Psychosocial   Plan of Care Reviewed With patient   Plan of Care Review   Progress improving   OTHER   Outcome Summary Pain medication prior to meals, scheduled phenergan. zofran x 1 this afternoon. Port dressing changed. Up ad dick. Potassium protocol initiated.      Goal: Individualization and Mutuality  Outcome: Ongoing (interventions implemented as appropriate)    Goal: Discharge Needs Assessment  Outcome: Ongoing (interventions implemented as appropriate)    Goal: Interprofessional Rounds/Family Conf  Outcome: Ongoing (interventions implemented as appropriate)      Problem: Oncology Care (Adult)  Goal: Signs and Symptoms of Listed Potential Problems Will be Absent, Minimized or Managed (Oncology Care)  Outcome: Ongoing (interventions implemented as appropriate)      Problem: Nausea/Vomiting (Adult)  Goal: Identify Related Risk Factors and Signs and Symptoms  Outcome: Outcome(s) achieved Date Met: 03/19/18    Goal: Symptom Relief  Outcome: Ongoing (interventions implemented as appropriate)    Goal: Adequate Hydration  Outcome: Ongoing (interventions implemented as appropriate)      Problem: Nutrition, Imbalanced: Inadequate Oral Intake (Adult)  Goal: Identify Related Risk Factors and Signs and Symptoms  Outcome: Outcome(s) achieved Date Met: 03/19/18    Goal: Improved Oral Intake  Outcome: Ongoing (interventions implemented as appropriate)    Goal: Prevent Further Weight Loss  Outcome: Ongoing (interventions implemented as appropriate)

## 2018-03-19 NOTE — PROGRESS NOTES
[facility]  Inpatient Progress Note    Reason for Follow Up:  Nausea and epigastric pain, esophageal cancer possible radiation esophagitis    Subjective     Interval History:   Tolerating small amounts of po.  No nausea.  Still awaiting path report from Saturday's esophageal biopsies    Current Facility-Administered Medications:   •  acetaminophen (TYLENOL) tablet 650 mg, 650 mg, Oral, Q4H PRN, Bienvenido Collier MD  •  albuterol (PROVENTIL) nebulizer solution 0.083% 2.5 mg/3mL, 2.5 mg, Nebulization, Q4H PRN, Pa Davis MD  •  aspirin EC tablet 81 mg, 81 mg, Oral, Daily, Pa Davis MD, 81 mg at 03/19/18 0813  •  buPROPion XL (WELLBUTRIN XL) 24 hr tablet 300 mg, 300 mg, Oral, Q PM, Pa Davis MD, 300 mg at 03/18/18 1651  •  dextrose 5 % and sodium chloride 0.9 % infusion, 50 mL/hr, Intravenous, Continuous, Nilesh Casillas MD, Last Rate: 50 mL/hr at 03/19/18 1013, 50 mL/hr at 03/19/18 1013  •  diazePAM (VALIUM) tablet 5 mg, 5 mg, Oral, 4x Daily PRN, Pa Davis MD  •  enoxaparin (LOVENOX) syringe 30 mg, 30 mg, Subcutaneous, Q24H, Bienvenido Collier MD, Stopped at 03/17/18 1400  •  heparin flush (porcine) 100 UNIT/ML injection 500 Units, 5 mL, Intracatheter, Q12H, Bienvenido Coliler MD, 500 Units at 03/15/18 2045  •  heparin flush (porcine) 100 UNIT/ML injection 500 Units, 5 mL, Intracatheter, PRN, Bienvenido Collier MD  •  HYDROcodone-acetaminophen (NORCO) 5-325 MG per tablet 1 tablet, 1 tablet, Oral, Q4H PRN, Bienvenido Collier MD  •  HYDROcodone-acetaminophen (NORCO) 7.5-325 MG per tablet 1 tablet, 1 tablet, Oral, 4x Daily PRN, Pa Davis MD, 1 tablet at 03/19/18 0813  •  loperamide (IMODIUM) capsule 2 mg, 2 mg, Oral, 4x Daily PRN, Pa Davis MD  •  [DISCONTINUED] morphine injection 2 mg, 2 mg, Intravenous, Q4H PRN, 2 mg at 03/15/18 3380 **AND** naloxone (NARCAN) injection 0.4 mg, 0.4 mg, Intravenous, Q5 Min PRN, Bienvenido Collier MD  •  ondansetron  (ZOFRAN) injection 4 mg, 4 mg, Intravenous, Q6H PRN, Bienvenido Collier MD, 4 mg at 03/18/18 2029  •  ondansetron (ZOFRAN) tablet 8 mg, 8 mg, Oral, Q8H PRN, Pa Davis MD  •  pantoprazole (PROTONIX) injection 40 mg, 40 mg, Intravenous, Q AM, Pa Davis MD, 40 mg at 03/19/18 0528  •  potassium chloride 10 mEq in 100 mL IVPB, 10 mEq, Intravenous, Q1H PRN, Nilesh Casillas MD  •  promethazine (PHENERGAN) tablet 6.25 mg, 6.25 mg, Oral, Q6H, Pa Davis MD, 6.25 mg at 03/19/18 0813  •  sodium chloride 0.9 % flush 10 mL, 10 mL, Intracatheter, Q12H, Bienvenido Collier MD, 10 mL at 03/19/18 0830  •  sodium chloride 0.9 % flush 10 mL, 10 mL, Intracatheter, PRN, Bienvenido Collier MD  •  sucralfate (CARAFATE) 1 GM/10ML suspension 1 g, 1 g, Oral, Q6H, Pa Davis MD, 1 g at 03/19/18 0529  •  temazepam (RESTORIL) capsule 15 mg, 15 mg, Oral, Nightly PRN, Bienvenido Collier MD  Review of Systems:    The following systems were reviewed and negative;  constitution, respiratory and cardiovascular    Objective     Vital Signs  Temp:  [98.5 °F (36.9 °C)-98.9 °F (37.2 °C)] 98.9 °F (37.2 °C)  Heart Rate:  [75-86] 86  Resp:  [16-20] 20  BP: ()/(55-76) 119/68  Body mass index is 17.04 kg/m².    Intake/Output Summary (Last 24 hours) at 03/19/18 1157  Last data filed at 03/19/18 1041   Gross per 24 hour   Intake             1886 ml   Output                0 ml   Net             1886 ml     I/O this shift:  In: 240 [P.O.:240]  Out: -      Physical Exam:   General: patient awake, alert and cooperative   Eyes: Normal lids and lashes, no scleral icterus   Neck: supple, normal ROM   Skin: warm and dry, not jaundiced   Cardiovascular: regular rhythm and rate, no murmurs auscultated   Pulm: clear to auscultation bilaterally, regular and unlabored   Abdomen: soft, nontender, nondistended; normal bowel sounds   Rectal: deferred   Extremities: no rash or edema   Psychiatric: Normal mood and behavior; memory  intact     Results Review:     I reviewed the patient's new clinical results.      Results from last 7 days  Lab Units 03/19/18  0434 03/18/18  0505 03/17/18  0504   WBC 10*3/mm3 3.84* 3.55* 3.46*   HEMOGLOBIN g/dL 9.5* 9.4* 9.5*   HEMATOCRIT % 27.9* 27.9* 27.6*   PLATELETS 10*3/mm3 135* 145 138*       Results from last 7 days  Lab Units 03/19/18  0434 03/18/18  0505 03/17/18  0504   SODIUM mmol/L 139 139 137   POTASSIUM mmol/L 3.1* 3.3* 3.5   CHLORIDE mmol/L 104 104 104   CO2 mmol/L 24.9 25.8 24.2   BUN mg/dL 2* 4* 3*   CREATININE mg/dL 0.55* 0.59 0.59   CALCIUM mg/dL 8.1* 8.1* 8.1*   BILIRUBIN mg/dL <0.2 <0.2 0.2   ALK PHOS U/L 47 47 44   ALT (SGPT) U/L 14 11 10   AST (SGOT) U/L 15 14 13   GLUCOSE mg/dL 202* 117* 116*         No results found for: LIPASE    Radiology:  CT Chest With Contrast   Preliminary Result   1.  Infiltrative lesion of the left upper lobe previously measured 5.8 x   4.2 cm, now there is a spiculated solid mass in the same area measuring   2.8 x 2.6 cm. Findings are in keeping with known history of neoplasm.   Close follow-up is recommended.   2.  Interval thickening of lower esophagus and gastroesophageal junction   is noted, the finding may be in keeping with known esophageal cancer.       ----------------------------------------------------------------       Assessment/Plan     Patient Active Problem List   Diagnosis   • Adenocarcinoma of left lung   • Panlobular emphysema   • Abnormal finding on imaging   • Esophageal carcinoma   • Fitting and adjustment of vascular catheter   • OROZCO (dyspnea on exertion)   • Radiation esophagitis       Impression  1. Adenocarcinoma of the GE junction: s/p chemo and radiation.  Area biopsed 3/17, path still pending and will determine further treatment  2. Radiation esophagitis: stable on carafate  3. Poor po intake: ensure causes diarrhea    Plan  -await biopsy results  -continue carafate  -add magic cup supplements    I discussed the patients findings and  my recommendations with patient.    Dalia Edwards MD

## 2018-03-19 NOTE — PLAN OF CARE
Problem: Patient Care Overview  Goal: Plan of Care Review  Outcome: Ongoing (interventions implemented as appropriate)   03/19/18 0243   Coping/Psychosocial   Plan of Care Reviewed With patient   Plan of Care Review   Progress improving   OTHER   Outcome Summary CT scan done todayshows free fluid in pelvis area, emesis x1 (after attempting to eat)Zofran given X1,Norco given X1, continue fluids

## 2018-03-19 NOTE — NURSING NOTE
Staff nurse voiced concerns related to freshly applied dressing not adhering to the patient's skin.  Assessed the dressing.  Coming up at the top of the dressing.  Discussed with patient.  Used alcohol to attempt to de-fat the skin surface to allow for better adherence followed by the standard central line dressing change kit process.

## 2018-03-20 ENCOUNTER — TELEPHONE (OUTPATIENT)
Dept: RADIATION ONCOLOGY | Facility: HOSPITAL | Age: 68
End: 2018-03-20

## 2018-03-20 VITALS
SYSTOLIC BLOOD PRESSURE: 134 MMHG | OXYGEN SATURATION: 95 % | TEMPERATURE: 97.1 F | WEIGHT: 108.8 LBS | RESPIRATION RATE: 18 BRPM | HEIGHT: 67 IN | BODY MASS INDEX: 17.08 KG/M2 | HEART RATE: 86 BPM | DIASTOLIC BLOOD PRESSURE: 81 MMHG

## 2018-03-20 LAB
ALBUMIN SERPL-MCNC: 3.7 G/DL (ref 3.5–5.2)
ALBUMIN/GLOB SERPL: 1.6 G/DL
ALP SERPL-CCNC: 57 U/L (ref 39–117)
ALT SERPL W P-5'-P-CCNC: 13 U/L (ref 1–33)
ANION GAP SERPL CALCULATED.3IONS-SCNC: 10.5 MMOL/L
AST SERPL-CCNC: 17 U/L (ref 1–32)
BASOPHILS # BLD AUTO: 0.01 10*3/MM3 (ref 0–0.2)
BASOPHILS NFR BLD AUTO: 0.2 % (ref 0–1.5)
BILIRUB SERPL-MCNC: 0.2 MG/DL (ref 0.1–1.2)
BUN BLD-MCNC: 5 MG/DL (ref 8–23)
BUN/CREAT SERPL: 7.2 (ref 7–25)
CALCIUM SPEC-SCNC: 8.9 MG/DL (ref 8.6–10.5)
CHLORIDE SERPL-SCNC: 98 MMOL/L (ref 98–107)
CO2 SERPL-SCNC: 27.5 MMOL/L (ref 22–29)
CREAT BLD-MCNC: 0.69 MG/DL (ref 0.57–1)
CYTO UR: NORMAL
DEPRECATED RDW RBC AUTO: 55.2 FL (ref 37–54)
EOSINOPHIL # BLD AUTO: 0.14 10*3/MM3 (ref 0–0.7)
EOSINOPHIL NFR BLD AUTO: 2.1 % (ref 0.3–6.2)
ERYTHROCYTE [DISTWIDTH] IN BLOOD BY AUTOMATED COUNT: 17 % (ref 11.7–13)
GFR SERPL CREATININE-BSD FRML MDRD: 84 ML/MIN/1.73
GLOBULIN UR ELPH-MCNC: 2.3 GM/DL
GLUCOSE BLD-MCNC: 92 MG/DL (ref 65–99)
HCT VFR BLD AUTO: 31.5 % (ref 35.6–45.5)
HGB BLD-MCNC: 10.7 G/DL (ref 11.9–15.5)
IMM GRANULOCYTES # BLD: 0.06 10*3/MM3 (ref 0–0.03)
IMM GRANULOCYTES NFR BLD: 0.9 % (ref 0–0.5)
LAB AP CASE REPORT: NORMAL
LYMPHOCYTES # BLD AUTO: 0.83 10*3/MM3 (ref 0.9–4.8)
LYMPHOCYTES NFR BLD AUTO: 12.6 % (ref 19.6–45.3)
Lab: NORMAL
MAGNESIUM SERPL-MCNC: 2.1 MG/DL (ref 1.6–2.4)
MCH RBC QN AUTO: 30.1 PG (ref 26.9–32)
MCHC RBC AUTO-ENTMCNC: 34 G/DL (ref 32.4–36.3)
MCV RBC AUTO: 88.7 FL (ref 80.5–98.2)
MONOCYTES # BLD AUTO: 1.21 10*3/MM3 (ref 0.2–1.2)
MONOCYTES NFR BLD AUTO: 18.4 % (ref 5–12)
NEUTROPHILS # BLD AUTO: 4.32 10*3/MM3 (ref 1.9–8.1)
NEUTROPHILS NFR BLD AUTO: 65.8 % (ref 42.7–76)
PATH REPORT.ADDENDUM SPEC: NORMAL
PATH REPORT.FINAL DX SPEC: NORMAL
PATH REPORT.GROSS SPEC: NORMAL
PLATELET # BLD AUTO: 161 10*3/MM3 (ref 140–500)
PMV BLD AUTO: 10.6 FL (ref 6–12)
POTASSIUM BLD-SCNC: 4 MMOL/L (ref 3.5–5.2)
PROT SERPL-MCNC: 6 G/DL (ref 6–8.5)
RBC # BLD AUTO: 3.55 10*6/MM3 (ref 3.9–5.2)
SODIUM BLD-SCNC: 136 MMOL/L (ref 136–145)
WBC NRBC COR # BLD: 6.57 10*3/MM3 (ref 4.5–10.7)

## 2018-03-20 PROCEDURE — 80053 COMPREHEN METABOLIC PANEL: CPT | Performed by: INTERNAL MEDICINE

## 2018-03-20 PROCEDURE — 99231 SBSQ HOSP IP/OBS SF/LOW 25: CPT | Performed by: INTERNAL MEDICINE

## 2018-03-20 PROCEDURE — 85025 COMPLETE CBC W/AUTO DIFF WBC: CPT | Performed by: INTERNAL MEDICINE

## 2018-03-20 PROCEDURE — 99239 HOSP IP/OBS DSCHRG MGMT >30: CPT | Performed by: INTERNAL MEDICINE

## 2018-03-20 PROCEDURE — 63710000001 PROMETHAZINE PER 12.5 MG: Performed by: INTERNAL MEDICINE

## 2018-03-20 PROCEDURE — 25010000002 HEPARIN FLUSH (PORCINE) 100 UNIT/ML SOLUTION: Performed by: INTERNAL MEDICINE

## 2018-03-20 PROCEDURE — 25010000002 POTASSIUM CHLORIDE PER 2 MEQ OF POTASSIUM: Performed by: INTERNAL MEDICINE

## 2018-03-20 PROCEDURE — 83735 ASSAY OF MAGNESIUM: CPT | Performed by: INTERNAL MEDICINE

## 2018-03-20 RX ORDER — PANTOPRAZOLE SODIUM 40 MG/1
40 GRANULE, DELAYED RELEASE ORAL
Qty: 30 EACH | Refills: 1 | Status: SHIPPED | OUTPATIENT
Start: 2018-03-20 | End: 2018-01-01 | Stop reason: HOSPADM

## 2018-03-20 RX ADMIN — SUCRALFATE 1 G: 1 SUSPENSION ORAL at 05:33

## 2018-03-20 RX ADMIN — HYDROCODONE BITARTRATE AND ACETAMINOPHEN 1 TABLET: 7.5; 325 TABLET ORAL at 08:05

## 2018-03-20 RX ADMIN — PROMETHAZINE HYDROCHLORIDE 6.25 MG: 12.5 TABLET ORAL at 08:05

## 2018-03-20 RX ADMIN — ASPIRIN 81 MG: 81 TABLET ORAL at 08:06

## 2018-03-20 RX ADMIN — SODIUM CHLORIDE 10 MEQ: 9 INJECTION, SOLUTION INTRAVENOUS at 02:36

## 2018-03-20 RX ADMIN — PROMETHAZINE HYDROCHLORIDE 6.25 MG: 12.5 TABLET ORAL at 01:57

## 2018-03-20 RX ADMIN — PANTOPRAZOLE SODIUM 40 MG: 40 INJECTION, POWDER, FOR SOLUTION INTRAVENOUS at 05:36

## 2018-03-20 RX ADMIN — SUCRALFATE 1 G: 1 SUSPENSION ORAL at 00:26

## 2018-03-20 RX ADMIN — SODIUM CHLORIDE 10 MEQ: 9 INJECTION, SOLUTION INTRAVENOUS at 03:38

## 2018-03-20 RX ADMIN — Medication 500 UNITS: at 10:27

## 2018-03-20 NOTE — TELEPHONE ENCOUNTER
Mrs Abad calls to say she collapsed last week and ended up in the hospital and has just gone home so does not want to keep her scheduled follow up on this Friday.

## 2018-03-20 NOTE — DISCHARGE SUMMARY
Date of Discharge:  3/20/2018    Discharge Diagnosis:   1.  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 Moon Bowen on 10/6/2017.  Primary stereotactic radiation therapy to the left upper lobe completed 12/15/2017  2.  Adenocarcinoma of the GE junction, HER-2 negative, PDL-1 positive status post treatment with weekly carboplatin and Taxol with radiation; chemotherapy completed 3/1/18  3.  Radiation esophagitis  4.  Volume depletion and malnutrition related to radiation esophagitis  5.  Hypokalemia  6.  Pancytopenia secondary to chemotherapy    Presenting Problem/History of Present Illness  Radiation esophagitis [K20.8]  Esophageal carcinoma [C15.9]  Radiation esophagitis [K20.8]   Ms. Abad is a very pleasant 68 y.o. female with the above-mentioned history who is here today for evaluation near the end of her combined radiation and low-dose weekly carbo Taxol chemotherapy for adenocarcinoma of the esophagus.  She has received a total of 5 cycles of chemotherapy with weekly carboplatin and Taxol, with concurrent radiation.  She was due 2 weeks ago for her sixth cycle, but it was delayed due to esophagitis, myelosuppression, nausea and vomiting.  She was seen by one of her nurse practitioners last week and again her treatment was held and she received supportive care in the office with IV fluids and antiemetics.     She completed her weekly carboplatin and Taxol chemotherapy on 3/1/2018 and completed her radiation therapy about a week ago.  She was scheduled return today for follow-up and get her scheduled for post treatment scans and endoscopic evaluation.  Unfortunately when she presented to the office today she was profoundly weak.  She's been unable to  the office for a weight.  The patient and family report that she really has not been able to keep anything down for several days.  She also has had pain most likely due to radiation esophagitis.  She will  require admission to the hospital today for further symptom control.       Hospital Course  1.  Well-differentiated adenocarcinoma of the left upper lobe status post stereotactic radiation to the lung 12/15/17.  I reviewed the follow-up CT scan of the chest today and the patient appears to have had a favorable response with decreased size of the left upper lobe tumor and no evidence of new disease.     2.  Adenocarcinoma of the GE junction negative for HER-2 elizabeth, PDL-1 positive:  The patient has been treated with combined radiation with low-dose weekly carboplatin and Taxol; chemotherapy completed 3/1/18 and radiation completed 3/7/18.  She underwent an EGD this admission showing ulceration and scar at the GE junction; biopsies were taken and an showed marketed ulceration and severe acute inflammation with inflammatory changes and ulcer exudate.  No definitive malignancy identified.  CT scan reviewed showed thickening at the GE junction likely treatment related.       3.  Radiation esophagitis: Slowly improving.  Continue PPI, sucralfate, pain control.  At discharge the pain is manageable with oral medications in addition to PPI and Carafate which will be continued at home.  She is maintaining nutrition and hydration orally     4.  FEN: She presented with dehydration and weakness related to radiation esophagitis.   she required IV fluid resuscitation and electrolyte replacement.  At discharge she is maintaining oral hydration and nutrition.     5.  Weakness: We'll therapy assess the patient with no needs identified and immediately signed off.     6.  Mild pancytopenia secondary to chemotherapy: Counts are stable today in no transfusions required    Procedures Performed  Procedure(s):  ESOPHAGOGASTRODUODENOSCOPY WITH BIOPSIES  03/17 1035 UPPER GI ENDOSCOPY    Consults:   Consults     Date and Time Order Name Status Description    3/16/2018 1302 Inpatient Gastroenterology Consult Completed           Pertinent Test  Results: See pathology above    Condition on Discharge:  stable    Vital Signs  Temp:  [97.1 °F (36.2 °C)-98.9 °F (37.2 °C)] 97.1 °F (36.2 °C)  Heart Rate:  [78-86] 86  Resp:  [18-20] 18  BP: (119-151)/(68-81) 134/81    Physical Exam:  Con: Well developed no distress  HEENT: no icterus, no thrush, moist membranes  CV: RRR, S1S2  RESP: diminished bs, no wheezing  GI: soft, nontender, nondistended, +bs  MS: no edema  PSYCH: normal mood     Discharge Disposition  Home or Self Care    Discharge Medications   Dilma Abad   Home Medication Instructions SIM:[id number]    Printed on:03/20/18 0254   Medication Information                      albuterol (VENTOLIN HFA) 108 (90 Base) MCG/ACT inhaler  Inhale 2 puffs Every 6 (Six) Hours As Needed for Wheezing.             aspirin 81 MG EC tablet  Take 81 mg by mouth Daily.             buPROPion XL (WELLBUTRIN XL) 300 MG 24 hr tablet  Take 300 mg by mouth Every Evening.             Cholecalciferol (VITAMIN D) 2000 units tablet  Take 2,000 Units by mouth Every Other Day.             cyanocobalamin (VITAMIN B-12) 2000 MCG tablet  Take 2,000 mcg by mouth Every Other Day.             diazePAM (VALIUM) 5 MG tablet  Take 5 mg by mouth 4 (Four) Times a Day As Needed for Anxiety.             diphenoxylate-atropine (LOMOTIL) 2.5-0.025 MG per tablet  TK 1 T PO  QID PRN             escitalopram (LEXAPRO) 20 MG tablet  Take 20 mg by mouth Every Evening.             fluticasone (FLONASE) 50 MCG/ACT nasal spray  2 sprays into each nostril Daily As Needed for Rhinitis.             HYDROcodone-acetaminophen (NORCO) 7.5-325 MG per tablet  Take 1 tablet by mouth 4 (Four) Times a Day As Needed for Moderate Pain .             Multiple Vitamins-Minerals (CENTRUM SILVER PO)  Take 1 tablet by mouth Daily.             ondansetron (ZOFRAN) 8 MG tablet  Take 1 tablet by mouth Every 8 (Eight) Hours As Needed for Nausea or Vomiting.             pantoprazole (PROTONIX) 40 MG pack packet  Take 1 packet  by mouth Every Morning Before Breakfast.             Probiotic Product (PROBIOTIC PO)  Take 1 capsule by mouth Daily.             promethazine (PHENERGAN) 12.5 MG tablet  TAKE 1 TABLET BY MOUTH EVERY 6 HOURS AS NEEDED FOR NAUSEA OR VOMITING             simvastatin (ZOCOR) 20 MG tablet  Take 20 mg by mouth Every Night.             sucralfate (CARAFATE) 1 GM/10ML suspension  Take 10 mL by mouth Every 6 (Six) Hours.             umeclidinium-vilanterol (ANORO ELLIPTA) 62.5-25 MCG/INH aerosol powder  inhaler  Inhale 1 puff Daily.             Wheat Dextrin (BENEFIBER DRINK MIX) pack  Take  by mouth Daily.                 Discharge Diet:  regular as tolerated    Activity at Discharge: ad dick    Follow-up Appointments  Future Appointments  Date Time Provider Department Center   3/23/2018 9:45 AM Shazia Mcdonald MD [facility] None      I will schedule her with a nurse practitioner with lab review in 1 week and to see Dr. Collier with labs in approximately 2-3 weeks.  I discussed with the patient potentially pursuing a PET scan approximately 6 weeks after chemotherapy radiation was completed to further assess her lung cancer, GE junction cancer and decide at that point how to proceed with care regarding surgery etc.    Test Results Pending at Discharge:  Special stains on path       Nilesh Casillas MD  03/20/18  8:53 AM    Time: I spent 45 minutes discharging the patient including discussing test results during the hospital stay, follow-up testing with PET scan down the road, follow-up care, arranging follow-up care etc.

## 2018-03-20 NOTE — PROGRESS NOTES
[facility]  Inpatient Progress Note    Reason for Follow Up:  Nausea and epigastric pain, esophageal cancer possible radiation esophagitis    Subjective     Interval History:   Feeling better.  Tolerating increasing amount of po intake    Current Facility-Administered Medications:   •  acetaminophen (TYLENOL) tablet 650 mg, 650 mg, Oral, Q4H PRN, Bienvenido Collier MD  •  albuterol (PROVENTIL) nebulizer solution 0.083% 2.5 mg/3mL, 2.5 mg, Nebulization, Q4H PRN, Pa Davis MD  •  aspirin EC tablet 81 mg, 81 mg, Oral, Daily, Pa Davis MD, 81 mg at 03/20/18 0806  •  buPROPion XL (WELLBUTRIN XL) 24 hr tablet 300 mg, 300 mg, Oral, Q PM, Pa Davis MD, 300 mg at 03/19/18 1706  •  dextrose 5 % and sodium chloride 0.9 % infusion, 50 mL/hr, Intravenous, Continuous, Nilesh Casillas MD, Last Rate: 50 mL/hr at 03/19/18 1710, 50 mL/hr at 03/19/18 1710  •  diazePAM (VALIUM) tablet 5 mg, 5 mg, Oral, 4x Daily PRN, Pa Davis MD  •  enoxaparin (LOVENOX) syringe 30 mg, 30 mg, Subcutaneous, Q24H, Bienvenido Collier MD, Stopped at 03/17/18 1400  •  heparin flush (porcine) 100 UNIT/ML injection 500 Units, 5 mL, Intracatheter, Q12H, Bienvenido Collier MD, 500 Units at 03/15/18 2045  •  heparin flush (porcine) 100 UNIT/ML injection 500 Units, 5 mL, Intracatheter, PRN, Bienvenido Collier MD  •  HYDROcodone-acetaminophen (NORCO) 5-325 MG per tablet 1 tablet, 1 tablet, Oral, Q4H PRN, Bienvenido Collier MD  •  HYDROcodone-acetaminophen (NORCO) 7.5-325 MG per tablet 1 tablet, 1 tablet, Oral, 4x Daily PRN, Pa Davis MD, 1 tablet at 03/20/18 0805  •  loperamide (IMODIUM) capsule 2 mg, 2 mg, Oral, 4x Daily PRN, Pa Davis MD  •  [DISCONTINUED] morphine injection 2 mg, 2 mg, Intravenous, Q4H PRN, 2 mg at 03/15/18 9773 **AND** naloxone (NARCAN) injection 0.4 mg, 0.4 mg, Intravenous, Q5 Min PRN, Bienvenido Collier MD  •  ondansetron (ZOFRAN) injection 4 mg, 4 mg, Intravenous, Q6H PRN,  Bienvenido Collier MD, 4 mg at 03/19/18 1221  •  ondansetron (ZOFRAN) tablet 8 mg, 8 mg, Oral, Q8H PRN, Pa Davis MD  •  pantoprazole (PROTONIX) injection 40 mg, 40 mg, Intravenous, Q AM, Pa Davis MD, 40 mg at 03/20/18 0536  •  potassium chloride 10 mEq in 100 mL IVPB, 10 mEq, Intravenous, Q1H PRN, Nilesh Casillas MD, Last Rate: 100 mL/hr at 03/20/18 0338, 10 mEq at 03/20/18 0338  •  promethazine (PHENERGAN) tablet 6.25 mg, 6.25 mg, Oral, Q6H, Pa Davis MD, 6.25 mg at 03/20/18 0805  •  sodium chloride 0.9 % flush 10 mL, 10 mL, Intracatheter, Q12H, Bienvenido Collier MD, 10 mL at 03/19/18 2104  •  sodium chloride 0.9 % flush 10 mL, 10 mL, Intracatheter, PRN, Bienvenido Collier MD  •  sucralfate (CARAFATE) 1 GM/10ML suspension 1 g, 1 g, Oral, Q6H, aP Davis MD, 1 g at 03/20/18 0533  •  temazepam (RESTORIL) capsule 15 mg, 15 mg, Oral, Nightly PRN, Bienvenido Collier MD  Review of Systems:    The following systems were reviewed and negative;  constitution, respiratory and cardiovascular    Objective     Vital Signs  Temp:  [97.1 °F (36.2 °C)-98.9 °F (37.2 °C)] 97.1 °F (36.2 °C)  Heart Rate:  [78-86] 86  Resp:  [18-20] 18  BP: (119-151)/(68-81) 134/81  Body mass index is 17.04 kg/m².    Intake/Output Summary (Last 24 hours) at 03/20/18 0846  Last data filed at 03/20/18 0600   Gross per 24 hour   Intake             2595 ml   Output                0 ml   Net             2595 ml     No intake/output data recorded.     Physical Exam:   General: patient awake, alert and cooperative   Eyes: Normal lids and lashes, no scleral icterus   Neck: supple, normal ROM   Skin: warm and dry, not jaundiced   Cardiovascular: regular rhythm and rate, no murmurs auscultated   Pulm: clear to auscultation bilaterally, regular and unlabored   Abdomen: soft, nontender, nondistended; normal bowel sounds   Rectal: deferred   Extremities: no rash or edema   Psychiatric: Normal mood and behavior; memory  intact     Results Review:     I reviewed the patient's new clinical results.      Results from last 7 days  Lab Units 03/20/18  0535 03/19/18  0434 03/18/18  0505   WBC 10*3/mm3 6.57 3.84* 3.55*   HEMOGLOBIN g/dL 10.7* 9.5* 9.4*   HEMATOCRIT % 31.5* 27.9* 27.9*   PLATELETS 10*3/mm3 161 135* 145       Results from last 7 days  Lab Units 03/20/18  0535 03/19/18  0434 03/18/18  0505   SODIUM mmol/L 136 139 139   POTASSIUM mmol/L 4.0 3.1* 3.3*   CHLORIDE mmol/L 98 104 104   CO2 mmol/L 27.5 24.9 25.8   BUN mg/dL 5* 2* 4*   CREATININE mg/dL 0.69 0.55* 0.59   CALCIUM mg/dL 8.9 8.1* 8.1*   BILIRUBIN mg/dL 0.2 <0.2 <0.2   ALK PHOS U/L 57 47 47   ALT (SGPT) U/L 13 14 11   AST (SGOT) U/L 17 15 14   GLUCOSE mg/dL 92 202* 117*         No results found for: LIPASE    Radiology:  CT Chest With Contrast   Preliminary Result   1.  Infiltrative lesion of the left upper lobe previously measured 5.8 x   4.2 cm, now there is a spiculated solid mass in the same area measuring   2.8 x 2.6 cm. Findings are in keeping with known history of neoplasm.   Close follow-up is recommended.   2.  Interval thickening of lower esophagus and gastroesophageal junction   is noted, the finding may be in keeping with known esophageal cancer.       ----------------------------------------------------------------     Tissue Pathology Exam   Order: [id number]   Status:  Final result   Visible to patient:  No (Not Released) Dx:  Esophageal carcinoma; Radiation esoph...    3d ago   Final Diagnosis   1. STOMACH, RANDOM BIOPSY:           FRAGMENTS OF GASTRIC MUCOSA WITH MILD CHRONIC ACTIVE GASTRITIS.           SPECIAL STAIN (DIFF-QUIK) FOR HELICOBACTER PYLORI IS EQUIVOCAL.             IMMUNOHISTOCHEMICAL STAIN FOR HELICOBACTER PYLORI IS PENDING (SEE COMMENT).     COMMENT: The initial Diff-Quik special stain is equivocal for Helicobacter pylori.  Given the pattern and severity of inflammation, a more sensitive and specific immunohistochemical stain for  Helicobacter pylori is warranted.  This immunohistochemical stain will be performed with results to be reported in an addendum.     2. GASTROESOPHAGEAL JUNCTION, BIOPSY:           FRAGMENTS OF GASTRIC-TYPE MUCOSA WITH MARKED ULCERATION AND SEVERE ASSOCIATED                  ACUTE INFLAMMATION.           DETACHED FRAGMENTS OF ACUTE INFLAMMATORY / ULCER EXUDATE.           SPECIAL STAIN (GMS) IS PENDING (SEE COMMENT).           REACTIVE EPITHELIAL CHANGES.           NO DEFINITIVE EVIDENCE OF INTESTINAL METAPLASIA OR DYSPLASIA.     COMMENT: A GMS special stain will be performed to assess for the presence of fungi.  The results of this special stain will be reported in an addendum.  Immunohistochemical stains for viral antigens are available upon request.  If such studies are desired, please notify the laboratory             Assessment/Plan     Patient Active Problem List   Diagnosis   • Adenocarcinoma of left lung   • Panlobular emphysema   • Abnormal finding on imaging   • Esophageal carcinoma   • Fitting and adjustment of vascular catheter   • OROZCO (dyspnea on exertion)   • Radiation esophagitis       Impression  1. Adenocarcinoma of the GE junction: s/p chemo and radiation.  Initial path report reviewed - presence of ulceration and acute inflammation but no dysplasia or malignancy noted  2. Radiation esophagitis: stable on carafate  3. Poor po intake: ensure causes diarrhea    Plan  -await final path report with regards to fungal stain, although suspect these findings are secondary to radiation induced esophageal injury  -continue carafate and PPI    Will follow bx results peripherally.  Please call if we can be of any further assistance in the care of this pleasant lady.    I discussed the patients findings and my recommendations with patient.          Jayant Robles M.D.  [facility]  [address] [place] [address]  Office: [phone]

## 2018-03-20 NOTE — PLAN OF CARE
Problem: Patient Care Overview  Goal: Plan of Care Review  Outcome: Ongoing (interventions implemented as appropriate)   03/20/18 4454   Coping/Psychosocial   Plan of Care Reviewed With patient   Plan of Care Review   Progress improving   OTHER   Outcome Summary Pain mgmt prior to meals, no nausea this evening, up at dick, K+ runs finished

## 2018-03-21 NOTE — PAYOR COMM NOTE
"Dilma Shell (68 y.o. Female)     ATTN: AROLDO ALLAN   REF#[id number]  D/C SUMMARY  THANKS!  MOON [phone] OR [phone]        Date of Birth Social Security Number Address Home Phone MRN    1950  [address] [id number] [phone] [id number]    Congregation Marital Status          None        Admission Date Admission Type Admitting Provider Attending Provider Department, Room/Bed    3/15/18 Urgent Bienvenido Collier MD  [facility] [address] [place], P396/1    Discharge Date Discharge Disposition Discharge Destination        3/20/2018 Home or Self Care              Attending Provider:  (none)   Allergies:  Penicillins    Isolation:  None   Infection:  None   Code Status:  Prior    Ht:  170.2 cm (67\")   Wt:  49.4 kg (108 lb 12.8 oz)    Admission Cmt:  None   Principal Problem:  None                Active Insurance as of 3/15/2018     Primary Coverage     Payor Plan Insurance Group Employer/Plan Group    [employer] [place] [facility]      Payor Plan Address Payor Plan Phone Number Effective From Effective To    [address] [address] [phone] 10/20/2017     [place] [address]       Subscriber Name Subscriber Birth Date Member ID       DILMA SHELL [id number]                 Emergency Contacts      (Rel.) Home Phone Work Phone Mobile Phone    Gricelda Fuentes (Sister) [phone] -- --               Discharge Summary      Nilesh Casillas MD at 3/20/2018  8:53 AM            Date of Discharge:  3/20/2018    Discharge Diagnosis:   1.  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 Moon Bowen on 10/6/2017.  Primary stereotactic radiation therapy to the left upper lobe completed 12/15/2017  2.  Adenocarcinoma of the GE junction, HER-2 negative, PDL-1 positive status post treatment with weekly carboplatin and Taxol with radiation; chemotherapy completed 3/1/18  3.  Radiation " esophagitis  4.  Volume depletion and malnutrition related to radiation esophagitis  5.  Hypokalemia  6.  Pancytopenia secondary to chemotherapy    Presenting Problem/History of Present Illness  Radiation esophagitis [K20.8]  Esophageal carcinoma [C15.9]  Radiation esophagitis [K20.8]   Ms. Abad is a very pleasant 68 y.o. female with the above-mentioned history who is here today for evaluation near the end of her combined radiation and low-dose weekly carbo Taxol chemotherapy for adenocarcinoma of the esophagus.  She has received a total of 5 cycles of chemotherapy with weekly carboplatin and Taxol, with concurrent radiation.  She was due 2 weeks ago for her sixth cycle, but it was delayed due to esophagitis, myelosuppression, nausea and vomiting.  She was seen by one of her nurse practitioners last week and again her treatment was held and she received supportive care in the office with IV fluids and antiemetics.     She completed her weekly carboplatin and Taxol chemotherapy on 3/1/2018 and completed her radiation therapy about a week ago.  She was scheduled return today for follow-up and get her scheduled for post treatment scans and endoscopic evaluation.  Unfortunately when she presented to the office today she was profoundly weak.  She's been unable to  the office for a weight.  The patient and family report that she really has not been able to keep anything down for several days.  She also has had pain most likely due to radiation esophagitis.  She will require admission to the hospital today for further symptom control.       Hospital Course  1.  Well-differentiated adenocarcinoma of the left upper lobe status post stereotactic radiation to the lung 12/15/17.  I reviewed the follow-up CT scan of the chest today and the patient appears to have had a favorable response with decreased size of the left upper lobe tumor and no evidence of new disease.     2.  Adenocarcinoma of the GE junction negative  for HER-2 elizabeth, PDL-1 positive:  The patient has been treated with combined radiation with low-dose weekly carboplatin and Taxol; chemotherapy completed 3/1/18 and radiation completed 3/7/18.  She underwent an EGD this admission showing ulceration and scar at the GE junction; biopsies were taken and an showed marketed ulceration and severe acute inflammation with inflammatory changes and ulcer exudate.  No definitive malignancy identified.  CT scan reviewed showed thickening at the GE junction likely treatment related.       3.  Radiation esophagitis: Slowly improving.  Continue PPI, sucralfate, pain control.  At discharge the pain is manageable with oral medications in addition to PPI and Carafate which will be continued at home.  She is maintaining nutrition and hydration orally     4.  FEN: She presented with dehydration and weakness related to radiation esophagitis.    she required IV fluid resuscitation and electrolyte replacement.  At discharge she is maintaining oral hydration and nutrition.     5.  Weakness:  We'll therapy assess the patient with no needs identified and immediately signed off.     6.  Mild pancytopenia secondary to chemotherapy: Counts are stable today in no transfusions required    Procedures Performed  Procedure(s):  ESOPHAGOGASTRODUODENOSCOPY WITH BIOPSIES  03/17 1035 UPPER GI ENDOSCOPY    Consults:   Consults     Date and Time Order Name Status Description    3/16/2018 1302 Inpatient Gastroenterology Consult Completed           Pertinent Test Results: See pathology above    Condition on Discharge:  stable    Vital Signs  Temp:  [97.1 °F (36.2 °C)-98.9 °F (37.2 °C)] 97.1 °F (36.2 °C)  Heart Rate:  [78-86] 86  Resp:  [18-20] 18  BP: (119-151)/(68-81) 134/81    Physical Exam:  Con: Well developed no distress  HEENT: no icterus, no thrush, moist membranes  CV: RRR, S1S2  RESP: diminished bs, no wheezing  GI: soft, nontender, nondistended, +bs  MS: no edema  PSYCH: normal mood     Discharge  Disposition  Home or Self Care    Discharge Medications   Dilma Abad   Home Medication Instructions SIM:[id number]    Printed on:03/20/18 2991   Medication Information                      albuterol (VENTOLIN HFA) 108 (90 Base) MCG/ACT inhaler  Inhale 2 puffs Every 6 (Six) Hours As Needed for Wheezing.             aspirin 81 MG EC tablet  Take 81 mg by mouth Daily.             buPROPion XL (WELLBUTRIN XL) 300 MG 24 hr tablet  Take 300 mg by mouth Every Evening.             Cholecalciferol (VITAMIN D) 2000 units tablet  Take 2,000 Units by mouth Every Other Day.             cyanocobalamin (VITAMIN B-12) 2000 MCG tablet  Take 2,000 mcg by mouth Every Other Day.             diazePAM (VALIUM) 5 MG tablet  Take 5 mg by mouth 4 (Four) Times a Day As Needed for Anxiety.             diphenoxylate-atropine (LOMOTIL) 2.5-0.025 MG per tablet  TK 1 T PO  QID PRN             escitalopram (LEXAPRO) 20 MG tablet  Take 20 mg by mouth Every Evening.             fluticasone (FLONASE) 50 MCG/ACT nasal spray  2 sprays into each nostril Daily As Needed for Rhinitis.             HYDROcodone-acetaminophen (NORCO) 7.5-325 MG per tablet  Take 1 tablet by mouth 4 (Four) Times a Day As Needed for Moderate Pain .             Multiple Vitamins-Minerals (CENTRUM SILVER PO)  Take 1 tablet by mouth Daily.             ondansetron (ZOFRAN) 8 MG tablet  Take 1 tablet by mouth Every 8 (Eight) Hours As Needed for Nausea or Vomiting.             pantoprazole (PROTONIX) 40 MG pack packet  Take 1 packet by mouth Every Morning Before Breakfast.             Probiotic Product (PROBIOTIC PO)  Take 1 capsule by mouth Daily.             promethazine (PHENERGAN) 12.5 MG tablet  TAKE 1 TABLET BY MOUTH EVERY 6 HOURS AS NEEDED FOR NAUSEA OR VOMITING             simvastatin (ZOCOR) 20 MG tablet  Take 20 mg by mouth Every Night.             sucralfate (CARAFATE) 1 GM/10ML suspension  Take 10 mL by mouth Every 6 (Six) Hours.             umeclidinium-vilanterol  (ANORO ELLIPTA) 62.5-25 MCG/INH aerosol powder  inhaler  Inhale 1 puff Daily.             Wheat Dextrin (BENEFIBER DRINK MIX) pack  Take  by mouth Daily.                 Discharge Diet:  regular as tolerated    Activity at Discharge: ad dick    Follow-up Appointments  Future Appointments  Date Time Provider Department Center   3/23/2018 9:45 AM Shazia Mcdonald MD [facility] None      I will schedule her with a nurse practitioner with lab review in 1 week and to see Dr. Collier with labs in approximately 2-3 weeks.  I discussed with the patient potentially pursuing a PET scan approximately 6 weeks after chemotherapy radiation was completed to further assess her lung cancer, GE junction cancer and decide at that point how to proceed with care regarding surgery etc.    Test Results Pending at Discharge:  Special stains on path       Nilesh Casillas MD  03/20/18  8:53 AM    Time: I spent 45 minutes discharging the patient including discussing test results during the hospital stay, follow-up testing with PET scan down the road, follow-up care, arranging follow-up care etc.          Electronically signed by Nilesh Casillas MD at 3/20/2018  9:13 AM

## 2018-03-22 DIAGNOSIS — C34.92 ADENOCARCINOMA OF LEFT LUNG (HCC): Primary | ICD-10-CM

## 2018-03-23 ENCOUNTER — TELEPHONE (OUTPATIENT)
Dept: ONCOLOGY | Facility: HOSPITAL | Age: 68
End: 2018-03-23

## 2018-03-23 NOTE — TELEPHONE ENCOUNTER
Pt called and left v/m for triage stating she was recently dc'd from hospital and is having a lot of pain and weakness. Tried calling pt back, no answer. LEft v/m for her to return call.

## 2018-03-23 NOTE — TELEPHONE ENCOUNTER
Patient called back, stating that she is very weak, hasnt been eating well bc she doesn't have strength to make the food. Having pain in her stomach and esophagus from the radiation still. Reviewed with  who dc'd pt this week, he would like pt to come in to be seen today and fluids. Notified pt of this, she says she really doesn't think she can get up here today, doesn't have a ride and doesn't have the energy. She says she is able to drink fluids fine, she was just worried about losing more weight. Encouraged pt to drink plenty of fluids and try to drink at least 2 boost or ensure a day to keep up her calorie and protein intake. TOld her to call back if she needed anything and if she continued to decline over the weekend she needed to go back to ER. Pt v/u.

## 2018-03-26 ENCOUNTER — DOCUMENTATION (OUTPATIENT)
Dept: NUTRITION | Facility: HOSPITAL | Age: 68
End: 2018-03-26

## 2018-03-26 NOTE — PROGRESS NOTES
ONC Nutrition Follow Up:     Last weight 108#.  Decreased about 20# since beginning treatment.     Noted nursing phone call with patient reporting  that she is very weak, hasn't been eating well because she doesn't have strength to make the food. Did not think she could make it into the office to receive IVF last week. Scheduled to see MD tomorrow.  She was recently admitted to the hospital for dehydration, radiation esophagitis, malnutrition. She was eating a fair amount at time of discharge.     Tried to call patient, no answer.  Left a message.    She has been encouraged to drink Ensure or Boost but she has complained of these causing nausea and diarrhea in the past.      Wondering if she has tried Ensure clear and if she would try it.    Will continue to follow.

## 2018-03-27 ENCOUNTER — OFFICE VISIT (OUTPATIENT)
Dept: ONCOLOGY | Facility: CLINIC | Age: 68
End: 2018-03-27

## 2018-03-27 ENCOUNTER — INFUSION (OUTPATIENT)
Dept: ONCOLOGY | Facility: HOSPITAL | Age: 68
End: 2018-03-27

## 2018-03-27 VITALS
BODY MASS INDEX: 17.86 KG/M2 | DIASTOLIC BLOOD PRESSURE: 62 MMHG | WEIGHT: 113.8 LBS | TEMPERATURE: 98 F | OXYGEN SATURATION: 98 % | HEIGHT: 67 IN | RESPIRATION RATE: 12 BRPM | SYSTOLIC BLOOD PRESSURE: 100 MMHG | HEART RATE: 99 BPM

## 2018-03-27 DIAGNOSIS — C34.92 ADENOCARCINOMA OF LEFT LUNG (HCC): Primary | ICD-10-CM

## 2018-03-27 DIAGNOSIS — C34.92 ADENOCARCINOMA OF LEFT LUNG (HCC): ICD-10-CM

## 2018-03-27 LAB
ALBUMIN SERPL-MCNC: 3.9 G/DL (ref 3.5–5.2)
ALBUMIN/GLOB SERPL: 1.3 G/DL (ref 1.1–2.4)
ALP SERPL-CCNC: 65 U/L (ref 38–116)
ALT SERPL W P-5'-P-CCNC: 17 U/L (ref 0–33)
ANION GAP SERPL CALCULATED.3IONS-SCNC: 13.9 MMOL/L
AST SERPL-CCNC: 19 U/L (ref 0–32)
BASOPHILS # BLD AUTO: 0.04 10*3/MM3 (ref 0–0.1)
BASOPHILS NFR BLD AUTO: 0.6 % (ref 0–1.1)
BILIRUB SERPL-MCNC: 0.4 MG/DL (ref 0.1–1.2)
BUN BLD-MCNC: 15 MG/DL (ref 6–20)
BUN/CREAT SERPL: 16 (ref 7.3–30)
CALCIUM SPEC-SCNC: 9.4 MG/DL (ref 8.5–10.2)
CHLORIDE SERPL-SCNC: 99 MMOL/L (ref 98–107)
CO2 SERPL-SCNC: 26.1 MMOL/L (ref 22–29)
CREAT BLD-MCNC: 0.94 MG/DL (ref 0.6–1.1)
DEPRECATED RDW RBC AUTO: 56 FL (ref 37–49)
EOSINOPHIL # BLD AUTO: 0.2 10*3/MM3 (ref 0–0.36)
EOSINOPHIL NFR BLD AUTO: 3 % (ref 1–5)
ERYTHROCYTE [DISTWIDTH] IN BLOOD BY AUTOMATED COUNT: 17 % (ref 11.7–14.5)
GFR SERPL CREATININE-BSD FRML MDRD: 59 ML/MIN/1.73
GLOBULIN UR ELPH-MCNC: 2.9 GM/DL (ref 1.8–3.5)
GLUCOSE BLD-MCNC: 110 MG/DL (ref 74–124)
HCT VFR BLD AUTO: 35.2 % (ref 34–45)
HGB BLD-MCNC: 11.9 G/DL (ref 11.5–14.9)
IMM GRANULOCYTES # BLD: 0.05 10*3/MM3 (ref 0–0.03)
IMM GRANULOCYTES NFR BLD: 0.7 % (ref 0–0.5)
LYMPHOCYTES # BLD AUTO: 0.88 10*3/MM3 (ref 1–3.5)
LYMPHOCYTES NFR BLD AUTO: 13.1 % (ref 20–49)
MAGNESIUM SERPL-MCNC: 1.9 MG/DL (ref 1.8–2.5)
MCH RBC QN AUTO: 30.5 PG (ref 27–33)
MCHC RBC AUTO-ENTMCNC: 33.8 G/DL (ref 32–35)
MCV RBC AUTO: 90.3 FL (ref 83–97)
MONOCYTES # BLD AUTO: 1.07 10*3/MM3 (ref 0.25–0.8)
MONOCYTES NFR BLD AUTO: 15.9 % (ref 4–12)
NEUTROPHILS # BLD AUTO: 4.47 10*3/MM3 (ref 1.5–7)
NEUTROPHILS NFR BLD AUTO: 66.7 % (ref 39–75)
NRBC BLD MANUAL-RTO: 0 /100 WBC (ref 0–0)
PLATELET # BLD AUTO: 135 10*3/MM3 (ref 150–375)
PMV BLD AUTO: 9.5 FL (ref 8.9–12.1)
POTASSIUM BLD-SCNC: 4.4 MMOL/L (ref 3.5–4.7)
PROT SERPL-MCNC: 6.8 G/DL (ref 6.3–8)
RBC # BLD AUTO: 3.9 10*6/MM3 (ref 3.9–5)
SODIUM BLD-SCNC: 139 MMOL/L (ref 134–145)
WBC NRBC COR # BLD: 6.71 10*3/MM3 (ref 4–10)

## 2018-03-27 PROCEDURE — 83735 ASSAY OF MAGNESIUM: CPT

## 2018-03-27 PROCEDURE — 96374 THER/PROPH/DIAG INJ IV PUSH: CPT | Performed by: NURSE PRACTITIONER

## 2018-03-27 PROCEDURE — 80053 COMPREHEN METABOLIC PANEL: CPT

## 2018-03-27 PROCEDURE — 96361 HYDRATE IV INFUSION ADD-ON: CPT | Performed by: NURSE PRACTITIONER

## 2018-03-27 PROCEDURE — 85025 COMPLETE CBC W/AUTO DIFF WBC: CPT | Performed by: INTERNAL MEDICINE

## 2018-03-27 PROCEDURE — 25010000002 GRANISETRON PER 100 MCG: Performed by: NURSE PRACTITIONER

## 2018-03-27 PROCEDURE — 25010000002 ALTEPLASE 2 MG RECONSTITUTED SOLUTION: Performed by: NURSE PRACTITIONER

## 2018-03-27 PROCEDURE — 36415 COLL VENOUS BLD VENIPUNCTURE: CPT | Performed by: INTERNAL MEDICINE

## 2018-03-27 PROCEDURE — 99214 OFFICE O/P EST MOD 30 MIN: CPT | Performed by: NURSE PRACTITIONER

## 2018-03-27 RX ORDER — GRANISETRON HYDROCHLORIDE 1 MG/1
1 TABLET, FILM COATED ORAL ONCE
Status: DISCONTINUED | OUTPATIENT
Start: 2018-03-27 | End: 2018-03-27 | Stop reason: HOSPADM

## 2018-03-27 RX ORDER — GRANISETRON HYDROCHLORIDE 1 MG/ML
1 INJECTION INTRAVENOUS ONCE
Status: COMPLETED | OUTPATIENT
Start: 2018-03-27 | End: 2018-03-27

## 2018-03-27 RX ORDER — SODIUM CHLORIDE 9 MG/ML
1000 INJECTION, SOLUTION INTRAVENOUS ONCE
Status: COMPLETED | OUTPATIENT
Start: 2018-03-27 | End: 2018-03-27

## 2018-03-27 RX ADMIN — SODIUM CHLORIDE 500 ML: 900 INJECTION, SOLUTION INTRAVENOUS at 13:40

## 2018-03-27 RX ADMIN — GRANISETRON HYDROCHLORIDE 1 MG: 1 INJECTION INTRAVENOUS at 13:51

## 2018-03-27 RX ADMIN — ALTEPLASE 2 MG: 2.2 INJECTION, POWDER, LYOPHILIZED, FOR SOLUTION INTRAVENOUS at 12:54

## 2018-03-27 NOTE — PROGRESS NOTES
Pt here for labs and NP visit. Port flushes easily but has no blood return. Activase instilled and pt to return after NP visit. Labs drawn per MA.

## 2018-03-28 ENCOUNTER — DOCUMENTATION (OUTPATIENT)
Dept: NUTRITION | Facility: HOSPITAL | Age: 68
End: 2018-03-28

## 2018-03-28 NOTE — PROGRESS NOTES
"ONC Nutrition Follow Up:     Weight 113# 3/27, increased about 5#.    Received IVF yesterday. Was able to speak to patient on the phone.  She says that she feels like the IVF \"perked her up\".    Trying to drink Ensure although it gives her diarrhea.  Using imodium and lomitol. Taking zofran on a schedule for nausea.    Encouraged patient to keep trying to increase intake, eat small frequent meals, emphasized high protein foods.      Will continue to follow.   "

## 2018-03-30 ENCOUNTER — DOCUMENTATION (OUTPATIENT)
Dept: ONCOLOGY | Facility: CLINIC | Age: 68
End: 2018-03-30

## 2018-03-30 DIAGNOSIS — K20.80 RADIATION ESOPHAGITIS: ICD-10-CM

## 2018-03-30 DIAGNOSIS — T66.XXXA RADIATION ESOPHAGITIS: ICD-10-CM

## 2018-03-30 DIAGNOSIS — C15.9 ESOPHAGEAL CARCINOMA (HCC): ICD-10-CM

## 2018-03-30 NOTE — PROGRESS NOTES
Fax rec from [facility] stating pts Protonix packets was not covered on the formulary. I have submitted a formulary exception as the letter stated to [facility] through covermymeds.    Awaiting decision.

## 2018-04-03 ENCOUNTER — INFUSION (OUTPATIENT)
Dept: ONCOLOGY | Facility: HOSPITAL | Age: 68
End: 2018-04-03

## 2018-04-03 ENCOUNTER — DOCUMENTATION (OUTPATIENT)
Dept: ONCOLOGY | Facility: CLINIC | Age: 68
End: 2018-04-03

## 2018-04-03 VITALS
SYSTOLIC BLOOD PRESSURE: 108 MMHG | WEIGHT: 114 LBS | DIASTOLIC BLOOD PRESSURE: 68 MMHG | TEMPERATURE: 98 F | HEART RATE: 110 BPM | BODY MASS INDEX: 17.85 KG/M2

## 2018-04-03 DIAGNOSIS — T66.XXXA RADIATION ESOPHAGITIS: ICD-10-CM

## 2018-04-03 DIAGNOSIS — C15.9 ESOPHAGEAL CARCINOMA (HCC): ICD-10-CM

## 2018-04-03 DIAGNOSIS — K20.80 RADIATION ESOPHAGITIS: ICD-10-CM

## 2018-04-03 DIAGNOSIS — K20.80 RADIATION ESOPHAGITIS: Primary | ICD-10-CM

## 2018-04-03 DIAGNOSIS — T66.XXXA RADIATION ESOPHAGITIS: Primary | ICD-10-CM

## 2018-04-03 LAB
ALBUMIN SERPL-MCNC: 4.1 G/DL (ref 3.5–5.2)
ALBUMIN/GLOB SERPL: 1.5 G/DL (ref 1.1–2.4)
ALP SERPL-CCNC: 66 U/L (ref 38–116)
ALT SERPL W P-5'-P-CCNC: 12 U/L (ref 0–33)
ANION GAP SERPL CALCULATED.3IONS-SCNC: 14.5 MMOL/L
AST SERPL-CCNC: 20 U/L (ref 0–32)
BASOPHILS # BLD AUTO: 0.02 10*3/MM3 (ref 0–0.1)
BASOPHILS NFR BLD AUTO: 0.3 % (ref 0–1.1)
BILIRUB SERPL-MCNC: 0.4 MG/DL (ref 0.1–1.2)
BUN BLD-MCNC: 12 MG/DL (ref 6–20)
BUN/CREAT SERPL: 14 (ref 7.3–30)
CALCIUM SPEC-SCNC: 9.4 MG/DL (ref 8.5–10.2)
CHLORIDE SERPL-SCNC: 99 MMOL/L (ref 98–107)
CO2 SERPL-SCNC: 23.5 MMOL/L (ref 22–29)
CREAT BLD-MCNC: 0.86 MG/DL (ref 0.6–1.1)
DEPRECATED RDW RBC AUTO: 52.8 FL (ref 37–49)
EOSINOPHIL # BLD AUTO: 0.29 10*3/MM3 (ref 0–0.36)
EOSINOPHIL NFR BLD AUTO: 3.8 % (ref 1–5)
ERYTHROCYTE [DISTWIDTH] IN BLOOD BY AUTOMATED COUNT: 15.9 % (ref 11.7–14.5)
GFR SERPL CREATININE-BSD FRML MDRD: 65 ML/MIN/1.73
GLOBULIN UR ELPH-MCNC: 2.8 GM/DL (ref 1.8–3.5)
GLUCOSE BLD-MCNC: 164 MG/DL (ref 74–124)
HCT VFR BLD AUTO: 35.3 % (ref 34–45)
HGB BLD-MCNC: 12.1 G/DL (ref 11.5–14.9)
IMM GRANULOCYTES # BLD: 0.04 10*3/MM3 (ref 0–0.03)
IMM GRANULOCYTES NFR BLD: 0.5 % (ref 0–0.5)
LYMPHOCYTES # BLD AUTO: 1.02 10*3/MM3 (ref 1–3.5)
LYMPHOCYTES NFR BLD AUTO: 13.2 % (ref 20–49)
MCH RBC QN AUTO: 31 PG (ref 27–33)
MCHC RBC AUTO-ENTMCNC: 34.3 G/DL (ref 32–35)
MCV RBC AUTO: 90.5 FL (ref 83–97)
MONOCYTES # BLD AUTO: 0.8 10*3/MM3 (ref 0.25–0.8)
MONOCYTES NFR BLD AUTO: 10.3 % (ref 4–12)
NEUTROPHILS # BLD AUTO: 5.56 10*3/MM3 (ref 1.5–7)
NEUTROPHILS NFR BLD AUTO: 71.9 % (ref 39–75)
NRBC BLD MANUAL-RTO: 0 /100 WBC (ref 0–0)
PLATELET # BLD AUTO: 137 10*3/MM3 (ref 150–375)
PMV BLD AUTO: 9.7 FL (ref 8.9–12.1)
POTASSIUM BLD-SCNC: 3.8 MMOL/L (ref 3.5–4.7)
PROT SERPL-MCNC: 6.9 G/DL (ref 6.3–8)
RBC # BLD AUTO: 3.9 10*6/MM3 (ref 3.9–5)
SODIUM BLD-SCNC: 137 MMOL/L (ref 134–145)
WBC NRBC COR # BLD: 7.73 10*3/MM3 (ref 4–10)

## 2018-04-03 PROCEDURE — 96361 HYDRATE IV INFUSION ADD-ON: CPT | Performed by: INTERNAL MEDICINE

## 2018-04-03 PROCEDURE — 80053 COMPREHEN METABOLIC PANEL: CPT

## 2018-04-03 PROCEDURE — 85025 COMPLETE CBC W/AUTO DIFF WBC: CPT | Performed by: INTERNAL MEDICINE

## 2018-04-03 PROCEDURE — 25010000002 GRANISETRON PER 100 MCG: Performed by: INTERNAL MEDICINE

## 2018-04-03 PROCEDURE — 36415 COLL VENOUS BLD VENIPUNCTURE: CPT | Performed by: INTERNAL MEDICINE

## 2018-04-03 PROCEDURE — 96374 THER/PROPH/DIAG INJ IV PUSH: CPT | Performed by: INTERNAL MEDICINE

## 2018-04-03 RX ORDER — GRANISETRON HYDROCHLORIDE 1 MG/ML
1 INJECTION INTRAVENOUS ONCE
Status: COMPLETED | OUTPATIENT
Start: 2018-04-03 | End: 2018-04-03

## 2018-04-03 RX ADMIN — SODIUM CHLORIDE 1000 ML: 9 INJECTION, SOLUTION INTRAVENOUS at 13:21

## 2018-04-03 RX ADMIN — GRANISETRON HYDROCHLORIDE 1 MG: 1 INJECTION INTRAVENOUS at 13:27

## 2018-04-03 NOTE — PROGRESS NOTES
Pt here today for IVF. Pt states she is very nauseous. Reviewed with Dr. Collier. OK to give pt Kytril with IVF today. Kytril given and pt reported nausea subsiding.

## 2018-04-03 NOTE — PROGRESS NOTES
To Whom It May Concern,    Mrs. Dilma Abad is undergoing treatment in our office for adenocarcinoma of the esophagus.  She has had significant complications and toxicities from her treatment that have made her very weak.    We understand that her son is currently incarcerated.  We are writing this letter on her behalf to request that if possible that he be considered eligible for home incarceration so that he can continue to provide assistance to his mother while she is dealing with this illness.    Your consideration in this matter is greatly appreciated.    Sincerely,        Bienvenido Collier M.D.

## 2018-04-04 ENCOUNTER — DOCUMENTATION (OUTPATIENT)
Dept: RADIATION ONCOLOGY | Facility: HOSPITAL | Age: 68
End: 2018-04-04

## 2018-04-04 NOTE — PROGRESS NOTES
RADIATION THERAPY TREATMENT SUMMARY    Diagnosis: Esophageal cancer    Patient Care Team:  Mary Taylor MD as PCP - General (Family Medicine)  Bienvenido Collier MD as Consulting Physician (Hematology and Oncology)  Daniel Oconnor MD as Referring Physician (Pulmonary Disease)  Shazia Mcdonald MD as Consulting Physician (Radiation Oncology)     Dilma Abad has recently completed the course of radiation therapy prescribed for the above-mentioned diagnosis. Below, please find the specifics of the course of treatment delivered:    Radiation Details:    Dates of treatment: 1/11/2018 - 3/6/2018  Treatment Site - ESOPHAGUS  Treatment Intent - Curative  Total Dose in cGy - 6000  Number of Treatments - 30  Dose per fraction - 200 cGy per fraction  Fractions per day - 1 fx/day  Fractions per week - 5 fx/week  Treatment Type - Rapid Arc  Energy - 6 MVP  Normalization - Volumetric Normalization-- see below  Imaging/Field Verification - IGRT using CBCT daily    Tolerance and Toxicities: She tolerated the treatments fairly well though she did have significant esophagitis at the conclusion of treatment, coupled with cytopenias which required admission.   She had a short treatment break from February 23 thru 27 but completed the treatments in 52 elapsed days.    Follow-up Plans:  I have asked that she return to see me in approximately 2-3 weeks with a CT of the chest to follow up for the lung ca treatment. I have also made a referred to our Survivorship Clinic.

## 2018-04-09 DIAGNOSIS — T66.XXXA RADIATION ESOPHAGITIS: ICD-10-CM

## 2018-04-09 DIAGNOSIS — J43.1 PANLOBULAR EMPHYSEMA (HCC): ICD-10-CM

## 2018-04-09 DIAGNOSIS — C34.92 ADENOCARCINOMA OF LEFT LUNG (HCC): ICD-10-CM

## 2018-04-09 DIAGNOSIS — K20.80 RADIATION ESOPHAGITIS: ICD-10-CM

## 2018-04-09 DIAGNOSIS — Z45.2 FITTING AND ADJUSTMENT OF VASCULAR CATHETER: ICD-10-CM

## 2018-04-09 DIAGNOSIS — C15.9 ESOPHAGEAL CARCINOMA (HCC): ICD-10-CM

## 2018-04-09 RX ORDER — PROMETHAZINE HYDROCHLORIDE 12.5 MG/1
TABLET ORAL
Qty: 30 TABLET | Refills: 0 | Status: SHIPPED | OUTPATIENT
Start: 2018-04-09 | End: 2018-05-02

## 2018-04-10 ENCOUNTER — TELEPHONE (OUTPATIENT)
Dept: ONCOLOGY | Facility: CLINIC | Age: 68
End: 2018-04-10

## 2018-04-10 ENCOUNTER — OFFICE VISIT (OUTPATIENT)
Dept: ONCOLOGY | Facility: CLINIC | Age: 68
End: 2018-04-10

## 2018-04-10 ENCOUNTER — INFUSION (OUTPATIENT)
Dept: ONCOLOGY | Facility: HOSPITAL | Age: 68
End: 2018-04-10

## 2018-04-10 ENCOUNTER — OFFICE VISIT (OUTPATIENT)
Dept: RADIATION ONCOLOGY | Facility: HOSPITAL | Age: 68
End: 2018-04-10

## 2018-04-10 VITALS
BODY MASS INDEX: 17.64 KG/M2 | OXYGEN SATURATION: 95 % | DIASTOLIC BLOOD PRESSURE: 72 MMHG | HEART RATE: 96 BPM | WEIGHT: 112.4 LBS | SYSTOLIC BLOOD PRESSURE: 122 MMHG | HEIGHT: 67 IN | RESPIRATION RATE: 16 BRPM | TEMPERATURE: 98.2 F

## 2018-04-10 VITALS
DIASTOLIC BLOOD PRESSURE: 76 MMHG | RESPIRATION RATE: 16 BRPM | HEART RATE: 104 BPM | OXYGEN SATURATION: 97 % | TEMPERATURE: 97.4 F | SYSTOLIC BLOOD PRESSURE: 118 MMHG

## 2018-04-10 DIAGNOSIS — C34.92 ADENOCARCINOMA OF LEFT LUNG (HCC): Primary | ICD-10-CM

## 2018-04-10 DIAGNOSIS — T66.XXXA RADIATION ESOPHAGITIS: ICD-10-CM

## 2018-04-10 DIAGNOSIS — C15.9 ESOPHAGEAL CARCINOMA (HCC): ICD-10-CM

## 2018-04-10 DIAGNOSIS — C34.92 ADENOCARCINOMA OF LEFT LUNG (HCC): ICD-10-CM

## 2018-04-10 DIAGNOSIS — Z45.2 FITTING AND ADJUSTMENT OF VASCULAR CATHETER: ICD-10-CM

## 2018-04-10 DIAGNOSIS — K20.80 RADIATION ESOPHAGITIS: ICD-10-CM

## 2018-04-10 LAB
ALBUMIN SERPL-MCNC: 4.1 G/DL (ref 3.5–5.2)
ALBUMIN/GLOB SERPL: 1.6 G/DL (ref 1.1–2.4)
ALP SERPL-CCNC: 62 U/L (ref 38–116)
ALT SERPL W P-5'-P-CCNC: 11 U/L (ref 0–33)
ANION GAP SERPL CALCULATED.3IONS-SCNC: 13.7 MMOL/L
AST SERPL-CCNC: 21 U/L (ref 0–32)
BASOPHILS # BLD AUTO: 0.01 10*3/MM3 (ref 0–0.1)
BASOPHILS NFR BLD AUTO: 0.1 % (ref 0–1.1)
BILIRUB SERPL-MCNC: 0.3 MG/DL (ref 0.1–1.2)
BUN BLD-MCNC: 14 MG/DL (ref 6–20)
BUN/CREAT SERPL: 16.1 (ref 7.3–30)
CALCIUM SPEC-SCNC: 9.6 MG/DL (ref 8.5–10.2)
CHLORIDE SERPL-SCNC: 98 MMOL/L (ref 98–107)
CO2 SERPL-SCNC: 28.3 MMOL/L (ref 22–29)
CREAT BLD-MCNC: 0.87 MG/DL (ref 0.6–1.1)
DEPRECATED RDW RBC AUTO: 52.6 FL (ref 37–49)
EOSINOPHIL # BLD AUTO: 0.09 10*3/MM3 (ref 0–0.36)
EOSINOPHIL NFR BLD AUTO: 1.3 % (ref 1–5)
ERYTHROCYTE [DISTWIDTH] IN BLOOD BY AUTOMATED COUNT: 15.7 % (ref 11.7–14.5)
GFR SERPL CREATININE-BSD FRML MDRD: 64 ML/MIN/1.73
GLOBULIN UR ELPH-MCNC: 2.6 GM/DL (ref 1.8–3.5)
GLUCOSE BLD-MCNC: 134 MG/DL (ref 74–124)
HCT VFR BLD AUTO: 35 % (ref 34–45)
HGB BLD-MCNC: 11.7 G/DL (ref 11.5–14.9)
IMM GRANULOCYTES # BLD: 0.02 10*3/MM3 (ref 0–0.03)
IMM GRANULOCYTES NFR BLD: 0.3 % (ref 0–0.5)
LYMPHOCYTES # BLD AUTO: 0.73 10*3/MM3 (ref 1–3.5)
LYMPHOCYTES NFR BLD AUTO: 10.7 % (ref 20–49)
MAGNESIUM SERPL-MCNC: 2.1 MG/DL (ref 1.8–2.5)
MCH RBC QN AUTO: 31 PG (ref 27–33)
MCHC RBC AUTO-ENTMCNC: 33.4 G/DL (ref 32–35)
MCV RBC AUTO: 92.6 FL (ref 83–97)
MONOCYTES # BLD AUTO: 0.64 10*3/MM3 (ref 0.25–0.8)
MONOCYTES NFR BLD AUTO: 9.3 % (ref 4–12)
NEUTROPHILS # BLD AUTO: 5.36 10*3/MM3 (ref 1.5–7)
NEUTROPHILS NFR BLD AUTO: 78.3 % (ref 39–75)
NRBC BLD MANUAL-RTO: 0 /100 WBC (ref 0–0)
PLATELET # BLD AUTO: 195 10*3/MM3 (ref 150–375)
PMV BLD AUTO: 9.1 FL (ref 8.9–12.1)
POTASSIUM BLD-SCNC: 4.3 MMOL/L (ref 3.5–4.7)
PROT SERPL-MCNC: 6.7 G/DL (ref 6.3–8)
RBC # BLD AUTO: 3.78 10*6/MM3 (ref 3.9–5)
SODIUM BLD-SCNC: 140 MMOL/L (ref 134–145)
WBC NRBC COR # BLD: 6.85 10*3/MM3 (ref 4–10)

## 2018-04-10 PROCEDURE — 36415 COLL VENOUS BLD VENIPUNCTURE: CPT | Performed by: INTERNAL MEDICINE

## 2018-04-10 PROCEDURE — 85025 COMPLETE CBC W/AUTO DIFF WBC: CPT | Performed by: INTERNAL MEDICINE

## 2018-04-10 PROCEDURE — 99214 OFFICE O/P EST MOD 30 MIN: CPT | Performed by: INTERNAL MEDICINE

## 2018-04-10 PROCEDURE — 99024 POSTOP FOLLOW-UP VISIT: CPT | Performed by: RADIOLOGY

## 2018-04-10 PROCEDURE — 80053 COMPREHEN METABOLIC PANEL: CPT | Performed by: INTERNAL MEDICINE

## 2018-04-10 PROCEDURE — 83735 ASSAY OF MAGNESIUM: CPT | Performed by: INTERNAL MEDICINE

## 2018-04-10 PROCEDURE — 96523 IRRIG DRUG DELIVERY DEVICE: CPT | Performed by: INTERNAL MEDICINE

## 2018-04-10 NOTE — PROGRESS NOTES
Subjective    REASON FOR FOLLOW UP:   1.  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 Moon Bowen on 10/6/2017.  Primary stereotactic radiation therapy to the left upper lobe completed 12/15/2017  2.  Lung tumor is negative for PDL 1.  Also negative for ALK, EGFR, and ROS 1 mutations.  3.  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   4.  Esophageal adenocarcinoma was negative for HER-2/elizabeth overexpression but positive for PDL 1  5.  Initiation of combined radiation and low-dose weekly carboplatin and Taxol chemotherapy for treatment of esophageal adenocarcinoma.  First treatment delivered 1/11/2018.  6.  Development of thrombocytopenia and radiation esophagitis on the visit of 2/15/2018.    HISTORY OF PRESENT ILLNESS: Ms. Abad is a very pleasant 68 y.o. female with the above-noted history of both lung cancer and esophageal cancer.  As noted above, she was treated with combined hemotherapy and radiation for her adenocarcinoma of the GE junction and had significant toxicity from the treatment including esophagitis leading to hospitalization from 03/15/2018 through 03/20/2018 for radiation esophagitis and dehydration.    She reports feeling fair today.  She remains generally weak and reports that she still is dealing with some chronic nausea and poor appetite.    During her hospitalization, she underwent EGD with biopsies which showed inflammation and ulceration but no evidence of malignancy.  History of Present Illness     Past Medical History:   Diagnosis Date   • Adenocarcinoma of lung 2017    HAD RADIATION    • Anxiety and depression    • Arthritis    • Benign parotid tumor 2012    removed by Dr Vickers told it was benign   • COPD (chronic obstructive pulmonary disease)    • Depression    • Diarrhea    • Early cataract    • Emphysema of lung    • Esophageal cancer 2017   • History of chronic pain    • History of colon polyps    • History  of pneumonia    • Hyperlipidemia    • Joint pain    • Stroke       HEMATOLOGIC/ ONCOLOGIC HISTORY:  The patient is a 67 y.o. year old female who has a history of smoking and COPD.  She had an abnormal CT scan in February of this year showing a 3.5 cm mass in the left suprahilar area.  A repeat CT scan was performed at [facility] 7/13/2017 and show that the mass had increased in size from about 3.5 cm up to 6.5 cm.    She underwent navigational bronchoscopy and endobronchial ultrasound and biopsy with Dr. Moon Bowen on 10/6/2017.  The pathology on the biopsy showed well-differentiated adenocarcinoma.  She is referred to us now by her pulmonologist Dr. Daniel Oconnor for further staging evaluation and treatment recommendations.    She has not had a PET scan for staging.  She had a PDL 1 staining on her bronchoscopy specimen which was negative for PDL 1.  EGFR, ALK, and ROS 1 were also negative.    She is still smoking but is trying to quit.  She had pulmonary function tests performed on 10/25/2017 showing a DLCO of 33% and FEV1 37%.    She was referred to the [facility] radiation [facility] and completed primary radiation to the left upper lobe lung mass.  Because of abnormal uptake in the esophagus noted on her staging PET scan, she also underwent an EGD and biopsy which unfortunately revealed adenocarcinoma of the GE junction.  Molecular studies for HER-2/elizabeth and PDL 1 on the tumor tissue showed that she was PD L1 positive and HER-2/elizabeth negative.  We recommended combined weekly low dose carbo Taxol chemotherapy and radiation.     She had significant difficulty with treatment particularly with radiation esophagitis near the end of her course of therapy.  She required some dose delay and  completed her radiation treatments the first week of March 2018.      Past Surgical History:   Procedure Laterality Date   • BRONCHOSCOPY     • CHOLECYSTECTOMY  1999   • COLON SURGERY  2015    COLON POLYPS   •  COLONOSCOPY  04/13/2016    TA w/low grade dysplasia x 3, serrated adenoma x 2   • COLONOSCOPY  06/14/2016    TA w/high grade dysplasia   • ENDOSCOPY N/A 12/6/2017    Procedure: ESOPHAGOGASTRODUODENOSCOPY WITH BIOPSY;  Surgeon: Jayant Robles MD;  Location: [facility] ENDOSCOPY;  Service:    • ENDOSCOPY N/A 3/17/2018    Procedure: ESOPHAGOGASTRODUODENOSCOPY WITH BIOPSIES;  Surgeon: Ambrosio Sapp MD;  Location: [facility] ENDOSCOPY;  Service: Gastroenterology   • FOOT SURGERY  10/2010    [place]   • HYSTERECTOMY     • CA INSJ TUNNELED CVC W/O SUBQ PORT/ AGE 3 YR/> Right 1/9/2018    Procedure: MEDIPORT PLACEMENT right;  Surgeon: Damaso Germain MD;  Location: [facility] HYBRID OR 18/19;  Service: Vascular   • SALIVARY GLAND SURGERY      PAROTID MASS REMOVED BENIGN   • SHOULDER ARTHROSCOPY Left 1995. 2001        ALLERGIES:    Allergies   Allergen Reactions   • Penicillins Hives         Review of Systems   Constitutional: Positive for fatigue. Negative for activity change, appetite change, fever and unexpected weight change.   HENT: Negative for hearing loss, nosebleeds, trouble swallowing and voice change.    Eyes: Negative for visual disturbance.   Respiratory: Negative for cough, shortness of breath and wheezing.    Cardiovascular: Negative for chest pain and palpitations.   Gastrointestinal: Positive for diarrhea, nausea and vomiting. Negative for abdominal pain.        Short bowel symptoms after partial colectomy.  Chronic. Reflux and painful swallowing most  due to radiation esophagitis.  Improving.   Genitourinary: Negative for difficulty urinating, frequency, hematuria and urgency.   Musculoskeletal: Negative for back pain and neck pain.   Skin: Negative for rash.   Neurological: Positive for weakness. Negative for dizziness, seizures, syncope and headaches.   Hematological: Negative for adenopathy. Does not bruise/bleed easily.   Psychiatric/Behavioral: Negative for behavioral problems. The patient is not  "nervous/anxious.         Objective     Vitals:    04/10/18 1627   BP: 122/72   Pulse: 96   Resp: 16   Temp: 98.2 °F (36.8 °C)   TempSrc: Oral   SpO2: 95%   Weight: 51 kg (112 lb 6.4 oz)   Height: 170.2 cm (67.01\")   PainSc: 6  Comment: pt is not currently having any pain because she took a pain pill.   PainLoc: Abdomen     Current Status 4/10/2018   ECOG score 0       Physical Exam   Constitutional: She is oriented to person, place, and time. She appears well-developed. No distress.   Weak appearing but in no acute distress.   HENT:   Head: Normocephalic.   Eyes: Conjunctivae and EOM are normal. Pupils are equal, round, and reactive to light. No scleral icterus.   Neck: Normal range of motion. Neck supple. No JVD present. No thyromegaly present.   Cardiovascular: Normal rate and regular rhythm.  Exam reveals no gallop and no friction rub.    No murmur heard.  Pulmonary/Chest: Effort normal. No respiratory distress. She has no wheezes. She has no rhonchi. She has no rales.   Right IJ Mediport the right chest wall.   Abdominal: Soft. She exhibits no distension and no mass. There is no tenderness.   Musculoskeletal: Normal range of motion. She exhibits no edema or deformity.   Neurological: She is alert and oriented to person, place, and time. She has normal reflexes. No cranial nerve deficit.   Skin: Skin is warm and dry. No rash noted. No erythema.   Psychiatric: She has a normal mood and affect. Her behavior is normal. Judgment normal.         RECENT LABS:  Hematology WBC   Date Value Ref Range Status   04/10/2018 6.85 4.00 - 10.00 10*3/mm3 Final     RBC   Date Value Ref Range Status   04/10/2018 3.78 (L) 3.90 - 5.00 10*6/mm3 Final     Hemoglobin   Date Value Ref Range Status   04/10/2018 11.7 11.5 - 14.9 g/dL Final     Hematocrit   Date Value Ref Range Status   04/10/2018 35.0 34.0 - 45.0 % Final     Platelets   Date Value Ref Range Status   04/10/2018 195 150 - 375 10*3/mm3 Final        Lab Results   Component " Value Date    GLUCOSE 164 (H) 04/03/2018    BUN 12 04/03/2018    CREATININE 0.86 04/03/2018    EGFRIFNONA 65 04/03/2018    BCR 14.0 04/03/2018    K 3.8 04/03/2018    CO2 23.5 04/03/2018    CALCIUM 9.4 04/03/2018    ALBUMIN 4.10 04/03/2018    LABIL2 1.5 04/03/2018    AST 20 04/03/2018    ALT 12 04/03/2018     PATHOLOGY  FROM EGD 3/17/2018  Final Diagnosis   1. STOMACH, RANDOM BIOPSY:           FRAGMENTS OF GASTRIC MUCOSA WITH MILD CHRONIC ACTIVE GASTRITIS.           SPECIAL STAIN (DIFF-QUIK) FOR HELICOBACTER PYLORI IS EQUIVOCAL.             IMMUNOHISTOCHEMICAL STAIN FOR HELICOBACTER PYLORI IS PENDING (SEE COMMENT).   2. GASTROESOPHAGEAL JUNCTION, BIOPSY:           FRAGMENTS OF GASTRIC-TYPE MUCOSA WITH MARKED ULCERATION AND SEVERE ASSOCIATED                  ACUTE INFLAMMATION.           DETACHED FRAGMENTS OF ACUTE INFLAMMATORY / ULCER EXUDATE.           SPECIAL STAIN (GMS) IS PENDING (SEE COMMENT).           REACTIVE EPITHELIAL CHANGES.           NO DEFINITIVE EVIDENCE OF INTESTINAL METAPLASIA OR DYSPLASIA.    PATHOLOGY FROM EGD 12/6/2017  Final Diagnosis   1.  GASTROESOPHAGEAL JUNCTION, BIOPSIES:             INVASIVE ADENOCARCINOMA.            NO IDENTIFIED INTESTINAL METAPLASIA.      COMMENT: In this specimen tumor is identified extending into submucosal tissue. Deeper extension cannot be excluded in these superficial biopsies.   Her 2 Brody negative  PD L1 positive ( > 1 ).      Assessment/Plan   1.  Well differentiated adenocarcinoma presumably from lung origin in a long-time smoker.  The biopsy was made from navigational bronchoscopy and endobronchial ultrasound at [place] on 10/6/2017.  Staging PET scan showed no mediastinal adenopathy or obvious distant metastases.  She did have uptake in the lower esophagus which was evaluated with EGD and biopsy on 12/6/2017 and unfortunately was found to be adenocarcinoma at the GE junction.   · LungTumor tissue was negative for PDL 1 expression, EGFR, ALK, or ROS 1 mutations.    · She completed stereotactic radiation to the lung tumor 12/15/2017.  2.  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   · Esophageal adenocarcinoma was negative for HER-2/elizabeth overexpression but positive for PDL 1.  · Initiated combined radiation therapy to the esophagus along with weekly low-dose carboplatin and Taxol chemotherapy 1/11/18. She completed treatment in March 2018.  3. Emphysema due to smoking.  4. Placement of right internal jugular MediPort by Dr. Raphael Germain of vascular surgery 1/9/2018.  5.  She was not considered a candidate for lobectomy due to her poor pulmonary function but after reevaluation by pulmonary and cardiology she was cleared for potential esophagectomy surgery.  6.  History of benign right parotid tumor in 2012, surgically removed per Dr. Vickers.  Patient is noted to have very tiny nodule below the scar, new to her, but possibly just scar tissue.  Monitor closely.  7.  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  She is currently using Lomotil and Imodium as needed for diarrhea control.  Stable.  8.  Nausea due to chemotherapy: Patient is currently taking Zofran for nausea.    9.  Radiation esophagitis.  Completed radiation with chemotherapy on the first week of March.  She was hospitalized from March 15 through the 20th for radiation esophagitis and dehydration.  He is currently utilizing Carafate and Norco 7.5/325.      Plan  1.  We encouraged her to try to continue to work on her nutrition by eating frequent snacks during the day.  2.  She had her port flushed in the office today.  3.  We discussed the results of her EGD and biopsies that were performed in the hospital on 3/18/2018.  4.  We plan to continue to observe her for now without any plans for surgery as I think she would not be able to recuperate from surgery at this point.  5.  She will undergo PET scan in 4 weeks.  6.  M.D. follow-up in 5 weeks to review  the results of the PET scan.  7.  She will need to continue to have her port flushed every 4-6 weeks.

## 2018-04-10 NOTE — TELEPHONE ENCOUNTER
Pt calling c/o nausea and wants to know if she could be seen earlier for possible IVFS. Reviewed with Dr Collier, ok to bring patient in earlier to infusion and he could see her in the back.   Called patient back to tell her this. Patient says now she doesn't think she can come in earlier and doesn't want to change her appt. She said she will keep as is and if she needs to come in tomorrow for fluids she could do that maybe.

## 2018-04-10 NOTE — PROGRESS NOTES
DIAGNOSIS and REASON FOR FOLLOW UP: Adenocarcinoma of left lung - T2, N0, M0   Adenocarcinoma of the distal esophagus     Patient Care Team:  Mary Taylor MD as PCP - General (Family Medicine)  Bienvenido Collier MD as Consulting Physician (Hematology and Oncology)  Daniel Oconnor MD as Referring Physician (Pulmonary Disease)  Shazia Mcdonald MD as Consulting Physician (Radiation Oncology)    CHIEF COMPLAINT:  Post-radiation follow up  I had the pleasure of seeing Dilma Abad  back in the department today, now approximately 4 months out from the radiation therapy portion of her treatment for her lung diagnosis and approximately 4 weeks out from treatment for the distal esophageal carcinoma.     She required hospitalization at the conclusion of her care for persistent GI symptoms and is still struggling with these. She has persistent nausea which is not controlled with her phenergan and zofran. She is also still having abdominal pain which is requiring a pain pill about every 4 hours. She is still getting fluids routinely. She doesn't feel these symptoms have improved significantly; however, she is clearly eating more and able to do more around the house currently.     She did have a CT of the chest while hospitalized on March 18, 2018 which showeda decrease in the size of the treated left upper lobe tumor, from 5.8 x 4.2 cm to 2.8 x 2.6 cm.     Past Medical History: she  has a past medical history of Adenocarcinoma of lung (2017); Anxiety and depression; Arthritis; Benign parotid tumor (2012); COPD (chronic obstructive pulmonary disease); Depression; Diarrhea; Early cataract; Emphysema of lung; Esophageal cancer (2017); History of chronic pain; History of colon polyps; History of pneumonia; Hyperlipidemia; Joint pain; and Stroke.   No history exists.       Past Surgical History:  she has a past surgical history that includes Bronchoscopy; Colonoscopy (04/13/2016); Salivary gland surgery; Shoulder  arthroscopy (Left, 1995. 2001); Foot surgery (10/2010); Cholecystectomy (1999); Colonoscopy (06/14/2016); Esophagogastroduodenoscopy (N/A, 12/6/2017); Hysterectomy; Colon surgery (2015); pr insj tunneled cvc w/o subq port/ age 3 yr/> (Right, 1/9/2018); and Esophagogastroduodenoscopy (N/A, 3/17/2018).    Meds:    Current Outpatient Prescriptions:   •  albuterol (VENTOLIN HFA) 108 (90 Base) MCG/ACT inhaler, Inhale 2 puffs Every 6 (Six) Hours As Needed for Wheezing., Disp: , Rfl:   •  aspirin 81 MG EC tablet, Take 81 mg by mouth Daily., Disp: , Rfl:   •  buPROPion XL (WELLBUTRIN XL) 300 MG 24 hr tablet, Take 300 mg by mouth Every Evening., Disp: , Rfl:   •  Cholecalciferol (VITAMIN D) 2000 units tablet, Take 2,000 Units by mouth Every Other Day., Disp: , Rfl:   •  cyanocobalamin (VITAMIN B-12) 2000 MCG tablet, Take 2,000 mcg by mouth Every Other Day., Disp: , Rfl:   •  diazePAM (VALIUM) 5 MG tablet, Take 5 mg by mouth 4 (Four) Times a Day As Needed for Anxiety., Disp: , Rfl:   •  diphenoxylate-atropine (LOMOTIL) 2.5-0.025 MG per tablet, TK 1 T PO  QID PRN, Disp: , Rfl: 1  •  escitalopram (LEXAPRO) 20 MG tablet, Take 20 mg by mouth Every Evening., Disp: , Rfl:   •  fluticasone (FLONASE) 50 MCG/ACT nasal spray, 2 sprays into each nostril Daily As Needed for Rhinitis., Disp: , Rfl:   •  HYDROcodone-acetaminophen (NORCO) 7.5-325 MG per tablet, Take 1 tablet by mouth 4 (Four) Times a Day As Needed for Moderate Pain ., Disp: , Rfl:   •  Multiple Vitamins-Minerals (CENTRUM SILVER PO), Take 1 tablet by mouth Daily., Disp: , Rfl:   •  ondansetron (ZOFRAN) 8 MG tablet, Take 1 tablet by mouth Every 8 (Eight) Hours As Needed for Nausea or Vomiting., Disp: 60 tablet, Rfl: 5  •  pantoprazole (PROTONIX) 40 MG pack packet, Take 1 packet by mouth Every Morning Before Breakfast., Disp: 30 each, Rfl: 1  •  Probiotic Product (PROBIOTIC PO), Take 1 capsule by mouth Daily., Disp: , Rfl:   •  promethazine (PHENERGAN) 12.5 MG tablet, TAKE  1 TABLET BY MOUTH EVERY 6 HOURS AS NEEDED FOR NAUSEA OR VOMITING, Disp: 30 tablet, Rfl: 0  •  simvastatin (ZOCOR) 20 MG tablet, Take 20 mg by mouth Every Night., Disp: , Rfl:   •  sucralfate (CARAFATE) 1 GM/10ML suspension, Take 10 mL by mouth Every 6 (Six) Hours., Disp: 420 mL, Rfl: 1  •  umeclidinium-vilanterol (ANORO ELLIPTA) 62.5-25 MCG/INH aerosol powder  inhaler, Inhale 1 puff Daily., Disp: , Rfl:   •  Wheat Dextrin (BENEFIBER DRINK MIX) pack, Take  by mouth Daily., Disp: , Rfl:     Allergies:    Allergies   Allergen Reactions   • Penicillins Hives       Family History:  her family history includes Cancer in her sister and sister; Heart disease in her father; Leukemia in her cousin; Lung cancer in her sister.    Social History:  she  reports that she quit smoking about 45 years ago. Her smoking use included Cigarettes. She has a 30.00 pack-year smoking history. She has never used smokeless tobacco. She reports that she does not drink alcohol or use drugs.    Pertinent Findings on Review of Systems:  Fourteen systems have been reviewed with the patient and are negative other than as mentioned above.    Performance Status:  (2) Ambulatory and capable of self care, unable to carry out work activity, up and about > 50% or waking hours    Pertinent Findings on Physical Exam:  Vitals:    04/10/18 1502   BP: 118/76   Pulse: 104   Resp: 16   Temp: 97.4 °F (36.3 °C)   TempSrc: Oral   SpO2: 97%   PainSc: 0-No pain  Comment: abdomen 5 before pain pill       Physical Exam   Constitutional: She is oriented to person, place, and time. She is active and cooperative. No distress.   Thin   HENT:   Head: Normocephalic and atraumatic.   Nose: Nose normal.   Mouth/Throat: Mucous membranes are normal. Normal dentition.   Eyes: Conjunctivae and EOM are normal.   Neck: Normal range of motion.   Pulmonary/Chest: Effort normal.   Abdominal: Normal appearance. There is no hepatosplenomegaly.   Musculoskeletal: Normal range of motion.      Vascular Status -  Her right foot exhibits no edema. Her left foot exhibits no edema.  Neurological: She is alert and oriented to person, place, and time.   Skin: Skin is warm and dry.   Psychiatric: She has a normal mood and affect. Her behavior is normal. Judgment and thought content normal.       Assessment:  Adenocarcinoma of left lung - Adenocarcinoma of the distal esophagus     Plan:   We reviewed her previous scan and discussed the response in the lung. I assured her we will continue to watch the lung lesion as we perform follow up scans for the esophageal lesion.    I am very puzzled by her continued GI symptoms. I recommended she start a trial of zofran alone for a couple of days and leave off the phenergan which doesn't appear to be working and is making her very drowsy. I encouraged her to watch for constipation as well and we discussed some dietary changes which could help as well.     We did discuss the possibility of obtaining an MRI of the brain due to the persistent nausea but it does appear to all be related to GI issues. She wished to hold off on that for now.    We did discuss the follow up process, involving probable PET scan next with EGDs to follow as well. She is awaiting those schedules from Dr. Collier as she sees him later today. I will follow up on that scheduling as well and I have encouraged her to call me with an update on her GI issues next week.

## 2018-04-16 ENCOUNTER — DOCUMENTATION (OUTPATIENT)
Dept: NUTRITION | Facility: HOSPITAL | Age: 68
End: 2018-04-16

## 2018-04-16 NOTE — PROGRESS NOTES
[facility] Nutrition Follow Up:     Weight 112# 4/10/18.  Increased from last month 4#. Ranging 112-114#.  Spoke to patient on phone. She continues to struggle with po intake, c/o pain mid sternal area when she eats. She is taking pain meds and trying to eat when they take effect. She is also using carafate but does not think that it helps.    Encouraged high gissel milkshakes, other high gissel high protein soft foods frequently in small amounts throughout the day. Will continue to follow.

## 2018-04-18 ENCOUNTER — TELEPHONE (OUTPATIENT)
Dept: ONCOLOGY | Facility: CLINIC | Age: 68
End: 2018-04-18

## 2018-04-18 DIAGNOSIS — C15.9 ESOPHAGEAL CARCINOMA (HCC): Primary | ICD-10-CM

## 2018-04-18 NOTE — TELEPHONE ENCOUNTER
"Pt called and informed.  Message sent to scheduling.    ----- Message from Bienvenido Collier MD sent at 4/18/2018  2:50 PM EDT -----  Yes please  ----- Message -----  From: Jessica White RN  Sent: 4/18/2018   2:01 PM  To: Bienvenido Collier MD    Pt called today stating that she feels weak and unable to eat a lot due to food \"hurts going down through breast bone\".  She has had one episode of diarrhea today and 3 episodes yesterday.  No fever, no aches, no chills.     Should pt come in for NP visit, labs and possible IVF's?      "

## 2018-04-18 NOTE — TELEPHONE ENCOUNTER
"Pt called stating that he feels very weak and is unable to eat a lot due to \"food hurts going down through breast bone\".  Pt has had diarrhea x1 today and 3 times yesterday. No fever, no aches, no chills.  Message sent to Dr Collier.  Waiting to hear back.   "

## 2018-04-19 ENCOUNTER — OFFICE VISIT (OUTPATIENT)
Dept: ONCOLOGY | Facility: CLINIC | Age: 68
End: 2018-04-19

## 2018-04-19 ENCOUNTER — LAB (OUTPATIENT)
Dept: ONCOLOGY | Facility: HOSPITAL | Age: 68
End: 2018-04-19

## 2018-04-19 ENCOUNTER — INFUSION (OUTPATIENT)
Dept: ONCOLOGY | Facility: HOSPITAL | Age: 68
End: 2018-04-19

## 2018-04-19 VITALS
OXYGEN SATURATION: 96 % | RESPIRATION RATE: 16 BRPM | HEIGHT: 67 IN | DIASTOLIC BLOOD PRESSURE: 72 MMHG | SYSTOLIC BLOOD PRESSURE: 114 MMHG | HEART RATE: 95 BPM | BODY MASS INDEX: 17.55 KG/M2 | TEMPERATURE: 98.4 F | WEIGHT: 111.8 LBS

## 2018-04-19 DIAGNOSIS — T66.XXXA RADIATION ESOPHAGITIS: ICD-10-CM

## 2018-04-19 DIAGNOSIS — C15.9 ESOPHAGEAL CARCINOMA (HCC): ICD-10-CM

## 2018-04-19 DIAGNOSIS — C15.9 ESOPHAGEAL CARCINOMA (HCC): Primary | ICD-10-CM

## 2018-04-19 DIAGNOSIS — T66.XXXA RADIATION ESOPHAGITIS: Primary | ICD-10-CM

## 2018-04-19 DIAGNOSIS — K20.80 RADIATION ESOPHAGITIS: ICD-10-CM

## 2018-04-19 DIAGNOSIS — K20.80 RADIATION ESOPHAGITIS: Primary | ICD-10-CM

## 2018-04-19 LAB
ALBUMIN SERPL-MCNC: 4.1 G/DL (ref 3.5–5.2)
ALBUMIN/GLOB SERPL: 1.6 G/DL (ref 1.1–2.4)
ALP SERPL-CCNC: 63 U/L (ref 38–116)
ALT SERPL W P-5'-P-CCNC: 13 U/L (ref 0–33)
ANION GAP SERPL CALCULATED.3IONS-SCNC: 13.1 MMOL/L
AST SERPL-CCNC: 19 U/L (ref 0–32)
BASOPHILS # BLD AUTO: 0.02 10*3/MM3 (ref 0–0.1)
BASOPHILS NFR BLD AUTO: 0.4 % (ref 0–1.1)
BILIRUB SERPL-MCNC: 0.3 MG/DL (ref 0.1–1.2)
BUN BLD-MCNC: 11 MG/DL (ref 6–20)
BUN/CREAT SERPL: 12.2 (ref 7.3–30)
CALCIUM SPEC-SCNC: 9.7 MG/DL (ref 8.5–10.2)
CHLORIDE SERPL-SCNC: 97 MMOL/L (ref 98–107)
CO2 SERPL-SCNC: 26.9 MMOL/L (ref 22–29)
CREAT BLD-MCNC: 0.9 MG/DL (ref 0.6–1.1)
DEPRECATED RDW RBC AUTO: 49 FL (ref 37–49)
EOSINOPHIL # BLD AUTO: 0.05 10*3/MM3 (ref 0–0.36)
EOSINOPHIL NFR BLD AUTO: 0.9 % (ref 1–5)
ERYTHROCYTE [DISTWIDTH] IN BLOOD BY AUTOMATED COUNT: 14.6 % (ref 11.7–14.5)
GFR SERPL CREATININE-BSD FRML MDRD: 62 ML/MIN/1.73
GLOBULIN UR ELPH-MCNC: 2.6 GM/DL (ref 1.8–3.5)
GLUCOSE BLD-MCNC: 121 MG/DL (ref 74–124)
HCT VFR BLD AUTO: 35.1 % (ref 34–45)
HGB BLD-MCNC: 11.8 G/DL (ref 11.5–14.9)
IMM GRANULOCYTES # BLD: 0.02 10*3/MM3 (ref 0–0.03)
IMM GRANULOCYTES NFR BLD: 0.4 % (ref 0–0.5)
LYMPHOCYTES # BLD AUTO: 0.79 10*3/MM3 (ref 1–3.5)
LYMPHOCYTES NFR BLD AUTO: 14.1 % (ref 20–49)
MCH RBC QN AUTO: 30.9 PG (ref 27–33)
MCHC RBC AUTO-ENTMCNC: 33.6 G/DL (ref 32–35)
MCV RBC AUTO: 91.9 FL (ref 83–97)
MONOCYTES # BLD AUTO: 0.74 10*3/MM3 (ref 0.25–0.8)
MONOCYTES NFR BLD AUTO: 13.2 % (ref 4–12)
NEUTROPHILS # BLD AUTO: 3.98 10*3/MM3 (ref 1.5–7)
NEUTROPHILS NFR BLD AUTO: 71 % (ref 39–75)
NRBC BLD MANUAL-RTO: 0 /100 WBC (ref 0–0)
PLATELET # BLD AUTO: 232 10*3/MM3 (ref 150–375)
PMV BLD AUTO: 9.2 FL (ref 8.9–12.1)
POTASSIUM BLD-SCNC: 3.6 MMOL/L (ref 3.5–4.7)
PROT SERPL-MCNC: 6.7 G/DL (ref 6.3–8)
RBC # BLD AUTO: 3.82 10*6/MM3 (ref 3.9–5)
SODIUM BLD-SCNC: 137 MMOL/L (ref 134–145)
WBC NRBC COR # BLD: 5.6 10*3/MM3 (ref 4–10)

## 2018-04-19 PROCEDURE — 36415 COLL VENOUS BLD VENIPUNCTURE: CPT | Performed by: INTERNAL MEDICINE

## 2018-04-19 PROCEDURE — 85025 COMPLETE CBC W/AUTO DIFF WBC: CPT

## 2018-04-19 PROCEDURE — 99213 OFFICE O/P EST LOW 20 MIN: CPT | Performed by: NURSE PRACTITIONER

## 2018-04-19 PROCEDURE — 80053 COMPREHEN METABOLIC PANEL: CPT

## 2018-04-19 PROCEDURE — 96361 HYDRATE IV INFUSION ADD-ON: CPT | Performed by: NURSE PRACTITIONER

## 2018-04-19 PROCEDURE — 96374 THER/PROPH/DIAG INJ IV PUSH: CPT | Performed by: NURSE PRACTITIONER

## 2018-04-19 RX ORDER — SODIUM CHLORIDE 9 MG/ML
500 INJECTION, SOLUTION INTRAVENOUS ONCE
Status: COMPLETED | OUTPATIENT
Start: 2018-04-19 | End: 2018-04-19

## 2018-04-19 RX ORDER — SODIUM CHLORIDE 9 MG/ML
500 INJECTION, SOLUTION INTRAVENOUS ONCE
Status: CANCELLED
Start: 2018-04-19 | End: 2018-04-19

## 2018-04-19 RX ORDER — FAMOTIDINE 10 MG/ML
20 INJECTION, SOLUTION INTRAVENOUS ONCE
Status: COMPLETED | OUTPATIENT
Start: 2018-04-19 | End: 2018-04-19

## 2018-04-19 RX ORDER — FAMOTIDINE 10 MG/ML
20 INJECTION, SOLUTION INTRAVENOUS ONCE
Status: CANCELLED
Start: 2018-04-19 | End: 2018-04-19

## 2018-04-19 RX ADMIN — FAMOTIDINE 20 MG: 10 INJECTION, SOLUTION INTRAVENOUS at 14:17

## 2018-04-19 RX ADMIN — SODIUM CHLORIDE 500 ML: 9 INJECTION, SOLUTION INTRAVENOUS at 14:13

## 2018-04-19 NOTE — PROGRESS NOTES
Subjective    REASON FOR FOLLOW UP:   1.  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 Moon Bowen on 10/6/2017.  Primary stereotactic radiation therapy to the left upper lobe completed 12/15/2017  2.  Lung tumor is negative for PDL 1.  Also negative for ALK, EGFR, and ROS 1 mutations.  3.  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   4.  Esophageal adenocarcinoma was negative for HER-2/elizabeth overexpression but positive for PDL 1  5.  Initiation of combined radiation and low-dose weekly carboplatin and Taxol chemotherapy for treatment of esophageal adenocarcinoma.  First treatment delivered 1/11/2018.  6.  Development of thrombocytopenia and radiation esophagitis on the visit of 2/15/2018.      History of Present Illness     HISTORY OF PRESENT ILLNESS: Ms. Abad is a very pleasant 68 y.o. female with the above-noted history of both lung cancer and esophageal cancer.  As noted above, she was treated with combined chemotherapy and radiation for her adenocarcinoma of the GE junction, completing concurrent chemoradiation around 3-18.  Unfortunately she developed significant toxicity to treatment including esophagitis requiring hospitalization from 03/15/2018 through 03/20/2018 for radiation esophagitis and dehydration. During her hospitalization, she underwent EGD with biopsies which showed inflammation and ulceration but no evidence of malignancy.    She has continued to feel weak with chronic nausea and poor appetite.  She is now reporting worsening pain when she tries to eat anything, specifically pain in her sternal region.  She tries to take in supplemental protein shakes but anytime she does this has worsened diarrhea beyond her normal loose stools.  She currently is using Norco 7.5/325 4-5 times a day, prescribed by her PCP.    She would like to receive IV fluids today.  She denies other concerns this time.    Past Medical History:    Diagnosis Date   • Adenocarcinoma of lung 2017    HAD RADIATION    • Anxiety and depression    • Arthritis    • Benign parotid tumor 2012    removed by Dr Vickers told it was benign   • COPD (chronic obstructive pulmonary disease)    • Depression    • Diarrhea    • Early cataract    • Emphysema of lung    • Esophageal cancer 2017   • History of chronic pain    • History of colon polyps    • History of pneumonia    • Hyperlipidemia    • Joint pain    • Stroke       HEMATOLOGIC/ ONCOLOGIC HISTORY:  The patient is a 67 y.o. year old female who has a history of smoking and COPD.  She had an abnormal CT scan in February of this year showing a 3.5 cm mass in the left suprahilar area.  A repeat CT scan was performed at [facility] 7/13/2017 and show that the mass had increased in size from about 3.5 cm up to 6.5 cm.    She underwent navigational bronchoscopy and endobronchial ultrasound and biopsy with Dr. Moon Bowen on 10/6/2017.  The pathology on the biopsy showed well-differentiated adenocarcinoma.  She is referred to us now by her pulmonologist Dr. Daniel Oconnor for further staging evaluation and treatment recommendations.    She has not had a PET scan for staging.  She had a PDL 1 staining on her bronchoscopy specimen which was negative for PDL 1.  EGFR, ALK, and ROS 1 were also negative.    She is still smoking but is trying to quit.  She had pulmonary function tests performed on 10/25/2017 showing a DLCO of 33% and FEV1 37%.    She was referred to the [facility] radiation Center and completed primary radiation to the left upper lobe lung mass.  Because of abnormal uptake in the esophagus noted on her staging PET scan, she also underwent an EGD and biopsy which unfortunately revealed adenocarcinoma of the GE junction.  Molecular studies for HER-2/elizabeth and PDL 1 on the tumor tissue showed that she was PD L1 positive and HER-2/elizabeth negative.  We recommended combined weekly low dose carbo Taxol  chemotherapy and radiation.     She had significant difficulty with treatment particularly with radiation esophagitis near the end of her course of therapy.  She required some dose delay and  completed her radiation treatments the first week of March 2018.      Past Surgical History:   Procedure Laterality Date   • BRONCHOSCOPY     • CHOLECYSTECTOMY  1999   • COLON SURGERY  2015    COLON POLYPS   • COLONOSCOPY  04/13/2016    TA w/low grade dysplasia x 3, serrated adenoma x 2   • COLONOSCOPY  06/14/2016    TA w/high grade dysplasia   • ENDOSCOPY N/A 12/6/2017    Procedure: ESOPHAGOGASTRODUODENOSCOPY WITH BIOPSY;  Surgeon: Jayant Robles MD;  Location: [facility] ENDOSCOPY;  Service:    • ENDOSCOPY N/A 3/17/2018    Procedure: ESOPHAGOGASTRODUODENOSCOPY WITH BIOPSIES;  Surgeon: Ambrosio Sapp MD;  Location: [facility] ENDOSCOPY;  Service: Gastroenterology   • FOOT SURGERY  10/2010    [place]   • HYSTERECTOMY     • KS INSJ TUNNELED CVC W/O SUBQ PORT/ AGE 3 YR/> Right 1/9/2018    Procedure: MEDIPORT PLACEMENT right;  Surgeon: Damaso Germain MD;  Location: [facility] HYBRID OR 18/19;  Service: Vascular   • SALIVARY GLAND SURGERY      PAROTID MASS REMOVED BENIGN   • SHOULDER ARTHROSCOPY Left 1995. 2001        ALLERGIES:    Allergies   Allergen Reactions   • Penicillins Hives         Review of Systems   Constitutional: Positive for fatigue. Negative for activity change, appetite change, fever and unexpected weight change.   HENT: Positive for trouble swallowing (painful in lower esophagus). Negative for hearing loss, nosebleeds and voice change.    Eyes: Negative for visual disturbance.   Respiratory: Negative for cough, shortness of breath and wheezing.    Cardiovascular: Negative for chest pain and palpitations.   Gastrointestinal: Positive for diarrhea. Negative for abdominal pain.        Short bowel symptoms after partial colectomy.  Chronic. Reflux and painful swallowing most  due to radiation esophagitis.  Improving.  "  Genitourinary: Negative for difficulty urinating, frequency, hematuria and urgency.   Musculoskeletal: Negative for back pain and neck pain.   Skin: Negative for rash.   Neurological: Positive for weakness. Negative for dizziness, seizures, syncope and headaches.   Hematological: Negative for adenopathy. Does not bruise/bleed easily.   Psychiatric/Behavioral: Negative for behavioral problems. The patient is not nervous/anxious.         Objective     Vitals:    04/19/18 1318   BP: 114/72   Pulse: 95   Resp: 16   Temp: 98.4 °F (36.9 °C)   TempSrc: Oral   SpO2: 96%   Weight: 50.7 kg (111 lb 12.8 oz)   Height: 170.2 cm (67.01\")   PainSc: 3  Comment: chest     Current Status 4/19/2018   ECOG score 0       Physical Exam   Constitutional: She is oriented to person, place, and time. She appears well-developed. No distress.   Weak appearing but in no acute distress.   HENT:   Head: Normocephalic.   Eyes: Conjunctivae and EOM are normal. Pupils are equal, round, and reactive to light. No scleral icterus.   Neck: Normal range of motion. Neck supple. No JVD present. No thyromegaly present.   Cardiovascular: Normal rate and regular rhythm.  Exam reveals no gallop and no friction rub.    No murmur heard.  Pulmonary/Chest: Effort normal. No respiratory distress. She has no wheezes. She has no rhonchi. She has no rales.   Right IJ Mediport the right chest wall.   Abdominal: Soft. She exhibits no distension and no mass. There is no tenderness.   Musculoskeletal: Normal range of motion. She exhibits no edema or deformity.   Neurological: She is alert and oriented to person, place, and time. She has normal reflexes. No cranial nerve deficit.   Skin: Skin is warm and dry. No rash noted. No erythema.   Psychiatric: She has a normal mood and affect. Her behavior is normal. Judgment normal.       No change 4/19/18    RECENT LABS:  Hematology WBC   Date Value Ref Range Status   04/19/2018 5.60 4.00 - 10.00 10*3/mm3 Final     RBC   Date " Value Ref Range Status   04/19/2018 3.82 (L) 3.90 - 5.00 10*6/mm3 Final     Hemoglobin   Date Value Ref Range Status   04/19/2018 11.8 11.5 - 14.9 g/dL Final     Hematocrit   Date Value Ref Range Status   04/19/2018 35.1 34.0 - 45.0 % Final     Platelets   Date Value Ref Range Status   04/19/2018 232 150 - 375 10*3/mm3 Final        Lab Results   Component Value Date    GLUCOSE 121 04/19/2018    BUN 11 04/19/2018    CREATININE 0.90 04/19/2018    EGFRIFNONA 62 04/19/2018    BCR 12.2 04/19/2018    K 3.6 04/19/2018    CO2 26.9 04/19/2018    CALCIUM 9.7 04/19/2018    ALBUMIN 4.10 04/19/2018    LABIL2 1.6 04/19/2018    AST 19 04/19/2018    ALT 13 04/19/2018     PATHOLOGY  FROM EGD 3/17/2018  Final Diagnosis   1. STOMACH, RANDOM BIOPSY:           FRAGMENTS OF GASTRIC MUCOSA WITH MILD CHRONIC ACTIVE GASTRITIS.           SPECIAL STAIN (DIFF-QUIK) FOR HELICOBACTER PYLORI IS EQUIVOCAL.             IMMUNOHISTOCHEMICAL STAIN FOR HELICOBACTER PYLORI IS PENDING (SEE COMMENT).   2. GASTROESOPHAGEAL JUNCTION, BIOPSY:           FRAGMENTS OF GASTRIC-TYPE MUCOSA WITH MARKED ULCERATION AND SEVERE ASSOCIATED                  ACUTE INFLAMMATION.           DETACHED FRAGMENTS OF ACUTE INFLAMMATORY / ULCER EXUDATE.           SPECIAL STAIN (GMS) IS PENDING (SEE COMMENT).           REACTIVE EPITHELIAL CHANGES.           NO DEFINITIVE EVIDENCE OF INTESTINAL METAPLASIA OR DYSPLASIA.    PATHOLOGY FROM EGD 12/6/2017  Final Diagnosis   1.  GASTROESOPHAGEAL JUNCTION, BIOPSIES:             INVASIVE ADENOCARCINOMA.            NO IDENTIFIED INTESTINAL METAPLASIA.      COMMENT: In this specimen tumor is identified extending into submucosal tissue. Deeper extension cannot be excluded in these superficial biopsies.   Her 2 Brody negative  PD L1 positive ( > 1 ).      Assessment/Plan   1.  Well differentiated adenocarcinoma presumably from lung origin in a long-time smoker.  The biopsy was made from navigational bronchoscopy and endobronchial ultrasound at  [facility] on 10/6/2017.  Staging PET scan showed no mediastinal adenopathy or obvious distant metastases.  She did have uptake in the lower esophagus which was evaluated with EGD and biopsy on 12/6/2017 and unfortunately was found to be adenocarcinoma at the GE junction.   · LungTumor tissue was negative for PDL 1 expression, EGFR, ALK, or ROS 1 mutations.   · She completed stereotactic radiation to the lung tumor 12/15/2017.  2.  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   · Esophageal adenocarcinoma was negative for HER-2/elizabeth overexpression but positive for PDL 1.  · Initiated combined radiation therapy to the esophagus along with weekly low-dose carboplatin and Taxol chemotherapy 1/11/18. She completed treatment 3/2/18.  · Now with increased pain in lower esophagus with swallowing.  Since it has been 7 weeks since she completed concurrent therapy, it is reasonable to go ahead with PET scan.  I discussed this with Dr. Collier and he agrees.  We will move up PET scan and M.D. follow-up.  3. Emphysema due to smoking.  4. Placement of right internal jugular MediPort by Dr. Raphael Germain of vascular surgery 1/9/2018.  5.  She was not considered a candidate for lobectomy due to her poor pulmonary function but after reevaluation by pulmonary and cardiology she was cleared for potential esophagectomy surgery.  6.  History of benign right parotid tumor in 2012, surgically removed per Dr. Vickers.  Patient is noted to have very tiny nodule below the scar, new to her, but possibly just scar tissue.  Monitor closely.  7.  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  She uses Lomotil and Imodium as needed.  Stable.  8.  Radiation esophagitis.  Completed concurrent chemoradiation the first week of March.  She was hospitalized from March 15 through the 20th for radiation esophagitis and dehydration.  She is currently utilizing Norco 7.5/325 as prescribed by her PCP.  Carafate  has not been helpful.  9.  Worsening pain with swallowing, specifically in the lower esophagus/sternal region.  As noted above she is taking Norco 4-5 times a day.  Carafate has not been helpful.  With seem unusual for her to be having worsening pain related to esophagitis this far out from completion of therapy.  It has been 7 weeks since completion, as noted above we will go ahead with PET scan to rule out recurrent disease. Note: during hospitalization, she underwent EGD with biopsies which showed inflammation and ulceration but no evidence of malignancy.  10.  Weakness and poor oral intake.  Patient will receive 500 cc NS and 20 mg IV Pepcid today.  She is also followed by [employer] dietitian.    Plan  1.  1 L NS today.  2.  Move up PET scan to take place next week.    3.  M.D. follow-up in 2 weeks to review PET scan.    4.  She will need to continue port flushes every 4-6 weeks.

## 2018-04-26 ENCOUNTER — HOSPITAL ENCOUNTER (OUTPATIENT)
Dept: PET IMAGING | Facility: HOSPITAL | Age: 68
Discharge: HOME | End: 2018-04-26
Attending: INTERNAL MEDICINE | Admitting: INTERNAL MEDICINE

## 2018-04-26 ENCOUNTER — HOSPITAL ENCOUNTER (OUTPATIENT)
Dept: PET IMAGING | Facility: HOSPITAL | Age: 68
Discharge: HOME | End: 2018-04-26
Attending: INTERNAL MEDICINE

## 2018-04-26 DIAGNOSIS — C15.9 ESOPHAGEAL CARCINOMA (HCC): ICD-10-CM

## 2018-04-26 DIAGNOSIS — C34.92 ADENOCARCINOMA OF LEFT LUNG (HCC): ICD-10-CM

## 2018-04-26 DIAGNOSIS — Z45.2 FITTING AND ADJUSTMENT OF VASCULAR CATHETER: ICD-10-CM

## 2018-04-26 DIAGNOSIS — T66.XXXA RADIATION ESOPHAGITIS: ICD-10-CM

## 2018-04-26 DIAGNOSIS — K20.80 RADIATION ESOPHAGITIS: ICD-10-CM

## 2018-04-26 LAB — GLUCOSE BLDC GLUCOMTR-MCNC: 106 MG/DL (ref 70–130)

## 2018-04-26 PROCEDURE — 0 FLUDEOXYGLUCOSE F18 SOLUTION: Performed by: INTERNAL MEDICINE

## 2018-04-26 PROCEDURE — 78815 PET IMAGE W/CT SKULL-THIGH: CPT

## 2018-04-26 PROCEDURE — 82962 GLUCOSE BLOOD TEST: CPT

## 2018-04-26 PROCEDURE — A9552 F18 FDG: HCPCS | Performed by: INTERNAL MEDICINE

## 2018-04-26 RX ADMIN — FLUDEOXYGLUCOSE F18 1 DOSE: 300 INJECTION INTRAVENOUS at 09:18

## 2018-05-02 ENCOUNTER — TELEPHONE (OUTPATIENT)
Dept: ONCOLOGY | Facility: CLINIC | Age: 68
End: 2018-05-02

## 2018-05-02 ENCOUNTER — HOSPITAL ENCOUNTER (EMERGENCY)
Facility: HOSPITAL | Age: 68
Discharge: HOME | End: 2018-05-02
Attending: EMERGENCY MEDICINE | Admitting: EMERGENCY MEDICINE

## 2018-05-02 ENCOUNTER — APPOINTMENT (OUTPATIENT)
Dept: GENERAL RADIOLOGY | Facility: HOSPITAL | Age: 68
End: 2018-05-02

## 2018-05-02 VITALS
HEIGHT: 67 IN | BODY MASS INDEX: 16.95 KG/M2 | TEMPERATURE: 98.6 F | WEIGHT: 108 LBS | HEART RATE: 77 BPM | SYSTOLIC BLOOD PRESSURE: 162 MMHG | RESPIRATION RATE: 16 BRPM | DIASTOLIC BLOOD PRESSURE: 84 MMHG | OXYGEN SATURATION: 94 %

## 2018-05-02 DIAGNOSIS — N39.0 URINARY TRACT INFECTION WITHOUT HEMATURIA, SITE UNSPECIFIED: Primary | ICD-10-CM

## 2018-05-02 DIAGNOSIS — K20.90 ESOPHAGITIS: ICD-10-CM

## 2018-05-02 LAB
ALBUMIN SERPL-MCNC: 4.2 G/DL (ref 3.5–5.2)
ALBUMIN/GLOB SERPL: 1.5 G/DL
ALP SERPL-CCNC: 63 U/L (ref 39–117)
ALT SERPL W P-5'-P-CCNC: 15 U/L (ref 1–33)
ANION GAP SERPL CALCULATED.3IONS-SCNC: 10.2 MMOL/L
AST SERPL-CCNC: 20 U/L (ref 1–32)
BACTERIA UR QL AUTO: ABNORMAL /HPF
BASOPHILS # BLD AUTO: 0.02 10*3/MM3 (ref 0–0.2)
BASOPHILS NFR BLD AUTO: 0.3 % (ref 0–1.5)
BILIRUB SERPL-MCNC: 0.2 MG/DL (ref 0.1–1.2)
BILIRUB UR QL STRIP: NEGATIVE
BUN BLD-MCNC: 20 MG/DL (ref 8–23)
BUN/CREAT SERPL: 23.3 (ref 7–25)
CALCIUM SPEC-SCNC: 9.6 MG/DL (ref 8.6–10.5)
CHLORIDE SERPL-SCNC: 97 MMOL/L (ref 98–107)
CLARITY UR: CLEAR
CO2 SERPL-SCNC: 27.8 MMOL/L (ref 22–29)
COLOR UR: YELLOW
CREAT BLD-MCNC: 0.86 MG/DL (ref 0.57–1)
DEPRECATED RDW RBC AUTO: 45.4 FL (ref 37–54)
EOSINOPHIL # BLD AUTO: 0.09 10*3/MM3 (ref 0–0.7)
EOSINOPHIL NFR BLD AUTO: 1.3 % (ref 0.3–6.2)
ERYTHROCYTE [DISTWIDTH] IN BLOOD BY AUTOMATED COUNT: 13.3 % (ref 11.7–13)
GFR SERPL CREATININE-BSD FRML MDRD: 65 ML/MIN/1.73
GLOBULIN UR ELPH-MCNC: 2.8 GM/DL
GLUCOSE BLD-MCNC: 96 MG/DL (ref 65–99)
GLUCOSE UR STRIP-MCNC: NEGATIVE MG/DL
HCT VFR BLD AUTO: 38.9 % (ref 35.6–45.5)
HGB BLD-MCNC: 12.8 G/DL (ref 11.9–15.5)
HGB UR QL STRIP.AUTO: NEGATIVE
HOLD SPECIMEN: NORMAL
HOLD SPECIMEN: NORMAL
HYALINE CASTS UR QL AUTO: ABNORMAL /LPF
IMM GRANULOCYTES # BLD: 0 10*3/MM3 (ref 0–0.03)
IMM GRANULOCYTES NFR BLD: 0 % (ref 0–0.5)
KETONES UR QL STRIP: NEGATIVE
LEUKOCYTE ESTERASE UR QL STRIP.AUTO: ABNORMAL
LYMPHOCYTES # BLD AUTO: 0.94 10*3/MM3 (ref 0.9–4.8)
LYMPHOCYTES NFR BLD AUTO: 13.5 % (ref 19.6–45.3)
MAGNESIUM SERPL-MCNC: 2.4 MG/DL (ref 1.6–2.4)
MCH RBC QN AUTO: 30.8 PG (ref 26.9–32)
MCHC RBC AUTO-ENTMCNC: 32.9 G/DL (ref 32.4–36.3)
MCV RBC AUTO: 93.7 FL (ref 80.5–98.2)
MONOCYTES # BLD AUTO: 0.7 10*3/MM3 (ref 0.2–1.2)
MONOCYTES NFR BLD AUTO: 10.1 % (ref 5–12)
NEUTROPHILS # BLD AUTO: 5.2 10*3/MM3 (ref 1.9–8.1)
NEUTROPHILS NFR BLD AUTO: 74.8 % (ref 42.7–76)
NITRITE UR QL STRIP: NEGATIVE
PH UR STRIP.AUTO: 5.5 [PH] (ref 5–8)
PLATELET # BLD AUTO: 181 10*3/MM3 (ref 140–500)
PMV BLD AUTO: 10.3 FL (ref 6–12)
POTASSIUM BLD-SCNC: 4.2 MMOL/L (ref 3.5–5.2)
PROT SERPL-MCNC: 7 G/DL (ref 6–8.5)
PROT UR QL STRIP: NEGATIVE
RBC # BLD AUTO: 4.15 10*6/MM3 (ref 3.9–5.2)
RBC # UR: ABNORMAL /HPF
REF LAB TEST METHOD: ABNORMAL
SODIUM BLD-SCNC: 135 MMOL/L (ref 136–145)
SP GR UR STRIP: 1.02 (ref 1–1.03)
SQUAMOUS #/AREA URNS HPF: ABNORMAL /HPF
TROPONIN T SERPL-MCNC: <0.01 NG/ML (ref 0–0.03)
UROBILINOGEN UR QL STRIP: ABNORMAL
WBC NRBC COR # BLD: 6.95 10*3/MM3 (ref 4.5–10.7)
WBC UR QL AUTO: ABNORMAL /HPF
WHOLE BLOOD HOLD SPECIMEN: NORMAL
WHOLE BLOOD HOLD SPECIMEN: NORMAL

## 2018-05-02 PROCEDURE — 96360 HYDRATION IV INFUSION INIT: CPT

## 2018-05-02 PROCEDURE — 93010 ELECTROCARDIOGRAM REPORT: CPT | Performed by: INTERNAL MEDICINE

## 2018-05-02 PROCEDURE — 81001 URINALYSIS AUTO W/SCOPE: CPT | Performed by: EMERGENCY MEDICINE

## 2018-05-02 PROCEDURE — 84484 ASSAY OF TROPONIN QUANT: CPT | Performed by: EMERGENCY MEDICINE

## 2018-05-02 PROCEDURE — 71046 X-RAY EXAM CHEST 2 VIEWS: CPT

## 2018-05-02 PROCEDURE — 85025 COMPLETE CBC W/AUTO DIFF WBC: CPT | Performed by: EMERGENCY MEDICINE

## 2018-05-02 PROCEDURE — 87086 URINE CULTURE/COLONY COUNT: CPT | Performed by: EMERGENCY MEDICINE

## 2018-05-02 PROCEDURE — 99284 EMERGENCY DEPT VISIT MOD MDM: CPT

## 2018-05-02 PROCEDURE — 93005 ELECTROCARDIOGRAM TRACING: CPT | Performed by: EMERGENCY MEDICINE

## 2018-05-02 PROCEDURE — 83735 ASSAY OF MAGNESIUM: CPT | Performed by: EMERGENCY MEDICINE

## 2018-05-02 PROCEDURE — 80053 COMPREHEN METABOLIC PANEL: CPT | Performed by: EMERGENCY MEDICINE

## 2018-05-02 RX ORDER — SODIUM CHLORIDE 0.9 % (FLUSH) 0.9 %
10 SYRINGE (ML) INJECTION AS NEEDED
Status: DISCONTINUED | OUTPATIENT
Start: 2018-05-02 | End: 2018-05-02 | Stop reason: HOSPADM

## 2018-05-02 RX ORDER — SULFAMETHOXAZOLE AND TRIMETHOPRIM 800; 160 MG/1; MG/1
1 TABLET ORAL ONCE
Status: COMPLETED | OUTPATIENT
Start: 2018-05-02 | End: 2018-05-02

## 2018-05-02 RX ORDER — SULFAMETHOXAZOLE AND TRIMETHOPRIM 200; 40 MG/5ML; MG/5ML
20 SUSPENSION ORAL 2 TIMES DAILY
Qty: 400 ML | Refills: 0 | Status: SHIPPED | OUTPATIENT
Start: 2018-05-02 | End: 2018-05-07

## 2018-05-02 RX ADMIN — SODIUM CHLORIDE 1000 ML: 9 INJECTION, SOLUTION INTRAVENOUS at 18:29

## 2018-05-02 RX ADMIN — SULFAMETHOXAZOLE AND TRIMETHOPRIM 160 MG: 800; 160 TABLET ORAL at 20:47

## 2018-05-02 NOTE — ED TRIAGE NOTES
Patient undergoing lung and esophageal cancer, her last chemotherapy tx was 2 months ago, she is lung cancer free and finds out on Monday about her esophagus (Dr. Collier). Patient c/o n/v/d for 2 months that has progressively gotten worse. C/o feeling weak, x3 episodes of loose watery diarrhea, no blood or black tarry stools noted. Patient able to ambulate. Patient in no acute distress at this time.

## 2018-05-02 NOTE — ED TRIAGE NOTES
Patient had her last round of chemo 2 months ago and has had n/v/d and generalized weakness since the last treatment. Patient reports that she has not been taking any other prescription medications other zofran in about a month.

## 2018-05-02 NOTE — TELEPHONE ENCOUNTER
Pt called and said she is very weak and having pain and wants to go to the ER. She said she is unable to take herself so she will be calling an ambulance. She wanted Dr Collier to know. In basket message sent to Dr Collier.

## 2018-05-02 NOTE — ED PROVIDER NOTES
EMERGENCY DEPARTMENT ENCOUNTER    CHIEF COMPLAINT  Chief Complaint: Generalized weakness  History given by: Pt  History limited by: none  Room Number: 39/39  PMD: Mary Taylor MD      HPI:  Pt is a 68 y.o. female who presents complaining of intermittent generalized weakness that has been ongoing for 2+ months since her chemotherapy treatment.. She also complains of N/V, abd pain, and diarrhea.     Duration:  2+ months  Onset: gradual  Timing: constant  Location: generalized  Quality: weakness  Intensity/Severity: moderate  Associated Symptoms: N/V, abd pain, and diarrhea  Aggravating Factors: Chemotherapy treatment  Alleviating Factors: none stated  Previous Episodes: Pt has hx of weakness  Treatment before arrival: none stated    PAST MEDICAL HISTORY  Active Ambulatory Problems     Diagnosis Date Noted   • Adenocarcinoma of left lung 10/31/2017   • Panlobular emphysema 10/31/2017   • Abnormal finding on imaging 11/14/2017   • Esophageal carcinoma 12/08/2017   • Fitting and adjustment of vascular catheter 01/08/2018   • OROZCO (dyspnea on exertion) 02/09/2018   • Radiation esophagitis 02/15/2018     Resolved Ambulatory Problems     Diagnosis Date Noted   • No Resolved Ambulatory Problems     Past Medical History:   Diagnosis Date   • Adenocarcinoma of lung 2017   • Anxiety and depression    • Arthritis    • Benign parotid tumor 2012   • COPD (chronic obstructive pulmonary disease)    • Depression    • Diarrhea    • Early cataract    • Emphysema of lung    • Esophageal cancer 2017   • History of chronic pain    • History of colon polyps    • History of pneumonia    • Hyperlipidemia    • Joint pain    • Stroke        PAST SURGICAL HISTORY  Past Surgical History:   Procedure Laterality Date   • BRONCHOSCOPY     • CHOLECYSTECTOMY  1999   • COLON SURGERY  2015    COLON POLYPS   • COLONOSCOPY  04/13/2016    TA w/low grade dysplasia x 3, serrated adenoma x 2   • COLONOSCOPY  06/14/2016    TA w/high grade dysplasia   •  ENDOSCOPY N/A 12/6/2017    Procedure: ESOPHAGOGASTRODUODENOSCOPY WITH BIOPSY;  Surgeon: Jayant Robles MD;  Location: [facility] ENDOSCOPY;  Service:    • ENDOSCOPY N/A 3/17/2018    Procedure: ESOPHAGOGASTRODUODENOSCOPY WITH BIOPSIES;  Surgeon: Ambrosio Sapp MD;  Location: [facility] ENDOSCOPY;  Service: Gastroenterology   • FOOT SURGERY  10/2010    [place]   • HYSTERECTOMY     • OH INSJ TUNNELED CVC W/O SUBQ PORT/ AGE 3 YR/> Right 1/9/2018    Procedure: MEDIPORT PLACEMENT right;  Surgeon: Damaso Germain MD;  Location: [facility] HYBRID OR 18/19;  Service: Vascular   • SALIVARY GLAND SURGERY      PAROTID MASS REMOVED BENIGN   • SHOULDER ARTHROSCOPY Left 1995. 2001       FAMILY HISTORY  Family History   Problem Relation Age of Onset   • Cancer Sister    • Lung cancer Sister    • Leukemia Cousin    • Heart disease Father    • Cancer Sister    • Malig Hyperthermia Neg Hx        SOCIAL HISTORY  Social History     Social History   • Marital status:      Spouse name: N/A   • Number of children: N/A   • Years of education: High school     Occupational History   •  Retired     Social History Main Topics   • Smoking status: Former Smoker     Packs/day: 1.00     Years: 30.00     Types: Cigarettes     Quit date: 1973   • Smokeless tobacco: Never Used      Comment: Caffeine-2 daily   • Alcohol use No   • Drug use: No   • Sexual activity: Defer     Other Topics Concern   • Not on file     Social History Narrative    Lives alone.       ALLERGIES  Penicillins    REVIEW OF SYSTEMS  Review of Systems   Constitutional: Negative for fever.   HENT: Negative for sore throat.    Eyes: Negative.    Respiratory: Negative for cough and shortness of breath.    Cardiovascular: Negative for chest pain.   Gastrointestinal: Positive for abdominal pain, diarrhea, nausea and vomiting.   Genitourinary: Negative for dysuria.   Musculoskeletal: Negative for neck pain.   Skin: Negative for rash.   Allergic/Immunologic: Negative.     Neurological: Positive for weakness (Generalized). Negative for numbness and headaches.   Hematological: Negative.    Psychiatric/Behavioral: Negative.    All other systems reviewed and are negative.      PHYSICAL EXAM  ED Triage Vitals   Temp Heart Rate Resp BP SpO2   05/02/18 1654 05/02/18 1642 05/02/18 1642 05/02/18 1642 05/02/18 1642   98.6 °F (37 °C) 89 14 107/66 99 %      Temp src Heart Rate Source Patient Position BP Location FiO2 (%)   05/02/18 1654 -- -- -- --   Oral           Physical Exam   Constitutional: She is oriented to person, place, and time and well-developed, well-nourished, and in no distress. No distress.   HENT:   Head: Normocephalic and atraumatic.   Eyes: EOM are normal. Pupils are equal, round, and reactive to light.   Neck: Normal range of motion. Neck supple.   Cardiovascular: Normal rate, regular rhythm and normal heart sounds.    Pulmonary/Chest: Effort normal and breath sounds normal. No respiratory distress.   Abdominal: Soft. There is tenderness (mild) in the epigastric area. There is no rebound and no guarding.   Musculoskeletal: Normal range of motion. She exhibits no edema.   Neurological: She is alert and oriented to person, place, and time. She has normal sensation and normal strength. She has a normal Straight Leg Raise Test.   No neurological or focal deficits.   Skin: Skin is warm and dry. No rash noted.   Psychiatric: Mood and affect normal.   Nursing note and vitals reviewed.      LAB RESULTS  Lab Results (last 24 hours)     Procedure Component Value Units Date/Time    CBC & Differential [[id number]] Collected:  05/02/18 1827    Specimen:  Blood Updated:  05/02/18 1839    Narrative:       The following orders were created for panel order CBC & Differential.  Procedure                               Abnormality         Status                     ---------                               -----------         ------                     CBC Auto Differential[[id number]]         Abnormal            Final result                 Please view results for these tests on the individual orders.    Comprehensive Metabolic Panel [[id number]]  (Abnormal) Collected:  05/02/18 1827    Specimen:  Blood Updated:  05/02/18 1904     Glucose 96 mg/dL      BUN 20 mg/dL      Creatinine 0.86 mg/dL      Sodium 135 (L) mmol/L      Potassium 4.2 mmol/L      Chloride 97 (L) mmol/L      CO2 27.8 mmol/L      Calcium 9.6 mg/dL      Total Protein 7.0 g/dL      Albumin 4.20 g/dL      ALT (SGPT) 15 U/L      AST (SGOT) 20 U/L      Alkaline Phosphatase 63 U/L      Total Bilirubin 0.2 mg/dL      eGFR Non African Amer 65 mL/min/1.73      Globulin 2.8 gm/dL      A/G Ratio 1.5 g/dL      BUN/Creatinine Ratio 23.3     Anion Gap 10.2 mmol/L     Troponin [[id number]]  (Normal) Collected:  05/02/18 1827    Specimen:  Blood Updated:  05/02/18 1901     Troponin T <0.010 ng/mL     Narrative:       Troponin T Reference Ranges:  Less than 0.03 ng/mL:    Negative for AMI  0.03 to 0.09 ng/mL:      Indeterminant for AMI  Greater than 0.09 ng/mL: Positive for AMI    Magnesium [[id number]]  (Normal) Collected:  05/02/18 1827    Specimen:  Blood Updated:  05/02/18 1901     Magnesium 2.4 mg/dL     CBC Auto Differential [[id number]]  (Abnormal) Collected:  05/02/18 1827    Specimen:  Blood Updated:  05/02/18 1839     WBC 6.95 10*3/mm3      RBC 4.15 10*6/mm3      Hemoglobin 12.8 g/dL      Hematocrit 38.9 %      MCV 93.7 fL      MCH 30.8 pg      MCHC 32.9 g/dL      RDW 13.3 (H) %      RDW-SD 45.4 fl      MPV 10.3 fL      Platelets 181 10*3/mm3      Neutrophil % 74.8 %      Lymphocyte % 13.5 (L) %      Monocyte % 10.1 %      Eosinophil % 1.3 %      Basophil % 0.3 %      Immature Grans % 0.0 %      Neutrophils, Absolute 5.20 10*3/mm3      Lymphocytes, Absolute 0.94 10*3/mm3      Monocytes, Absolute 0.70 10*3/mm3      Eosinophils, Absolute 0.09 10*3/mm3      Basophils, Absolute 0.02 10*3/mm3      Immature Grans, Absolute 0.00 10*3/mm3     Urinalysis  With / Culture If Indicated - Urine, Clean Catch [[id number]]  (Abnormal) Collected:  05/02/18 1927    Specimen:  Urine from Urine, Clean Catch Updated:  05/02/18 1957     Color, UA Yellow     Appearance, UA Clear     pH, UA 5.5     Specific Gravity, UA 1.024     Glucose, UA Negative     Ketones, UA Negative     Bilirubin, UA Negative     Blood, UA Negative     Protein, UA Negative     Leuk Esterase, UA Moderate (2+) (A)     Nitrite, UA Negative     Urobilinogen, UA 0.2 E.U./dL    Urinalysis, Microscopic Only - Urine, Clean Catch [[id number]]  (Abnormal) Collected:  05/02/18 1927    Specimen:  Urine from Urine, Clean Catch Updated:  05/02/18 1957     RBC, UA 0-2 /HPF      WBC, UA Too Numerous to Count (A) /HPF      Bacteria, UA 1+ (A) /HPF      Squamous Epithelial Cells, UA 0-2 /HPF      Hyaline Casts, UA 7-12 /LPF      Methodology Automated Microscopy    Urine Culture - Urine, Urine, Clean Catch [[id number]] Collected:  05/02/18 1927    Specimen:  Urine from Urine, Clean Catch Updated:  05/02/18 1952          I ordered the above labs and reviewed the results    RADIOLOGY  CXR-Focal nodular area of consolidation within the left upper lung  zone has diminished within the interim. There is hyperaeration  consistent with COPD emphysema. There is a Mediport catheter again  demonstrated in position. Heart size normal. Stable mediastinum. No  edema or effusion has developed. No acute airspace disease identified.     I ordered the above noted radiological studies. Interpreted by radiologist. Reviewed by me in PACS.       PROCEDURES  Procedures  EKG           EKG time: 6:20 pm`  Rhythm/Rate: Sinus 79  P waves and FL: nml  QRS, axis: nml   ST and T waves: nml     Interpreted Contemporaneously by me, independently viewed  unchanged compared to prior 01/5/18      PROGRESS AND CONSULTS  ED Course   6:27 PM  Ordered IVF for hydration.    8:07 PM  BP- 124/74 HR- 76 Temp- 98.6 °F (37 °C) (Oral) O2 sat- 94%  Rechecked the patient who  is in NAD and is resting comfortably. Informed pt of all tests being normal except the UA that showed elevated WBC and bacteria indicating UTI. I then informed pt of the plan for discharge with abx for treatment.Pt understands and agrees with the plan, all questions answered.        MEDICAL DECISION MAKING  Results were reviewed/discussed with the patient and they were also made aware of online access. Pt also made aware that some labs, such as cultures, will not be resulted during ER visit and follow up with PMD is necessary.     MDM  Number of Diagnoses or Management Options     Amount and/or Complexity of Data Reviewed  Clinical lab tests: reviewed and ordered (Troponin is <0.010  WBC, UA Too Numerous to Count (A)   Bacteria, UA 1+ (A) )  Tests in the radiology section of CPT®: ordered and reviewed (CXR-Focal nodular area of consolidation within the left upper lung zone has diminished within the interim. There is hyperaeration consistent with COPD emphysema. There is a Mediport catheter again demonstrated in position. Heart size normal. Stable mediastinum. No  edema or effusion has developed. No acute airspace disease identified.)  Tests in the medicine section of CPT®: reviewed and ordered (See Procedure Note)  Decide to obtain previous medical records or to obtain history from someone other than the patient: yes  Review and summarize past medical records: yes (4/26/18- PET scan showed an interval decrease in the right upper lobe mass. It also showed an interval decrease in GE junction. Evidence of esophagitis.)  Independent visualization of images, tracings, or specimens: yes           DIAGNOSIS  Final diagnoses:   Urinary tract infection without hematuria, site unspecified   Esophagitis       DISPOSITION  DISCHARGE    Patient discharged in stable condition.    Reviewed implications of results, diagnosis, meds, responsibility to follow up, warning signs and symptoms of possible worsening, potential  complications and reasons to return to ER.    Patient/Family voiced understanding of above instructions.    Discussed plan for discharge, as there is no emergent indication for admission. Patient referred to primary care provider for BP management due to today's BP. Pt/family is agreeable and understands need for follow up and repeat testing.  Pt is aware that discharge does not mean that nothing is wrong but it indicates no emergency is present that requires admission and they must continue care with follow-up as given below or physician of their choice.     FOLLOW-UP  No follow-up provider specified.       Medication List      No changes were made to your prescriptions during this visit.           Latest Documented Vital Signs:  As of 8:08 PM  BP- 124/74 HR- 76 Temp- 98.6 °F (37 °C) (Oral) O2 sat- 94%    --  Documentation assistance provided by reginald Dumont for .  Information recorded by the scribe was done at my direction and has been verified and validated by me.            Umair Dumont  05/02/18 2008       Ajith Villela MD  05/03/18 0002

## 2018-05-02 NOTE — ED NOTES
Pt is battling lung and esophageal cancer. Pt states she has been generally weak. Pt states her N/V/D has gotten worse and her doctor told her to come to the ER. Pt states this has been going on since her last treatment 2 months ago.      Lanie Coles RN  05/02/18 [id number]

## 2018-05-04 LAB
BACTERIA SPEC AEROBE CULT: NORMAL
BACTERIA SPEC AEROBE CULT: NORMAL

## 2018-05-07 ENCOUNTER — INFUSION (OUTPATIENT)
Dept: ONCOLOGY | Facility: HOSPITAL | Age: 68
End: 2018-05-07

## 2018-05-07 ENCOUNTER — OFFICE VISIT (OUTPATIENT)
Dept: ONCOLOGY | Facility: CLINIC | Age: 68
End: 2018-05-07

## 2018-05-07 ENCOUNTER — LAB (OUTPATIENT)
Dept: LAB | Facility: HOSPITAL | Age: 68
End: 2018-05-07

## 2018-05-07 VITALS
BODY MASS INDEX: 16.73 KG/M2 | OXYGEN SATURATION: 100 % | HEART RATE: 100 BPM | TEMPERATURE: 98.6 F | DIASTOLIC BLOOD PRESSURE: 68 MMHG | SYSTOLIC BLOOD PRESSURE: 108 MMHG | RESPIRATION RATE: 12 BRPM | HEIGHT: 67 IN | WEIGHT: 106.6 LBS

## 2018-05-07 DIAGNOSIS — T66.XXXA RADIATION ESOPHAGITIS: ICD-10-CM

## 2018-05-07 DIAGNOSIS — Z45.2 FITTING AND ADJUSTMENT OF VASCULAR CATHETER: Primary | ICD-10-CM

## 2018-05-07 DIAGNOSIS — J43.1 PANLOBULAR EMPHYSEMA (HCC): ICD-10-CM

## 2018-05-07 DIAGNOSIS — C34.92 ADENOCARCINOMA OF LEFT LUNG (HCC): ICD-10-CM

## 2018-05-07 DIAGNOSIS — K20.80 RADIATION ESOPHAGITIS: ICD-10-CM

## 2018-05-07 DIAGNOSIS — C15.9 ESOPHAGEAL CARCINOMA (HCC): ICD-10-CM

## 2018-05-07 DIAGNOSIS — C15.9 ESOPHAGEAL CARCINOMA (HCC): Primary | ICD-10-CM

## 2018-05-07 DIAGNOSIS — Z45.2 FITTING AND ADJUSTMENT OF VASCULAR CATHETER: ICD-10-CM

## 2018-05-07 LAB
DEPRECATED RDW RBC AUTO: 40.9 FL (ref 37–49)
ERYTHROCYTE [DISTWIDTH] IN BLOOD BY AUTOMATED COUNT: 12.6 % (ref 11.7–14.5)
HCT VFR BLD AUTO: 38.3 % (ref 34–45)
HGB BLD-MCNC: 13.2 G/DL (ref 11.5–14.9)
MCH RBC QN AUTO: 31 PG (ref 27–33)
MCHC RBC AUTO-ENTMCNC: 34.5 G/DL (ref 32–35)
MCV RBC AUTO: 89.9 FL (ref 83–97)
PLATELET # BLD AUTO: 180 10*3/MM3 (ref 150–375)
PMV BLD AUTO: 10.2 FL (ref 8.9–12.1)
RBC # BLD AUTO: 4.26 10*6/MM3 (ref 3.9–5)
WBC NRBC COR # BLD: 9.01 10*3/MM3 (ref 4–10)

## 2018-05-07 PROCEDURE — 99214 OFFICE O/P EST MOD 30 MIN: CPT | Performed by: INTERNAL MEDICINE

## 2018-05-07 PROCEDURE — 25010000002 HEPARIN FLUSH (PORCINE) 100 UNIT/ML SOLUTION: Performed by: INTERNAL MEDICINE

## 2018-05-07 PROCEDURE — 36416 COLLJ CAPILLARY BLOOD SPEC: CPT | Performed by: INTERNAL MEDICINE

## 2018-05-07 PROCEDURE — 96523 IRRIG DRUG DELIVERY DEVICE: CPT | Performed by: INTERNAL MEDICINE

## 2018-05-07 PROCEDURE — 85027 COMPLETE CBC AUTOMATED: CPT | Performed by: INTERNAL MEDICINE

## 2018-05-07 RX ORDER — SODIUM CHLORIDE 0.9 % (FLUSH) 0.9 %
10 SYRINGE (ML) INJECTION AS NEEDED
Status: DISCONTINUED | OUTPATIENT
Start: 2018-05-07 | End: 2018-05-07 | Stop reason: HOSPADM

## 2018-05-07 RX ORDER — SODIUM CHLORIDE 0.9 % (FLUSH) 0.9 %
10 SYRINGE (ML) INJECTION AS NEEDED
Status: CANCELLED | OUTPATIENT
Start: 2018-05-07

## 2018-05-07 RX ORDER — CIPROFLOXACIN 500 MG/1
TABLET, FILM COATED ORAL
Refills: 0 | COMMUNITY
Start: 2018-05-03 | End: 2018-01-01

## 2018-05-07 RX ADMIN — Medication 10 ML: at 15:35

## 2018-05-07 RX ADMIN — SODIUM CHLORIDE, PRESERVATIVE FREE 500 UNITS: 5 INJECTION INTRAVENOUS at 15:35

## 2018-05-07 NOTE — PROGRESS NOTES
Subjective    REASON FOR FOLLOW UP:   1.  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 Moon Bowen on 10/6/2017.  Primary stereotactic radiation therapy to the left upper lobe completed 12/15/2017  2.  Lung tumor is negative for PDL 1.  Also negative for ALK, EGFR, and ROS 1 mutations.  3.  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   4.  Esophageal adenocarcinoma was negative for HER-2/elizabeth overexpression but positive for PDL 1  5.  Initiation of combined radiation and low-dose weekly carboplatin and Taxol chemotherapy for treatment of esophageal adenocarcinoma.  First treatment delivered 1/11/2018.  6.  Development of thrombocytopenia and radiation esophagitis on the visit of 2/15/2018.  7.  EGD during the hospitalization in March 2018.  Biopsies were negative for cancer.      History of Present Illness     HISTORY OF PRESENT ILLNESS: Ms. Abad is a very pleasant 68 y.o. female with the above-noted history of both lung cancer and esophageal cancer.  As noted above, she was treated with combined chemotherapy and radiation for her adenocarcinoma of the GE junction, completing concurrent chemoradiation around 3-18.  Unfortunately she developed significant toxicity to treatment including esophagitis requiring hospitalization from 03/15/2018 through 03/20/2018 for radiation esophagitis and dehydration. During her hospitalization, she underwent EGD with biopsies which showed inflammation and ulceration but no evidence of malignancy.    She has continued to feel weak with chronic nausea and poor appetite.  She is now reporting worsening pain when she tries to eat anything, specifically pain in her sternal region.  She tries to take in supplemental protein shakes but anytime she does this has worsened diarrhea beyond her normal loose stools.  She currently is using Norco 7.5/325 4-5 times a day, prescribed by her PCP.    She has had a very  difficult time recovering from her radiation esophagitis and still has poor nutrition and oral intake.  She's had multiple visits to our office for additional IV fluids.     Since her last office visit here, she has undergone a PET scan on 4/26/2018 with results as noted below.  She also had an ER visit on 5/2/2018 where she was treated for presumptive urinary tract infection.  She is still having some esophagitis symptoms but this seems to slowly be improving.  She continues to use Protonix and Carafate    Past Medical History:   Diagnosis Date   • Adenocarcinoma of lung 2017    HAD RADIATION    • Anxiety and depression    • Arthritis    • Benign parotid tumor 2012    removed by Dr Vickers told it was benign   • COPD (chronic obstructive pulmonary disease)    • Depression    • Diarrhea    • Early cataract    • Emphysema of lung    • Esophageal cancer 2017   • History of chronic pain    • History of colon polyps    • History of pneumonia    • Hyperlipidemia    • Joint pain    • Stroke       HEMATOLOGIC/ ONCOLOGIC HISTORY:  The patient is a 67 y.o. year old female who has a history of smoking and COPD.  She had an abnormal CT scan in February of this year showing a 3.5 cm mass in the left suprahilar area.  A repeat CT scan was performed at [facility] 7/13/2017 and show that the mass had increased in size from about 3.5 cm up to 6.5 cm.    She underwent navigational bronchoscopy and endobronchial ultrasound and biopsy with Dr. Moon Bowen on 10/6/2017.  The pathology on the biopsy showed well-differentiated adenocarcinoma.  She is referred to us now by her pulmonologist Dr. Daniel Oconnor for further staging evaluation and treatment recommendations.    She has not had a PET scan for staging.  She had a PDL 1 staining on her bronchoscopy specimen which was negative for PDL 1.  EGFR, ALK, and ROS 1 were also negative.    She is still smoking but is trying to quit.  She had pulmonary function tests  performed on 10/25/2017 showing a DLCO of 33% and FEV1 37%.    She was referred to the [facility] and completed primary radiation to the left upper lobe lung mass.  Because of abnormal uptake in the esophagus noted on her staging PET scan, she also underwent an EGD and biopsy which unfortunately revealed adenocarcinoma of the GE junction.  Molecular studies for HER-2/elizabeth and PDL 1 on the tumor tissue showed that she was PD L1 positive and HER-2/elizabeth negative.  We recommended combined weekly low dose carbo Taxol chemotherapy and radiation.     She had significant difficulty with treatment particularly with radiation esophagitis near the end of her course of therapy.  She required some dose delay and  completed her radiation treatments the first week of March 2018.      Past Surgical History:   Procedure Laterality Date   • BRONCHOSCOPY     • CHOLECYSTECTOMY  1999   • COLON SURGERY  2015    COLON POLYPS   • COLONOSCOPY  04/13/2016    TA w/low grade dysplasia x 3, serrated adenoma x 2   • COLONOSCOPY  06/14/2016    TA w/high grade dysplasia   • ENDOSCOPY N/A 12/6/2017    Procedure: ESOPHAGOGASTRODUODENOSCOPY WITH BIOPSY;  Surgeon: Jayant Robles MD;  Location: [facility] ENDOSCOPY;  Service:    • ENDOSCOPY N/A 3/17/2018    Procedure: ESOPHAGOGASTRODUODENOSCOPY WITH BIOPSIES;  Surgeon: Ambrosio Sapp MD;  Location: [facility] ENDOSCOPY;  Service: Gastroenterology   • FOOT SURGERY  10/2010    [place]   • HYSTERECTOMY     • [place] INSJ TUNNELED CVC W/O SUBQ PORT/ AGE 3 YR/> Right 1/9/2018    Procedure: MEDIPORT PLACEMENT right;  Surgeon: Damaso Germain MD;  Location: [facility] HYBRID OR 18/19;  Service: Vascular   • SALIVARY GLAND SURGERY      PAROTID MASS REMOVED BENIGN   • SHOULDER ARTHROSCOPY Left 1995. 2001        ALLERGIES:    Allergies   Allergen Reactions   • Penicillins Hives   • Bactrim [Sulfamethoxazole-Trimethoprim] Itching   • Sulfa Antibiotics Itching         Review of Systems   Constitutional:  "Positive for fatigue. Negative for activity change, appetite change, fever and unexpected weight change.   HENT: Positive for trouble swallowing (painful in lower esophagus). Negative for hearing loss, nosebleeds and voice change.    Eyes: Negative for visual disturbance.   Respiratory: Negative for cough, shortness of breath and wheezing.    Cardiovascular: Negative for chest pain and palpitations.   Gastrointestinal: Positive for diarrhea. Negative for abdominal pain.        Short bowel symptoms after partial colectomy.  Chronic. Reflux and painful swallowing most  due to radiation esophagitis.  Improving.   Genitourinary: Negative for difficulty urinating, frequency, hematuria and urgency.   Musculoskeletal: Negative for back pain and neck pain.   Skin: Negative for rash.   Neurological: Positive for weakness. Negative for dizziness, seizures, syncope and headaches.   Hematological: Negative for adenopathy. Does not bruise/bleed easily.   Psychiatric/Behavioral: Negative for behavioral problems. The patient is not nervous/anxious.         Objective     Vitals:    05/07/18 1440   BP: 108/68   Pulse: 100   Resp: 12   Temp: 98.6 °F (37 °C)   TempSrc: Oral   SpO2: 100%   Weight: 48.4 kg (106 lb 9.6 oz)   Height: 170.2 cm (67.01\")   PainSc: 4  Comment: pain in esphogas area     Current Status 5/7/2018   ECOG score 0       Physical Exam   Constitutional: She is oriented to person, place, and time. She appears well-developed. No distress.   Weak appearing but in no acute distress.   HENT:   Head: Normocephalic.   Eyes: Conjunctivae and EOM are normal. Pupils are equal, round, and reactive to light. No scleral icterus.   Neck: Normal range of motion. Neck supple. No JVD present. No thyromegaly present.   Cardiovascular: Normal rate and regular rhythm.  Exam reveals no gallop and no friction rub.    No murmur heard.  Pulmonary/Chest: Effort normal. No respiratory distress. She has no wheezes. She has no rhonchi. She has " no rales.   Right IJ Mediport the right chest wall.   Abdominal: Soft. She exhibits no distension and no mass. There is no tenderness.   Musculoskeletal: Normal range of motion. She exhibits no edema or deformity.   Neurological: She is alert and oriented to person, place, and time. She has normal reflexes. No cranial nerve deficit.   Skin: Skin is warm and dry. No rash noted. No erythema.   Psychiatric: She has a normal mood and affect. Her behavior is normal. Judgment normal.       No change 4/19/18    RECENT LABS:  Hematology WBC   Date Value Ref Range Status   05/07/2018 9.01 4.00 - 10.00 10*3/mm3 Final     RBC   Date Value Ref Range Status   05/07/2018 4.26 3.90 - 5.00 10*6/mm3 Final     Hemoglobin   Date Value Ref Range Status   05/07/2018 13.2 11.5 - 14.9 g/dL Final     Hematocrit   Date Value Ref Range Status   05/07/2018 38.3 34.0 - 45.0 % Final     Platelets   Date Value Ref Range Status   05/07/2018 180 150 - 375 10*3/mm3 Final        Lab Results   Component Value Date    GLUCOSE 96 05/02/2018    BUN 20 05/02/2018    CREATININE 0.86 05/02/2018    EGFRIFNONA 65 05/02/2018    BCR 23.3 05/02/2018    K 4.2 05/02/2018    CO2 27.8 05/02/2018    CALCIUM 9.6 05/02/2018    ALBUMIN 4.20 05/02/2018    LABIL2 1.5 05/02/2018    AST 20 05/02/2018    ALT 15 05/02/2018     PATHOLOGY  FROM EGD 3/17/2018  Final Diagnosis   1. STOMACH, RANDOM BIOPSY:           FRAGMENTS OF GASTRIC MUCOSA WITH MILD CHRONIC ACTIVE GASTRITIS.           SPECIAL STAIN (DIFF-QUIK) FOR HELICOBACTER PYLORI IS EQUIVOCAL.             IMMUNOHISTOCHEMICAL STAIN FOR HELICOBACTER PYLORI IS PENDING (SEE COMMENT).   2. GASTROESOPHAGEAL JUNCTION, BIOPSY:           FRAGMENTS OF GASTRIC-TYPE MUCOSA WITH MARKED ULCERATION AND SEVERE ASSOCIATED                  ACUTE INFLAMMATION.           DETACHED FRAGMENTS OF ACUTE INFLAMMATORY / ULCER EXUDATE.           SPECIAL STAIN (GMS) IS PENDING (SEE COMMENT).           REACTIVE EPITHELIAL CHANGES.           NO  DEFINITIVE EVIDENCE OF INTESTINAL METAPLASIA OR DYSPLASIA.  Addendum   Utilizing appropriate positive and negative controls, sections of the random gastric biopsy (specimen #1) are stained by immunohistochemical technique for Helicobacter pylori. The  immunohistochemical stain is negative for Helicobacter pylori.     A GMS special stain was performed on sections of the gastroesophageal junction biopsy (specimen #2). The GMS special stain is negative for fungi. Control tissue stains appropriately.  TDJ/th       PATHOLOGY FROM EGD 12/6/2017  Final Diagnosis   1.  GASTROESOPHAGEAL JUNCTION, BIOPSIES:             INVASIVE ADENOCARCINOMA.            NO IDENTIFIED INTESTINAL METAPLASIA.      COMMENT: In this specimen tumor is identified extending into submucosal tissue. Deeper extension cannot be excluded in these superficial biopsies.   Her 2 Brody negative  PD L1 positive ( > 1 ).    F-18 FDG PET FROM SKULL BASE TO MID THIGH WITH PET/CT FUSION  4/26/2018     HISTORY: 68-year-old female with left upper lobe lung cancer and  esophageal cancer. Restaging.     TECHNIQUE: Radiation dose reduction techniques were utilized, including  automated exposure control and exposure modulation based on body size.   Blood glucose level at time of injection was 106 mg/dL.  6.4 mCi of F-18  FDG were injected and PET was performed from skull base to mid thigh. CT  was obtained for localization and attenuation correction. Time at  injection 9:18 AM. PET start time 10:55 AM. Compared with previous CTs  from 03/18/2018 and PET/CT from 11/07/2017.     FINDINGS: There has been significant interval decrease in the size of  the left upper lobe lung mass which is currently irregular but measures  approximately 4.7 x 1.5 cm and the maximal SUV is is 5.1, previously  measuring approximately 5.3 x 5.5 cm with a maximal SUV of 12.5. There  is no suspicious mediastinal or hilar activity. There is no change in  the low-level right hilar activity. There  has been interval decrease in  the size of the hypermetabolic mass at the GE junction. The  hypermetabolic activity at the GE junction has a maximal SUV of 7.5 and  was previously 8.8. There is also hypermetabolic activity at the distal  esophagus which was not present previously, contiguous with the  hypermetabolic mass at the GE junction. There is no hypermetabolic  lymphadenopathy within the abdomen. There is a fairly typical speckled  pattern of activity throughout the liver. No suspicious hypermetabolic  activity seen within the abdomen or pelvis.     IMPRESSION:  1. Interval decrease in the size and intensity of activity of the  approximately 4.7 x 1.5 cm left upper lobe mass. There is no suspicious  hypermetabolic lymphadenopathy within the chest.  2. Interval decrease in the size and intensity of activity of the mass  at the GE junction. There is hypermetabolic activity at the distal  esophagus which was not present previously which may be secondary to  radiation. There is no evidence for metastatic disease within the  abdomen or pelvis.    Images personally reviewed    Assessment/Plan   1.  Well differentiated adenocarcinoma presumably from lung origin in a long-time smoker.  The biopsy was made from navigational bronchoscopy and endobronchial ultrasound at [place] on 10/6/2017.  Staging PET scan showed no mediastinal adenopathy or obvious distant metastases.  She did have uptake in the lower esophagus which was evaluated with EGD and biopsy on 12/6/2017 and unfortunately was found to be adenocarcinoma at the GE junction.   · LungTumor tissue was negative for PDL 1 expression, EGFR, ALK, or ROS 1 mutations.   · She completed stereotactic radiation to the lung tumor 12/15/2017.  2.  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   · Esophageal adenocarcinoma was negative for HER-2/elizabeth overexpression but positive for PDL 1.  · Initiated combined radiation therapy to the esophagus  along with weekly low-dose carboplatin and Taxol chemotherapy 1/11/18. She completed treatment 3/2/18.  · Now with increased pain in lower esophagus with swallowing.  Since it has been 7 weeks since she completed concurrent therapy, it is reasonable to go ahead with PET scan.  I discussed this with Dr. Collier and he agrees.  We will move up PET scan and M.D. follow-up.  3. Emphysema due to smoking.  4. Placement of right internal jugular MediPort by Dr. Raphael Germain of vascular surgery 1/9/2018.  5.  She was not considered a candidate for lobectomy due to her poor pulmonary function but after reevaluation by pulmonary and cardiology she was cleared for potential esophagectomy surgery.  6.  History of benign right parotid tumor in 2012, surgically removed per Dr. Vickers.  Patient is noted to have very tiny nodule below the scar, new to her, but possibly just scar tissue.  Monitor closely.  7.  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  She uses Lomotil and Imodium as needed.  Stable.  8.  Radiation esophagitis.  Completed concurrent chemoradiation the first week of March.  She was hospitalized from March 15 through the 20th for radiation esophagitis and dehydration.  She is currently utilizing Norco 7.5/325 as prescribed by her PCP.  And tingling to use Carafate and Protonix daily   9.  Worsening pain with swallowing, specifically in the lower esophagus/sternal region.  As noted above she is taking Norco 4-5 times a day.    10.  Weakness and poor oral intake.   11.  Urinary tract infection which seems to be responding to antibiotic therapy with Cipro.    Plan  1.  Dilma will have her port flushed in the office today.  2.  She'll return monthly for port flush  3.  M.D. follow-up with labs in 8 weeks.  4.  We discussed the results of the PET scan with the patient and told her at this time we feel she should be observed without further treatment or plans for surgery.  We most likely will be  repeating her CT scans again in 6 months.  5.  She will continue Protonix and Carafate for her radiation esophagitis.  6.  We again discussed strategies for improving her oral intake including nutritional supplements and focusing on smaller more frequent intake with soft foods that contain protein such as yogurt, eggs, cheese, peanut butter, or hummus.

## 2018-07-18 NOTE — PROGRESS NOTES
Subjective    REASON FOR FOLLOW UP:   1.  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 Moon Bowen on 10/6/2017.  Primary stereotactic radiation therapy to the left upper lobe completed 12/15/2017  2.  Lung tumor is negative for PDL 1.  Also negative for ALK, EGFR, and ROS 1 mutations.  3.  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   4.  Esophageal adenocarcinoma was negative for HER-2/elizabeth overexpression but positive for PDL 1  5.  Initiation of combined radiation and low-dose weekly carboplatin and Taxol chemotherapy for treatment of esophageal adenocarcinoma.  First treatment delivered 1/11/2018.  6.  Development of thrombocytopenia and radiation esophagitis on the visit of 2/15/2018.  7.  EGD during the hospitalization in March 2018.  Biopsies were negative for cancer.      History of Present Illness     HISTORY OF PRESENT ILLNESS: Ms. Abad is a very pleasant 68 y.o. female with the above-noted history of both lung cancer and esophageal cancer.  As noted above, she was treated with combined chemotherapy and radiation for her adenocarcinoma of the GE junction, completing concurrent chemoradiation around 3-18.  Unfortunately she developed significant toxicity to treatment including esophagitis requiring hospitalization from 03/15/2018 through 03/20/2018 for radiation esophagitis and dehydration. During her hospitalization, she underwent EGD with biopsies which showed inflammation and ulceration but no evidence of malignancy.    She has continued to feel weak with chronic nausea and poor appetite.  She is now reporting worsening pain when she tries to eat anything, specifically pain in her sternal region.  She tries to take in supplemental protein shakes but anytime she does this has worsened diarrhea beyond her normal loose stools.  She currently is using Norco 7.5/325 4-5 times a day, prescribed by her PCP.    She has had a very  difficult time recovering from her radiation esophagitis and still has poor nutrition and oral intake.  She's had multiple visits to our office for additional IV fluids.     On today's visit she still has complaints of weakness and poor appetite but her weight is stable and her family reports that she has been more active in the last several weeks.  She still has a MediPort in place which was flushed today.    Past Medical History:   Diagnosis Date   • Adenocarcinoma of lung (CMS/HCC) 2017    HAD RADIATION    • Anxiety and depression    • Arthritis    • Benign parotid tumor 2012    removed by Dr Vickers told it was benign   • COPD (chronic obstructive pulmonary disease) (CMS/HCC)    • Depression    • Diarrhea    • Early cataract    • Emphysema of lung (CMS/HCC)    • Esophageal cancer (CMS/HCC) 2017   • History of chronic pain    • History of colon polyps    • History of pneumonia    • Hyperlipidemia    • Joint pain    • Stroke (CMS/HCC)       HEMATOLOGIC/ ONCOLOGIC HISTORY:  The patient is a 67 y.o. year old female who has a history of smoking and COPD.  She had an abnormal CT scan in February of this year showing a 3.5 cm mass in the left suprahilar area.  A repeat CT scan was performed at [facility] 7/13/2017 and show that the mass had increased in size from about 3.5 cm up to 6.5 cm.    She underwent navigational bronchoscopy and endobronchial ultrasound and biopsy with Dr. Moon Bowen on 10/6/2017.  The pathology on the biopsy showed well-differentiated adenocarcinoma.  She is referred to us now by her pulmonologist Dr. Daniel Oconnor for further staging evaluation and treatment recommendations.    She has not had a PET scan for staging.  She had a PDL 1 staining on her bronchoscopy specimen which was negative for PDL 1.  EGFR, ALK, and ROS 1 were also negative.    She is still smoking but is trying to quit.  She had pulmonary function tests performed on 10/25/2017 showing a DLCO of 33% and  FEV1 37%.    She was referred to the [facility] radiation Center and completed primary radiation to the left upper lobe lung mass.  Because of abnormal uptake in the esophagus noted on her staging PET scan, she also underwent an EGD and biopsy which unfortunately revealed adenocarcinoma of the GE junction.  Molecular studies for HER-2/elizabeth and PDL 1 on the tumor tissue showed that she was PD L1 positive and HER-2/elizabeth negative.  We recommended combined weekly low dose carbo Taxol chemotherapy and radiation.     She had significant difficulty with treatment particularly with radiation esophagitis near the end of her course of therapy.  She required some dose delay and  completed her radiation treatments the first week of March 2018.      Past Surgical History:   Procedure Laterality Date   • BRONCHOSCOPY     • CHOLECYSTECTOMY  1999   • COLON SURGERY  2015    COLON POLYPS   • COLONOSCOPY  04/13/2016    TA w/low grade dysplasia x 3, serrated adenoma x 2   • COLONOSCOPY  06/14/2016    TA w/high grade dysplasia   • ENDOSCOPY N/A 12/6/2017    Procedure: ESOPHAGOGASTRODUODENOSCOPY WITH BIOPSY;  Surgeon: Jayant Robles MD;  Location: [facility] ENDOSCOPY;  Service:    • ENDOSCOPY N/A 3/17/2018    Procedure: ESOPHAGOGASTRODUODENOSCOPY WITH BIOPSIES;  Surgeon: Ambrosio Sapp MD;  Location: [facility] ENDOSCOPY;  Service: Gastroenterology   • FOOT SURGERY  10/2010    [place]   • HYSTERECTOMY     • HI INSJ TUNNELED CVC W/O SUBQ PORT/ AGE 3 YR/> Right 1/9/2018    Procedure: MEDIPORT PLACEMENT right;  Surgeon: Damaso Germain MD;  Location: [facility] HYBRID OR 18/19;  Service: Vascular   • SALIVARY GLAND SURGERY      PAROTID MASS REMOVED BENIGN   • SHOULDER ARTHROSCOPY Left 1995. 2001        ALLERGIES:    Allergies   Allergen Reactions   • Penicillins Hives   • Bactrim [Sulfamethoxazole-Trimethoprim] Itching   • Sulfa Antibiotics Itching         Review of Systems   Constitutional: Positive for fatigue. Negative for activity change,  "appetite change, fever and unexpected weight change.   HENT: Positive for trouble swallowing (painful in lower esophagus). Negative for hearing loss, nosebleeds and voice change.    Eyes: Negative for visual disturbance.   Respiratory: Negative for cough, shortness of breath and wheezing.    Cardiovascular: Negative for chest pain and palpitations.   Gastrointestinal: Positive for diarrhea. Negative for abdominal pain.        Short bowel symptoms after partial colectomy.  Chronic. Reflux and painful swallowing most  due to radiation esophagitis.  Improving.   Genitourinary: Negative for difficulty urinating, frequency, hematuria and urgency.   Musculoskeletal: Negative for back pain and neck pain.   Skin: Negative for rash.   Neurological: Positive for weakness. Negative for dizziness, seizures, syncope and headaches.   Hematological: Negative for adenopathy. Does not bruise/bleed easily.   Psychiatric/Behavioral: Negative for behavioral problems. The patient is not nervous/anxious.         Objective     Vitals:    07/18/18 1342   BP: 96/64   Pulse: 91   Resp: 16   Temp: 98.2 °F (36.8 °C)   TempSrc: Oral   SpO2: 97%   Weight: 49 kg (108 lb)   Height: 170.2 cm (67.01\")   PainSc: 4  Comment: Abd pain and nausea constantly.   PainLoc: Abdomen     Current Status 7/18/2018   ECOG score 0       Physical Exam   Constitutional: She is oriented to person, place, and time. She appears well-developed. No distress.   Weak appearing but in no acute distress.   HENT:   Head: Normocephalic.   Eyes: Pupils are equal, round, and reactive to light. Conjunctivae and EOM are normal. No scleral icterus.   Neck: Normal range of motion. Neck supple. No JVD present. No thyromegaly present.   Cardiovascular: Normal rate and regular rhythm.  Exam reveals no gallop and no friction rub.    No murmur heard.  Pulmonary/Chest: Effort normal. No respiratory distress. She has no wheezes. She has no rhonchi. She has no rales.   Right IJ Mediport the " right chest wall.   Abdominal: Soft. She exhibits no distension and no mass. There is no tenderness.   Musculoskeletal: Normal range of motion. She exhibits no edema or deformity.   Neurological: She is alert and oriented to person, place, and time. She has normal reflexes. No cranial nerve deficit.   Skin: Skin is warm and dry. No rash noted. No erythema.   Psychiatric: She has a normal mood and affect. Her behavior is normal. Judgment normal.       No change 4/19/18    RECENT LABS:  Hematology WBC   Date Value Ref Range Status   07/18/2018 5.61 4.00 - 10.00 10*3/mm3 Final     RBC   Date Value Ref Range Status   07/18/2018 4.07 3.90 - 5.00 10*6/mm3 Final     Hemoglobin   Date Value Ref Range Status   07/18/2018 11.9 11.5 - 14.9 g/dL Final     Hematocrit   Date Value Ref Range Status   07/18/2018 35.4 34.0 - 45.0 % Final     Platelets   Date Value Ref Range Status   07/18/2018 179 150 - 375 10*3/mm3 Final        Lab Results   Component Value Date    GLUCOSE 96 05/02/2018    BUN 20 05/02/2018    CREATININE 0.86 05/02/2018    EGFRIFNONA 65 05/02/2018    BCR 23.3 05/02/2018    K 4.2 05/02/2018    CO2 27.8 05/02/2018    CALCIUM 9.6 05/02/2018    ALBUMIN 4.20 05/02/2018    AST 20 05/02/2018    ALT 15 05/02/2018                 Assessment/Plan   1.  Well differentiated adenocarcinoma presumably from lung origin in a long-time smoker.  The biopsy was made from navigational bronchoscopy and endobronchial ultrasound at [facility] on 10/6/2017.  Staging PET scan showed no mediastinal adenopathy or obvious distant metastases.  She did have uptake in the lower esophagus which was evaluated with EGD and biopsy on 12/6/2017 and unfortunately was found to be adenocarcinoma at the GE junction.   · LungTumor tissue was negative for PDL 1 expression, EGFR, ALK, or ROS 1 mutations.   · She completed stereotactic radiation to the lung tumor 12/15/2017.  2.  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    · Esophageal adenocarcinoma was negative for HER-2/elizabeth overexpression but positive for PDL 1.  · Initiated combined radiation therapy to the esophagus along with weekly low-dose carboplatin and Taxol chemotherapy 1/11/18. She completed treatment 3/2/18.  · Now with increased pain in lower esophagus with swallowing.  Since it has been 7 weeks since she completed concurrent therapy, it is reasonable to go ahead with PET scan.  I discussed this with Dr. Collier and he agrees.  We will move up PET scan and M.D. follow-up.  3. Emphysema due to smoking.  4. Placement of right internal jugular MediPort by Dr. Raphael Germain of vascular surgery 1/9/2018.  5.  She was not considered a candidate for lobectomy due to her poor pulmonary function but after reevaluation by pulmonary and cardiology she was cleared for potential esophagectomy surgery.  6.  History of benign right parotid tumor in 2012, surgically removed per Dr. Vickers.  Patient is noted to have very tiny nodule below the scar, new to her, but possibly just scar tissue.  Monitor closely.  7.  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  She uses Lomotil and Imodium as needed.  Stable.  8.  Radiation esophagitis.  Completed concurrent chemoradiation the first week of March.  She was hospitalized from March 15 through the 20th for radiation esophagitis and dehydration.  She is currently utilizing Norco 7.5/325 as prescribed by her PCP.  And tingling to use Carafate and Protonix daily   9.  Worsening pain with swallowing, specifically in the lower esophagus/sternal region.  As noted above she is taking Norco 4-5 times a day.    10.  Weakness and poor oral intake.       Plan  1.  Dilma will have her port flushed in the office today.  2.  She'll return monthly for port flush  3.  M.D. follow-up 4 months.  4.  CT scans of the chest abdomen pelvis and labs will be performed 1 week prior to her 4 month follow-up visit  5.  She will continue  Protonix and Carafate for her radiation esophagitis.  6.  We again discussed strategies for improving her oral intake including nutritional supplements and focusing on smaller more frequent intake with soft foods that contain protein such as yogurt, eggs, cheese, peanut butter, or hummus.

## 2018-07-31 PROBLEM — J44.9 COPD (CHRONIC OBSTRUCTIVE PULMONARY DISEASE) (HCC): Status: ACTIVE | Noted: 2018-01-01

## 2018-07-31 PROBLEM — E78.5 HYPERLIPIDEMIA: Status: ACTIVE | Noted: 2018-01-01

## 2018-07-31 PROBLEM — F32.A ANXIETY AND DEPRESSION: Status: ACTIVE | Noted: 2018-01-01

## 2018-07-31 PROBLEM — A41.9 SEPSIS (HCC): Status: ACTIVE | Noted: 2018-01-01

## 2018-07-31 PROBLEM — F41.9 ANXIETY AND DEPRESSION: Status: ACTIVE | Noted: 2018-01-01

## 2018-07-31 NOTE — TELEPHONE ENCOUNTER
Patient called stating that she has been having a lot of diarrhea and causing weakness and low BP. She saw her PCP yesterday who wanted to admit her to hospital but pt didn't go, wanted to talk to us first. Pt not currently on treatment of any kind. TOld her to call her PCP back and update her on status and see if she can direct admit her. Pt v/u.     ----- Message from Saide Clayton sent at 7/31/2018 12:05 PM EDT -----  Contact: [phone]  Pt has losing weight and weak feeling .

## 2018-08-01 PROBLEM — J18.9 PNEUMONIA DUE TO INFECTIOUS ORGANISM: Status: ACTIVE | Noted: 2018-01-01

## 2018-08-01 PROBLEM — R19.7 DIARRHEA: Status: ACTIVE | Noted: 2018-01-01

## 2018-08-01 PROBLEM — R10.84 GENERALIZED ABDOMINAL PAIN: Status: ACTIVE | Noted: 2018-01-01

## 2018-08-01 NOTE — H&P
HISTORY AND PHYSICAL   [facility]        Patient Identification:  Name: Dilma Abad  Age: 68 y.o.  Sex: female  :  1950  MRN: [id number]                     Primary Care Physician: Mary Taylor MD    Chief Complaint:  Weakness and diarrhea    History of Present Illness:       The patient is a 68-year-old white female with history of adenocarcinoma of the lung status post radiation chemotherapy treatment, history of esophageal cancer, anxiety, depression, arthritis, chronic pain, hyperlipidemia and COPD who is admitted with history of having diarrhea for the last 3 or 4 days.  She'd eaten at [employer] started having some diarrhea after that and some abdominal discomfort.  She's had some nausea but no vomiting.  She's not had any fever but has had some chills.  She's been extremely weak but has not fallen down or pass out.  The patient was evaluated in the ER and neck criteria for sepsis and was started on some broad-spectrum antibiotics and admitted for further evaluation treatment.    Past Medical History:  Past Medical History:   Diagnosis Date   • Adenocarcinoma of lung (CMS/HCC) 2017    HAD RADIATION    • Anxiety and depression    • Arthritis    • Benign parotid tumor 2012    removed by Dr Vickers told it was benign   • COPD (chronic obstructive pulmonary disease) (CMS/HCC)    • Depression    • Diarrhea    • Early cataract    • Emphysema of lung (CMS/HCC)    • Esophageal cancer (CMS/HCC)    • History of chronic pain    • History of colon polyps    • History of pneumonia    • Hyperlipidemia    • Joint pain    • Stroke (CMS/HCC)      Past Surgical History:  Past Surgical History:   Procedure Laterality Date   • BRONCHOSCOPY     • CHOLECYSTECTOMY     • COLON SURGERY      COLON POLYPS   • COLONOSCOPY  2016    TA w/low grade dysplasia x 3, serrated adenoma x 2   • COLONOSCOPY  2016    TA w/high grade dysplasia   • ENDOSCOPY N/A 2017    Procedure:  ESOPHAGOGASTRODUODENOSCOPY WITH BIOPSY;  Surgeon: Jayant Robles MD;  Location: [facility] ENDOSCOPY;  Service:    • ENDOSCOPY N/A 3/17/2018    Procedure: ESOPHAGOGASTRODUODENOSCOPY WITH BIOPSIES;  Surgeon: Ambrosio Sapp MD;  Location: [facility] ENDOSCOPY;  Service: Gastroenterology   • FOOT SURGERY  10/2010    [place]   • HYSTERECTOMY     • MS INSJ TUNNELED CVC W/O SUBQ PORT/ AGE 3 YR/> Right 1/9/2018    Procedure: MEDIPORT PLACEMENT right;  Surgeon: Damaso Germain MD;  Location: [facility] HYBRID OR 18/19;  Service: Vascular   • SALIVARY GLAND SURGERY      PAROTID MASS REMOVED BENIGN   • SHOULDER ARTHROSCOPY Left 1995. 2001      Home Meds:  Prescriptions Prior to Admission   Medication Sig Dispense Refill Last Dose   • buPROPion XL (WELLBUTRIN XL) 300 MG 24 hr tablet Take 300 mg by mouth Every Evening.   Taking   • CARAFATE 1 GM/10ML suspension TAKE 10 ML BY MOUTH EVERY 6 HOURS 420 mL 2    • Cholecalciferol (VITAMIN D) 2000 units tablet Take 2,000 Units by mouth Every Other Day.   Taking   • cyanocobalamin (VITAMIN B-12) 2000 MCG tablet Take 2,000 mcg by mouth Every Other Day.   Taking   • diazePAM (VALIUM) 5 MG tablet Take 5 mg by mouth 4 (Four) Times a Day As Needed for Anxiety.   Taking   • diphenoxylate-atropine (LOMOTIL) 2.5-0.025 MG per tablet TK 1 T PO  QID PRN  1 Taking   • escitalopram (LEXAPRO) 20 MG tablet Take 20 mg by mouth Every Evening.   Taking   • HYDROcodone-acetaminophen (NORCO) 7.5-325 MG per tablet Take 1 tablet by mouth 4 (Four) Times a Day As Needed for Moderate Pain .   Taking   • Multiple Vitamins-Minerals (CENTRUM SILVER PO) Take 1 tablet by mouth Daily.   Taking   • ondansetron (ZOFRAN) 8 MG tablet Take 1 tablet by mouth Every 8 (Eight) Hours As Needed for Nausea or Vomiting. 60 tablet 5 Taking   • ondansetron (ZOFRAN) 8 MG tablet TAKE 1 TABLET BY MOUTH EVERY 8 HOURS AS NEEDED FOR NAUSEA OR VOMITING 30 tablet 0    • pantoprazole (PROTONIX) 40 MG EC tablet TAKE 1 TABLET BY MOUTH DAILY 30  tablet 5    • pantoprazole (PROTONIX) 40 MG pack packet Take 1 packet by mouth Every Morning Before Breakfast. 30 each 1 Taking   • Probiotic Product (PROBIOTIC PO) Take 1 capsule by mouth Daily.   Taking   • simvastatin (ZOCOR) 20 MG tablet Take 20 mg by mouth Every Night.   Taking   • umeclidinium-vilanterol (ANORO ELLIPTA) 62.5-25 MCG/INH aerosol powder  inhaler Inhale 1 puff Daily.   Taking   • Wheat Dextrin (BENEFIBER DRINK MIX) pack Take  by mouth Daily.   Taking     Current meds    Current Facility-Administered Medications:   •  acetaminophen (TYLENOL) tablet 650 mg, 650 mg, Oral, Q4H PRN, Tejas Masterson MD  •  aztreonam (AZACTAM) 2 g/100 mL 0.9% NS (mbp), 2 g, Intravenous, Q8H, Tejas Masterson MD  •  diazePAM (VALIUM) tablet 5 mg, 5 mg, Oral, 4x Daily PRN, Tejas Masterson MD  •  diphenoxylate-atropine (LOMOTIL) 2.5-0.025 MG per tablet 1 tablet, 1 tablet, Oral, 4x Daily PRN, Tejas Masterson MD  •  HYDROcodone-acetaminophen (NORCO) 7.5-325 MG per tablet 1 tablet, 1 tablet, Oral, 4x Daily PRN, Tejas Masterson MD  •  HYDROcodone-acetaminophen (NORCO) 7.5-325 MG per tablet 1 tablet, 1 tablet, Oral, Q4H PRN, Tejas Masterson MD  •  ipratropium-albuterol (DUO-NEB) nebulizer solution 3 mL, 3 mL, Nebulization, Q8H - RT, Tejas Masterson MD  •  lactobacillus acidophilus (RISAQUAD) capsule 1 capsule, 1 capsule, Oral, Daily, Tejas Masterson MD  •  ondansetron (ZOFRAN) tablet 4 mg, 4 mg, Oral, Q6H PRN **OR** ondansetron ODT (ZOFRAN-ODT) disintegrating tablet 4 mg, 4 mg, Oral, Q6H PRN **OR** ondansetron (ZOFRAN) injection 4 mg, 4 mg, Intravenous, Q6H PRN, Tejas Masterson MD  •  ondansetron (ZOFRAN) tablet 8 mg, 8 mg, Oral, Q8H PRN, Tejas Masterson MD  •  pantoprazole (PROTONIX) EC tablet 40 mg, 40 mg, Oral, Daily, Tejas Masterson MD  •  Pharmacy Consult, , Does not apply, Continuous PRN, Tejas Masterson MD  •  Pharmacy to dose vancomycin, , Does not apply, Continuous PRN, Tejas Masterson MD  •  potassium chloride (MICRO-K) CR capsule 40 mEq, 40  mEq, Oral, PRN **OR** potassium chloride (KLOR-CON) packet 40 mEq, 40 mEq, Oral, PRN **OR** potassium chloride 20 mEq in 50 mL IVPB, 20 mEq, Intravenous, Q1H PRN, eTjas Masterson MD  •  sodium chloride 0.9 % flush 1-10 mL, 1-10 mL, Intravenous, PRN, Tejas Masterson MD  •  sodium chloride 0.9 % flush 10 mL, 10 mL, Intravenous, PRN, Higinio Quintana MD  •  sodium chloride 0.9 % infusion, 100 mL/hr, Intravenous, Continuous, Tejas Masterson MD, Last Rate: 100 mL/hr at 08/01/18 0057, 100 mL/hr at 08/01/18 0057  •  vancomycin 750 mg/250 mL 0.9% NS add-vantage, 17 mg/kg, Intravenous, Q24H, Tejas Masterson MD  Allergies:  Allergies   Allergen Reactions   • Penicillins Hives   • Bactrim [Sulfamethoxazole-Trimethoprim] Itching   • Sulfa Antibiotics Itching     Immunizations:    There is no immunization history on file for this patient.  Social History:   Social History     Social History Narrative    Lives alone.     Social History     Social History   • Marital status:      Spouse name: N/A   • Number of children: N/A   • Years of education: High school     Occupational History   •  Retired     Social History Main Topics   • Smoking status: Former Smoker     Packs/day: 1.00     Years: 30.00     Types: Cigarettes     Quit date: 1973   • Smokeless tobacco: Never Used      Comment: Caffeine-2 daily   • Alcohol use No   • Drug use: No   • Sexual activity: Defer     Other Topics Concern   • Not on file     Social History Narrative    Lives alone.       Family History:  Family History   Problem Relation Age of Onset   • Cancer Sister    • Lung cancer Sister    • Leukemia Cousin    • Heart disease Father    • Cancer Sister    • Malig Hyperthermia Neg Hx         Review of Systems  See history of present illness and past medical history.  Patient denies headache, dizziness, syncope, falls, trauma, change in vision, change in hearing, change in taste, changes in weight, changes in appetite, focal weakness, numbness, or paresthesia.   "Patient denies chest pain, palpitations, dyspnea, orthopnea, PND, cough, sinus pressure, rhinorrhea, epistaxis, hemoptysis,vomiting,hematemesis,  constipation or hematchezia.  Denies cold or heat intolerance, polydipsia, polyuria, polyphagia. Denies hematuria, pyuria, dysuria, hesitancy, frequency or urgency.     Remainder of ROS is negative.    Objective:  tMax 24 hrs: Temp (24hrs), Av.6 °F (37 °C), Min:97.8 °F (36.6 °C), Max:99.2 °F (37.3 °C)    Vitals Ranges:   Temp:  [97.8 °F (36.6 °C)-99.2 °F (37.3 °C)] 97.8 °F (36.6 °C)  Heart Rate:  [81-93] 81  Resp:  [21-26] 21  BP: (114-141)/() 114/64      Exam:  /64 (BP Location: Left arm, Patient Position: Sitting)   Pulse 81   Temp 97.8 °F (36.6 °C) (Oral)   Resp 21   Ht 170.2 cm (67\")   Wt 50.8 kg (112 lb)   SpO2 92%   BMI 17.54 kg/m²     General Appearance:    Alert, cooperative, no distress, appears stated age   Head:    Normocephalic, without obvious abnormality, atraumatic   Eyes:    PERRL, conjunctiva/corneas clear, EOM's intact, both eyes   Ears:    Normal external ear canals, both ears   Nose:   Nares normal, septum midline, mucosa normal, no drainage    or sinus tenderness   Throat:   Lips, mucosa, and tongue normal   Neck:   Supple, symmetrical, trachea midline, no adenopathy;     thyroid:  no enlargement/tenderness/nodules; no carotid    bruit or JVD   Back:     Symmetric, no curvature, ROM normal, no CVA tenderness   Lungs:     Clear to auscultation bilaterally, respirations unlabored   Chest Wall:    No tenderness or deformity    Heart:    Regular rate and rhythm, S1 and S2 normal, no murmur, rub   or gallop   Abdomen:     Soft, Mild diffuse abdominal tenderness, bowel sounds active all four quadrants,     no masses, no hepatomegaly, no splenomegaly   Extremities:   Extremities normal, atraumatic, no cyanosis or edema   Pulses:   2+ and symmetric all extremities   Skin:   Skin color, texture, turgor normal, no rashes or lesions   Lymph " nodes:   Cervical, supraclavicular, and axillary nodes normal   Neurologic:   CNII-XII intact, normal strength, sensation intact throughout      .    Data Review:  Lab Results (last 72 hours)     Procedure Component Value Units Date/Time    Urinalysis With Culture If Indicated - Urine, Catheter [[id number]]  (Abnormal) Collected:  07/31/18 2244    Specimen:  Urine from Urine, Catheter Updated:  07/31/18 2318     Color, UA Yellow     Appearance, UA Clear     pH, UA 6.0     Specific Gravity, UA 1.009     Glucose,  mg/dL (Trace) (A)     Ketones, UA Negative     Bilirubin, UA Negative     Blood, UA Negative     Protein, UA Trace (A)     Leuk Esterase, UA Trace (A)     Nitrite, UA Negative     Urobilinogen, UA 0.2 E.U./dL    Urinalysis, Microscopic Only - Urine, Clean Catch [[id number]]  (Abnormal) Collected:  07/31/18 2244    Specimen:  Urine from Urine, Catheter Updated:  07/31/18 2318     RBC, UA None Seen /HPF      WBC, UA None Seen /HPF      Bacteria, UA Trace (A) /HPF      Squamous Epithelial Cells, UA None Seen /HPF      Hyaline Casts, UA None Seen /LPF      Methodology Automated Microscopy    Rosie Draw [[id number]] Collected:  07/31/18 2146    Specimen:  Blood Updated:  07/31/18 2300    Narrative:       The following orders were created for panel order Rosie Draw.  Procedure                               Abnormality         Status                     ---------                               -----------         ------                     Light Blue Top[[id number]]                                   Final result               Green Top (Gel)[[id number]]                                  Final result               Lavender Top[[id number]]                                     Final result               Gold Top - SST[[id number]]                                                                Please view results for these tests on the individual orders.    Green Top (Gel) [[id number]] Collected:  07/31/18 2146     Specimen:  Blood Updated:  07/31/18 2300     Extra Tube Hold for add-ons.     Comment: Auto resulted.       Light Blue Top [[id number]] Collected:  07/31/18 2146    Specimen:  Blood Updated:  07/31/18 2300     Extra Tube hold for add-on     Comment: Auto resulted       Lavender Top [[id number]] Collected:  07/31/18 2146    Specimen:  Blood Updated:  07/31/18 2300     Extra Tube hold for add-on     Comment: Auto resulted       Comprehensive Metabolic Panel [[id number]]  (Abnormal) Collected:  07/31/18 2146    Specimen:  Blood Updated:  07/31/18 2222     Glucose 142 (H) mg/dL      BUN 14 mg/dL      Creatinine 1.05 (H) mg/dL      Sodium 130 (L) mmol/L      Potassium 3.1 (L) mmol/L      Chloride 88 (L) mmol/L      CO2 26.7 mmol/L      Calcium 9.9 mg/dL      Total Protein 7.3 g/dL      Albumin 4.10 g/dL      ALT (SGPT) 17 U/L      AST (SGOT) 18 U/L      Alkaline Phosphatase 88 U/L      Total Bilirubin 1.0 mg/dL      eGFR Non African Amer 52 (L) mL/min/1.73      Globulin 3.2 gm/dL      A/G Ratio 1.3 g/dL      BUN/Creatinine Ratio 13.3     Anion Gap 15.3 mmol/L     C-reactive Protein [[id number]]  (Abnormal) Collected:  07/31/18 2146    Specimen:  Blood Updated:  07/31/18 2222     C-Reactive Protein 20.00 (H) mg/dL     Lactic Acid, Plasma [[id number]]  (Abnormal) Collected:  07/31/18 2146    Specimen:  Blood Updated:  07/31/18 2211     Lactate 2.5 (C) mmol/L     Lactic Acid, Reflex Timer (This will reflex a repeat order 3-3:15 hours after ordered.) [[id number]] Collected:  07/31/18 2146    Specimen:  Blood Updated:  07/31/18 2211    Blood Culture - Blood, [[id number]] Collected:  07/31/18 2156    Specimen:  Blood from Arm, Left Updated:  07/31/18 2200    CBC & Differential [[id number]] Collected:  07/31/18 2146    Specimen:  Blood Updated:  07/31/18 2156    Narrative:       The following orders were created for panel order CBC & Differential.  Procedure                               Abnormality         Status                      ---------                               -----------         ------                     CBC Auto Differential[[id number]]        Abnormal            Final result                 Please view results for these tests on the individual orders.    CBC Auto Differential [[id number]]  (Abnormal) Collected:  07/31/18 2146    Specimen:  Blood Updated:  07/31/18 2156     WBC 12.92 (H) 10*3/mm3      RBC 3.75 (L) 10*6/mm3      Hemoglobin 10.9 (L) g/dL      Hematocrit 32.6 (L) %      MCV 86.9 fL      MCH 29.1 pg      MCHC 33.4 g/dL      RDW 13.0 %      RDW-SD 42.1 fl      MPV 9.7 fL      Platelets 159 10*3/mm3      Neutrophil % 84.4 (H) %      Lymphocyte % 5.0 (L) %      Monocyte % 9.8 %      Eosinophil % 0.1 (L) %      Basophil % 0.1 %      Immature Grans % 0.6 (H) %      Neutrophils, Absolute 10.92 (H) 10*3/mm3      Lymphocytes, Absolute 0.64 (L) 10*3/mm3      Monocytes, Absolute 1.26 (H) 10*3/mm3      Eosinophils, Absolute 0.01 10*3/mm3      Basophils, Absolute 0.01 10*3/mm3      Immature Grans, Absolute 0.08 (H) 10*3/mm3     Blood Culture - Blood, [[id number]] Collected:  07/31/18 2146    Specimen:  Blood from Arm, Left Updated:  07/31/18 2151                   Imaging Results (all)     Procedure Component Value Units Date/Time    XR Chest 2 View [[id number]] Collected:  07/31/18 2236     Updated:  07/31/18 2243    Narrative:       PA AND LATERAL CHEST X-RAY     HISTORY: Simple sepsis protocol, history of lung and esophageal cancer     COMPARISON: May 2, 2018.     FINDINGS: PA and lateral views of the chest were obtained. The lungs are  hyperexpanded suggesting COPD. There is underlying emphysema and  fibrosis. Cavitary left upper lobe lesion is again seen, somewhat more  conspicuous on today's study and presumably is related to the patient's  pulmonary malignancy. Adjacent linear densities extending towards the  hilum may be related to areas of atelectasis and/or post treatment  related changes. These have increased slightly.  There are increasing  mostly groundglass opacities in the peripheral left perihilar region and  left lung base which could be related to superimposed pneumonia and/or  post treatment related changes. These have developed since the previous  exam. Fibrotic changes in the right lung appears stable. Small left  effusion has increased minimally. Heart size and vascularity are normal.  Right-sided Port-A-Cath is unchanged.             Impression:       Increasing left-sided opacities as discussed. Some of these  most assuredly related to post treatment related changes but extensive  superimposed pneumonia is not excluded.     This report was finalized on 7/31/2018 10:40 PM by Damaso Rosen M.D.           Patient Active Problem List   Diagnosis Code   • Adenocarcinoma of left lung (CMS/HCC) C34.92   • Panlobular emphysema (CMS/HCC) J43.1   • Abnormal finding on imaging R93.8   • Esophageal carcinoma (CMS/HCC) C15.9   • Fitting and adjustment of vascular catheter Z45.2   • OROZCO (dyspnea on exertion) R06.09   • Radiation esophagitis K20.8   • Sepsis (CMS/HCC) A41.9   • Anxiety and depression F41.8   • COPD (chronic obstructive pulmonary disease) (CMS/HCC) J44.9   • Hyperlipidemia E78.5   • Generalized abdominal pain R10.84   • Diarrhea R19.7       Assessment:  Active Hospital Problems    Diagnosis Date Noted   • **Sepsis (CMS/HCC) [A41.9] 07/31/2018   • Generalized abdominal pain [R10.84] 08/01/2018   • Diarrhea [R19.7] 08/01/2018   • Anxiety and depression [F41.8] 07/31/2018   • COPD (chronic obstructive pulmonary disease) (CMS/HCC) [J44.9] 07/31/2018   • Hyperlipidemia [E78.5] 07/31/2018   • Adenocarcinoma of left lung (CMS/HCC) [C34.92] 10/31/2017   • Panlobular emphysema (CMS/HCC) [J43.1] 10/31/2017      Resolved Hospital Problems    Diagnosis Date Noted Date Resolved   No resolved problems to display.       Plan:  The patient's admitted hospital continue with some broad-spectrum IV antibiotics and check cultures.   We'll check stool cultures and stool for C. difficile.  We'll get CT scanning of the abdomen and pelvis.    Tejas Masterson MD  8/1/2018  1:30 AM

## 2018-08-01 NOTE — PROGRESS NOTES
Discharge Planning Assessment  [facility]     Patient Name: Dilma Abad  MRN: [id number]  Today's Date: 8/1/2018    Admit Date: 7/31/2018          Discharge Needs Assessment     Row Name 08/01/18 1308       Living Environment    Lives With alone    Current Living Arrangements home/apartment/condo       Resource/Environmental Concerns    Transportation Concerns car, none       Transition Planning    Transportation Anticipated family or friend will provide       Discharge Needs Assessment    Equipment Currently Used at Home oxygen            Discharge Plan     Row Name 08/01/18 1309       Plan    Plan Home with HH    Patient/Family in Agreement with Plan yes    Plan Comments Met with pt at bedside.  Introduced self, explained CCP role, facesheet verified.  Pt states she lives in house alone.  Independent with all ADL's prior to hospitalization.  Has home O2 at night from [facility] [place].  Denies history of HH or SNF.  Requests HH at discharge.  List of HH agencies provided and pt to notify CCP of choice.  CORTES Pina RN        Destination     No service coordination in this encounter.      Durable Medical Equipment     No service coordination in this encounter.      Dialysis/Infusion     No service coordination in this encounter.      Home Medical Care     No service coordination in this encounter.      Social Care     No service coordination in this encounter.                Demographic Summary     Row Name 08/01/18 1308       General Information    Admission Type inpatient    Arrived From home    Referral Source admission list    Reason for Consult discharge planning    Preferred Language English       Contact Information    Permission Granted to Share Info With family/designee   Gricelda Fuentes (sister) [phone]            Functional Status     Row Name 08/01/18 1308       Functional Status, IADL    Medications independent    Meal Preparation independent    Housekeeping independent    Laundry independent    Shopping  independent            Psychosocial    No documentation.           Abuse/Neglect    No documentation.           Legal    No documentation.           Substance Abuse    No documentation.           Patient Forms    No documentation.         Salena Pina

## 2018-08-01 NOTE — ED TRIAGE NOTES
"Pt states \"I'm near death\", states she developed diarrhea Saturday and \"it happens in my sleep and I don't even know that I've crapped on myself\".  Pt takes lomotil and states \"I know it's addicting so I don't like taking it\".      Pt denies nausea, denies vomiting, reports \"horrible\" appetite.      Pt currently c/o shortness of breath.  "

## 2018-08-01 NOTE — PLAN OF CARE
Problem: Patient Care Overview  Goal: Plan of Care Review  Outcome: Ongoing (interventions implemented as appropriate)   08/01/18 4442   Coping/Psychosocial   Plan of Care Reviewed With patient   Plan of Care Review   Progress improving   OTHER   Outcome Summary Minimal complaints of pain. VSS. Lactic is improving. Remians on IV fluids. Slept well. Up ad dick. Continue to monitor.        Problem: Sepsis/Septic Shock (Adult)  Goal: Signs and Symptoms of Listed Potential Problems Will be Absent, Minimized or Managed (Sepsis/Septic Shock)  Outcome: Ongoing (interventions implemented as appropriate)      Problem: Pain, Acute (Adult)  Goal: Identify Related Risk Factors and Signs and Symptoms  Outcome: Ongoing (interventions implemented as appropriate)    Goal: Acceptable Pain Control/Comfort Level  Outcome: Ongoing (interventions implemented as appropriate)

## 2018-08-01 NOTE — PROGRESS NOTES
"Pharmacokinetic Consult -  Azactam and Vancomycin Dosing (Follow-up Note)    Dilma Abad is on day #2  pharmacy to dose vancomycin for Intra-abdominal infection/sepsis.  Pharmacy dosing vancomycin per Dr Masterson's request.   Goal trough: 15-20 mg/L  For Vancomycin  Duration: 7 days    Relevant clinical data and objective history reviewed:  68 y.o. female 170.2 cm (67\") 50.8 kg (112 lb)    Past Medical History:   Diagnosis Date   • Adenocarcinoma of lung (CMS/HCC) 2017    HAD RADIATION    • Anxiety and depression    • Arthritis    • Benign parotid tumor 2012    removed by Dr Vickers told it was benign   • COPD (chronic obstructive pulmonary disease) (CMS/HCC)    • Depression    • Diarrhea    • Early cataract    • Emphysema of lung (CMS/HCC)    • Esophageal cancer (CMS/HCC) 2017   • History of chronic pain    • History of colon polyps    • History of pneumonia    • Hyperlipidemia    • Joint pain    • Stroke (CMS/HCC)      Creatinine   Date Value Ref Range Status   08/01/2018 0.80 0.57 - 1.00 mg/dL Final   07/31/2018 1.05 (H) 0.57 - 1.00 mg/dL Final     BUN   Date Value Ref Range Status   08/01/2018 13 8 - 23 mg/dL Final     Estimated Creatinine Clearance: 52.5 mL/min (by C-G formula based on SCr of 0.8 mg/dL).    Lab Results   Component Value Date    WBC 7.61 08/01/2018     Temp Readings from Last 3 Encounters:   08/01/18 98.9 °F (37.2 °C) (Oral)   07/18/18 98.2 °F (36.8 °C) (Oral)   05/07/18 98.6 °F (37 °C) (Oral)     Baseline culture/source/susceptibility: 7/31 B/C x 2  NG x 24 hrs                                                                8/01 C Diff: negative      Anti-Infectives     Ordered     Dose/Rate Route Frequency Start Stop    07/31/18 2345  vancomycin 750 mg/250 mL 0.9% NS add-vantage     Ordering Provider:  Tejas Masterson MD    17 mg/kg × 50.8 kg  over 60 Minutes Intravenous Every 24 Hours 08/01/18 1800 08/07/18 1759    07/31/18 2357  aztreonam (AZACTAM) 2 g/100 mL 0.9% NS (mbp)     Ordering Provider: "  Tejas Masterson MD    2 g  over 4 Hours Intravenous Every 8 Hours 08/01/18 0600 08/08/18 0559    07/31/18 2326  Pharmacy to dose vancomycin     Ordering Provider:  Tejas Masterson MD     Does not apply Continuous PRN 07/31/18 2326 08/07/18 2325 07/31/18 2234  aztreonam (AZACTAM) 2 g/100 mL 0.9% NS (mbp)     Ordering Provider:  Higinio Quintana MD    2 g  over 30 Minutes Intravenous Once 07/31/18 2236 07/31/18 2340    07/31/18 2234  vancomycin (VANCOCIN) in iso-osmotic dextrose IVPB 1 g (premix) 200 mL     Ordering Provider:  Higinio Quintana MD    20 mg/kg × 50.8 kg Intravenous Once 07/31/18 2236 07/31/18 2343          VANCOMYCIN DOSING SCHEDULE ( INCLUDING LEVELS)  7/31   2343   Vancomycin 1000mg ( 20mg/kg) iv x 1 dose  8/01   1800   Vancomycin 750mg ( 17mg/kg) iv q 24 hours  8/03   1730   Vancomycin trough prior to 4th overall dose     Azactam Dosing  Azactam 2 grams iv q 8 hours extended infusion     Assessment/Plan  1. See above for dosing schedule   2. Monitor renal function…serum creatinine in am   Today=0.8  3. Encourage hydration to prevent toxic accumulation   4. Monitor for s/sx of toxicity including incr in SCr and decr in UOP  5. Pharmacy will continue to follow and adjust accordingly    Lucille Muhammad [facility]

## 2018-08-01 NOTE — PROGRESS NOTES
Adult Nutrition  Assessment/PES    Patient Name:  Dilma Abad  YOB: 1950  MRN: [id number]  Admit Date:  7/31/2018    Assessment Date:  8/1/2018    Nutrition assessment completed. Low BMI (17.5) patient reported minimal po intake for at least 4 days prior to admission. Mild malnutrition of chronic illness.  Provided nutrition therapy-encouraged adequate po intake. Patient agreed to supplements TID-ensure, carnation instant breakfast and milkshake with protein powder. Patient requested a standing weight for accuracy.  Will follow.          Reason for Assessment     Row Name 08/01/18 1526          Reason for Assessment    Reason For Assessment identified at risk by screening criteria     Diagnosis cancer diagnosis/related complications   Primary Problem:  Pneumonia due to infectious organism      Identified At Risk by Screening Criteria Low BMI-17.5               Anthropometrics     Row Name 08/01/18 1527          Body Mass Index (BMI)    BMI Assessment BMI 17-18.4: protein-energy malnutrition grade I             Labs/Tests/Procedures/Meds     Row Name 08/01/18 1527          Labs/Procedures/Meds    Lab Results Reviewed reviewed, pertinent        Diagnostic Tests/Procedures    Diagnostic Test/Procedure Reviewed reviewed, pertinent        Medications    Pertinent Medications Reviewed reviewed, pertinent     Pertinent Medications Comments probiotic, PPI, NaCl             Physical Findings     Row Name 08/01/18 1527          Physical Findings    Overall Physical Appearance loss of muscle mass;loss of subcutaneous fat;underweight   B=19             Estimated/Assessed Needs     Row Name 08/01/18 1528          Calculation Measurements    Weight Used For Calculations 50.8 kg (111 lb 15.9 oz)        Estimated/Assessed Needs    Additional Documentation KCAL/KG (Group);Protein Requirements (Group);Fluid Requirements (Group)        KCAL/KG                                                                kcal/kg  (Specify)   6831-9074        [place]-St. Jeor Equation    RMR ([place]-St. Jeor Equation) 1060.63        Protein Requirements    Est Protein Requirement Amount (gms/kg) 1.2 gm protein     Estimated Protein Requirements (gms/day) 60.96        Fluid Requirements    Estimated Fluid Requirements (mL/day) 1300     RDA Method (mL) 1300     [place]-Segar Method (over 20 kg) 2516             Nutrition Prescription Ordered     Row Name 08/01/18 1528          Nutrition Prescription PO    Current PO Diet Regular             Evaluation of Received Nutrient/Fluid Intake     Row Name 08/01/18 1528          Calculation Measurements    Weight Used For Calculations 50.8 kg (111 lb 15.9 oz)        PO Evaluation    Number of Meals 1     % PO Intake 50             Evaluation of Prescribed Nutrient/Fluid Intake     Row Name 08/01/18 1528          Calculation Measurements    Weight Used For Calculations 50.8 kg (111 lb 15.9 oz)             Malnutrition Severity Assessment     Row Name 08/01/18 1528          Malnutrition Severity Assessment    Malnutrition Type Chronic Illness Malnutrition        Physical Signs of Malnutrition (Chronic)    Muscle Wasting Mild     Fat Loss Mild        Weight Status (Chronic)    BMI Mod (<17)   BMI-17.5     %IBW Mild (<90%)   82%        Energy Intake Status (Chronic)    Energy Intake Mild (<75% / 5d)        Criteria Met (Must meet criteria for severity in at least 2 of these categories: M Wasting, Fat Loss, Fluid, Secondary Signs, Wt. Status, Intake)    Patient meets criteria for  Mild malnutrition         Problem/Interventions:        Problem 1     Row Name 08/01/18 1530          Nutrition Diagnoses Problem 1    Problem 1 Malnutrition     Etiology (related to) MNT for Treatment/Condition     Signs/Symptoms (evidenced by) Report of Mnimal PO Intake;% IBW     Percent (%) IBW 82 %                     Intervention Goal     Row Name 08/01/18 1530          Intervention Goal    General Maintain nutrition;Meet  nutritional needs for age/condition     PO Tolerate PO;Increase intake     Weight Appropriate weight gain             Nutrition Intervention     Row Name 08/01/18 1530          Nutrition Intervention    RD/Tech Action Interview for preference;Follow Tx progress;Care plan reviewd;Encourage intake;Recommend/ordered;Supplement provided     Recommended/Ordered Supplement             Nutrition Prescription     Row Name 08/01/18 1530          Nutrition Prescription PO    PO Prescription Begin/change supplement     Supplement Ensure;[place] Breakfast Essentials;Milkshake;PRO powder     Supplement Frequency 3 times a day     New PO Prescription Ordered? Yes             Education/Evaluation     Row Name 08/01/18 1530          Education    Education Will Instruct as appropriate        Monitor/Evaluation    Monitor Per protocol     Education Follow-up Reinforce PRN         Electronically signed by:  Darshana Denise RD  08/01/18 3:31 PM

## 2018-08-01 NOTE — PROGRESS NOTES
"Pharmacokinetic Consult - Vancomycin Dosing (Initial Note)    Dilma Abad has been consulted for pharmacy to dose vancomycin for Intra-Abdominal Infection, Sepsis.  Pharmacy dosing vancomycin per Dr. Masterson's request.   Goal trough: 15-20 mg/L   Other antimicrobials: Azactam    Relevant clinical data and objective history reviewed:  68 y.o. female 170.2 cm (67\") 50.8 kg (112 lb)    Past Medical History:   Diagnosis Date   • Adenocarcinoma of lung (CMS/HCC) 2017    HAD RADIATION    • Anxiety and depression    • Arthritis    • Benign parotid tumor 2012    removed by Dr Vickers told it was benign   • COPD (chronic obstructive pulmonary disease) (CMS/[facility])    • Depression    • Diarrhea    • Early cataract    • Emphysema of lung (CMS/[facility])    • Esophageal cancer (CMS/HCC) 2017   • History of chronic pain    • History of colon polyps    • History of pneumonia    • Hyperlipidemia    • Joint pain    • Stroke (CMS/[facility])      Creatinine   Date Value Ref Range Status   07/31/2018 1.05 (H) 0.57 - 1.00 mg/dL Final   07/18/2018 0.78 0.60 - 1.10 mg/dL Final   05/02/2018 0.86 0.57 - 1.00 mg/dL Final     BUN   Date Value Ref Range Status   07/31/2018 14 8 - 23 mg/dL Final     Estimated Creatinine Clearance: 40 mL/min (A) (by C-G formula based on SCr of 1.05 mg/dL (H)).    Lab Results   Component Value Date    WBC 12.92 (H) 07/31/2018     Temp Readings from Last 3 Encounters:   07/31/18 98.9 °F (37.2 °C)   07/18/18 98.2 °F (36.8 °C) (Oral)   05/07/18 98.6 °F (37 °C) (Oral)        Assessment/Plan  Will start vancomycin 1000 mg IV now, followed by 750 mg iv q24h.     Vancomycin level on 8/3/18 at 1730.    Will monitor serum creatinine every 24 hours for the first 3 days then at least every 48 hours per dosing recommendations.      Pharmacy will continue to follow daily while on vancomycin and adjust as needed.     Alireza Maldonado, [facility]      "

## 2018-08-01 NOTE — PROGRESS NOTES
Name: Dilma Abad ADMIT: 2018   : 1950  PCP: Mary Taylor MD    MRN: [id number] LOS: 1 days   AGE/SEX: 68 y.o. female    ROOM: [facility]/   Subjective   Feels little better but still complains of weakness  He had 3 BM morning  Complains of pleuritic chest pain on the left side    Brief hospital course since admission:  Lung cancer  Hx of esophageal cancer status post radiation  COPD    I have reviewed past medical history, social history, family history, allergies.  No changes from admission note.      Review of Systems   Constitutional: Positive for fatigue.   Respiratory: Positive for shortness of breath.    Gastrointestinal: Positive for diarrhea. Negative for nausea and vomiting.          Objective   Vital Signs  Temp:  [97.8 °F (36.6 °C)-99.2 °F (37.3 °C)] 97.9 °F (36.6 °C)  Heart Rate:  [79-93] 84  Resp:  [16-26] 16  BP: ()/() 101/49  SpO2:  [92 %-97 %] 95 %  on  Flow (L/min):  [2.5] 2.5;   Device (Oxygen Therapy): nasal cannula  Body mass index is 17.54 kg/m².    Intake/Output Summary (Last 24 hours) at 18 1247  Last data filed at 18 0904   Gross per 24 hour   Intake              360 ml   Output                0 ml   Net              360 ml       Physical Exam   Constitutional: She is oriented to person, place, and time. She appears cachectic. Nasal cannula in place.   HENT:   Head: Atraumatic.   Eyes: Pupils are equal, round, and reactive to light. No scleral icterus.   Cardiovascular: Normal rate and regular rhythm.    Pulmonary/Chest: No accessory muscle usage. No respiratory distress. She has decreased breath sounds. She has no wheezes.   Abdominal: Soft. Normal appearance and bowel sounds are normal. She exhibits no ascites. There is no hepatosplenomegaly.   Neurological: She is alert and oriented to person, place, and time.   Skin: No laceration and no petechiae noted. No cyanosis. Nails show no clubbing.   Psychiatric: She has a normal mood and affect. Her  behavior is normal.   Vitals reviewed.      Results Review:      Results from last 7 days  Lab Units 08/01/18  0542 07/31/18  2146   WBC 10*3/mm3 7.61 12.92*   HEMOGLOBIN g/dL 9.2* 10.9*   HEMATOCRIT % 28.3* 32.6*   PLATELETS 10*3/mm3 144 159       Results from last 7 days  Lab Units 08/01/18  0542 07/31/18  2146   SODIUM mmol/L 138 130*   POTASSIUM mmol/L 4.1 3.1*   CHLORIDE mmol/L 103 88*   CO2 mmol/L 24.5 26.7   BUN mg/dL 13 14   CREATININE mg/dL 0.80 1.05*   GLUCOSE mg/dL 87 142*   CALCIUM mg/dL 8.8 9.9         Hemoglobin A1C:No results found for: HGBA1C  Glucose Range:No results found for: POCGLU      aztreonam 2 g Intravenous Q8H   ipratropium-albuterol 3 mL Nebulization Q8H - RT   lactobacillus acidophilus 1 capsule Oral Daily   pantoprazole 40 mg Oral Daily   vancomycin 17 mg/kg Intravenous Q24H       Pharmacy Consult     Pharmacy to dose vancomycin     sodium chloride 100 mL/hr Last Rate: 100 mL/hr (08/01/18 0057)   Diet Regular  Assessment/Plan       Assessment/Plan      Active Hospital Problems    Diagnosis Date Noted   • **Pneumonia due to infectious organism [J18.9] 08/01/2018   • Generalized abdominal pain [R10.84] 08/01/2018   • Diarrhea [R19.7] 08/01/2018   • Sepsis (CMS/[facility]) [A41.9] 07/31/2018   • Anxiety and depression [F41.8] 07/31/2018   • COPD (chronic obstructive pulmonary disease) (CMS/[facility]) [J44.9] 07/31/2018   • Hyperlipidemia [E78.5] 07/31/2018   • Adenocarcinoma of left lung (CMS/[facility]) [C34.92] 10/31/2017   • Panlobular emphysema (CMS/[facility]) [J43.1] 10/31/2017      Resolved Hospital Problems    Diagnosis Date Noted Date Resolved   No resolved problems to display.     Continue antibiotics for pneumonia  Dehydration has been corrected  And Imodium for diarrhea  Follow the labs        Hilaria Brunson MD  [facility]  08/01/18

## 2018-08-01 NOTE — PLAN OF CARE
Problem: Patient Care Overview  Goal: Plan of Care Review  Outcome: Ongoing (interventions implemented as appropriate)   08/01/18 1685   Coping/Psychosocial   Plan of Care Reviewed With patient   Plan of Care Review   Progress improving   OTHER   Outcome Summary tx given per order o2 weaned

## 2018-08-01 NOTE — PLAN OF CARE
Problem: Patient Care Overview  Goal: Plan of Care Review  Outcome: Ongoing (interventions implemented as appropriate)   08/01/18 5076   Coping/Psychosocial   Plan of Care Reviewed With patient   Plan of Care Review   Progress improving   OTHER   Outcome Summary VSS. pain controlled with prn, pain medication, no S/S of discomfort or disttress. will continue to monitor      Goal: Individualization and Mutuality  Outcome: Ongoing (interventions implemented as appropriate)    Goal: Discharge Needs Assessment  Outcome: Ongoing (interventions implemented as appropriate)      Problem: Sepsis/Septic Shock (Adult)  Goal: Signs and Symptoms of Listed Potential Problems Will be Absent, Minimized or Managed (Sepsis/Septic Shock)  Outcome: Ongoing (interventions implemented as appropriate)      Problem: Pain, Acute (Adult)  Goal: Identify Related Risk Factors and Signs and Symptoms  Outcome: Ongoing (interventions implemented as appropriate)    Goal: Acceptable Pain Control/Comfort Level  Outcome: Ongoing (interventions implemented as appropriate)      Problem: Nutrition, Imbalanced: Inadequate Oral Intake (Adult)  Goal: Identify Related Risk Factors and Signs and Symptoms  Outcome: Ongoing (interventions implemented as appropriate)    Goal: Improved Oral Intake  Outcome: Ongoing (interventions implemented as appropriate)    Goal: Prevent Further Weight Loss  Outcome: Ongoing (interventions implemented as appropriate)      Problem: Fall Risk (Adult)  Goal: Identify Related Risk Factors and Signs and Symptoms  Outcome: Ongoing (interventions implemented as appropriate)    Goal: Absence of Fall  Outcome: Ongoing (interventions implemented as appropriate)

## 2018-08-01 NOTE — ED PROVIDER NOTES
" EMERGENCY DEPARTMENT ENCOUNTER    CHIEF COMPLAINT  Chief Complaint: diarrhea  History given by: patient family  History limited by: nothing  Room Number: 10/10  PMD: Mary Taylor MD  Oncologist- Dr. Collier    HPI:  Pt is a 68 y.o. female who presents complaining of frequent diarrhea for the last four days. Pt states that she has had one episode of diarrhea today after taking lomotil. Pt states that she \"doesn't even know\" when she is having a BM. Pt also complains of moderate, upper abd pain and SOA but denies N/V, fever, HA, sore throat, CP, cough, bloody or black stool, dysuria, LE pain or LE swelling. Pt's family states that the pt has had generalized weakness and been off-balance as well. Pt states that she has not had chemotherapy or radiation since 2018.    Duration/Onset/Timin days, gradual, intermittent  Location: N/A  Radiation: N/A  Quality: watery  Intensity/Severity: moderate to severe  Associated Symptoms: abd pain, SOA, generalized weakness, off-balance  Aggravating or Alleviating Factors: Pt states that her diarrhea improved after taking Lomotil earlier today.  Previous Episodes: none      PAST MEDICAL HISTORY  Active Ambulatory Problems     Diagnosis Date Noted   • Adenocarcinoma of left lung (CMS/HCC) 10/31/2017   • Panlobular emphysema (CMS/HCC) 10/31/2017   • Abnormal finding on imaging 2017   • Esophageal carcinoma (CMS/HCC) 2017   • Fitting and adjustment of vascular catheter 2018   • OROZCO (dyspnea on exertion) 2018   • Radiation esophagitis 02/15/2018     Resolved Ambulatory Problems     Diagnosis Date Noted   • No Resolved Ambulatory Problems     Past Medical History:   Diagnosis Date   • Adenocarcinoma of lung (CMS/HCC)    • Anxiety and depression    • Arthritis    • Benign parotid tumor    • COPD (chronic obstructive pulmonary disease) (CMS/HCC)    • Depression    • Diarrhea    • Early cataract    • Emphysema of lung (CMS/HCC)    • Esophageal " cancer (CMS/HCC) 2017   • History of chronic pain    • History of colon polyps    • History of pneumonia    • Hyperlipidemia    • Joint pain    • Stroke (CMS/HCC)        PAST SURGICAL HISTORY  Past Surgical History:   Procedure Laterality Date   • BRONCHOSCOPY     • CHOLECYSTECTOMY  1999   • COLON SURGERY  2015    COLON POLYPS   • COLONOSCOPY  04/13/2016    TA w/low grade dysplasia x 3, serrated adenoma x 2   • COLONOSCOPY  06/14/2016    TA w/high grade dysplasia   • ENDOSCOPY N/A 12/6/2017    Procedure: ESOPHAGOGASTRODUODENOSCOPY WITH BIOPSY;  Surgeon: Jayant Robles MD;  Location: [facility] ENDOSCOPY;  Service:    • ENDOSCOPY N/A 3/17/2018    Procedure: ESOPHAGOGASTRODUODENOSCOPY WITH BIOPSIES;  Surgeon: Ambrosio Sapp MD;  Location: [facility] ENDOSCOPY;  Service: Gastroenterology   • FOOT SURGERY  10/2010    [place]   • HYSTERECTOMY     • AR INSJ TUNNELED CVC W/O SUBQ PORT/ AGE 3 YR/> Right 1/9/2018    Procedure: MEDIPORT PLACEMENT right;  Surgeon: Damaso Germain MD;  Location: [facility] HYBRID OR 18/19;  Service: Vascular   • SALIVARY GLAND SURGERY      PAROTID MASS REMOVED BENIGN   • SHOULDER ARTHROSCOPY Left 1995. 2001       FAMILY HISTORY  Family History   Problem Relation Age of Onset   • Cancer Sister    • Lung cancer Sister    • Leukemia Cousin    • Heart disease Father    • Cancer Sister    • Malig Hyperthermia Neg Hx        SOCIAL HISTORY  Social History     Social History   • Marital status:      Spouse name: N/A   • Number of children: N/A   • Years of education: High school     Occupational History   •  Retired     Social History Main Topics   • Smoking status: Former Smoker     Packs/day: 1.00     Years: 30.00     Types: Cigarettes     Quit date: 1973   • Smokeless tobacco: Never Used      Comment: Caffeine-2 daily   • Alcohol use No   • Drug use: No   • Sexual activity: Defer     Other Topics Concern   • Not on file     Social History Narrative    Lives alone.       ALLERGIES  Penicillins;  Bactrim [sulfamethoxazole-trimethoprim]; and Sulfa antibiotics    REVIEW OF SYSTEMS  Review of Systems   Constitutional: Negative for chills and fever.   HENT: Negative.  Negative for sore throat.    Eyes: Negative.    Respiratory: Positive for shortness of breath. Negative for cough.    Cardiovascular: Negative.  Negative for chest pain.   Gastrointestinal: Positive for abdominal pain and diarrhea.   Genitourinary: Negative.  Negative for dysuria.   Musculoskeletal: Positive for gait problem (off-balance). Negative for back pain.   Skin: Negative.  Negative for rash.   Neurological: Positive for weakness (generalized). Negative for headaches.   All other systems reviewed and are negative.      PHYSICAL EXAM  ED Triage Vitals   Temp Heart Rate Resp BP SpO2   07/31/18 2122 07/31/18 2120 07/31/18 2120 -- 07/31/18 2120   99.2 °F (37.3 °C) 93 26  94 %      Temp src Heart Rate Source Patient Position BP Location FiO2 (%)   07/31/18 2122 07/31/18 2120 -- -- --   Tympanic Monitor          Physical Exam   Constitutional: No distress.   Pt appears elderly   HENT:   Head: Normocephalic and atraumatic.   Mouth/Throat: Oropharynx is clear and moist. Mucous membranes are dry.   Eyes:   Unremarkable   Cardiovascular: Normal rate and regular rhythm.    No murmur heard.  Pulmonary/Chest: No respiratory distress. She has decreased breath sounds (bilaterally).   O2=95% on RA   Abdominal: There is generalized tenderness (mild).   Musculoskeletal: She exhibits no edema or tenderness.   Neurological: She is alert.   Skin: No rash noted.   Nursing note and vitals reviewed.      LAB RESULTS  Lab Results (last 24 hours)     Procedure Component Value Units Date/Time    CBC & Differential [[id number]] Collected:  07/31/18 2146    Specimen:  Blood Updated:  07/31/18 2156    Narrative:       The following orders were created for panel order CBC & Differential.  Procedure                               Abnormality         Status                      ---------                               -----------         ------                     CBC Auto Differential[[id number]]        Abnormal            Final result                 Please view results for these tests on the individual orders.    Comprehensive Metabolic Panel [[id number]]  (Abnormal) Collected:  07/31/18 2146    Specimen:  Blood Updated:  07/31/18 2222     Glucose 142 (H) mg/dL      BUN 14 mg/dL      Creatinine 1.05 (H) mg/dL      Sodium 130 (L) mmol/L      Potassium 3.1 (L) mmol/L      Chloride 88 (L) mmol/L      CO2 26.7 mmol/L      Calcium 9.9 mg/dL      Total Protein 7.3 g/dL      Albumin 4.10 g/dL      ALT (SGPT) 17 U/L      AST (SGOT) 18 U/L      Alkaline Phosphatase 88 U/L      Total Bilirubin 1.0 mg/dL      eGFR Non African Amer 52 (L) mL/min/1.73      Globulin 3.2 gm/dL      A/G Ratio 1.3 g/dL      BUN/Creatinine Ratio 13.3     Anion Gap 15.3 mmol/L     Lactic Acid, Plasma [[id number]]  (Abnormal) Collected:  07/31/18 2146    Specimen:  Blood Updated:  07/31/18 2211     Lactate 2.5 (C) mmol/L     Blood Culture - Blood, [[id number]] Collected:  07/31/18 2146    Specimen:  Blood from Arm, Left Updated:  07/31/18 2151    C-reactive Protein [[id number]]  (Abnormal) Collected:  07/31/18 2146    Specimen:  Blood Updated:  07/31/18 2222     C-Reactive Protein 20.00 (H) mg/dL     CBC Auto Differential [[id number]]  (Abnormal) Collected:  07/31/18 2146    Specimen:  Blood Updated:  07/31/18 2156     WBC 12.92 (H) 10*3/mm3      RBC 3.75 (L) 10*6/mm3      Hemoglobin 10.9 (L) g/dL      Hematocrit 32.6 (L) %      MCV 86.9 fL      MCH 29.1 pg      MCHC 33.4 g/dL      RDW 13.0 %      RDW-SD 42.1 fl      MPV 9.7 fL      Platelets 159 10*3/mm3      Neutrophil % 84.4 (H) %      Lymphocyte % 5.0 (L) %      Monocyte % 9.8 %      Eosinophil % 0.1 (L) %      Basophil % 0.1 %      Immature Grans % 0.6 (H) %      Neutrophils, Absolute 10.92 (H) 10*3/mm3      Lymphocytes, Absolute 0.64 (L) 10*3/mm3      Monocytes, Absolute  1.26 (H) 10*3/mm3      Eosinophils, Absolute 0.01 10*3/mm3      Basophils, Absolute 0.01 10*3/mm3      Immature Grans, Absolute 0.08 (H) 10*3/mm3     Lactic Acid, Reflex Timer (This will reflex a repeat order 3-3:15 hours after ordered.) [[id number]] Collected:  07/31/18 2146    Specimen:  Blood Updated:  07/31/18 2211    Blood Culture - Blood, [[id number]] Collected:  07/31/18 2156    Specimen:  Blood from Arm, Left Updated:  07/31/18 2200    Urinalysis With Culture If Indicated - Urine, Catheter [[id number]] Collected:  07/31/18 2244    Specimen:  Urine from Urine, Catheter Updated:  07/31/18 2249          I ordered the above labs and reviewed the results    RADIOLOGY  XR Chest 2 View   Final Result   Increasing left-sided opacities as discussed. Some of these   most assuredly related to post treatment related changes but extensive   superimposed pneumonia is not excluded.       This report was finalized on 7/31/2018 10:40 PM by Damaso Rosen M.D.               I ordered the above noted radiological studies. Interpreted by radiologist. Reviewed by me in PACS.       PROCEDURES  Procedures      PROGRESS AND CONSULTS     2135- Ordered blood work, blood cultures, lactic acid, CRP and CXR for further evaluation. Ordered IVF for hydration.    2233- Placed call to [facility] for admission. Ordered stool cultures for further evaluation. Ordered vancomycin and aztreonam for sepsis and potassium for hypokalemia. Ordered 30mL/kg IVF bolus for sepsis.    2240- Discussed the pt's case with Dr. Masterson ([facility]), who agrees to admit the pt to a telemetry bed.    2252- Rechecked pt. Pt is resting comfortably. MP=983/109. Notified pt of her lab and imaging results, including her elevated lactic acid. Discussed the plan to admit the pt for IV abx and further evaluation. Pt and family agree with the plan and all questions were addressed.    MEDICAL DECISION MAKING  Results were reviewed/discussed with the patient and they were also made aware of  online access. Pt also made aware that some labs, such as cultures, will not be resulted during ER visit and follow up with PMD is necessary.     MDM  Number of Diagnoses or Management Options     Amount and/or Complexity of Data Reviewed  Clinical lab tests: ordered and reviewed (WBC=12.92, lactic acid=2.5)  Tests in the radiology section of CPT®: ordered and reviewed (CXR shows an increasing left sided opacity)  Decide to obtain previous medical records or to obtain history from someone other than the patient: yes  Obtain history from someone other than the patient: yes (family)  Review and summarize past medical records: yes (Pt last saw Dr. Collier (heme/onc) on 7/18/18 for routine follow up. Reviewed office visit.)  Discuss the patient with other providers: yes (D/w Dr. Masterson ([facility]))  Independent visualization of images, tracings, or specimens: yes    Patient Progress  Patient progress: improved         DIAGNOSIS  Final diagnoses:   Sepsis, due to unspecified organism (CMS/HCC)   Diarrhea, unspecified type   Hypokalemia       DISPOSITION  ADMISSION    Discussed treatment plan and reason for admission with pt/family and admitting physician.  Pt/family voiced understanding of the plan for admission for further testing/treatment as needed.     Latest Documented Vital Signs:  As of 10:53 PM  BP- 130/95 HR- 84 Temp- 99.2 °F (37.3 °C) (Tympanic) O2 sat- 97%    --  Documentation assistance provided by reginald Flores for Dr. Quintana.  Information recorded by the scrfrancy was done at my direction and has been verified and validated by me.     Lou Flores  07/31/18 [id number]       Higinio Quintana MD  07/31/18 [id number]

## 2018-08-01 NOTE — PLAN OF CARE
Problem: Nutrition, Imbalanced: Inadequate Oral Intake (Adult)  Intervention: Promote/Optimize Nutrition   08/01/18 1534   Nutrition Interventions   Oral Nutrition Promotion calorie dense foods provided;calorie dense liquids provided;nutritional therapy counseling provided

## 2018-08-01 NOTE — PAYOR COMM NOTE
"UR CONTACT:   GABRIELA                  P: [phone]  F: [phone]        Dilam Shell (68 y.o. Female)     Date of Birth Social Security Number Address Home Phone MRN    1950  [id number] [facility] [address] [phone] [id number]    Religious Marital Status          None        Admission Date Admission Type Admitting Provider Attending Provider Department, Room/Bed    7/31/18 Emergency Tejas Masterson MD Ramaswamy, Rajanna B, MD [address], [address]    Discharge Date Discharge Disposition Discharge Destination                       Attending Provider:  Hilaria Brunson MD    Allergies:  Penicillins, Bactrim [Sulfamethoxazole-trimethoprim], Sulfa Antibiotics    Isolation:  None   Infection:  None   Code Status:  CPR    Ht:  170.2 cm (67\")   Wt:  50.8 kg (112 lb)    Admission Cmt:  None   Principal Problem:  Sepsis (CMS/[facility]) [A41.9]                 Active Insurance as of 7/31/2018     Primary Coverage     Payor Plan Insurance Group Employer/Plan Group    [facility]      Payor Plan Address Payor Plan Phone Number Effective From Effective To    [address] [address] [phone] 10/20/2017     [facility] [id number]       Subscriber Name Subscriber Birth Date Member ID       DILMA SHELL 1950 [id number]           Secondary Coverage     Payor Plan Insurance Group Employer/Plan Group    [place]      Payor Plan Address Payor Plan Phone Number Effective From Effective To    [address] [phone] 4/1/2018     [facility] [id number]       Subscriber Name Subscriber Birth Date Member ID       DILMA SHELL 1950 [id number]                 Emergency Contacts      (Rel.) Home Phone Work Phone Mobile Phone    Gricelda Fuentes (Sister) [phone] -- --               History & Physical      Tejas Masterson MD at 7/31/2018 11:19 PM          HISTORY AND PHYSICAL   [facility] " [facility]        Patient Identification:  Name: Dilma Abad  Age: 68 y.o.  Sex: female  :  1950  MRN: [id number]                     Primary Care Physician: Mary Taylor MD    Chief Complaint:  Weakness and diarrhea    History of Present Illness:       The patient is a 68-year-old white female with history of adenocarcinoma of the lung status post radiation chemotherapy treatment, history of esophageal cancer, anxiety, depression, arthritis, chronic pain, hyperlipidemia and COPD who is admitted with history of having diarrhea for the last 3 or 4 days.  She'd eaten at [employer] started having some diarrhea after that and some abdominal discomfort.  She's had some nausea but no vomiting.  She's not had any fever but has had some chills.  She's been extremely weak but has not fallen down or pass out.  The patient was evaluated in the ER and neck criteria for sepsis and was started on some broad-spectrum antibiotics and admitted for further evaluation treatment.    Past Medical History:  Past Medical History:   Diagnosis Date   • Adenocarcinoma of lung (CMS/HCC) 2017    HAD RADIATION    • Anxiety and depression    • Arthritis    • Benign parotid tumor 2012    removed by Dr Vickers told it was benign   • COPD (chronic obstructive pulmonary disease) (CMS/HCC)    • Depression    • Diarrhea    • Early cataract    • Emphysema of lung (CMS/HCC)    • Esophageal cancer (CMS/HCC)    • History of chronic pain    • History of colon polyps    • History of pneumonia    • Hyperlipidemia    • Joint pain    • Stroke (CMS/HCC)      Past Surgical History:  Past Surgical History:   Procedure Laterality Date   • BRONCHOSCOPY     • CHOLECYSTECTOMY     • COLON SURGERY      COLON POLYPS   • COLONOSCOPY  2016    TA w/low grade dysplasia x 3, serrated adenoma x 2   • COLONOSCOPY  2016    TA w/high grade dysplasia   • ENDOSCOPY N/A 2017    Procedure: ESOPHAGOGASTRODUODENOSCOPY WITH BIOPSY;  Surgeon:  Jayant Robles MD;  Location: [facility] ENDOSCOPY;  Service:    • ENDOSCOPY N/A 3/17/2018    Procedure: ESOPHAGOGASTRODUODENOSCOPY WITH BIOPSIES;  Surgeon: Ambrosio Sapp MD;  Location: [facility] ENDOSCOPY;  Service: Gastroenterology   • FOOT SURGERY  10/2010    [place]   • HYSTERECTOMY     • HI INSJ TUNNELED CVC W/O SUBQ PORT/ AGE 3 YR/> Right 1/9/2018    Procedure: MEDIPORT PLACEMENT right;  Surgeon: Damaso Germain MD;  Location: [facility] HYBRID OR 18/19;  Service: Vascular   • SALIVARY GLAND SURGERY      PAROTID MASS REMOVED BENIGN   • SHOULDER ARTHROSCOPY Left 1995. 2001      Home Meds:  Prescriptions Prior to Admission   Medication Sig Dispense Refill Last Dose   • buPROPion XL (WELLBUTRIN XL) 300 MG 24 hr tablet Take 300 mg by mouth Every Evening.   Taking   • CARAFATE 1 GM/10ML suspension TAKE 10 ML BY MOUTH EVERY 6 HOURS 420 mL 2    • Cholecalciferol (VITAMIN D) 2000 units tablet Take 2,000 Units by mouth Every Other Day.   Taking   • cyanocobalamin (VITAMIN B-12) 2000 MCG tablet Take 2,000 mcg by mouth Every Other Day.   Taking   • diazePAM (VALIUM) 5 MG tablet Take 5 mg by mouth 4 (Four) Times a Day As Needed for Anxiety.   Taking   • diphenoxylate-atropine (LOMOTIL) 2.5-0.025 MG per tablet TK 1 T PO  QID PRN  1 Taking   • escitalopram (LEXAPRO) 20 MG tablet Take 20 mg by mouth Every Evening.   Taking   • HYDROcodone-acetaminophen (NORCO) 7.5-325 MG per tablet Take 1 tablet by mouth 4 (Four) Times a Day As Needed for Moderate Pain .   Taking   • Multiple Vitamins-Minerals (CENTRUM SILVER PO) Take 1 tablet by mouth Daily.   Taking   • ondansetron (ZOFRAN) 8 MG tablet Take 1 tablet by mouth Every 8 (Eight) Hours As Needed for Nausea or Vomiting. 60 tablet 5 Taking   • ondansetron (ZOFRAN) 8 MG tablet TAKE 1 TABLET BY MOUTH EVERY 8 HOURS AS NEEDED FOR NAUSEA OR VOMITING 30 tablet 0    • pantoprazole (PROTONIX) 40 MG EC tablet TAKE 1 TABLET BY MOUTH DAILY 30 tablet 5    • pantoprazole (PROTONIX) 40 MG pack  packet Take 1 packet by mouth Every Morning Before Breakfast. 30 each 1 Taking   • Probiotic Product (PROBIOTIC PO) Take 1 capsule by mouth Daily.   Taking   • simvastatin (ZOCOR) 20 MG tablet Take 20 mg by mouth Every Night.   Taking   • umeclidinium-vilanterol (ANORO ELLIPTA) 62.5-25 MCG/INH aerosol powder  inhaler Inhale 1 puff Daily.   Taking   • Wheat Dextrin (BENEFIBER DRINK MIX) pack Take  by mouth Daily.   Taking     Current meds    Current Facility-Administered Medications:   •  acetaminophen (TYLENOL) tablet 650 mg, 650 mg, Oral, Q4H PRN, Tejas Masterson MD  •  aztreonam (AZACTAM) 2 g/100 mL 0.9% NS (mbp), 2 g, Intravenous, Q8H, Tejas Masterson MD  •  diazePAM (VALIUM) tablet 5 mg, 5 mg, Oral, 4x Daily PRN, Tejas Masterson MD  •  diphenoxylate-atropine (LOMOTIL) 2.5-0.025 MG per tablet 1 tablet, 1 tablet, Oral, 4x Daily PRN, Tejas Masterson MD  •  HYDROcodone-acetaminophen (NORCO) 7.5-325 MG per tablet 1 tablet, 1 tablet, Oral, 4x Daily PRN, Tejas Masterson MD  •  HYDROcodone-acetaminophen (NORCO) 7.5-325 MG per tablet 1 tablet, 1 tablet, Oral, Q4H PRN, Tejas Masetrson MD  •  ipratropium-albuterol (DUO-NEB) nebulizer solution 3 mL, 3 mL, Nebulization, Q8H - RT, Tejas Masterson MD  •  lactobacillus acidophilus (RISAQUAD) capsule 1 capsule, 1 capsule, Oral, Daily, Tejas Masterson MD  •  ondansetron (ZOFRAN) tablet 4 mg, 4 mg, Oral, Q6H PRN **OR** ondansetron ODT (ZOFRAN-ODT) disintegrating tablet 4 mg, 4 mg, Oral, Q6H PRN **OR** ondansetron (ZOFRAN) injection 4 mg, 4 mg, Intravenous, Q6H PRN, Tejas Masterson MD  •  ondansetron (ZOFRAN) tablet 8 mg, 8 mg, Oral, Q8H PRN, Tejas Masterosn MD  •  pantoprazole (PROTONIX) EC tablet 40 mg, 40 mg, Oral, Daily, Tejas Masterson MD  •  Pharmacy Consult, , Does not apply, Continuous PRN, Tejas Masterson MD  •  Pharmacy to dose vancomycin, , Does not apply, Continuous PRN, Tejas Masterson MD  •  potassium chloride (MICRO-K) CR capsule 40 mEq, 40 mEq, Oral, PRN **OR** potassium chloride (KLOR-CON)  packet 40 mEq, 40 mEq, Oral, PRN **OR** potassium chloride 20 mEq in 50 mL IVPB, 20 mEq, Intravenous, Q1H PRN, Tejas Masterson MD  •  sodium chloride 0.9 % flush 1-10 mL, 1-10 mL, Intravenous, PRN, Tejas Masterson MD  •  sodium chloride 0.9 % flush 10 mL, 10 mL, Intravenous, PRN, Higinio Quintana MD  •  sodium chloride 0.9 % infusion, 100 mL/hr, Intravenous, Continuous, Tejas Masterson MD, Last Rate: 100 mL/hr at 08/01/18 0057, 100 mL/hr at 08/01/18 0057  •  vancomycin 750 mg/250 mL 0.9% NS add-vantage, 17 mg/kg, Intravenous, Q24H, Tejas Masterson MD  Allergies:  Allergies   Allergen Reactions   • Penicillins Hives   • Bactrim [Sulfamethoxazole-Trimethoprim] Itching   • Sulfa Antibiotics Itching     Immunizations:    There is no immunization history on file for this patient.  Social History:   Social History     Social History Narrative    Lives alone.     Social History     Social History   • Marital status:      Spouse name: N/A   • Number of children: N/A   • Years of education: High school     Occupational History   •  Retired     Social History Main Topics   • Smoking status: Former Smoker     Packs/day: 1.00     Years: 30.00     Types: Cigarettes     Quit date: 1973   • Smokeless tobacco: Never Used      Comment: Caffeine-2 daily   • Alcohol use No   • Drug use: No   • Sexual activity: Defer     Other Topics Concern   • Not on file     Social History Narrative    Lives alone.       Family History:  Family History   Problem Relation Age of Onset   • Cancer Sister    • Lung cancer Sister    • Leukemia Cousin    • Heart disease Father    • Cancer Sister    • Malig Hyperthermia Neg Hx         Review of Systems  See history of present illness and past medical history.  Patient denies headache, dizziness, syncope, falls, trauma, change in vision, change in hearing, change in taste, changes in weight, changes in appetite, focal weakness, numbness, or paresthesia.  Patient denies chest pain, palpitations, dyspnea,  "orthopnea, PND, cough, sinus pressure, rhinorrhea, epistaxis, hemoptysis,vomiting,hematemesis,  constipation or hematchezia.  Denies cold or heat intolerance, polydipsia, polyuria, polyphagia. Denies hematuria, pyuria, dysuria, hesitancy, frequency or urgency.     Remainder of ROS is negative.    Objective:  tMax 24 hrs: Temp (24hrs), Av.6 °F (37 °C), Min:97.8 °F (36.6 °C), Max:99.2 °F (37.3 °C)    Vitals Ranges:   Temp:  [97.8 °F (36.6 °C)-99.2 °F (37.3 °C)] 97.8 °F (36.6 °C)  Heart Rate:  [81-93] 81  Resp:  [21-26] 21  BP: (114-141)/() 114/64      Exam:  /64 (BP Location: Left arm, Patient Position: Sitting)   Pulse 81   Temp 97.8 °F (36.6 °C) (Oral)   Resp 21   Ht 170.2 cm (67\")   Wt 50.8 kg (112 lb)   SpO2 92%   BMI 17.54 kg/m²      General Appearance:    Alert, cooperative, no distress, appears stated age   Head:    Normocephalic, without obvious abnormality, atraumatic   Eyes:    PERRL, conjunctiva/corneas clear, EOM's intact, both eyes   Ears:    Normal external ear canals, both ears   Nose:   Nares normal, septum midline, mucosa normal, no drainage    or sinus tenderness   Throat:   Lips, mucosa, and tongue normal   Neck:   Supple, symmetrical, trachea midline, no adenopathy;     thyroid:  no enlargement/tenderness/nodules; no carotid    bruit or JVD   Back:     Symmetric, no curvature, ROM normal, no CVA tenderness   Lungs:     Clear to auscultation bilaterally, respirations unlabored   Chest Wall:    No tenderness or deformity    Heart:    Regular rate and rhythm, S1 and S2 normal, no murmur, rub   or gallop   Abdomen:     Soft, Mild diffuse abdominal tenderness, bowel sounds active all four quadrants,     no masses, no hepatomegaly, no splenomegaly   Extremities:   Extremities normal, atraumatic, no cyanosis or edema   Pulses:   2+ and symmetric all extremities   Skin:   Skin color, texture, turgor normal, no rashes or lesions   Lymph nodes:   Cervical, supraclavicular, and axillary " nodes normal   Neurologic:   CNII-XII intact, normal strength, sensation intact throughout      .    Data Review:  Lab Results (last 72 hours)     Procedure Component Value Units Date/Time    Urinalysis With Culture If Indicated - Urine, Catheter [[id number]]  (Abnormal) Collected:  07/31/18 2244    Specimen:  Urine from Urine, Catheter Updated:  07/31/18 2318     Color, UA Yellow     Appearance, UA Clear     pH, UA 6.0     Specific Gravity, UA 1.009     Glucose,  mg/dL (Trace) (A)     Ketones, UA Negative     Bilirubin, UA Negative     Blood, UA Negative     Protein, UA Trace (A)     Leuk Esterase, UA Trace (A)     Nitrite, UA Negative     Urobilinogen, UA 0.2 E.U./dL    Urinalysis, Microscopic Only - Urine, Clean Catch [[id number]]  (Abnormal) Collected:  07/31/18 2244    Specimen:  Urine from Urine, Catheter Updated:  07/31/18 2318     RBC, UA None Seen /HPF      WBC, UA None Seen /HPF      Bacteria, UA Trace (A) /HPF      Squamous Epithelial Cells, UA None Seen /HPF      Hyaline Casts, UA None Seen /LPF      Methodology Automated Microscopy    [facility] Draw [[id number]] Collected:  07/31/18 2146    Specimen:  Blood Updated:  07/31/18 2300    Narrative:       The following orders were created for panel order [facility].  Procedure                               Abnormality         Status                     ---------                               -----------         ------                     Light Blue Top[[id number]]                                   Final result               Green Top (Gel)[[id number]]                                  Final result               Lavender Top[[id number]]                                     Final result               Gold Top - SST[[id number]]                                                                Please view results for these tests on the individual orders.    Green Top (Gel) [[id number]] Collected:  07/31/18 2146    Specimen:  Blood Updated:  07/31/18 2300     Extra Tube  Hold for add-ons.     Comment: Auto resulted.       Light Blue Top [[id number]] Collected:  07/31/18 2146    Specimen:  Blood Updated:  07/31/18 2300     Extra Tube hold for add-on     Comment: Auto resulted       Lavender Top [[id number]] Collected:  07/31/18 2146    Specimen:  Blood Updated:  07/31/18 2300     Extra Tube hold for add-on     Comment: Auto resulted       Comprehensive Metabolic Panel [[id number]]  (Abnormal) Collected:  07/31/18 2146    Specimen:  Blood Updated:  07/31/18 2222     Glucose 142 (H) mg/dL      BUN 14 mg/dL      Creatinine 1.05 (H) mg/dL      Sodium 130 (L) mmol/L      Potassium 3.1 (L) mmol/L      Chloride 88 (L) mmol/L      CO2 26.7 mmol/L      Calcium 9.9 mg/dL      Total Protein 7.3 g/dL      Albumin 4.10 g/dL      ALT (SGPT) 17 U/L      AST (SGOT) 18 U/L      Alkaline Phosphatase 88 U/L      Total Bilirubin 1.0 mg/dL      eGFR Non African Amer 52 (L) mL/min/1.73      Globulin 3.2 gm/dL      A/G Ratio 1.3 g/dL      BUN/Creatinine Ratio 13.3     Anion Gap 15.3 mmol/L     C-reactive Protein [[id number]]  (Abnormal) Collected:  07/31/18 2146    Specimen:  Blood Updated:  07/31/18 2222     C-Reactive Protein 20.00 (H) mg/dL     Lactic Acid, Plasma [[id number]]  (Abnormal) Collected:  07/31/18 2146    Specimen:  Blood Updated:  07/31/18 2211     Lactate 2.5 (C) mmol/L     Lactic Acid, Reflex Timer (This will reflex a repeat order 3-3:15 hours after ordered.) [[id number]] Collected:  07/31/18 2146    Specimen:  Blood Updated:  07/31/18 2211    Blood Culture - Blood, [[id number]] Collected:  07/31/18 2156    Specimen:  Blood from Arm, Left Updated:  07/31/18 2200    CBC & Differential [[id number]] Collected:  07/31/18 2146    Specimen:  Blood Updated:  07/31/18 2156    Narrative:       The following orders were created for panel order CBC & Differential.  Procedure                               Abnormality         Status                     ---------                               -----------          ------                     CBC Auto Differential[[id number]]        Abnormal            Final result                 Please view results for these tests on the individual orders.    CBC Auto Differential [[id number]]  (Abnormal) Collected:  07/31/18 2146    Specimen:  Blood Updated:  07/31/18 2156     WBC 12.92 (H) 10*3/mm3      RBC 3.75 (L) 10*6/mm3      Hemoglobin 10.9 (L) g/dL      Hematocrit 32.6 (L) %      MCV 86.9 fL      MCH 29.1 pg      MCHC 33.4 g/dL      RDW 13.0 %      RDW-SD 42.1 fl      MPV 9.7 fL      Platelets 159 10*3/mm3      Neutrophil % 84.4 (H) %      Lymphocyte % 5.0 (L) %      Monocyte % 9.8 %      Eosinophil % 0.1 (L) %      Basophil % 0.1 %      Immature Grans % 0.6 (H) %      Neutrophils, Absolute 10.92 (H) 10*3/mm3      Lymphocytes, Absolute 0.64 (L) 10*3/mm3      Monocytes, Absolute 1.26 (H) 10*3/mm3      Eosinophils, Absolute 0.01 10*3/mm3      Basophils, Absolute 0.01 10*3/mm3      Immature Grans, Absolute 0.08 (H) 10*3/mm3     Blood Culture - Blood, [[id number]] Collected:  07/31/18 2146    Specimen:  Blood from Arm, Left Updated:  07/31/18 2151                   Imaging Results (all)     Procedure Component Value Units Date/Time    XR Chest 2 View [[id number]] Collected:  07/31/18 2236     Updated:  07/31/18 2243    Narrative:       PA AND LATERAL CHEST X-RAY     HISTORY: Simple sepsis protocol, history of lung and esophageal cancer     COMPARISON: May 2, 2018.     FINDINGS: PA and lateral views of the chest were obtained. The lungs are  hyperexpanded suggesting COPD. There is underlying emphysema and  fibrosis. Cavitary left upper lobe lesion is again seen, somewhat more  conspicuous on today's study and presumably is related to the patient's  pulmonary malignancy. Adjacent linear densities extending towards the  hilum may be related to areas of atelectasis and/or post treatment  related changes. These have increased slightly. There are increasing  mostly groundglass opacities in the  peripheral left perihilar region and  left lung base which could be related to superimposed pneumonia and/or  post treatment related changes. These have developed since the previous  exam. Fibrotic changes in the right lung appears stable. Small left  effusion has increased minimally. Heart size and vascularity are normal.  Right-sided Port-A-Cath is unchanged.             Impression:       Increasing left-sided opacities as discussed. Some of these  most assuredly related to post treatment related changes but extensive  superimposed pneumonia is not excluded.     This report was finalized on 7/31/2018 10:40 PM by Damaso Rosen M.D.           Patient Active Problem List   Diagnosis Code   • Adenocarcinoma of left lung (CMS/HCC) C34.92   • Panlobular emphysema (CMS/HCC) J43.1   • Abnormal finding on imaging R93.8   • Esophageal carcinoma (CMS/HCC) C15.9   • Fitting and adjustment of vascular catheter Z45.2   • OROZCO (dyspnea on exertion) R06.09   • Radiation esophagitis K20.8   • Sepsis (CMS/HCC) A41.9   • Anxiety and depression F41.8   • COPD (chronic obstructive pulmonary disease) (CMS/HCC) J44.9   • Hyperlipidemia E78.5   • Generalized abdominal pain R10.84   • Diarrhea R19.7       Assessment:  Active Hospital Problems    Diagnosis Date Noted   • **Sepsis (CMS/HCC) [A41.9] 07/31/2018   • Generalized abdominal pain [R10.84] 08/01/2018   • Diarrhea [R19.7] 08/01/2018   • Anxiety and depression [F41.8] 07/31/2018   • COPD (chronic obstructive pulmonary disease) (CMS/HCC) [J44.9] 07/31/2018   • Hyperlipidemia [E78.5] 07/31/2018   • Adenocarcinoma of left lung (CMS/HCC) [C34.92] 10/31/2017   • Panlobular emphysema (CMS/HCC) [J43.1] 10/31/2017      Resolved Hospital Problems    Diagnosis Date Noted Date Resolved   No resolved problems to display.       Plan:  The patient's admitted hospital continue with some broad-spectrum IV antibiotics and check cultures.  We'll check stool cultures and stool for C. difficile.  We'll  "get CT scanning of the abdomen and pelvis.    Tejas Masterson MD  2018  1:30 AM      Electronically signed by Tejas Masterson MD at 2018  1:30 AM          Emergency Department Notes      Susana Cao RN at 2018  9:19 PM        Pt states \"I'm near death\", states she developed diarrhea Saturday and \"it happens in my sleep and I don't even know that I've crapped on myself\".  Pt takes lomotil and states \"I know it's addicting so I don't like taking it\".      Pt denies nausea, denies vomiting, reports \"horrible\" appetite.      Pt currently c/o shortness of breath.    Electronically signed by Susana Cao RN at 2018  9:20 PM     Higinio Quintana MD at 2018  9:28 PM           EMERGENCY DEPARTMENT ENCOUNTER    CHIEF COMPLAINT  Chief Complaint: diarrhea  History given by: patient family  History limited by: nothing  Room Number: 10/10  PMD: Mary Taylor MD  Oncologist- Dr. Collier    HPI:  Pt is a 68 y.o. female who presents complaining of frequent diarrhea for the last four days. Pt states that she has had one episode of diarrhea today after taking lomotil. Pt states that she \"doesn't even know\" when she is having a BM. Pt also complains of moderate, upper abd pain and SOA but denies N/V, fever, HA, sore throat, CP, cough, bloody or black stool, dysuria, LE pain or LE swelling. Pt's family states that the pt has had generalized weakness and been off-balance as well. Pt states that she has not had chemotherapy or radiation since 2018.    Duration/Onset/Timin days, gradual, intermittent  Location: N/A  Radiation: N/A  Quality: watery  Intensity/Severity: moderate to severe  Associated Symptoms: abd pain, SOA, generalized weakness, off-balance  Aggravating or Alleviating Factors: Pt states that her diarrhea improved after taking Lomotil earlier today.  Previous Episodes: none      PAST MEDICAL HISTORY  Active Ambulatory Problems     Diagnosis Date Noted   • Adenocarcinoma of left lung (CMS/HCC) " 10/31/2017   • Panlobular emphysema (CMS/HCC) 10/31/2017   • Abnormal finding on imaging 11/14/2017   • Esophageal carcinoma (CMS/HCC) 12/08/2017   • Fitting and adjustment of vascular catheter 01/08/2018   • OROZCO (dyspnea on exertion) 02/09/2018   • Radiation esophagitis 02/15/2018     Resolved Ambulatory Problems     Diagnosis Date Noted   • No Resolved Ambulatory Problems     Past Medical History:   Diagnosis Date   • Adenocarcinoma of lung (CMS/HCC) 2017   • Anxiety and depression    • Arthritis    • Benign parotid tumor 2012   • COPD (chronic obstructive pulmonary disease) (CMS/HCC)    • Depression    • Diarrhea    • Early cataract    • Emphysema of lung (CMS/HCC)    • Esophageal cancer (CMS/HCC) 2017   • History of chronic pain    • History of colon polyps    • History of pneumonia    • Hyperlipidemia    • Joint pain    • Stroke (CMS/HCC)        PAST SURGICAL HISTORY  Past Surgical History:   Procedure Laterality Date   • BRONCHOSCOPY     • CHOLECYSTECTOMY  1999   • COLON SURGERY  2015    COLON POLYPS   • COLONOSCOPY  04/13/2016    TA w/low grade dysplasia x 3, serrated adenoma x 2   • COLONOSCOPY  06/14/2016    TA w/high grade dysplasia   • ENDOSCOPY N/A 12/6/2017    Procedure: ESOPHAGOGASTRODUODENOSCOPY WITH BIOPSY;  Surgeon: Jayant Robles MD;  Location: [facility] ENDOSCOPY;  Service:    • ENDOSCOPY N/A 3/17/2018    Procedure: ESOPHAGOGASTRODUODENOSCOPY WITH BIOPSIES;  Surgeon: Ambrosio Sapp MD;  Location: [facility] ENDOSCOPY;  Service: Gastroenterology   • FOOT SURGERY  10/2010    [place]   • HYSTERECTOMY     • SD INSJ TUNNELED CVC W/O SUBQ PORT/ AGE 3 YR/> Right 1/9/2018    Procedure: MEDIPORT PLACEMENT right;  Surgeon: Damaso Germain MD;  Location: [facility] HYBRID OR 18/19;  Service: Vascular   • SALIVARY GLAND SURGERY      PAROTID MASS REMOVED BENIGN   • SHOULDER ARTHROSCOPY Left 1995. 2001       FAMILY HISTORY  Family History   Problem Relation Age of Onset   • Cancer Sister    • Lung cancer Sister     • Leukemia Cousin    • Heart disease Father    • Cancer Sister    • Malig Hyperthermia Neg Hx        SOCIAL HISTORY  Social History     Social History   • Marital status:      Spouse name: N/A   • Number of children: N/A   • Years of education: High school     Occupational History   •  Retired     Social History Main Topics   • Smoking status: Former Smoker     Packs/day: 1.00     Years: 30.00     Types: Cigarettes     Quit date: 1973   • Smokeless tobacco: Never Used      Comment: Caffeine-2 daily   • Alcohol use No   • Drug use: No   • Sexual activity: Defer     Other Topics Concern   • Not on file     Social History Narrative    Lives alone.       ALLERGIES  Penicillins; Bactrim [sulfamethoxazole-trimethoprim]; and Sulfa antibiotics    REVIEW OF SYSTEMS  Review of Systems   Constitutional: Negative for chills and fever.   HENT: Negative.  Negative for sore throat.    Eyes: Negative.    Respiratory: Positive for shortness of breath. Negative for cough.    Cardiovascular: Negative.  Negative for chest pain.   Gastrointestinal: Positive for abdominal pain and diarrhea.   Genitourinary: Negative.  Negative for dysuria.   Musculoskeletal: Positive for gait problem (off-balance). Negative for back pain.   Skin: Negative.  Negative for rash.   Neurological: Positive for weakness (generalized). Negative for headaches.   All other systems reviewed and are negative.      PHYSICAL EXAM  ED Triage Vitals   Temp Heart Rate Resp BP SpO2   07/31/18 2122 07/31/18 2120 07/31/18 2120 -- 07/31/18 2120   99.2 °F (37.3 °C) 93 26  94 %      Temp src Heart Rate Source Patient Position BP Location FiO2 (%)   07/31/18 2122 07/31/18 2120 -- -- --   Tympanic Monitor          Physical Exam   Constitutional: No distress.   Pt appears elderly   HENT:   Head: Normocephalic and atraumatic.   Mouth/Throat: Oropharynx is clear and moist. Mucous membranes are dry.   Eyes:   Unremarkable   Cardiovascular: Normal rate and regular rhythm.     No murmur heard.  Pulmonary/Chest: No respiratory distress. She has decreased breath sounds (bilaterally).   O2=95% on RA   Abdominal: There is generalized tenderness (mild).   Musculoskeletal: She exhibits no edema or tenderness.   Neurological: She is alert.   Skin: No rash noted.   Nursing note and vitals reviewed.      LAB RESULTS  Lab Results (last 24 hours)     Procedure Component Value Units Date/Time    CBC & Differential [[id number]] Collected:  07/31/18 2146    Specimen:  Blood Updated:  07/31/18 2156    Narrative:       The following orders were created for panel order CBC & Differential.  Procedure                               Abnormality         Status                     ---------                               -----------         ------                     CBC Auto Differential[[id number]]        Abnormal            Final result                 Please view results for these tests on the individual orders.    Comprehensive Metabolic Panel [[id number]]  (Abnormal) Collected:  07/31/18 2146    Specimen:  Blood Updated:  07/31/18 2222     Glucose 142 (H) mg/dL      BUN 14 mg/dL      Creatinine 1.05 (H) mg/dL      Sodium 130 (L) mmol/L      Potassium 3.1 (L) mmol/L      Chloride 88 (L) mmol/L      CO2 26.7 mmol/L      Calcium 9.9 mg/dL      Total Protein 7.3 g/dL      Albumin 4.10 g/dL      ALT (SGPT) 17 U/L      AST (SGOT) 18 U/L      Alkaline Phosphatase 88 U/L      Total Bilirubin 1.0 mg/dL      eGFR Non African Amer 52 (L) mL/min/1.73      Globulin 3.2 gm/dL      A/G Ratio 1.3 g/dL      BUN/Creatinine Ratio 13.3     Anion Gap 15.3 mmol/L     Lactic Acid, Plasma [[id number]]  (Abnormal) Collected:  07/31/18 2146    Specimen:  Blood Updated:  07/31/18 2211     Lactate 2.5 (C) mmol/L     Blood Culture - Blood, [[id number]] Collected:  07/31/18 2146    Specimen:  Blood from Arm, Left Updated:  07/31/18 2151    C-reactive Protein [[id number]]  (Abnormal) Collected:  07/31/18 2146    Specimen:  Blood Updated:   07/31/18 2222     C-Reactive Protein 20.00 (H) mg/dL     CBC Auto Differential [[id number]]  (Abnormal) Collected:  07/31/18 2146    Specimen:  Blood Updated:  07/31/18 2156     WBC 12.92 (H) 10*3/mm3      RBC 3.75 (L) 10*6/mm3      Hemoglobin 10.9 (L) g/dL      Hematocrit 32.6 (L) %      MCV 86.9 fL      MCH 29.1 pg      MCHC 33.4 g/dL      RDW 13.0 %      RDW-SD 42.1 fl      MPV 9.7 fL      Platelets 159 10*3/mm3      Neutrophil % 84.4 (H) %      Lymphocyte % 5.0 (L) %      Monocyte % 9.8 %      Eosinophil % 0.1 (L) %      Basophil % 0.1 %      Immature Grans % 0.6 (H) %      Neutrophils, Absolute 10.92 (H) 10*3/mm3      Lymphocytes, Absolute 0.64 (L) 10*3/mm3      Monocytes, Absolute 1.26 (H) 10*3/mm3      Eosinophils, Absolute 0.01 10*3/mm3      Basophils, Absolute 0.01 10*3/mm3      Immature Grans, Absolute 0.08 (H) 10*3/mm3     Lactic Acid, Reflex Timer (This will reflex a repeat order 3-3:15 hours after ordered.) [[id number]] Collected:  07/31/18 2146    Specimen:  Blood Updated:  07/31/18 2211    Blood Culture - Blood, [[id number]] Collected:  07/31/18 2156    Specimen:  Blood from Arm, Left Updated:  07/31/18 2200    Urinalysis With Culture If Indicated - Urine, Catheter [[id number]] Collected:  07/31/18 2244    Specimen:  Urine from Urine, Catheter Updated:  07/31/18 2249          I ordered the above labs and reviewed the results    RADIOLOGY  XR Chest 2 View   Final Result   Increasing left-sided opacities as discussed. Some of these   most assuredly related to post treatment related changes but extensive   superimposed pneumonia is not excluded.       This report was finalized on 7/31/2018 10:40 PM by Damaso Rosen M.D.               I ordered the above noted radiological studies. Interpreted by radiologist. Reviewed by me in PACS.       PROCEDURES  Procedures      PROGRESS AND CONSULTS     2135- Ordered blood work, blood cultures, lactic acid, CRP and CXR for further evaluation. Ordered IVF for  hydration.    2233- Placed call to [facility] for admission. Ordered stool cultures for further evaluation. Ordered vancomycin and aztreonam for sepsis and potassium for hypokalemia. Ordered 30mL/kg IVF bolus for sepsis.    2240- Discussed the pt's case with Dr. Masterson ([facility]), who agrees to admit the pt to a telemetry bed.    2252- Rechecked pt. Pt is resting comfortably. SG=204/109. Notified pt of her lab and imaging results, including her elevated lactic acid. Discussed the plan to admit the pt for IV abx and further evaluation. Pt and family agree with the plan and all questions were addressed.    MEDICAL DECISION MAKING  Results were reviewed/discussed with the patient and they were also made aware of online access. Pt also made aware that some labs, such as cultures, will not be resulted during ER visit and follow up with PMD is necessary.     MDM  Number of Diagnoses or Management Options     Amount and/or Complexity of Data Reviewed  Clinical lab tests: ordered and reviewed (WBC=12.92, lactic acid=2.5)  Tests in the radiology section of CPT®:  ordered and reviewed (CXR shows an increasing left sided opacity)  Decide to obtain previous medical records or to obtain history from someone other than the patient: yes  Obtain history from someone other than the patient: yes (family)  Review and summarize past medical records: yes (Pt last saw Dr. Collier (heme/onc) on 7/18/18 for routine follow up. Reviewed office visit.)  Discuss the patient with other providers: yes (D/w Dr. Masterson ([facility]))  Independent visualization of images, tracings, or specimens: yes    Patient Progress  Patient progress: improved         DIAGNOSIS  Final diagnoses:   Sepsis, due to unspecified organism (CMS/HCC)   Diarrhea, unspecified type   Hypokalemia       DISPOSITION  ADMISSION    Discussed treatment plan and reason for admission with pt/family and admitting physician.  Pt/family voiced understanding of the plan for admission for further  testing/treatment as needed.     Latest Documented Vital Signs:  As of 10:53 PM  BP- 130/95 HR- 84 Temp- 99.2 °F (37.3 °C) (Tympanic) O2 sat- 97%    --  Documentation assistance provided by reginald Flores for Dr. Quintana.  Information recorded by the scribe was done at my direction and has been verified and validated by me.     Lou Flores  07/31/18 6279       Higinio Quintana MD  07/31/18 2259      Electronically signed by Higinio Quintana MD at 7/31/2018 11:28 PM       Lines, Drains & Airways    Active LDAs     Name:   Placement date:   Placement time:   Site:   Days:    Peripheral IV 07/31/18 2145 Right Antecubital  07/31/18 2145    Antecubital    less than 1    Single Lumen Implantable Port Right Subclavian          Subclavian             Inactive LDAs     None                    Maria Eugenia Gayle, RN Registered Nurse Signed   Plan of Care Date of Service: 8/1/2018  4:43 AM         Problem: Patient Care Overview  Goal: Plan of Care Review  Outcome: Ongoing (interventions implemented as appropriate)    08/01/18 0442   Coping/Psychosocial   Plan of Care Reviewed With patient   Plan of Care Review   Progress improving   OTHER   Outcome Summary Minimal complaints of pain. VSS. Lactic is improving. Remians on IV fluids. Slept well. Up ad dick. Continue to monitor.          Problem: Sepsis/Septic Shock (Adult)  Goal: Signs and Symptoms of Listed Potential Problems Will be Absent, Minimized or Managed (Sepsis/Septic Shock)  Outcome: Ongoing (interventions implemented as appropriate)        Problem: Pain, Acute (Adult)  Goal: Identify Related Risk Factors and Signs and Symptoms  Outcome: Ongoing (interventions implemented as appropriate)     Goal: Acceptable Pain Control/Comfort Level  Outcome: Ongoing (interventions implemented as appropriate)                         Lucille Muhammad formerly [facility] Pharmacist Signed Pharmacy  Progress Notes Date of Service: 8/1/2018 11:36 AM        Pharmacokinetic Consult -   "Azactam and Vancomycin Dosing (Follow-up Note)     Dilma Abad is on day #2  pharmacy to dose vancomycin for Intra-abdominal infection/sepsis.  Pharmacy dosing vancomycin per Dr Masterson's request.   Goal trough: 15-20 mg/L  For Vancomycin  Duration: 7 days     Relevant clinical data and objective history reviewed:  68 y.o. female 170.2 cm (67\") 50.8 kg (112 lb)     Medical History        Past Medical History:   Diagnosis Date   • Adenocarcinoma of lung (CMS/HCC) 2017     HAD RADIATION    • Anxiety and depression     • Arthritis     • Benign parotid tumor 2012     removed by Dr Vickers told it was benign   • COPD (chronic obstructive pulmonary disease) (CMS/HCC)     • Depression     • Diarrhea     • Early cataract     • Emphysema of lung (CMS/HCC)     • Esophageal cancer (CMS/HCC) 2017   • History of chronic pain     • History of colon polyps     • History of pneumonia     • Hyperlipidemia     • Joint pain     • Stroke (CMS/HCC)                 Creatinine   Date Value Ref Range Status   08/01/2018 0.80 0.57 - 1.00 mg/dL Final   07/31/2018 1.05 (H) 0.57 - 1.00 mg/dL Final            BUN   Date Value Ref Range Status   08/01/2018 13 8 - 23 mg/dL Final      Estimated Creatinine Clearance: 52.5 mL/min (by C-G formula based on SCr of 0.8 mg/dL).           Lab Results   Component Value Date     WBC 7.61 08/01/2018          Temp Readings from Last 3 Encounters:   08/01/18 98.9 °F (37.2 °C) (Oral)   07/18/18 98.2 °F (36.8 °C) (Oral)   05/07/18 98.6 °F (37 °C) (Oral)      Baseline culture/source/susceptibility: 7/31 B/C x 2  NG x 24 hrs                                                                8/01 C Diff: negative                   Anti-Infectives      Ordered     Dose/Rate Route Frequency Start Stop     07/31/18 3217   vancomycin 750 mg/250 mL 0.9% NS add-vantage     Ordering Provider:  Tejas Masterson MD    17 mg/kg × 50.8 kg  over 60 Minutes Intravenous Every 24 Hours 08/01/18 1800 08/07/18 1759     07/31/18 8009   " "aztreonam (AZACTAM) 2 g/100 mL 0.9% NS (mbp)     Ordering Provider:  Tejas Masterson MD    2 g  over 4 Hours Intravenous Every 8 Hours 08/01/18 0600 08/08/18 0559     07/31/18 2326   Pharmacy to dose vancomycin     Ordering Provider:  Tejas Masterson MD      Does not apply Continuous PRN 07/31/18 2326 08/07/18 2325     07/31/18 2234   aztreonam (AZACTAM) 2 g/100 mL 0.9% NS (mbp)     Ordering Provider:  Higinio Quintana MD    2 g  over 30 Minutes Intravenous Once 07/31/18 2236 07/31/18 2340     07/31/18 2234   vancomycin (VANCOCIN) in iso-osmotic dextrose IVPB 1 g (premix) 200 mL     Ordering Provider:  Higinio Quintana MD    20 mg/kg × 50.8 kg Intravenous Once 07/31/18 2236 07/31/18 2343           VANCOMYCIN DOSING SCHEDULE ( INCLUDING LEVELS)  7/31   2343   Vancomycin 1000mg ( 20mg/kg) iv x 1 dose  8/01   1800   Vancomycin 750mg ( 17mg/kg) iv q 24 hours  8/03   1730   Vancomycin trough prior to 4th overall dose      Azactam Dosing  Azactam 2 grams iv q 8 hours extended infusion      Assessment/Plan  1. See above for dosing schedule   2. Monitor renal function…serum creatinine in am   Today=0.8  3. Encourage hydration to prevent toxic accumulation   4. Monitor for s/sx of toxicity including incr in SCr and decr in UOP  5. Pharmacy will continue to follow and adjust accordingly     Lucille Muhammad [facility]             Alireza Maldonado [facility] Pharmacist Signed Pharmacy  Progress Notes Date of Service: 7/31/2018 11:59 PM         Pharmacy Consult - Aztreonam     Dilma Abad has been consulted for Aztreonam dosing for Intra-Abdominal Infection, Sepsis  Pharmacy dosing per Dr Tejas Masterson's request.      Relevant clinical data and objective history reviewed:  68 y.o. female 170.2 cm (67\")   50.8 kg (112 lb)   Ideal body weight: 61.6 kg (135 lb 12.9 oz)          Active Ambulatory Problems     Diagnosis Date Noted   • Adenocarcinoma of left lung (CMS/HCC) 10/31/2017   • Panlobular emphysema (CMS/HCC) 10/31/2017   • Abnormal " finding on imaging 11/14/2017   • Esophageal carcinoma (CMS/HCC) 12/08/2017   • Fitting and adjustment of vascular catheter 01/08/2018   • OROZCO (dyspnea on exertion) 02/09/2018   • Radiation esophagitis 02/15/2018           Resolved Ambulatory Problems     Diagnosis Date Noted   • No Resolved Ambulatory Problems           Past Medical History:   Diagnosis Date   • Adenocarcinoma of lung (CMS/HCC) 2017   • Anxiety and depression     • Arthritis     • Benign parotid tumor 2012   • COPD (chronic obstructive pulmonary disease) (CMS/[facility])     • Depression     • Diarrhea     • Early cataract     • Emphysema of lung (CMS/[facility])     • Esophageal cancer (CMS/HCC) 2017   • History of chronic pain     • History of colon polyps     • History of pneumonia     • Hyperlipidemia     • Joint pain     • Stroke (CMS/[facility])        Allergies:         Allergies as of 07/31/2018 - Reviewed 07/31/2018   Allergen Reaction Noted   • Penicillins Hives 10/06/2017   • Bactrim [sulfamethoxazole-trimethoprim] Itching 05/07/2018   • Sulfa antibiotics Itching 05/07/2018         Other Antibiotics/Anti-infectives on profile: Vancomyin            Lab Results   Component Value Date     GLUCOSE 142 (H) 07/31/2018     CALCIUM 9.9 07/31/2018      (L) 07/31/2018     K 3.1 (L) 07/31/2018     CO2 26.7 07/31/2018     CL 88 (L) 07/31/2018     BUN 14 07/31/2018     CREATININE 1.05 (H) 07/31/2018     EGFRIFNONA 52 (L) 07/31/2018     BCR 13.3 07/31/2018     ANIONGAP 15.3 07/31/2018            Lab Results   Component Value Date     WBC 12.92 (H) 07/31/2018     HGB 10.9 (L) 07/31/2018     HCT 32.6 (L) 07/31/2018     MCV 86.9 07/31/2018      07/31/2018         Estimated Creatinine Clearance: 40 mL/min (A) (by C-G formula based on SCr of 1.05 mg/dL (H)).  No intake/output data recorded.      Temp Readings from Last 3 Encounters:   07/31/18 98.9 °F (37.2 °C)   07/18/18 98.2 °F (36.8 °C) (Oral)   05/07/18 98.6 °F (37 °C) (Oral)         Assessment/Plan     Will  start patient on Aztreonam 2 gm iv q8h      Pharmacy will follow daily.        Alireza Maldonado RPH

## 2018-08-01 NOTE — PROGRESS NOTES
"Pharmacy Consult - Aztreonam    Dilma Abad has been consulted for Aztreonam dosing for Intra-Abdominal Infection, Sepsis  Pharmacy dosing per Dr Tejas Masterson's request.     Relevant clinical data and objective history reviewed:  68 y.o. female 170.2 cm (67\")   50.8 kg (112 lb)   Ideal body weight: 61.6 kg (135 lb 12.9 oz)    Active Ambulatory Problems     Diagnosis Date Noted   • Adenocarcinoma of left lung (CMS/HCC) 10/31/2017   • Panlobular emphysema (CMS/HCC) 10/31/2017   • Abnormal finding on imaging 11/14/2017   • Esophageal carcinoma (CMS/HCC) 12/08/2017   • Fitting and adjustment of vascular catheter 01/08/2018   • OROZCO (dyspnea on exertion) 02/09/2018   • Radiation esophagitis 02/15/2018     Resolved Ambulatory Problems     Diagnosis Date Noted   • No Resolved Ambulatory Problems     Past Medical History:   Diagnosis Date   • Adenocarcinoma of lung (CMS/HCC) 2017   • Anxiety and depression    • Arthritis    • Benign parotid tumor 2012   • COPD (chronic obstructive pulmonary disease) (CMS/[facility])    • Depression    • Diarrhea    • Early cataract    • Emphysema of lung (CMS/HCC)    • Esophageal cancer (CMS/[facility]) 2017   • History of chronic pain    • History of colon polyps    • History of pneumonia    • Hyperlipidemia    • Joint pain    • Stroke (CMS/[facility])      Allergies:   Allergies as of 07/31/2018 - Reviewed 07/31/2018   Allergen Reaction Noted   • Penicillins Hives 10/06/2017   • Bactrim [sulfamethoxazole-trimethoprim] Itching 05/07/2018   • Sulfa antibiotics Itching 05/07/2018       Other Antibiotics/Anti-infectives on profile: Vancomyin      Lab Results   Component Value Date    GLUCOSE 142 (H) 07/31/2018    CALCIUM 9.9 07/31/2018     (L) 07/31/2018    K 3.1 (L) 07/31/2018    CO2 26.7 07/31/2018    CL 88 (L) 07/31/2018    BUN 14 07/31/2018    CREATININE 1.05 (H) 07/31/2018    EGFRIFNONA 52 (L) 07/31/2018    BCR 13.3 07/31/2018    ANIONGAP 15.3 07/31/2018     Lab Results   Component Value Date    WBC " 12.92 (H) 07/31/2018    HGB 10.9 (L) 07/31/2018    HCT 32.6 (L) 07/31/2018    MCV 86.9 07/31/2018     07/31/2018       Estimated Creatinine Clearance: 40 mL/min (A) (by C-G formula based on SCr of 1.05 mg/dL (H)).  No intake/output data recorded.  Temp Readings from Last 3 Encounters:   07/31/18 98.9 °F (37.2 °C)   07/18/18 98.2 °F (36.8 °C) (Oral)   05/07/18 98.6 °F (37 °C) (Oral)       Assessment/Plan    Will start patient on Aztreonam 2 gm iv q8h     Pharmacy will follow daily.      Alireza Maldonado, [facility]

## 2018-08-01 NOTE — PROGRESS NOTES
Malnutrition Severity Assessment    Patient Name:  Dilma Abad  YOB: 1950  MRN: [id number]  Admit Date:  7/31/2018    Patient meets criteria for : Mild malnutrition    Mild malnutrition of chronic illness. BMI-17.5, 82% IBW. Poor po intake for the past week.  Nutrition focused physical exam, mild muscle loss to temporalis, clavicle and acromion bone region; mild fat loss to orbital and thoracic region.    Malnutrition Type: Chronic Illness Malnutrition     Malnutrition Type (last 8 hours)      Malnutrition Severity Assessment     Row Name 08/01/18 1528       Malnutrition Severity Assessment    Malnutrition Type Chronic Illness Malnutrition    Row Name 08/01/18 1528       Physical Signs of Malnutrition (Chronic)    Muscle Wasting Mild    Fat Loss Mild    Row Name 08/01/18 1528       Weight Status (Chronic)    BMI Mod (<17)   BMI-17.5    %IBW Mild (<90%)   82%    Row Name 08/01/18 1528       Energy Intake Status (Chronic)    Energy Intake Mild (<75% / 5d)    Row Name 08/01/18 1528       Criteria Met (Must meet criteria for severity in at least 2 of these categories: M Wasting, Fat Loss, Fluid, Secondary Signs, Wt. Status, Intake)    Patient meets criteria for  Mild malnutrition          Electronically signed by:  Darshana Denise RD  08/01/18 3:32 PM

## 2018-08-02 NOTE — PLAN OF CARE
Problem: Patient Care Overview  Goal: Plan of Care Review  Outcome: Ongoing (interventions implemented as appropriate)   18 06   Coping/Psychosocial   Plan of Care Reviewed With patient   Plan of Care Review   Progress improving       Problem: Sepsis/Septic Shock (Adult)  Goal: Signs and Symptoms of Listed Potential Problems Will be Absent, Minimized or Managed (Sepsis/Septic Shock)  Outcome: Ongoing (interventions implemented as appropriate)   18 06   Goal/Outcome Evaluation   Problems Assessed (Sepsis) all   Problems Present (Sepsis) glycemic control impaired       Problem: Fall Risk (Adult)  Intervention: Reduce Risk/Promote Restraint Free Environment   18 0500   Safety Management   Environmental Safety Modification room near unit station;lighting adjusted;clutter free environment maintained;assistive device/personal items within reach   Safety Promotion/Fall Prevention safety round/check completed;nonskid shoes/slippers when out of bed;fall prevention program maintained;activity supervised   Prevent  Drop/Fall   Safety/Security Measures bed alarm set     Intervention: Review Medications/Identify Contributors to Fall Risk   18   Safety Management   Medication Review/Management medications reviewed       Goal: Absence of Fall  Outcome: Ongoing (interventions implemented as appropriate)   18 06   Fall Risk (Adult)   Absence of Fall making progress toward outcome

## 2018-08-02 NOTE — PLAN OF CARE
Problem: Patient Care Overview  Goal: Plan of Care Review  Outcome: Ongoing (interventions implemented as appropriate)   08/02/18 1500   OTHER   Outcome Summary VSS. Patient continues on IV abx. O2 2-3L required to maintain sats >90%. PRN pain medications given r/t pleurtic pain. Will continue to monitor.     Goal: Individualization and Mutuality  Outcome: Ongoing (interventions implemented as appropriate)    Goal: Interprofessional Rounds/Family Conf  Outcome: Ongoing (interventions implemented as appropriate)      Problem: Pain, Acute (Adult)  Goal: Identify Related Risk Factors and Signs and Symptoms  Outcome: Ongoing (interventions implemented as appropriate)    Goal: Acceptable Pain Control/Comfort Level  Outcome: Ongoing (interventions implemented as appropriate)      Problem: Nutrition, Imbalanced: Inadequate Oral Intake (Adult)  Goal: Identify Related Risk Factors and Signs and Symptoms  Outcome: Ongoing (interventions implemented as appropriate)    Goal: Improved Oral Intake  Outcome: Ongoing (interventions implemented as appropriate)    Goal: Prevent Further Weight Loss  Outcome: Ongoing (interventions implemented as appropriate)      Problem: Fall Risk (Adult)  Goal: Absence of Fall  Outcome: Ongoing (interventions implemented as appropriate)      Problem: Skin Injury Risk (Adult)  Goal: Identify Related Risk Factors and Signs and Symptoms  Outcome: Ongoing (interventions implemented as appropriate)    Goal: Skin Health and Integrity  Outcome: Ongoing (interventions implemented as appropriate)

## 2018-08-02 NOTE — PLAN OF CARE
Problem: Skin Injury Risk (Adult)  Goal: Identify Related Risk Factors and Signs and Symptoms  Outcome: Ongoing (interventions implemented as appropriate)   08/02/18 0628   Skin Injury Risk (Adult)   Related Risk Factors (Skin Injury Risk) advanced age;body weight extremes;infection;medication     Goal: Skin Health and Integrity  Outcome: Ongoing (interventions implemented as appropriate)   08/02/18 0628   Skin Injury Risk (Adult)   Skin Health and Integrity making progress toward outcome

## 2018-08-02 NOTE — PROGRESS NOTES
Name: Dilma Abad ADMIT: 2018   : 1950  PCP: Mary Taylor MD    MRN: [id number] LOS: 2 days   AGE/SEX: 68 y.o. female    ROOM: [facility]/   Subjective   Feels little better but still complains of weakness  Complains of pleuritic chest pain on the left side    Brief hospital course since admission:  Lung cancer  Hx of esophageal cancer status post radiation  COPD    I have reviewed past medical history, social history, family history, allergies.  No changes from admission note.      Review of Systems   Constitutional: Positive for fatigue.   Respiratory: Positive for shortness of breath.    Gastrointestinal: Positive for diarrhea. Negative for nausea and vomiting.          Objective   Vital Signs  Temp:  [98.3 °F (36.8 °C)-99.1 °F (37.3 °C)] 98.7 °F (37.1 °C)  Heart Rate:  [69-96] 91  Resp:  [18-22] 20  BP: (102-130)/(51-80) 130/62  SpO2:  [88 %-96 %] 96 %  on  Flow (L/min):  [2-3] 3;   Device (Oxygen Therapy): nasal cannula;humidified  Body mass index is 17.54 kg/m².    Intake/Output Summary (Last 24 hours) at 18 1405  Last data filed at 18 1323   Gross per 24 hour   Intake             2581 ml   Output                0 ml   Net             2581 ml       Physical Exam   Constitutional: She is oriented to person, place, and time. She appears cachectic. Nasal cannula in place.   HENT:   Head: Atraumatic.   Eyes: Pupils are equal, round, and reactive to light. No scleral icterus.   Cardiovascular: Normal rate and regular rhythm.    Pulmonary/Chest: No accessory muscle usage. No respiratory distress. She has decreased breath sounds. She has no wheezes.   Abdominal: Soft. Normal appearance and bowel sounds are normal. She exhibits no ascites. There is no hepatosplenomegaly.   Neurological: She is alert and oriented to person, place, and time.   Skin: No laceration and no petechiae noted. No cyanosis. Nails show no clubbing.   Psychiatric: She has a normal mood and affect. Her behavior is  normal.   Vitals reviewed.      Results Review:      Results from last 7 days  Lab Units 08/02/18  0525 08/01/18  0542 07/31/18  2146   WBC 10*3/mm3 6.45 7.61 12.92*   HEMOGLOBIN g/dL 8.2* 9.2* 10.9*   HEMATOCRIT % 25.3* 28.3* 32.6*   PLATELETS 10*3/mm3 130* 144 159       Results from last 7 days  Lab Units 08/02/18  0525 08/01/18  0542 07/31/18  2146   SODIUM mmol/L 139 138 130*   POTASSIUM mmol/L 3.8 4.1 3.1*   CHLORIDE mmol/L 106 103 88*   CO2 mmol/L 23.1 24.5 26.7   BUN mg/dL 10 13 14   CREATININE mg/dL 0.55* 0.80 1.05*   GLUCOSE mg/dL 111* 87 142*   CALCIUM mg/dL 8.1* 8.8 9.9         Hemoglobin A1C:No results found for: HGBA1C  Glucose Range:No results found for: POCGLU      aztreonam 1 g Intravenous Q8H   ipratropium-albuterol 3 mL Nebulization Q8H - RT   lactobacillus acidophilus 1 capsule Oral Daily   pantoprazole 40 mg Oral Daily   vancomycin 17 mg/kg Intravenous Q24H       Pharmacy Consult     Pharmacy to dose vancomycin     sodium chloride 100 mL/hr Last Rate: 100 mL/hr (08/01/18 0057)   Diet Regular  Assessment/Plan       Assessment/Plan      Active Hospital Problems    Diagnosis Date Noted   • **Pneumonia due to infectious organism [J18.9] 08/01/2018   • Generalized abdominal pain [R10.84] 08/01/2018   • Diarrhea [R19.7] 08/01/2018   • Sepsis (CMS/[facility]) [A41.9] 07/31/2018   • Anxiety and depression [F41.8] 07/31/2018   • COPD (chronic obstructive pulmonary disease) (CMS/[facility]) [J44.9] 07/31/2018   • Hyperlipidemia [E78.5] 07/31/2018   • Adenocarcinoma of left lung (CMS/[facility]) [C34.92] 10/31/2017   • Panlobular emphysema (CMS/[facility]) [J43.1] 10/31/2017      Resolved Hospital Problems    Diagnosis Date Noted Date Resolved   No resolved problems to display.     Continue antibiotics for pneumonia, namely Zosyn and vacomycin  Swallow study   Dehydration has been corrected  And Imodium for diarrhea  Follow the labs        Hilaria Brunson MD  [facility]  08/02/18

## 2018-08-02 NOTE — PROGRESS NOTES
"Pharmacokinetic Consult - Antibiotic Dosing (Follow-up Note)    Dilma Abad is a 68 y.o. female who is on day 2 pharmacy to dose vanc/zosyn for pna per Dr. Masterson's request.     Relevant clinical data and objective history reviewed:  She  has a past medical history of Adenocarcinoma of lung (CMS/HCC) (2017); Anxiety and depression; Arthritis; Benign parotid tumor (2012); COPD (chronic obstructive pulmonary disease) (CMS/[facility]); Depression; Diarrhea; Early cataract; Emphysema of lung (CMS/HCC); Esophageal cancer (CMS/HCC) (2017); History of chronic pain; History of colon polyps; History of pneumonia; Hyperlipidemia; Joint pain; and Stroke (CMS/[facility]).    Allergies as of 07/31/2018 - Reviewed 07/31/2018   Allergen Reaction Noted   • Penicillins Hives 10/06/2017   • Bactrim [sulfamethoxazole-trimethoprim] Itching 05/07/2018   • Sulfa antibiotics Itching 05/07/2018       /62 (BP Location: Left arm, Patient Position: Lying)   Pulse 91   Temp 98.7 °F (37.1 °C) (Oral)   Resp 20   Ht 170.2 cm (67\")   Wt 50.8 kg (112 lb)   SpO2 96%   BMI 17.54 kg/m²     Estimated Creatinine Clearance: 52.5 mL/min (A) (by C-G formula based on SCr of 0.55 mg/dL (L)).    Results from last 7 days     Lab Units 08/02/18  0525 08/01/18  0542 07/31/18  2146   CREATININE mg/dL 0.55* 0.80 1.05*     Lab Results   Component Value Date    WBC 6.45 08/02/2018     Baseline culture/source/susceptibility: 7/31 B/C x 2  NG x 24 hrs                                                                8/01 C Diff: negative     7/31 CT Extensive opacities are present in the periphery of the left lower lobe concerning for pneumonia    Dosing hx:   7/31 2343 vanc 1000 once   8/1 2346 vanc 750 mg   8/2 vanc 500 mg q12 (renal fxn improved)    Assessment/Plan  Chart reviewed   Renal function is improved     1. Vanc 500 mg q12  2. Zosyn 3.375 q8  2. Creatinine in am  4. Vt 8/3 1500    Pharmacy will continue to follow patient daily and adjust dose as needed. "     Ramona Florence, PharmD, BCPS  8/2/2018 2:41 PM

## 2018-08-02 NOTE — PLAN OF CARE
Problem: Patient Care Overview  Goal: Plan of Care Review   08/02/18 1528   Coping/Psychosocial   Plan of Care Reviewed With patient   OTHER   Outcome Summary Pt seen this date for a bedside swallow evaluation. SLP recs regular and thins. Small bites/sips. No straws. Meds whole with puree. SLP to sign off. Please re consult if indicated.

## 2018-08-02 NOTE — PLAN OF CARE
Problem: Fall Risk (Adult)  Goal: Identify Related Risk Factors and Signs and Symptoms  Outcome: Outcome(s) achieved Date Met: 08/02/18 08/01/18 9174   Fall Risk (Adult)   Signs and Symptoms (Fall Risk) presence of risk factors

## 2018-08-02 NOTE — THERAPY EVALUATION
Acute Care - Speech Language Pathology   Swallow Initial Evaluation & D/C [facility]     Patient Name: Dilma Abad  : 1950  MRN: [id number]  Today's Date: 2018               Admit Date: 2018    Visit Dx:     ICD-10-CM ICD-9-CM   1. Sepsis, due to unspecified organism (CMS/HCC) A41.9 038.9     995.91   2. Diarrhea, unspecified type R19.7 787.91   3. Hypokalemia E87.6 276.8     Patient Active Problem List   Diagnosis   • Adenocarcinoma of left lung (CMS/HCC)   • Panlobular emphysema (CMS/HCC)   • Abnormal finding on imaging   • Esophageal carcinoma (CMS/HCC)   • Fitting and adjustment of vascular catheter   • OROZCO (dyspnea on exertion)   • Radiation esophagitis   • Sepsis (CMS/HCC)   • Anxiety and depression   • COPD (chronic obstructive pulmonary disease) (CMS/HCC)   • Hyperlipidemia   • Generalized abdominal pain   • Diarrhea   • Pneumonia due to infectious organism     Past Medical History:   Diagnosis Date   • Adenocarcinoma of lung (CMS/HCC) 2017    HAD RADIATION    • Anxiety and depression    • Arthritis    • Benign parotid tumor 2012    removed by Dr Vickers told it was benign   • COPD (chronic obstructive pulmonary disease) (CMS/HCC)    • Depression    • Diarrhea    • Early cataract    • Emphysema of lung (CMS/HCC)    • Esophageal cancer (CMS/HCC)    • History of chronic pain    • History of colon polyps    • History of pneumonia    • Hyperlipidemia    • Joint pain    • Stroke (CMS/HCC)      Past Surgical History:   Procedure Laterality Date   • BRONCHOSCOPY     • CHOLECYSTECTOMY     • COLON SURGERY  2015    COLON POLYPS   • COLONOSCOPY  2016    TA w/low grade dysplasia x 3, serrated adenoma x 2   • COLONOSCOPY  2016    TA w/high grade dysplasia   • ENDOSCOPY N/A 2017    Procedure: ESOPHAGOGASTRODUODENOSCOPY WITH BIOPSY;  Surgeon: Jayant Robles MD;  Location: [facility] ENDOSCOPY;  Service:    • ENDOSCOPY N/A 3/17/2018    Procedure: ESOPHAGOGASTRODUODENOSCOPY  WITH BIOPSIES;  Surgeon: Ambrosio Sapp MD;  Location: [facility] ENDOSCOPY;  Service: Gastroenterology   • FOOT SURGERY  10/2010    [place]   • HYSTERECTOMY     • CA INSJ TUNNELED CVC W/O SUBQ PORT/ AGE 3 YR/> Right 1/9/2018    Procedure: MEDIPORT PLACEMENT right;  Surgeon: Damaso Germain MD;  Location: [facility] HYBRID OR 18/19;  Service: Vascular   • SALIVARY GLAND SURGERY      PAROTID MASS REMOVED BENIGN   • SHOULDER ARTHROSCOPY Left 1995. 2001          SWALLOW EVALUATION (last 72 hours)      SLP Adult Swallow Evaluation     Row Name 08/02/18 1500                   Rehab Evaluation    Document Type evaluation  -SH (r) ES (t) SH (c)        Subjective Information complains of;pain  -SH (r) ES (t) SH (c)        Patient Observations alert;cooperative;agree to therapy  -SH (r) ES (t) SH (c)        Patient Effort good  -SH (r) ES (t) SH (c)        Symptoms Noted During/After Treatment none  -SH (r) ES (t) SH (c)           General Information    Patient Profile Reviewed yes  -SH (r) ES (t) SH (c)        Pertinent History Of Current Problem Pt was admitted to the hospital with L lobe pneumonia. Pt with hx of lung cancer, esophageal cancer, radiation and chemotherapy, esophagitis, emphysema, and stroke. Pt with no speech therapy hx.   -SH (r) ES (t) SH (c)        Current Method of Nutrition regular textures;thin liquids  -SH (r) ES (t) SH (c)        Precautions/Limitations, Vision WFL;for purposes of eval  -SH (r) ES (t) SH (c)        Precautions/Limitations, Hearing WFL;for purposes of eval  -SH (r) ES (t) SH (c)        Prior Level of Function-Communication WFL  -SH (r) ES (t) SH (c)        Prior Level of Function-Swallowing no diet consistency restrictions  -SH (r) ES (t) SH (c)        Plans/Goals Discussed with patient;agreed upon  -SH (r) ES (t) SH (c)        Barriers to Rehab none identified  -SH (r) ES (t) SH (c)           Oral Motor and Function    Dentition Assessment upper dentures/partial in place;other (see  comments)   natural lowers  -SH (r) ES (t) SH (c)        Secretion Management WNL/WFL  -SH (r) ES (t) SH (c)        Mucosal Quality moist, healthy  -SH (r) ES (t) SH (c)        Volitional Swallow WFL  -SH (r) ES (t) SH (c)        Volitional Cough reduced respiratory support  -SH (r) ES (t) SH (c)           Oral Musculature and Cranial Nerve Assessment    Oral Motor General Assessment WFL  -SH (r) ES (t) SH (c)           General Eating/Swallowing Observations    Respiratory Support Currently in Use nasal cannula;other (see comments)   2L; 28 breaths/min  -SH (r) ES (t) SH (c)        Eating/Swallowing Skills self-fed;fed by SLP  -SH (r) ES (t)  (c)        Positioning During Eating upright in bed  -SH (r) ES (t) SH (c)        Utensils Used spoon;cup;straw  -SH (r) ES (t) SH (c)        Consistencies Trialed regular textures;mechanical soft, no mixed consistencies;pureed;thin liquids   mixed  -SH (r) ES (t) SH (c)           Respiratory    Respiratory Status increase in respiratory rate;other (see comments);wheezing, stridor   28 breaths per min, 2 liters  -           Clinical Swallow Eval    Oral Prep Phase WFL  -SH (r) ES (t) SH (c)        Oral Transit WFL  -SH (r) ES (t) SH (c)        Oral Residue WFL  -SH (r) ES (t) SH (c)        Pharyngeal Phase no overt signs/symptoms of pharyngeal impairment  -SH (r) ES (t) SH (c)        Esophageal Phase suspected esophageal impairment   hx of esophageal cancer  -SH (r) ES (t) SH (c)        Clinical Swallow Evaluation Summary Pt was seen this date for a bedside swallow evaluation. Pt with adequate hyolaryngeal elevation/excursion, per palpation. Pt on 2L of oxygen with 28 breaths per minute. Pt reports no difficulties swallowing. No overt s/s of aspiration with thins via spoon, cup, or straw, puree, mech soft, mixed, and regular. Pt with throat clear immediately following thins from straw 1/2. Pt at risk with straw and larger bolus d/t SOA. SLP recs regular and thins, no straw.  SLP educated patient on small sips and bites, and no straw.   -           Clinical Impression    SLP Swallowing Diagnosis functional oral phase;functional pharyngeal phase  -SH (r) ES (t) SH (c)        Functional Impact risk of aspiration/pneumonia;other   d/t decreased breath support  -SH (r) ES (t) SH (c)        Criteria for Skilled Therapeutic Interventions Met no problems identified which require skilled intervention  -SH (r) ES (t) SH (c)           Recommendations    Therapy Frequency (Swallow) evaluation only  -SH (r) ES (t) SH (c)        Predicted Duration Therapy Intervention (Days) --  -        SLP Diet Recommendation regular textures;thin liquids  -SH (r) ES (t) SH (c)        Recommended Diagnostics --  -        Recommended Precautions and Strategies no straw;small bites of food and sips of liquid  -SH (r) ES (t) SH (c)        SLP Rec. for Method of Medication Administration meds whole;with pudding or applesauce;as tolerated  -        Monitor for Signs of Aspiration yes;notify SLP if any concerns  -SH (r) ES (t) SH (c)           Swallow Goals (SLP)    Oral Nutrition/Hydration Goal Selection (SLP) --  -SH (r) ES (t) SH (c)          User Key  (r) = Recorded By, (t) = Taken By, (c) = Cosigned By    Initials Name Effective Dates     Dalia Lowe, MS CCC-SLP 03/07/18 -     Bibiana Shaw, Speech Therapy Student 06/11/18 -         EDUCATION  The patient has been educated in the following areas:   Dysphagia (Swallowing Impairment).    SLP Recommendation and Plan                  Sign off.                                      SLP Outcome Measures (last 72 hours)      SLP Outcome Measures     Row Name 08/02/18 1500             SLP Outcome Measures    Outcome Measure Used? Adult NOMS  -SH (r) ES (t) SH (c)         Adult FCM Scores    FCM Chosen Swallowing  -SH (r) ES (t) SH (c)      Swallowing FCM Score 7  -SH (r) ES (t) SH (c)        User Key  (r) = Recorded By, (t) = Taken By, (c) = Cosigned By     Initials Name Effective Dates    Dalia Zhu MS CCC-SLP 03/07/18 -     Bibiana Shaw, Speech Therapy Student 06/11/18 -            Time Calculation:         Time Calculation- SLP     Row Name 08/02/18 1530             Time Calculation- SLP    SLP Start Time 1445  -SH (r) ES (t) SH (c)      SLP Received On 08/02/18  -SH (r) ES (t) SH (c)        User Key  (r) = Recorded By, (t) = Taken By, (c) = Cosigned By    Initials Name Provider Type    Dalia Zhu MS CCC-SLP Speech and Language Pathologist    Bibiana Shaw, Speech Therapy Student Speech Therapy Student                   Dalia Lowe MS CCC-SLP  8/2/2018

## 2018-08-03 NOTE — CONSULTS
Pulmonary Consultation     Patient Name: Dilma Abad  Age/Sex: 68 y.o. female  : 1950  MRN: [id number]    Date of Admission: 2018  Date of Encounter Visit: 18  Encounter Provider: Davina Koenig MD  Referring Provider: Tejsa Masterson MD  Place of Service: [facility]  Patient Care Team:  Mary Taylor MD as PCP - General (Family Medicine)  Bienvenido Collier MD as Consulting Physician (Hematology and Oncology)  Daniel Oconnor MD as Referring Physician (Pulmonary Disease)  Shazia Mcdonald MD as Consulting Physician (Radiation Oncology)      Subjective:     Consulted for: Evaluation of worsening hypoxemic respiratory failure    Chief Complaint: Worsening dyspnea    History of Present Illness:  Dilma Abad is a 68 y.o. female with history of COPD history of lung cancer that was admitted on 2018 with diarrhea and abdominal discomfort.  Patient was dehydrated creatinine was slightly elevated there was no fever but she did have some chills.  She has lung cancer of the left lung with adenocarcinoma status post radiation and chemotherapy and she has history of esophageal cancer as well.  Patient was on oxygen at 2-3 L/m and she had progressive worsening symptoms with increase in her oxygen requirement up to 6 L high flow to maintain adequate oxygenation.  Along with that she was using accessory muscles and she was severely diminished and wheezing on exam.  This is a worsening finding compared to the finding described by Dr. Brunson on his assessment earlier this morning  There was no fever or chills.  On her chest x-ray she was noted to have left lower lobe infiltrate and she had leukocytosis so she was started on antibiotic with Zosyn and vancomycin, her leukocytosis did improve with the hydration and with the antibiotics.  [place] patient is denying any chest pain, she is unable to expectorate.  The abdominal symptoms had improved over the hospital course  No hemoptysis  The  dyspnea at this point is severe, similar to previous COPD exacerbation, dyspnea is worse by slightest activity and relieved by higher flow oxygen      Pulmonary Functions Testing Results:  Her last spirometry on 1/8/18 showed hyperinflation with total lung capacity at 141% of predicted, she had severe emphysema with DLCO at 41% of predicted.  Her FEV1 was 1.56 at 67% of predicted with FEV1/FVC of 57%      Review of Systems:   Review of Systems   Constitutional: Negative for chills and fever.   HENT: Negative.  Negative for sore throat.    Eyes: Negative.    Respiratory: Positive for shortness of breath. Negative for cough.   see history of present illness for more details  Cardiovascular: Negative.  Negative for chest pain.   Gastrointestinal: Positive for abdominal pain and diarrhea.   Genitourinary: Negative.  Negative for dysuria.   Musculoskeletal: Positive for gait problem (off-balance). Negative for back pain.   Skin: Negative.  Negative for rash.   Neurological: Positive for weakness (generalized). Negative for headaches.   All other systems reviewed and are negative.  Past Medical History:  Past Medical History:   Diagnosis Date   • Adenocarcinoma of lung (CMS/HCC) 2017    HAD RADIATION    • Anxiety and depression    • Arthritis    • Benign parotid tumor 2012    removed by Dr Vickers told it was benign   • COPD (chronic obstructive pulmonary disease) (CMS/HCC)    • Depression    • Diarrhea    • Early cataract    • Emphysema of lung (CMS/HCC)    • Esophageal cancer (CMS/HCC) 2017   • History of chronic pain    • History of colon polyps    • History of pneumonia    • Hyperlipidemia    • Joint pain    • Stroke (CMS/HCC)        Past Surgical History:   Procedure Laterality Date   • BRONCHOSCOPY     • CHOLECYSTECTOMY  1999   • COLON SURGERY  2015    COLON POLYPS   • COLONOSCOPY  04/13/2016    TA w/low grade dysplasia x 3, serrated adenoma x 2   • COLONOSCOPY  06/14/2016    TA w/high grade dysplasia   • ENDOSCOPY  N/A 12/6/2017    Procedure: ESOPHAGOGASTRODUODENOSCOPY WITH BIOPSY;  Surgeon: Jayant Robles MD;  Location: [facility] ENDOSCOPY;  Service:    • ENDOSCOPY N/A 3/17/2018    Procedure: ESOPHAGOGASTRODUODENOSCOPY WITH BIOPSIES;  Surgeon: Ambrosio Sapp MD;  Location: [facility] ENDOSCOPY;  Service: Gastroenterology   • FOOT SURGERY  10/2010    [place]   • HYSTERECTOMY     • ME INSJ TUNNELED CVC W/O SUBQ PORT/ AGE 3 YR/> Right 1/9/2018    Procedure: MEDIPORT PLACEMENT right;  Surgeon: Damaso Germain MD;  Location: [facility] HYBRID OR 18/19;  Service: Vascular   • SALIVARY GLAND SURGERY      PAROTID MASS REMOVED BENIGN   • SHOULDER ARTHROSCOPY Left 1995. 2001       Home Medications:   Prescriptions Prior to Admission   Medication Sig Dispense Refill Last Dose   • buPROPion XL (WELLBUTRIN XL) 300 MG 24 hr tablet Take 300 mg by mouth Every Evening.   Taking   • CARAFATE 1 GM/10ML suspension TAKE 10 ML BY MOUTH EVERY 6 HOURS 420 mL 2    • Cholecalciferol (VITAMIN D) 2000 units tablet Take 2,000 Units by mouth Every Other Day.   Taking   • cyanocobalamin (VITAMIN B-12) 2000 MCG tablet Take 2,000 mcg by mouth Every Other Day.   Taking   • diazePAM (VALIUM) 5 MG tablet Take 5 mg by mouth 4 (Four) Times a Day As Needed for Anxiety.   Taking   • diphenoxylate-atropine (LOMOTIL) 2.5-0.025 MG per tablet TK 1 T PO  QID PRN  1 Taking   • escitalopram (LEXAPRO) 20 MG tablet Take 20 mg by mouth Every Evening.   Taking   • HYDROcodone-acetaminophen (NORCO) 7.5-325 MG per tablet Take 1 tablet by mouth 4 (Four) Times a Day As Needed for Moderate Pain .   Taking   • Multiple Vitamins-Minerals (CENTRUM SILVER PO) Take 1 tablet by mouth Daily.   Taking   • ondansetron (ZOFRAN) 8 MG tablet Take 1 tablet by mouth Every 8 (Eight) Hours As Needed for Nausea or Vomiting. 60 tablet 5 Taking   • ondansetron (ZOFRAN) 8 MG tablet TAKE 1 TABLET BY MOUTH EVERY 8 HOURS AS NEEDED FOR NAUSEA OR VOMITING 30 tablet 0    • pantoprazole (PROTONIX) 40 MG EC  tablet TAKE 1 TABLET BY MOUTH DAILY 30 tablet 5    • pantoprazole (PROTONIX) 40 MG pack packet Take 1 packet by mouth Every Morning Before Breakfast. 30 each 1 Taking   • Probiotic Product (PROBIOTIC PO) Take 1 capsule by mouth Daily.   Taking   • simvastatin (ZOCOR) 20 MG tablet Take 20 mg by mouth Every Night.   Taking   • umeclidinium-vilanterol (ANORO ELLIPTA) 62.5-25 MCG/INH aerosol powder  inhaler Inhale 1 puff Daily.   Taking   • Wheat Dextrin (BENEFIBER DRINK MIX) pack Take  by mouth Daily.   Taking       Inpatient Medications:  Scheduled Meds:  ipratropium-albuterol 3 mL Nebulization Q4H - RT   lactobacillus acidophilus 1 capsule Oral Daily   lidocaine 1 patch Transdermal Q24H   methylPREDNISolone sodium succinate 80 mg Intravenous Once   pantoprazole 40 mg Oral Daily   piperacillin-tazobactam 3.375 g Intravenous Q8H   vancomycin 500 mg Intravenous Q12H     Continuous Infusions:  Pharmacy to dose vancomycin     Pharmacy to Dose Zosyn     sodium chloride 100 mL/hr Last Rate: 100 mL/hr (08/01/18 0057)     PRN Meds:.•  acetaminophen  •  diazePAM  •  diphenoxylate-atropine  •  HYDROcodone-acetaminophen  •  HYDROcodone-acetaminophen  •  nitroglycerin  •  ondansetron **OR** ondansetron ODT **OR** ondansetron  •  ondansetron  •  Pharmacy to dose vancomycin  •  Pharmacy to Dose Zosyn  •  potassium chloride **OR** potassium chloride **OR** potassium chloride  •  sodium chloride  •  sodium chloride    Allergies:  Allergies   Allergen Reactions   • Penicillins Hives     Happened about 40 years ago, had rash   • Bactrim [Sulfamethoxazole-Trimethoprim] Itching   • Sulfa Antibiotics Itching       Past Social History:  Social History     Social History   • Marital status:    • Years of education: High school     Occupational History   •  Retired     Social History Main Topics   • Smoking status: Former Smoker     Packs/day: 1.00     Years: 30.00     Types: Cigarettes     Quit date: 1973   • Smokeless tobacco: Never  Used      Comment: Caffeine-2 daily   • Alcohol use No   • Drug use: No   • Sexual activity: Defer     Other Topics Concern   • Not on file     Social History Narrative    Lives alone.       Past Family History:  Family History   Problem Relation Age of Onset   • Cancer Sister    • Lung cancer Sister    • Leukemia Cousin    • Heart disease Father    • Cancer Sister    • Malig Hyperthermia Neg Hx            Objective:   Temp:  [98.3 °F (36.8 °C)-98.7 °F (37.1 °C)] 98.3 °F (36.8 °C)  Heart Rate:  [] 128  Resp:  [18-28] 28  BP: (106-180)/(51-94) 149/94  SpO2:  [79 %-96 %] 95 %  on  Flow (L/min):  [2-10] 10 Device (Oxygen Therapy): high-flow nasal cannula;humidified     Intake/Output Summary (Last 24 hours) at 08/02/18 2154  Last data filed at 08/02/18 1820   Gross per 24 hour   Intake             2271 ml   Output                0 ml   Net             2271 ml     Body mass index is 17.54 kg/m².  1    07/31/18  2149 07/31/18  2330 08/01/18  0045   Weight: 50.8 kg (112 lb) 50.8 kg (112 lb) 50.8 kg (112 lb)     Weight change:     Physical Exam:   Physical Exam   General:    Alert and oriented ×4, anxious but pleasant, in obvious respiratory distress, using accessory muscles                    Head:    Normocephalic, atraumatic.   Eyes:          Conjunctivae and sclerae normal, no icterus, PERRLA   Throat:   No oral lesions, no thrush, oral mucosa moist. Upper dentures    Neck:   Supple, trachea midline.No JVD, she has a port in the right IJ    Lungs:     Mild scoliosis, positive expiratory wheezes, prolonged expiratory phase, possible crackles posteriorly bilaterally, diminished breath sounds, using accessory muscles with labored breathing and respiratory distress.      Heart:    Regular rhythm and Rapid heart rate.  No murmurs, gallops, or rubs noted.   Abdomen:     Soft, non-tender, non-distended, positive bowel sounds.    Extremities:   No clubbing, cyanosis, or edema.     Pulses:   Pulses palpable and equal  bilaterally.    Skin:   No bleeding or rash.   Neuro:   Non-focal.  Moves all extremities well.    Psychiatric:   Anxious      Lab Review:     Results from last 7 days  Lab Units 08/02/18  0525 08/01/18  0542 07/31/18  2146   SODIUM mmol/L 139 138 130*   POTASSIUM mmol/L 3.8 4.1 3.1*   CHLORIDE mmol/L 106 103 88*   CO2 mmol/L 23.1 24.5 26.7   BUN mg/dL 10 13 14   CREATININE mg/dL 0.55* 0.80 1.05*   GLUCOSE mg/dL 111* 87 142*   CALCIUM mg/dL 8.1* 8.8 9.9   AST (SGOT) U/L  --   --  18   ALT (SGPT) U/L  --   --  17   ALBUMIN g/dL  --   --  4.10           Results from last 7 days  Lab Units 08/02/18  0525 08/01/18  0542 07/31/18  2146   WBC 10*3/mm3 6.45 7.61 12.92*   HEMOGLOBIN g/dL 8.2* 9.2* 10.9*   HEMATOCRIT % 25.3* 28.3* 32.6*   PLATELETS 10*3/mm3 130* 144 159   MCV fL 88.2 87.6 86.9   MCH pg 28.6 28.5 29.1   MCHC g/dL 32.4 32.5 33.4   RDW % 13.7* 13.2* 13.0   RDW-SD fl 44.0 42.9 42.1   MPV fL 9.9 9.9 9.7   NEUTROPHIL % % 77.4*  --  84.4*   LYMPHOCYTE % % 7.3*  --  5.0*   MONOCYTES % % 14.0*  --  9.8   EOSINOPHIL % % 0.5  --  0.1*   BASOPHIL % % 0.2  --  0.1   IMM GRAN % % 0.6*  --  0.6*   NEUTROS ABS 10*3/mm3 5.00  --  10.92*   LYMPHS ABS 10*3/mm3 0.47*  --  0.64*   MONOS ABS 10*3/mm3 0.90  --  1.26*   EOS ABS 10*3/mm3 0.03  --  0.01   BASOS ABS 10*3/mm3 0.01  --  0.01   IMMATURE GRANS (ABS) 10*3/mm3 0.04*  --  0.08*                   Invalid input(s): LDLCALC        Results from last 7 days  Lab Units 08/01/18  0542   TSH mIU/mL 2.040       Results from last 7 days  Lab Units 08/02/18 2006   PH, ARTERIAL pH units 7.342*   PCO2, ARTERIAL mm Hg 43.5   PO2 ART mm Hg 58.4*   HCO3 ART mmol/L 23.6       Results from last 7 days  Lab Units 08/01/18  0126 07/31/18  2146   LACTATE mmol/L 2.1* 2.5*       Results from last 7 days  Lab Units 07/31/18  2146   CRP mg/dL 20.00*           Results from last 7 days  Lab Units 07/31/18  2156 07/31/18  2146   BLOODCX  No growth at 24 hours No growth at 24 hours       Results from  last 7 days  Lab Units 07/31/18  2244   NITRITE UA  Negative   WBC UA /HPF None Seen   BACTERIA UA /HPF Trace*   SQUAM EPITHEL UA /HPF None Seen                 Stress echocardiogram 2/9/18:   · Myocardial perfusion imaging indicates a normal myocardial perfusion study with no evidence of ischemia.  · Left ventricular ejection fraction is normal (Calculated EF = 66%).  · Impressions are consistent with a low risk study.  · There is no prior study available for comparison.  Imaging:  Imaging Results (most recent)     Procedure Component Value Units Date/Time    XR Chest 1 View [[id number]] Collected:  08/02/18 2131     Updated:  08/02/18 2135    Narrative:       PORTABLE CHEST X-RAY     CLINICAL HISTORY: sob labored breathing, pneu, copd; A41.9-Sepsis,  unspecified organism; R19.7-Diarrhea, unspecified; E87.6-Hypokalemia     COMPARISON: 7/31/2018.     FINDINGS: Portable AP view of the chest was obtained with overlying  monitor leads in place. Lungs hyperexpanded suggesting COPD. There is  underlying emphysema and fibrosis. There are increasing opacities on the  left side, particularly left perihilar region and left lung base likely  related to worsening pneumonia. Some of this may be related to the  patient's known pulmonary malignancy, particularly in the perihilar  region. There are small effusions present, left greater than right.  Heart size and pulmonary vascularity are normal. Right Port-A-Cath  remains in place.             Impression:       Significant worsening in left-sided pulmonary opacities  likely pneumonia        This report was finalized on 8/2/2018 9:32 PM by Damaso Rosen M.D.       CT Abdomen Pelvis Without Contrast [[id number]] Collected:  08/01/18 0002     Updated:  08/01/18 0009    Narrative:       NONCONTRAST CT SCANS ABDOMEN AND PELVIS     HISTORY: abdominal pain and diarrhea; A41.9-Sepsis, unspecified  organism; R19.7-Diarrhea, unspecified; E87.6-Hypokalemia, lung cancer,  esophageal cancer      COMPARISON: 3/18/2018.     TECHNIQUE:Radiation dose reduction techniques were utilized, including  automated exposure control and exposure modulation based on body size.   Axial images were obtained from the lung bases to the symphysis pubis  without oral or IV contrast per request.      FINDINGS ABDOMEN CT:  Extensive opacities are present in the periphery  of the left lower lobe concerning for pneumonia. Stable tiny right  effusion. Slightly larger left effusion has increased minimally. Absent  gallbladder. Remaining solid organs are otherwise unremarkable without  benefit of IV contrast. Distal esophageal thickening is unchanged and  presumably related to the patient's neoplasm.     FINDINGS PELVIS CT:  Postsurgical changes again noted in the GI tract.  The GI tract not opacified for assessment but non obstructive in  appearance. The appendix is unable to be visualized for evaluation on  this exam without oral or IV contrast. Absent uterus. Moderate urinary  bladder distention. No ascites. Encompassed aorta is non-aneurysmal.                Impression:       1.  Pulmonary findings as discussed.  2. Persistent esophageal thickening presumably related to the patient's  neoplasm history.  3. Otherwise grossly unremarkable noncontrast study              This study was performed with techniques to keep radiation doses as low  as reasonably achievable (ALARA). Individualized dose reduction  techniques using automated exposure control or adjustment of mA and/or  kV according to the patient size were employed.      This report was finalized on 8/1/2018 12:06 AM by Damaso Rosen M.D.       XR Chest 2 View [[id number]] Collected:  07/31/18 2236     Updated:  07/31/18 2243    Narrative:       PA AND LATERAL CHEST X-RAY     HISTORY: Simple sepsis protocol, history of lung and esophageal cancer     COMPARISON: May 2, 2018.     FINDINGS: PA and lateral views of the chest were obtained. The lungs are  hyperexpanded suggesting  COPD. There is underlying emphysema and  fibrosis. Cavitary left upper lobe lesion is again seen, somewhat more  conspicuous on today's study and presumably is related to the patient's  pulmonary malignancy. Adjacent linear densities extending towards the  hilum may be related to areas of atelectasis and/or post treatment  related changes. These have increased slightly. There are increasing  mostly groundglass opacities in the peripheral left perihilar region and  left lung base which could be related to superimposed pneumonia and/or  post treatment related changes. These have developed since the previous  exam. Fibrotic changes in the right lung appears stable. Small left  effusion has increased minimally. Heart size and vascularity are normal.  Right-sided Port-A-Cath is unchanged.             Impression:       Increasing left-sided opacities as discussed. Some of these  most assuredly related to post treatment related changes but extensive  superimposed pneumonia is not excluded.     This report was finalized on 7/31/2018 10:40 PM by Damaso Rosen M.D.                   I personally viewed and interpreted the patient's imaging studies.    Assessment:   1. Acute exacerbation of COPD  2. Worsening acute hypoxemic respiratory failure  3. Pneumonia of the left lung  4. Possible component of hydrostatic edema  5. Adenocarcinoma of the left lung  6. Emphysema  7. Anxiety and depression  8. Mild lactic acidosis on presentation   9. Anemia    Plan:     Patient is already on wide spectrum antibiotic and her white blood count is decreasing in her temperature shows no fever in the last 24 hours, I believe her worsening symptoms are not due to any worsening infection, she does definitely have a component of acute bronchospasm with significant decrease in breath sounds and positive expiratory wheezes and use of accessory muscles and will benefit from systemic steroids for acute COPD exacerbation.  The patient has no jugular  venous distention or edema but the chest x-ray is suggestive of hydrostatic edema on top of her pneumonia and she had 4500 cc of crystalloids over the last 48 hours which might contribute to that as well.  We will give 1 dose of Lasix IV and we will check a proBNP to guide further treatment along with the clinical response to the initial measures  Continue with the current antibiotic and with the bronchodilator therapy  We'll continue to follow-up on her clinical progress  Discussed with the patient and with the nursing staff       Thank you for allowing me to participate in the care of Dilma WHEELER Pollo. Feel free to contact me directly with any further questions or concerns.    Davian Koenig MD  [facility]   08/02/18  9:54 PM    Dictated utilizing Dragon dictation

## 2018-08-03 NOTE — PROGRESS NOTES
"  Daily Progress Note.   [facility] INTENSIVE CARE  8/3/2018    Patient:  Name:  Dilma Abad  MRN:  [id number]  1950  68 y.o.  female         Interval History:  Reviewed ventilator documentation from overnight and discussed with Dr Koenig.      Physical Exam:  BP 97/63   Pulse 78   Temp 98.2 °F (36.8 °C) (Oral)   Resp 24   Ht 170.2 cm (67\")   Wt 50.8 kg (112 lb)   SpO2 99%   BMI 17.54 kg/m²   Body mass index is 17.54 kg/m².    Intake/Output Summary (Last 24 hours) at 08/03/18 0818  Last data filed at 08/03/18 0538   Gross per 24 hour   Intake          2283.67 ml   Output              400 ml   Net          1883.67 ml   Vent Settings       Vt (Set, L): 0.5 L  Resp Rate (Set): 22  Pressure Support (cm H2O): 0 cm H20  FiO2 (%): 40 %  PEEP/CPAP (cm H2O): 9 cm H20    Minute Ventilation (L/min) (Obs): 12.7 L/min  Resp Rate (Observed) Vent: 22     I:E Ratio (Obs): 1:3.00    PIP Observed (cm H2O): 35 cm H2O       GENERAL:  Sedated paralyzed on vent  HEENT:  ET tube, nonicteric sclera, no JVD  PULMONARY:    Crackles worse on right lung fields, mech air entry no wheeze, chest wall port left  CARDIAC:  RRR no MRG, S1 S2  ABD: SNTND BS+  EXT: no c/c/e, pulses symmetric 2+ bilaterally  NEURO:  No spon movement  SKIN: no lesions, no rash  PSYCH: no agitation    Data Review:  Notable Labs:    Results from last 7 days  Lab Units 08/03/18 0521 08/02/18 2328 08/02/18 0525 08/01/18  0542 07/31/18  2146   WBC 10*3/mm3 19.17* 18.16* 6.45 7.61 12.92*   HEMOGLOBIN g/dL 10.5* 11.3* 8.2* 9.2* 10.9*   PLATELETS 10*3/mm3 234 265 130* 144 159     Results from last 7 days  Lab Units 08/03/18 0521 08/02/18 2327 08/02/18 0525 08/01/18  0542 07/31/18  2146   SODIUM mmol/L 134*  --  139 138 130*   POTASSIUM mmol/L 4.7 4.4 3.8 4.1 3.1*   CHLORIDE mmol/L 98  --  106 103 88*   CO2 mmol/L 21.2*  --  23.1 24.5 26.7   BUN mg/dL 14  --  10 13 14   CREATININE mg/dL 0.85  --  0.55* 0.80 1.05*   GLUCOSE mg/dL 161*  --  111* 87 " 142*   CALCIUM mg/dL 8.4*  --  8.1* 8.8 9.9   MAGNESIUM mg/dL  --  2.1  --   --   --    PHOSPHORUS mg/dL  --  7.0*  --   --   --    Estimated Creatinine Clearance: 49.4 mL/min (by C-G formula based on SCr of 0.85 mg/dL).    Imaging:  reviewed    Scheduled meds:      albuterol 6 puff Inhalation Q4H - RT   artificial tears  Both Eyes Q4H   famotidine 20 mg Intravenous Q12H   ipratropium 6 puff Inhalation Q4H - RT   lactobacillus acidophilus 1 capsule Oral Daily   lidocaine 1 patch Transdermal Q24H   methylPREDNISolone sodium succinate 60 mg Intravenous Q12H   pantoprazole 40 mg Oral Daily   piperacillin-tazobactam 3.375 g Intravenous Q8H   vancomycin 500 mg Intravenous Q12H       ASSESSMENT  /  PLAN:  1. Rapidly progressive acute hypercapnic respiratory failure requiring intubation  2. Hypotension multifactorial  3. COPD exacerbation with air trapping and Auto PEEP resulting in hypertension  4. Acute hypoxemic respiratory failure  5. Progressive pneumonia  6. Possible pulmonary edema  7. Anemia  8. Adenocarcinoma of the left lung  9. Mild lactic acidosis on presentation    --mech ventilation good however pt paralzyed, will need to stop paralytics today and see if we can switch her to pressure control and nothave auto peeping.  o2 requirements low.  -will start with abg and repeat cxr     CCT so far today an additional 38minutes.      Mauro Burns MD  [facility]  08/03/18  8:21AM

## 2018-08-03 NOTE — PROGRESS NOTES
PROGRESS NOTE  Patient Name: Dilma Abad  Age/Sex: 68 y.o. female  : 1950  MRN: [id number]    Date of Admission: 2018  Date of Encounter Visit: 18   LOS: 3 days   Patient Care Team:  Mary Taylor MD as PCP - General (Family Medicine)  Bienvenido Collier MD as Consulting Physician (Hematology and Oncology)  Daniel Oconnor MD as Referring Physician (Pulmonary Disease)  Shazia Mcdonald MD as Consulting Physician (Radiation Oncology)    Chief Complaint: Respiratory failure and hypotension    Interval History: Patient was initially seen on 18 for worsening acute hypoxemia, she was noted to have significant wheezing and chest tightness with a COPD exacerbation component however her film were also suggestive of a acute pulmonary edema on top of her pneumonia.  Steroids were ordered as well as breathing treatment and patient was already on wide spectrum antibiotic coverage.  With her 1 hour or so after the evaluation agent had rapid progression with altered mental status and became obtunded.  Rapid response was called ABGs were drawn and patient was assessed and was found to be in acute hypercapnic respiratory failure with severe respiratory acidosis with pH of 6.9.  Initial plan to consider BiPAP was aborted given the acuteness of the situation at the time of assessment, patient was intubated and started on mechanical ventilator.  Initially she was hypertensive however she started having problem with hypotension after intubation which was in part due to her sepsis, in large part due to her sedation and in a decent part as well due to significant air trapping that was noted.  Ventilator setting were adjusted and patient was started on pressors with improvement in the hemodynamic parameters    REVIEW OF SYSTEMS:   Unobtainable, patient is intubated on the ventilator    Ventilator/Non-Invasive Ventilation Settings     Start     Ordered    18 0021  Ventilator - AC/VC; (26); 100; 90%;  "5; 500  Continuous     Comments:  MAY INCREASE PEAK PRESSURE ALARM TO 55.   Question Answer Comment   Vent Mode AC/VC    Breath rate  26   FiO2 100    FiO2 titrate for Sp02% =/> 90%    PEEP 5    Tidal Volume 500        08/03/18 0022    08/02/18 2354  Ventilator - AC/VC; (22); 100; 90%; 5; 450  Continuous,   Status:  Canceled     Question Answer Comment   Vent Mode AC/VC    Breath rate  22   FiO2 100    FiO2 titrate for Sp02% =/> 90%    PEEP 5    Tidal Volume 450        08/02/18 2354            Vital Signs  Temp:  [98.3 °F (36.8 °C)-98.7 °F (37.1 °C)] 98.3 °F (36.8 °C)  Heart Rate:  [] 102  Resp:  [10-28] 10  BP: (106-180)/(51-94) 149/94  FiO2 (%):  [100 %] 100 %  SpO2:  [76 %-99 %] 98 %  on  Flow (L/min):  [2-15] 15 Device (Oxygen Therapy): ventilator    Intake/Output Summary (Last 24 hours) at 08/03/18 0205  Last data filed at 08/02/18 2200   Gross per 24 hour   Intake          3577.67 ml   Output                0 ml   Net          3577.67 ml     Flowsheet Rows      First Filed Value   Admission Height  170.2 cm (67\") Documented at 07/31/2018 2120   Admission Weight  50.8 kg (112 lb) Documented at 07/31/2018 2149        Body mass index is 17.54 kg/m².  1    07/31/18  2149 07/31/18  2330 08/01/18  0045   Weight: 50.8 kg (112 lb) 50.8 kg (112 lb) 50.8 kg (112 lb)       Physical Exam:  GEN:  He did, on the ventilator, sedated and paralyzed  EYES:   Sclera clear. No icterus.   ENT:   External ears/nose normal, no oral lesions, no thrush, mucous membranes moist, orally intubated  NECK:  Supple, midline trachea, no JVD  LUNGS: Coarse sounds, positive wheezes, delayed expiratory phase that improved by adjusting to a higher PEEP, positive crackles.   CV:  Regular rhythm and initially was rapid heart rate that improved but was still a relatively tachycardic post-sedation and intubation and mechanical ventilation. Normal S1/S2. No murmurs, gallops, or rubs noted.  ABD:  Soft, non-tender and non-distended. Normal bowel " sounds. No guarding  EXT:  No movement while patient is paralyzed No cyanosis. No redness. No edema.   Skin: dry, intact, no bleeding    Results Review:        Results from last 7 days  Lab Units 08/02/18 2327 08/02/18 0525 08/01/18 0542 07/31/18 2146   SODIUM mmol/L  --  139 138 130*   POTASSIUM mmol/L 4.4 3.8 4.1 3.1*   CHLORIDE mmol/L  --  106 103 88*   CO2 mmol/L  --  23.1 24.5 26.7   BUN mg/dL  --  10 13 14   CREATININE mg/dL  --  0.55* 0.80 1.05*   CALCIUM mg/dL  --  8.1* 8.8 9.9   AST (SGOT) U/L  --   --   --  18   ALT (SGPT) U/L  --   --   --  17   ANION GAP mmol/L  --  9.9 10.5 15.3   ALBUMIN g/dL  --   --   --  4.10       Results from last 7 days  Lab Units 08/02/18 2327   TROPONIN T ng/mL 0.054*       Results from last 7 days  Lab Units 08/01/18 0542   TSH mIU/mL 2.040       Results from last 7 days  Lab Units 08/02/18 2327 08/02/18 0525   PROBNP pg/mL 6,525.0* 3,433.0*       Results from last 7 days  Lab Units 08/02/18 2328 08/02/18 0525 08/01/18 0542 07/31/18 2146   WBC 10*3/mm3 18.16* 6.45 7.61 12.92*   HEMOGLOBIN g/dL 11.3* 8.2* 9.2* 10.9*   HEMATOCRIT % 34.5* 25.3* 28.3* 32.6*   PLATELETS 10*3/mm3 265 130* 144 159   MCV fL 89.1 88.2 87.6 86.9   NEUTROPHIL % %  --  77.4*  --  84.4*   LYMPHOCYTE % %  --  7.3*  --  5.0*   MONOCYTES % %  --  14.0*  --  9.8   EOSINOPHIL % %  --  0.5  --  0.1*   BASOPHIL % %  --  0.2  --  0.1   IMM GRAN % %  --  0.6*  --  0.6*           Results from last 7 days  Lab Units 08/02/18  2327   MAGNESIUM mg/dL 2.1           Invalid input(s): LDLCALC    Results from last 7 days  Lab Units 08/03/18  0012 08/02/18  2315 08/02/18 2006   PH, ARTERIAL pH units 7.107* 6.996* 7.342*   PCO2, ARTERIAL mm Hg 80.4* 107.4* 43.5   PO2 ART mm Hg 99.4 68.3* 58.4*   HCO3 ART mmol/L 25.4 26.2 23.6         Glucose   Date/Time Value Ref Range Status   08/02/2018 2308 246 (H) 70 - 130 mg/dL Final       Results from last 7 days  Lab Units 08/01/18  0126 07/31/18  2146   LACTATE mmol/L  2.1* 2.5*       Results from last 7 days  Lab Units 07/31/18  2156 07/31/18  2146   BLOODCX  No growth at 2 days No growth at 2 days       Results from last 7 days  Lab Units 07/31/18  2244   NITRITE UA  Negative   WBC UA /HPF None Seen   BACTERIA UA /HPF Trace*   SQUAM EPITHEL UA /HPF None Seen               Imaging:   Imaging Results (all)     Procedure Component Value Units Date/Time    XR Chest 1 View [[id number]] Collected:  08/02/18 2352     Updated:  08/02/18 2356    Narrative:       PORTABLE CHEST X-RAY     CLINICAL HISTORY: ETT PLACEMENT; A41.9-Sepsis, unspecified organism;  R19.7-Diarrhea, unspecified; E87.6-Hypokalemia     COMPARISON: 9:05 PM.     FINDINGS: Portable AP view of the chest was obtained with overlying  monitor leads in place. ET tube terminates at the level of the mid  thoracic trachea. Port-A-Cath is unchanged. There is been continued  worsening in extensive left-sided opacities likely related to pneumonia.  Underlying emphysema and fibrosis again noted along with small  effusions.             Impression:       Interval intubation with continued worsening of left-sided  opacities as discussed        This report was finalized on 8/2/2018 11:53 PM by Damaso Rosen M.D.       XR Chest 1 View [[id number]] Collected:  08/02/18 2131     Updated:  08/02/18 2135    Narrative:       PORTABLE CHEST X-RAY     CLINICAL HISTORY: sob labored breathing, pneu, copd; A41.9-Sepsis,  unspecified organism; R19.7-Diarrhea, unspecified; E87.6-Hypokalemia     COMPARISON: 7/31/2018.     FINDINGS: Portable AP view of the chest was obtained with overlying  monitor leads in place. Lungs hyperexpanded suggesting COPD. There is  underlying emphysema and fibrosis. There are increasing opacities on the  left side, particularly left perihilar region and left lung base likely  related to worsening pneumonia. Some of this may be related to the  patient's known pulmonary malignancy, particularly in the perihilar  region. There  are small effusions present, left greater than right.  Heart size and pulmonary vascularity are normal. Right Port-A-Cath  remains in place.             Impression:       Significant worsening in left-sided pulmonary opacities  likely pneumonia        This report was finalized on 8/2/2018 9:32 PM by Damaso Rosen M.D.       CT Abdomen Pelvis Without Contrast [[id number]] Collected:  08/01/18 0002     Updated:  08/01/18 0009    Narrative:       NONCONTRAST CT SCANS ABDOMEN AND PELVIS     HISTORY: abdominal pain and diarrhea; A41.9-Sepsis, unspecified  organism; R19.7-Diarrhea, unspecified; E87.6-Hypokalemia, lung cancer,  esophageal cancer     COMPARISON: 3/18/2018.     TECHNIQUE:Radiation dose reduction techniques were utilized, including  automated exposure control and exposure modulation based on body size.   Axial images were obtained from the lung bases to the symphysis pubis  without oral or IV contrast per request.      FINDINGS ABDOMEN CT:  Extensive opacities are present in the periphery  of the left lower lobe concerning for pneumonia. Stable tiny right  effusion. Slightly larger left effusion has increased minimally. Absent  gallbladder. Remaining solid organs are otherwise unremarkable without  benefit of IV contrast. Distal esophageal thickening is unchanged and  presumably related to the patient's neoplasm.     FINDINGS PELVIS CT:  Postsurgical changes again noted in the GI tract.  The GI tract not opacified for assessment but non obstructive in  appearance. The appendix is unable to be visualized for evaluation on  this exam without oral or IV contrast. Absent uterus. Moderate urinary  bladder distention. No ascites. Encompassed aorta is non-aneurysmal.                Impression:       1.  Pulmonary findings as discussed.  2. Persistent esophageal thickening presumably related to the patient's  neoplasm history.  3. Otherwise grossly unremarkable noncontrast study              This study was performed  with techniques to keep radiation doses as low  as reasonably achievable (ALARA). Individualized dose reduction  techniques using automated exposure control or adjustment of mA and/or  kV according to the patient size were employed.      This report was finalized on 8/1/2018 12:06 AM by Damaso Rosen M.D.       XR Chest 2 View [[id number]] Collected:  07/31/18 2236     Updated:  07/31/18 2243    Narrative:       PA AND LATERAL CHEST X-RAY     HISTORY: Simple sepsis protocol, history of lung and esophageal cancer     COMPARISON: May 2, 2018.     FINDINGS: PA and lateral views of the chest were obtained. The lungs are  hyperexpanded suggesting COPD. There is underlying emphysema and  fibrosis. Cavitary left upper lobe lesion is again seen, somewhat more  conspicuous on today's study and presumably is related to the patient's  pulmonary malignancy. Adjacent linear densities extending towards the  hilum may be related to areas of atelectasis and/or post treatment  related changes. These have increased slightly. There are increasing  mostly groundglass opacities in the peripheral left perihilar region and  left lung base which could be related to superimposed pneumonia and/or  post treatment related changes. These have developed since the previous  exam. Fibrotic changes in the right lung appears stable. Small left  effusion has increased minimally. Heart size and vascularity are normal.  Right-sided Port-A-Cath is unchanged.             Impression:       Increasing left-sided opacities as discussed. Some of these  most assuredly related to post treatment related changes but extensive  superimposed pneumonia is not excluded.     This report was finalized on 7/31/2018 10:40 PM by Damaso Rosen M.D.             I reviewed the patient's new clinical results.  I personally viewed and interpreted the patient's imaging results:ET tube was in proper position above the anneliese mock further progression of the density of the  pulmonary infiltrates/edema compared to the films done 2 hours earlier, this rapid progression is more suggestive of hydrostatic process on top of the initial infectious process        Medication Review:     albuterol 6 puff Inhalation Q4H - RT   artificial tears  Both Eyes Q4H   famotidine 20 mg Intravenous Q12H   [START ON 8/4/2018] ipratropium 6 puff Inhalation Q4H - RT   lactobacillus acidophilus 1 capsule Oral Daily   lidocaine 1 patch Transdermal Q24H   methylPREDNISolone sodium succinate 60 mg Intravenous Q12H   pantoprazole 40 mg Oral Daily   piperacillin-tazobactam 3.375 g Intravenous Q8H   vancomycin 500 mg Intravenous Q12H         cisatracurium (NIMBEX) infusion 3 mcg/kg/min Last Rate: 2.5 mcg/kg/min (08/03/18 0133)   norepinephrine 0.02-0.3 mcg/kg/min Last Rate: 0.06 mcg/kg/min (08/03/18 0136)   Pharmacy to dose vancomycin     Pharmacy to Dose Zosyn     propofol 5-50 mcg/kg/min Last Rate: 10 mcg/kg/min (08/03/18 0100)       ASSESSMENT:   1. Rapidly progressive acute hypercapnic respiratory failure requiring intubation  2. Hypotension multifactorial  3. COPD exacerbation with air trapping and Auto PEEP resulting in hypertension  4. Acute hypoxemic respiratory failure  5. Progressive pneumonia  6. Possible pulmonary edema  7. Anemia  8. Adenocarcinoma of the left lung  9. Mild lactic acidosis on presentation    PLAN:  Continue with the ventilatory support, ventilator setting were adjusted to minimize auto PEEP by increase in the PEEP and adjusting the airflow and allow more time for exhalation with higher I:E ratio.  Patient was over breathing the ventilator and the resulting in more air trapping and had to be paralyzed to minimize the auto PEEP component that was causing significant hypotension  Continue with a wide spectrum antibiotic coverage, continue with the steroid  Upon intubation a frothy secretion were obtained on suctioning typical of hydrostatic pulmonary edema and patient will be given another  dose of Lasix after her blood pressure can be controlled and to a acceptable range  Patient started and is being titrated on levo fed to maintain good perfusion pressure at all time  Chest x-ray was done and it confirmed proper position of the ET tube  Fentanyl was added to propofol for good sedation and pain control, BIS monitor would be used while on paralytic to follow on the level of sedation.  Monitor urine output, consider Castillo catheter especially if renal function is trending worse  Check lactic acid was morning labs  Discussed with ICU staff, no family at the bedside, patient is unresponsive    Disposition: Depending on hospital course    Davian Koenig MD  08/03/18  2:05 AM      Time: Critical care 49 min excluding any procedure time      Dictated utilizing Dragon dictation

## 2018-08-03 NOTE — PROGRESS NOTES
Adult Nutrition  Assessment/PES    Patient Name:  Dilma Abad  YOB: 1950  MRN: [id number]  Admit Date:  7/31/2018    Assessment Date:  8/3/2018    Comments:  Nutrition follow up. Now on the vent and sedated with propofol. NPO.  Doing better per discussion in rounds. If unable to extubate in next 24 hrs, would recommend starting TF (Glucerna 1.2 would be formula of choice). Will follow.          Reason for Assessment     Row Name 08/03/18 1203          Reason for Assessment    Reason For Assessment follow-up protocol     Diagnosis pulmonary disease   Lung & Esoph Cancer, Resp Failure-vent, COPD                 Labs/Tests/Procedures/Meds     Row Name 08/03/18 1205          Labs/Procedures/Meds    Lab Results Reviewed reviewed, pertinent     Lab Results Comments glu, Na, phos, hgb, wbc        Diagnostic Tests/Procedures    Diagnostic Test/Procedure Reviewed reviewed, pertinent     Diagnostic Test/Procedures Comments passed swallow eval yesterday        Medications    Pertinent Medications Reviewed reviewed, pertinent     Pertinent Medications Comments probiotic, pepcid, steroids, Abx, levo, propofol, off nimbex             Physical Findings     Row Name 08/03/18 1206          Physical Findings    Overall Physical Appearance loss of muscle mass;loss of subcutaneous fat;underweight;on ventilator support     Gastrointestinal --   formed BM     Skin other (see comments)   intact               Nutrition Prescription Ordered     Row Name 08/03/18 1207          Nutrition Prescription PO    Current PO Diet NPO        Propofol Considerations    Propofol (mL/hr) 7.6 mL/hr     Propofol (Kcal/day) 200.64 Kcal/day       Problem/Interventions:        Intervention Goal     Row Name 08/03/18 1208          Intervention Goal    General Maintain nutrition;Reduce/improve symptoms;Disease management/therapy;Meet nutritional needs for age/condition;Improved nutrition related lab(s)     TF/PN Inititiate TF/PN     Weight No  significant weight loss             Nutrition Intervention     Row Name 08/03/18 1209          Nutrition Intervention    RD/Tech Action Care plan reviewd;Follow Tx progress;Recommend/ordered     Recommended/Ordered EN             Nutrition Prescription     Row Name 08/03/18 1210          Nutrition Prescription EN    Enteral Prescription Enteral begin/change     Product Glucerna 1.2 gissel             Education/Evaluation     Row Name 08/03/18 1210          Education    Education Will Instruct as appropriate        Monitor/Evaluation    Monitor Skin status;Per protocol;Symptoms;I&O;Pertinent labs;Weight         Electronically signed by:  Farideh Gandhi RD  08/03/18 12:10 PM

## 2018-08-03 NOTE — NURSING NOTE
Call placed to hospitalist to report pt change in status and give ABG results. Called back quickly and orders received from Dr Mtz

## 2018-08-03 NOTE — PLAN OF CARE
Problem: Patient Care Overview  Goal: Plan of Care Review  Outcome: Ongoing (interventions implemented as appropriate)   08/03/18 0620   Coping/Psychosocial   Plan of Care Reviewed With patient;sibling   Plan of Care Review   Progress declining   OTHER   Outcome Summary Pt was a rapid from the 5th floor in respiratory distress. Brought to ICU and intubated. Pt was fighting ventilator and BP was dropping, so she was put on Nimbex per MD Koenig. Nimbex is now off and pt is tolerating propofol and fentanyl well. Levophed gtt started and being titrated to keep BP up. Straight cathed once. Labs drawn this AM, waiting for results. Continue to monitor per orders.       Problem: Sepsis/Septic Shock (Adult)  Goal: Signs and Symptoms of Listed Potential Problems Will be Absent, Minimized or Managed (Sepsis/Septic Shock)  Outcome: Ongoing (interventions implemented as appropriate)      Problem: Pain, Acute (Adult)  Goal: Identify Related Risk Factors and Signs and Symptoms  Outcome: Ongoing (interventions implemented as appropriate)    Goal: Acceptable Pain Control/Comfort Level  Outcome: Ongoing (interventions implemented as appropriate)      Problem: Nutrition, Imbalanced: Inadequate Oral Intake (Adult)  Goal: Identify Related Risk Factors and Signs and Symptoms  Outcome: Ongoing (interventions implemented as appropriate)    Goal: Improved Oral Intake  Outcome: Ongoing (interventions implemented as appropriate)    Goal: Prevent Further Weight Loss  Outcome: Ongoing (interventions implemented as appropriate)      Problem: Fall Risk (Adult)  Goal: Absence of Fall  Outcome: Ongoing (interventions implemented as appropriate)      Problem: Skin Injury Risk (Adult)  Goal: Identify Related Risk Factors and Signs and Symptoms  Outcome: Ongoing (interventions implemented as appropriate)    Goal: Skin Health and Integrity  Outcome: Ongoing (interventions implemented as appropriate)      Problem: Restraint, Nonbehavioral  (Nonviolent)  Goal: Nonbehavioral (Nonviolent) Restraint: Absence of Injury/Harm  Outcome: Ongoing (interventions implemented as appropriate)    Goal: Nonbehavioral (Nonviolent) Restraint: Achievement of Discontinuation Criteria  Outcome: Ongoing (interventions implemented as appropriate)    Goal: Nonbehavioral (Nonviolent) Restraint: Preservation of Dignity and Wellbeing  Outcome: Ongoing (interventions implemented as appropriate)

## 2018-08-03 NOTE — PLAN OF CARE
Problem: Patient Care Overview  Goal: Plan of Care Review  Outcome: Ongoing (interventions implemented as appropriate)  Patient able to tolerate SBT today, on abx, patient neurologically stable, stopped levophed but had to restart this evening, stable otherwise   08/1950   Coping/Psychosocial   Plan of Care Reviewed With patient;family   Plan of Care Review   Progress improving

## 2018-08-03 NOTE — PROCEDURES
Endotracheal Intubation Procedure Note    Indication for endotracheal intubation: respiratory failure.  Sedation: Propofol.  Equipment: A video Glidoscope was used and Antionette 3 laryngoscope blade.  Using a Glidoscope  Number of attempts: 1.  ETT location confirmed by by auscultation, by CXR and ETCO2 monitor.    Davian Koenig MD  8/3/2018

## 2018-08-03 NOTE — PROGRESS NOTES
"Pharmacokinetic Evaluation - Vancomycin and zosyn     Dilma Abad is a 68 y.o. female on vancomycin and zosyn pharmacy to dose.  MRN: [id number]  : 1950    Day of vancomycin therapy: 3/7  Indication: Intra-Abdominal Infection, Sepsis  Consulted by: Dr. Masterson  Goal trough: 15-20 mcg/ml  Current vanc dose: 500 mg iv q12  Zosyn 3.375 g iv q8 day     Blood pressure 102/91, pulse 64, temperature 98.3 °F (36.8 °C), temperature source Oral, resp. rate 22, height 170.2 cm (67\"), weight 50.8 kg (112 lb), SpO2 98 %.    Results from last 7 days  Lab Units 18  0521 18  0525 18  0542   CREATININE mg/dL 0.85 0.55* 0.80     Estimated Creatinine Clearance: 49.4 mL/min (by C-G formula based on SCr of 0.85 mg/dL).    Results from last 7 days  Lab Units 18  0521 18  2328 18  0525   WBC 10*3/mm3 19.17* 18.16* 6.45   HEMOGLOBIN g/dL 10.5* 11.3* 8.2*   HEMATOCRIT % 31.1* 34.5* 25.3*   PLATELETS 10*3/mm3 234 265 130*        Other relevant labs/chart info:  crp 20    Radiology:    ct abd/pelvis:  Extensive opacities are present in the periphery of the left lower lobe concerning for pneumonia     cxr: Significant worsening in left-sided pulmonary opacities  likely pneumonia      Cultures:    bc-2 sets-ng x 2 days   c diff negative   MRSA screen-ng x 24h    Dosing hx (include troughs if drawn):  2343 vanc 1000 once    2346 vanc 750 mg    vanc 500 mg q12 (renal fxn improved): 1654  8/3  0538, 1500 trough level    Assessment:  Transferred to icu  for worsening hypoxic resp failure. PMH of esophageal and lung cancer w/ recent radiation and chemotherapy. Patient now intubated, is off of paralytic now, on iv steroids for copd exacerbation which is also causing leukocytosis, and a trough level is due today at 1500 but will retime to 1700 since this AM's dose was a little late.    Scr increased today to 0.8.     Plan:  1. Reschedule vanc trough to 1700 today since " AM dose given late. This is only the 3rd dose of 500 mg q12 so may not be at steady state.  2. Leave information sheet out with 2nd shift rph to address.   3. MRSA screen pending, ng at 24h, if final result is negative would recommend to ask to dc.  4. Continue zosyn at 3.375 g iv q8 using 4 hr infusion time.    Thanks for this consult, will follow until dc,  Ramakrishna Olsen.D, BCCCP

## 2018-08-04 NOTE — PROGRESS NOTES
"Pharmacokinetic Consult - Vancomycin/Zosyn Dosing (Follow-up Note)    Dilma Abad is on day 4 pharmacy to dose vancomycin for sepsis secondary to progressive PNA per Dr. Masterson's request. Goal trough: 15-20 mcg/mL.    Duration of Therapy: 7 days    Other Antimicrobials: Zosyn (Rx to dose)    Relevant clinical data and objective history reviewed:  68 y.o. female 170.2 cm (67\") 54.2 kg (119 lb 7.8 oz)  Transferred to icu 8/2 for worsening hypoxic resp failure. PMH of esophageal and lung cancer w/ recent radiation and chemotherapy. Patient now intubated, is off of paralytic now, on iv steroids for copd exacerbation which is also causing leukocytosis. MRSA screen finalized.     Creatinine   Date Value Ref Range Status   08/04/2018 0.69 0.57 - 1.00 mg/dL Final   08/03/2018 0.85 0.57 - 1.00 mg/dL Final   08/02/2018 0.55 (L) 0.57 - 1.00 mg/dL Final     BUN   Date Value Ref Range Status   08/04/2018 22 8 - 23 mg/dL Final     Estimated Creatinine Clearance: 56 mL/min (by C-G formula based on SCr of 0.69 mg/dL).    Lab Results   Component Value Date    WBC 7.47 08/04/2018     Temp Readings from Last 3 Encounters:   08/04/18 97.8 °F (36.6 °C)     Baseline culture/source/susceptibility:  7/31: Blood cultures x 2-NGTD  8/1: C. Difficile antigen-negative  8/2: MRSA screen-negative    Vancomycin Dosing History  Vancomycin 1000 mg (20 mg/kg) loading dose IV once: 7/31 @ 2343  Vancomycin 750 mg IV q24h: 8/1 @ 2346  RENAL FUNCTION IMPROVED  Vancomycin 500 mg IV q12h: 8/2 @ 1654  Vancomycin 500 mg IV q12h: 8/3@ 0538, 1800   Vancomycin trough=10.1 mcg/mL on 8/3 @ 1819 (~12 hrs post-dose)  Start vancomycin 750 mg IV q12h: 8/4 @ 0514    Assessment/Plan    1. Vancomycin is currently dosed at 750 mg (~14 mg/kg) IV q12h based on past level. Next trough is scheduled for tomorrow evening 8/5 before the 1700 dose.    2. MRSA screen has come back negative (finalized)-would recommend discontinuing vancomycin at this time.    3. Will monitor " serum creatinine every 24 hours since the patient is on both vancomycin and Zosyn. SCr is stable today at 0.69.    4. Zosyn is currently dosed at 3.375g IV q8h extended infusion. This dosing is appropriate based on risk stratification and renal function (CrCl >20 mL/min).    5. Pharmacy will continue to follow daily while on vancomycin/Zosyn,  and adjust as needed.     Raúl Bernal.D., BCPS

## 2018-08-04 NOTE — PLAN OF CARE
Problem: Patient Care Overview  Goal: Plan of Care Review   08/04/18 1541   Coping/Psychosocial   Plan of Care Reviewed With patient   OTHER   Outcome Summary able to tolerate honey thick liquids/puree.

## 2018-08-04 NOTE — PLAN OF CARE
Problem: Patient Care Overview  Goal: Plan of Care Review   08/04/18 1503   Plan of Care Review   Progress improving   OTHER   Outcome Summary Patient extubated to 2 l/m nasal cannula today and tolerating well. Continuing bronchodilator therapy as ordered.

## 2018-08-04 NOTE — THERAPY EVALUATION
Acute Care - Speech Language Pathology   Swallow Initial Evaluation [facility]     Patient Name: Dilma Abad  : 1950  MRN: [id number]  Today's Date: 2018               Admit Date: 2018    Visit Dx:     ICD-10-CM ICD-9-CM   1. Sepsis, due to unspecified organism (CMS/HCC) A41.9 038.9     995.91   2. Diarrhea, unspecified type R19.7 787.91   3. Hypokalemia E87.6 276.8   4. Oropharyngeal dysphagia R13.12 787.22     Patient Active Problem List   Diagnosis   • Adenocarcinoma of left lung (CMS/HCC)   • Panlobular emphysema (CMS/HCC)   • Abnormal finding on imaging   • Esophageal carcinoma (CMS/HCC)   • Fitting and adjustment of vascular catheter   • OROZCO (dyspnea on exertion)   • Radiation esophagitis   • Sepsis (CMS/HCC)   • Anxiety and depression   • COPD (chronic obstructive pulmonary disease) (CMS/HCC)   • Hyperlipidemia   • Generalized abdominal pain   • Diarrhea   • Pneumonia due to infectious organism     Past Medical History:   Diagnosis Date   • Adenocarcinoma of lung (CMS/HCC) 2017    HAD RADIATION    • Anxiety and depression    • Arthritis    • Benign parotid tumor 2012    removed by Dr Vickers told it was benign   • COPD (chronic obstructive pulmonary disease) (CMS/HCC)    • Depression    • Diarrhea    • Early cataract    • Emphysema of lung (CMS/HCC)    • Esophageal cancer (CMS/HCC) 2017   • History of chronic pain    • History of colon polyps    • History of pneumonia    • Hyperlipidemia    • Joint pain    • Stroke (CMS/HCC)      Past Surgical History:   Procedure Laterality Date   • BRONCHOSCOPY     • CHOLECYSTECTOMY     • COLON SURGERY  2015    COLON POLYPS   • COLONOSCOPY  2016    TA w/low grade dysplasia x 3, serrated adenoma x 2   • COLONOSCOPY  2016    TA w/high grade dysplasia   • ENDOSCOPY N/A 2017    Procedure: ESOPHAGOGASTRODUODENOSCOPY WITH BIOPSY;  Surgeon: Jayant Robles MD;  Location: [facility] ENDOSCOPY;  Service:    • ENDOSCOPY N/A 3/17/2018     Procedure: ESOPHAGOGASTRODUODENOSCOPY WITH BIOPSIES;  Surgeon: Ambrosio Sapp MD;  Location: [facility] ENDOSCOPY;  Service: Gastroenterology   • FOOT SURGERY  10/2010    [place]   • HYSTERECTOMY     • INTUBATION  8/3/2018        • UT INSJ TUNNELED CVC W/O SUBQ PORT/ AGE 3 YR/> Right 1/9/2018    Procedure: MEDIPORT PLACEMENT right;  Surgeon: Damaso Germain MD;  Location: [facility] HYBRID OR 18/19;  Service: Vascular   • SALIVARY GLAND SURGERY      PAROTID MASS REMOVED BENIGN   • SHOULDER ARTHROSCOPY Left 1995. 2001          SWALLOW EVALUATION (last 72 hours)      SLP Adult Swallow Evaluation     Row Name 08/04/18 1500 08/02/18 1500                Rehab Evaluation    Document Type evaluation  -LS evaluation  -SH (r) ES (t) SH (c)       Subjective Information complains of   thirst  -LS complains of;pain  -SH (r) ES (t) SH (c)       Patient Observations alert;cooperative;agree to therapy  -LS alert;cooperative;agree to therapy  -SH (r) ES (t) SH (c)       Patient Effort good  -LS good  -SH (r) ES (t) SH (c)       Symptoms Noted During/After Treatment none  -LS none  -SH (r) ES (t) SH (c)          General Information    Patient Profile Reviewed yes  -LS yes  -SH (r) ES (t) SH (c)       Pertinent History Of Current Problem --   Pt was admitted with lower lobe pa. Pt with hx of lung ca.  -LS Pt was admitted to the hospital with L lobe pneumonia. Pt with hx of lung cancer, esophageal cancer, radiation and chemotherapy, esophagitis, emphysema, and stroke. Pt with no speech therapy hx.   -SH (r) ES (t) SH (c)       Current Method of Nutrition NPO  -LS regular textures;thin liquids  -SH (r) ES (t) SH (c)       Precautions/Limitations, Vision WFL;for purposes of eval  -LS WFL;for purposes of eval  -SH (r) ES (t) SH (c)       Precautions/Limitations, Hearing WFL;for purposes of eval  -LS WFL;for purposes of eval  -SH (r) ES (t) SH (c)       Prior Level of Function-Communication WFL  -LS WFL  -SH (r) ES (t) SH (c)       Prior Level  of Function-Swallowing no diet consistency restrictions  -LS no diet consistency restrictions  -SH (r) ES (t) SH (c)       Plans/Goals Discussed with patient;agreed upon  -LS patient;agreed upon  -SH (r) ES (t) SH (c)       Barriers to Rehab none identified  -LS none identified  -SH (r) ES (t) SH (c)       Patient's Goals for Discharge return to PO diet  -LS  --          Oral Motor and Function    Dentition Assessment upper dentures/partial in place  -LS upper dentures/partial in place;other (see comments)   natural lowers  -SH (r) ES (t) SH (c)       Secretion Management WNL/WFL  -LS WNL/WFL  -SH (r) ES (t) SH (c)       Mucosal Quality moist, healthy  -LS moist, healthy  -SH (r) ES (t) SH (c)       Volitional Swallow weak  -LS WFL  -SH (r) ES (t) SH (c)       Volitional Cough weak  -LS reduced respiratory support  -SH (r) ES (t) SH (c)          Oral Musculature and Cranial Nerve Assessment    Oral Motor General Assessment generalized oral motor weakness  -LS WFL  -SH (r) ES (t) SH (c)          General Eating/Swallowing Observations    Respiratory Support Currently in Use room air  -LS nasal cannula;other (see comments)   2L; 28 breaths/min  -SH (r) ES (t) SH (c)       Eating/Swallowing Skills self-fed;fed by SLP  -LS self-fed;fed by SLP  -SH (r) ES (t) SH (c)       Positioning During Eating upright in bed  - upright in bed  -SH (r) ES (t) SH (c)       Utensils Used spoon;cup  - spoon;cup;straw  -SH (r) ES (t) SH (c)       Consistencies Trialed chopped;pureed;thin liquids;nectar/syrup-thick liquids;honey-thick liquids  - regular textures;mechanical soft, no mixed consistencies;pureed;thin liquids   mixed  -SH (r) ES (t) SH (c)          Respiratory    Respiratory Status WFL  -LS increase in respiratory rate;other (see comments);wheezing, stridor   28 breaths per min, 2 liters  -       Date of Intubation --   8/3/18 due to hypoxia  extubated 8/4  -LS  --          Clinical Swallow Eval    Oral Prep Phase WFL   -LS WFL  -SH (r) ES (t) SH (c)       Oral Transit WFL  -LS WFL  -SH (r) ES (t) SH (c)       Oral Residue  -- WFL  -SH (r) ES (t) SH (c)       Pharyngeal Phase suspected pharyngeal impairment  -LS no overt signs/symptoms of pharyngeal impairment  -SH (r) ES (t) SH (c)       Esophageal Phase  -- suspected esophageal impairment   hx of esophageal cancer  -SH (r) ES (t) SH (c)       Clinical Swallow Evaluation Summary bedside eval completed.  -LS Pt was seen this date for a bedside swallow evaluation. Pt with adequate hyolaryngeal elevation/excursion, per palpation. Pt on 2L of oxygen with 28 breaths per minute. Pt reports no difficulties swallowing. No overt s/s of aspiration with thins via spoon, cup, or straw, puree, mech soft, mixed, and regular. Pt with throat clear immediately following thins from straw 1/2. Pt at risk with straw and larger bolus d/t SOA. SLP recs regular and thins, no straw. SLP educated patient on small sips and bites, and no straw.   -SH          Pharyngeal Phase Concerns    Pharyngeal Phase Concerns throat clear;other (see comments)   audible swallow with  thin,  nectar liquids, & MS.  -LS  --       Throat Clear thin;nectar;mechanical soft  -LS  --       Pharyngeal Phase Concerns, Comment risk of aspiration.  -LS  --          Clinical Impression    SLP Swallowing Diagnosis moderate;oral dysfunction;pharyngeal dysfunction  -LS functional oral phase;functional pharyngeal phase  -SH (r) ES (t) SH (c)       Functional Impact no impact on function  -LS risk of aspiration/pneumonia;other   d/t decreased breath support  -SH (r) ES (t) SH (c)       Rehab Potential/Prognosis, Swallowing good, to achieve stated therapy goals  -LS  --       Criteria for Skilled Therapeutic Interventions Met demonstrates skilled criteria  -LS no problems identified which require skilled intervention  -SH (r) ES (t) SH (c)          Recommendations    Therapy Frequency (Swallow) PRN  -LS evaluation only  -SH (r) ES (t) SH  (c)       Predicted Duration Therapy Intervention (Days) until discharge  - --  -       SLP Diet Recommendation puree;honey thick liquids  - regular textures;thin liquids  - (r) ES (t) SH (c)       Recommended Diagnostics VFSS (MBS)  - --  -       Recommended Precautions and Strategies upright posture during/after eating;small bites of food and sips of liquid;no straw;alternate between small bites of food and sips of liquid  - no straw;small bites of food and sips of liquid  - (r) ES (t) SH (c)       SLP Rec. for Method of Medication Administration meds crushed;with pudding or applesauce  - meds whole;with pudding or applesauce;as tolerated  -       Monitor for Signs of Aspiration notify SLP if any concerns;gurgly voice;throat clearing  - yes;notify SLP if any concerns  -SH (r) ES (t) SH (c)          Swallow Goals (SLP)    Oral Nutrition/Hydration Goal Selection (SLP)  -- --  -SH (r) ES (t) SH (c)         User Key  (r) = Recorded By, (t) = Taken By, (c) = Cosigned By    Initials Name Effective Dates     AbdirashidDinah broderick, MS [facility]-SLP 06/08/18 -     Dalia Zhu, MS CCC-SLP 03/07/18 -     Bibiana Shaw, Speech Therapy Student 06/11/18 -         EDUCATION  The patient has been educated in the following areas:   Dysphagia (Swallowing Impairment).    SLP Recommendation and Plan  SLP Swallowing Diagnosis: moderate, oral dysfunction, pharyngeal dysfunction  SLP Diet Recommendation: puree, honey thick liquids  Recommended Precautions and Strategies: upright posture during/after eating, small bites of food and sips of liquid, no straw, alternate between small bites of food and sips of liquid     Monitor for Signs of Aspiration: notify SLP if any concerns, gurgly voice, throat clearing  Recommended Diagnostics: VFSS (MBS)  Criteria for Skilled Therapeutic Interventions Met: demonstrates skilled criteria     Rehab Potential/Prognosis, Swallowing: good, to achieve stated therapy goals  Therapy  Frequency (Swallow): PRN  Predicted Duration Therapy Intervention (Days): until discharge       Plan of Care Reviewed With: patient  Plan of Care Review  Plan of Care Reviewed With: patient  Outcome Summary: able to tolerate honey thick liquids/puree.           SLP Outcome Measures (last 72 hours)      SLP Outcome Measures     Row Name 08/04/18 1500 08/02/18 1500          SLP Outcome Measures    Outcome Measure Used? Adult NOMS  -LS Adult NOMS  -SH (r) ES (t) SH (c)        Adult FCM Scores    FCM Chosen Swallowing  -LS Swallowing  -SH (r) ES (t) SH (c)     Swallowing FCM Score 3  -LS 7  -SH (r) ES (t) SH (c)       User Key  (r) = Recorded By, (t) = Taken By, (c) = Cosigned By    Initials Name Effective Dates     Dinah Du MS CCC-SLP 06/08/18 -     Dalia Zhu MS CCC-GENIE 03/07/18 -     Bibiana Shaw, Speech Therapy Student 06/11/18 -            Time Calculation:         Time Calculation- SLP     Row Name 08/04/18 1545 08/04/18 1531          Time Calculation- SLP    SLP Received On 08/04/18  -LS 08/04/18  -       User Key  (r) = Recorded By, (t) = Taken By, (c) = Cosigned By    Initials Name Provider Type    Dinah Tapia MS CCC-SLP Speech and Language Pathologist          Therapy Charges for Today     Code Description Service Date Service Provider Modifiers Qty    [id number] HC ST EVAL ORAL PHARYNG SWALLOW 4 8/4/2018 Dinah Du MS CCC-SLP GN 1               Dinah Du MS CCC-GENIE  8/4/2018

## 2018-08-04 NOTE — CONSULTS
Adult Nutrition  Assessment/PES    Patient Name:  Dilma Abad  YOB: 1950  MRN: [id number]  Admit Date:  7/31/2018    Assessment Date:  8/4/2018    Comments:  TF consult. Glucerna 1.2 @ goal rate of 50 cc/hr with 30 cc water Q 4 hrs will be ordered. Provision: 1440 kcals, 72 gm pro, 1148 cc free water.           Reason for Assessment     Row Name 08/04/18 1153          Reason for Assessment    Reason For Assessment physician consult;TF/PN                   Physical Findings     Row Name 08/04/18 1153          Physical Findings    Overall Physical Appearance on ventilator support     Gastrointestinal feeding tube     Tubes nasogastric tube               Nutrition Prescription Ordered     Row Name 08/04/18 1153          Nutrition Prescription PO    Current PO Diet NPO               Problem/Interventions:          Problem 2     Row Name 08/04/18 1153          Nutrition Diagnoses Problem 2    Problem 2 Needs Alternate Route     Etiology (related to) Medical Diagnosis     Pulmonary/Critical Care Acute respiratory failure;Ventilator                   Intervention Goal     Row Name 08/04/18 1153          Intervention Goal    General Maintain nutrition;Nutrition support treatment     TF/PN Inititiate TF/PN             Nutrition Intervention     Row Name 08/04/18 1154          Nutrition Intervention    RD/Tech Action Recommend/ordered     Recommended/Ordered EN             Nutrition Prescription     Row Name 08/04/18 1154          Nutrition Prescription EN    Enteral Prescription Enteral begin/change;Enteral to supply     Enteral Route NG     Product Glucerna 1.2 gissel   per Farideh's note yesterday - Gluc 1.2 formula of choice     TF Delivery Method Continuous     Continuous TF Goal Rate (mL/hr) 50 mL/hr     Continuous TF Starting Rate (mL/hr) 20 mL/hr     Water flush (mL)  30 mL     Water Flush Frequency Every 4 hours     New EN Prescription Ordered? Yes        EN to Supply    Kcal/Day 1440 Kcal/Day     Protein  (gm/day) 72 gm/day     Meet Estimated Kcal Need (%) 96 %     Meet Estimated Protein Need (%) 118 %     TF Free H2O (mL) 966 mL     Total Free H2O (mL/day) 1146 mL/day             Education/Evaluation     Row Name 08/04/18 1155          Monitor/Evaluation    Monitor Per protocol         Electronically signed by:  Soniya Canada RD  08/04/18 11:55 AM

## 2018-08-04 NOTE — PROGRESS NOTES
"Pharmacokinetic Evaluation - Vancomycin and zosyn     Dilma Abad is a 68 y.o. female on vancomycin and zosyn pharmacy to dose.  MRN: [id number]  : 1950    Day of vancomycin therapy: 3/7  Indication: Intra-Abdominal Infection, Sepsis  Consulted by: Dr. Masterson  Goal trough: 15-20 mcg/ml  Current vanc dose: 500 mg iv q12  Zosyn 3.375 g iv q8 day     Blood pressure 123/63, pulse 72, temperature 98.3 °F (36.8 °C), temperature source Oral, resp. rate 22, height 170.2 cm (67\"), weight 50.8 kg (112 lb), SpO2 100 %.    Results from last 7 days  Lab Units 18  0521 18  0525 18  0542   CREATININE mg/dL 0.85 0.55* 0.80     Estimated Creatinine Clearance: 49.4 mL/min (by C-G formula based on SCr of 0.85 mg/dL).    Results from last 7 days  Lab Units 18  0521 18  2328 18  0525   WBC 10*3/mm3 19.17* 18.16* 6.45   HEMOGLOBIN g/dL 10.5* 11.3* 8.2*   HEMATOCRIT % 31.1* 34.5* 25.3*   PLATELETS 10*3/mm3 234 265 130*        Other relevant labs/chart info:  crp 20    Radiology:    ct abd/pelvis:  Extensive opacities are present in the periphery of the left lower lobe concerning for pneumonia     cxr: Significant worsening in left-sided pulmonary opacities  likely pneumonia      Cultures:    bc-2 sets-ng x 2 days   c diff negative   MRSA screen-ng x 24h    Dosing hx (include troughs if drawn):  2343 vanc 1000 once    234 vanc 750 mg    vanc 500 mg q12 (renal fxn improved): 1654  8/3  0538, 1800  8/3 Trough @1819: 10.1    Assessment:  Transferred to icu  for worsening hypoxic resp failure. PMH of esophageal and lung cancer w/ recent radiation and chemotherapy. Patient now intubated, is off of paralytic now, on iv steroids for copd exacerbation which is also causing leukocytosis, and a trough level is due today at 1500 but will retime to 1700 since this AM's dose was a little late.    Scr increased today to 0.8.     Plan:  1. Although not necessarily at " steady state given that the patient only received 2 doses, the patient's SCr is approximately at baseline (baseline ~0.7-0.9) and will likely need a larger dose. Increased to Vancomycin 750 mg IV Q12H (14.6 mg/kg/dose). Trough scheduled to be drawn on 8/5 @1700 (30 minutes before the 1730 dose).   2. MRSA screen pending, ng at 24h, if final result is negative would recommend to ask to dc.  3. Continue piperacillin-tazobactam at 3.375 g iv q8 using 4 hr infusion time.  4. Recommend adequate hydration to prevent nephrotoxicities especially given concomitant use of piperacillin-tazobactam.     Thanks for this consult, will follow until essie,  Jesus Herndon, PharmD  Clinical Staff Pharmacist

## 2018-08-04 NOTE — PLAN OF CARE
Problem: Patient Care Overview  Goal: Plan of Care Review  Outcome: Ongoing (interventions implemented as appropriate)   08/03/18 2000 08/04/18 0606   Coping/Psychosocial   Plan of Care Reviewed With patient --    Plan of Care Review   Progress --  improving   OTHER   Outcome Summary --  Pt VSS , able to wean off Levophed , awakes up anxious on propfol IV , Castillo inserted after x3 straight cath and 4 bladder scans greater than 750, pt tolerated well , NG tube placed RT nare pt tolerated well for oral meds , hydrocodone , and valium ,pt wakes and follows commands      Goal: Individualization and Mutuality  Outcome: Ongoing (interventions implemented as appropriate)    Goal: Discharge Needs Assessment  Outcome: Ongoing (interventions implemented as appropriate)    Goal: Interprofessional Rounds/Family Conf  Outcome: Ongoing (interventions implemented as appropriate)      Problem: Sepsis/Septic Shock (Adult)  Intervention: Provide Early Enteral Nutrition Therapy  Ng tube placed for nutrition if needed and oral meds .    Goal: Signs and Symptoms of Listed Potential Problems Will be Absent, Minimized or Managed (Sepsis/Septic Shock)  Outcome: Ongoing (interventions implemented as appropriate)      Problem: Pain, Acute (Adult)  Goal: Identify Related Risk Factors and Signs and Symptoms  Outcome: Ongoing (interventions implemented as appropriate)    Goal: Acceptable Pain Control/Comfort Level  Outcome: Ongoing (interventions implemented as appropriate)      Problem: Nutrition, Imbalanced: Inadequate Oral Intake (Adult)  Goal: Identify Related Risk Factors and Signs and Symptoms  Outcome: Ongoing (interventions implemented as appropriate)    Goal: Improved Oral Intake  Outcome: Ongoing (interventions implemented as appropriate)    Goal: Prevent Further Weight Loss  Outcome: Ongoing (interventions implemented as appropriate)      Problem: Fall Risk (Adult)  Goal: Absence of Fall  Outcome: Ongoing (interventions implemented  as appropriate)      Problem: Skin Injury Risk (Adult)  Goal: Identify Related Risk Factors and Signs and Symptoms  Outcome: Ongoing (interventions implemented as appropriate)    Goal: Skin Health and Integrity  Outcome: Ongoing (interventions implemented as appropriate)      Problem: Restraint, Nonbehavioral (Nonviolent)  Goal: Rationale and Justification  Outcome: Ongoing (interventions implemented as appropriate)    Goal: Nonbehavioral (Nonviolent) Restraint: Absence of Injury/Harm  Outcome: Ongoing (interventions implemented as appropriate)    Goal: Nonbehavioral (Nonviolent) Restraint: Achievement of Discontinuation Criteria  Outcome: Ongoing (interventions implemented as appropriate)    Goal: Nonbehavioral (Nonviolent) Restraint: Preservation of Dignity and Wellbeing  Outcome: Ongoing (interventions implemented as appropriate)

## 2018-08-04 NOTE — PLAN OF CARE
Problem: Nutrition, Imbalanced: Inadequate Oral Intake (Adult)  Goal: Prevent Further Weight Loss  Outcome: Ongoing (interventions implemented as appropriate)   08/04/18 1158   Nutrition, Imbalanced: Inadequate Oral Intake (Adult)   Prevent Further Weight Loss other (see comments)     TF to start - Glucerna 1.2 @ goal rate 50 cc/hr  30 cc water flushes Q 4 hrs.   NG tube in place

## 2018-08-05 NOTE — PLAN OF CARE
Problem: Patient Care Overview  Goal: Plan of Care Review   08/05/18 1544   Plan of Care Review   Progress no change   OTHER   Outcome Summary continuing bronchodilator therapy as ordered

## 2018-08-05 NOTE — PLAN OF CARE
Problem: Patient Care Overview  Goal: Plan of Care Review  Outcome: Ongoing (interventions implemented as appropriate)   08/05/18 0656   Coping/Psychosocial   Plan of Care Reviewed With patient   Plan of Care Review   Progress improving   OTHER   Outcome Summary VSS, receiving pain meds every 4hrs for chest pain ( Lung CA ) not well controlled , pt did take 1 valium last for anxiety , incontinant of stool x2 , F/c in place for urinary retention .      Goal: Individualization and Mutuality  Outcome: Ongoing (interventions implemented as appropriate)    Goal: Discharge Needs Assessment  Outcome: Ongoing (interventions implemented as appropriate)    Goal: Interprofessional Rounds/Family Conf  Outcome: Ongoing (interventions implemented as appropriate)      Problem: Sepsis/Septic Shock (Adult)  Goal: Signs and Symptoms of Listed Potential Problems Will be Absent, Minimized or Managed (Sepsis/Septic Shock)  Outcome: Ongoing (interventions implemented as appropriate)      Problem: Pain, Acute (Adult)  Goal: Identify Related Risk Factors and Signs and Symptoms  Outcome: Ongoing (interventions implemented as appropriate)    Goal: Acceptable Pain Control/Comfort Level  Outcome: Ongoing (interventions implemented as appropriate)      Problem: Nutrition, Imbalanced: Inadequate Oral Intake (Adult)  Goal: Identify Related Risk Factors and Signs and Symptoms  Outcome: Ongoing (interventions implemented as appropriate)    Goal: Improved Oral Intake  Outcome: Ongoing (interventions implemented as appropriate)    Goal: Prevent Further Weight Loss  Outcome: Ongoing (interventions implemented as appropriate)      Problem: Fall Risk (Adult)  Goal: Absence of Fall  Outcome: Ongoing (interventions implemented as appropriate)      Problem: Skin Injury Risk (Adult)  Goal: Identify Related Risk Factors and Signs and Symptoms  Outcome: Ongoing (interventions implemented as appropriate)    Goal: Skin Health and Integrity  Outcome: Ongoing  (interventions implemented as appropriate)      Problem: Restraint, Nonbehavioral (Nonviolent)  Goal: Rationale and Justification  Outcome: Outcome(s) achieved Date Met: 08/05/18    Goal: Nonbehavioral (Nonviolent) Restraint: Absence of Injury/Harm  Outcome: Ongoing (interventions implemented as appropriate)    Goal: Nonbehavioral (Nonviolent) Restraint: Achievement of Discontinuation Criteria  Outcome: Outcome(s) achieved Date Met: 08/05/18    Goal: Nonbehavioral (Nonviolent) Restraint: Preservation of Dignity and Wellbeing  Outcome: Outcome(s) achieved Date Met: 08/05/18

## 2018-08-05 NOTE — NURSING NOTE
Ellen Yeh, monitor tech, spoke with Bridger with [facility] and notified them that she would be transferring to the floor and they were to resume primary care.

## 2018-08-05 NOTE — PLAN OF CARE
Problem: Patient Care Overview  Goal: Plan of Care Review  Outcome: Ongoing (interventions implemented as appropriate)   08/05/18 1501   Coping/Psychosocial   Plan of Care Reviewed With patient   Plan of Care Review   Progress improving   OTHER   Outcome Summary VSS, F/C d/c'd and patient instructed on the use of the purewick external catheter. Lortab given once this shift for pain after coughing while trying to produce sputum for a culture. Not interested in her pureed diet, but she is drinking honey thick juice and eating ice cream. Reassured that her swallow will be re-evaluated. Transfer to  pending transport. Continue to monitor.        Problem: Sepsis/Septic Shock (Adult)  Goal: Signs and Symptoms of Listed Potential Problems Will be Absent, Minimized or Managed (Sepsis/Septic Shock)  Outcome: Ongoing (interventions implemented as appropriate)      Problem: Nutrition, Imbalanced: Inadequate Oral Intake (Adult)  Goal: Prevent Further Weight Loss  Outcome: Ongoing (interventions implemented as appropriate)      Problem: Fall Risk (Adult)  Goal: Absence of Fall  Outcome: Ongoing (interventions implemented as appropriate)      Problem: Skin Injury Risk (Adult)  Goal: Identify Related Risk Factors and Signs and Symptoms  Outcome: Ongoing (interventions implemented as appropriate)    Goal: Skin Health and Integrity  Outcome: Ongoing (interventions implemented as appropriate)

## 2018-08-05 NOTE — PROGRESS NOTES
LOS: 5 days   Patient Care Team:  Mary Taylor MD as PCP - General (Family Medicine)  Bienvenido Collier MD as Consulting Physician (Hematology and Oncology)  Daniel Oconnor MD as Referring Physician (Pulmonary Disease)  Shazia Mcdonald MD as Consulting Physician (Radiation Oncology)    Chief Complaint:  Follow-up respiratory failure, pneumonia, COPD with exacerbation of lung cancer    Interval History:   Extubated yesterday.  On oxygen 2 L/m which is her baseline requirement.  She denies cough.  She denies shortness of breath at rest.  She started eating and reported some nausea after eating but stated that this is chronic.      REVIEW OF SYSTEMS:   Constitutional: No fever or chills  Cardiovascular: No chest pain or palpitation.  No leg swelling   Respiratory: As above  Gastrointestinal: Mild nausea after eating.  This is chronic though.  No diarrhea or abdominal pain.  She had a bowel movement yesterday      Ventilator/Non-Invasive Ventilation Settings     Start     Ordered    08/03/18 0021  Ventilator - AC/VC; (26); 100; 90%; 5; 500  Continuous,   Status:  Canceled     Comments:  MAY INCREASE PEAK PRESSURE ALARM TO 55.   Question Answer Comment   Vent Mode AC/VC    Breath rate  26   FiO2 100    FiO2 titrate for Sp02% =/> 90%    PEEP 5    Tidal Volume 500        08/03/18 0022    08/02/18 2354  Ventilator - AC/VC; (22); 100; 90%; 5; 450  Continuous,   Status:  Canceled     Question Answer Comment   Vent Mode AC/VC    Breath rate  22   FiO2 100    FiO2 titrate for Sp02% =/> 90%    PEEP 5    Tidal Volume 450        08/02/18 2354            Vital Signs  Temp:  [97.7 °F (36.5 °C)-98.5 °F (36.9 °C)] 98.4 °F (36.9 °C)  Heart Rate:  [] 93  Resp:  [18-25] 20  BP: (107-150)/() 149/80  FiO2 (%):  [40 %] 40 %    Intake/Output Summary (Last 24 hours) at 08/05/18 0954  Last data filed at 08/05/18 0200   Gross per 24 hour   Intake              650 ml   Output   "            575 ml   Net               75 ml     Flowsheet Rows      First Filed Value   Admission Height  170.2 cm (67\") Documented at 07/31/2018 2120   Admission Weight  50.8 kg (112 lb) Documented at 07/31/2018 2149          Physical Exam:   General Appearance:    Alert, cooperative, Cachectic    Lungs:     Decreased air entry overall.  Crackles on the left side.  No wheezing     Heart:    Regular rhythm and normal rate, normal S1 and S2, no            murmur   HEENT:    Oral thrush    Abdomen:     Soft.  Positive bowel sounds.  No tenderness    Neuro:   Conscious, alert, oriented x3   Extremities:   Moves all extremities well, no edema, no cyanosis, no             Redness          Results Review:          Results from last 7 days  Lab Units 08/05/18  0348 08/04/18  0329 08/03/18  0521   SODIUM mmol/L 142 142 134*   POTASSIUM mmol/L 3.9 4.2 4.7   CHLORIDE mmol/L 107 102 98   CO2 mmol/L 23.3 21.6* 21.2*   BUN mg/dL 24* 22 14   CREATININE mg/dL 0.61 0.69 0.85   GLUCOSE mg/dL 138* 139* 161*   CALCIUM mg/dL 7.4* 8.4* 8.4*       Results from last 7 days  Lab Units 08/02/18  2327   TROPONIN T ng/mL 0.054*       Results from last 7 days  Lab Units 08/05/18  0348 08/04/18  0329 08/03/18  0521   WBC 10*3/mm3 5.95 7.47 19.17*   HEMOGLOBIN g/dL 9.0* 9.3* 10.5*   HEMATOCRIT % 27.8* 28.7* 31.1*   PLATELETS 10*3/mm3 207 184 234           Results from last 7 days  Lab Units 08/02/18  2327   PROBNP pg/mL 6,525.0*       I reviewed the patient's new clinical results.  I personally viewed and interpreted the patient's CXR        Medication Review:     enoxaparin 40 mg Subcutaneous Q24H   famotidine 20 mg Intravenous Q12H   ipratropium-albuterol 3 mL Nebulization 4x Daily - RT   lactobacillus acidophilus 1 capsule Oral Daily   lidocaine 1 patch Transdermal Q24H   methylPREDNISolone sodium succinate 60 mg Intravenous Q12H   piperacillin-tazobactam 3.375 g Intravenous Q8H         norepinephrine 0.02-0.3 mcg/kg/min Last Rate: Stopped " (08/04/18 1930)   Pharmacy to Dose Zosyn         Diagnostic imaging:  I personally and independently reviewed the Following images:  Extensive consolidation in the lingula and left lower lobe.      Review of the PFT dated 10/31/17    Assessment:    1. Left upper and lower lobe pneumonia  2. Adenocarcinoma left lung status post radiation (completed 12/15/17) and chemotherapy, diagnosed by bronch 10/6/17  3. Acute hypercapnic and hypoxic respiratory failure, placed on mechanical ventilation on 8/2/18, On oxygen 2 L/m at night at baseline  4. Acute on chronic anemia-stable  5. COPD exacerbation with predominant upper lobe emphysema (FEV1:37-->56%; DLCO 33% on PFT 10/31/17)  6. Lactic acidosis, resolved  7. 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 Oral candidiasis   9. Steroid-induced hyperglycemia, mild  10. Nausea      Plan   Continue Zosyn.  Blood culture negative.  Check induced sputum culture and urine strep antigen    No wheezing.  I'll switch her to oral prednisone and stop Solu-Medrol.    Continue bronchodilators.     Increase diet as tolerated.     transfer to floor and Ambulate    Insulin subcutaneous when necessary     PRN Zofran for nausea        Nathaniel Castellon MD  08/05/18  9:54 AM          This note was dictated utilizing Dragon dictation

## 2018-08-06 NOTE — PROGRESS NOTES
Name: Dilma Abad ADMIT: 2018   : 1950  PCP: Mary Taylor MD    MRN: [id number] LOS: 6 days   AGE/SEX: 68 y.o. female  ROOM: [facility]   Subjective   Was in ICU from - with acute hypoxic and hypercapnic respiratory failure and was mechanically ventilated and required pressor support. Was extubated  and has remained stable. She reports that dyspnea is stable. She reports not having difficulty with swallowing before and follows with Dr Collier for lung and esophageal adenocarcinoma. No CP vomiting or diarrhea. She has chronic nausea and cancer related pain which are controlled with medications.    Objective   Vital Signs  Temp:  [97.4 °F (36.3 °C)-97.7 °F (36.5 °C)] 97.7 °F (36.5 °C)  Heart Rate:  [70-87] 74  Resp:  [14-26] 16  BP: (131-165)/(70-89) 165/89  SpO2:  [90 %-98 %] 90 %  on  Flow (L/min):  [2-4] 2;   Device (Oxygen Therapy): nasal cannula  Body mass index is 18.71 kg/m².    Physical Exam   Constitutional: She is oriented to person, place, and time. She appears well-developed. No distress.   frail   Eyes: Conjunctivae and EOM are normal.   Neck: Neck supple.   Cardiovascular: Normal rate, regular rhythm and intact distal pulses.    Pulmonary/Chest: Effort normal. No stridor. She has decreased breath sounds. She has no wheezes. She has rhonchi.   Abdominal: Soft. She exhibits no distension.   Musculoskeletal: Normal range of motion. She exhibits no edema.   Neurological: She is alert and oriented to person, place, and time.   Skin: Skin is warm and dry. She is not diaphoretic.   Psychiatric: She has a normal mood and affect. Her behavior is normal.   Nursing note and vitals reviewed.      Results Review:       I reviewed the patient's new clinical results.     I reviewed imaging, agree with interpretation.     I reviewed telemetry/EKG results, sinus rhythm      Results from last 7 days  Lab Units 18  0444 18  0348 18  0329 18  0521   WBC 10*3/mm3 5.57 5.95  7.47 19.17*   HEMOGLOBIN g/dL 9.6* 9.0* 9.3* 10.5*   PLATELETS 10*3/mm3 229 207 184 234     Results from last 7 days  Lab Units 08/06/18 0444 08/05/18 0348 08/04/18 0329 08/03/18  0521   SODIUM mmol/L 142 142 142 134*   POTASSIUM mmol/L 3.7 3.9 4.2 4.7   CHLORIDE mmol/L 101 107 102 98   CO2 mmol/L 28.9 23.3 21.6* 21.2*   BUN mg/dL 17 24* 22 14   CREATININE mg/dL 0.56* 0.61 0.69 0.85   GLUCOSE mg/dL 97 138* 139* 161*   Estimated Creatinine Clearance: 56 mL/min (A) (by C-G formula based on SCr of 0.56 mg/dL (L)).  Results from last 7 days  Lab Units 08/06/18 0444 08/05/18 0348 08/04/18 0329 08/03/18 0521 08/02/18 2327  07/31/18  2146   CALCIUM mg/dL 8.5* 7.4* 8.4* 8.4*  --   < > 9.9   ALBUMIN g/dL 3.20* 2.60* 2.60*  --   --   --  4.10   MAGNESIUM mg/dL  --   --   --   --  2.1  --   --    PHOSPHORUS mg/dL  --   --  4.3  --  7.0*  --   --    < > = values in this interval not displayed.      enoxaparin 40 mg Subcutaneous Q24H   famotidine 20 mg Intravenous Q12H   ipratropium-albuterol 3 mL Nebulization 4x Daily - RT   lactobacillus acidophilus 1 capsule Oral Daily   lidocaine 1 patch Transdermal Q24H   piperacillin-tazobactam 3.375 g Intravenous Q8H   predniSONE 40 mg Oral Daily With Breakfast       Pharmacy to Dose Zosyn    Diet Regular; Nectar / Syrup Thick      Assessment/Plan      Active Hospital Problems    Diagnosis Date Noted   • **Pneumonia due to infectious organism [J18.9] 08/01/2018   • Generalized abdominal pain [R10.84] 08/01/2018   • Diarrhea [R19.7] 08/01/2018   • Sepsis (CMS/HCC) [A41.9] 07/31/2018   • Anxiety and depression [F41.8] 07/31/2018   • COPD (chronic obstructive pulmonary disease) (CMS/HCC) [J44.9] 07/31/2018   • Hyperlipidemia [E78.5] 07/31/2018   • Adenocarcinoma of left lung (CMS/HCC) [C34.92] 10/31/2017   • Panlobular emphysema (CMS/HCC) [J43.1] 10/31/2017      Resolved Hospital Problems    Diagnosis Date Noted Date Resolved   No resolved problems to display.     - Aspiration PNA:  Respiratory Cx is normal clementine and strep antigen negative. Check RVP and procal. Repeat CRP (was >20 on admission). Continue Zosyn and can transition to Augmentin at discharge. Pulmonology following.   - COPD AE: Prednisone. Breathing treatments. On 2L with 88% saturation. Wean as able. Will need walking oximetry prior to discharge.  - Lung/Esophageal Adenocarcinoma: VFSS today and on modified diet. SLP following. Follow up with Dr Collier, CBC group.  - Acute Mixed Respiratory Failure: Extubated 8/4. 2/2 above.  - Oral Candidiasis: Nystatin  - Cancer Related Pain/Nausea: Symptom control.  - Disposition: Reassess PT/OT, TBD    >35 minutes time spent    Darrick Schafer MD  [facility]  08/06/18  1:28 PM

## 2018-08-06 NOTE — PROGRESS NOTES
Adult Nutrition  Assessment/PES    Patient Name:  Dilma Abad  YOB: 1950  MRN: [id number]  Admit Date:  7/31/2018    Assessment Date:  8/6/2018    Comments:  Feeding tube removed 8/4. Had VFSS this morning and diet advanced to regular, nectar thick liquids, no mixed consistencies, pureed fruit, no straws. Ate 50% dinner 8/4 and 25% breakfast 8/5 to average 38%. Will monitor weight, labs, PO intake, skin status.          Reason for Assessment     Row Name 08/06/18 0953          Reason for Assessment    Reason For Assessment follow-up protocol                 Labs/Tests/Procedures/Meds     Row Name 08/06/18 0953          Labs/Procedures/Meds    Lab Results Reviewed reviewed     Lab Results Comments alb, crt, H/H low        Diagnostic Tests/Procedures    Diagnostic Test/Procedure Reviewed reviewed     Diagnostic Test/Procedures Comments VFSS 8/6        Medications    Pertinent Medications Reviewed reviewed     Pertinent Medications Comments antacid, antibiotic, steroid             Physical Findings     Row Name 08/06/18 0920          Physical Findings    Overall Physical Appearance on oxygen therapy     Skin other (see comments)   intact; Wade score 15               Nutrition Prescription Ordered     Row Name 08/06/18 0954          Nutrition Prescription PO    Current PO Diet Regular     Fluid Consistency Nectar/syrup thick     Other Modifiers No Straws;Other (comment)   no mixed. pureed all fruits             Evaluation of Received Nutrient/Fluid Intake     Row Name 08/06/18 0955          PO Evaluation    Number of Days PO Intake Evaluated 2 days     Number of Meals 2     % PO Intake averaged 38%             Problem/Interventions:          Problem 2     Row Name 08/06/18 0956          Nutrition Diagnoses Problem 2    Resolved? Yes                   Intervention Goal     Row Name 08/06/18 0956          Intervention Goal    General Maintain nutrition     PO Tolerate PO;Increase intake;PO intake (%)      PO Intake % 75 %     Weight No significant weight loss             Nutrition Intervention     Row Name 08/06/18 0956          Nutrition Intervention    RD/Tech Action Follow Tx progress;Care plan reviewd             Nutrition Prescription     Row Name 08/06/18 0956          Nutrition Prescription EN    Enteral Prescription Discontinue enteral feeding             Education/Evaluation     Row Name 08/06/18 0957          Education    Education Will Instruct as appropriate        Monitor/Evaluation    Monitor Per protocol;PO intake;Pertinent labs;Weight;Skin status         Electronically signed by:  Deepa Perdue RD  08/06/18 9:58 AM

## 2018-08-06 NOTE — MBS/VFSS/FEES
Acute Care - Speech Language Pathology   Swallow Initial Evaluation [facility]     Patient Name: Dilma Abad  : 1950  MRN: [id number]  Today's Date: 2018               Admit Date: 2018    Visit Dx:     ICD-10-CM ICD-9-CM   1. Sepsis, due to unspecified organism (CMS/HCC) A41.9 038.9     995.91   2. Diarrhea, unspecified type R19.7 787.91   3. Hypokalemia E87.6 276.8   4. Oropharyngeal dysphagia R13.12 787.22     Patient Active Problem List   Diagnosis   • Adenocarcinoma of left lung (CMS/HCC)   • Panlobular emphysema (CMS/HCC)   • Abnormal finding on imaging   • Esophageal carcinoma (CMS/HCC)   • Fitting and adjustment of vascular catheter   • OROZCO (dyspnea on exertion)   • Radiation esophagitis   • Sepsis (CMS/HCC)   • Anxiety and depression   • COPD (chronic obstructive pulmonary disease) (CMS/HCC)   • Hyperlipidemia   • Generalized abdominal pain   • Diarrhea   • Pneumonia due to infectious organism     Past Medical History:   Diagnosis Date   • Adenocarcinoma of lung (CMS/HCC) 2017    HAD RADIATION    • Anxiety and depression    • Arthritis    • Benign parotid tumor 2012    removed by Dr Vickers told it was benign   • COPD (chronic obstructive pulmonary disease) (CMS/HCC)    • Depression    • Diarrhea    • Early cataract    • Emphysema of lung (CMS/HCC)    • Esophageal cancer (CMS/HCC)    • History of chronic pain    • History of colon polyps    • History of pneumonia    • Hyperlipidemia    • Joint pain    • Stroke (CMS/HCC)      Past Surgical History:   Procedure Laterality Date   • BRONCHOSCOPY     • CHOLECYSTECTOMY     • COLON SURGERY  2015    COLON POLYPS   • COLONOSCOPY  2016    TA w/low grade dysplasia x 3, serrated adenoma x 2   • COLONOSCOPY  2016    TA w/high grade dysplasia   • ENDOSCOPY N/A 2017    Procedure: ESOPHAGOGASTRODUODENOSCOPY WITH BIOPSY;  Surgeon: Jayant Robles MD;  Location: [facility] ENDOSCOPY;  Service:    • ENDOSCOPY N/A 3/17/2018     Procedure: ESOPHAGOGASTRODUODENOSCOPY WITH BIOPSIES;  Surgeon: Ambrosio Sapp MD;  Location: [facility] ENDOSCOPY;  Service: Gastroenterology   • FOOT SURGERY  10/2010    [place]   • HYSTERECTOMY     • INTUBATION  8/3/2018        • NC INSJ TUNNELED CVC W/O SUBQ PORT/ AGE 3 YR/> Right 1/9/2018    Procedure: MEDIPORT PLACEMENT right;  Surgeon: Damaso Germain MD;  Location: [facility] HYBRID OR 18/19;  Service: Vascular   • SALIVARY GLAND SURGERY      PAROTID MASS REMOVED BENIGN   • SHOULDER ARTHROSCOPY Left 1995. 2001          SWALLOW EVALUATION (last 72 hours)      SLP Adult Swallow Evaluation     Row Name 08/06/18 0900 08/04/18 1500          Document Type evaluation  -SH (r) ES (t) SH (c) evaluation  -LS    Subjective Information no complaints  -SH (r) ES (t) SH (c) complains of   thirst  -LS    Patient Observations alert;cooperative;agree to therapy  -SH (r) ES (t) SH (c) alert;cooperative;agree to therapy  -LS    Patient Effort good  -SH (r) ES (t) SH (c) good  -LS    Symptoms Noted During/After Treatment none  -SH (r) ES (t) SH (c) none  -LS          Patient Profile Reviewed yes  -SH (r) ES (t) SH (c) yes  -LS    Pertinent History Of Current Problem Pt was admitted to the hospital with L lobe pneumonia. Pt with hx of lung cancer, esophageal cancer, radiation and chemotherapy, esophagitis, emphysema, and stroke. Pt intubated 8/2-8/4.  -SH (r) ES (t) SH (c) --   Pt was admitted with lower lobe pa. Pt with hx of lung ca.  -LS    Current Method of Nutrition honey-thick liquids;pureed  -SH (r) ES (t) SH (c) NPO  -LS    Precautions/Limitations, Vision WFL;for purposes of eval  -SH (r) ES (t) SH (c) WFL;for purposes of eval  -LS    Precautions/Limitations, Hearing WFL;for purposes of eval  -SH (r) ES (t) SH (c) WFL;for purposes of eval  -LS    Prior Level of Function-Communication WFL  -SH (r) ES (t) SH (c) WFL  -LS    Prior Level of Function-Swallowing no diet consistency restrictions  -SH (r) ES (t) SH (c) no diet  consistency restrictions  -LS    Plans/Goals Discussed with patient;agreed upon  -SH (r) ES (t) SH (c) patient;agreed upon  -LS    Barriers to Rehab none identified  -SH (r) ES (t) SH (c) none identified  -LS    Patient's Goals for Discharge return to regular diet  -SH (r) ES (t) SH (c) return to PO diet  -LS          Dentition Assessment  -- upper dentures/partial in place  -LS    Secretion Management  -- WNL/WFL  -LS    Mucosal Quality  -- moist, healthy  -LS    Volitional Swallow  -- weak  -LS    Volitional Cough  -- weak  -LS          Oral Motor General Assessment  -- generalized oral motor weakness  -LS          Respiratory Support Currently in Use  -- room air  -LS    Eating/Swallowing Skills  -- self-fed;fed by SLP  -LS    Positioning During Eating  -- upright in bed  -LS    Utensils Used  -- spoon;cup  -LS    Consistencies Trialed  -- chopped;pureed;thin liquids;nectar/syrup-thick liquids;honey-thick liquids  -LS          Respiratory Status  -- WFL  -LS    Date of Intubation  -- --   8/3/18 due to hypoxia  extubated 8/4  -LS          Oral Prep Phase  -- WFL  -LS    Oral Transit  -- WFL  -LS    Pharyngeal Phase  -- suspected pharyngeal impairment  -LS    Clinical Swallow Evaluation Summary  -- bedside eval completed.  -LS          Pharyngeal Phase Concerns  -- throat clear;other (see comments)   audible swallow with  thin,  nectar liquids, & MS.  -LS    Throat Clear  -- thin;nectar;mechanical soft  -LS    Pharyngeal Phase Concerns, Comment  -- risk of aspiration.  -LS          Utensils Used spoon;cup;straw  -SH (r) ES (t) SH (c)  --    Consistencies Trialed thin liquids;nectar/syrup-thick liquids;honey-thick liquids;pureed;mechanical soft, no mixed consistencies;regular textures   mixed  -SH (r) ES (t) SH (c)  --          Oral Prep Phase WFL  -SH (r) ES (t) SH (c)  --    Oral Transit Phase impaired  -SH (r) ES (t) SH (c)  --    Oral Residue WFL  -SH (r) ES (t) SH (c)  --    VFSS Summary Pt presents with mild  oropharyngeal dysphagia characterized by mistiming due to impulsive drink size with reduced vestibule closure. Premature spillage of honey via spoon to the pyrifroms without penetration/aspiration. Pt with intermittent premature spillage past the valleculae of honey via straw without penetration/aspiration. Premature spillage to pyriforms with nectar via spoon without penetration/aspiration. Pt with intermittent spillage to the pyriforms with nectar via cup with transient penetration during the swallow x1. Intermittent premature spillage to the pyrifroms with nectar via straw with shallow transient penetration during the swallow x1. Pt with transient penetration during the swallow with thins via cup. Pt with trace aspiration from residue with thins via cup during the swallow with strong cough post aspiration. Pt was cued for small bolus with thins via cup; however, pt continued to take large bolus drinks. Pt with adequate timing with thins via cup, cued small bolus, without penetration/aspiration. Intermittent mild residue following thins via cup, small bolus. Pt cleared residue with subsequent swallow. Premature spillage of puree to valleculae without penetration/aspiration. Mild base of tongue residue post puree swallow. Pt clears puree residue to trace residue with subsequent swallow. Premature spillage to pyriforms with mech soft and mixed trials without penetration/aspiration. Pt with trace base of tongue and valleculae residue, but clears with subsequent swallow. Premature spillage to valleculae with regular trials without penetration/aspiration. Pt clears mild regular residue with liquid wash without penetration/aspiration. SLP recs regular and nectar, due to difficulty following small bolus precaution.   -SH (r) ES (t) SH (c)  --          Impaired Oral Transit Phase premature spillage of liquids into pharynx  -SH (r) ES (t) SH (c)  --    Premature Spillage of Liquids into Pharynx honey-thick  liquids;nectar-thick liquids;pudding/puree;mixed consistency;mechanical soft;regular textures  -SH (r) ES (t) SH (c)  --          Initiation of Pharyngeal Swallow bolus in valleculae;bolus in pyriform sinuses  -SH (r) ES (t) SH (c)  --    Pharyngeal Phase impaired pharyngeal phase of swallowing  -SH (r) ES (t) SH (c)  --    Penetration During the Swallow nectar-thick liquids;thin liquids  -SH (r) ES (t) SH (c)  --    Aspiration During the Swallow thin liquids  -SH (r) ES (t) SH (c)  --    Response to Penetration no response  -SH (r) ES (t) SH (c)  --    Response to Aspiration cough  -SH (r) ES (t) SH (c)  --    Pharyngeal Residue mixed consistency;mechanical soft;regular textures  -SH (r) ES (t) SH (c)  --    Response to Residue cleared residue with spontaneous subsequent swallow;cleared residue with liquid wash  -SH (r) ES (t) SH (c)  --    Attempted Compensatory Maneuvers bolus size  -SH (r) ES (t) SH (c)  --    Response to Attempted Compensatory Maneuvers other (see comments)   Pt did not comply.  -SH (r) ES (t) SH (c)  --          Severity Level of Dysphagia mild dysphagia  -SH (r) ES (t) SH (c)  --    Consistencies Aspirated/Penetrated thin liquids;aspirated;penetrated;nectar-thick liquids  -SH (r) ES (t) SH (c)  --    Summary Statement Pt presents with mild oropharyngeal dysphagia characterized by mistiming due to impulsive drink size with reduced vestibule closure. Pt unable to follow precaution for smaller bolus size. Pt with shallow transient penetration during swallow with nectar via cup and straw. Pt with transient penetration with thins via cup, large bolus. Pt with aspiration during swallow of thins via cup residue. Pt with strong cough following aspiration. No penetration/aspiration wiht honey, puree, mech soft, mixed, regular, and liquid wash-thins via cup.   -SH (r) ES (t) SH (c)  --          SLP Swallowing Diagnosis mild;pharyngeal dysfunction;oral dysfunction  -SH (r) ES (t) SH (c) moderate;oral  dysfunction;pharyngeal dysfunction  -    Functional Impact risk of aspiration/pneumonia  -SH (r) ES (t) SH (c) no impact on function  -LS    Rehab Potential/Prognosis, Swallowing good, to achieve stated therapy goals  -SH (r) ES (t) SH (c) good, to achieve stated therapy goals  -    Criteria for Skilled Therapeutic Interventions Met demonstrates skilled criteria  -SH (r) ES (t) SH (c) demonstrates skilled criteria  -          Therapy Frequency (Swallow) PRN  -SH (r) ES (t) SH (c) PRN  -LS    Predicted Duration Therapy Intervention (Days) until discharge  -SH (r) ES (t) SH (c) until discharge  -LS    SLP Diet Recommendation regular textures;nectar thick liquids;other (see comments);water between meals after oral care, with supervision   no mixed, small sips & no straws w/ free h20 protocol  - puree;honey thick liquids  -    Recommended Diagnostics reassess via clinical swallow evaluation  -SH (r) ES (t) SH (c) VFSS (MBS)  -LS    Recommended Precautions and Strategies upright posture during/after eating  -SH (r) ES (t) SH (c) upright posture during/after eating;small bites of food and sips of liquid;no straw;alternate between small bites of food and sips of liquid  -LS    SLP Rec. for Method of Medication Administration meds whole;with thick liquids;with pudding or applesauce  -SH (r) ES (t) SH (c) meds crushed;with pudding or applesauce  -    Monitor for Signs of Aspiration yes;notify SLP if any concerns  -SH (r) ES (t) SH (c) notify SLP if any concerns;gurgly voice;throat clearing  -          Oral Nutrition/Hydration Goal Selection (SLP) oral nutrition/hydration, SLP goal 1  -SH (r) ES (t) SH (c)  --          Oral Nutrition/Hydration Goal 1, SLP Pt will tolerate regular and nectar without overt s/s of aspiration for 5/5 trials across 1 session.  -SH (r) ES (t) SH (c)  --    Time Frame (Oral Nutrition/Hydration Goal 1, SLP) by discharge  -SH (r) ES (t) SH (c)  --      User Key  (r) = Recorded By, (t) =  Taken By, (c) = Cosigned By    Initials Name Effective Dates    SARABJIT Dinah Du, MS [facility]-SLP 06/08/18 -     Dalia Zhu MS CCC-SLP 03/07/18 -     Bibiana Shaw, Speech Therapy Student 06/11/18 -         EDUCATION  The patient has been educated in the following areas:   Dysphagia (Swallowing Impairment).    SLP Recommendation and Plan     SLP Diet Recommendation: regular textures, nectar thick liquids, other (see comments), water between meals after oral care, with supervision (no mixed, small sips & no straws w/ free h20 protocol)                                  Plan of Care Reviewed With: patient  Plan of Care Review  Plan of Care Reviewed With: patient  Progress: improving  Outcome Summary: VFSS completed. Pt with one instance of trace aspiration with thins with strong cough. Pt unable to consistently follow directions for small bolus size. D/t this, and intolerance with small sips thins rec following bedside last week piror to intubation. SLP recs conservative diet of regular (no mixed) and nectar. Allow free water protocol with water (no straws).           SLP GOALS     Row Name 08/06/18 0900             Oral Nutrition/Hydration Goal 1 (SLP)    Oral Nutrition/Hydration Goal 1, SLP Pt will tolerate regular and nectar without overt s/s of aspiration for 5/5 trials across 1 session.  -SH (r) ES (t) SH (c)      Time Frame (Oral Nutrition/Hydration Goal 1, SLP) by discharge  -SH (r) ES (t) SH (c)        User Key  (r) = Recorded By, (t) = Taken By, (c) = Cosigned By    Initials Name Provider Type    Dalia Zhu MS CCC-SLP Speech and Language Pathologist    Bibiana Shaw, Speech Therapy Student Speech Therapy Student             SLP Outcome Measures (last 72 hours)      SLP Outcome Measures     Row Name 08/06/18 1000 08/04/18 1500          SLP Outcome Measures    Outcome Measure Used? Adult NOMS  -SH (r) ES (t) SH (c) Adult NOMS  -LS        Adult FCM Scores    FCM Chosen Swallowing  -SH (r) ES  (t) SH (c) Swallowing  -LS     Swallowing FCM Score 5  -SH (r) ES (t) SH (c) 3  -LS       User Key  (r) = Recorded By, (t) = Taken By, (c) = Cosigned By    Initials Name Effective Dates    LS Dinah Du, MS CCC-SLP 06/08/18 -     Dalia Zhu MS CCC-SLP 03/07/18 -     Bibiana Shaw, Speech Therapy Student 06/11/18 -            Time Calculation:         Time Calculation- SLP     Row Name 08/06/18 1011             Time Calculation- SLP    SLP Start Time 0800  -SH (r) ES (t) SH (c)      SLP Received On 08/06/18  -SH (r) ES (t) SH (c)        User Key  (r) = Recorded By, (t) = Taken By, (c) = Cosigned By    Initials Name Provider Type    Dalia Zhu MS CCC-SLP Speech and Language Pathologist    Bibiana Shaw, Speech Therapy Student Speech Therapy Student                   Dalia Lowe MS CCC-SLP  8/6/2018

## 2018-08-06 NOTE — THERAPY EVALUATION
Acute Care - Physical Therapy Initial Evaluation  [facility]     Patient Name: Dilma Abad  : 1950  MRN: [id number]  Today's Date: 2018   Onset of Illness/Injury or Date of Surgery: 18  Date of Referral to PT: 18  Referring Physician: Darrick Schafer MD      Admit Date: 2018    Visit Dx:     ICD-10-CM ICD-9-CM   1. Sepsis, due to unspecified organism (CMS/HCC) A41.9 038.9     995.91   2. Diarrhea, unspecified type R19.7 787.91   3. Hypokalemia E87.6 276.8   4. Oropharyngeal dysphagia R13.12 787.22   5. Difficulty walking R26.2 719.7     Patient Active Problem List   Diagnosis   • Adenocarcinoma of left lung (CMS/HCC)   • Panlobular emphysema (CMS/HCC)   • Abnormal finding on imaging   • Esophageal carcinoma (CMS/HCC)   • Fitting and adjustment of vascular catheter   • OROZCO (dyspnea on exertion)   • Radiation esophagitis   • Sepsis (CMS/HCC)   • Anxiety and depression   • COPD (chronic obstructive pulmonary disease) (CMS/HCC)   • Hyperlipidemia   • Generalized abdominal pain   • Diarrhea   • Pneumonia due to infectious organism     Past Medical History:   Diagnosis Date   • Adenocarcinoma of lung (CMS/HCC) 2017    HAD RADIATION    • Anxiety and depression    • Arthritis    • Benign parotid tumor 2012    removed by Dr Vickers told it was benign   • COPD (chronic obstructive pulmonary disease) (CMS/HCC)    • Depression    • Diarrhea    • Early cataract    • Emphysema of lung (CMS/HCC)    • Esophageal cancer (CMS/HCC)    • History of chronic pain    • History of colon polyps    • History of pneumonia    • Hyperlipidemia    • Joint pain    • Stroke (CMS/HCC)      Past Surgical History:   Procedure Laterality Date   • BRONCHOSCOPY     • CHOLECYSTECTOMY     • COLON SURGERY  2015    COLON POLYPS   • COLONOSCOPY  2016    TA w/low grade dysplasia x 3, serrated adenoma x 2   • COLONOSCOPY  2016    TA w/high grade dysplasia   • ENDOSCOPY N/A 2017    Procedure:  ESOPHAGOGASTRODUODENOSCOPY WITH BIOPSY;  Surgeon: Jayant Robles MD;  Location: [facility] ENDOSCOPY;  Service:    • ENDOSCOPY N/A 3/17/2018    Procedure: ESOPHAGOGASTRODUODENOSCOPY WITH BIOPSIES;  Surgeon: Ambrosio Sapp MD;  Location: [facility] ENDOSCOPY;  Service: Gastroenterology   • FOOT SURGERY  10/2010    [place]   • HYSTERECTOMY     • INTUBATION  8/3/2018        • IL INSJ TUNNELED CVC W/O SUBQ PORT/ AGE 3 YR/> Right 1/9/2018    Procedure: MEDIPORT PLACEMENT right;  Surgeon: Damaso Germain MD;  Location: [facility] HYBRID OR 18/19;  Service: Vascular   • SALIVARY GLAND SURGERY      PAROTID MASS REMOVED BENIGN   • SHOULDER ARTHROSCOPY Left 1995. 2001        PT ASSESSMENT (last 12 hours)      Physical Therapy Evaluation     Row Name 08/06/18 0511          PT Evaluation Time/Intention    Subjective Information complains of;weakness  -CA     Document Type evaluation  -CA     Mode of Treatment individual therapy;physical therapy  -CA     Patient Effort good  -CA     Row Name 08/06/18 0822          General Information    Patient Profile Reviewed? yes  -CA     Onset of Illness/Injury or Date of Surgery 07/31/18  -CA     Referring Physician Darrick Schafer MD  -CA     Patient Observations alert;cooperative;agree to therapy  -CA     Patient/Family Observations pt supine in bed, eager for PT, no acute distress  -CA     Prior Level of Function independent:  -CA     Equipment Currently Used at Home none  -CA     Pertinent History of Current Functional Problem Pt admitted to [facility] with freq diarrhea x 4 days, increased weakness, and decreased balance. Work up reveals sepsis and pneumonia, hospital stay further complicated by acute respiratory failure and intubation.  -CA     Existing Precautions/Restrictions fall  -CA     Benefits Reviewed patient:;improve function;increase independence;increase strength;increase balance;decrease pain  -CA     Barriers to Rehab none identified  -CA     Row Name 08/06/18 2643           Relationship/Environment    Lives With alone  -CA     Row Name 08/06/18 1551          Resource/Environmental Concerns    Current Living Arrangements home/apartment/condo  -CA     Row Name 08/06/18 1551          Cognitive Assessment/Intervention- PT/OT    Orientation Status (Cognition) oriented x 4  -CA     Follows Commands (Cognition) WNL  -CA     Personal Safety Interventions fall prevention program maintained;gait belt;nonskid shoes/slippers when out of bed  -CA     Row Name 08/06/18 1551          Bed Mobility Assessment/Treatment    Bed Mobility Assessment/Treatment supine-sit  -CA     Supine-Sit [place] (Bed Mobility) verbal cues;nonverbal cues (demo/gesture);contact guard  -CA     Assistive Device (Bed Mobility) bed rails;head of bed elevated  -CA     Comment (Bed Mobility) increased time to complete  -CA     Row Name 08/06/18 1551          Transfer Assessment/Treatment    Transfer Assessment/Treatment sit-stand transfer;stand-sit transfer  -CA     Sit-Stand [place] (Transfers) contact guard;verbal cues;nonverbal cues (demo/gesture)  -CA     Stand-Sit [place] (Transfers) contact guard;nonverbal cues (demo/gesture);verbal cues  -CA     Row Name 08/06/18 1551          Sit-Stand Transfer    Assistive Device (Sit-Stand Transfers) walker, front-wheeled  -CA     Row Name 08/06/18 1551          Stand-Sit Transfer    Assistive Device (Stand-Sit Transfers) walker, front-wheeled  -CA     Row Name 08/06/18 1551          Gait/Stairs Assessment/Training    Gait/Stairs Assessment/Training gait/ambulation independence  -CA     [place] Level (Gait) contact guard;verbal cues;nonverbal cues (demo/gesture)  -CA     Assistive Device (Gait) walker, front-wheeled  -CA     Distance in Feet (Gait) 100  -CA     Pattern (Gait) step-through  -CA     Deviations/Abnormal Patterns (Gait) gait speed decreased  -CA     Bilateral Gait Deviations heel strike decreased  -CA     Comment (Gait/Stairs) Pt desats to 81 during amb  with O2 via nc, encouraged for deep pursed lip breathing upon return to room  -CA     Row Name 08/06/18 1551          General ROM    GENERAL ROM COMMENTS grossly wfl  -CA     Row Name 08/06/18 1551          General Assessment (Manual Muscle Testing)    Comment, General Manual Muscle Testing (MMT) Assessment grossly 3+/5  -CA     Row Name 08/06/18 1551          Sensory Assessment/Intervention    Sensory General Assessment no sensation deficits identified  -CA     Row Name 08/06/18 1551          Pain Assessment    Additional Documentation Pain Scale: Numbers Pre/Post-Treatment (Group)  -CA     Row Name 08/06/18 1551          Pain Scale: Numbers Pre/Post-Treatment    Pain Scale: Numbers, Pretreatment 0/10 - no pain  -CA     Pain Scale: Numbers, Post-Treatment 0/10 - no pain  -CA     Row Name 08/06/18 1551          Physical Therapy Clinical Impression    Date of Referral to PT 08/06/18  -CA     PT Diagnosis (PT Clinical Impression) difficulty walking  -CA     Prognosis (PT Clinical Impression) good  -CA     Functional Level at Time of Evaluation (PT Clinical Impression) CGA  -CA     Patient/Family Goals Statement (PT Clinical Impression) DC home  -CA     Criteria for Skilled Interventions Met (PT Clinical Impression) yes;treatment indicated  -CA     Pathology/Pathophysiology Noted (Describe Specifically for Each System) pulmonary;musculoskeletal  -CA     Impairments Found (describe specific impairments) aerobic capacity/endurance;gait, locomotion, and balance;muscle performance  -CA     Functional Limitations in Following Categories (Describe Specific Limitations) home management;community/leisure  -CA     Rehab Potential (PT Clinical Summary) good, to achieve stated therapy goals  -CA     Predicted Duration of Therapy (PT) 1 week  -CA     Care Plan Review (PT) evaluation/treatment results reviewed  -CA     Row Name 08/06/18 1551          Vital Signs    Pre SpO2 (%) 90  -CA     O2 Delivery Pre Treatment supplemental O2   -CA     Intra SpO2 (%) 81  -CA     O2 Delivery Intra Treatment supplemental O2  -CA     Post SpO2 (%) 91  -CA     O2 Delivery Post Treatment supplemental O2  -CA     Row Name 08/06/18 1551          Physical Therapy Goals    Bed Mobility Goal Selection (PT) bed mobility, PT goal 1  -CA     Transfer Goal Selection (PT) transfer, PT goal 1  -CA     Gait Training Goal Selection (PT) gait training, PT goal 1  -CA     Row Name 08/06/18 1551          Bed Mobility Goal 1 (PT)    Activity/Assistive Device (Bed Mobility Goal 1, PT) bed mobility activities, all  -CA     [place] Level/Cues Needed (Bed Mobility Goal 1, PT) independent  -CA     Time Frame (Bed Mobility Goal 1, PT) 1 week  -CA     Row Name 08/06/18 1551          Transfer Goal 1 (PT)    Activity/Assistive Device (Transfer Goal 1, PT) transfers, all  -CA     [place] Level/Cues Needed (Transfer Goal 1, PT) supervision required  -CA     Time Frame (Transfer Goal 1, PT) 1 week  -CA     Row Name 08/06/18 1551          Gait Training Goal 1 (PT)    Activity/Assistive Device (Gait Training Goal 1, PT) gait (walking locomotion);improve balance and speed;increase endurance/gait distance;walker, rolling  -CA     [place] Level (Gait Training Goal 1, PT) standby assist  -CA     Distance (Gait Goal 1, PT) 300  -CA     Time Frame (Gait Training Goal 1, PT) 1 week  -CA     Row Name 08/06/18 1555          Positioning and Restraints    Pre-Treatment Position in bed  -CA     Post Treatment Position chair  -CA     In Chair notified nsg;reclined;call light within reach;encouraged to call for assist  -CA       User Key  (r) = Recorded By, (t) = Taken By, (c) = Cosigned By    Initials Name Provider Type    Greta Magallanes PT Physical Therapist          Physical Therapy Education     Title: PT OT SLP Therapies (Done)     Topic: Physical Therapy (Done)     Point: Mobility training (Done)    Learning Progress Summary     Learner Status Readiness Method Response Comment  Documented by    Patient Done Acceptance [facility] 08/06/18 1558          Point: Home exercise program (Done)    Learning Progress Summary     Learner Status Readiness Method Response Comment Documented by    Patient Done Acceptance [facility] 08/06/18 1552          Point: Body mechanics (Done)    Learning Progress Summary     Learner Status Readiness Method Response Comment Documented by    Patient Done Acceptance [facility] 08/06/18 1550          Point: Precautions (Done)    Learning Progress Summary     Learner Status Readiness Method Response Comment Documented by    Patient Done Acceptance [facility] 08/06/18 1556                      User Key     Initials Effective Dates Name Provider Type Discipline    CA 06/13/18 -  Greta Kebede, PT Physical Therapist PT                PT Recommendation and Plan  Anticipated Discharge Disposition (PT): home with home health, home with assist  Planned Therapy Interventions (PT Eval): balance training, bed mobility training, gait training, home exercise program, neuromuscular re-education, patient/family education, ROM (range of motion), stair training, strengthening, stretching, transfer training  Therapy Frequency (PT Clinical Impression): 5 times/wk  Outcome Summary/Treatment Plan (PT)  Anticipated Discharge Disposition (PT): home with home health, home with assist  Plan of Care Reviewed With: patient  Outcome Summary: Pt is a 68 y.o. female admitted to [facility] with c/o diarrhea x 4 days, generalized weakness, and decreased balance on 7/31/2018. Work up reveals sepsis and pneumonia. Hospital stay further complicated by acute respiratory failure and intubation. Pt presents today with decreased activity tolerance, generalized weakness, and decreased functional mobility. Pt required CGA for bed mobility, CGA for STS transfers, and CGA for ambulation with FWW for 100 ft, but fatigues and desats while amb. Prior to admission, pt was ind with adls and amb without AD, and not on home O2.  Pt lives alone at home, with no JUAN LUIS, but reports neighbor will be available to assist her at home upon DC. Pt will benefit from skilled PT to address the previous deficits and improve overall safety with functional mobility. Pt will likely need FWW for home. Anticipate pt will D/C to home with assist and HH PT once medically appropriate.          Outcome Measures     Row Name 08/06/18 1600             How much help from another person do you currently need...    Turning from your back to your side while in flat bed without using bedrails? 3  -CA      Moving from lying on back to sitting on the side of a flat bed without bedrails? 3  -CA      Moving to and from a bed to a chair (including a wheelchair)? 3  -CA      Standing up from a chair using your arms (e.g., wheelchair, bedside chair)? 3  -CA      Climbing 3-5 steps with a railing? 3  -CA      To walk in hospital room? 3  -CA      AM-PAC 6 Clicks Score 18  -CA         Functional Assessment    Outcome Measure Options AM-PAC 6 Clicks Basic Mobility (PT)  -CA        User Key  (r) = Recorded By, (t) = Taken By, (c) = Cosigned By    Initials Name Provider Type    Greta Magallanes PT Physical Therapist           Time Calculation:         PT Charges     Row Name 08/06/18 1602             Time Calculation    Start Time 1542  -CA      Stop Time 1602  -CA      Time Calculation (min) 20 min  -CA      PT Received On 08/06/18  -CA      PT - Next Appointment 08/07/18  -CA      PT Goal Re-Cert Due Date 08/13/18  -CA        User Key  (r) = Recorded By, (t) = Taken By, (c) = Cosigned By    Initials Name Provider Type    Greta Magallanes PT Physical Therapist        Therapy Suggested Charges     Code   Minutes Charges    None           Therapy Charges for Today     Code Description Service Date Service Provider Modifiers Qty    80316034819 HC PT EVAL LOW COMPLEXITY 1 8/6/2018 Greta Kebede PT GP 1          PT G-Codes  Outcome Measure Options: AM-PAC 6 Clicks Basic  Mobility (PT)      Greta Kebede, PT  8/6/2018

## 2018-08-06 NOTE — PROGRESS NOTES
Dr. JIMMIE Martinez    [address]    8/6/2018    Patient ID:  Name:  Dilma Abad  MRN:  [id number]  1950  68 y.o.  female            CC/Reason for visit:Follow-up respiratory failure, pneumonia, COPD, lung cancer    HPI: Patient is weak.  She has not ambulated since she was admitted to the hospital.  She is asking me to go home today.  I told her that that is impossible and not recommended.  She denies any cough, hemoptysis or chest pain.  No fever    Vitals:  Vitals:    08/06/18 0924 08/06/18 1218 08/06/18 1222 08/06/18 1223   BP:   165/89    BP Location:   Right arm    Patient Position:   Lying    Pulse: 78 74 72 74   Resp: 16 16 14 16   Temp:       TempSrc:       SpO2: 98% 90% 90%    Weight:       Height:         FiO2 (%): 40 %     Body mass index is 18.71 kg/m².  No intake or output data in the 24 hours ending 08/06/18 1408    Exam:  GEN:  No distress, appears stated age  EYES:   EOM-i, anicteric sclera bilat  ENT:    External ears/nose normal, OP clear  NECK:  Supple, midline trachea  LUNGS: Diminished breath sounds bilaterally and some crackles on the left side, but no wheezing.  nonlabored breathing  CV:  Normal S1S2, without murmur, no edema  ABD:  Non tender, no enlarged liver or masses  EXT:  No cyanosis or clubbing    Scheduled meds:    enoxaparin 40 mg Subcutaneous Q24H   famotidine 20 mg Intravenous Q12H   ipratropium-albuterol 3 mL Nebulization 4x Daily - RT   lactobacillus acidophilus 1 capsule Oral Daily   lidocaine 1 patch Transdermal Q24H   nystatin 5 mL Swish & Spit 4x Daily   piperacillin-tazobactam 3.375 g Intravenous Q8H   predniSONE 40 mg Oral Daily With Breakfast     IV meds:                        Pharmacy to Dose Zosyn        Data Review:   I reviewed the patient's medications and new clinical results.  Lab Results   Component Value Date    CALCIUM 8.5 (L) 08/06/2018    PHOS 4.3 08/04/2018    MG 2.1 08/02/2018    MG 2.4 05/02/2018    MG 2.1 04/10/2018       Results  from last 7 days  Lab Units 08/06/18  0444 08/05/18  0348 08/04/18  0329  08/02/18  2327 08/02/18  0525   SODIUM mmol/L 142 142 142  < >  --  139   POTASSIUM mmol/L 3.7 3.9 4.2  < > 4.4 3.8   CHLORIDE mmol/L 101 107 102  < >  --  106   CO2 mmol/L 28.9 23.3 21.6*  < >  --  23.1   BUN mg/dL 17 24* 22  < >  --  10   CREATININE mg/dL 0.56* 0.61 0.69  < >  --  0.55*   CALCIUM mg/dL 8.5* 7.4* 8.4*  < >  --  8.1*   BILIRUBIN mg/dL 0.2 <0.2 0.3  --   --   --    ALK PHOS U/L 65 61 80  --   --   --    ALT (SGPT) U/L 26 24 34*  --   --   --    AST (SGOT) U/L 15 16 23  --   --   --    GLUCOSE mg/dL 97 138* 139*  < >  --  111*   WBC 10*3/mm3 5.57 5.95 7.47  < >  --  6.45   HEMOGLOBIN g/dL 9.6* 9.0* 9.3*  < >  --  8.2*   PLATELETS 10*3/mm3 229 207 184  < >  --  130*   PROBNP pg/mL  --   --   --   --  6,525.0* 3,433.0*   < > = values in this interval not displayed.    Results from last 7 days  Lab Units 08/05/18  1234 08/02/18  1759 07/31/18  2156 07/31/18  2146   BLOODCX   --   --  No growth at 5 days No growth at 5 days   RESPCX  Scant growth (1+) Normal Respiratory Debra  --   --   --    GRAM STAIN RESULT  Rare (1+) Epithelial cells per low power field  Few (2+) WBCs seen  Few (2+) Gram positive cocci  Rare (1+) Yeast  --   --   --    MRSA SCREEN CX   --  No Methicillin Resistant Staphylococcus aureus isolated  --   --              Results from last 7 days  Lab Units 08/02/18 2327   TROPONIN T ng/mL 0.054*       Results from last 7 days  Lab Units 08/03/18  0927 08/03/18  0353 08/03/18  0012 08/02/18  2315 08/02/18 2006   PH, ARTERIAL pH units 7.412 7.314* 7.107* 6.996* 7.342*   PCO2, ARTERIAL mm Hg 38.9 46.3* 80.4* 107.4* 43.5   PO2 ART mm Hg 87.9 99.3 99.4 68.3* 58.4*   FLOW RATE lpm  --   --   --  15 6   MODALITY  Adult Vent Adult Vent Adult Vent NRB Cannula   O2 SATURATION CALC % 96.9 97.0 94.2 79.2* 88.2*         ASSESSMENT:   Principal Problem:    Pneumonia due to infectious organism  Active Problems:     Adenocarcinoma of left lung (CMS/HCC)    Panlobular emphysema (CMS/HCC)    Sepsis (CMS/HCC)    Anxiety and depression    COPD (chronic obstructive pulmonary disease) (CMS/HCC)    Hyperlipidemia    Generalized abdominal pain    Diarrhea      PLAN:  Patient has received 6 days of IV Zosyn.  That should be sufficient for coverage of pneumonia.  Stop antibiotics today.  She is now down to 2 L of oxygen which is her usual baseline.  Patient needs physical therapy in order to start ambulating.  She has unrealistic expectations.  She wanted to go home today            Aiden Martinez MD  8/6/2018

## 2018-08-06 NOTE — PLAN OF CARE
Problem: Patient Care Overview  Goal: Plan of Care Review   08/06/18 2783   Coping/Psychosocial   Plan of Care Reviewed With patient   Plan of Care Review   Progress improving   OTHER   Outcome Summary VFSS completed. Pt with one instance of trace aspiration with thins with strong cough. Pt unable to consistently follow directions for small bolus size. D/t this, and intolerance with small sips thins rec following bedside last week prior to intubation (8/2). SLP recs conservative diet of regular (no mixed) and nectar. Allow free water protocol with water (no straws). Of note, throat clearing did not correlate with dysphagia this date. Also, RR much reduced this date.

## 2018-08-06 NOTE — PLAN OF CARE
Problem: Patient Care Overview  Goal: Plan of Care Review  Outcome: Ongoing (interventions implemented as appropriate)   08/05/18 1544 08/06/18 0030 08/06/18 0516   Coping/Psychosocial   Plan of Care Reviewed With --  patient --    Plan of Care Review   Progress no change --  --    OTHER   Outcome Summary --  --  Pt requiring 3-4 L O2 NC, VSS otherwise. Pain treated with prn Lortab, patient also complains of nausea. Pt is on puree diet and honey thick liquids but hoping for a speech re-eval. Will continue to monitor.     Goal: Individualization and Mutuality  Outcome: Ongoing (interventions implemented as appropriate)    Goal: Discharge Needs Assessment  Outcome: Ongoing (interventions implemented as appropriate)    Goal: Interprofessional Rounds/Family Conf  Outcome: Ongoing (interventions implemented as appropriate)      Problem: Sepsis/Septic Shock (Adult)  Goal: Signs and Symptoms of Listed Potential Problems Will be Absent, Minimized or Managed (Sepsis/Septic Shock)  Outcome: Ongoing (interventions implemented as appropriate)      Problem: Pain, Acute (Adult)  Goal: Identify Related Risk Factors and Signs and Symptoms  Outcome: Ongoing (interventions implemented as appropriate)    Goal: Acceptable Pain Control/Comfort Level  Outcome: Ongoing (interventions implemented as appropriate)      Problem: Nutrition, Imbalanced: Inadequate Oral Intake (Adult)  Goal: Identify Related Risk Factors and Signs and Symptoms  Outcome: Ongoing (interventions implemented as appropriate)    Goal: Improved Oral Intake  Outcome: Ongoing (interventions implemented as appropriate)    Goal: Prevent Further Weight Loss  Outcome: Ongoing (interventions implemented as appropriate)      Problem: Fall Risk (Adult)  Goal: Absence of Fall  Outcome: Ongoing (interventions implemented as appropriate)      Problem: Skin Injury Risk (Adult)  Goal: Identify Related Risk Factors and Signs and Symptoms  Outcome: Ongoing (interventions implemented  as appropriate)    Goal: Skin Health and Integrity  Outcome: Ongoing (interventions implemented as appropriate)

## 2018-08-06 NOTE — PLAN OF CARE
Problem: Patient Care Overview  Goal: Plan of Care Review   08/06/18 9478   Coping/Psychosocial   Plan of Care Reviewed With patient   OTHER   Outcome Summary Pt is a 68 y.o. female admitted to [facility] with c/o diarrhea x 4 days, generalized weakness, and decreased balance on 7/31/2018. Work up reveals sepsis and pneumonia. Hospital stay further complicated by acute respiratory failure and intubation. Pt presents today with decreased activity tolerance, generalized weakness, and decreased functional mobility. Pt required CGA for bed mobility, CGA for STS transfers, and CGA for ambulation with FWW for 100 ft, but fatigues and desats while amb. Prior to admission, pt was ind with adls and amb without AD, and not on home O2. Pt lives alone at home, with no JUAN LUIS, but reports neighbor will be available to assist her at home upon DC. Pt will benefit from skilled PT to address the previous deficits and improve overall safety with functional mobility. Pt will likely need FWW for home. Anticipate pt will D/C to home with assist and  PT once medically appropriate.

## 2018-08-07 NOTE — PROGRESS NOTES
Dr. JIMMIE Martinez    [address]    8/7/2018    Patient ID:  Name:  Dilma Abad  MRN:  [id number]  1950  68 y.o.  female            CC/Reason for visit:Follow-up respiratory failure, pneumonia, COPD, lung cancer    HPI: no new complainjts other than she wants to eat regular textured foods    Vitals:  Vitals:    08/07/18 0705 08/07/18 0745 08/07/18 1057 08/07/18 1104   BP:  153/91     BP Location:  Left arm     Patient Position:  Lying     Pulse: 72 81 82 84   Resp: 15 18 18 20   Temp:  98 °F (36.7 °C)     TempSrc:  Oral     SpO2:  96% 92%    Weight:       Height:         FiO2 (%): 40 %     Body mass index is 18.71 kg/m².    Intake/Output Summary (Last 24 hours) at 08/07/18 1235  Last data filed at 08/07/18 0121   Gross per 24 hour   Intake               50 ml   Output             1050 ml   Net            -1000 ml       Exam:  GEN:  No distress,  LUNGS: Coarse breath sounds but no wheezes, nonlabored breathing  CV:  Normal S1S2, without murmur, no edema  ABD:  Non tender, no enlarged liver or masses  EXT:  No cyanosis or clubbing    Scheduled meds:    amoxicillin-clavulanate 1 tablet Oral Q12H   enoxaparin 40 mg Subcutaneous Q24H   famotidine 20 mg Oral BID   ipratropium-albuterol 3 mL Nebulization 4x Daily - RT   lactobacillus acidophilus 1 capsule Oral Daily   lidocaine 1 patch Transdermal Q24H   nystatin 5 mL Swish & Spit 4x Daily   predniSONE 40 mg Oral Daily With Breakfast     IV meds:                           Data Review:   I reviewed the patient's medications and new clinical results.  Lab Results   Component Value Date    CALCIUM 7.8 (L) 08/07/2018    PHOS 4.3 08/04/2018    MG 2.1 08/02/2018    MG 2.4 05/02/2018    MG 2.1 04/10/2018       Results from last 7 days  Lab Units 08/07/18  0550 08/06/18  0444 08/05/18  0348 08/04/18  0329  08/02/18  2327 08/02/18  0525   SODIUM mmol/L 143 142 142 142  < >  --  139   POTASSIUM mmol/L 3.4* 3.7 3.9 4.2  < > 4.4 3.8   CHLORIDE mmol/L 94* 101  107 102  < >  --  106   CO2 mmol/L 30.0* 28.9 23.3 21.6*  < >  --  23.1   BUN mg/dL 12 17 24* 22  < >  --  10   CREATININE mg/dL 0.61 0.56* 0.61 0.69  < >  --  0.55*   CALCIUM mg/dL 7.8* 8.5* 7.4* 8.4*  < >  --  8.1*   BILIRUBIN mg/dL  --  0.2 <0.2 0.3  --   --   --    ALK PHOS U/L  --  65 61 80  --   --   --    ALT (SGPT) U/L  --  26 24 34*  --   --   --    AST (SGOT) U/L  --  15 16 23  --   --   --    GLUCOSE mg/dL 88 97 138* 139*  < >  --  111*   WBC 10*3/mm3 5.17 5.57 5.95 7.47  < >  --  6.45   HEMOGLOBIN g/dL 9.9* 9.6* 9.0* 9.3*  < >  --  8.2*   PLATELETS 10*3/mm3 231 229 207 184  < >  --  130*   PROBNP pg/mL  --   --   --   --   --  6,525.0* 3,433.0*   PROCALCITONIN ng/mL 0.59*  --   --   --   --   --   --    < > = values in this interval not displayed.    Results from last 7 days  Lab Units 08/05/18  1234 08/02/18  1759 07/31/18  2156 07/31/18  2146   BLOODCX   --   --  No growth at 5 days No growth at 5 days   RESPCX  Moderate growth (3+) Candida albicans*  Scant growth (1+) Normal Respiratory Debra  --   --   --    GRAM STAIN RESULT  Rare (1+) Epithelial cells per low power field  Few (2+) WBCs seen  Few (2+) Gram positive cocci  Rare (1+) Yeast  --   --   --    MRSA SCREEN CX   --  No Methicillin Resistant Staphylococcus aureus isolated  --   --        Results from last 7 days  Lab Units 08/06/18  1926   ADENOVIRUS DETECTION BY PCR  Not Detected   CORONAVIRUS 229E  Not Detected   CORONAVIRUS HKU1  Not Detected   CORONAVIRUS NL63  Not Detected   CORONAVIRUS OC43  Not Detected   HUMAN METAPNEUMOVIRUS  Not Detected   HUMAN RHINOVIRUS/ENTEROVIRUS  Not Detected   INFLUENZA B PCR  Not Detected   PARAINFLUENZA 1  Not Detected   PARAINFLUENZA VIRUS 2  Not Detected   PARAINFLUENZA VIRUS 3  Not Detected   PARAINFLUENZA VIRUS 4  Not Detected   BORDETELLA PERTUSSIS PCR  Not Detected   CHLAMYDOPHILA PNEUMONIAE PCR  Not Detected   MYCOPLAMA PNEUMO PCR  Not Detected   INFLUENZA A PCR  Not Detected   INFLUENZA A H3   Not Detected   INFLUENZA A H1  Not Detected   RSV, PCR  Not Detected         Results from last 7 days  Lab Units 08/02/18  2327   TROPONIN T ng/mL 0.054*       Results from last 7 days  Lab Units 08/03/18  0927 08/03/18  0353 08/03/18  0012 08/02/18  2315 08/02/18 2006   PH, ARTERIAL pH units 7.412 7.314* 7.107* 6.996* 7.342*   PCO2, ARTERIAL mm Hg 38.9 46.3* 80.4* 107.4* 43.5   PO2 ART mm Hg 87.9 99.3 99.4 68.3* 58.4*   FLOW RATE lpm  --   --   --  15 6   MODALITY  Adult Vent Adult Vent Adult Vent NRB Cannula   O2 SATURATION CALC % 96.9 97.0 94.2 79.2* 88.2*           ASSESSMENT:   Principal Problem:    Pneumonia due to infectious organism  Active Problems:    Adenocarcinoma of left lung (CMS/HCC)    Panlobular emphysema (CMS/HCC)    Sepsis (CMS/HCC)    Anxiety and depression    COPD (chronic obstructive pulmonary disease) (CMS/HCC)    Hyperlipidemia    Generalized abdominal pain    Diarrhea      PLAN:  Complete 1 more day of antibiotics, for a total of 7 days.  May give Augmentin.  Patient to follow-up with her primary care physician within 2 weeks for a chest x-ray.  Follow-up with us in the office, unless her primary care physician refers her to pulmonology.        Aiden Martinez MD  8/7/2018

## 2018-08-07 NOTE — DISCHARGE PLACEMENT REQUEST
"Dilma Shell (68 y.o. Female)     Date of Birth Social Security Number Address Home Phone MRN    1950  [id number] [facility] [address] [phone] [id number]    Gnosticism Marital Status          None        Admission Date Admission Type Admitting Provider Attending Provider Department, Room/Bed    7/31/18 Emergency Tejas Masterson MD Richards, Stephen J, MD [address], 422/1    Discharge Date Discharge Disposition Discharge Destination                       Attending Provider:  Dionisio Chappell MD    Allergies:  Penicillins, Bactrim [Sulfamethoxazole-trimethoprim], Sulfa Antibiotics    Isolation:  None   Infection:  None   Code Status:  CPR    Ht:  170.2 cm (67\")   Wt:  54.2 kg (119 lb 7.8 oz)    Admission Cmt:  None   Principal Problem:  Pneumonia due to infectious organism [J18.9]                 Active Insurance as of 7/31/2018     Primary Coverage     Payor Plan Insurance Group Employer/Plan Group    [employer] [place]ARE REPLACEMENT [facility]      Payor Plan Address Payor Plan Phone Number Effective From Effective To    [address] [address] [phone] 10/20/2017     [facility] [id number]       Subscriber Name Subscriber Birth Date Member ID       DILMA SHELL 1950 [id number]           Secondary Coverage     Payor Plan Insurance Group Employer/Plan Group    [place]      Payor Plan Address Payor Plan Phone Number Effective From Effective To    [address] [phone] 4/1/2018     [facility] [id number]       Subscriber Name Subscriber Birth Date Member ID       DILMA SHELL 1950 [id number]                 Emergency Contacts      (Rel.) Home Phone Work Phone Mobile Phone    Gricelda Fuentes (Sister) [phone] -- --              "

## 2018-08-07 NOTE — PLAN OF CARE
Problem: Patient Care Overview  Goal: Plan of Care Review  Outcome: Ongoing (interventions implemented as appropriate)   08/07/18 0417   Coping/Psychosocial   Plan of Care Reviewed With patient   Plan of Care Review   Progress improving   OTHER   Outcome Summary JACLYN Remains on Zosyn. c/o nausea every am. States she needs Zofran before breakfast to tolerate eating. Has Lortab prn pain. No c/o through the night. Will continue to monitor.        Problem: Sepsis/Septic Shock (Adult)  Goal: Signs and Symptoms of Listed Potential Problems Will be Absent, Minimized or Managed (Sepsis/Septic Shock)  Outcome: Ongoing (interventions implemented as appropriate)      Problem: Pain, Acute (Adult)  Goal: Identify Related Risk Factors and Signs and Symptoms  Outcome: Ongoing (interventions implemented as appropriate)    Goal: Acceptable Pain Control/Comfort Level  Outcome: Ongoing (interventions implemented as appropriate)      Problem: Nutrition, Imbalanced: Inadequate Oral Intake (Adult)  Goal: Improved Oral Intake  Outcome: Ongoing (interventions implemented as appropriate)      Problem: Fall Risk (Adult)  Goal: Absence of Fall  Outcome: Ongoing (interventions implemented as appropriate)      Problem: Skin Injury Risk (Adult)  Goal: Identify Related Risk Factors and Signs and Symptoms  Outcome: Ongoing (interventions implemented as appropriate)    Goal: Skin Health and Integrity  Outcome: Ongoing (interventions implemented as appropriate)

## 2018-08-07 NOTE — PROGRESS NOTES
Continued Stay Note  [facility]     Patient Name: Dilma Abad  MRN: [id number]  Today's Date: 8/7/2018    Admit Date: 7/31/2018          Discharge Plan     Row Name 08/07/18 1228       Plan    Plan Home with [facility]    Patient/Family in Agreement with Plan yes    Plan Comments Followed up with patient who plans home with HH. She has no preference and is agreeable to whoever takes her insurance. Called referral to [facility]. MARLEN Maxwell will walk patient to be sure she does ok on room air. Patient only uses her home O2 at 2L at night. She uses [employer] if any changes needs to be made.     8/7/18 1415 - RN informs patient's sats dropped to 86% with standing. Called to Zaina/MAINE to inform MD. Called to Khushboo/Berhane to inform of need for continuous O2. She requests I fax her order and qualifying sats to [phone] when available.               Discharge Codes    No documentation.           Jaxon Javed RN

## 2018-08-07 NOTE — PLAN OF CARE
Problem: Patient Care Overview  Goal: Plan of Care Review  Outcome: Ongoing (interventions implemented as appropriate)   08/07/18 1403   Coping/Psychosocial   Plan of Care Reviewed With patient   Plan of Care Review   Progress improving   OTHER   Outcome Summary Pt admitted for pneumonia, sepsis, & acute respiratory failure. Pt lives alone and reports Indep. @ PLOF w/ ADL/IADLs and mobility w/ no DME. Pt currently presents w/ decreased strength and activity tolerance, impacting safety during ADLs. Pt will benefit from skilled acute OT services to address deficits and improve safety for return home- anticipate pt to return home w/ HH.

## 2018-08-07 NOTE — THERAPY TREATMENT NOTE
Acute Care - Physical Therapy Treatment Note  [facility]     Patient Name: Dilma Abad  : 1950  MRN: [id number]  Today's Date: 2018  Onset of Illness/Injury or Date of Surgery: 18  Date of Referral to PT: 18  Referring Physician: Darrick Schafer MD    Admit Date: 2018    Visit Dx:    ICD-10-CM ICD-9-CM   1. Sepsis, due to unspecified organism (CMS/HCC) A41.9 038.9     995.91   2. Diarrhea, unspecified type R19.7 787.91   3. Hypokalemia E87.6 276.8   4. Oropharyngeal dysphagia R13.12 787.22   5. Difficulty walking R26.2 719.7     Patient Active Problem List   Diagnosis   • Adenocarcinoma of left lung (CMS/HCC)   • Panlobular emphysema (CMS/HCC)   • Abnormal finding on imaging   • Esophageal carcinoma (CMS/HCC)   • Fitting and adjustment of vascular catheter   • OROZCO (dyspnea on exertion)   • Radiation esophagitis   • Sepsis (CMS/HCC)   • Anxiety and depression   • COPD (chronic obstructive pulmonary disease) (CMS/HCC)   • Hyperlipidemia   • Generalized abdominal pain   • Diarrhea   • Pneumonia due to infectious organism       Therapy Treatment          Rehabilitation Treatment Summary     Row Name 18             Treatment Time/Intention    Discipline physical therapy assistant  -CW      Document Type therapy note (daily note)  -CW      Subjective Information no complaints  -CW      Mode of Treatment physical therapy  -CW      Therapy Frequency (PT Clinical Impression) 5 times/wk  -CW      Patient Effort good  -CW      Existing Precautions/Restrictions fall  -CW      Recorded by [CW] Jacek Lamb PTA 18 1000      Row Name 18             Vital Signs    O2 Delivery Pre Treatment supplemental O2  -CW      O2 Delivery Intra Treatment supplemental O2  -CW      O2 Delivery Post Treatment supplemental O2  -CW      Recorded by [CW] Jacek Lamb PTA 18      Row Name 18             Cognitive Assessment/Intervention- PT/OT     Orientation Status (Cognition) oriented x 4  -CW      Follows Commands (Cognition) WNL  -CW      Personal Safety Interventions fall prevention program maintained;gait belt;muscle strengthening facilitated;nonskid shoes/slippers when out of bed  -CW      Recorded by [CW] Jacek Lamb, PTA 08/07/18 1000      Row Name 08/07/18 0927             Bed Mobility Assessment/Treatment    Bed Mobility Assessment/Treatment supine-sit;sit-supine  -CW      Supine-Sit [place] (Bed Mobility) supervision  -CW      Sit-Supine [place] (Bed Mobility) supervision  -CW      Assistive Device (Bed Mobility) bed rails  -CW      Recorded by [CW] Jacek Lamb, PTA 08/07/18 1000      Row Name 08/07/18 0927             Transfer Assessment/Treatment    Transfer Assessment/Treatment sit-stand transfer;stand-sit transfer  -CW      Recorded by [CW] Jacek Lamb, PTA 08/07/18 1000      Row Name 08/07/18 0927             Sit-Stand Transfer    Sit-Stand [place] (Transfers) stand by assist  -CW      Assistive Device (Sit-Stand Transfers) walker, front-wheeled  -CW      Recorded by [CW] Jacek Lamb, PTA 08/07/18 1000      Row Name 08/07/18 0927             Stand-Sit Transfer    Stand-Sit [place] (Transfers) stand by assist  -CW      Assistive Device (Stand-Sit Transfers) walker, front-wheeled  -CW      Recorded by [CW] Jacek Lamb, PTA 08/07/18 1000      Row Name 08/07/18 0927             Gait/Stairs Assessment/Training    [place] Level (Gait) stand by assist  -CW      Assistive Device (Gait) walker, front-wheeled  -CW      Distance in Feet (Gait) 150  -CW      Pattern (Gait) step-through  -CW      Recorded by [CW] Jacek Lamb, PTA 08/07/18 1000      Row Name 08/07/18 0927             Positioning and Restraints    Pre-Treatment Position in bed  -CW      Post Treatment Position bed  -CW      In Bed notified nsg;supine;call light within reach;encouraged to call for assist;with  family/caregiver  -CW      Recorded by [CW] Jacek Lamb, JOHN 08/07/18 1000      Row Name 08/07/18 0927             Outcome Summary/Treatment Plan (PT)    Anticipated Discharge Disposition (PT) home with home health;home with assist  -CW      Recorded by [CW] Jacek Lamb, PTA 08/07/18 1000        User Key  (r) = Recorded By, (t) = Taken By, (c) = Cosigned By    Initials Name Effective Dates Discipline    CW Jacek Lamb, JOHN 03/07/18 -  PT                     Physical Therapy Education     Title: PT OT SLP Therapies (Done)     Topic: Physical Therapy (Done)     Point: Mobility training (Done)    Learning Progress Summary     Learner Status Readiness Method Response Comment Documented by    Patient Done Acceptance E,TB VUJOSE ALBERTO   08/07/18 1000     Done Acceptance E,TB VU   08/06/18 2208     Done Acceptance E VU  CA 08/06/18 1558          Point: Home exercise program (Done)    Learning Progress Summary     Learner Status Readiness Method Response Comment Documented by    Patient Done Acceptance E,TB VU,JOSE ALBERTO   08/07/18 1000     Done Acceptance E,TB VU   08/06/18 2208     Done Acceptance E VU  CA 08/06/18 1558          Point: Body mechanics (Done)    Learning Progress Summary     Learner Status Readiness Method Response Comment Documented by    Patient Done Acceptance E,TB VUJOSE ALBERTO   08/07/18 1000     Done Acceptance E,TB VU   08/06/18 2208     Done Acceptance E VU  CA 08/06/18 1558          Point: Precautions (Done)    Learning Progress Summary     Learner Status Readiness Method Response Comment Documented by    Patient Done Acceptance E,TB VUJOSE ALBERTO   08/07/18 1000     Done Acceptance E,TB VU   08/06/18 2208     Done Acceptance E VU  CA 08/06/18 1558                      User Key     Initials Effective Dates Name Provider Type Discipline     06/16/16 -  Dana Sykes RN Registered Nurse Nurse     03/07/18 -  Jacek Lamb PTA Physical Therapy Assistant PT    CA 06/13/18 -   Greta Kebede, PT Physical Therapist PT                    PT Recommendation and Plan  Anticipated Discharge Disposition (PT): home with home health, home with assist  Therapy Frequency (PT Clinical Impression): 5 times/wk  Outcome Summary/Treatment Plan (PT)  Anticipated Discharge Disposition (PT): home with home health, home with assist  Plan of Care Reviewed With: patient  Progress: improving  Outcome Summary: Pt increased with amb distance and safety with RWX and improved bed mobility          Outcome Measures     Row Name 08/07/18 1000 08/06/18 1600          How much help from another person do you currently need...    Turning from your back to your side while in flat bed without using bedrails? 3  -CW 3  -CA     Moving from lying on back to sitting on the side of a flat bed without bedrails? 3  -CW 3  -CA     Moving to and from a bed to a chair (including a wheelchair)? 3  -CW 3  -CA     Standing up from a chair using your arms (e.g., wheelchair, bedside chair)? 3  -CW 3  -CA     Climbing 3-5 steps with a railing? 3  -CW 3  -CA     To walk in hospital room? 3  -CW 3  -CA     AM-PAC 6 Clicks Score 18  -CW 18  -CA        Functional Assessment    Outcome Measure Options AM-PAC 6 Clicks Basic Mobility (PT)  -CW AM-PAC 6 Clicks Basic Mobility (PT)  -CA       User Key  (r) = Recorded By, (t) = Taken By, (c) = Cosigned By    Initials Name Provider Type    Jacek Gordon PTA Physical Therapy Assistant    Greta Magallanes, PT Physical Therapist           Time Calculation:         PT Charges     Row Name 08/07/18 1001             Time Calculation    Start Time 0943  -CW      Stop Time 1001  -CW      Time Calculation (min) 18 min  -CW      PT Received On 08/07/18  -CW      PT - Next Appointment 08/08/18  -CW        User Key  (r) = Recorded By, (t) = Taken By, (c) = Cosigned By    Initials Name Provider Type    Jacek Gordon PTA Physical Therapy Assistant        Therapy Suggested Charges     Code    Minutes Charges    None           Therapy Charges for Today     Code Description Service Date Service Provider Modifiers Qty    48457395710 HC PT THER PROC EA 15 MIN 8/7/2018 Jacek Lamb, PTA GP 1    05960578826 HC PT THER SUPP EA 15 MIN 8/7/2018 Jacek Lamb, PTA GP 1          PT G-Codes  Outcome Measure Options: AM-PAC 6 Clicks Basic Mobility (PT)    Jacek Lamb PTA  8/7/2018

## 2018-08-07 NOTE — PROGRESS NOTES
LOS: 7 days     Name: Dilma Abad  Age/Sex: 68 y.o. female  :  1950        PCP: Mary Taylor MD    Subjective   She is feeling a lot better today cough and SOA has greatly improved tolerating the oral antibiotics.    General: No Fever or Chills, Cardiac: No Chest Pain or Palpitations, Resp: No Cough or SOA, GI: No Nausea, Vomiting, or Diarrhea and Other: No bleeding      amoxicillin-clavulanate 1 tablet Oral Q12H   enoxaparin 40 mg Subcutaneous Q24H   famotidine 20 mg Intravenous Q12H   ipratropium-albuterol 3 mL Nebulization 4x Daily - RT   lactobacillus acidophilus 1 capsule Oral Daily   lidocaine 1 patch Transdermal Q24H   nystatin 5 mL Swish & Spit 4x Daily   predniSONE 40 mg Oral Daily With Breakfast       Pharmacy to Dose Zosyn        Objective   Vital Signs  Temp:  [97.7 °F (36.5 °C)-98 °F (36.7 °C)] 98 °F (36.7 °C)  Heart Rate:  [71-87] 81  Resp:  [14-20] 18  BP: (153-165)/(84-94) 153/91  Body mass index is 18.71 kg/m².    Intake/Output Summary (Last 24 hours) at 18 0841  Last data filed at 18 0121   Gross per 24 hour   Intake               50 ml   Output             1050 ml   Net            -1000 ml       Physical Exam   Constitutional: She is oriented to person, place, and time. She appears well-developed and well-nourished.   HENT:   Head: Normocephalic and atraumatic.   Nose: Nose normal.   Eyes: Conjunctivae and EOM are normal.   Neck: Neck supple.   Cardiovascular: Normal rate and regular rhythm.    Pulmonary/Chest: Effort normal. No respiratory distress.   Abdominal: Soft. Bowel sounds are normal.   Musculoskeletal: Normal range of motion. She exhibits no edema.   Neurological: She is alert and oriented to person, place, and time.   Skin: Skin is warm and dry.   Psychiatric: She has a normal mood and affect. Her behavior is normal.   Nursing note and vitals reviewed.        Results Review:       I reviewed the patient's new clinical results.    Results from last 7  days  Lab Units 08/07/18  0550 08/06/18  0444 08/05/18  0348 08/04/18  0329 08/03/18  0521 08/02/18 2328 08/02/18  0525   WBC 10*3/mm3 5.17 5.57 5.95 7.47 19.17* 18.16* 6.45   HEMOGLOBIN g/dL 9.9* 9.6* 9.0* 9.3* 10.5* 11.3* 8.2*   PLATELETS 10*3/mm3 231 229 207 184 234 265 130*     Results from last 7 days  Lab Units 08/07/18  0550 08/06/18  0444 08/05/18 0348 08/04/18 0329 08/03/18 0521 08/02/18 2327 08/02/18  0525 08/01/18  0542   SODIUM mmol/L 143 142 142 142 134*  --  139 138   POTASSIUM mmol/L 3.4* 3.7 3.9 4.2 4.7 4.4 3.8 4.1   CHLORIDE mmol/L 94* 101 107 102 98  --  106 103   CO2 mmol/L 30.0* 28.9 23.3 21.6* 21.2*  --  23.1 24.5   BUN mg/dL 12 17 24* 22 14  --  10 13   CREATININE mg/dL 0.61 0.56* 0.61 0.69 0.85  --  0.55* 0.80   CALCIUM mg/dL 7.8* 8.5* 7.4* 8.4* 8.4*  --  8.1* 8.8   MAGNESIUM mg/dL  --   --   --   --   --  2.1  --   --    PHOSPHORUS mg/dL  --   --   --  4.3  --  7.0*  --   --    Estimated Creatinine Clearance: 56 mL/min (by C-G formula based on SCr of 0.61 mg/dL).      Assessment/Plan   Principal Problem:    Pneumonia due to infectious organism  Active Problems:    Adenocarcinoma of left lung (CMS/HCC)    Panlobular emphysema (CMS/HCC)    Sepsis (CMS/HCC)    Anxiety and depression    COPD (chronic obstructive pulmonary disease) (CMS/HCC)    Hyperlipidemia    Generalized abdominal pain    Diarrhea      PLAN  - replce K  - oral abx  - discussed with Dr Martinez  - will require oxygen 2L continuous at DC    Disposition  Plan Dc tomorrow home with home health      Dionisio Chappell MD  [facility]  08/07/18  8:41 AM

## 2018-08-07 NOTE — PLAN OF CARE
Problem: Patient Care Overview  Goal: Plan of Care Review  Outcome: Ongoing (interventions implemented as appropriate)   08/07/18 1001   Coping/Psychosocial   Plan of Care Reviewed With patient   Plan of Care Review   Progress improving   OTHER   Outcome Summary Pt increased with amb distance and safety with RWX and improved bed mobility

## 2018-08-07 NOTE — PLAN OF CARE
Problem: Patient Care Overview  Goal: Plan of Care Review  Outcome: Ongoing (interventions implemented as appropriate)   08/07/18 9312   Coping/Psychosocial   Plan of Care Reviewed With patient   Plan of Care Review   Progress improving   OTHER   Outcome Summary Patient switched to oral antibiotics and Pepcid today. Anticipated discharge for 08/08/2018. No complaints of pain throughout day. Sats dropping into mid 80s on RA, so patient kept on 2 liters O2. Vitals Stable. Will continue to monitor.

## 2018-08-07 NOTE — THERAPY EVALUATION
Acute Care - Occupational Therapy Initial Evaluation  [facility]     Patient Name: Dilma Abad  : 1950  MRN: [id number]  Today's Date: 2018  Onset of Illness/Injury or Date of Surgery: 18     Referring Physician: Darrick Schafer MD    Admit Date: 2018       ICD-10-CM ICD-9-CM   1. Sepsis, due to unspecified organism (CMS/HCC) A41.9 038.9     995.91   2. Diarrhea, unspecified type R19.7 787.91   3. Hypokalemia E87.6 276.8   4. Oropharyngeal dysphagia R13.12 787.22   5. Difficulty walking R26.2 719.7     Patient Active Problem List   Diagnosis   • Adenocarcinoma of left lung (CMS/HCC)   • Panlobular emphysema (CMS/HCC)   • Abnormal finding on imaging   • Esophageal carcinoma (CMS/HCC)   • Fitting and adjustment of vascular catheter   • OROZCO (dyspnea on exertion)   • Radiation esophagitis   • Sepsis (CMS/HCC)   • Anxiety and depression   • COPD (chronic obstructive pulmonary disease) (CMS/HCC)   • Hyperlipidemia   • Generalized abdominal pain   • Diarrhea   • Pneumonia due to infectious organism     Past Medical History:   Diagnosis Date   • Adenocarcinoma of lung (CMS/HCC) 2017    HAD RADIATION    • Anxiety and depression    • Arthritis    • Benign parotid tumor 2012    removed by Dr Vickers told it was benign   • COPD (chronic obstructive pulmonary disease) (CMS/HCC)    • Depression    • Diarrhea    • Early cataract    • Emphysema of lung (CMS/HCC)    • Esophageal cancer (CMS/HCC) 2017   • History of chronic pain    • History of colon polyps    • History of pneumonia    • Hyperlipidemia    • Joint pain    • Stroke (CMS/HCC)      Past Surgical History:   Procedure Laterality Date   • BRONCHOSCOPY     • CHOLECYSTECTOMY     • COLON SURGERY      COLON POLYPS   • COLONOSCOPY  2016    TA w/low grade dysplasia x 3, serrated adenoma x 2   • COLONOSCOPY  2016    TA w/high grade dysplasia   • ENDOSCOPY N/A 2017    Procedure: ESOPHAGOGASTRODUODENOSCOPY WITH BIOPSY;   Surgeon: Jayant Robles MD;  Location: [facility] ENDOSCOPY;  Service:    • ENDOSCOPY N/A 3/17/2018    Procedure: ESOPHAGOGASTRODUODENOSCOPY WITH BIOPSIES;  Surgeon: Ambrosio Sapp MD;  Location: [facility] ENDOSCOPY;  Service: Gastroenterology   • FOOT SURGERY  10/2010    [place]   • HYSTERECTOMY     • INTUBATION  8/3/2018        • [place] INSJ TUNNELED CVC W/O SUBQ PORT/ AGE 3 YR/> Right 1/9/2018    Procedure: MEDIPORT PLACEMENT right;  Surgeon: Damaso Germain MD;  Location: [facility] HYBRID OR 18/19;  Service: Vascular   • SALIVARY GLAND SURGERY      PAROTID MASS REMOVED BENIGN   • SHOULDER ARTHROSCOPY Left 1995. 2001          OT ASSESSMENT FLOWSHEET (last 72 hours)      Occupational Therapy Evaluation     Row Name 08/07/18 1319                   OT Evaluation Time/Intention    Subjective Information no complaints  -RP        Document Type evaluation  -RP        Mode of Treatment occupational therapy  -RP        Patient Effort good  -RP           General Information    Patient Profile Reviewed? yes  -RP        Patient Observations alert;cooperative;agree to therapy  -RP        Patient/Family Observations Pt received semi-supine in bed w/ no signs of acute distress  -RP        Prior Level of Function independent:;ADL's;home management;all household mobility  -RP        Equipment Currently Used at Home none  -RP        Pertinent History of Current Functional Problem Pt admitted for pnueonia and sepsis.   -RP        Existing Precautions/Restrictions fall;oxygen therapy device and L/min  -RP           Relationship/Environment    Lives With alone  -RP           Resource/Environmental Concerns    Current Living Arrangements home/apartment/condo  -RP           Cognitive Assessment/Intervention- PT/OT    Orientation Status (Cognition) oriented x 4  -RP        Follows Commands (Cognition) WFL  -RP        Personal Safety Interventions fall prevention program maintained;gait belt;nonskid shoes/slippers when out of bed  -RP            Safety Issues, Functional Mobility    Comment, Safety Issues/Impairments (Mobility) Pt ambulated from EOB <> sink w/ CGA- no DME  -RP           Bed Mobility Assessment/Treatment    Bed Mobility Assessment/Treatment supine-sit  -RP        Supine-Sit [place] (Bed Mobility) supervision  -RP        Assistive Device (Bed Mobility) bed rails;head of bed elevated  -RP           Transfer Assessment/Treatment    Transfer Assessment/Treatment sit-stand transfer;stand-sit transfer  -RP           Sit-Stand Transfer    Sit-Stand [place] (Transfers) contact guard;stand by assist  -RP           Stand-Sit Transfer    Stand-Sit [place] (Transfers) contact guard;stand by assist  -RP           ADL Assessment/Intervention    BADL Assessment/Intervention grooming;lower body dressing  -RP           Lower Body Dressing Assessment/Training    Lower Body Dressing [place] Level doff;don;socks;set up;supervision  -RP        Lower Body Dressing Position edge of bed sitting  -RP           Grooming Assessment/Training    [place] Level (Grooming) hair care, combing/brushing;oral care regimen;set up;contact guard assist  -RP        Grooming Position supported standing;sink side  -RP        Comment (Grooming) Pt stood at sink approx. 6 min to complete oral care and brush hair- pt required CGA for safety  -RP           General ROM    GENERAL ROM COMMENTS B UE AROM WFL  -RP           General Assessment (Manual Muscle Testing)    General Manual Muscle Testing (MMT) Assessment upper extremity strength deficits identified  -RP        Comment, General Manual Muscle Testing (MMT) Assessment B UE MMT: grossly approx. 3+/5  -RP           Motor Assessment/Interventions    Additional Documentation Balance (Group)  -RP           Balance    Balance static sitting balance;static standing balance  -RP           Static Sitting Balance    Level of [place] (Unsupported Sitting, Static Balance) supervision  -RP        Sitting  Position (Unsupported Sitting, Static Balance) sitting on edge of bed  -RP        Time Able to Maintain Position (Unsupported Sitting, Static Balance) more than 5 minutes  -RP           Static Standing Balance    Level of [place] (Supported Standing, Static Balance) contact guard assist  -RP        Time Able to Maintain Position (Supported Standing, Static Balance) more than 5 minutes  -RP           Positioning and Restraints    Pre-Treatment Position in bed  -RP        Post Treatment Position bed  -RP        In Bed sitting EOB;call light within reach;encouraged to call for assist;with nsg  -RP           Pain Assessment    Additional Documentation Pain Scale: Numbers Pre/Post-Treatment (Group)  -RP           Pain Scale: Numbers Pre/Post-Treatment    Pain Scale: Numbers, Pretreatment 0/10 - no pain  -RP        Pain Scale: Numbers, Post-Treatment 0/10 - no pain  -RP           Coping    Observed Emotional State accepting;calm;cooperative  -RP        Verbalized Emotional State acceptance  -RP           Plan of Care Review    Plan of Care Reviewed With patient  -RP           Clinical Impression (OT)    OT Diagnosis Pt admitted d/t pneumonia, sepsis, and acute respiratory failure. Pt currently presents w/ decreased strength and activity tolerance, impacting safety w/ ADLs. Pt will benefit from skilled OT services to address deficits and improve safety w/ ADLs for return home.   -RP        Criteria for Skilled Therapeutic Interventions Met (OT Eval) yes;treatment indicated  -RP        Rehab Potential (OT Eval) good, to achieve stated therapy goals  -RP        Therapy Frequency (OT Eval) 5 times/wk  -RP        Care Plan Review (OT) evaluation/treatment results reviewed;care plan/treatment goals reviewed;patient/other agree to care plan  -RP        Anticipated Discharge Disposition (OT) home with home health  -RP           Vital Signs    Pre SpO2 (%) 91  -RP        O2 Delivery Pre Treatment supplemental O2  -RP         Intra SpO2 (%) 84  -RP        O2 Delivery Intra Treatment room air   RN testing O2 levels w/out supplemental O2 w/ activity  -RP        Post SpO2 (%) 89  -RP        O2 Delivery Post Treatment supplemental O2  -RP        Pre Patient Position Supine  -RP        Intra Patient Position Standing  -RP        Post Patient Position Sitting  -RP           Planned OT Interventions    Planned Therapy Interventions (OT Eval) BADL retraining;activity tolerance training;transfer/mobility retraining;ROM/therapeutic exercise;patient/caregiver education/training  -RP           OT Goals    Transfer Goal Selection (OT) transfer, OT goal 1  -RP        Endurance Goal Selection (OT) endurance, OT goal 1  -RP        Additional Documentation Endurance Goal Selection (OT) (Row)  -RP           Transfer Goal 1 (OT)    Activity/Assistive Device (Transfer Goal 1, OT) toilet  -RP        [place] Level/Cues Needed (Transfer Goal 1, OT) conditional independence  -RP        Time Frame (Transfer Goal 1, OT) long term goal (LTG)  -RP        Progress/Outcome (Transfer Goal 1, OT) goal ongoing  -RP            Endurance Goal 1 (OT)    Endurance Goal 1 (OT) Pt will increase functional endurance to assist w/ safety during ADLs.  -RP        Activity Level (Endurance Goal 1, OT) to increase;endurance 2 good  -RP        Time Frame (Endurance Goal 1, OT) long term goal (LTG)  -RP        Progress/Outcome (Endurance Goal 1, OT) goal ongoing  -RP           Patient Education Goal (OT)    Activity (Patient Education Goal, OT) Pt will demonstrate education on energy conservation techniques to improve safety during ADLs.  -RP        [place]/Cues/Accuracy (Memory Goal 2, OT) demonstrates adequately;verbalizes understanding  -RP        Time Frame (Patient Education Goal, OT) long term goal (LTG)  -RP        Progress/Outcome (Patient Education Goal, OT) goal ongoing  -RP          User Key  (r) = Recorded By, (t) = Taken By, (c) = Cosigned By    Initials Name  Effective Dates    RP Rochelle Parker OT 05/03/18 -            Occupational Therapy Education     Title: PT OT SLP Therapies (Done)     Topic: Occupational Therapy (Done)     Point: ADL training (Done)     Description: Instruct learner(s) on proper safety adaptation and remediation techniques during self care or transfers.   Instruct in proper use of assistive devices.   Learning Progress Summary     Learner Status Readiness Method Response Comment Documented by    Patient Done Acceptance E,D VU OT educ on OT role in therapeutic process and pt's POC.  08/07/18 1402                      User Key     Initials Effective Dates Name Provider Type Discipline    RP 05/03/18 -  Rochelle Parker, OT Occupational Therapist OT                  OT Recommendation and Plan  Outcome Summary/Treatment Plan (OT)  Anticipated Discharge Disposition (OT): home with home health  Planned Therapy Interventions (OT Eval): BADL retraining, activity tolerance training, transfer/mobility retraining, ROM/therapeutic exercise, patient/caregiver education/training  Therapy Frequency (OT Eval): 5 times/wk  Plan of Care Review  Plan of Care Reviewed With: patient  Plan of Care Reviewed With: patient  Outcome Summary: Pt admitted for pneumonia, sepsis, & acute respiratory failure. Pt lives alone and reports Indep. @ PLOF w/ ADL/IADLs and mobility w/ no DME. Pt currently presents w/ decreased strength and activity tolerance, impacting safety during ADLs. Pt will benefit from skilled acute OT services to address deficits and improve safety for return home- anticipate pt to return home w/ HH.           Outcome Measures     Row Name 08/07/18 1400 08/07/18 1000 08/06/18 1600       How much help from another person do you currently need...    Turning from your back to your side while in flat bed without using bedrails?  -- 3  -CW 3  -CA    Moving from lying on back to sitting on the side of a flat bed without bedrails?  -- 3  -CW 3  -CA    Moving to  and from a bed to a chair (including a wheelchair)?  -- 3  -CW 3  -CA    Standing up from a chair using your arms (e.g., wheelchair, bedside chair)?  -- 3  -CW 3  -CA    Climbing 3-5 steps with a railing?  -- 3  -CW 3  -CA    To walk in hospital room?  -- 3  -CW 3  -CA    AM-PAC 6 Clicks Score  -- 18  -CW 18  -CA       How much help from another is currently needed...    Putting on and taking off regular lower body clothing? 3  -RP  --  --    Bathing (including washing, rinsing, and drying) 3  -RP  --  --    Toileting (which includes using toilet bed pan or urinal) 3  -RP  --  --    Putting on and taking off regular upper body clothing 3  -RP  --  --    Taking care of personal grooming (such as brushing teeth) 3  -RP  --  --    Eating meals 4  -RP  --  --    Score 19  -RP  --  --       Functional Assessment    Outcome Measure Options AM-PAC 6 Clicks Daily Activity (OT)  -RP AM-PAC 6 Clicks Basic Mobility (PT)  -CW AM-PAC 6 Clicks Basic Mobility (PT)  -CA      User Key  (r) = Recorded By, (t) = Taken By, (c) = Cosigned By    Initials Name Provider Type    CW Jacek Lamb, PTA Physical Therapy Assistant    Rochelle Herrera, OT Occupational Therapist    Greta Magallanes, EDDIE Physical Therapist          Time Calculation:   OT Start Time: 1300  OT Stop Time: 1319  OT Time Calculation (min): 19 min  Therapy Suggested Charges     Code   Minutes Charges    None           Therapy Charges for Today     Code Description Service Date Service Provider Modifiers Qty    [id number]  OT EVAL MOD COMPLEXITY 2 8/7/2018 Rochelle Parker, OT GO 1    [id number]  OT SELF CARE/MGMT/TRAIN EA 15 MIN 8/7/2018 Rochelle Parker OT GO 1               Rochelle Parker OT  8/7/2018

## 2018-08-08 NOTE — PROGRESS NOTES
Case Management Discharge Note    Final Note: Home with [facility] and continuous home O2 per [facility]    Destination     No service has been selected for the patient.      Durable Medical Equipment - Selection Complete     Service Request Status Selected Specialties Address Phone Number Fax Number    RATNA HOOK Selected DME Services [address] [facility] [id number] [phone]        Jaxon Javed, RN 8/8/2018 1522    Called to [place]                 Dialysis/Infusion     No service has been selected for the patient.      Home Medical Care - Selection Complete     Service Request Status Selected Specialties Address Phone Number Fax Number    [facility] [place] Selected Home Health Services [address] [address] [phone]        Jaxon Javed, RN 8/7/2018 1231    Called to [place].                 Social Care     No service has been selected for the patient.        Other: Other (Per private vehicle)    Final Discharge Disposition Code: 06 - home with home health care

## 2018-08-08 NOTE — DISCHARGE PLACEMENT REQUEST
"Dilma Shell (68 y.o. Female)     Date of Birth Social Security Number Address Home Phone MRN    1950  [address] [facility] [address] [phone] [id number]    Alevism Marital Status          None        Admission Date Admission Type Admitting Provider Attending Provider Department, Room/Bed    7/31/18 Emergency Tejas Masterson MD  [address], 422/1    Discharge Date Discharge Disposition Discharge Destination        8/8/2018 Home-Health Care Svc              Attending Provider:  (none)   Allergies:  Penicillins, Bactrim [Sulfamethoxazole-trimethoprim], Sulfa Antibiotics    Isolation:  None   Infection:  None   Code Status:  CPR    Ht:  170.2 cm (67\")   Wt:  54.2 kg (119 lb 7.8 oz)    Admission Cmt:  None   Principal Problem:  Pneumonia due to infectious organism [J18.9]                 Active Insurance as of 7/31/2018     Primary Coverage     Payor Plan Insurance Group Employer/Plan Group    [facility] MEDICARE REPLACEMENT [facility]      Payor Plan Address Payor Plan Phone Number Effective From Effective To    [address] [address] [phone] 10/20/2017     [facility] [id number]       Subscriber Name Subscriber Birth Date Member ID       DILMA SHELL 1950 [id number]           Secondary Coverage     Payor Plan Insurance Group Employer/Plan Group    [place]      Payor Plan Address Payor Plan Phone Number Effective From Effective To    [address] [phone] 4/1/2018     [facility] [id number]       Subscriber Name Subscriber Birth Date Member ID       DILMA SHELL 1950 [id number]                 Emergency Contacts      (Rel.) Home Phone Work Phone Mobile Phone    Gricelda Fuentes (Sister) [phone] -- --              "

## 2018-08-08 NOTE — PAYOR COMM NOTE
"Dilma Shell (68 y.o. Female)                      ATTENTION;   AROLDONILSON LEMUS N, CONTINUED STAY CLINICALS CASE REF [id number], REPLY TO UR                    DEPT BY  [phone]       Date of Birth Social Security Number Address Home Phone MRN    1950  [id number] [facility] [id number] [phone] [id number]    Tenriism Marital Status          None        Admission Date Admission Type Admitting Provider Attending Provider Department, Room/Bed    7/31/18 Emergency Tejas Masterson MD Richards, Stephen J, MD [address], 422/1    Discharge Date Discharge Disposition Discharge Destination                       Attending Provider:  Dionisio Chappell MD    Allergies:  Penicillins, Bactrim [Sulfamethoxazole-trimethoprim], Sulfa Antibiotics    Isolation:  None   Infection:  None   Code Status:  CPR    Ht:  170.2 cm (67\")   Wt:  54.2 kg (119 lb 7.8 oz)    Admission Cmt:  None   Principal Problem:  Pneumonia due to infectious organism [J18.9]                 Active Insurance as of 7/31/2018     Primary Coverage     Payor Plan Insurance Group Employer/Plan Group    [employer] [place] [employer] [facility]ACEMENT [facility]      Payor Plan Address Payor Plan Phone Number Effective From Effective To    [address] [address] [phone] 10/20/2017     [facility] [id number]       Subscriber Name Subscriber Birth Date Member ID       DILMA SHELL 1950 [id number]           Secondary Coverage     Payor Plan Insurance Group Employer/Plan Group    [place]      Payor Plan Address Payor Plan Phone Number Effective From Effective To    [address] [phone] 4/1/2018     [facility] [address]       Subscriber Name Subscriber Birth Date Member ID       DILMA SHELL 1950 [id number]                 Emergency Contacts      (Rel.) Home Phone Work Phone Mobile Phone    Gricelda Fuentes (Sister) [phone] -- --            Lines, Drains & Airways  "   Active LDAs     Name:   Placement date:   Placement time:   Site:   Days:    Single Lumen Implantable Port Right Subclavian          Subclavian                          Progress Notes  Date of Service: 8/7/2018 12:35 PM  Aiden Martinez MD   Pulmonology   Expand All Collapse All    []Manual[]Template  []Copied    Dr. JIMMIE Martinez     [address]     8/7/2018     Patient ID:  Name:  Dilma Abad  MRN:  [id number]  1950  68 y.o.  female                                                      CC/Reason for visit:Follow-up respiratory failure, pneumonia, COPD, lung cancer     HPI: no new complainjts other than she wants to eat regular textured foods     Vitals:  Vitals          Vitals:     08/07/18 0705 08/07/18 0745 08/07/18 1057 08/07/18 1104   BP:   153/91       BP Location:   Left arm       Patient Position:   Lying       Pulse: 72 81 82 84   Resp: 15 18 18 20   Temp:   98 °F (36.7 °C)       TempSrc:   Oral       SpO2:   96% 92%     Weight:           Height:                 FiO2 (%): 40 %                                     Body mass index is 18.71 kg/m².     Intake/Output Summary (Last 24 hours) at 08/07/18 1235  Last data filed at 08/07/18 0121    Gross per 24 hour   Intake               50 ml   Output             1050 ml   Net            -1000 ml         Exam:  GEN:               No distress,  LUNGS:           Coarse breath sounds but no wheezes, nonlabored breathing  CV:                  Normal S1S2, without murmur, no edema  ABD:                Non tender, no enlarged liver or masses  EXT:                No cyanosis or clubbing     Scheduled meds:         amoxicillin-clavulanate 1 tablet Oral Q12H   enoxaparin 40 mg Subcutaneous Q24H   famotidine 20 mg Oral BID   ipratropium-albuterol 3 mL Nebulization 4x Daily - RT   lactobacillus acidophilus 1 capsule Oral Daily   lidocaine 1 patch Transdermal Q24H   nystatin 5 mL Swish & Spit 4x Daily   predniSONE 40 mg Oral Daily With Breakfast       IV meds:                            Data Review:   I reviewed the patient's medications and new clinical results.        Lab Results   Component Value Date     CALCIUM 7.8 (L) 08/07/2018     PHOS 4.3 08/04/2018     MG 2.1 08/02/2018     MG 2.4 05/02/2018     MG 2.1 04/10/2018         Results from last 7 days  Lab Units 08/07/18  0550 08/06/18  0444 08/05/18  0348 08/04/18  0329   08/02/18  2327 08/02/18  0525   SODIUM mmol/L 143 142 142 142  < >  --  139   POTASSIUM mmol/L 3.4* 3.7 3.9 4.2  < > 4.4 3.8   CHLORIDE mmol/L 94* 101 107 102  < >  --  106   CO2 mmol/L 30.0* 28.9 23.3 21.6*  < >  --  23.1   BUN mg/dL 12 17 24* 22  < >  --  10   CREATININE mg/dL 0.61 0.56* 0.61 0.69  < >  --  0.55*   CALCIUM mg/dL 7.8* 8.5* 7.4* 8.4*  < >  --  8.1*   BILIRUBIN mg/dL  --  0.2 <0.2 0.3  --   --   --    ALK PHOS U/L  --  65 61 80  --   --   --    ALT (SGPT) U/L  --  26 24 34*  --   --   --    AST (SGOT) U/L  --  15 16 23  --   --   --    GLUCOSE mg/dL 88 97 138* 139*  < >  --  111*   WBC 10*3/mm3 5.17 5.57 5.95 7.47  < >  --  6.45   HEMOGLOBIN g/dL 9.9* 9.6* 9.0* 9.3*  < >  --  8.2*   PLATELETS 10*3/mm3 231 229 207 184  < >  --  130*   PROBNP pg/mL  --   --   --   --   --  6,525.0* 3,433.0*   PROCALCITONIN ng/mL 0.59*  --   --   --   --   --   --    < > = values in this interval not displayed.     Results from last 7 days  Lab Units 08/05/18  1234 08/02/18  1759 07/31/18  2156 07/31/18  2146   BLOODCX    --   --  No growth at 5 days No growth at 5 days   RESPCX   Moderate growth (3+) Candida albicans*  Scant growth (1+) Normal Respiratory Debra  --   --   --    GRAM STAIN RESULT   Rare (1+) Epithelial cells per low power field  Few (2+) WBCs seen  Few (2+) Gram positive cocci  Rare (1+) Yeast  --   --   --    MRSA SCREEN CX    --  No Methicillin Resistant Staphylococcus aureus isolated  --   --          Results from last 7 days  Lab Units 08/06/18  1926   ADENOVIRUS DETECTION BY PCR   Not Detected   CORONAVIRUS 229E    Not Detected   CORONAVIRUS HKU1   Not Detected   CORONAVIRUS NL63   Not Detected   CORONAVIRUS OC43   Not Detected   HUMAN METAPNEUMOVIRUS   Not Detected   HUMAN RHINOVIRUS/ENTEROVIRUS   Not Detected   INFLUENZA B PCR   Not Detected   PARAINFLUENZA 1   Not Detected   PARAINFLUENZA VIRUS 2   Not Detected   PARAINFLUENZA VIRUS 3   Not Detected   PARAINFLUENZA VIRUS 4   Not Detected   BORDETELLA PERTUSSIS PCR   Not Detected   CHLAMYDOPHILA PNEUMONIAE PCR   Not Detected   MYCOPLAMA PNEUMO PCR   Not Detected   INFLUENZA A PCR   Not Detected   INFLUENZA A H3   Not Detected   INFLUENZA A H1   Not Detected   RSV, PCR   Not Detected            Results from last 7 days  Lab Units 08/02/18  2327   TROPONIN T ng/mL 0.054*         Results from last 7 days  Lab Units 08/03/18  0927 08/03/18  0353 08/03/18  0012 08/02/18  2315 08/02/18 2006   PH, ARTERIAL pH units 7.412 7.314* 7.107* 6.996* 7.342*   PCO2, ARTERIAL mm Hg 38.9 46.3* 80.4* 107.4* 43.5   PO2 ART mm Hg 87.9 99.3 99.4 68.3* 58.4*   FLOW RATE lpm  --   --   --  15 6   MODALITY   Adult Vent Adult Vent Adult Vent NRB Cannula   O2 SATURATION CALC % 96.9 97.0 94.2 79.2* 88.2*               ASSESSMENT:            Principal Problem:    Pneumonia due to infectious organism  Active Problems:    Adenocarcinoma of left lung (CMS/HCC)    Panlobular emphysema (CMS/HCC)    Sepsis (CMS/HCC)    Anxiety and depression    COPD (chronic obstructive pulmonary disease) (CMS/HCC)    Hyperlipidemia    Generalized abdominal pain    Diarrhea        PLAN:  Complete 1 more day of antibiotics, for a total of 7 days.  May give Augmentin.  Patient to follow-up with her primary care physician within 2 weeks for a chest x-ray.  Follow-up with us in the office, unless her primary care physician refers her to pulmonology.           Aiden Martinez MD  8/7/2018             Progress Notes  Date of Service: 8/7/2018 12:30 PM  Jaxon Javed, RN   Case Management       []Manual[]Template  []Copied  Continued Stay Note  [facility]     Patient Name: Dilma Abad                     MRN: [id number]  Today's Date: 2018                       Admit Date: 2018                       Discharge Plan      Row Name 18 1228           Plan     Plan Home with [facility]     Patient/Family in Agreement with Plan yes     Plan Comments Followed up with patient who plans home with . She has no preference and is agreeable to whoever takes her insurance. Called referral to [facility]. MARLEN Maxwell will walk patient to be sure she does ok on room air. Patient only uses her home O2 at 2L at night. She uses [facility] if any changes needs to be made.      18 1415 - RN informs patient's sats dropped to 86% with standing. Called to Zaina/MAINE to inform MD. Called to Khushboo/[facility] to inform of need for continuous O2. She requests I fax her order and qualifying sats to [phone] when available.                      Discharge Codes    No documentation.               Jaxon Javed RN                           Progress Notes  Date of Service: 2018 8:41 AM  Dionisio Chappell MD   Medicine   Expand All Collapse All    []Manual[]Template  []Copied        LOS: 7 days      Name: Dilma Abad  Age/Sex: 68 y.o. female  :  1950        PCP: Mary Taylor MD        Subjective      She is feeling a lot better today cough and SOA has greatly improved tolerating the oral antibiotics.    General: No Fever or Chills, Cardiac: No Chest Pain or Palpitations, Resp: No Cough or SOA, GI: No Nausea, Vomiting, or Diarrhea and Other: No bleeding        amoxicillin-clavulanate 1 tablet Oral Q12H   enoxaparin 40 mg Subcutaneous Q24H   famotidine 20 mg Intravenous Q12H   ipratropium-albuterol 3 mL Nebulization 4x Daily - RT   lactobacillus acidophilus 1 capsule Oral Daily   lidocaine 1 patch Transdermal Q24H   nystatin 5 mL Swish & Spit 4x Daily   predniSONE 40 mg Oral Daily With Breakfast          Pharmacy to Dose Zosyn              Objective      Vital Signs  Temp:  [97.7 °F (36.5 °C)-98 °F (36.7 °C)] 98 °F (36.7 °C)  Heart Rate:  [71-87] 81  Resp:  [14-20] 18  BP: (153-165)/(84-94) 153/91  Body mass index is 18.71 kg/m².     Intake/Output Summary (Last 24 hours) at 08/07/18 0841  Last data filed at 08/07/18 0121    Gross per 24 hour   Intake               50 ml   Output             1050 ml   Net            -1000 ml         Physical Exam   Constitutional: She is oriented to person, place, and time. She appears well-developed and well-nourished.   HENT:   Head: Normocephalic and atraumatic.   Nose: Nose normal.   Eyes: Conjunctivae and EOM are normal.   Neck: Neck supple.   Cardiovascular: Normal rate and regular rhythm.    Pulmonary/Chest: Effort normal. No respiratory distress.   Abdominal: Soft. Bowel sounds are normal.   Musculoskeletal: Normal range of motion. She exhibits no edema.   Neurological: She is alert and oriented to person, place, and time.   Skin: Skin is warm and dry.   Psychiatric: She has a normal mood and affect. Her behavior is normal.   Nursing note and vitals reviewed.           Results Review:       I reviewed the patient's new clinical results.     Results from last 7 days  Lab Units 08/07/18  0550 08/06/18  0444 08/05/18  0348 08/04/18  0329 08/03/18  0521 08/02/18 2328 08/02/18  0525   WBC 10*3/mm3 5.17 5.57 5.95 7.47 19.17* 18.16* 6.45   HEMOGLOBIN g/dL 9.9* 9.6* 9.0* 9.3* 10.5* 11.3* 8.2*   PLATELETS 10*3/mm3 231 229 207 184 234 265 130*      Results from last 7 days  Lab Units 08/07/18  0550 08/06/18  0444 08/05/18  0348 08/04/18  0329 08/03/18  0521 08/02/18  2327 08/02/18  0525 08/01/18  0542   SODIUM mmol/L 143 142 142 142 134*  --  139 138   POTASSIUM mmol/L 3.4* 3.7 3.9 4.2 4.7 4.4 3.8 4.1   CHLORIDE mmol/L 94* 101 107 102 98  --  106 103   CO2 mmol/L 30.0* 28.9 23.3 21.6* 21.2*  --  23.1 24.5   BUN mg/dL 12 17 24* 22 14  --  10 13   CREATININE mg/dL 0.61 0.56*  0.61 0.69 0.85  --  0.55* 0.80   CALCIUM mg/dL 7.8* 8.5* 7.4* 8.4* 8.4*  --  8.1* 8.8   MAGNESIUM mg/dL  --   --   --   --   --  2.1  --   --    PHOSPHORUS mg/dL  --   --   --  4.3  --  7.0*  --   --    Estimated Creatinine Clearance: 56 mL/min (by C-G formula based on SCr of 0.61 mg/dL).           Assessment/Plan      Principal Problem:    Pneumonia due to infectious organism  Active Problems:    Adenocarcinoma of left lung (CMS/HCC)    Panlobular emphysema (CMS/HCC)    Sepsis (CMS/HCC)    Anxiety and depression    COPD (chronic obstructive pulmonary disease) (CMS/[facility])    Hyperlipidemia    Generalized abdominal pain    Diarrhea        PLAN  - replce K  - oral abx  - discussed with Dr Martinez  - will require oxygen 2L continuous at DC     Disposition  Plan Dc tomorrow home with home health        Dionisio Chappell MD  [facility]  08/07/18  8:41 AM                            Progress Notes  Date of Service: 8/8/2018 8:10 AM  Jaxon Javed RN   Case Management      []Manual[]Template  []Copied  Continued Stay Note  [facility]     Patient Name: Dilma Abad                     MRN: [id number]  Today's Date: 8/8/2018                       Admit Date: 7/31/2018                       Discharge Plan      Row Name 08/08/18 0808           Plan     Plan Home with [facility] and continuous home O2 per [facility]     Patient/Family in Agreement with Plan yes     Plan Comments Faxed face sheet, qualifying sats, home O2 continuous order to Vikas [phone] with fax confirmation received.                  Discharge Codes    No documentation.              Expected Discharge Date and Time      Expected Discharge Date Expected Discharge Time     Aug 8, 2018                  Jaxon Javed RN

## 2018-08-08 NOTE — SIGNIFICANT NOTE
08/08/18 1147   Rehab Treatment   Discipline occupational therapist   Reason Treatment Not Performed other (see comments)  (Pt to d/c home today.)

## 2018-08-08 NOTE — PROGRESS NOTES
Continued Stay Note  [facility]     Patient Name: Dilma Abad  MRN: [id number]  Today's Date: 8/8/2018    Admit Date: 7/31/2018          Discharge Plan     Row Name 08/08/18 0808       Plan    Plan Home with [facility] and continuous home O2 per [facility]    Patient/Family in Agreement with Plan yes    Plan Comments Faxed face sheet, qualifying sats, home O2 continuous order to Vikas [phone] with fax confirmation received.    8/8/18 0830 - Called to Marleni [phone] to inform that I have faxed needed clinicals to their office and that patient will need portable O2 to get home and plan DC today.    Called to [facility] ext: [phone] and left m to inform of DC today.     8/8/18 1240 - Called to Marleni to check on status of O2 delivery. She will call the office and let us know.     8/8/18 1345 - Called to Bryce and their  is 15 minutes away.               Discharge Codes    No documentation.       Expected Discharge Date and Time     Expected Discharge Date Expected Discharge Time    Aug 8, 2018             Jaxon Javed RN

## 2018-08-08 NOTE — DISCHARGE SUMMARY
Date of Admission: 7/31/2018  Date of Discharge:  8/8/2018    PCP: Mary Taylor MD      DISCHARGE DIAGNOSIS  Principal Problem:    Pneumonia due to infectious organism  Active Problems:    Adenocarcinoma of left lung (CMS/HCC)    Panlobular emphysema (CMS/HCC)    Sepsis (CMS/HCC)    Anxiety and depression    COPD (chronic obstructive pulmonary disease) (CMS/HCC)    Hyperlipidemia    Generalized abdominal pain    Diarrhea      SECONDARY DIAGNOSES  Past Medical History:   Diagnosis Date   • Adenocarcinoma of lung (CMS/HCC) 2017    HAD RADIATION    • Anxiety and depression    • Arthritis    • Benign parotid tumor 2012    removed by Dr Vickers told it was benign   • COPD (chronic obstructive pulmonary disease) (CMS/HCC)    • Depression    • Diarrhea    • Early cataract    • Emphysema of lung (CMS/HCC)    • Esophageal cancer (CMS/HCC) 2017   • History of chronic pain    • History of colon polyps    • History of pneumonia    • Hyperlipidemia    • Joint pain    • Stroke (CMS/HCC)        CONSULTS   Consults     Date and Time Order Name Status Description    8/2/2018 2051 Inpatient Pulmonology Consult Completed     7/31/2018 2233 [facility] (on-call MD unless specified) Completed           PROCEDURES PERFORMED  FL Video Swallow   Final Result      XR Abdomen KUB   Final Result   Tip of the Dobbhoff tube is in the stomach.           This report was finalized on 8/4/2018 10:13 PM by Dr. Magdy Ramos M.D.          XR Chest 1 View   Final Result   Interval intubation with continued worsening of left-sided   opacities as discussed           This report was finalized on 8/2/2018 11:53 PM by Damaso Rosen M.D.          XR Chest 1 View   Final Result   Significant worsening in left-sided pulmonary opacities   likely pneumonia           This report was finalized on 8/2/2018 9:32 PM by Damaso Rosen M.D.          CT Abdomen Pelvis Without Contrast   Final Result   1.  Pulmonary findings as discussed.   2. Persistent  esophageal thickening presumably related to the patient's   neoplasm history.   3. Otherwise grossly unremarkable noncontrast study                   This study was performed with techniques to keep radiation doses as low   as reasonably achievable (ALARA). Individualized dose reduction   techniques using automated exposure control or adjustment of mA and/or   kV according to the patient size were employed.        This report was finalized on 8/1/2018 12:06 AM by Damaso Rosen M.D.          XR Chest 2 View   Final Result   Increasing left-sided opacities as discussed. Some of these   most assuredly related to post treatment related changes but extensive   superimposed pneumonia is not excluded.       This report was finalized on 7/31/2018 10:40 PM by Damaso Rosen M.D.                HOSPITAL COURSE  Patient is a 68 y.o. female presented to [facility] complaining of having increasing shortness breath and weakness.  Please see the admitting history and physical for further details.  She has history of lung cancer and esophageal cancer treated with radiation.  She is not a candidate for surgery.  Chest x-ray shows left lower lobe infiltrate.  She was started on IV antibiotics and transition oral antibiotics over the course for hospitalization.  Given her history of COPD and underlying pneumonia on this admission pulmonary was consulted for assistance.  We've continued her on supportive care with breathing treatments and steroids and she has quickly improved over the last week in the hospital.  She's been titrated down to 2 L nasal cannula oxygen and this will need to be continued continuously at home.  We can continue to work to titrate this in the outpatient setting.  She'll need 2 more days oral antibiotics been fever free for the last 3 or 4 days and white blood cell count is normalized.  She still somewhat weak and fatigued and plan is for her to go home with home health.  This is been arranged at  "discharge.  Pulmonary's recommended follow-up with a primary care physician within 2 weeks for repeat chest x-ray.  She is more than welcome to follow-up in the office at [place] [facility] unless her primary care physician with regard to see a different pulmonologist.  Otherwise at this time she remained hemodynamically stable at this point is stable for discharge home    08/14/18  Patient called today regarding her nebulizer.  She was quite upset regarding her nebulizer machine.  She was not ordered one at [place] because she on multiple occasions told me she had one at home.  She evidently doesn't have one and one was ordered today.    CONDITION ON DISCHARGE  Stable.      VITAL SIGNS  /83 (BP Location: Right arm, Patient Position: Lying)   Pulse 86   Temp 98.1 °F (36.7 °C) (Oral)   Resp 18   Ht 170.2 cm (67\")   Wt 54.2 kg (119 lb 7.8 oz)   SpO2 (!) 86%   BMI 18.71 kg/m²   Objective:  General Appearance:  Comfortable.    Vital signs: (most recent): Blood pressure 151/83, pulse 88, temperature 98.1 °F (36.7 °C), temperature source Oral, resp. rate 18, height 170.2 cm (67\"), weight 54.2 kg (119 lb 7.8 oz), SpO2 (!) 86 %.  No fever.    Output: Producing urine and producing stool.    HEENT: Normal HEENT exam.    Lungs:  Normal effort.  She is not in respiratory distress.  No rales, wheezes or rhonchi.    Heart: Normal rate.  S1 normal and S2 normal.    Abdomen: Abdomen is soft.  Bowel sounds are normal.   There is no abdominal tenderness.     Extremities: Normal range of motion.    Pulses: Distal pulses are intact.    Neurological: Patient is alert and oriented to person, place and time.                DISCHARGE DISPOSITION   Home-Health Care [facility]      DISCHARGE MEDICATIONS     Discharge Medications      New Medications      Instructions Start Date   albuterol (2.5 MG/3ML) 0.083% nebulizer solution  Commonly known as:  PROVENTIL   2.5 mg, Nebulization, Once As Needed      amoxicillin-clavulanate 767-161 " MG per tablet  Commonly known as:  AUGMENTIN   1 tablet, Oral, Every 12 Hours Scheduled      ondansetron ODT 4 MG disintegrating tablet  Commonly known as:  ZOFRAN ODT   4 mg, Oral, Every 8 Hours PRN      predniSONE 10 MG tablet  Commonly known as:  DELTASONE   3 tabs po daily x 3days then 2 tabs po daily x 3days then 1 tabs po daily x 3days then stop      RESOURCE THICKENUP CLEAR powder   Use as needed for nectar thick liquids         Changes to Medications      Instructions Start Date   pantoprazole 40 MG EC tablet  Commonly known as:  PROTONIX  What changed:  Another medication with the same name was removed. Continue taking this medication, and follow the directions you see here.   40 mg, Oral, Daily         Continue These Medications      Instructions Start Date   ANORO ELLIPTA 62.5-25 MCG/INH aerosol powder  inhaler  Generic drug:  umeclidinium-vilanterol   1 puff, Inhalation, Daily - RT      BENEFIBER DRINK MIX pack   Oral, Daily      buPROPion  MG 24 hr tablet  Commonly known as:  WELLBUTRIN XL   300 mg, Oral, Every Evening      CARAFATE 1 GM/10ML suspension  Generic drug:  sucralfate   TAKE 10 ML BY MOUTH EVERY 6 HOURS      CENTRUM SILVER PO   1 tablet, Oral, Daily      cyanocobalamin 2000 MCG tablet  Commonly known as:  VITAMIN B-12   2,000 mcg, Oral, Every Other Day      diazePAM 5 MG tablet  Commonly known as:  VALIUM   5 mg, Oral, 4 Times Daily PRN      diphenoxylate-atropine 2.5-0.025 MG per tablet  Commonly known as:  LOMOTIL   TK 1 T PO  QID PRN      HYDROcodone-acetaminophen 7.5-325 MG per tablet  Commonly known as:  NORCO   1 tablet, Oral, 4 Times Daily PRN      ondansetron 8 MG tablet  Commonly known as:  ZOFRAN   8 mg, Oral, Every 8 Hours PRN      ondansetron 8 MG tablet  Commonly known as:  ZOFRAN   TAKE 1 TABLET BY MOUTH EVERY 8 HOURS AS NEEDED FOR NAUSEA OR VOMITING      PROBIOTIC PO   1 capsule, Oral, Daily      simvastatin 20 MG tablet  Commonly known as:  ZOCOR   20 mg, Oral,  Nightly      Vitamin D 2000 units tablet   2,000 Units, Oral, Every Other Day         Stop These Medications    escitalopram 20 MG tablet  Commonly known as:  LEXAPRO          Diet Instructions     Diet: Regular; Nectar / Syrup Thick       Discharge Diet:  Regular    Fluid Consistency:  Nectar / Syrup Thick       Activity Instructions     Activity as Tolerated         Future Appointments  Date Time Provider Department Center   8/28/2018 2:00 PM Jayant Robles MD [facility] None   9/5/2018 12:00 PM IN[facility] PORT CHAIR [facility]   10/17/2018 12:00 PM [facility] PORT [facility]   11/8/2018 12:00 PM ANURADHA CT 3 BH [facility]   11/19/2018 1:30 PM INFU [facility] PORT CHAIR [facility]   11/19/2018 2:00 PM Bienvenido Collier MD [facility]  [facility]   Additional Instructions for the Follow-ups that You Need to Schedule     Referral to Home Health    As directed      Face to Face Visit Date:  8/8/2018    Follow-up Provider for Plan of Care?:  I treated the patient in an acute care facility and will not continue treatment after discharge.    Follow-up Provider:  JIM BURCH [[id number]]    Reason/Clinical Findings:  weakness dysphagia and copd    Describe mobility limitations that make leaving home difficult:  not driving    Nursing/Therapeutic Services Requested:  Skilled Nursing Speech Therapy    Skilled nursing orders:  COPD management O2 instruction    SLP orders:  Dysphagia therapy    Frequency:  1 Week 1           Follow-up Information     Jim Burch MD .    Specialty:  Family Medicine  Contact information:  [address] [place] [id number]  [phone]                   TEST  RESULTS PENDING AT DISCHARGE         Dionisio Chappell MD  [facility]  08/08/18  11:05 AM      Time: greater than 30 minutes.

## 2018-08-08 NOTE — PLAN OF CARE
Problem: Patient Care Overview  Goal: Plan of Care Review  Outcome: Ongoing (interventions implemented as appropriate)   08/08/18 1830   Coping/Psychosocial   Plan of Care Reviewed With patient   Plan of Care Review   Progress improving   OTHER   Outcome Summary vss. infrequent c/o pain. probable discharge this morning. resting well throughout shift. will continue to monitor

## 2018-08-08 NOTE — THERAPY EVALUATION
Inpatient Rehabilitation - Speech Language Pathology   Swallow Re-Evaluation & D/C [facility]     Patient Name: Dilma Abad  : 1950  MRN: [id number]  Today's Date: 2018  Onset of Illness/Injury or Date of Surgery: 18     Referring Physician: Darrick Schafer MD      Admit Date: 2018    Visit Dx:     ICD-10-CM ICD-9-CM   1. Sepsis, due to unspecified organism (CMS/HCC) A41.9 038.9     995.91   2. Diarrhea, unspecified type R19.7 787.91   3. Hypokalemia E87.6 276.8   4. Oropharyngeal dysphagia R13.12 787.22   5. Difficulty walking R26.2 719.7   6. Pneumonia due to infectious organism, unspecified laterality, unspecified part of lung J18.9 136.9     484.8   7. Chronic obstructive pulmonary disease, unspecified COPD type (CMS/HCC) J44.9 496     Patient Active Problem List   Diagnosis   • Adenocarcinoma of left lung (CMS/HCC)   • Panlobular emphysema (CMS/HCC)   • Abnormal finding on imaging   • Esophageal carcinoma (CMS/HCC)   • Fitting and adjustment of vascular catheter   • OROZCO (dyspnea on exertion)   • Radiation esophagitis   • Sepsis (CMS/HCC)   • Anxiety and depression   • COPD (chronic obstructive pulmonary disease) (CMS/HCC)   • Hyperlipidemia   • Generalized abdominal pain   • Diarrhea   • Pneumonia due to infectious organism     Past Medical History:   Diagnosis Date   • Adenocarcinoma of lung (CMS/HCC) 2017    HAD RADIATION    • Anxiety and depression    • Arthritis    • Benign parotid tumor 2012    removed by Dr Vickres told it was benign   • COPD (chronic obstructive pulmonary disease) (CMS/HCC)    • Depression    • Diarrhea    • Early cataract    • Emphysema of lung (CMS/HCC)    • Esophageal cancer (CMS/HCC) 2017   • History of chronic pain    • History of colon polyps    • History of pneumonia    • Hyperlipidemia    • Joint pain    • Stroke (CMS/HCC)      Past Surgical History:   Procedure Laterality Date   • BRONCHOSCOPY     • CHOLECYSTECTOMY     • COLON SURGERY       COLON POLYPS   • COLONOSCOPY  04/13/2016    TA w/low grade dysplasia x 3, serrated adenoma x 2   • COLONOSCOPY  06/14/2016    TA w/high grade dysplasia   • ENDOSCOPY N/A 12/6/2017    Procedure: ESOPHAGOGASTRODUODENOSCOPY WITH BIOPSY;  Surgeon: Jayant Robles MD;  Location: [facility] ENDOSCOPY;  Service:    • ENDOSCOPY N/A 3/17/2018    Procedure: ESOPHAGOGASTRODUODENOSCOPY WITH BIOPSIES;  Surgeon: Ambrosio Sapp MD;  Location: [facility] ENDOSCOPY;  Service: Gastroenterology   • FOOT SURGERY  10/2010    [place]   • HYSTERECTOMY     • INTUBATION  8/3/2018        • UT INSJ TUNNELED CVC W/O SUBQ PORT/ AGE 3 YR/> Right 1/9/2018    Procedure: MEDIPORT PLACEMENT right;  Surgeon: Damaso Germain MD;  Location: [facility] HYBRID OR 18/19;  Service: Vascular   • SALIVARY GLAND SURGERY      PAROTID MASS REMOVED BENIGN   • SHOULDER ARTHROSCOPY Left 1995. 2001          SWALLOW EVALUATION (last 72 hours)      SLP Adult Swallow Evaluation     Row Name 08/08/18 1400 08/06/18 0900          Document Type re-evaluation  -SH (r) ES (t) SH (c) evaluation  -SH (r) ES (t) SH (c)    Subjective Information no complaints  -SH (r) ES (t) SH (c) no complaints  -SH (r) ES (t) SH (c)    Patient Observations alert;cooperative;agree to therapy  -SH (r) ES (t) SH (c) alert;cooperative;agree to therapy  -SH (r) ES (t) SH (c)    Patient Effort excellent  -SH (r) ES (t) SH (c) good  -SH (r) ES (t) SH (c)    Symptoms Noted During/After Treatment none  -SH (r) ES (t) SH (c) none  -SH (r) ES (t) SH (c)          Patient Profile Reviewed yes  -SH (r) ES (t) SH (c) yes  -SH (r) ES (t) SH (c)    Pertinent History Of Current Problem Pt was admitted to the hospital with L lobe pneumonia. Pt with hx of lung cancer, esophageal cancer, radiation and chemotherapy, esophagitis, emphysema, and stroke. Pt with no speech therapy hx.   -SH (r) ES (t) SH (c) Pt was admitted to the hospital with L lobe pneumonia. Pt with hx of lung cancer, esophageal cancer, radiation and  chemotherapy, esophagitis, emphysema, and stroke. Pt intubated 8/2-8/4.  -SH (r) ES (t) SH (c)    Current Method of Nutrition regular textures;nectar/syrup-thick liquids  -SH (r) ES (t) SH (c) honey-thick liquids;pureed  -SH (r) ES (t) SH (c)    Precautions/Limitations, Vision WFL;for purposes of eval  -SH (r) ES (t) SH (c) WFL;for purposes of eval  -SH (r) ES (t) SH (c)    Precautions/Limitations, Hearing WFL;for purposes of eval  -SH (r) ES (t) SH (c) WFL;for purposes of eval  -SH (r) ES (t) SH (c)    Prior Level of Function-Communication WFL  -SH (r) ES (t) SH (c) WFL  -SH (r) ES (t) SH (c)    Prior Level of Function-Swallowing no diet consistency restrictions  -SH (r) ES (t) SH (c) no diet consistency restrictions  -SH (r) ES (t) SH (c)    Plans/Goals Discussed with patient;agreed upon  -SH (r) ES (t) SH (c) patient;agreed upon  -SH (r) ES (t) SH (c)    Barriers to Rehab none identified  -SH (r) ES (t) SH (c) none identified  -SH (r) ES (t) SH (c)    Patient's Goals for Discharge return to regular diet  -SH (r) ES (t) SH (c) return to regular diet  -SH (r) ES (t) SH (c)          Additional Documentation Pain Scale: FACES Pre/Post-Treatment (Group)  -SH (r) ES (t) SH (c)  --          Pre/Post Treatment Pain Comment Pt did not appear to be in distress.  -SH (r) ES (t) SH (c)  --          Respiratory Support Currently in Use nasal cannula  -SH (r) ES (t) SH (c)  --    Eating/Swallowing Skills self-fed  -SH (r) ES (t) SH (c)  --    Positioning During Eating upright 90 degree  -SH (r) ES (t) SH (c)  --    Utensils Used cup  -SH (r) ES (t) SH (c)  --    Consistencies Trialed thin liquids  -SH (r) ES (t) SH (c)  --          Oral Prep Phase WFL  -SH (r) ES (t) SH (c)  --    Oral Transit WFL  -SH (r) ES (t) SH (c)  --    Oral Residue WFL  -SH (r) ES (t) SH (c)  --    Pharyngeal Phase no overt signs/symptoms of pharyngeal impairment  -SH (r) ES (t) SH (c)  --    Esophageal Phase unremarkable  -SH (r) ES (t) SH (c)  --     Clinical Swallow Evaluation Summary Pt was seen this date for a bedside swallow re evaluation. Pt with chronic throat clear. Hyolaryngeal elevation was adequate, per palpation. SLP educated pt on small bolus size and risk factors when eating/drinking when SOA. Pt with no overt s/s of aspiration with thins from a cup. Pt with throat clear x2 with thins via cup; however, there was no correlation between throat clear and penetration/aspiration during the VFSS 8/6/18. SLP suspects chronic throat clear that is unrelated to the swallow. SLP recs regular and thins. Meds whole with thins.   -SH (r) ES (t) SH (c)  --          Pharyngeal Phase Concerns throat clear;other (see comments)   Unrelated to swallow difficulty  -SH (r) ES (t) SH (c)  --    Throat Clear thin  -SH (r) ES (t) SH (c)  --          Utensils Used  -- spoon;cup;straw  -SH (r) ES (t) SH (c)    Consistencies Trialed  -- thin liquids;nectar/syrup-thick liquids;honey-thick liquids;pureed;mechanical soft, no mixed consistencies;regular textures   mixed  -SH (r) ES (t) SH (c)          Oral Prep Phase  -- WFL  -SH (r) ES (t) SH (c)    Oral Transit Phase  -- impaired  -SH (r) ES (t) SH (c)    Oral Residue  -- WFL  -SH (r) ES (t) SH (c)    VFSS Summary  -- Pt presents with mild oropharyngeal dysphagia characterized by mistiming due to impulsive drink size with reduced vestibule closure. Premature spillage of honey via spoon to the pyrifroms without penetration/aspiration. Pt with intermittent premature spillage past the valleculae of honey via straw without penetration/aspiration. Premature spillage to pyriforms with nectar via spoon without penetration/aspiration. Pt with intermittent spillage to the pyriforms with nectar via cup with transient penetration during the swallow x1. Intermittent premature spillage to the pyrifroms with nectar via straw with shallow transient penetration during the swallow x1. Pt with transient penetration during the swallow with thins  via cup. Pt with trace aspiration from residue with thins via cup during the swallow with strong cough post aspiration. Pt was cued for small bolus with thins via cup; however, pt continued to take large bolus drinks. Pt with adequate timing with thins via cup, cued small bolus, without penetration/aspiration. Intermittent mild residue following thins via cup, small bolus. Pt cleared residue with subsequent swallow. Premature spillage of puree to valleculae without penetration/aspiration. Mild base of tongue residue post puree swallow. Pt clears puree residue to trace residue with subsequent swallow. Premature spillage to pyriforms with mech soft and mixed trials without penetration/aspiration. Pt with trace base of tongue and valleculae residue, but clears with subsequent swallow. Premature spillage to valleculae with regular trials without penetration/aspiration. Pt clears mild regular residue with liquid wash without penetration/aspiration. SLP recs regular and nectar, due to difficulty following small bolus precaution.   -SH (r) ES (t) SH (c)          Impaired Oral Transit Phase  -- premature spillage of liquids into pharynx  -SH (r) ES (t) SH (c)    Premature Spillage of Liquids into Pharynx  -- honey-thick liquids;nectar-thick liquids;pudding/puree;mixed consistency;mechanical soft;regular textures  -SH (r) ES (t) SH (c)          Initiation of Pharyngeal Swallow  -- bolus in valleculae;bolus in pyriform sinuses  -SH (r) ES (t) SH (c)    Pharyngeal Phase  -- impaired pharyngeal phase of swallowing  -SH (r) ES (t) SH (c)    Penetration During the Swallow  -- nectar-thick liquids;thin liquids  -SH (r) ES (t) SH (c)    Aspiration During the Swallow  -- thin liquids  -SH (r) ES (t) SH (c)    Response to Penetration  -- no response  -SH (r) ES (t) SH (c)    Response to Aspiration  -- cough  -SH (r) ES (t) SH (c)    Pharyngeal Residue  -- mixed consistency;mechanical soft;regular textures  -SH (r) ES (t) SH (c)     Response to Residue  -- cleared residue with spontaneous subsequent swallow;cleared residue with liquid wash  -SH (r) ES (t) SH (c)    Attempted Compensatory Maneuvers  -- bolus size  -SH (r) ES (t) SH (c)    Response to Attempted Compensatory Maneuvers  -- other (see comments)   Pt did not comply.  -SH (r) ES (t) SH (c)          Severity Level of Dysphagia  -- mild dysphagia  -SH (r) ES (t) SH (c)    Consistencies Aspirated/Penetrated  -- thin liquids;aspirated;penetrated;nectar-thick liquids  -SH (r) ES (t) SH (c)    Summary Statement  -- Pt presents with mild oropharyngeal dysphagia characterized by mistiming due to impulsive drink size with reduced vestibule closure. Pt unable to follow precaution for smaller bolus size. Pt with shallow transient penetration during swallow with nectar via cup and straw. Pt with transient penetration with thins via cup, large bolus. Pt with aspiration during swallow of thins via cup residue. Pt with strong cough following aspiration. No penetration/aspiration wiht honey, puree, mech soft, mixed, regular, and liquid wash-thins via cup.   -SH (r) ES (t) SH (c)          SLP Swallowing Diagnosis functional oral phase;functional pharyngeal phase  -SH (r) ES (t) SH (c) mild;pharyngeal dysfunction;oral dysfunction  -SH (r) ES (t) SH (c)    Functional Impact no impact on function  -SH (r) ES (t) SH (c) risk of aspiration/pneumonia  -SH (r) ES (t) SH (c)    Rehab Potential/Prognosis, Swallowing  -- good, to achieve stated therapy goals  -SH (r) ES (t) SH (c)    Criteria for Skilled Therapeutic Interventions Met no problems identified which require skilled intervention  -SH (r) ES (t) SH (c) demonstrates skilled criteria  -SH (r) ES (t) SH (c)          Therapy Frequency (Swallow) evaluation only  -SH (r) ES (t) SH (c) PRN  -SH (r) ES (t) SH (c)    Predicted Duration Therapy Intervention (Days) --  -SH until discharge  -SH (r) ES (t) SH (c)    SLP Diet Recommendation regular textures;thin  liquids  -SH (r) ES (t) SH (c) regular textures;nectar thick liquids;other (see comments);water between meals after oral care, with supervision   no mixed, small sips & no straws w/ free h20 protocol  -    Recommended Diagnostics reassess via clinical swallow evaluation  -SH (r) ES (t) SH (c) reassess via clinical swallow evaluation  -SH (r) ES (t) SH (c)    Recommended Precautions and Strategies upright posture during/after eating;small bites of food and sips of liquid  -SH (r) ES (t) SH (c) upright posture during/after eating  -SH (r) ES (t) SH (c)    SLP Rec. for Method of Medication Administration meds whole;with thin liquids  -SH (r) ES (t) SH (c) meds whole;with thick liquids;with pudding or applesauce  -SH (r) ES (t) SH (c)    Monitor for Signs of Aspiration yes;notify SLP if any concerns  -SH (r) ES (t) SH (c) yes;notify SLP if any concerns  -SH (r) ES (t) SH (c)    Anticipated Dischage Disposition home  -SH (r) ES (t) SH (c)  --          Oral Nutrition/Hydration Goal Selection (SLP)  -- oral nutrition/hydration, SLP goal 1  -SH (r) ES (t) SH (c)          Oral Nutrition/Hydration Goal 1, SLP  -- Pt will tolerate regular and nectar without overt s/s of aspiration for 5/5 trials across 1 session.  -SH (r) ES (t) SH (c)    Time Frame (Oral Nutrition/Hydration Goal 1, SLP)  -- by discharge  -SH (r) ES (t) SH (c)      User Key  (r) = Recorded By, (t) = Taken By, (c) = Cosigned By    Initials Name Effective Dates     Dalia Lowe MS CCC-SLP 03/07/18 -     Bibiana Shaw, Speech Therapy Student 06/11/18 -         EDUCATION  The patient has been educated in the following areas:   Dysphagia (Swallowing Impairment).    SLP Recommendation and Plan  Upgrade to thins, small drinks only.           SLP GOALS     Row Name 08/06/18 0900             Oral Nutrition/Hydration Goal 1 (SLP)    Oral Nutrition/Hydration Goal 1, SLP Pt will tolerate regular and nectar without overt s/s of aspiration for 5/5 trials across 1  session.  -SH (r) ES (t) SH (c)      Time Frame (Oral Nutrition/Hydration Goal 1, SLP) by discharge  -SH (r) ES (t) SH (c)        User Key  (r) = Recorded By, (t) = Taken By, (c) = Cosigned By    Initials Name Provider Type    Dalia Zhu MS CCC-SLP Speech and Language Pathologist    Bibiana Shaw, Speech Therapy Student Speech Therapy Student             SLP Outcome Measures (last 72 hours)      SLP Outcome Measures     Row Name 08/08/18 1400 08/06/18 1000          SLP Outcome Measures    Outcome Measure Used? Adult NOMS  -SH (r) ES (t) SH (c) Adult NOMS  -SH (r) ES (t) SH (c)        Adult FCM Scores    FCM Chosen Swallowing  -SH (r) ES (t) SH (c) Swallowing  -SH (r) ES (t) SH (c)     Swallowing FCM Score 7  -SH (r) ES (t) SH (c) 5  -SH (r) ES (t) SH (c)       User Key  (r) = Recorded By, (t) = Taken By, (c) = Cosigned By    Initials Name Effective Dates     Dalia Lowe, MS CCC-SLP 03/07/18 -     Bibiana Shaw, Speech Therapy Student 06/11/18 -            Time Calculation:         Time Calculation- SLP     Row Name 08/08/18 1440             Time Calculation- SLP    SLP Start Time 1400  -SH (r) ES (t) SH (c)      SLP Received On 08/08/18  -SH (r) ES (t) SH (c)        User Key  (r) = Recorded By, (t) = Taken By, (c) = Cosigned By    Initials Name Provider Type     Mynor Dalia MS NILSON CCC-SLP Speech and Language Pathologist    Bibiana Shaw, Speech Therapy Student Speech Therapy Student                   Dalia Lowe MS CCC-SLP  8/8/2018

## 2018-08-08 NOTE — SIGNIFICANT NOTE
08/08/18 0946   Rehab Treatment   Discipline physical therapy assistant   Reason Treatment Not Performed (Pt d/c planned for home today)

## 2018-08-08 NOTE — PLAN OF CARE
Problem: Patient Care Overview  Goal: Plan of Care Review   08/08/18 1436   OTHER   Outcome Summary Pt was seen this date for a bedside swallow re evaluation. SLP recs regular and thins. Meds whole with thin. SLP to sign off. Please re consult if indicated.

## 2018-08-09 NOTE — PAYOR COMM NOTE
"Pollo Dilma WHEELER (68 y.o. Female)                  ATTENTION;   AROLDO LEMUS LPN, DISCHARGE SUMMARY FOR YOUR REVIEW, STILL AWAITING                APPROVAL OF 8/7/18 (CLINICALS FAXED) REPLY TO [facility]PT,AUTH#[id number], REPLY TO [facility] DEPT                RONALD CAR LPN  [id number]           Date of Birth Social Security Number Address Home Phone MRN    1950  [id number] [phone] [id number]    [facility] Mar[employer] Status          None        Admission Date Admission Type Admitting Provider Attending Provider Department, Room/Bed    7/31/18 Emergency Tejas Masterson MD  [address], 422/1    Discharge Date Discharge Disposition Discharge Destination        8/8/2018 Home-Health Care [facility]              Attending Provider:  (none)   Allergies:  Penicillins, Bactrim [Sulfamethoxazole-trimethoprim], Sulfa Antibiotics    Isolation:  None   Infection:  None   Code Status:  Prior    Ht:  170.2 cm (67\")   Wt:  54.2 kg (119 lb 7.8 oz)    Admission Cmt:  None   Principal Problem:  Pneumonia due to infectious organism [J18.9]                 Active Insurance as of 7/31/2018     Primary Coverage     Payor Plan Insurance Group Employer/Plan Group    [facility] MEDICARE REPLACEMENT [facility]      Payor Plan Address Payor Plan Phone Number Effective From Effective To    [address] [address] [phone] 10/20/2017     [facility] [address]       Subscriber Name Subscriber Birth Date Member ID       DILMA SHELL SUMMER 1950 [id number]           Secondary Coverage     Payor Plan Insurance Group Employer/Plan Group    [place]      Payor Plan Address Payor Plan Phone Number Effective From Effective To    [address] [phone] 4/1/2018     [place] [address]       Subscriber Name Subscriber Birth Date Member ID       DILMA SHELL SUMMER 1950 [id number]                 Emergency Contacts      (Rel.) Home Phone Work Phone " Mobile Phone    Gricelda Fuentes (Sister) [phone] -- --               Discharge Summary      Dionisio Chappell MD at 8/8/2018 11:05 AM                 Date of Admission: 7/31/2018  Date of Discharge:  8/8/2018    PCP: Mary Taylor MD      DISCHARGE DIAGNOSIS  Principal Problem:    Pneumonia due to infectious organism  Active Problems:    Adenocarcinoma of left lung (CMS/HCC)    Panlobular emphysema (CMS/HCC)    Sepsis (CMS/HCC)    Anxiety and depression    COPD (chronic obstructive pulmonary disease) (CMS/HCC)    Hyperlipidemia    Generalized abdominal pain    Diarrhea      SECONDARY DIAGNOSES  Past Medical History:   Diagnosis Date   • Adenocarcinoma of lung (CMS/HCC) 2017    HAD RADIATION    • Anxiety and depression    • Arthritis    • Benign parotid tumor 2012    removed by Dr Vickers told it was benign   • COPD (chronic obstructive pulmonary disease) (CMS/HCC)    • Depression    • Diarrhea    • Early cataract    • Emphysema of lung (CMS/HCC)    • Esophageal cancer (CMS/HCC) 2017   • History of chronic pain    • History of colon polyps    • History of pneumonia    • Hyperlipidemia    • Joint pain    • Stroke (CMS/HCC)        CONSULTS   Consults     Date and Time Order Name Status Description    8/2/2018 2051 Inpatient Pulmonology Consult Completed     7/31/2018 2233 [facility] (on-call MD unless specified) Completed           PROCEDURES PERFORMED  FL Video Swallow   Final Result      XR Abdomen KUB   Final Result   Tip of the Dobbhoff tube is in the stomach.           This report was finalized on 8/4/2018 10:13 PM by Dr. Magdy Ramos M.D.          XR Chest 1 View   Final Result   Interval intubation with continued worsening of left-sided   opacities as discussed           This report was finalized on 8/2/2018 11:53 PM by Damaso Rosen M.D.          XR Chest 1 View   Final Result   Significant worsening in left-sided pulmonary opacities   likely pneumonia           This report was finalized on 8/2/2018 9:32  PM by Damaso Rosen M.D.          CT Abdomen Pelvis Without Contrast   Final Result   1.  Pulmonary findings as discussed.   2. Persistent esophageal thickening presumably related to the patient's   neoplasm history.   3. Otherwise grossly unremarkable noncontrast study                   This study was performed with techniques to keep radiation doses as low   as reasonably achievable (ALARA). Individualized dose reduction   techniques using automated exposure control or adjustment of mA and/or   kV according to the patient size were employed.        This report was finalized on 8/1/2018 12:06 AM by Damaso Rosen M.D.          XR Chest 2 View   Final Result   Increasing left-sided opacities as discussed. Some of these   most assuredly related to post treatment related changes but extensive   superimposed pneumonia is not excluded.       This report was finalized on 7/31/2018 10:40 PM by Damaso Rosen M.D.                HOSPITAL COURSE  Patient is a 68 y.o. female presented to [facility] complaining of having increasing shortness breath and weakness.  Please see the admitting history and physical for further details.  She has history of lung cancer and esophageal cancer treated with radiation.  She is not a candidate for surgery.  Chest x-ray shows left lower lobe infiltrate.  She was started on IV antibiotics and transition oral antibiotics over the course for hospitalization.  Given her history of COPD and underlying pneumonia on this admission pulmonary was consulted for assistance.  We've continued her on supportive care with breathing treatments and steroids and she has quickly improved over the last week in the hospital.  She's been titrated down to 2 L nasal cannula oxygen and this will need to be continued continuously at home.  We can continue to work to titrate this in the outpatient setting.  She'll need 2 more days oral antibiotics been fever free for the last 3 or 4 days and white  "blood cell count is normalized.  She still somewhat weak and fatigued and plan is for her to go home with home health.  This is been arranged at discharge.  Pulmonary's recommended follow-up with a primary care physician within 2 weeks for repeat chest x-ray.  She is more than welcome to follow-up in the office at [facility] unless her primary care physician with regard to see a different pulmonologist.  Otherwise at this time she remained hemodynamically stable at this point is stable for discharge home      CONDITION ON DISCHARGE  Stable.      VITAL SIGNS  /83 (BP Location: Right arm, Patient Position: Lying)   Pulse 86   Temp 98.1 °F (36.7 °C) (Oral)   Resp 18   Ht 170.2 cm (67\")   Wt 54.2 kg (119 lb 7.8 oz)   SpO2 (!) 86%   BMI 18.71 kg/m²    Objective:  General Appearance:  Comfortable.    Vital signs: (most recent): Blood pressure 151/83, pulse 88, temperature 98.1 °F (36.7 °C), temperature source Oral, resp. rate 18, height 170.2 cm (67\"), weight 54.2 kg (119 lb 7.8 oz), SpO2 (!) 86 %.  No fever.    Output: Producing urine and producing stool.    HEENT: Normal HEENT exam.    Lungs:  Normal effort.  She is not in respiratory distress.  No rales, wheezes or rhonchi.    Heart: Normal rate.  S1 normal and S2 normal.    Abdomen: Abdomen is soft.  Bowel sounds are normal.   There is no abdominal tenderness.     Extremities: Normal range of motion.    Pulses: Distal pulses are intact.    Neurological: Patient is alert and oriented to person, place and time.                DISCHARGE DISPOSITION   Home-Health Care [facility]      DISCHARGE MEDICATIONS     Discharge Medications      New Medications      Instructions Start Date   albuterol (2.5 MG/3ML) 0.083% nebulizer solution  Commonly known as:  PROVENTIL   2.5 mg, Nebulization, Once As Needed      amoxicillin-clavulanate 875-125 MG per tablet  Commonly known as:  AUGMENTIN   1 tablet, Oral, Every 12 Hours Scheduled      ondansetron ODT 4 MG " disintegrating tablet  Commonly known as:  ZOFRAN ODT   4 mg, Oral, Every 8 Hours PRN      predniSONE 10 MG tablet  Commonly known as:  DELTASONE   3 tabs po daily x 3days then 2 tabs po daily x 3days then 1 tabs po daily x 3days then stop      RESOURCE THICKENUP CLEAR powder   Use as needed for nectar thick liquids         Changes to Medications      Instructions Start Date   pantoprazole 40 MG EC tablet  Commonly known as:  PROTONIX  What changed:  Another medication with the same name was removed. Continue taking this medication, and follow the directions you see here.   40 mg, Oral, Daily         Continue These Medications      Instructions Start Date   ANORO ELLIPTA 62.5-25 MCG/INH aerosol powder  inhaler  Generic drug:  umeclidinium-vilanterol   1 puff, Inhalation, Daily - RT      BENEFIBER DRINK MIX pack   Oral, Daily      buPROPion  MG 24 hr tablet  Commonly known as:  WELLBUTRIN XL   300 mg, Oral, Every Evening      CARAFATE 1 GM/10ML suspension  Generic drug:  sucralfate   TAKE 10 ML BY MOUTH EVERY 6 HOURS      CENTRUM SILVER PO   1 tablet, Oral, Daily      cyanocobalamin 2000 MCG tablet  Commonly known as:  VITAMIN B-12   2,000 mcg, Oral, Every Other Day      diazePAM 5 MG tablet  Commonly known as:  VALIUM   5 mg, Oral, 4 Times Daily PRN      diphenoxylate-atropine 2.5-0.025 MG per tablet  Commonly known as:  LOMOTIL   TK 1 T PO  QID PRN      HYDROcodone-acetaminophen 7.5-325 MG per tablet  Commonly known as:  NORCO   1 tablet, Oral, 4 Times Daily PRN      ondansetron 8 MG tablet  Commonly known as:  ZOFRAN   8 mg, Oral, Every 8 Hours PRN      ondansetron 8 MG tablet  Commonly known as:  ZOFRAN   TAKE 1 TABLET BY MOUTH EVERY 8 HOURS AS NEEDED FOR NAUSEA OR VOMITING      PROBIOTIC PO   1 capsule, Oral, Daily      simvastatin 20 MG tablet  Commonly known as:  ZOCOR   20 mg, Oral, Nightly      Vitamin D 2000 units tablet   2,000 Units, Oral, Every Other Day         Stop These Medications     escitalopram 20 MG tablet  Commonly known as:  LEXAPRO          Diet Instructions     Diet: Regular; Nectar / Syrup Thick       Discharge Diet:  Regular    Fluid Consistency:  Nectar / Syrup Thick       Activity Instructions     Activity as Tolerated         Future Appointments  Date Time Provider Department Center   8/28/2018 2:00 PM Jayant Robles MD [facility] GE EA [facility] None   9/5/2018 12:00 PM INFU CBC KRE PORT CHAIR BH INFUS KRE LAG   10/17/2018 12:00 PM INFU CBC KRE PORT CHAIR BH INFUS K[facility] LAG   11/8/2018 12:00 PM ANURADHA CT 3 BH ANURADHA CT [facility]   11/19/2018 1:30 PM INFU CBC KRE PORT CHAIR BH INFUS KRE LAG   11/19/2018 2:00 PM Bienvenido Collier MD [facility] CBC KRES BH CBC Anuradha   Additional Instructions for the Follow-ups that You Need to Schedule     Referral to Home Health    As directed      Face to Face Visit Date:  8/8/2018    Follow-up Provider for Plan of Care?:  I treated the patient in an acute care facility and will not continue treatment after discharge.    Follow-up Provider:  JIM BURCH [[id number]]    Reason/Clinical Findings:  weakness dysphagia and copd    Describe mobility limitations that make leaving home difficult:  not driving    Nursing/Therapeutic Services Requested:  Skilled Nursing Speech Therapy    Skilled nursing orders:  COPD management O2 instruction    SLP orders:  Dysphagia therapy    Frequency:  1 Week 1           Follow-up Information     Jim Burch MD .    Specialty:  Family Medicine  Contact information:  [address] [place] [id number]  [phone]                   TEST  RESULTS PENDING AT DISCHARGE         Dionisio Chappell MD  [facility]  08/08/18  11:05 AM      Time: greater than 30 minutes.          Electronically signed by Dionisio Chappell MD at 8/8/2018  1:02 PM

## 2018-08-09 NOTE — OUTREACH NOTE
Prep Survey      Responses   Facility patient discharged from?  [facility]   Is patient eligible?  Yes   Discharge diagnosis  Pneumonia due to infectious organism  Adenocarcinoma of left lung  Sepsis COPD   Does the patient have one of the following disease processes/diagnoses(primary or secondary)?  Sepsis   Does the patient have Home health ordered?  Yes   What is the Home health agency?   [facility]     Is there a DME ordered?  Yes   What DME was ordered?  continuous home O2 per   Prep survey completed?  Yes          Mandie Santos RN

## 2018-08-10 NOTE — OUTREACH NOTE
Sepsis Week 1 Survey      Responses   Facility patient discharged from?  [facility]   Does the patient have one of the following disease processes/diagnoses(primary or secondary)?  Sepsis   Is there a successful TCM telephone encounter documented?  No   Week 1 attempt successful?  No   Unsuccessful attempts  Attempt 1          Wu Ng RN

## 2018-08-14 NOTE — TELEPHONE ENCOUNTER
Patient requesting labs be drawn at home via [facility].  [facility] notified and will draw labs on Friday the 17th.  Verbal orders given to Lani for CBC and Quantitative immunoglobulin levels (IgG, IgA, IgM)  Patient informed that labs will be drawn Friday when RN is there for visit.  Pt v/u.

## 2018-08-14 NOTE — TELEPHONE ENCOUNTER
"8/14/18 Call center notified case management regarding phone call from patient stating needing a nebulizer machine. Pt. States has picked up the nebulizer medication, but does not have machine. Notified Dr. Chappell of this & he reported that he asked the patient at discharge, 3 times if she need a nebulizer machine and she stated \"no\". He further investigated & found that pt. Had called  early call MD on Sunday, Dr. Mtz  & stated for her to call back Monday morning to get this resolved. Dr. Chappell placed the Home order for Nebulizer machine & I notified [facility] as they are the ones who has provided pt's Oxygen at discharge.  Spoke with [facility] Miranda,  [phone], & explained situation. I faxed the new nebulizer order along with the facesheet. She contacted her representative who in turn was going to call the patient & discuss the Nebulizer machine / order. I also spoke with [facility] health representative , who in turn will call the home health office to inform the Home Health nurse to follow through with additional education.   "

## 2018-08-14 NOTE — TELEPHONE ENCOUNTER
Patient calling asking if she should have immune levels checked and if she might need medication for immune system.  Just d/c'd from hospital for pneumonia and sepsis.  Message sent to Dr. Collier.  Patient informed of same.  Instructed patient we would let her know something after hearing back from Dr. Collier.  Pt v/u.

## 2018-08-14 NOTE — TELEPHONE ENCOUNTER
----- Message from Dilma Calvillo sent at 8/14/2018 10:51 AM EDT -----  [phone]/ [phone]  Ref:just released from hospital, had pneumonia sepsis. Has questions about blood work and immune system

## 2018-08-14 NOTE — OUTREACH NOTE
Sepsis Week 1 Survey      Responses   Facility patient discharged from?  [facility]   Does the patient have one of the following disease processes/diagnoses(primary or secondary)?  Sepsis   Is there a successful TCM telephone encounter documented?  No   Week 1 attempt successful?  Yes   Call start time  0958   Call end time  1022   Discharge diagnosis  Pneumonia due to infectious organism  Adenocarcinoma of left lung  Sepsis COPD   Medication alerts for this patient  Pt went home with nebulizer med but no machine.    Meds reviewed with patient/caregiver?  Yes   Is the patient having any side effects they believe may be caused by any medication additions or changes?  No   Does the patient have all medications related to this admission filled (includes all antibiotics, inhalers, nebulizers,steroids,etc.)  Yes   Is the patient taking all medications as directed (includes completed medication regime)?  Yes   Does the patient have a primary care provider?   Yes   Does the patient have an appointment with their PCP within 7 days of discharge?  Greater than 7 days   What is preventing the patient from scheduling follow up appointments within 7 days of discharge?  Earlier appointment not available   Nursing Interventions  Verified appointment date/time/provider, Educated patient on importance of making appointment   Has the patient kept scheduled appointments due by today?  N/A   What is the Home health agency?   [facility]     Has home health visited the patient within 72 hours of discharge?  Yes   What DME was ordered?  continuous home O2 per 2L   Has all DME been delivered?  No   DME interventions  Other   DME comments  Pt went home with nebs ordered but does not have the neb machine. Emailed CM/MSW to assist with this issue.    Did the patient receive a copy of their discharge instructions?  Yes   Nursing interventions  Reviewed instructions with patient   What is the patient's perception of their health status since  discharge?  Improving   Nursing interventions  Nurse provided patient education   Is the patient/caregiver able to teach back Sepsis?  S - Shivering,fever or very cold, E - Extreme pain or generalized discomfort (worst ever,especially abdomen), P - Pale or discolored skin, S - Sleepy, difficult to arouse,confused, I -   I feel like I might die-a feeling of hopelessness, S - Short of breath   Nursing interventions  Nurse provided patient education   Is patient/caregiver able to teach back steps to recovery at home?  Set small, achievable goals for return to baseline health, Rest and regain strength, Eat a balanced diet, Learn about sepsis(sepsis.org)   Is the patient/caregiver able to teach back signs and symptoms of worsening condition:  Fever, Rapid heart rate (>90), Shortness of breath/rapid respiratory rate, Altered mental status(confusion/coma)   If the patient is a current smoker, are they able to teach back resources for cessation?  [id number]   Is the patient/caregiver able to teach back the hierarchy of who to call/visit for symptoms/problems? PCP, Specialist, Home health nurse, Urgent Care, ED, 911  Yes   Additional teach back comments  Pt recently went thru chemo for lung CA. Stressed importance of not smoking for health as well as safety issues as pt now has O2 in home.    Week 1 call completed?  Yes          Toshia Aguiar RN

## 2018-08-14 NOTE — TELEPHONE ENCOUNTER
----- Message from Bienvenido Collier MD sent at 8/14/2018  3:48 PM EDT -----  Regarding: RE: immune system  OK to schedule her for a lab only visit for CBC and Quantitative immunoglobulin levels ( IgG, IgA, IgM )   Thanks  ----- Message -----  From: Lyudmila Medina RN  Sent: 8/14/2018  12:13 PM  To: Bienvenido Collier MD  Subject: immune system                                    Patient calling today to see if you think she should have her immune levels checked to see if she would need IVIG or other meds.  She was just released from hospital with pneumonia and sepsis.  Thanks

## 2018-08-22 NOTE — OUTREACH NOTE
Sepsis Week 2 Survey      Responses   Facility patient discharged from?  [facility]   Does the patient have one of the following disease processes/diagnoses(primary or secondary)?  Sepsis   Week 2 attempt successful?  Yes   Call start time  1526   Call end time  1534   Discharge diagnosis  Pneumonia due to infectious organism  Adenocarcinoma of left lung  Sepsis COPD   Meds reviewed with patient/caregiver?  Yes   Is the patient having any side effects they believe may be caused by any medication additions or changes?  No   Does the patient have all medications related to this admission filled (includes all antibiotics, inhalers, nebulizers,steroids,etc.)  Yes   Is the patient taking all medications as directed (includes completed medication regime)?  Yes   Does the patient have a primary care provider?   Yes   Does the patient have an appointment with their PCP within 7 days of discharge?  Greater than 7 days   What is preventing the patient from scheduling follow up appointments within 7 days of discharge?  Earlier appointment not available   Nursing Interventions  Verified appointment date/time/provider   Has the patient kept scheduled appointments due by today?  N/A   Comments  GI f/u appt 8-25   What is the Home health agency?   [facility]     What DME was ordered?  continuous home O2 per 2L   Has all DME been delivered?  Yes   DME comments  Pt still using O2 at home. Nebulizer in place now. She does feel O2 and nebs are helpful.    Psychosocial issues?  No   Comments  Still fatigued. Pt reports that she was instructed that she did not have to use thickening for food/drink.    What is the patient's perception of their health status since discharge?  Improving   Nursing interventions  Nurse provided patient education   Is the patient/caregiver able to teach back Sepsis?  S - Shivering,fever or very cold, P - Pale or discolored skin   Nursing interventions  Nurse provided patient education, Nurse provided reassurance  to patient   Is patient/caregiver able to teach back steps to recovery at home?  Set small, achievable goals for return to baseline health, Rest and regain strength, Record milestones and struggles in a journal, Eat a balanced diet, Exercise as tolerated   Is the patient/caregiver able to teach back signs and symptoms of worsening condition:  Fever, Rapid heart rate (>90), Altered mental status(confusion/coma)   If the patient is a current smoker, are they able to teach back resources for cessation?  Smoking cessation classes   Is the patient/caregiver able to teach back the hierarchy of who to call/visit for symptoms/problems? PCP, Specialist, Home health nurse, Urgent Care, ED, 911  Yes   Additional teach back comments  Pt recently went thru chemo for lung CA.     Week 2 call completed?  Yes          Toshia Aguiar RN

## 2018-08-28 PROBLEM — D80.1 HYPOGAMMAGLOBULINEMIA, ACQUIRED (HCC): Status: ACTIVE | Noted: 2018-01-01

## 2018-08-28 NOTE — TELEPHONE ENCOUNTER
----- Message from Bienvenido Collier MD sent at 8/28/2018  3:58 PM EDT -----  Regarding: Low IgG  She does have low IgG ( hypogammaglobulinemia ) . We should probably schedule her for IVIG infusion and see her back a month after the infusion to check the level again.  Thanks!  ----- Message -----  From: Brook Salazar RN  Sent: 8/28/2018   3:34 PM  To: MD Dr. Nando Raya, pt is calling for lab results from 8/17. Can you review and I will call pt with results/any recommendations. Thanks, Brook

## 2018-08-28 NOTE — TELEPHONE ENCOUNTER
Called pt and informed her IgG level is low and Dr. Collier would like for her to have IVIG infusion and return one month after infusion for lab. Per Dr. Collier, infusion should be sometime within the next two weeks. Pt informed and v/u. Message sent to appt desk to schedule and call pt with appt info. In basket message also sent to Jose for precert.

## 2018-08-29 NOTE — OUTREACH NOTE
Sepsis Week 3 Survey      Responses   Facility patient discharged from?  [facility]   Does the patient have one of the following disease processes/diagnoses(primary or secondary)?  Sepsis   Week 3 attempt successful?  Yes   Call start time  1534   Call end time  1541   Discharge diagnosis  Pneumonia due to infectious organism  Adenocarcinoma of left lung  Sepsis COPD   Medication alerts for this patient  She has her machine now.   Meds reviewed with patient/caregiver?  Yes   Is the patient having any side effects they believe may be caused by any medication additions or changes?  No   Does the patient have all medications related to this admission filled (includes all antibiotics, inhalers, nebulizers,steroids,etc.)  Yes   Is the patient taking all medications as directed (includes completed medication regime)?  Yes   Does the patient have a primary care provider?   Yes   Comments regarding PCP  09/05/18 PCP   Has the patient kept scheduled appointments due by today?  N/A   Comments  GI appt missed due to car problems.   What is the Home health agency?   [facility]     Has home health visited the patient within 72 hours of discharge?  Yes   What DME was ordered?  continuous home O2 per 2L   Has all DME been delivered?  Yes   DME comments  Waiting on [facility] for small O2 tanks today.   Psychosocial issues?  No   Did the patient receive a copy of their discharge instructions?  Yes   Nursing interventions  Reviewed instructions with patient   What is the patient's perception of their health status since discharge?  Improving   Nursing interventions  Nurse provided patient education   Is the patient/caregiver able to teach back Sepsis?  S - Shivering,fever or very cold, E - Extreme pain or generalized discomfort (worst ever,especially abdomen), P - Pale or discolored skin, S - Sleepy, difficult to arouse,confused, I -   I feel like I might die-a feeling of hopelessness, S - Short of breath   Nursing interventions  Nurse  provided patient education   Is patient/caregiver able to teach back steps to recovery at home?  Set small, achievable goals for return to baseline health, Rest and regain strength, Eat a balanced diet, Exercise as tolerated, Make a list of questions for PCP appoinment   Is the patient/caregiver able to teach back signs and symptoms of worsening condition:  Fever, Rapid heart rate (>90), Altered mental status(confusion/coma), High blood glucose without diabetes, Hyperthermia, Shortness of breath/rapid respiratory rate, Edema   If the patient is a current smoker, are they able to teach back resources for cessation?  [id number]   Is the patient/caregiver able to teach back the hierarchy of who to call/visit for symptoms/problems? PCP, Specialist, Home health nurse, Urgent Care, ED, 911  Yes   Additional teach back comments  She is doing some better each day.    Week 3 call completed?  Yes          Lesa Charles RN

## 2018-09-10 NOTE — OUTREACH NOTE
Sepsis Week 4 Survey      Responses   Facility patient discharged from?  [facility]   Does the patient have one of the following disease processes/diagnoses(primary or secondary)?  Sepsis   Week 4 attempt successful?  Yes   Call start time  1539   Call end time  1543   Discharge diagnosis  Pneumonia due to infectious organism  Adenocarcinoma of left lung  Sepsis COPD   Medication alerts for this patient  Pt had IVIG infusion today   Meds reviewed with patient/caregiver?  Yes   Is the patient having any side effects they believe may be caused by any medication additions or changes?  No   Is the patient taking all medications as directed (includes completed medication regime)?  Yes   Medication comments  Pt just tired   Has the patient kept scheduled appointments due by today?  Yes   Is the patient still receiving Home Health Services?  Yes   Psychosocial issues?  No   What is the patient's perception of their health status since discharge?  Returned to baseline/stable   Nursing interventions  Nurse provided patient education   Is the patient/caregiver able to teach back Sepsis?  S - Shivering,fever or very cold, E - Extreme pain or generalized discomfort (worst ever,especially abdomen), P - Pale or discolored skin, S - Sleepy, difficult to arouse,confused, I -   I feel like I might die-a feeling of hopelessness, S - Short of breath   Nursing interventions  Nurse provided patient education   Is patient/caregiver able to teach back steps to recovery at home?  Set small, achievable goals for return to baseline health, Rest and regain strength, Eat a balanced diet, Exercise as tolerated, Make a list of questions for PCP appoinment   Is the patient/caregiver able to teach back signs and symptoms of worsening condition:  Fever, Rapid heart rate (>90), Altered mental status(confusion/coma), High blood glucose without diabetes, Hyperthermia, Shortness of breath/rapid respiratory rate, Edema   Is the patient/caregiver able to  teach back the hierarchy of who to call/visit for symptoms/problems? PCP, Specialist, Home health nurse, Urgent Care, ED, 911  Yes   Additional teach back comments  She is doing some better each day. States she is tired and just wants to go home to rest.   Week 4 call completed?  Yes   Would the patient like one additional call?  No   Graduated  Yes   Did the patient feel the follow up calls were helpful during their recovery period?  Yes   Was the number of calls appropriate?  Yes          Arabella Guerrero RN

## 2018-09-10 NOTE — PROGRESS NOTES
Discussed purpose of IVIG and possible side effects. Printed info from Up to Date given to patient. Patient verbalizes understanding.

## 2018-10-08 NOTE — TELEPHONE ENCOUNTER
----- Message from Bienvenido Collier MD sent at 10/8/2018 12:54 PM EDT -----  Sure  ----- Message -----  From: Kaitlyn Wong RN  Sent: 10/8/2018  11:50 AM  To: Bienvenido Collier MD    Pt would like to try CBD pills to help increase her appetite. Are you ok for her to try try them?

## 2018-10-08 NOTE — PROGRESS NOTES
Pt presents for RN review. CBC reviewed and is satisfactory for pt at this time. Pt is having trouble gaining weight, I gave her samples of Boost because she said Ensure gives her diarrhea. She is wants to try CBD pills to help increase her appetite. Message sent to Dr. Collier to see if he is ok with her trying the CBD pills. Copy of labs given and pt V/U.   Lab Results   Component Value Date    WBC 4.83 10/08/2018    HGB 12.5 10/08/2018    HCT 37.6 10/08/2018    MCV 88.3 10/08/2018     10/08/2018

## 2018-10-08 NOTE — TELEPHONE ENCOUNTER
Message letting the pt know Dr. Collier is ok for her to try CBD pills to help increase her appetite.

## 2018-10-10 NOTE — TELEPHONE ENCOUNTER
Calling back about the CBD oil. Gave her information on where to find it. Pt v/u.    ----- Message from Neptali Manuel sent at 10/10/2018  1:05 PM EDT -----  Contact: [phone]  Returning call from Monday about meds.

## 2018-11-19 NOTE — PROGRESS NOTES
Subjective    REASON FOR FOLLOW UP:   1.  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 Moon Bwoen on 10/6/2017.  Primary stereotactic radiation therapy to the left upper lobe completed 12/15/2017  2.  Lung tumor is negative for PDL 1.  Also negative for ALK, EGFR, and ROS 1 mutations.  3.  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   4.  Esophageal adenocarcinoma was negative for HER-2/eilzabeth overexpression but positive for PDL 1  5.  Initiation of combined radiation and low-dose weekly carboplatin and Taxol chemotherapy for treatment of esophageal adenocarcinoma.  First treatment delivered 1/11/2018.  6.  Development of thrombocytopenia and radiation esophagitis on the visit of 2/15/2018.  7.  EGD during the hospitalization in March 2018.  Biopsies were negative for cancer.      History of Present Illness     HISTORY OF PRESENT ILLNESS: Ms. Abad is a very pleasant 68 y.o. female with the above-noted history of both lung cancer and esophageal cancer.  As noted above, she was treated with combined chemotherapy and radiation for her adenocarcinoma of the GE junction, completing concurrent chemoradiation around 3-18.  Unfortunately she developed significant toxicity to treatment including esophagitis requiring hospitalization from 03/15/2018 through 03/20/2018 for radiation esophagitis and dehydration. During her hospitalization, she underwent EGD with biopsies which showed inflammation and ulceration but no evidence of malignancy.    She has continued to feel weak with chronic nausea and poor appetite.  She is now reporting worsening pain when she tries to eat anything, specifically pain in her sternal region.  She tries to take in supplemental protein shakes but anytime she does this has worsened diarrhea beyond her normal loose stools.  She currently is using Norco 7.5/325 4-5 times a day, prescribed by her PCP.    She had a very difficult  time recovering from her radiation esophagitis. She had multiple visits to our office for additional IV fluids.  She now is finally beginning to feel better.  Her appetite is returning and she slowly regaining her energy level.    Since her last visit to our office in July, she required admission to the hospital for treatment of LLL  pneumonia from 7/31/2018 through 8/8/2018.  She was found to be hypogammaglobulinemic on that admission and received a dose of IVIG as an outpatient on 9/10/2018 at a dose of 20 g IV.    She returns today for reevaluation and review of surveillance CT scans which were performed on 11/8/2018.  Thankfully there is no evidence of active malignancy although she does have significant radiation scarring in the left upper lobe.    Past Medical History:   Diagnosis Date   • Adenocarcinoma of lung (CMS/HCC) 2017    HAD RADIATION    • Anxiety and depression    • Arthritis    • Benign parotid tumor 2012    removed by Dr Vickers told it was benign   • COPD (chronic obstructive pulmonary disease) (CMS/HCC)    • Depression    • Diarrhea    • Early cataract    • Emphysema of lung (CMS/HCC)    • Esophageal cancer (CMS/HCC) 2017   • History of chronic pain    • History of colon polyps    • History of pneumonia    • Hyperlipidemia    • Joint pain    • Stroke (CMS/HCC)       HEMATOLOGIC/ ONCOLOGIC HISTORY:  The patient is a 67 y.o. year old female who has a history of smoking and COPD.  She had an abnormal CT scan in February of this year showing a 3.5 cm mass in the left suprahilar area.  A repeat CT scan was performed [facility] 7/13/2017 and show that the mass had increased in size from about 3.5 cm up to 6.5 cm.    She underwent navigational bronchoscopy and endobronchial ultrasound and biopsy with Dr. Moon Bowen on 10/6/2017.  The pathology on the biopsy showed well-differentiated adenocarcinoma.  She had a PDL 1 staining on her bronchoscopy specimen which was negative for PDL  1.  EGFR, ALK, and ROS 1 were also negative.    She was referred to the [facility] radiation Center and completed primary radiation to the left upper lobe lung mass.  Because of abnormal uptake in the esophagus noted on her staging PET scan, she also underwent an EGD and biopsy which unfortunately revealed adenocarcinoma of the GE junction.  Molecular studies for HER-2/elizabeth and PDL 1 on the tumor tissue showed that she was PD L1 positive and HER-2/elizabeth negative.  We recommended combined weekly low dose carbo Taxol chemotherapy and radiation.     She had significant difficulty with treatment particularly with radiation esophagitis near the end of her course of therapy.  She required some dose delay and  completed her radiation treatments the first week of March 2018.    She reports today that she is finally beginning to feel a little better.  Her appetite is returning and she is slowly regaining her energy.      Past Surgical History:   Procedure Laterality Date   • BRONCHOSCOPY     • CHOLECYSTECTOMY  1999   • COLON SURGERY  2015    COLON POLYPS   • COLONOSCOPY  04/13/2016    TA w/low grade dysplasia x 3, serrated adenoma x 2   • COLONOSCOPY  06/14/2016    TA w/high grade dysplasia   • FOOT SURGERY  10/2010    [place]   • HYSTERECTOMY     • INTUBATION  8/3/2018        • SALIVARY GLAND SURGERY      PAROTID MASS REMOVED BENIGN   • SHOULDER ARTHROSCOPY Left 1995. 2001        ALLERGIES:    Allergies   Allergen Reactions   • Penicillins Hives     Happened about 40 years ago, had rash, Tolerated 7 days of Zosyn here in 8/2018   • Bactrim [Sulfamethoxazole-Trimethoprim] Itching   • Sulfa Antibiotics Itching         Review of Systems   Constitutional: Positive for fatigue. Negative for activity change, appetite change, fever and unexpected weight change.   HENT: Positive for trouble swallowing (painful in lower esophagus). Negative for hearing loss, nosebleeds and voice change.    Eyes: Negative for visual disturbance.    Respiratory: Negative for cough, shortness of breath and wheezing.    Cardiovascular: Negative for chest pain and palpitations.   Gastrointestinal: Positive for diarrhea. Negative for abdominal pain.        Short bowel symptoms after partial colectomy.  Chronic. Reflux and painful swallowing most  due to radiation esophagitis.  Improving.   Genitourinary: Negative for difficulty urinating, frequency, hematuria and urgency.   Musculoskeletal: Negative for back pain and neck pain.   Skin: Negative for rash.   Neurological: Positive for weakness. Negative for dizziness, seizures, syncope and headaches.   Hematological: Negative for adenopathy. Does not bruise/bleed easily.   Psychiatric/Behavioral: Negative for behavioral problems. The patient is not nervous/anxious.         Objective     There were no vitals filed for this visit.  Current Status 9/10/2018   ECOG score 0       Physical Exam   Constitutional: She is oriented to person, place, and time. She appears well-developed. No distress.   Weak appearing but in no acute distress.   HENT:   Head: Normocephalic.   Eyes: Conjunctivae and EOM are normal. Pupils are equal, round, and reactive to light. No scleral icterus.   Neck: Normal range of motion. Neck supple. No JVD present. No thyromegaly present.   Cardiovascular: Normal rate and regular rhythm. Exam reveals no gallop and no friction rub.   No murmur heard.  Pulmonary/Chest: Effort normal. No respiratory distress. She has no wheezes. She has no rhonchi. She has no rales.   Right IJ Mediport the right chest wall.   Abdominal: Soft. She exhibits no distension and no mass. There is no tenderness.   Musculoskeletal: Normal range of motion. She exhibits no edema or deformity.   Neurological: She is alert and oriented to person, place, and time. She has normal reflexes. No cranial nerve deficit.   Skin: Skin is warm and dry. No rash noted. No erythema.   Psychiatric: She has a normal mood and affect. Her behavior  is normal. Judgment normal.       No change 4/19/18    RECENT LABS:  Hematology WBC   Date Value Ref Range Status   10/08/2018 4.83 4.00 - 10.00 10*3/mm3 Final     RBC   Date Value Ref Range Status   10/08/2018 4.26 3.90 - 5.00 10*6/mm3 Final     Hemoglobin   Date Value Ref Range Status   10/08/2018 12.5 11.5 - 14.9 g/dL Final     Hematocrit   Date Value Ref Range Status   10/08/2018 37.6 34.0 - 45.0 % Final     Platelets   Date Value Ref Range Status   10/08/2018 205 150 - 375 10*3/mm3 Final        Lab Results   Component Value Date    GLUCOSE 66 08/08/2018    BUN 14 08/08/2018    CREATININE 0.80 11/08/2018    EGFRIFNONA 107 08/08/2018    BCR 25.0 08/08/2018    K 4.1 08/08/2018    CO2 31.5 (H) 08/08/2018    CALCIUM 8.8 08/08/2018    ALBUMIN 3.20 (L) 08/06/2018    AST 15 08/06/2018    ALT 26 08/06/2018         CT CAP 11/8/2018  IMPRESSION:  Progressive volume loss and consolidation in the left upper  lobe consistent with evolving post RT fibrosis and atelectasis. Residual  neoplasm in this location cannot be entirely excluded. Continued CT  follow-up is recommended. Esophageal wall thickening involving the  distal esophagus extending across the GE junction into the cardia of the  stomach has diminished significantly. There is no evidence of metastatic  disease within the chest, abdomen or pelvis.      Assessment/Plan   1.  Well differentiated adenocarcinoma presumably from lung origin in a long-time smoker.  The biopsy was made from navigational bronchoscopy and endobronchial ultrasound at [place] on 10/6/2017.  Staging PET scan showed no mediastinal adenopathy or obvious distant metastases.  She did have uptake in the lower esophagus which was evaluated with EGD and biopsy on 12/6/2017 and unfortunately was found to be adenocarcinoma at the GE junction.   · LungTumor tissue was negative for PDL 1 expression, EGFR, ALK, or ROS 1 mutations.   · She completed stereotactic radiation to the lung tumor  12/15/2017.  · Scans from 11/8/2018 showed no evidence of progressive cancer although she does have significant left upper lobe scarring from radiation.  2.  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   · Esophageal adenocarcinoma was negative for HER-2/elizabeth overexpression but positive for PDL 1.  · Initiated combined radiation therapy to the esophagus along with weekly low-dose carboplatin and Taxol chemotherapy 1/11/18. She completed treatment 3/2/18.  · No evidence of recurrent or metastatic disease on her surveillance scans 11/8/2018.  3. Emphysema due to smoking.  4. Placement of right internal jugular MediPort by Dr. Raphael Germain of vascular surgery 1/9/2018.  5.  Left lower lobe pneumonia which required hospitalization in August 2018.  6.  Hypogammaglobulinemia.  She received 1 dose of IVIG 20 g IV on 9/10/2018.  We feel she would benefit from monthly IVIG infusions at least through the winter months.    Plan  1.  We reviewed the results of her surveillance CT scans from 11/8/2018 with the patient and provided her a copy of the report.  2.  She'll return monthly for IVIG and port flush  3.  M.D. follow-up 3 months.  4.  CT scans of the chest abdomen pelvis will be planned in 6 months

## 2019-01-01 ENCOUNTER — INFUSION (OUTPATIENT)
Dept: ONCOLOGY | Facility: HOSPITAL | Age: 69
End: 2019-01-01

## 2019-01-01 ENCOUNTER — TELEPHONE (OUTPATIENT)
Dept: ONCOLOGY | Facility: HOSPITAL | Age: 69
End: 2019-01-01

## 2019-01-01 ENCOUNTER — APPOINTMENT (OUTPATIENT)
Dept: CT IMAGING | Facility: HOSPITAL | Age: 69
End: 2019-01-01

## 2019-01-01 ENCOUNTER — APPOINTMENT (OUTPATIENT)
Dept: GENERAL RADIOLOGY | Facility: HOSPITAL | Age: 69
End: 2019-01-01

## 2019-01-01 ENCOUNTER — APPOINTMENT (OUTPATIENT)
Dept: ONCOLOGY | Facility: HOSPITAL | Age: 69
End: 2019-01-01

## 2019-01-01 ENCOUNTER — OFFICE VISIT (OUTPATIENT)
Dept: ONCOLOGY | Facility: CLINIC | Age: 69
End: 2019-01-01

## 2019-01-01 ENCOUNTER — ANESTHESIA (OUTPATIENT)
Dept: PERIOP | Facility: HOSPITAL | Age: 69
End: 2019-01-01

## 2019-01-01 ENCOUNTER — TELEPHONE (OUTPATIENT)
Dept: GENERAL RADIOLOGY | Facility: HOSPITAL | Age: 69
End: 2019-01-01

## 2019-01-01 ENCOUNTER — READMISSION MANAGEMENT (OUTPATIENT)
Dept: CALL CENTER | Facility: HOSPITAL | Age: 69
End: 2019-01-01

## 2019-01-01 ENCOUNTER — APPOINTMENT (OUTPATIENT)
Dept: ONCOLOGY | Facility: CLINIC | Age: 69
End: 2019-01-01

## 2019-01-01 ENCOUNTER — ANESTHESIA EVENT (OUTPATIENT)
Dept: PERIOP | Facility: HOSPITAL | Age: 69
End: 2019-01-01

## 2019-01-01 ENCOUNTER — HOSPITAL ENCOUNTER (INPATIENT)
Facility: HOSPITAL | Age: 69
LOS: 7 days | Discharge: HOME | End: 2019-03-24
Attending: EMERGENCY MEDICINE | Admitting: HOSPITALIST

## 2019-01-01 ENCOUNTER — APPOINTMENT (OUTPATIENT)
Dept: CARDIOLOGY | Facility: HOSPITAL | Age: 69
End: 2019-01-01

## 2019-01-01 ENCOUNTER — HOSPITAL ENCOUNTER (INPATIENT)
Facility: HOSPITAL | Age: 69
LOS: 12 days | End: 2019-05-11
Attending: EMERGENCY MEDICINE | Admitting: SURGERY

## 2019-01-01 ENCOUNTER — APPOINTMENT (OUTPATIENT)
Dept: PET IMAGING | Facility: HOSPITAL | Age: 69
End: 2019-01-01

## 2019-01-01 VITALS
HEART RATE: 97 BPM | TEMPERATURE: 98 F | SYSTOLIC BLOOD PRESSURE: 158 MMHG | DIASTOLIC BLOOD PRESSURE: 88 MMHG | RESPIRATION RATE: 14 BRPM | HEIGHT: 67 IN | OXYGEN SATURATION: 95 % | WEIGHT: 95.4 LBS | BODY MASS INDEX: 14.97 KG/M2

## 2019-01-01 VITALS
HEIGHT: 67 IN | RESPIRATION RATE: 14 BRPM | WEIGHT: 98.4 LBS | OXYGEN SATURATION: 93 % | TEMPERATURE: 98 F | SYSTOLIC BLOOD PRESSURE: 124 MMHG | DIASTOLIC BLOOD PRESSURE: 79 MMHG | BODY MASS INDEX: 15.44 KG/M2 | HEART RATE: 98 BPM

## 2019-01-01 VITALS
RESPIRATION RATE: 20 BRPM | HEIGHT: 67 IN | WEIGHT: 99.4 LBS | DIASTOLIC BLOOD PRESSURE: 77 MMHG | SYSTOLIC BLOOD PRESSURE: 157 MMHG | BODY MASS INDEX: 15.6 KG/M2 | OXYGEN SATURATION: 94 % | HEART RATE: 99 BPM | TEMPERATURE: 98.6 F

## 2019-01-01 VITALS
OXYGEN SATURATION: 92 % | TEMPERATURE: 97.8 F | DIASTOLIC BLOOD PRESSURE: 76 MMHG | WEIGHT: 96.3 LBS | RESPIRATION RATE: 16 BRPM | HEIGHT: 67 IN | BODY MASS INDEX: 15.11 KG/M2 | SYSTOLIC BLOOD PRESSURE: 127 MMHG | HEART RATE: 91 BPM

## 2019-01-01 VITALS
SYSTOLIC BLOOD PRESSURE: 107 MMHG | TEMPERATURE: 97.5 F | OXYGEN SATURATION: 89 % | BODY MASS INDEX: 12.84 KG/M2 | WEIGHT: 79.9 LBS | DIASTOLIC BLOOD PRESSURE: 47 MMHG | RESPIRATION RATE: 16 BRPM | HEIGHT: 66 IN

## 2019-01-01 VITALS — SYSTOLIC BLOOD PRESSURE: 148 MMHG | HEART RATE: 86 BPM | DIASTOLIC BLOOD PRESSURE: 84 MMHG

## 2019-01-01 VITALS — SYSTOLIC BLOOD PRESSURE: 146 MMHG | DIASTOLIC BLOOD PRESSURE: 91 MMHG | HEART RATE: 80 BPM

## 2019-01-01 DIAGNOSIS — K20.80 RADIATION ESOPHAGITIS: ICD-10-CM

## 2019-01-01 DIAGNOSIS — Z45.2 FITTING AND ADJUSTMENT OF VASCULAR CATHETER: ICD-10-CM

## 2019-01-01 DIAGNOSIS — C15.9 ESOPHAGEAL CARCINOMA (HCC): ICD-10-CM

## 2019-01-01 DIAGNOSIS — J18.9 PNEUMONIA DUE TO INFECTIOUS ORGANISM, UNSPECIFIED LATERALITY, UNSPECIFIED PART OF LUNG: ICD-10-CM

## 2019-01-01 DIAGNOSIS — T66.XXXA RADIATION ESOPHAGITIS: ICD-10-CM

## 2019-01-01 DIAGNOSIS — R26.89 DECREASED MOBILITY: ICD-10-CM

## 2019-01-01 DIAGNOSIS — J96.11 CHRONIC RESPIRATORY FAILURE WITH HYPOXIA (HCC): ICD-10-CM

## 2019-01-01 DIAGNOSIS — D80.1 HYPOGAMMAGLOBULINEMIA, ACQUIRED (HCC): ICD-10-CM

## 2019-01-01 DIAGNOSIS — K56.609 SBO (SMALL BOWEL OBSTRUCTION) (HCC): Primary | ICD-10-CM

## 2019-01-01 DIAGNOSIS — C34.92 ADENOCARCINOMA OF LEFT LUNG (HCC): Primary | ICD-10-CM

## 2019-01-01 DIAGNOSIS — D80.1 HYPOGAMMAGLOBULINEMIA, ACQUIRED (HCC): Primary | ICD-10-CM

## 2019-01-01 DIAGNOSIS — R11.0 NAUSEA IN ADULT: ICD-10-CM

## 2019-01-01 DIAGNOSIS — C34.92 ADENOCARCINOMA OF LEFT LUNG (HCC): ICD-10-CM

## 2019-01-01 DIAGNOSIS — R09.02 HYPOXEMIA: ICD-10-CM

## 2019-01-01 DIAGNOSIS — K56.609 SMALL BOWEL OBSTRUCTION (HCC): Primary | ICD-10-CM

## 2019-01-01 DIAGNOSIS — J44.9 CHRONIC OBSTRUCTIVE PULMONARY DISEASE, UNSPECIFIED COPD TYPE (HCC): ICD-10-CM

## 2019-01-01 DIAGNOSIS — R53.1 GENERALIZED WEAKNESS: ICD-10-CM

## 2019-01-01 LAB
ABO GROUP BLD: NORMAL
ALBUMIN SERPL-MCNC: 2.9 G/DL (ref 3.5–5.2)
ALBUMIN SERPL-MCNC: 3 G/DL (ref 3.5–5.2)
ALBUMIN SERPL-MCNC: 3.5 G/DL (ref 3.5–5.2)
ALBUMIN SERPL-MCNC: 4.2 G/DL (ref 3.5–5.2)
ALBUMIN SERPL-MCNC: 4.6 G/DL (ref 3.5–5.2)
ALBUMIN/GLOB SERPL: 1 G/DL
ALBUMIN/GLOB SERPL: 1.3 G/DL
ALBUMIN/GLOB SERPL: 1.3 G/DL
ALBUMIN/GLOB SERPL: 1.5 G/DL
ALP SERPL-CCNC: 120 U/L (ref 39–117)
ALP SERPL-CCNC: 53 U/L (ref 39–117)
ALP SERPL-CCNC: 66 U/L (ref 39–117)
ALP SERPL-CCNC: 68 U/L (ref 39–117)
ALT SERPL W P-5'-P-CCNC: 12 U/L (ref 1–33)
ALT SERPL W P-5'-P-CCNC: 16 U/L (ref 1–33)
ALT SERPL W P-5'-P-CCNC: 18 U/L (ref 1–33)
ALT SERPL W P-5'-P-CCNC: 68 U/L (ref 1–33)
ANION GAP SERPL CALCULATED.3IONS-SCNC: 10.2 MMOL/L
ANION GAP SERPL CALCULATED.3IONS-SCNC: 11 MMOL/L
ANION GAP SERPL CALCULATED.3IONS-SCNC: 11.1 MMOL/L
ANION GAP SERPL CALCULATED.3IONS-SCNC: 11.2 MMOL/L
ANION GAP SERPL CALCULATED.3IONS-SCNC: 11.4 MMOL/L
ANION GAP SERPL CALCULATED.3IONS-SCNC: 11.7 MMOL/L
ANION GAP SERPL CALCULATED.3IONS-SCNC: 11.7 MMOL/L
ANION GAP SERPL CALCULATED.3IONS-SCNC: 12.1 MMOL/L
ANION GAP SERPL CALCULATED.3IONS-SCNC: 12.8 MMOL/L
ANION GAP SERPL CALCULATED.3IONS-SCNC: 14.3 MMOL/L
ANION GAP SERPL CALCULATED.3IONS-SCNC: 14.5 MMOL/L
ANION GAP SERPL CALCULATED.3IONS-SCNC: 17 MMOL/L
ANION GAP SERPL CALCULATED.3IONS-SCNC: 18.4 MMOL/L
ANION GAP SERPL CALCULATED.3IONS-SCNC: 19.6 MMOL/L
ANION GAP SERPL CALCULATED.3IONS-SCNC: 8 MMOL/L
ANION GAP SERPL CALCULATED.3IONS-SCNC: 8.9 MMOL/L
ANION GAP SERPL CALCULATED.3IONS-SCNC: 9.3 MMOL/L
ANION GAP SERPL CALCULATED.3IONS-SCNC: 9.9 MMOL/L
ANION GAP SERPL CALCULATED.3IONS-SCNC: 9.9 MMOL/L
ARTERIAL PATENCY WRIST A: ABNORMAL
ARTERIAL PATENCY WRIST A: POSITIVE
ARTERIAL PATENCY WRIST A: POSITIVE
AST SERPL-CCNC: 13 U/L (ref 1–32)
AST SERPL-CCNC: 19 U/L (ref 1–32)
AST SERPL-CCNC: 21 U/L (ref 1–32)
AST SERPL-CCNC: 54 U/L (ref 1–32)
ATMOSPHERIC PRESS: 749.3 MMHG
ATMOSPHERIC PRESS: 760 MMHG
ATMOSPHERIC PRESS: 763.9 MMHG
BACTERIA SPEC AEROBE CULT: NORMAL
BACTERIA UR QL AUTO: ABNORMAL /HPF
BASE EXCESS BLDA CALC-SCNC: -10.4 MMOL/L (ref 0–2)
BASE EXCESS BLDA CALC-SCNC: 1.5 MMOL/L (ref 0–2)
BASE EXCESS BLDA CALC-SCNC: 5.5 MMOL/L (ref 0–2)
BASOPHILS # BLD AUTO: 0 10*3/MM3 (ref 0–0.2)
BASOPHILS # BLD AUTO: 0.01 10*3/MM3 (ref 0–0.2)
BASOPHILS # BLD AUTO: 0.02 10*3/MM3 (ref 0–0.1)
BASOPHILS # BLD AUTO: 0.02 10*3/MM3 (ref 0–0.2)
BASOPHILS NFR BLD AUTO: 0 % (ref 0–1.5)
BASOPHILS NFR BLD AUTO: 0.1 % (ref 0–1.5)
BASOPHILS NFR BLD AUTO: 0.2 % (ref 0–1.5)
BASOPHILS NFR BLD AUTO: 0.3 % (ref 0–1.1)
BASOPHILS NFR BLD AUTO: 0.3 % (ref 0–1.5)
BDY SITE: ABNORMAL
BDY SITE: ABNORMAL
BDY SITE: NORMAL
BH CV LOW VAS LEFT POPLITEAL SPONT: 1
BH CV LOWER VASCULAR LEFT COMMON FEMORAL AUGMENT: NORMAL
BH CV LOWER VASCULAR LEFT COMMON FEMORAL COMPETENT: NORMAL
BH CV LOWER VASCULAR LEFT COMMON FEMORAL COMPRESS: NORMAL
BH CV LOWER VASCULAR LEFT COMMON FEMORAL PHASIC: NORMAL
BH CV LOWER VASCULAR LEFT COMMON FEMORAL SPONT: NORMAL
BH CV LOWER VASCULAR LEFT DISTAL FEMORAL COMPRESS: NORMAL
BH CV LOWER VASCULAR LEFT GASTRONEMIUS COMPRESS: NORMAL
BH CV LOWER VASCULAR LEFT GREATER SAPH AK COMPRESS: NORMAL
BH CV LOWER VASCULAR LEFT GREATER SAPH BK COMPRESS: NORMAL
BH CV LOWER VASCULAR LEFT MID FEMORAL AUGMENT: NORMAL
BH CV LOWER VASCULAR LEFT MID FEMORAL COMPETENT: NORMAL
BH CV LOWER VASCULAR LEFT MID FEMORAL COMPRESS: NORMAL
BH CV LOWER VASCULAR LEFT MID FEMORAL PHASIC: NORMAL
BH CV LOWER VASCULAR LEFT MID FEMORAL SPONT: NORMAL
BH CV LOWER VASCULAR LEFT PERONEAL COMPRESS: NORMAL
BH CV LOWER VASCULAR LEFT POPLITEAL AUGMENT: NORMAL
BH CV LOWER VASCULAR LEFT POPLITEAL COMPETENT: NORMAL
BH CV LOWER VASCULAR LEFT POPLITEAL COMPRESS: NORMAL
BH CV LOWER VASCULAR LEFT POPLITEAL PHASIC: NORMAL
BH CV LOWER VASCULAR LEFT POPLITEAL SPONT: NORMAL
BH CV LOWER VASCULAR LEFT POSTERIOR TIBIAL COMPRESS: NORMAL
BH CV LOWER VASCULAR LEFT PROXIMAL FEMORAL COMPRESS: NORMAL
BH CV LOWER VASCULAR LEFT SAPHENOFEMORAL JUNCTION AUGMENT: NORMAL
BH CV LOWER VASCULAR LEFT SAPHENOFEMORAL JUNCTION COMPRESS: NORMAL
BH CV LOWER VASCULAR LEFT SAPHENOFEMORAL JUNCTION PHASIC: NORMAL
BH CV LOWER VASCULAR LEFT SAPHENOFEMORAL JUNCTION SPONT: NORMAL
BH CV LOWER VASCULAR RIGHT COMMON FEMORAL AUGMENT: NORMAL
BH CV LOWER VASCULAR RIGHT COMMON FEMORAL COMPETENT: NORMAL
BH CV LOWER VASCULAR RIGHT COMMON FEMORAL COMPRESS: NORMAL
BH CV LOWER VASCULAR RIGHT COMMON FEMORAL PHASIC: NORMAL
BH CV LOWER VASCULAR RIGHT COMMON FEMORAL SPONT: NORMAL
BH CV LOWER VASCULAR RIGHT DISTAL FEMORAL COMPRESS: NORMAL
BH CV LOWER VASCULAR RIGHT GASTRONEMIUS COMPRESS: NORMAL
BH CV LOWER VASCULAR RIGHT GREATER SAPH AK COMPRESS: NORMAL
BH CV LOWER VASCULAR RIGHT GREATER SAPH BK COMPRESS: NORMAL
BH CV LOWER VASCULAR RIGHT MID FEMORAL AUGMENT: NORMAL
BH CV LOWER VASCULAR RIGHT MID FEMORAL COMPETENT: NORMAL
BH CV LOWER VASCULAR RIGHT MID FEMORAL COMPRESS: NORMAL
BH CV LOWER VASCULAR RIGHT MID FEMORAL PHASIC: NORMAL
BH CV LOWER VASCULAR RIGHT MID FEMORAL SPONT: NORMAL
BH CV LOWER VASCULAR RIGHT PERONEAL COMPRESS: NORMAL
BH CV LOWER VASCULAR RIGHT POPLITEAL AUGMENT: NORMAL
BH CV LOWER VASCULAR RIGHT POPLITEAL COMPETENT: NORMAL
BH CV LOWER VASCULAR RIGHT POPLITEAL COMPRESS: NORMAL
BH CV LOWER VASCULAR RIGHT POPLITEAL PHASIC: NORMAL
BH CV LOWER VASCULAR RIGHT POPLITEAL SPONT: NORMAL
BH CV LOWER VASCULAR RIGHT POSTERIOR TIBIAL COMPRESS: NORMAL
BH CV LOWER VASCULAR RIGHT PROXIMAL FEMORAL COMPRESS: NORMAL
BH CV LOWER VASCULAR RIGHT SAPHENOFEMORAL JUNCTION AUGMENT: NORMAL
BH CV LOWER VASCULAR RIGHT SAPHENOFEMORAL JUNCTION COMPRESS: NORMAL
BH CV LOWER VASCULAR RIGHT SAPHENOFEMORAL JUNCTION PHASIC: NORMAL
BH CV LOWER VASCULAR RIGHT SAPHENOFEMORAL JUNCTION SPONT: NORMAL
BH CV POP FLUID COLLECT LEFT: 1
BILIRUB SERPL-MCNC: 0.3 MG/DL (ref 0.2–1.2)
BILIRUB SERPL-MCNC: 0.5 MG/DL (ref 0.2–1.2)
BILIRUB SERPL-MCNC: 0.6 MG/DL (ref 0.2–1.2)
BILIRUB SERPL-MCNC: 1.2 MG/DL (ref 0.1–1.2)
BILIRUB UR QL STRIP: NEGATIVE
BLD GP AB SCN SERPL QL: NEGATIVE
BUN BLD-MCNC: 10 MG/DL (ref 8–23)
BUN BLD-MCNC: 12 MG/DL (ref 8–23)
BUN BLD-MCNC: 13 MG/DL (ref 8–23)
BUN BLD-MCNC: 14 MG/DL (ref 8–23)
BUN BLD-MCNC: 15 MG/DL (ref 8–23)
BUN BLD-MCNC: 16 MG/DL (ref 8–23)
BUN BLD-MCNC: 16 MG/DL (ref 8–23)
BUN BLD-MCNC: 17 MG/DL (ref 8–23)
BUN BLD-MCNC: 17 MG/DL (ref 8–23)
BUN BLD-MCNC: 19 MG/DL (ref 8–23)
BUN BLD-MCNC: 22 MG/DL (ref 8–23)
BUN BLD-MCNC: 26 MG/DL (ref 8–23)
BUN BLD-MCNC: 27 MG/DL (ref 8–23)
BUN/CREAT SERPL: 16.7 (ref 7–25)
BUN/CREAT SERPL: 17.2 (ref 7–25)
BUN/CREAT SERPL: 18.1 (ref 7–25)
BUN/CREAT SERPL: 19.7 (ref 7–25)
BUN/CREAT SERPL: 20.7 (ref 7–25)
BUN/CREAT SERPL: 21.1 (ref 7–25)
BUN/CREAT SERPL: 21.7 (ref 7–25)
BUN/CREAT SERPL: 22.2 (ref 7–25)
BUN/CREAT SERPL: 24.1 (ref 7–25)
BUN/CREAT SERPL: 24.3 (ref 7–25)
BUN/CREAT SERPL: 27.7 (ref 7–25)
BUN/CREAT SERPL: 28.6 (ref 7–25)
BUN/CREAT SERPL: 28.9 (ref 7–25)
BUN/CREAT SERPL: 29.4 (ref 7–25)
BUN/CREAT SERPL: 29.6 (ref 7–25)
BUN/CREAT SERPL: 30.2 (ref 7–25)
BUN/CREAT SERPL: 32.5 (ref 7–25)
BUN/CREAT SERPL: 33.9 (ref 7–25)
BUN/CREAT SERPL: 38.6 (ref 7–25)
C DIFF TOX GENS STL QL NAA+PROBE: NEGATIVE
CALCIUM SPEC-SCNC: 10.1 MG/DL (ref 8.6–10.5)
CALCIUM SPEC-SCNC: 11.5 MG/DL (ref 8.6–10.5)
CALCIUM SPEC-SCNC: 8.4 MG/DL (ref 8.6–10.5)
CALCIUM SPEC-SCNC: 8.5 MG/DL (ref 8.6–10.5)
CALCIUM SPEC-SCNC: 8.6 MG/DL (ref 8.6–10.5)
CALCIUM SPEC-SCNC: 8.6 MG/DL (ref 8.6–10.5)
CALCIUM SPEC-SCNC: 8.7 MG/DL (ref 8.6–10.5)
CALCIUM SPEC-SCNC: 8.7 MG/DL (ref 8.6–10.5)
CALCIUM SPEC-SCNC: 9 MG/DL (ref 8.6–10.5)
CALCIUM SPEC-SCNC: 9.2 MG/DL (ref 8.6–10.5)
CALCIUM SPEC-SCNC: 9.2 MG/DL (ref 8.6–10.5)
CALCIUM SPEC-SCNC: 9.3 MG/DL (ref 8.6–10.5)
CALCIUM SPEC-SCNC: 9.4 MG/DL (ref 8.6–10.5)
CHLORIDE SERPL-SCNC: 100 MMOL/L (ref 98–107)
CHLORIDE SERPL-SCNC: 100 MMOL/L (ref 98–107)
CHLORIDE SERPL-SCNC: 101 MMOL/L (ref 98–107)
CHLORIDE SERPL-SCNC: 102 MMOL/L (ref 98–107)
CHLORIDE SERPL-SCNC: 105 MMOL/L (ref 98–107)
CHLORIDE SERPL-SCNC: 107 MMOL/L (ref 98–107)
CHLORIDE SERPL-SCNC: 86 MMOL/L (ref 98–107)
CHLORIDE SERPL-SCNC: 92 MMOL/L (ref 98–107)
CHLORIDE SERPL-SCNC: 93 MMOL/L (ref 98–107)
CHLORIDE SERPL-SCNC: 94 MMOL/L (ref 98–107)
CHLORIDE SERPL-SCNC: 96 MMOL/L (ref 98–107)
CHLORIDE SERPL-SCNC: 96 MMOL/L (ref 98–107)
CHLORIDE SERPL-SCNC: 97 MMOL/L (ref 98–107)
CHLORIDE SERPL-SCNC: 98 MMOL/L (ref 98–107)
CHLORIDE SERPL-SCNC: 99 MMOL/L (ref 98–107)
CLARITY UR: ABNORMAL
CO2 SERPL-SCNC: 20.3 MMOL/L (ref 22–29)
CO2 SERPL-SCNC: 21.5 MMOL/L (ref 22–29)
CO2 SERPL-SCNC: 21.7 MMOL/L (ref 22–29)
CO2 SERPL-SCNC: 23 MMOL/L (ref 22–29)
CO2 SERPL-SCNC: 23.6 MMOL/L (ref 22–29)
CO2 SERPL-SCNC: 24.1 MMOL/L (ref 22–29)
CO2 SERPL-SCNC: 24.9 MMOL/L (ref 22–29)
CO2 SERPL-SCNC: 25.3 MMOL/L (ref 22–29)
CO2 SERPL-SCNC: 27.6 MMOL/L (ref 22–29)
CO2 SERPL-SCNC: 28.1 MMOL/L (ref 22–29)
CO2 SERPL-SCNC: 29 MMOL/L (ref 22–29)
CO2 SERPL-SCNC: 29.1 MMOL/L (ref 22–29)
CO2 SERPL-SCNC: 29.8 MMOL/L (ref 22–29)
CO2 SERPL-SCNC: 30.4 MMOL/L (ref 22–29)
CO2 SERPL-SCNC: 30.9 MMOL/L (ref 22–29)
CO2 SERPL-SCNC: 31.8 MMOL/L (ref 22–29)
CO2 SERPL-SCNC: 32.2 MMOL/L (ref 22–29)
CO2 SERPL-SCNC: 34 MMOL/L (ref 22–29)
CO2 SERPL-SCNC: 35.7 MMOL/L (ref 22–29)
COLOR UR: YELLOW
CREAT BLD-MCNC: 0.4 MG/DL (ref 0.57–1)
CREAT BLD-MCNC: 0.45 MG/DL (ref 0.57–1)
CREAT BLD-MCNC: 0.46 MG/DL (ref 0.57–1)
CREAT BLD-MCNC: 0.47 MG/DL (ref 0.57–1)
CREAT BLD-MCNC: 0.49 MG/DL (ref 0.57–1)
CREAT BLD-MCNC: 0.51 MG/DL (ref 0.57–1)
CREAT BLD-MCNC: 0.54 MG/DL (ref 0.57–1)
CREAT BLD-MCNC: 0.56 MG/DL (ref 0.57–1)
CREAT BLD-MCNC: 0.57 MG/DL (ref 0.57–1)
CREAT BLD-MCNC: 0.58 MG/DL (ref 0.57–1)
CREAT BLD-MCNC: 0.58 MG/DL (ref 0.57–1)
CREAT BLD-MCNC: 0.6 MG/DL (ref 0.57–1)
CREAT BLD-MCNC: 0.61 MG/DL (ref 0.57–1)
CREAT BLD-MCNC: 0.63 MG/DL (ref 0.57–1)
CREAT BLD-MCNC: 0.7 MG/DL (ref 0.57–1)
CREAT BLD-MCNC: 0.76 MG/DL (ref 0.57–1)
CREAT BLD-MCNC: 0.82 MG/DL (ref 0.57–1)
CREAT BLD-MCNC: 0.86 MG/DL (ref 0.57–1)
CREAT BLD-MCNC: 1.49 MG/DL (ref 0.57–1)
D-LACTATE SERPL-SCNC: 6 MMOL/L (ref 0.5–2)
DEPRECATED RDW RBC AUTO: 43.4 FL (ref 37–49)
DEPRECATED RDW RBC AUTO: 44.4 FL (ref 37–54)
DEPRECATED RDW RBC AUTO: 45.1 FL (ref 37–54)
DEPRECATED RDW RBC AUTO: 45.4 FL (ref 37–54)
DEPRECATED RDW RBC AUTO: 46.2 FL (ref 37–54)
DEPRECATED RDW RBC AUTO: 47.3 FL (ref 37–54)
DEPRECATED RDW RBC AUTO: 47.6 FL (ref 37–54)
DEPRECATED RDW RBC AUTO: 48 FL (ref 37–54)
DEPRECATED RDW RBC AUTO: 48.1 FL (ref 37–54)
DEPRECATED RDW RBC AUTO: 48.4 FL (ref 37–54)
DEPRECATED RDW RBC AUTO: 49.1 FL (ref 37–54)
DEPRECATED RDW RBC AUTO: 49.1 FL (ref 37–54)
DEPRECATED RDW RBC AUTO: 49.5 FL (ref 37–54)
DEPRECATED RDW RBC AUTO: 49.7 FL (ref 37–54)
DEPRECATED RDW RBC AUTO: 50.2 FL (ref 37–54)
DEPRECATED RDW RBC AUTO: 50.5 FL (ref 37–54)
DIFFERENTIAL METHOD BLD: ABNORMAL
EOSINOPHIL # BLD AUTO: 0 10*3/MM3 (ref 0–0.4)
EOSINOPHIL # BLD AUTO: 0.01 10*3/MM3 (ref 0–0.4)
EOSINOPHIL # BLD AUTO: 0.03 10*3/MM3 (ref 0–0.36)
EOSINOPHIL # BLD AUTO: 0.03 10*3/MM3 (ref 0–0.4)
EOSINOPHIL # BLD AUTO: 0.03 10*3/MM3 (ref 0–0.4)
EOSINOPHIL NFR BLD AUTO: 0 % (ref 0.3–6.2)
EOSINOPHIL NFR BLD AUTO: 0.1 % (ref 0.3–6.2)
EOSINOPHIL NFR BLD AUTO: 0.3 % (ref 0.3–6.2)
EOSINOPHIL NFR BLD AUTO: 0.4 % (ref 0.3–6.2)
EOSINOPHIL NFR BLD AUTO: 0.4 % (ref 1–5)
ERYTHROCYTE [DISTWIDTH] IN BLOOD BY AUTOMATED COUNT: 14.2 % (ref 11.7–14.5)
ERYTHROCYTE [DISTWIDTH] IN BLOOD BY AUTOMATED COUNT: 14.2 % (ref 12.3–15.4)
ERYTHROCYTE [DISTWIDTH] IN BLOOD BY AUTOMATED COUNT: 14.3 % (ref 12.3–15.4)
ERYTHROCYTE [DISTWIDTH] IN BLOOD BY AUTOMATED COUNT: 14.5 % (ref 12.3–15.4)
ERYTHROCYTE [DISTWIDTH] IN BLOOD BY AUTOMATED COUNT: 14.5 % (ref 12.3–15.4)
ERYTHROCYTE [DISTWIDTH] IN BLOOD BY AUTOMATED COUNT: 14.7 % (ref 12.3–15.4)
ERYTHROCYTE [DISTWIDTH] IN BLOOD BY AUTOMATED COUNT: 14.8 % (ref 12.3–15.4)
ERYTHROCYTE [DISTWIDTH] IN BLOOD BY AUTOMATED COUNT: 14.8 % (ref 12.3–15.4)
ERYTHROCYTE [DISTWIDTH] IN BLOOD BY AUTOMATED COUNT: 15.2 % (ref 12.3–15.4)
ERYTHROCYTE [DISTWIDTH] IN BLOOD BY AUTOMATED COUNT: 15.2 % (ref 12.3–15.4)
ERYTHROCYTE [DISTWIDTH] IN BLOOD BY AUTOMATED COUNT: 15.4 % (ref 12.3–15.4)
ERYTHROCYTE [DISTWIDTH] IN BLOOD BY AUTOMATED COUNT: 15.4 % (ref 12.3–15.4)
ERYTHROCYTE [DISTWIDTH] IN BLOOD BY AUTOMATED COUNT: 15.6 % (ref 12.3–15.4)
ERYTHROCYTE [DISTWIDTH] IN BLOOD BY AUTOMATED COUNT: 16.1 % (ref 12.3–15.4)
GFR SERPL CREATININE-BSD FRML MDRD: 102 ML/MIN/1.73
GFR SERPL CREATININE-BSD FRML MDRD: 102 ML/MIN/1.73
GFR SERPL CREATININE-BSD FRML MDRD: 105 ML/MIN/1.73
GFR SERPL CREATININE-BSD FRML MDRD: 107 ML/MIN/1.73
GFR SERPL CREATININE-BSD FRML MDRD: 111 ML/MIN/1.73
GFR SERPL CREATININE-BSD FRML MDRD: 119 ML/MIN/1.73
GFR SERPL CREATININE-BSD FRML MDRD: 124 ML/MIN/1.73
GFR SERPL CREATININE-BSD FRML MDRD: 131 ML/MIN/1.73
GFR SERPL CREATININE-BSD FRML MDRD: 134 ML/MIN/1.73
GFR SERPL CREATININE-BSD FRML MDRD: 137 ML/MIN/1.73
GFR SERPL CREATININE-BSD FRML MDRD: 34 ML/MIN/1.73
GFR SERPL CREATININE-BSD FRML MDRD: 65 ML/MIN/1.73
GFR SERPL CREATININE-BSD FRML MDRD: 69 ML/MIN/1.73
GFR SERPL CREATININE-BSD FRML MDRD: 75 ML/MIN/1.73
GFR SERPL CREATININE-BSD FRML MDRD: 82 ML/MIN/1.73
GFR SERPL CREATININE-BSD FRML MDRD: 93 ML/MIN/1.73
GFR SERPL CREATININE-BSD FRML MDRD: 97 ML/MIN/1.73
GFR SERPL CREATININE-BSD FRML MDRD: 99 ML/MIN/1.73
GFR SERPL CREATININE-BSD FRML MDRD: >150 ML/MIN/1.73
GLOBULIN UR ELPH-MCNC: 2.8 GM/DL
GLOBULIN UR ELPH-MCNC: 2.9 GM/DL
GLOBULIN UR ELPH-MCNC: 3 GM/DL
GLOBULIN UR ELPH-MCNC: 3.3 GM/DL
GLUCOSE BLD-MCNC: 106 MG/DL (ref 65–99)
GLUCOSE BLD-MCNC: 110 MG/DL (ref 65–99)
GLUCOSE BLD-MCNC: 127 MG/DL (ref 65–99)
GLUCOSE BLD-MCNC: 127 MG/DL (ref 65–99)
GLUCOSE BLD-MCNC: 128 MG/DL (ref 65–99)
GLUCOSE BLD-MCNC: 129 MG/DL (ref 65–99)
GLUCOSE BLD-MCNC: 129 MG/DL (ref 65–99)
GLUCOSE BLD-MCNC: 133 MG/DL (ref 65–99)
GLUCOSE BLD-MCNC: 133 MG/DL (ref 65–99)
GLUCOSE BLD-MCNC: 142 MG/DL (ref 65–99)
GLUCOSE BLD-MCNC: 142 MG/DL (ref 65–99)
GLUCOSE BLD-MCNC: 144 MG/DL (ref 65–99)
GLUCOSE BLD-MCNC: 145 MG/DL (ref 65–99)
GLUCOSE BLD-MCNC: 157 MG/DL (ref 65–99)
GLUCOSE BLD-MCNC: 160 MG/DL (ref 65–99)
GLUCOSE BLD-MCNC: 184 MG/DL (ref 65–99)
GLUCOSE BLD-MCNC: 249 MG/DL (ref 65–99)
GLUCOSE BLD-MCNC: 61 MG/DL (ref 65–99)
GLUCOSE BLD-MCNC: 88 MG/DL (ref 65–99)
GLUCOSE BLDC GLUCOMTR-MCNC: 100 MG/DL (ref 70–130)
GLUCOSE BLDC GLUCOMTR-MCNC: 106 MG/DL (ref 70–130)
GLUCOSE BLDC GLUCOMTR-MCNC: 108 MG/DL (ref 70–130)
GLUCOSE BLDC GLUCOMTR-MCNC: 109 MG/DL (ref 70–130)
GLUCOSE BLDC GLUCOMTR-MCNC: 111 MG/DL (ref 70–130)
GLUCOSE BLDC GLUCOMTR-MCNC: 114 MG/DL (ref 70–130)
GLUCOSE BLDC GLUCOMTR-MCNC: 115 MG/DL (ref 70–130)
GLUCOSE BLDC GLUCOMTR-MCNC: 115 MG/DL (ref 70–130)
GLUCOSE BLDC GLUCOMTR-MCNC: 116 MG/DL (ref 70–130)
GLUCOSE BLDC GLUCOMTR-MCNC: 118 MG/DL (ref 70–130)
GLUCOSE BLDC GLUCOMTR-MCNC: 122 MG/DL (ref 70–130)
GLUCOSE BLDC GLUCOMTR-MCNC: 124 MG/DL (ref 70–130)
GLUCOSE BLDC GLUCOMTR-MCNC: 126 MG/DL (ref 70–130)
GLUCOSE BLDC GLUCOMTR-MCNC: 128 MG/DL (ref 70–130)
GLUCOSE BLDC GLUCOMTR-MCNC: 129 MG/DL (ref 70–130)
GLUCOSE BLDC GLUCOMTR-MCNC: 132 MG/DL (ref 70–130)
GLUCOSE BLDC GLUCOMTR-MCNC: 133 MG/DL (ref 70–130)
GLUCOSE BLDC GLUCOMTR-MCNC: 134 MG/DL (ref 70–130)
GLUCOSE BLDC GLUCOMTR-MCNC: 134 MG/DL (ref 70–130)
GLUCOSE BLDC GLUCOMTR-MCNC: 135 MG/DL (ref 70–130)
GLUCOSE BLDC GLUCOMTR-MCNC: 136 MG/DL (ref 70–130)
GLUCOSE BLDC GLUCOMTR-MCNC: 136 MG/DL (ref 70–130)
GLUCOSE BLDC GLUCOMTR-MCNC: 137 MG/DL (ref 70–130)
GLUCOSE BLDC GLUCOMTR-MCNC: 139 MG/DL (ref 70–130)
GLUCOSE BLDC GLUCOMTR-MCNC: 141 MG/DL (ref 70–130)
GLUCOSE BLDC GLUCOMTR-MCNC: 144 MG/DL (ref 70–130)
GLUCOSE BLDC GLUCOMTR-MCNC: 148 MG/DL (ref 70–130)
GLUCOSE BLDC GLUCOMTR-MCNC: 168 MG/DL (ref 70–130)
GLUCOSE BLDC GLUCOMTR-MCNC: 182 MG/DL (ref 70–130)
GLUCOSE BLDC GLUCOMTR-MCNC: 183 MG/DL (ref 70–130)
GLUCOSE BLDC GLUCOMTR-MCNC: 191 MG/DL (ref 70–130)
GLUCOSE BLDC GLUCOMTR-MCNC: 223 MG/DL (ref 70–130)
GLUCOSE BLDC GLUCOMTR-MCNC: 94 MG/DL (ref 70–130)
GLUCOSE UR STRIP-MCNC: NEGATIVE MG/DL
GRAM STN SPEC: NORMAL
GRAM STN SPEC: NORMAL
HCO3 BLDA-SCNC: 20.3 MMOL/L (ref 22–28)
HCO3 BLDA-SCNC: 25.5 MMOL/L (ref 22–28)
HCO3 BLDA-SCNC: 30.1 MMOL/L (ref 22–28)
HCT VFR BLD AUTO: 31.9 % (ref 34–46.6)
HCT VFR BLD AUTO: 33.9 % (ref 34–46.6)
HCT VFR BLD AUTO: 35.7 % (ref 34–46.6)
HCT VFR BLD AUTO: 36.9 % (ref 34–46.6)
HCT VFR BLD AUTO: 37.3 % (ref 34–46.6)
HCT VFR BLD AUTO: 37.3 % (ref 34–46.6)
HCT VFR BLD AUTO: 37.7 % (ref 34–46.6)
HCT VFR BLD AUTO: 37.7 % (ref 34–46.6)
HCT VFR BLD AUTO: 38.1 % (ref 34–46.6)
HCT VFR BLD AUTO: 38.2 % (ref 34–46.6)
HCT VFR BLD AUTO: 38.6 % (ref 34–45)
HCT VFR BLD AUTO: 38.8 % (ref 34–46.6)
HCT VFR BLD AUTO: 39.7 % (ref 34–46.6)
HCT VFR BLD AUTO: 40.3 % (ref 34–46.6)
HCT VFR BLD AUTO: 42.3 % (ref 34–46.6)
HCT VFR BLD AUTO: 46.8 % (ref 34–46.6)
HGB BLD-MCNC: 10.2 G/DL (ref 12–15.9)
HGB BLD-MCNC: 11.2 G/DL (ref 12–15.9)
HGB BLD-MCNC: 11.3 G/DL (ref 12–15.9)
HGB BLD-MCNC: 11.9 G/DL (ref 12–15.9)
HGB BLD-MCNC: 12 G/DL (ref 12–15.9)
HGB BLD-MCNC: 12 G/DL (ref 12–15.9)
HGB BLD-MCNC: 12.1 G/DL (ref 12–15.9)
HGB BLD-MCNC: 12.2 G/DL (ref 12–15.9)
HGB BLD-MCNC: 12.3 G/DL (ref 12–15.9)
HGB BLD-MCNC: 12.4 G/DL (ref 12–15.9)
HGB BLD-MCNC: 12.4 G/DL (ref 12–15.9)
HGB BLD-MCNC: 12.8 G/DL (ref 12–15.9)
HGB BLD-MCNC: 12.9 G/DL (ref 12–15.9)
HGB BLD-MCNC: 13.1 G/DL (ref 11.5–14.9)
HGB BLD-MCNC: 13.9 G/DL (ref 12–15.9)
HGB BLD-MCNC: 15.5 G/DL (ref 12–15.9)
HGB UR QL STRIP.AUTO: NEGATIVE
HOLD SPECIMEN: NORMAL
HOLD SPECIMEN: NORMAL
HOROWITZ INDEX BLD+IHG-RTO: 100 %
HOROWITZ INDEX BLD+IHG-RTO: 100 %
HOROWITZ INDEX BLD+IHG-RTO: 60 %
HYALINE CASTS UR QL AUTO: ABNORMAL /LPF
IGA1 MFR SER: 127 MG/DL (ref 70–400)
IGA1 MFR SER: 141 MG/DL (ref 70–400)
IGA1 MFR SER: 160 MG/DL (ref 70–400)
IGG1 SER-MCNC: 622 MG/DL (ref 700–1600)
IGG1 SER-MCNC: 672 MG/DL (ref 700–1600)
IGG1 SER-MCNC: 777 MG/DL (ref 700–1600)
IGM SERPL-MCNC: 57 MG/DL (ref 40–230)
IGM SERPL-MCNC: 59 MG/DL (ref 40–230)
IGM SERPL-MCNC: 64 MG/DL (ref 40–230)
IMM GRANULOCYTES # BLD AUTO: 0.01 10*3/MM3 (ref 0–0.03)
IMM GRANULOCYTES # BLD AUTO: 0.02 10*3/MM3 (ref 0–0.05)
IMM GRANULOCYTES # BLD AUTO: 0.02 10*3/MM3 (ref 0–0.05)
IMM GRANULOCYTES # BLD AUTO: 0.04 10*3/MM3 (ref 0–0.05)
IMM GRANULOCYTES # BLD AUTO: 0.05 10*3/MM3 (ref 0–0.05)
IMM GRANULOCYTES # BLD AUTO: 0.05 10*3/MM3 (ref 0–0.05)
IMM GRANULOCYTES # BLD AUTO: 0.07 10*3/MM3 (ref 0–0.05)
IMM GRANULOCYTES # BLD AUTO: 0.08 10*3/MM3 (ref 0–0.05)
IMM GRANULOCYTES # BLD AUTO: 0.08 10*3/MM3 (ref 0–0.05)
IMM GRANULOCYTES # BLD AUTO: 0.1 10*3/MM3 (ref 0–0.05)
IMM GRANULOCYTES # BLD AUTO: 0.1 10*3/MM3 (ref 0–0.05)
IMM GRANULOCYTES # BLD AUTO: 0.14 10*3/MM3 (ref 0–0.05)
IMM GRANULOCYTES NFR BLD AUTO: 0.1 % (ref 0–0.5)
IMM GRANULOCYTES NFR BLD AUTO: 0.2 % (ref 0–0.5)
IMM GRANULOCYTES NFR BLD AUTO: 0.2 % (ref 0–0.5)
IMM GRANULOCYTES NFR BLD AUTO: 0.3 % (ref 0–0.5)
IMM GRANULOCYTES NFR BLD AUTO: 0.4 % (ref 0–0.5)
IMM GRANULOCYTES NFR BLD AUTO: 0.4 % (ref 0–0.5)
IMM GRANULOCYTES NFR BLD AUTO: 0.5 % (ref 0–0.5)
IMM GRANULOCYTES NFR BLD AUTO: 0.5 % (ref 0–0.5)
IMM GRANULOCYTES NFR BLD AUTO: 0.6 % (ref 0–0.5)
IMM GRANULOCYTES NFR BLD AUTO: 0.6 % (ref 0–0.5)
IMM GRANULOCYTES NFR BLD AUTO: 0.7 % (ref 0–0.5)
IMM GRANULOCYTES NFR BLD AUTO: 0.7 % (ref 0–0.5)
IMM GRANULOCYTES NFR BLD AUTO: 1 % (ref 0–0.5)
IMM GRANULOCYTES NFR BLD AUTO: 1 % (ref 0–0.5)
IMM GRANULOCYTES NFR BLD AUTO: 1.1 % (ref 0–0.5)
IMM GRANULOCYTES NFR BLD AUTO: 1.3 % (ref 0–0.5)
KETONES UR QL STRIP: NEGATIVE
L PNEUMO1 AG UR QL IA: NEGATIVE
LEUKOCYTE ESTERASE UR QL STRIP.AUTO: ABNORMAL
LIPASE SERPL-CCNC: 8 U/L (ref 13–60)
LIPASE SERPL-CCNC: 8 U/L (ref 13–60)
LYMPHOCYTES # BLD AUTO: 0.16 10*3/MM3 (ref 0.7–3.1)
LYMPHOCYTES # BLD AUTO: 0.32 10*3/MM3 (ref 0.7–3.1)
LYMPHOCYTES # BLD AUTO: 0.35 10*3/MM3 (ref 0.7–3.1)
LYMPHOCYTES # BLD AUTO: 0.38 10*3/MM3 (ref 0.7–3.1)
LYMPHOCYTES # BLD AUTO: 0.38 10*3/MM3 (ref 0.7–3.1)
LYMPHOCYTES # BLD AUTO: 0.39 10*3/MM3 (ref 0.7–3.1)
LYMPHOCYTES # BLD AUTO: 0.41 10*3/MM3 (ref 0.7–3.1)
LYMPHOCYTES # BLD AUTO: 0.42 10*3/MM3 (ref 0.7–3.1)
LYMPHOCYTES # BLD AUTO: 0.56 10*3/MM3 (ref 0.7–3.1)
LYMPHOCYTES # BLD AUTO: 0.59 10*3/MM3 (ref 0.7–3.1)
LYMPHOCYTES # BLD AUTO: 0.59 10*3/MM3 (ref 0.7–3.1)
LYMPHOCYTES # BLD AUTO: 0.7 10*3/MM3 (ref 0.7–3.1)
LYMPHOCYTES # BLD AUTO: 0.73 10*3/MM3 (ref 0.7–3.1)
LYMPHOCYTES # BLD AUTO: 0.77 10*3/MM3 (ref 0.7–3.1)
LYMPHOCYTES # BLD AUTO: 0.81 10*3/MM3 (ref 1–3.5)
LYMPHOCYTES # BLD AUTO: 0.85 10*3/MM3 (ref 0.7–3.1)
LYMPHOCYTES NFR BLD AUTO: 1.8 % (ref 19.6–45.3)
LYMPHOCYTES NFR BLD AUTO: 10.2 % (ref 19.6–45.3)
LYMPHOCYTES NFR BLD AUTO: 12.1 % (ref 20–49)
LYMPHOCYTES NFR BLD AUTO: 2.6 % (ref 19.6–45.3)
LYMPHOCYTES NFR BLD AUTO: 3.2 % (ref 19.6–45.3)
LYMPHOCYTES NFR BLD AUTO: 3.3 % (ref 19.6–45.3)
LYMPHOCYTES NFR BLD AUTO: 3.6 % (ref 19.6–45.3)
LYMPHOCYTES NFR BLD AUTO: 4.6 % (ref 19.6–45.3)
LYMPHOCYTES NFR BLD AUTO: 5 % (ref 19.6–45.3)
LYMPHOCYTES NFR BLD AUTO: 5 % (ref 19.6–45.3)
LYMPHOCYTES NFR BLD AUTO: 5.7 % (ref 19.6–45.3)
LYMPHOCYTES NFR BLD AUTO: 6.1 % (ref 19.6–45.3)
LYMPHOCYTES NFR BLD AUTO: 7 % (ref 19.6–45.3)
LYMPHOCYTES NFR BLD AUTO: 9.1 % (ref 19.6–45.3)
LYMPHOCYTES NFR BLD AUTO: 9.4 % (ref 19.6–45.3)
LYMPHOCYTES NFR BLD AUTO: 9.4 % (ref 19.6–45.3)
MAGNESIUM SERPL-MCNC: 1.7 MG/DL (ref 1.6–2.4)
MAGNESIUM SERPL-MCNC: 1.8 MG/DL (ref 1.6–2.4)
MAGNESIUM SERPL-MCNC: 1.9 MG/DL (ref 1.6–2.4)
MAGNESIUM SERPL-MCNC: 2 MG/DL (ref 1.6–2.4)
MAGNESIUM SERPL-MCNC: 2 MG/DL (ref 1.6–2.4)
MAGNESIUM SERPL-MCNC: 2.1 MG/DL (ref 1.6–2.4)
MAGNESIUM SERPL-MCNC: 2.2 MG/DL (ref 1.6–2.4)
MCH RBC QN AUTO: 27.7 PG (ref 26.6–33)
MCH RBC QN AUTO: 27.8 PG (ref 26.6–33)
MCH RBC QN AUTO: 27.9 PG (ref 26.6–33)
MCH RBC QN AUTO: 28 PG (ref 26.6–33)
MCH RBC QN AUTO: 28.2 PG (ref 26.6–33)
MCH RBC QN AUTO: 28.3 PG (ref 26.6–33)
MCH RBC QN AUTO: 28.3 PG (ref 26.6–33)
MCH RBC QN AUTO: 28.4 PG (ref 26.6–33)
MCH RBC QN AUTO: 28.5 PG (ref 26.6–33)
MCH RBC QN AUTO: 28.5 PG (ref 26.6–33)
MCH RBC QN AUTO: 28.7 PG (ref 26.6–33)
MCH RBC QN AUTO: 28.7 PG (ref 27–33)
MCH RBC QN AUTO: 28.9 PG (ref 26.6–33)
MCH RBC QN AUTO: 28.9 PG (ref 26.6–33)
MCHC RBC AUTO-ENTMCNC: 31.4 G/DL (ref 31.5–35.7)
MCHC RBC AUTO-ENTMCNC: 31.6 G/DL (ref 31.5–35.7)
MCHC RBC AUTO-ENTMCNC: 31.7 G/DL (ref 31.5–35.7)
MCHC RBC AUTO-ENTMCNC: 31.7 G/DL (ref 31.5–35.7)
MCHC RBC AUTO-ENTMCNC: 31.8 G/DL (ref 31.5–35.7)
MCHC RBC AUTO-ENTMCNC: 31.8 G/DL (ref 31.5–35.7)
MCHC RBC AUTO-ENTMCNC: 32 G/DL (ref 31.5–35.7)
MCHC RBC AUTO-ENTMCNC: 32.2 G/DL (ref 31.5–35.7)
MCHC RBC AUTO-ENTMCNC: 32.5 G/DL (ref 31.5–35.7)
MCHC RBC AUTO-ENTMCNC: 32.9 G/DL (ref 31.5–35.7)
MCHC RBC AUTO-ENTMCNC: 32.9 G/DL (ref 31.5–35.7)
MCHC RBC AUTO-ENTMCNC: 33 G/DL (ref 31.5–35.7)
MCHC RBC AUTO-ENTMCNC: 33.1 G/DL (ref 31.5–35.7)
MCHC RBC AUTO-ENTMCNC: 33.1 G/DL (ref 31.5–35.7)
MCHC RBC AUTO-ENTMCNC: 33.2 G/DL (ref 31.5–35.7)
MCHC RBC AUTO-ENTMCNC: 33.9 G/DL (ref 32–35)
MCV RBC AUTO: 84.5 FL (ref 83–97)
MCV RBC AUTO: 84.8 FL (ref 79–97)
MCV RBC AUTO: 86 FL (ref 79–97)
MCV RBC AUTO: 86.3 FL (ref 79–97)
MCV RBC AUTO: 86.3 FL (ref 79–97)
MCV RBC AUTO: 86.9 FL (ref 79–97)
MCV RBC AUTO: 87.2 FL (ref 79–97)
MCV RBC AUTO: 87.4 FL (ref 79–97)
MCV RBC AUTO: 87.6 FL (ref 79–97)
MCV RBC AUTO: 87.6 FL (ref 79–97)
MCV RBC AUTO: 88.2 FL (ref 79–97)
MCV RBC AUTO: 88.3 FL (ref 79–97)
MCV RBC AUTO: 88.4 FL (ref 79–97)
MCV RBC AUTO: 89 FL (ref 79–97)
MCV RBC AUTO: 89 FL (ref 79–97)
MCV RBC AUTO: 89.1 FL (ref 79–97)
MODALITY: ABNORMAL
MODALITY: ABNORMAL
MODALITY: NORMAL
MONOCYTES # BLD AUTO: 0.38 10*3/MM3 (ref 0.1–0.9)
MONOCYTES # BLD AUTO: 0.4 10*3/MM3 (ref 0.1–0.9)
MONOCYTES # BLD AUTO: 0.41 10*3/MM3 (ref 0.1–0.9)
MONOCYTES # BLD AUTO: 0.42 10*3/MM3 (ref 0.1–0.9)
MONOCYTES # BLD AUTO: 0.42 10*3/MM3 (ref 0.1–0.9)
MONOCYTES # BLD AUTO: 0.49 10*3/MM3 (ref 0.1–0.9)
MONOCYTES # BLD AUTO: 0.53 10*3/MM3 (ref 0.1–0.9)
MONOCYTES # BLD AUTO: 0.57 10*3/MM3 (ref 0.1–0.9)
MONOCYTES # BLD AUTO: 0.75 10*3/MM3 (ref 0.25–0.8)
MONOCYTES # BLD AUTO: 0.76 10*3/MM3 (ref 0.1–0.9)
MONOCYTES # BLD AUTO: 0.8 10*3/MM3 (ref 0.1–0.9)
MONOCYTES # BLD AUTO: 0.83 10*3/MM3 (ref 0.1–0.9)
MONOCYTES # BLD AUTO: 0.9 10*3/MM3 (ref 0.1–0.9)
MONOCYTES # BLD AUTO: 1.01 10*3/MM3 (ref 0.1–0.9)
MONOCYTES # BLD AUTO: 1.12 10*3/MM3 (ref 0.1–0.9)
MONOCYTES # BLD AUTO: 1.15 10*3/MM3 (ref 0.1–0.9)
MONOCYTES NFR BLD AUTO: 11.2 % (ref 4–12)
MONOCYTES NFR BLD AUTO: 11.6 % (ref 5–12)
MONOCYTES NFR BLD AUTO: 12.1 % (ref 5–12)
MONOCYTES NFR BLD AUTO: 12.1 % (ref 5–12)
MONOCYTES NFR BLD AUTO: 3.8 % (ref 5–12)
MONOCYTES NFR BLD AUTO: 4.3 % (ref 5–12)
MONOCYTES NFR BLD AUTO: 4.3 % (ref 5–12)
MONOCYTES NFR BLD AUTO: 4.8 % (ref 5–12)
MONOCYTES NFR BLD AUTO: 5.4 % (ref 5–12)
MONOCYTES NFR BLD AUTO: 5.4 % (ref 5–12)
MONOCYTES NFR BLD AUTO: 6.2 % (ref 5–12)
MONOCYTES NFR BLD AUTO: 7 % (ref 5–12)
MONOCYTES NFR BLD AUTO: 7.8 % (ref 5–12)
MONOCYTES NFR BLD AUTO: 7.8 % (ref 5–12)
MONOCYTES NFR BLD AUTO: 8.6 % (ref 5–12)
MONOCYTES NFR BLD AUTO: 9.2 % (ref 5–12)
NEUTROPHILS # BLD AUTO: 10.02 10*3/MM3 (ref 1.7–7)
NEUTROPHILS # BLD AUTO: 10.13 10*3/MM3 (ref 1.4–7)
NEUTROPHILS # BLD AUTO: 11.36 10*3/MM3 (ref 1.4–7)
NEUTROPHILS # BLD AUTO: 13.14 10*3/MM3 (ref 1.4–7)
NEUTROPHILS # BLD AUTO: 5.04 10*3/MM3 (ref 1.4–7)
NEUTROPHILS # BLD AUTO: 5.05 10*3/MM3 (ref 1.5–7)
NEUTROPHILS # BLD AUTO: 5.48 10*3/MM3 (ref 1.7–7)
NEUTROPHILS # BLD AUTO: 5.79 10*3/MM3 (ref 1.7–7)
NEUTROPHILS # BLD AUTO: 6.65 10*3/MM3 (ref 1.4–7)
NEUTROPHILS # BLD AUTO: 6.74 10*3/MM3 (ref 1.4–7)
NEUTROPHILS # BLD AUTO: 7.24 10*3/MM3 (ref 1.7–7)
NEUTROPHILS # BLD AUTO: 7.5 10*3/MM3 (ref 1.4–7)
NEUTROPHILS # BLD AUTO: 8.04 10*3/MM3 (ref 1.4–7)
NEUTROPHILS # BLD AUTO: 8.06 10*3/MM3 (ref 1.4–7)
NEUTROPHILS # BLD AUTO: 8.35 10*3/MM3 (ref 1.4–7)
NEUTROPHILS # BLD AUTO: 8.76 10*3/MM3 (ref 1.7–7)
NEUTROPHILS NFR BLD AUTO: 75.9 % (ref 39–75)
NEUTROPHILS NFR BLD AUTO: 76.5 % (ref 42.7–76)
NEUTROPHILS NFR BLD AUTO: 77.8 % (ref 42.7–76)
NEUTROPHILS NFR BLD AUTO: 78 % (ref 42.7–76)
NEUTROPHILS NFR BLD AUTO: 80.9 % (ref 42.7–76)
NEUTROPHILS NFR BLD AUTO: 84.5 % (ref 42.7–76)
NEUTROPHILS NFR BLD AUTO: 85.3 % (ref 42.7–76)
NEUTROPHILS NFR BLD AUTO: 85.8 % (ref 42.7–76)
NEUTROPHILS NFR BLD AUTO: 87.2 % (ref 42.7–76)
NEUTROPHILS NFR BLD AUTO: 88.8 % (ref 42.7–76)
NEUTROPHILS NFR BLD AUTO: 89.1 % (ref 42.7–76)
NEUTROPHILS NFR BLD AUTO: 89.9 % (ref 42.7–76)
NEUTROPHILS NFR BLD AUTO: 91 % (ref 42.7–76)
NEUTROPHILS NFR BLD AUTO: 91.4 % (ref 42.7–76)
NEUTROPHILS NFR BLD AUTO: 92.1 % (ref 42.7–76)
NEUTROPHILS NFR BLD AUTO: 92.5 % (ref 42.7–76)
NITRITE UR QL STRIP: NEGATIVE
NRBC BLD AUTO-RTO: 0 /100 WBC (ref 0–0)
NRBC BLD AUTO-RTO: 0 /100 WBC (ref 0–0.2)
NRBC BLD AUTO-RTO: 0.1 /100 WBC (ref 0–0.2)
O2 A-A PPRESDIFF RESPIRATORY: 0.1 MMHG
O2 A-A PPRESDIFF RESPIRATORY: 0.2 MMHG
O2 A-A PPRESDIFF RESPIRATORY: 0.6 MMHG
OSMOLALITY UR: 349 MOSM/KG
PCO2 BLDA: 37.2 MM HG (ref 35–45)
PCO2 BLDA: 42.7 MM HG (ref 35–45)
PCO2 BLDA: 81.7 MM HG (ref 35–45)
PEEP RESPIRATORY: 5 CM[H2O]
PH BLDA: 7 PH UNITS (ref 7.35–7.45)
PH BLDA: 7.44 PH UNITS (ref 7.35–7.45)
PH BLDA: 7.46 PH UNITS (ref 7.35–7.45)
PH UR STRIP.AUTO: 7 [PH] (ref 5–8)
PHOSPHATE SERPL-MCNC: 1.7 MG/DL (ref 2.5–4.5)
PHOSPHATE SERPL-MCNC: 2 MG/DL (ref 2.5–4.5)
PHOSPHATE SERPL-MCNC: 2.5 MG/DL (ref 2.5–4.5)
PHOSPHATE SERPL-MCNC: 2.8 MG/DL (ref 2.5–4.5)
PHOSPHATE SERPL-MCNC: 2.9 MG/DL (ref 2.5–4.5)
PHOSPHATE SERPL-MCNC: 3.1 MG/DL (ref 2.5–4.5)
PHOSPHATE SERPL-MCNC: 3.1 MG/DL (ref 2.5–4.5)
PHOSPHATE SERPL-MCNC: 3.7 MG/DL (ref 2.5–4.5)
PHOSPHATE SERPL-MCNC: 3.8 MG/DL (ref 2.5–4.5)
PLATELET # BLD AUTO: 121 10*3/MM3 (ref 140–450)
PLATELET # BLD AUTO: 152 10*3/MM3 (ref 140–450)
PLATELET # BLD AUTO: 164 10*3/MM3 (ref 140–450)
PLATELET # BLD AUTO: 176 10*3/MM3 (ref 140–450)
PLATELET # BLD AUTO: 177 10*3/MM3 (ref 140–450)
PLATELET # BLD AUTO: 187 10*3/MM3 (ref 140–450)
PLATELET # BLD AUTO: 188 10*3/MM3 (ref 140–450)
PLATELET # BLD AUTO: 195 10*3/MM3 (ref 140–450)
PLATELET # BLD AUTO: 209 10*3/MM3 (ref 150–375)
PLATELET # BLD AUTO: 213 10*3/MM3 (ref 140–450)
PLATELET # BLD AUTO: 215 10*3/MM3 (ref 140–450)
PLATELET # BLD AUTO: 224 10*3/MM3 (ref 140–450)
PLATELET # BLD AUTO: 226 10*3/MM3 (ref 140–450)
PLATELET # BLD AUTO: 243 10*3/MM3 (ref 140–450)
PLATELET # BLD AUTO: 264 10*3/MM3 (ref 140–450)
PLATELET # BLD AUTO: 337 10*3/MM3 (ref 140–450)
PMV BLD AUTO: 10 FL (ref 6–12)
PMV BLD AUTO: 10.2 FL (ref 6–12)
PMV BLD AUTO: 10.3 FL (ref 6–12)
PMV BLD AUTO: 10.3 FL (ref 6–12)
PMV BLD AUTO: 10.5 FL (ref 6–12)
PMV BLD AUTO: 10.5 FL (ref 6–12)
PMV BLD AUTO: 10.7 FL (ref 6–12)
PMV BLD AUTO: 10.9 FL (ref 6–12)
PMV BLD AUTO: 11.3 FL (ref 6–12)
PMV BLD AUTO: 8.2 FL (ref 6–12)
PMV BLD AUTO: 9 FL (ref 8.9–12.1)
PMV BLD AUTO: 9.1 FL (ref 6–12)
PMV BLD AUTO: 9.2 FL (ref 6–12)
PMV BLD AUTO: 9.4 FL (ref 6–12)
PMV BLD AUTO: 9.8 FL (ref 6–12)
PMV BLD AUTO: 9.9 FL (ref 6–12)
PO2 BLDA: 370 MM HG (ref 80–100)
PO2 BLDA: 62.7 MM HG (ref 80–100)
PO2 BLDA: 82 MM HG (ref 80–100)
POTASSIUM BLD-SCNC: 2.8 MMOL/L (ref 3.5–5.2)
POTASSIUM BLD-SCNC: 3.2 MMOL/L (ref 3.5–5.2)
POTASSIUM BLD-SCNC: 3.3 MMOL/L (ref 3.5–5.2)
POTASSIUM BLD-SCNC: 3.3 MMOL/L (ref 3.5–5.2)
POTASSIUM BLD-SCNC: 3.5 MMOL/L (ref 3.5–5.2)
POTASSIUM BLD-SCNC: 3.6 MMOL/L (ref 3.5–5.2)
POTASSIUM BLD-SCNC: 3.7 MMOL/L (ref 3.5–5.2)
POTASSIUM BLD-SCNC: 3.8 MMOL/L (ref 3.5–5.2)
POTASSIUM BLD-SCNC: 3.9 MMOL/L (ref 3.5–5.2)
POTASSIUM BLD-SCNC: 4 MMOL/L (ref 3.5–5.2)
POTASSIUM BLD-SCNC: 4.1 MMOL/L (ref 3.5–5.2)
POTASSIUM BLD-SCNC: 4.2 MMOL/L (ref 3.5–5.2)
POTASSIUM BLD-SCNC: 4.3 MMOL/L (ref 3.5–5.2)
POTASSIUM BLD-SCNC: 4.5 MMOL/L (ref 3.5–5.2)
POTASSIUM BLD-SCNC: 4.6 MMOL/L (ref 3.5–5.2)
POTASSIUM BLD-SCNC: 4.7 MMOL/L (ref 3.5–5.2)
PROCALCITONIN SERPL-MCNC: 0.15 NG/ML (ref 0.1–0.25)
PROCALCITONIN SERPL-MCNC: 0.16 NG/ML (ref 0.1–0.25)
PROT SERPL-MCNC: 5.9 G/DL (ref 6–8.5)
PROT SERPL-MCNC: 6.3 G/DL (ref 6–8.5)
PROT SERPL-MCNC: 7.5 G/DL (ref 6–8.5)
PROT SERPL-MCNC: 7.6 G/DL (ref 6–8.5)
PROT UR QL STRIP: NEGATIVE
RBC # BLD AUTO: 3.58 10*6/MM3 (ref 3.77–5.28)
RBC # BLD AUTO: 3.87 10*6/MM3 (ref 3.77–5.28)
RBC # BLD AUTO: 4.01 10*6/MM3 (ref 3.77–5.28)
RBC # BLD AUTO: 4.27 10*6/MM3 (ref 3.77–5.28)
RBC # BLD AUTO: 4.28 10*6/MM3 (ref 3.77–5.28)
RBC # BLD AUTO: 4.29 10*6/MM3 (ref 3.77–5.28)
RBC # BLD AUTO: 4.32 10*6/MM3 (ref 3.77–5.28)
RBC # BLD AUTO: 4.33 10*6/MM3 (ref 3.77–5.28)
RBC # BLD AUTO: 4.35 10*6/MM3 (ref 3.77–5.28)
RBC # BLD AUTO: 4.37 10*6/MM3 (ref 3.77–5.28)
RBC # BLD AUTO: 4.39 10*6/MM3 (ref 3.77–5.28)
RBC # BLD AUTO: 4.53 10*6/MM3 (ref 3.77–5.28)
RBC # BLD AUTO: 4.57 10*6/MM3 (ref 3.9–5)
RBC # BLD AUTO: 4.61 10*6/MM3 (ref 3.77–5.28)
RBC # BLD AUTO: 4.92 10*6/MM3 (ref 3.77–5.28)
RBC # BLD AUTO: 5.37 10*6/MM3 (ref 3.77–5.28)
RBC # UR: ABNORMAL /HPF
REF LAB TEST METHOD: ABNORMAL
RH BLD: POSITIVE
S PNEUM AG SPEC QL LA: POSITIVE
SAO2 % BLDCOA: 92.6 % (ref 92–99)
SAO2 % BLDCOA: 96.5 % (ref 92–99)
SAO2 % BLDCOA: 99.8 % (ref 92–99)
SET MECH RESP RATE: 16
SET MECH RESP RATE: 20
SET MECH RESP RATE: 28
SODIUM BLD-SCNC: 130 MMOL/L (ref 136–145)
SODIUM BLD-SCNC: 133 MMOL/L (ref 136–145)
SODIUM BLD-SCNC: 133 MMOL/L (ref 136–145)
SODIUM BLD-SCNC: 135 MMOL/L (ref 136–145)
SODIUM BLD-SCNC: 136 MMOL/L (ref 136–145)
SODIUM BLD-SCNC: 137 MMOL/L (ref 136–145)
SODIUM BLD-SCNC: 138 MMOL/L (ref 136–145)
SODIUM BLD-SCNC: 139 MMOL/L (ref 136–145)
SODIUM BLD-SCNC: 142 MMOL/L (ref 136–145)
SODIUM BLD-SCNC: 143 MMOL/L (ref 136–145)
SODIUM BLD-SCNC: 144 MMOL/L (ref 136–145)
SODIUM UR-SCNC: 43 MMOL/L
SP GR UR STRIP: 1.02 (ref 1–1.03)
SQUAMOUS #/AREA URNS HPF: ABNORMAL /HPF
T&S EXPIRATION DATE: NORMAL
TOTAL RATE: 16 BREATHS/MINUTE
TOTAL RATE: 20 BREATHS/MINUTE
TOTAL RATE: 28 BREATHS/MINUTE
TRIGL SERPL-MCNC: 69 MG/DL (ref 0–150)
TROPONIN T SERPL-MCNC: <0.01 NG/ML (ref 0–0.03)
UROBILINOGEN UR QL STRIP: ABNORMAL
VENTILATOR MODE: ABNORMAL
VT ON VENT VENT: 450 ML
WBC # BLD AUTO: 7.57 10*3/MM3 (ref 3.4–10.8)
WBC NRBC COR # BLD: 10.27 10*3/MM3 (ref 3.4–10.8)
WBC NRBC COR # BLD: 11 10*3/MM3 (ref 3.4–10.8)
WBC NRBC COR # BLD: 11.69 10*3/MM3 (ref 3.4–10.8)
WBC NRBC COR # BLD: 12.47 10*3/MM3 (ref 3.4–10.8)
WBC NRBC COR # BLD: 14.8 10*3/MM3 (ref 3.4–10.8)
WBC NRBC COR # BLD: 5.97 10*3/MM3 (ref 3.4–10.8)
WBC NRBC COR # BLD: 6.67 10*3/MM3 (ref 4–10)
WBC NRBC COR # BLD: 7.16 10*3/MM3 (ref 3.4–10.8)
WBC NRBC COR # BLD: 7.45 10*3/MM3 (ref 3.4–10.8)
WBC NRBC COR # BLD: 7.57 10*3/MM3 (ref 3.4–10.8)
WBC NRBC COR # BLD: 8.23 10*3/MM3 (ref 3.4–10.8)
WBC NRBC COR # BLD: 8.34 10*3/MM3 (ref 3.4–10.8)
WBC NRBC COR # BLD: 8.82 10*3/MM3 (ref 3.4–10.8)
WBC NRBC COR # BLD: 9.03 10*3/MM3 (ref 3.4–10.8)
WBC NRBC COR # BLD: 9.22 10*3/MM3 (ref 3.4–10.8)
WBC NRBC COR # BLD: 9.29 10*3/MM3 (ref 3.4–10.8)
WBC UR QL AUTO: ABNORMAL /HPF
WHOLE BLOOD HOLD SPECIMEN: NORMAL
WHOLE BLOOD HOLD SPECIMEN: NORMAL

## 2019-01-01 PROCEDURE — 25010000002 POTASSIUM CHLORIDE PER 2 MEQ OF POTASSIUM: Performed by: SURGERY

## 2019-01-01 PROCEDURE — 86901 BLOOD TYPING SEROLOGIC RH(D): CPT | Performed by: ANESTHESIOLOGY

## 2019-01-01 PROCEDURE — 63710000001 DIPHENHYDRAMINE PER 50 MG: Performed by: NURSE PRACTITIONER

## 2019-01-01 PROCEDURE — 85025 COMPLETE CBC W/AUTO DIFF WBC: CPT | Performed by: INTERNAL MEDICINE

## 2019-01-01 PROCEDURE — 87899 AGENT NOS ASSAY W/OPTIC: CPT | Performed by: INTERNAL MEDICINE

## 2019-01-01 PROCEDURE — 94799 UNLISTED PULMONARY SVC/PX: CPT

## 2019-01-01 PROCEDURE — 25010000002 IOPAMIDOL 61 % SOLUTION: Performed by: SURGERY

## 2019-01-01 PROCEDURE — 74018 RADEX ABDOMEN 1 VIEW: CPT

## 2019-01-01 PROCEDURE — 80048 BASIC METABOLIC PNL TOTAL CA: CPT | Performed by: SURGERY

## 2019-01-01 PROCEDURE — 25010000002 ENOXAPARIN PER 10 MG: Performed by: INTERNAL MEDICINE

## 2019-01-01 PROCEDURE — 80053 COMPREHEN METABOLIC PANEL: CPT | Performed by: SURGERY

## 2019-01-01 PROCEDURE — 82962 GLUCOSE BLOOD TEST: CPT

## 2019-01-01 PROCEDURE — G0378 HOSPITAL OBSERVATION PER HR: HCPCS

## 2019-01-01 PROCEDURE — 63710000001 PREDNISONE PER 1 MG: Performed by: INTERNAL MEDICINE

## 2019-01-01 PROCEDURE — 25010000002 ONDANSETRON PER 1 MG: Performed by: SURGERY

## 2019-01-01 PROCEDURE — 84100 ASSAY OF PHOSPHORUS: CPT | Performed by: INTERNAL MEDICINE

## 2019-01-01 PROCEDURE — 25010000003 POTASSIUM CHLORIDE 10 MEQ/100ML SOLUTION: Performed by: INTERNAL MEDICINE

## 2019-01-01 PROCEDURE — C1769 GUIDE WIRE: HCPCS | Performed by: SURGERY

## 2019-01-01 PROCEDURE — 25010000002 IMMUNE GLOBULIN (HUMAN) 5 GM/50ML SOLUTION: Performed by: NURSE PRACTITIONER

## 2019-01-01 PROCEDURE — 96366 THER/PROPH/DIAG IV INF ADDON: CPT | Performed by: NURSE PRACTITIONER

## 2019-01-01 PROCEDURE — 84100 ASSAY OF PHOSPHORUS: CPT | Performed by: SURGERY

## 2019-01-01 PROCEDURE — 25010000002 DEXAMETHASONE PER 1 MG: Performed by: NURSE ANESTHETIST, CERTIFIED REGISTERED

## 2019-01-01 PROCEDURE — 25010000002 MORPHINE PER 10 MG: Performed by: SURGERY

## 2019-01-01 PROCEDURE — 94640 AIRWAY INHALATION TREATMENT: CPT

## 2019-01-01 PROCEDURE — 99024 POSTOP FOLLOW-UP VISIT: CPT | Performed by: SURGERY

## 2019-01-01 PROCEDURE — 71045 X-RAY EXAM CHEST 1 VIEW: CPT

## 2019-01-01 PROCEDURE — 25010000002 ONDANSETRON PER 1 MG: Performed by: INTERNAL MEDICINE

## 2019-01-01 PROCEDURE — 83735 ASSAY OF MAGNESIUM: CPT | Performed by: SURGERY

## 2019-01-01 PROCEDURE — 97110 THERAPEUTIC EXERCISES: CPT

## 2019-01-01 PROCEDURE — 49002 REOPENING OF ABDOMEN: CPT | Performed by: SURGERY

## 2019-01-01 PROCEDURE — 99214 OFFICE O/P EST MOD 30 MIN: CPT | Performed by: NURSE PRACTITIONER

## 2019-01-01 PROCEDURE — 99231 SBSQ HOSP IP/OBS SF/LOW 25: CPT | Performed by: SURGERY

## 2019-01-01 PROCEDURE — 80048 BASIC METABOLIC PNL TOTAL CA: CPT | Performed by: INTERNAL MEDICINE

## 2019-01-01 PROCEDURE — 99221 1ST HOSP IP/OBS SF/LOW 40: CPT | Performed by: SURGERY

## 2019-01-01 PROCEDURE — 82784 ASSAY IGA/IGD/IGG/IGM EACH: CPT | Performed by: INTERNAL MEDICINE

## 2019-01-01 PROCEDURE — 93010 ELECTROCARDIOGRAM REPORT: CPT | Performed by: INTERNAL MEDICINE

## 2019-01-01 PROCEDURE — 25010000002 CALCIUM GLUCONATE PER 10 ML: Performed by: SURGERY

## 2019-01-01 PROCEDURE — 25010000002 MAGNESIUM SULFATE PER 500 MG OF MAGNESIUM: Performed by: SURGERY

## 2019-01-01 PROCEDURE — 25010000002 FENTANYL CITRATE (PF) 100 MCG/2ML SOLUTION: Performed by: NURSE ANESTHETIST, CERTIFIED REGISTERED

## 2019-01-01 PROCEDURE — 25010000002 METHYLPREDNISOLONE PER 40 MG: Performed by: INTERNAL MEDICINE

## 2019-01-01 PROCEDURE — 84145 PROCALCITONIN (PCT): CPT | Performed by: INTERNAL MEDICINE

## 2019-01-01 PROCEDURE — 25010000002 HYDROMORPHONE PER 4 MG: Performed by: INTERNAL MEDICINE

## 2019-01-01 PROCEDURE — 87040 BLOOD CULTURE FOR BACTERIA: CPT | Performed by: INTERNAL MEDICINE

## 2019-01-01 PROCEDURE — 44005 FREEING OF BOWEL ADHESION: CPT | Performed by: SURGERY

## 2019-01-01 PROCEDURE — 3E0436Z INTRODUCTION OF NUTRITIONAL SUBSTANCE INTO CENTRAL VEIN, PERCUTANEOUS APPROACH: ICD-10-PCS | Performed by: SURGERY

## 2019-01-01 PROCEDURE — 74177 CT ABD & PELVIS W/CONTRAST: CPT

## 2019-01-01 PROCEDURE — 83735 ASSAY OF MAGNESIUM: CPT | Performed by: INTERNAL MEDICINE

## 2019-01-01 PROCEDURE — 25010000002: Performed by: NURSE PRACTITIONER

## 2019-01-01 PROCEDURE — 97161 PT EVAL LOW COMPLEX 20 MIN: CPT

## 2019-01-01 PROCEDURE — 71250 CT THORAX DX C-: CPT

## 2019-01-01 PROCEDURE — 82803 BLOOD GASES ANY COMBINATION: CPT

## 2019-01-01 PROCEDURE — 36600 WITHDRAWAL OF ARTERIAL BLOOD: CPT

## 2019-01-01 PROCEDURE — B410ZZZ FLUOROSCOPY OF ABDOMINAL AORTA: ICD-10-PCS | Performed by: SURGERY

## 2019-01-01 PROCEDURE — 25010000002 DIPHENHYDRAMINE PER 50 MG: Performed by: SURGERY

## 2019-01-01 PROCEDURE — 99284 EMERGENCY DEPT VISIT MOD MDM: CPT

## 2019-01-01 PROCEDURE — P9041 ALBUMIN (HUMAN),5%, 50ML: HCPCS | Performed by: NURSE ANESTHETIST, CERTIFIED REGISTERED

## 2019-01-01 PROCEDURE — 87205 SMEAR GRAM STAIN: CPT | Performed by: SURGERY

## 2019-01-01 PROCEDURE — 81001 URINALYSIS AUTO W/SCOPE: CPT | Performed by: NURSE PRACTITIONER

## 2019-01-01 PROCEDURE — 44005 FREEING OF BOWEL ADHESION: CPT | Performed by: PHYSICIAN ASSISTANT

## 2019-01-01 PROCEDURE — 25010000002 ALBUMIN HUMAN 5% PER 50 ML: Performed by: NURSE ANESTHETIST, CERTIFIED REGISTERED

## 2019-01-01 PROCEDURE — 25010000002 HYDROMORPHONE PER 4 MG: Performed by: SURGERY

## 2019-01-01 PROCEDURE — 80069 RENAL FUNCTION PANEL: CPT | Performed by: SURGERY

## 2019-01-01 PROCEDURE — 99223 1ST HOSP IP/OBS HIGH 75: CPT | Performed by: INTERNAL MEDICINE

## 2019-01-01 PROCEDURE — 87075 CULTR BACTERIA EXCEPT BLOOD: CPT | Performed by: SURGERY

## 2019-01-01 PROCEDURE — 25010000002 HYDROMORPHONE PER 4 MG: Performed by: EMERGENCY MEDICINE

## 2019-01-01 PROCEDURE — 25010000002 ONDANSETRON PER 1 MG: Performed by: EMERGENCY MEDICINE

## 2019-01-01 PROCEDURE — 96365 THER/PROPH/DIAG IV INF INIT: CPT | Performed by: NURSE PRACTITIONER

## 2019-01-01 PROCEDURE — 25010000002 HYDROMORPHONE PER 4 MG: Performed by: NURSE ANESTHETIST, CERTIFIED REGISTERED

## 2019-01-01 PROCEDURE — 85025 COMPLETE CBC W/AUTO DIFF WBC: CPT | Performed by: SURGERY

## 2019-01-01 PROCEDURE — 0DN80ZZ RELEASE SMALL INTESTINE, OPEN APPROACH: ICD-10-PCS | Performed by: SURGERY

## 2019-01-01 PROCEDURE — 25010000003 POTASSIUM CHLORIDE 10 MEQ/100ML SOLUTION: Performed by: SURGERY

## 2019-01-01 PROCEDURE — 25010000002 ONDANSETRON PER 1 MG: Performed by: NURSE ANESTHETIST, CERTIFIED REGISTERED

## 2019-01-01 PROCEDURE — 25010000002 IMMUNE GLOBULIN (HUMAN) 10 GM/100ML SOLUTION: Performed by: NURSE PRACTITIONER

## 2019-01-01 PROCEDURE — 84478 ASSAY OF TRIGLYCERIDES: CPT | Performed by: SURGERY

## 2019-01-01 PROCEDURE — 83605 ASSAY OF LACTIC ACID: CPT | Performed by: SURGERY

## 2019-01-01 PROCEDURE — 74019 RADEX ABDOMEN 2 VIEWS: CPT

## 2019-01-01 PROCEDURE — 25010000002 LORAZEPAM PER 2 MG: Performed by: INTERNAL MEDICINE

## 2019-01-01 PROCEDURE — 0WJP0ZZ INSPECTION OF GASTROINTESTINAL TRACT, OPEN APPROACH: ICD-10-PCS | Performed by: SURGERY

## 2019-01-01 PROCEDURE — 97162 PT EVAL MOD COMPLEX 30 MIN: CPT

## 2019-01-01 PROCEDURE — 93005 ELECTROCARDIOGRAM TRACING: CPT | Performed by: SURGERY

## 2019-01-01 PROCEDURE — 94660 CPAP INITIATION&MGMT: CPT

## 2019-01-01 PROCEDURE — 25010000002 ONDANSETRON PER 1 MG: Performed by: NURSE PRACTITIONER

## 2019-01-01 PROCEDURE — 25010000002 LEVOFLOXACIN PER 250 MG: Performed by: INTERNAL MEDICINE

## 2019-01-01 PROCEDURE — 99232 SBSQ HOSP IP/OBS MODERATE 35: CPT | Performed by: INTERNAL MEDICINE

## 2019-01-01 PROCEDURE — 99231 SBSQ HOSP IP/OBS SF/LOW 25: CPT | Performed by: INTERNAL MEDICINE

## 2019-01-01 PROCEDURE — 80053 COMPREHEN METABOLIC PANEL: CPT | Performed by: NURSE PRACTITIONER

## 2019-01-01 PROCEDURE — 25010000002 SUCCINYLCHOLINE PER 20 MG: Performed by: NURSE ANESTHETIST, CERTIFIED REGISTERED

## 2019-01-01 PROCEDURE — 94002 VENT MGMT INPAT INIT DAY: CPT

## 2019-01-01 PROCEDURE — 25010000002 IOPAMIDOL 61 % SOLUTION: Performed by: EMERGENCY MEDICINE

## 2019-01-01 PROCEDURE — 25010000002 HEPARIN (PORCINE) PER 1000 UNITS: Performed by: NURSE ANESTHETIST, CERTIFIED REGISTERED

## 2019-01-01 PROCEDURE — 85025 COMPLETE CBC W/AUTO DIFF WBC: CPT | Performed by: NURSE PRACTITIONER

## 2019-01-01 PROCEDURE — C1894 INTRO/SHEATH, NON-LASER: HCPCS | Performed by: SURGERY

## 2019-01-01 PROCEDURE — 85025 COMPLETE CBC W/AUTO DIFF WBC: CPT

## 2019-01-01 PROCEDURE — 25010000002 PHENYLEPHRINE PER 1 ML: Performed by: NURSE ANESTHETIST, CERTIFIED REGISTERED

## 2019-01-01 PROCEDURE — 99285 EMERGENCY DEPT VISIT HI MDM: CPT

## 2019-01-01 PROCEDURE — 99222 1ST HOSP IP/OBS MODERATE 55: CPT | Performed by: SURGERY

## 2019-01-01 PROCEDURE — 71046 X-RAY EXAM CHEST 2 VIEWS: CPT

## 2019-01-01 PROCEDURE — 93005 ELECTROCARDIOGRAM TRACING: CPT | Performed by: EMERGENCY MEDICINE

## 2019-01-01 PROCEDURE — 84132 ASSAY OF SERUM POTASSIUM: CPT | Performed by: SURGERY

## 2019-01-01 PROCEDURE — 25010000002 FENTANYL CITRATE (PF) 100 MCG/2ML SOLUTION: Performed by: ANESTHESIOLOGY

## 2019-01-01 PROCEDURE — 99232 SBSQ HOSP IP/OBS MODERATE 35: CPT | Performed by: SURGERY

## 2019-01-01 PROCEDURE — 83690 ASSAY OF LIPASE: CPT | Performed by: NURSE PRACTITIONER

## 2019-01-01 PROCEDURE — 84300 ASSAY OF URINE SODIUM: CPT | Performed by: HOSPITALIST

## 2019-01-01 PROCEDURE — 80053 COMPREHEN METABOLIC PANEL: CPT | Performed by: EMERGENCY MEDICINE

## 2019-01-01 PROCEDURE — 94618 PULMONARY STRESS TESTING: CPT

## 2019-01-01 PROCEDURE — 25010000002 IMMUNE GLOBULIN (HUMAN) 5 GM/50ML SOLUTION 50 ML VIAL: Performed by: NURSE PRACTITIONER

## 2019-01-01 PROCEDURE — 25010000002 PROMETHAZINE PER 50 MG: Performed by: SURGERY

## 2019-01-01 PROCEDURE — 74176 CT ABD & PELVIS W/O CONTRAST: CPT

## 2019-01-01 PROCEDURE — 36415 COLL VENOUS BLD VENIPUNCTURE: CPT | Performed by: INTERNAL MEDICINE

## 2019-01-01 PROCEDURE — 83935 ASSAY OF URINE OSMOLALITY: CPT | Performed by: HOSPITALIST

## 2019-01-01 PROCEDURE — 25010000002 PROPOFOL 10 MG/ML EMULSION: Performed by: NURSE ANESTHETIST, CERTIFIED REGISTERED

## 2019-01-01 PROCEDURE — 25010000002 PHENYLEPHRINE 10 MG/ML SOLUTION 5 ML VIAL: Performed by: NURSE ANESTHETIST, CERTIFIED REGISTERED

## 2019-01-01 PROCEDURE — 86900 BLOOD TYPING SEROLOGIC ABO: CPT | Performed by: ANESTHESIOLOGY

## 2019-01-01 PROCEDURE — 25010000002 HYDRALAZINE PER 20 MG: Performed by: INTERNAL MEDICINE

## 2019-01-01 PROCEDURE — 84484 ASSAY OF TROPONIN QUANT: CPT | Performed by: EMERGENCY MEDICINE

## 2019-01-01 PROCEDURE — 83690 ASSAY OF LIPASE: CPT | Performed by: EMERGENCY MEDICINE

## 2019-01-01 PROCEDURE — 0 IOPAMIDOL PER 1 ML: Performed by: SURGERY

## 2019-01-01 PROCEDURE — C1765 ADHESION BARRIER: HCPCS | Performed by: SURGERY

## 2019-01-01 PROCEDURE — 87493 C DIFF AMPLIFIED PROBE: CPT | Performed by: INTERNAL MEDICINE

## 2019-01-01 PROCEDURE — C1725 CATH, TRANSLUMIN NON-LASER: HCPCS | Performed by: SURGERY

## 2019-01-01 PROCEDURE — 86850 RBC ANTIBODY SCREEN: CPT | Performed by: ANESTHESIOLOGY

## 2019-01-01 PROCEDURE — 25010000002 HEPARIN (PORCINE) PER 1000 UNITS: Performed by: SURGERY

## 2019-01-01 PROCEDURE — 25010000002 PIPERACILLIN SOD-TAZOBACTAM PER 1 G: Performed by: SURGERY

## 2019-01-01 PROCEDURE — 99213 OFFICE O/P EST LOW 20 MIN: CPT | Performed by: NURSE PRACTITIONER

## 2019-01-01 PROCEDURE — 85025 COMPLETE CBC W/AUTO DIFF WBC: CPT | Performed by: EMERGENCY MEDICINE

## 2019-01-01 PROCEDURE — 25010000002 MORPHINE PER 10 MG: Performed by: NURSE PRACTITIONER

## 2019-01-01 PROCEDURE — 04753DZ DILATION OF SUPERIOR MESENTERIC ARTERY WITH INTRALUMINAL DEVICE, PERCUTANEOUS APPROACH: ICD-10-PCS | Performed by: SURGERY

## 2019-01-01 PROCEDURE — B414ZZZ FLUOROSCOPY OF SUPERIOR MESENTERIC ARTERY: ICD-10-PCS | Performed by: SURGERY

## 2019-01-01 PROCEDURE — 05HY33Z INSERTION OF INFUSION DEVICE INTO UPPER VEIN, PERCUTANEOUS APPROACH: ICD-10-PCS | Performed by: SURGERY

## 2019-01-01 PROCEDURE — 93970 EXTREMITY STUDY: CPT

## 2019-01-01 PROCEDURE — 84132 ASSAY OF SERUM POTASSIUM: CPT | Performed by: INTERNAL MEDICINE

## 2019-01-01 PROCEDURE — 87070 CULTURE OTHR SPECIMN AEROBIC: CPT | Performed by: SURGERY

## 2019-01-01 PROCEDURE — C1876 STENT, NON-COA/NON-COV W/DEL: HCPCS | Performed by: SURGERY

## 2019-01-01 DEVICE — CLIP LIGAT VASC HORIZON TI LG ORNG 6CT: Type: IMPLANTABLE DEVICE | Status: FUNCTIONAL

## 2019-01-01 DEVICE — IMPLANTABLE DEVICE: Type: IMPLANTABLE DEVICE | Site: ABDOMEN | Status: FUNCTIONAL

## 2019-01-01 RX ORDER — IPRATROPIUM BROMIDE AND ALBUTEROL SULFATE 2.5; .5 MG/3ML; MG/3ML
3 SOLUTION RESPIRATORY (INHALATION)
Status: DISCONTINUED | OUTPATIENT
Start: 2019-01-01 | End: 2019-01-01 | Stop reason: HOSPADM

## 2019-01-01 RX ORDER — DIPHENOXYLATE HYDROCHLORIDE AND ATROPINE SULFATE 2.5; .025 MG/1; MG/1
1 TABLET ORAL
Status: DISCONTINUED | OUTPATIENT
Start: 2019-01-01 | End: 2019-05-14 | Stop reason: HOSPADM

## 2019-01-01 RX ORDER — LORAZEPAM 2 MG/ML
2 CONCENTRATE ORAL
Status: DISCONTINUED | OUTPATIENT
Start: 2019-01-01 | End: 2019-05-14 | Stop reason: HOSPADM

## 2019-01-01 RX ORDER — PANTOPRAZOLE SODIUM 20 MG/1
20 TABLET, DELAYED RELEASE ORAL DAILY
COMMUNITY

## 2019-01-01 RX ORDER — HYDROMORPHONE HYDROCHLORIDE 1 MG/ML
0.5 INJECTION, SOLUTION INTRAMUSCULAR; INTRAVENOUS; SUBCUTANEOUS
Status: DISCONTINUED | OUTPATIENT
Start: 2019-01-01 | End: 2019-01-01

## 2019-01-01 RX ORDER — HYDROMORPHONE HCL 110MG/55ML
1.5 PATIENT CONTROLLED ANALGESIA SYRINGE INTRAVENOUS
Status: DISCONTINUED | OUTPATIENT
Start: 2019-01-01 | End: 2019-05-14 | Stop reason: HOSPADM

## 2019-01-01 RX ORDER — WOUND DRESSING ADHESIVE - LIQUID
LIQUID MISCELLANEOUS AS NEEDED
Status: DISCONTINUED | OUTPATIENT
Start: 2019-01-01 | End: 2019-01-01 | Stop reason: HOSPADM

## 2019-01-01 RX ORDER — FAMOTIDINE 10 MG/ML
20 INJECTION, SOLUTION INTRAVENOUS AS NEEDED
Status: CANCELLED | OUTPATIENT
Start: 2019-01-01

## 2019-01-01 RX ORDER — BUDESONIDE 0.5 MG/2ML
0.5 INHALANT ORAL
Status: DISCONTINUED | OUTPATIENT
Start: 2019-01-01 | End: 2019-01-01 | Stop reason: HOSPADM

## 2019-01-01 RX ORDER — POTASSIUM CHLORIDE 750 MG/1
40 CAPSULE, EXTENDED RELEASE ORAL AS NEEDED
Status: DISCONTINUED | OUTPATIENT
Start: 2019-01-01 | End: 2019-01-01

## 2019-01-01 RX ORDER — PROMETHAZINE HYDROCHLORIDE 25 MG/ML
6.25 INJECTION, SOLUTION INTRAMUSCULAR; INTRAVENOUS ONCE AS NEEDED
Status: DISCONTINUED | OUTPATIENT
Start: 2019-01-01 | End: 2019-01-01

## 2019-01-01 RX ORDER — DIPHENHYDRAMINE HCL 25 MG
25 CAPSULE ORAL ONCE
Status: COMPLETED | OUTPATIENT
Start: 2019-01-01 | End: 2019-01-01

## 2019-01-01 RX ORDER — ROCURONIUM BROMIDE 10 MG/ML
INJECTION, SOLUTION INTRAVENOUS AS NEEDED
Status: DISCONTINUED | OUTPATIENT
Start: 2019-01-01 | End: 2019-01-01 | Stop reason: SURG

## 2019-01-01 RX ORDER — ACETAMINOPHEN 160 MG/5ML
650 SOLUTION ORAL EVERY 4 HOURS PRN
Status: DISCONTINUED | OUTPATIENT
Start: 2019-01-01 | End: 2019-05-14 | Stop reason: HOSPADM

## 2019-01-01 RX ORDER — FAMOTIDINE 10 MG/ML
10 INJECTION, SOLUTION INTRAVENOUS EVERY 12 HOURS SCHEDULED
Status: DISCONTINUED | OUTPATIENT
Start: 2019-01-01 | End: 2019-01-01

## 2019-01-01 RX ORDER — PREDNISONE 10 MG/1
10 TABLET ORAL DAILY
Qty: 30 TABLET | Refills: 1 | Status: ON HOLD | OUTPATIENT
Start: 2019-01-01 | End: 2019-01-01 | Stop reason: SDUPTHER

## 2019-01-01 RX ORDER — ARFORMOTEROL TARTRATE 15 UG/2ML
15 SOLUTION RESPIRATORY (INHALATION)
Status: DISCONTINUED | OUTPATIENT
Start: 2019-01-01 | End: 2019-01-01 | Stop reason: HOSPADM

## 2019-01-01 RX ORDER — SODIUM CHLORIDE, SODIUM LACTATE, POTASSIUM CHLORIDE, CALCIUM CHLORIDE 600; 310; 30; 20 MG/100ML; MG/100ML; MG/100ML; MG/100ML
9 INJECTION, SOLUTION INTRAVENOUS CONTINUOUS
Status: DISCONTINUED | OUTPATIENT
Start: 2019-01-01 | End: 2019-01-01

## 2019-01-01 RX ORDER — HYDROMORPHONE HYDROCHLORIDE 1 MG/ML
0.25 INJECTION, SOLUTION INTRAMUSCULAR; INTRAVENOUS; SUBCUTANEOUS
Status: DISCONTINUED | OUTPATIENT
Start: 2019-01-01 | End: 2019-01-01

## 2019-01-01 RX ORDER — LIDOCAINE HYDROCHLORIDE 20 MG/ML
INJECTION, SOLUTION INFILTRATION; PERINEURAL AS NEEDED
Status: DISCONTINUED | OUTPATIENT
Start: 2019-01-01 | End: 2019-01-01 | Stop reason: SURG

## 2019-01-01 RX ORDER — FENTANYL CITRATE 50 UG/ML
INJECTION, SOLUTION INTRAMUSCULAR; INTRAVENOUS AS NEEDED
Status: DISCONTINUED | OUTPATIENT
Start: 2019-01-01 | End: 2019-01-01 | Stop reason: SURG

## 2019-01-01 RX ORDER — SERTRALINE HYDROCHLORIDE 25 MG/1
25 TABLET, FILM COATED ORAL DAILY
Status: DISCONTINUED | OUTPATIENT
Start: 2019-01-01 | End: 2019-01-01 | Stop reason: HOSPADM

## 2019-01-01 RX ORDER — SODIUM CHLORIDE 9 MG/ML
125 INJECTION, SOLUTION INTRAVENOUS CONTINUOUS
Status: DISCONTINUED | OUTPATIENT
Start: 2019-01-01 | End: 2019-01-01

## 2019-01-01 RX ORDER — PROMETHAZINE HYDROCHLORIDE 25 MG/ML
12.5 INJECTION, SOLUTION INTRAMUSCULAR; INTRAVENOUS ONCE AS NEEDED
Status: DISCONTINUED | OUTPATIENT
Start: 2019-01-01 | End: 2019-01-01

## 2019-01-01 RX ORDER — FENTANYL CITRATE 50 UG/ML
50 INJECTION, SOLUTION INTRAMUSCULAR; INTRAVENOUS
Status: DISCONTINUED | OUTPATIENT
Start: 2019-01-01 | End: 2019-01-01

## 2019-01-01 RX ORDER — DIPHENHYDRAMINE HCL 25 MG
25 CAPSULE ORAL ONCE
Status: CANCELLED | OUTPATIENT
Start: 2019-01-01

## 2019-01-01 RX ORDER — PROPOFOL 10 MG/ML
VIAL (ML) INTRAVENOUS AS NEEDED
Status: DISCONTINUED | OUTPATIENT
Start: 2019-01-01 | End: 2019-01-01 | Stop reason: SURG

## 2019-01-01 RX ORDER — SODIUM CHLORIDE 9 MG/ML
250 INJECTION, SOLUTION INTRAVENOUS ONCE
Status: COMPLETED | OUTPATIENT
Start: 2019-01-01 | End: 2019-01-01

## 2019-01-01 RX ORDER — MIDAZOLAM HYDROCHLORIDE 1 MG/ML
1 INJECTION INTRAMUSCULAR; INTRAVENOUS
Status: DISCONTINUED | OUTPATIENT
Start: 2019-01-01 | End: 2019-01-01 | Stop reason: HOSPADM

## 2019-01-01 RX ORDER — SODIUM CHLORIDE 0.9 % (FLUSH) 0.9 %
10 SYRINGE (ML) INJECTION AS NEEDED
Status: DISCONTINUED | OUTPATIENT
Start: 2019-01-01 | End: 2019-05-14 | Stop reason: HOSPADM

## 2019-01-01 RX ORDER — POTASSIUM CHLORIDE 7.45 MG/ML
10 INJECTION INTRAVENOUS
Status: DISCONTINUED | OUTPATIENT
Start: 2019-01-01 | End: 2019-01-01 | Stop reason: HOSPADM

## 2019-01-01 RX ORDER — LIDOCAINE HYDROCHLORIDE 10 MG/ML
0.5 INJECTION, SOLUTION EPIDURAL; INFILTRATION; INTRACAUDAL; PERINEURAL ONCE AS NEEDED
Status: DISCONTINUED | OUTPATIENT
Start: 2019-01-01 | End: 2019-01-01 | Stop reason: HOSPADM

## 2019-01-01 RX ORDER — MORPHINE SULFATE 20 MG/ML
10 SOLUTION ORAL
Status: DISCONTINUED | OUTPATIENT
Start: 2019-01-01 | End: 2019-05-14 | Stop reason: HOSPADM

## 2019-01-01 RX ORDER — MORPHINE SULFATE 2 MG/ML
4 INJECTION, SOLUTION INTRAMUSCULAR; INTRAVENOUS ONCE
Status: COMPLETED | OUTPATIENT
Start: 2019-01-01 | End: 2019-01-01

## 2019-01-01 RX ORDER — LEVOFLOXACIN 500 MG/1
500 TABLET, FILM COATED ORAL EVERY 24 HOURS
Qty: 3 TABLET | Refills: 0 | Status: SHIPPED | OUTPATIENT
Start: 2019-01-01 | End: 2019-01-01

## 2019-01-01 RX ORDER — SODIUM CHLORIDE 9 MG/ML
250 INJECTION, SOLUTION INTRAVENOUS ONCE
Status: CANCELLED | OUTPATIENT
Start: 2019-01-01

## 2019-01-01 RX ORDER — MORPHINE SULFATE 2 MG/ML
2 INJECTION, SOLUTION INTRAMUSCULAR; INTRAVENOUS
Status: DISCONTINUED | OUTPATIENT
Start: 2019-01-01 | End: 2019-05-14 | Stop reason: HOSPADM

## 2019-01-01 RX ORDER — PROMETHAZINE HYDROCHLORIDE 25 MG/ML
6.25 INJECTION, SOLUTION INTRAMUSCULAR; INTRAVENOUS EVERY 4 HOURS PRN
Status: DISCONTINUED | OUTPATIENT
Start: 2019-01-01 | End: 2019-01-01

## 2019-01-01 RX ORDER — LORAZEPAM 2 MG/ML
0.5 CONCENTRATE ORAL
Status: DISCONTINUED | OUTPATIENT
Start: 2019-01-01 | End: 2019-05-14 | Stop reason: HOSPADM

## 2019-01-01 RX ORDER — OXYCODONE AND ACETAMINOPHEN 7.5; 325 MG/1; MG/1
1 TABLET ORAL ONCE AS NEEDED
Status: DISCONTINUED | OUTPATIENT
Start: 2019-01-01 | End: 2019-01-01

## 2019-01-01 RX ORDER — LORAZEPAM 2 MG/ML
1 CONCENTRATE ORAL
Status: DISCONTINUED | OUTPATIENT
Start: 2019-01-01 | End: 2019-05-14 | Stop reason: HOSPADM

## 2019-01-01 RX ORDER — FAMOTIDINE 10 MG/ML
20 INJECTION, SOLUTION INTRAVENOUS EVERY 12 HOURS SCHEDULED
Status: DISCONTINUED | OUTPATIENT
Start: 2019-01-01 | End: 2019-01-01

## 2019-01-01 RX ORDER — ACETAMINOPHEN 325 MG/1
650 TABLET ORAL EVERY 6 HOURS PRN
Status: DISCONTINUED | OUTPATIENT
Start: 2019-01-01 | End: 2019-01-01 | Stop reason: HOSPADM

## 2019-01-01 RX ORDER — ACETAMINOPHEN 325 MG/1
650 TABLET ORAL ONCE
Status: COMPLETED | OUTPATIENT
Start: 2019-01-01 | End: 2019-01-01

## 2019-01-01 RX ORDER — SODIUM CHLORIDE 0.9 % (FLUSH) 0.9 %
10 SYRINGE (ML) INJECTION EVERY 12 HOURS SCHEDULED
Status: DISCONTINUED | OUTPATIENT
Start: 2019-01-01 | End: 2019-01-01

## 2019-01-01 RX ORDER — SUCCINYLCHOLINE CHLORIDE 20 MG/ML
INJECTION INTRAMUSCULAR; INTRAVENOUS AS NEEDED
Status: DISCONTINUED | OUTPATIENT
Start: 2019-01-01 | End: 2019-01-01 | Stop reason: SURG

## 2019-01-01 RX ORDER — MORPHINE SULFATE 2 MG/ML
2 INJECTION, SOLUTION INTRAMUSCULAR; INTRAVENOUS
Status: DISPENSED | OUTPATIENT
Start: 2019-01-01 | End: 2019-01-01

## 2019-01-01 RX ORDER — NALOXONE HCL 0.4 MG/ML
0.4 VIAL (ML) INJECTION
Status: DISCONTINUED | OUTPATIENT
Start: 2019-01-01 | End: 2019-01-01 | Stop reason: HOSPADM

## 2019-01-01 RX ORDER — SODIUM CHLORIDE 0.9 % (FLUSH) 0.9 %
10 SYRINGE (ML) INJECTION AS NEEDED
Status: DISCONTINUED | OUTPATIENT
Start: 2019-01-01 | End: 2019-01-01

## 2019-01-01 RX ORDER — POTASSIUM CHLORIDE 1.5 G/1.77G
40 POWDER, FOR SOLUTION ORAL AS NEEDED
Status: DISCONTINUED | OUTPATIENT
Start: 2019-01-01 | End: 2019-01-01

## 2019-01-01 RX ORDER — NALOXONE HCL 0.4 MG/ML
0.4 VIAL (ML) INJECTION
Status: DISCONTINUED | OUTPATIENT
Start: 2019-01-01 | End: 2019-01-01

## 2019-01-01 RX ORDER — ALBUMIN, HUMAN INJ 5% 5 %
SOLUTION INTRAVENOUS CONTINUOUS PRN
Status: DISCONTINUED | OUTPATIENT
Start: 2019-01-01 | End: 2019-01-01 | Stop reason: SURG

## 2019-01-01 RX ORDER — SODIUM CHLORIDE 9 MG/ML
100 INJECTION, SOLUTION INTRAVENOUS CONTINUOUS
Status: DISCONTINUED | OUTPATIENT
Start: 2019-01-01 | End: 2019-01-01

## 2019-01-01 RX ORDER — SODIUM CHLORIDE, SODIUM LACTATE, POTASSIUM CHLORIDE, CALCIUM CHLORIDE 600; 310; 30; 20 MG/100ML; MG/100ML; MG/100ML; MG/100ML
100 INJECTION, SOLUTION INTRAVENOUS CONTINUOUS
Status: DISCONTINUED | OUTPATIENT
Start: 2019-01-01 | End: 2019-01-01

## 2019-01-01 RX ORDER — LEVOFLOXACIN 5 MG/ML
500 INJECTION, SOLUTION INTRAVENOUS EVERY 24 HOURS
Status: DISCONTINUED | OUTPATIENT
Start: 2019-01-01 | End: 2019-01-01

## 2019-01-01 RX ORDER — DEXAMETHASONE SODIUM PHOSPHATE 10 MG/ML
INJECTION INTRAMUSCULAR; INTRAVENOUS AS NEEDED
Status: DISCONTINUED | OUTPATIENT
Start: 2019-01-01 | End: 2019-01-01 | Stop reason: SURG

## 2019-01-01 RX ORDER — DIPHENHYDRAMINE HYDROCHLORIDE 50 MG/ML
12.5 INJECTION INTRAMUSCULAR; INTRAVENOUS
Status: DISCONTINUED | OUTPATIENT
Start: 2019-01-01 | End: 2019-01-01

## 2019-01-01 RX ORDER — POTASSIUM CHLORIDE 7.45 MG/ML
10 INJECTION INTRAVENOUS
Status: DISCONTINUED | OUTPATIENT
Start: 2019-01-01 | End: 2019-01-01

## 2019-01-01 RX ORDER — HYDRALAZINE HYDROCHLORIDE 20 MG/ML
20 INJECTION INTRAMUSCULAR; INTRAVENOUS EVERY 6 HOURS PRN
Status: DISCONTINUED | OUTPATIENT
Start: 2019-01-01 | End: 2019-01-01 | Stop reason: HOSPADM

## 2019-01-01 RX ORDER — DIPHENHYDRAMINE HYDROCHLORIDE 50 MG/ML
50 INJECTION INTRAMUSCULAR; INTRAVENOUS AS NEEDED
Status: CANCELLED | OUTPATIENT
Start: 2019-01-01

## 2019-01-01 RX ORDER — MORPHINE SULFATE 20 MG/ML
20 SOLUTION ORAL
Status: DISCONTINUED | OUTPATIENT
Start: 2019-01-01 | End: 2019-05-14 | Stop reason: HOSPADM

## 2019-01-01 RX ORDER — METHYLPREDNISOLONE SODIUM SUCCINATE 40 MG/ML
40 INJECTION, POWDER, LYOPHILIZED, FOR SOLUTION INTRAMUSCULAR; INTRAVENOUS EVERY 12 HOURS
Status: DISCONTINUED | OUTPATIENT
Start: 2019-01-01 | End: 2019-01-01

## 2019-01-01 RX ORDER — HYDROCODONE BITARTRATE AND ACETAMINOPHEN 7.5; 325 MG/1; MG/1
1 TABLET ORAL ONCE AS NEEDED
Status: DISCONTINUED | OUTPATIENT
Start: 2019-01-01 | End: 2019-01-01

## 2019-01-01 RX ORDER — SODIUM CHLORIDE, SODIUM LACTATE, POTASSIUM CHLORIDE, CALCIUM CHLORIDE 600; 310; 30; 20 MG/100ML; MG/100ML; MG/100ML; MG/100ML
INJECTION, SOLUTION INTRAVENOUS CONTINUOUS PRN
Status: DISCONTINUED | OUTPATIENT
Start: 2019-01-01 | End: 2019-01-01 | Stop reason: SURG

## 2019-01-01 RX ORDER — LEVOFLOXACIN 500 MG/1
500 TABLET, FILM COATED ORAL EVERY 24 HOURS
Status: DISCONTINUED | OUTPATIENT
Start: 2019-01-01 | End: 2019-01-01 | Stop reason: HOSPADM

## 2019-01-01 RX ORDER — MAGNESIUM CARB/ALUMINUM HYDROX 105-160MG
30 TABLET,CHEWABLE ORAL 2 TIMES DAILY
Status: DISCONTINUED | OUTPATIENT
Start: 2019-01-01 | End: 2019-01-01

## 2019-01-01 RX ORDER — NALOXONE HCL 0.4 MG/ML
0.2 VIAL (ML) INJECTION AS NEEDED
Status: DISCONTINUED | OUTPATIENT
Start: 2019-01-01 | End: 2019-01-01

## 2019-01-01 RX ORDER — BUPIVACAINE HYDROCHLORIDE 2.5 MG/ML
INJECTION, SOLUTION EPIDURAL; INFILTRATION; INTRACAUDAL AS NEEDED
Status: DISCONTINUED | OUTPATIENT
Start: 2019-01-01 | End: 2019-01-01 | Stop reason: HOSPADM

## 2019-01-01 RX ORDER — LORAZEPAM 2 MG/ML
1 INJECTION INTRAMUSCULAR
Status: DISCONTINUED | OUTPATIENT
Start: 2019-01-01 | End: 2019-05-14 | Stop reason: HOSPADM

## 2019-01-01 RX ORDER — MEPERIDINE HYDROCHLORIDE 50 MG/ML
25 INJECTION INTRAMUSCULAR; INTRAVENOUS; SUBCUTANEOUS
Status: CANCELLED | OUTPATIENT
Start: 2019-01-01 | End: 2019-01-01

## 2019-01-01 RX ORDER — SODIUM CHLORIDE 0.9 % (FLUSH) 0.9 %
3-10 SYRINGE (ML) INJECTION AS NEEDED
Status: DISCONTINUED | OUTPATIENT
Start: 2019-01-01 | End: 2019-01-01 | Stop reason: HOSPADM

## 2019-01-01 RX ORDER — PROMETHAZINE HYDROCHLORIDE 25 MG/1
25 TABLET ORAL ONCE AS NEEDED
Status: DISCONTINUED | OUTPATIENT
Start: 2019-01-01 | End: 2019-01-01

## 2019-01-01 RX ORDER — OXYCODONE HYDROCHLORIDE AND ACETAMINOPHEN 5; 325 MG/1; MG/1
1 TABLET ORAL EVERY 4 HOURS PRN
Status: DISCONTINUED | OUTPATIENT
Start: 2019-01-01 | End: 2019-01-01 | Stop reason: HOSPADM

## 2019-01-01 RX ORDER — CEFAZOLIN SODIUM 2 G/100ML
2 INJECTION, SOLUTION INTRAVENOUS ONCE
Status: DISCONTINUED | OUTPATIENT
Start: 2019-01-01 | End: 2019-01-01 | Stop reason: HOSPADM

## 2019-01-01 RX ORDER — ONDANSETRON 2 MG/ML
4 INJECTION INTRAMUSCULAR; INTRAVENOUS ONCE
Status: COMPLETED | OUTPATIENT
Start: 2019-01-01 | End: 2019-01-01

## 2019-01-01 RX ORDER — PREDNISONE 10 MG/1
10 TABLET ORAL DAILY
Qty: 30 TABLET | Refills: 0 | Status: SHIPPED | OUTPATIENT
Start: 2019-01-01 | End: 2019-01-01 | Stop reason: SDUPTHER

## 2019-01-01 RX ORDER — SODIUM CHLORIDE 0.9 % (FLUSH) 0.9 %
10 SYRINGE (ML) INJECTION AS NEEDED
Status: CANCELLED | OUTPATIENT
Start: 2019-01-01

## 2019-01-01 RX ORDER — SODIUM CHLORIDE 0.9 % (FLUSH) 0.9 %
10 SYRINGE (ML) INJECTION AS NEEDED
Status: DISCONTINUED | OUTPATIENT
Start: 2019-01-01 | End: 2019-01-01 | Stop reason: HOSPADM

## 2019-01-01 RX ORDER — ONDANSETRON 2 MG/ML
INJECTION INTRAMUSCULAR; INTRAVENOUS AS NEEDED
Status: DISCONTINUED | OUTPATIENT
Start: 2019-01-01 | End: 2019-01-01 | Stop reason: SURG

## 2019-01-01 RX ORDER — FENTANYL CITRATE 50 UG/ML
25 INJECTION, SOLUTION INTRAMUSCULAR; INTRAVENOUS
Status: DISCONTINUED | OUTPATIENT
Start: 2019-01-01 | End: 2019-01-01

## 2019-01-01 RX ORDER — ACETAMINOPHEN 325 MG/1
650 TABLET ORAL EVERY 4 HOURS PRN
Status: DISCONTINUED | OUTPATIENT
Start: 2019-01-01 | End: 2019-05-14 | Stop reason: HOSPADM

## 2019-01-01 RX ORDER — SODIUM CHLORIDE 0.9 % (FLUSH) 0.9 %
1-10 SYRINGE (ML) INJECTION AS NEEDED
Status: DISCONTINUED | OUTPATIENT
Start: 2019-01-01 | End: 2019-01-01

## 2019-01-01 RX ORDER — LABETALOL HYDROCHLORIDE 5 MG/ML
5 INJECTION, SOLUTION INTRAVENOUS
Status: DISCONTINUED | OUTPATIENT
Start: 2019-01-01 | End: 2019-01-01

## 2019-01-01 RX ORDER — SODIUM CHLORIDE 0.9 % (FLUSH) 0.9 %
20 SYRINGE (ML) INJECTION AS NEEDED
Status: DISCONTINUED | OUTPATIENT
Start: 2019-01-01 | End: 2019-01-01

## 2019-01-01 RX ORDER — FLUMAZENIL 0.1 MG/ML
0.2 INJECTION INTRAVENOUS AS NEEDED
Status: DISCONTINUED | OUTPATIENT
Start: 2019-01-01 | End: 2019-01-01

## 2019-01-01 RX ORDER — ACETAMINOPHEN 650 MG/1
650 SUPPOSITORY RECTAL EVERY 4 HOURS PRN
Status: DISCONTINUED | OUTPATIENT
Start: 2019-01-01 | End: 2019-05-14 | Stop reason: HOSPADM

## 2019-01-01 RX ORDER — FAMOTIDINE 10 MG/ML
20 INJECTION, SOLUTION INTRAVENOUS ONCE
Status: COMPLETED | OUTPATIENT
Start: 2019-01-01 | End: 2019-01-01

## 2019-01-01 RX ORDER — SODIUM CHLORIDE 0.9 % (FLUSH) 0.9 %
3 SYRINGE (ML) INJECTION EVERY 12 HOURS SCHEDULED
Status: DISCONTINUED | OUTPATIENT
Start: 2019-01-01 | End: 2019-01-01 | Stop reason: HOSPADM

## 2019-01-01 RX ORDER — LIDOCAINE HYDROCHLORIDE 10 MG/ML
0.5 INJECTION, SOLUTION EPIDURAL; INFILTRATION; INTRACAUDAL; PERINEURAL ONCE AS NEEDED
Status: DISCONTINUED | OUTPATIENT
Start: 2019-01-01 | End: 2019-01-01

## 2019-01-01 RX ORDER — PREDNISONE 20 MG/1
TABLET ORAL
Qty: 15 TABLET | Refills: 0 | Status: SHIPPED | OUTPATIENT
Start: 2019-01-01

## 2019-01-01 RX ORDER — MIDAZOLAM HYDROCHLORIDE 1 MG/ML
2 INJECTION INTRAMUSCULAR; INTRAVENOUS
Status: DISCONTINUED | OUTPATIENT
Start: 2019-01-01 | End: 2019-01-01

## 2019-01-01 RX ORDER — MEGESTROL ACETATE 20 MG/1
20 TABLET ORAL DAILY
COMMUNITY

## 2019-01-01 RX ORDER — LORAZEPAM 2 MG/ML
0.25 INJECTION INTRAMUSCULAR EVERY 6 HOURS PRN
Status: DISCONTINUED | OUTPATIENT
Start: 2019-01-01 | End: 2019-01-01

## 2019-01-01 RX ORDER — PROMETHAZINE HYDROCHLORIDE 25 MG/1
25 SUPPOSITORY RECTAL ONCE AS NEEDED
Status: DISCONTINUED | OUTPATIENT
Start: 2019-01-01 | End: 2019-01-01

## 2019-01-01 RX ORDER — ALBUTEROL SULFATE 2.5 MG/3ML
2.5 SOLUTION RESPIRATORY (INHALATION) ONCE AS NEEDED
Status: DISCONTINUED | OUTPATIENT
Start: 2019-01-01 | End: 2019-01-01

## 2019-01-01 RX ORDER — POTASSIUM CHLORIDE 750 MG/1
40 CAPSULE, EXTENDED RELEASE ORAL AS NEEDED
Status: DISCONTINUED | OUTPATIENT
Start: 2019-01-01 | End: 2019-01-01 | Stop reason: HOSPADM

## 2019-01-01 RX ORDER — FAMOTIDINE 20 MG/1
20 TABLET, FILM COATED ORAL 2 TIMES DAILY
Qty: 60 TABLET | Refills: 0 | Status: SHIPPED | OUTPATIENT
Start: 2019-01-01

## 2019-01-01 RX ORDER — LORAZEPAM 2 MG/ML
1 INJECTION INTRAMUSCULAR EVERY 4 HOURS PRN
Status: DISCONTINUED | OUTPATIENT
Start: 2019-01-01 | End: 2019-01-01

## 2019-01-01 RX ORDER — ONDANSETRON 2 MG/ML
4 INJECTION INTRAMUSCULAR; INTRAVENOUS EVERY 6 HOURS PRN
Status: DISCONTINUED | OUTPATIENT
Start: 2019-01-01 | End: 2019-01-01

## 2019-01-01 RX ORDER — PREDNISONE 10 MG/1
10 TABLET ORAL DAILY
Qty: 30 TABLET | Refills: 1 | Status: SHIPPED | OUTPATIENT
Start: 2019-01-01

## 2019-01-01 RX ORDER — KETAMINE HYDROCHLORIDE 10 MG/ML
INJECTION INTRAMUSCULAR; INTRAVENOUS AS NEEDED
Status: DISCONTINUED | OUTPATIENT
Start: 2019-01-01 | End: 2019-01-01 | Stop reason: SURG

## 2019-01-01 RX ORDER — ACETAMINOPHEN 325 MG/1
650 TABLET ORAL ONCE
Status: CANCELLED | OUTPATIENT
Start: 2019-01-01

## 2019-01-01 RX ORDER — SODIUM CHLORIDE 0.9 % (FLUSH) 0.9 %
3-10 SYRINGE (ML) INJECTION AS NEEDED
Status: DISCONTINUED | OUTPATIENT
Start: 2019-01-01 | End: 2019-05-14 | Stop reason: HOSPADM

## 2019-01-01 RX ORDER — MORPHINE SULFATE 2 MG/ML
2 INJECTION, SOLUTION INTRAMUSCULAR; INTRAVENOUS ONCE
Status: COMPLETED | OUTPATIENT
Start: 2019-01-01 | End: 2019-01-01

## 2019-01-01 RX ORDER — EPHEDRINE SULFATE 50 MG/ML
5 INJECTION, SOLUTION INTRAVENOUS ONCE AS NEEDED
Status: DISCONTINUED | OUTPATIENT
Start: 2019-01-01 | End: 2019-01-01

## 2019-01-01 RX ORDER — HYDROMORPHONE HYDROCHLORIDE 1 MG/ML
0.5 INJECTION, SOLUTION INTRAMUSCULAR; INTRAVENOUS; SUBCUTANEOUS ONCE
Status: COMPLETED | OUTPATIENT
Start: 2019-01-01 | End: 2019-01-01

## 2019-01-01 RX ORDER — HYDRALAZINE HYDROCHLORIDE 20 MG/ML
5 INJECTION INTRAMUSCULAR; INTRAVENOUS
Status: DISCONTINUED | OUTPATIENT
Start: 2019-01-01 | End: 2019-01-01

## 2019-01-01 RX ORDER — HEPARIN SODIUM 1000 [USP'U]/ML
INJECTION, SOLUTION INTRAVENOUS; SUBCUTANEOUS AS NEEDED
Status: DISCONTINUED | OUTPATIENT
Start: 2019-01-01 | End: 2019-01-01 | Stop reason: SURG

## 2019-01-01 RX ORDER — DIPHENHYDRAMINE HCL 25 MG
25 CAPSULE ORAL
Status: DISCONTINUED | OUTPATIENT
Start: 2019-01-01 | End: 2019-01-01

## 2019-01-01 RX ORDER — SODIUM CHLORIDE 0.9 % (FLUSH) 0.9 %
1-10 SYRINGE (ML) INJECTION AS NEEDED
Status: DISCONTINUED | OUTPATIENT
Start: 2019-01-01 | End: 2019-01-01 | Stop reason: HOSPADM

## 2019-01-01 RX ORDER — LORAZEPAM 2 MG/ML
0.5 INJECTION INTRAMUSCULAR
Status: DISCONTINUED | OUTPATIENT
Start: 2019-01-01 | End: 2019-05-14 | Stop reason: HOSPADM

## 2019-01-01 RX ORDER — IPRATROPIUM BROMIDE AND ALBUTEROL SULFATE 2.5; .5 MG/3ML; MG/3ML
3 SOLUTION RESPIRATORY (INHALATION) EVERY 4 HOURS PRN
Status: DISCONTINUED | OUTPATIENT
Start: 2019-01-01 | End: 2019-01-01

## 2019-01-01 RX ORDER — ONDANSETRON 4 MG/1
4 TABLET, ORALLY DISINTEGRATING ORAL EVERY 6 HOURS PRN
Status: DISCONTINUED | OUTPATIENT
Start: 2019-01-01 | End: 2019-01-01 | Stop reason: HOSPADM

## 2019-01-01 RX ORDER — ONDANSETRON 2 MG/ML
4 INJECTION INTRAMUSCULAR; INTRAVENOUS EVERY 6 HOURS PRN
Status: DISCONTINUED | OUTPATIENT
Start: 2019-01-01 | End: 2019-01-01 | Stop reason: HOSPADM

## 2019-01-01 RX ORDER — MIDAZOLAM HYDROCHLORIDE 1 MG/ML
2 INJECTION INTRAMUSCULAR; INTRAVENOUS
Status: DISCONTINUED | OUTPATIENT
Start: 2019-01-01 | End: 2019-01-01 | Stop reason: HOSPADM

## 2019-01-01 RX ORDER — ACETAMINOPHEN 325 MG/1
650 TABLET ORAL ONCE AS NEEDED
Status: DISCONTINUED | OUTPATIENT
Start: 2019-01-01 | End: 2019-01-01

## 2019-01-01 RX ORDER — MORPHINE SULFATE 20 MG/ML
5 SOLUTION ORAL
Status: DISCONTINUED | OUTPATIENT
Start: 2019-01-01 | End: 2019-05-14 | Stop reason: HOSPADM

## 2019-01-01 RX ORDER — ONDANSETRON 4 MG/1
4 TABLET, FILM COATED ORAL EVERY 6 HOURS PRN
Status: DISCONTINUED | OUTPATIENT
Start: 2019-01-01 | End: 2019-01-01 | Stop reason: HOSPADM

## 2019-01-01 RX ORDER — POTASSIUM CHLORIDE 1.5 G/1.77G
40 POWDER, FOR SOLUTION ORAL AS NEEDED
Status: DISCONTINUED | OUTPATIENT
Start: 2019-01-01 | End: 2019-01-01 | Stop reason: HOSPADM

## 2019-01-01 RX ORDER — MORPHINE SULFATE 2 MG/ML
4 INJECTION, SOLUTION INTRAMUSCULAR; INTRAVENOUS
Status: DISCONTINUED | OUTPATIENT
Start: 2019-01-01 | End: 2019-05-14 | Stop reason: HOSPADM

## 2019-01-01 RX ORDER — POTASSIUM CHLORIDE 29.8 MG/ML
20 INJECTION INTRAVENOUS
Status: DISCONTINUED | OUTPATIENT
Start: 2019-01-01 | End: 2019-01-01

## 2019-01-01 RX ORDER — SERTRALINE HYDROCHLORIDE 25 MG/1
25 TABLET, FILM COATED ORAL DAILY
COMMUNITY

## 2019-01-01 RX ORDER — IPRATROPIUM BROMIDE AND ALBUTEROL SULFATE 2.5; .5 MG/3ML; MG/3ML
3 SOLUTION RESPIRATORY (INHALATION)
Status: DISCONTINUED | OUTPATIENT
Start: 2019-01-01 | End: 2019-01-01

## 2019-01-01 RX ORDER — ALBUMIN, HUMAN INJ 5% 5 %
SOLUTION INTRAVENOUS
Status: COMPLETED
Start: 2019-01-01 | End: 2019-01-01

## 2019-01-01 RX ORDER — LORAZEPAM 2 MG/ML
2 INJECTION INTRAMUSCULAR
Status: DISCONTINUED | OUTPATIENT
Start: 2019-01-01 | End: 2019-05-14 | Stop reason: HOSPADM

## 2019-01-01 RX ORDER — PROMETHAZINE HYDROCHLORIDE 25 MG/ML
6.25 INJECTION, SOLUTION INTRAMUSCULAR; INTRAVENOUS
Status: DISCONTINUED | OUTPATIENT
Start: 2019-01-01 | End: 2019-01-01

## 2019-01-01 RX ORDER — MIDAZOLAM HYDROCHLORIDE 1 MG/ML
1 INJECTION INTRAMUSCULAR; INTRAVENOUS
Status: DISCONTINUED | OUTPATIENT
Start: 2019-01-01 | End: 2019-01-01

## 2019-01-01 RX ORDER — HYDROMORPHONE HYDROCHLORIDE 1 MG/ML
0.5 INJECTION, SOLUTION INTRAMUSCULAR; INTRAVENOUS; SUBCUTANEOUS
Status: DISCONTINUED | OUTPATIENT
Start: 2019-01-01 | End: 2019-05-14 | Stop reason: HOSPADM

## 2019-01-01 RX ORDER — HYDROMORPHONE HYDROCHLORIDE 1 MG/ML
0.2 INJECTION, SOLUTION INTRAMUSCULAR; INTRAVENOUS; SUBCUTANEOUS
Status: DISCONTINUED | OUTPATIENT
Start: 2019-01-01 | End: 2019-01-01

## 2019-01-01 RX ORDER — PREDNISONE 20 MG/1
20 TABLET ORAL 2 TIMES DAILY WITH MEALS
Status: DISCONTINUED | OUTPATIENT
Start: 2019-01-01 | End: 2019-01-01 | Stop reason: HOSPADM

## 2019-01-01 RX ORDER — FENTANYL CITRATE 50 UG/ML
INJECTION, SOLUTION INTRAMUSCULAR; INTRAVENOUS
Status: COMPLETED
Start: 2019-01-01 | End: 2019-01-01

## 2019-01-01 RX ORDER — ONDANSETRON 2 MG/ML
4 INJECTION INTRAMUSCULAR; INTRAVENOUS ONCE AS NEEDED
Status: DISCONTINUED | OUTPATIENT
Start: 2019-01-01 | End: 2019-01-01

## 2019-01-01 RX ORDER — NITROGLYCERIN 0.4 MG/1
0.4 TABLET SUBLINGUAL
Status: DISCONTINUED | OUTPATIENT
Start: 2019-01-01 | End: 2019-01-01 | Stop reason: HOSPADM

## 2019-01-01 RX ORDER — FAMOTIDINE 20 MG/1
20 TABLET, FILM COATED ORAL 2 TIMES DAILY
Status: DISCONTINUED | OUTPATIENT
Start: 2019-01-01 | End: 2019-01-01 | Stop reason: HOSPADM

## 2019-01-01 RX ORDER — MORPHINE SULFATE 2 MG/ML
2 INJECTION, SOLUTION INTRAMUSCULAR; INTRAVENOUS
Status: DISCONTINUED | OUTPATIENT
Start: 2019-01-01 | End: 2019-01-01

## 2019-01-01 RX ORDER — DEXTROSE, SODIUM CHLORIDE, AND POTASSIUM CHLORIDE 5; .45; .15 G/100ML; G/100ML; G/100ML
75 INJECTION INTRAVENOUS CONTINUOUS
Status: DISCONTINUED | OUTPATIENT
Start: 2019-01-01 | End: 2019-01-01

## 2019-01-01 RX ORDER — DIPHENHYDRAMINE HYDROCHLORIDE 50 MG/ML
25 INJECTION INTRAMUSCULAR; INTRAVENOUS ONCE
Status: COMPLETED | OUTPATIENT
Start: 2019-01-01 | End: 2019-01-01

## 2019-01-01 RX ORDER — MORPHINE SULFATE 2 MG/ML
6 INJECTION, SOLUTION INTRAMUSCULAR; INTRAVENOUS
Status: DISCONTINUED | OUTPATIENT
Start: 2019-01-01 | End: 2019-05-14 | Stop reason: HOSPADM

## 2019-01-01 RX ORDER — ALBUTEROL SULFATE 2.5 MG/3ML
2.5 SOLUTION RESPIRATORY (INHALATION) ONCE AS NEEDED
Status: DISCONTINUED | OUTPATIENT
Start: 2019-01-01 | End: 2019-01-01 | Stop reason: HOSPADM

## 2019-01-01 RX ORDER — DEXTROSE AND SODIUM CHLORIDE 5; .9 G/100ML; G/100ML
75 INJECTION, SOLUTION INTRAVENOUS CONTINUOUS
Status: DISCONTINUED | OUTPATIENT
Start: 2019-01-01 | End: 2019-01-01

## 2019-01-01 RX ORDER — FENTANYL CITRATE 50 UG/ML
50 INJECTION, SOLUTION INTRAMUSCULAR; INTRAVENOUS
Status: DISCONTINUED | OUTPATIENT
Start: 2019-01-01 | End: 2019-01-01 | Stop reason: HOSPADM

## 2019-01-01 RX ORDER — SODIUM CHLORIDE 0.9 % (FLUSH) 0.9 %
3 SYRINGE (ML) INJECTION EVERY 12 HOURS SCHEDULED
Status: DISCONTINUED | OUTPATIENT
Start: 2019-01-01 | End: 2019-01-01

## 2019-01-01 RX ADMIN — SODIUM CHLORIDE, POTASSIUM CHLORIDE, SODIUM LACTATE AND CALCIUM CHLORIDE: 600; 310; 30; 20 INJECTION, SOLUTION INTRAVENOUS at 11:45

## 2019-01-01 RX ADMIN — IOPAMIDOL 35 ML: 510 INJECTION, SOLUTION INTRAVASCULAR at 12:50

## 2019-01-01 RX ADMIN — FAMOTIDINE 20 MG: 20 TABLET, FILM COATED ORAL at 20:26

## 2019-01-01 RX ADMIN — IPRATROPIUM BROMIDE AND ALBUTEROL SULFATE 3 ML: 2.5; .5 SOLUTION RESPIRATORY (INHALATION) at 07:37

## 2019-01-01 RX ADMIN — LEVOFLOXACIN 500 MG: 500 TABLET, FILM COATED ORAL at 08:59

## 2019-01-01 RX ADMIN — POTASSIUM CHLORIDE 10 MEQ: 7.46 INJECTION, SOLUTION INTRAVENOUS at 00:02

## 2019-01-01 RX ADMIN — PREDNISONE 20 MG: 20 TABLET ORAL at 18:59

## 2019-01-01 RX ADMIN — ACETAMINOPHEN 650 MG: 325 TABLET, FILM COATED ORAL at 11:35

## 2019-01-01 RX ADMIN — FAMOTIDINE 20 MG: 10 INJECTION, SOLUTION INTRAVENOUS at 11:57

## 2019-01-01 RX ADMIN — SODIUM CHLORIDE, PRESERVATIVE FREE 10 ML: 5 INJECTION INTRAVENOUS at 20:08

## 2019-01-01 RX ADMIN — BUDESONIDE 0.5 MG: 0.5 SUSPENSION RESPIRATORY (INHALATION) at 07:58

## 2019-01-01 RX ADMIN — LEVOFLOXACIN 500 MG: 500 TABLET, FILM COATED ORAL at 15:02

## 2019-01-01 RX ADMIN — MORPHINE SULFATE 2 MG: 2 INJECTION, SOLUTION INTRAMUSCULAR; INTRAVENOUS at 22:29

## 2019-01-01 RX ADMIN — SODIUM CHLORIDE, PRESERVATIVE FREE 3 ML: 5 INJECTION INTRAVENOUS at 22:04

## 2019-01-01 RX ADMIN — MINERAL OIL 30 ML: 1000 SOLUTION ORAL at 22:44

## 2019-01-01 RX ADMIN — IPRATROPIUM BROMIDE AND ALBUTEROL SULFATE 3 ML: 2.5; .5 SOLUTION RESPIRATORY (INHALATION) at 21:30

## 2019-01-01 RX ADMIN — KETAMINE HYDROCHLORIDE 15 MG: 10 INJECTION INTRAMUSCULAR; INTRAVENOUS at 08:16

## 2019-01-01 RX ADMIN — LEVOFLOXACIN 500 MG: 500 TABLET, FILM COATED ORAL at 16:12

## 2019-01-01 RX ADMIN — ONDANSETRON HYDROCHLORIDE 4 MG: 2 SOLUTION INTRAMUSCULAR; INTRAVENOUS at 05:32

## 2019-01-01 RX ADMIN — ONDANSETRON HYDROCHLORIDE 4 MG: 2 SOLUTION INTRAMUSCULAR; INTRAVENOUS at 00:21

## 2019-01-01 RX ADMIN — IPRATROPIUM BROMIDE AND ALBUTEROL SULFATE 3 ML: 2.5; .5 SOLUTION RESPIRATORY (INHALATION) at 15:26

## 2019-01-01 RX ADMIN — SODIUM CHLORIDE, PRESERVATIVE FREE 10 ML: 5 INJECTION INTRAVENOUS at 20:15

## 2019-01-01 RX ADMIN — FAMOTIDINE 20 MG: 20 TABLET, FILM COATED ORAL at 21:47

## 2019-01-01 RX ADMIN — ONDANSETRON HYDROCHLORIDE 4 MG: 2 SOLUTION INTRAMUSCULAR; INTRAVENOUS at 21:50

## 2019-01-01 RX ADMIN — SERTRALINE 25 MG: 25 TABLET, FILM COATED ORAL at 10:06

## 2019-01-01 RX ADMIN — MORPHINE SULFATE 2 MG: 2 INJECTION, SOLUTION INTRAMUSCULAR; INTRAVENOUS at 12:20

## 2019-01-01 RX ADMIN — IPRATROPIUM BROMIDE AND ALBUTEROL SULFATE 3 ML: 2.5; .5 SOLUTION RESPIRATORY (INHALATION) at 09:16

## 2019-01-01 RX ADMIN — PHENYLEPHRINE HYDROCHLORIDE 1 MCG/KG/MIN: 10 INJECTION INTRAVENOUS at 14:32

## 2019-01-01 RX ADMIN — MORPHINE SULFATE 2 MG: 2 INJECTION, SOLUTION INTRAMUSCULAR; INTRAVENOUS at 02:58

## 2019-01-01 RX ADMIN — POTASSIUM CHLORIDE 10 MEQ: 7.46 INJECTION, SOLUTION INTRAVENOUS at 10:55

## 2019-01-01 RX ADMIN — FENTANYL CITRATE 25 MCG: 50 INJECTION INTRAMUSCULAR; INTRAVENOUS at 09:10

## 2019-01-01 RX ADMIN — FAMOTIDINE 10 MG: 10 INJECTION INTRAVENOUS at 08:14

## 2019-01-01 RX ADMIN — MORPHINE SULFATE 2 MG: 2 INJECTION, SOLUTION INTRAMUSCULAR; INTRAVENOUS at 10:36

## 2019-01-01 RX ADMIN — ARFORMOTEROL TARTRATE 15 MCG: 15 SOLUTION RESPIRATORY (INHALATION) at 07:05

## 2019-01-01 RX ADMIN — MORPHINE SULFATE 2 MG: 2 INJECTION, SOLUTION INTRAMUSCULAR; INTRAVENOUS at 06:45

## 2019-01-01 RX ADMIN — FAMOTIDINE 10 MG: 10 INJECTION INTRAVENOUS at 22:44

## 2019-01-01 RX ADMIN — FAMOTIDINE 10 MG: 10 INJECTION INTRAVENOUS at 20:05

## 2019-01-01 RX ADMIN — IPRATROPIUM BROMIDE AND ALBUTEROL SULFATE 3 ML: 2.5; .5 SOLUTION RESPIRATORY (INHALATION) at 20:48

## 2019-01-01 RX ADMIN — ENOXAPARIN SODIUM 40 MG: 40 INJECTION SUBCUTANEOUS at 17:56

## 2019-01-01 RX ADMIN — MORPHINE SULFATE 2 MG: 2 INJECTION, SOLUTION INTRAMUSCULAR; INTRAVENOUS at 13:32

## 2019-01-01 RX ADMIN — FAMOTIDINE 10 MG: 10 INJECTION INTRAVENOUS at 08:30

## 2019-01-01 RX ADMIN — METRONIDAZOLE 500 MG: 500 INJECTION, SOLUTION INTRAVENOUS at 00:24

## 2019-01-01 RX ADMIN — METOPROLOL TARTRATE 5 MG: 1 INJECTION, SOLUTION INTRAVENOUS at 03:35

## 2019-01-01 RX ADMIN — ONDANSETRON HYDROCHLORIDE 4 MG: 2 SOLUTION INTRAMUSCULAR; INTRAVENOUS at 20:05

## 2019-01-01 RX ADMIN — SODIUM CHLORIDE 100 ML/HR: 9 INJECTION, SOLUTION INTRAVENOUS at 00:55

## 2019-01-01 RX ADMIN — POTASSIUM CHLORIDE: 2 INJECTION, SOLUTION, CONCENTRATE INTRAVENOUS at 18:29

## 2019-01-01 RX ADMIN — METRONIDAZOLE 500 MG: 500 INJECTION, SOLUTION INTRAVENOUS at 08:58

## 2019-01-01 RX ADMIN — ONDANSETRON HYDROCHLORIDE 4 MG: 2 SOLUTION INTRAMUSCULAR; INTRAVENOUS at 14:49

## 2019-01-01 RX ADMIN — MORPHINE SULFATE 2 MG: 2 INJECTION, SOLUTION INTRAMUSCULAR; INTRAVENOUS at 23:20

## 2019-01-01 RX ADMIN — IPRATROPIUM BROMIDE AND ALBUTEROL SULFATE 3 ML: 2.5; .5 SOLUTION RESPIRATORY (INHALATION) at 15:29

## 2019-01-01 RX ADMIN — MORPHINE SULFATE 2 MG: 2 INJECTION, SOLUTION INTRAMUSCULAR; INTRAVENOUS at 12:06

## 2019-01-01 RX ADMIN — IPRATROPIUM BROMIDE AND ALBUTEROL SULFATE 3 ML: 2.5; .5 SOLUTION RESPIRATORY (INHALATION) at 07:33

## 2019-01-01 RX ADMIN — IPRATROPIUM BROMIDE AND ALBUTEROL SULFATE 3 ML: 2.5; .5 SOLUTION RESPIRATORY (INHALATION) at 19:49

## 2019-01-01 RX ADMIN — SODIUM CHLORIDE, PRESERVATIVE FREE 3 ML: 5 INJECTION INTRAVENOUS at 20:25

## 2019-01-01 RX ADMIN — POTASSIUM CHLORIDE, DEXTROSE MONOHYDRATE AND SODIUM CHLORIDE 100 ML/HR: 150; 5; 450 INJECTION, SOLUTION INTRAVENOUS at 14:18

## 2019-01-01 RX ADMIN — LEVOFLOXACIN 500 MG: 500 INJECTION, SOLUTION INTRAVENOUS at 00:16

## 2019-01-01 RX ADMIN — IPRATROPIUM BROMIDE AND ALBUTEROL SULFATE 3 ML: 2.5; .5 SOLUTION RESPIRATORY (INHALATION) at 08:41

## 2019-01-01 RX ADMIN — SUCCINYLCHOLINE CHLORIDE 140 MG: 20 INJECTION, SOLUTION INTRAMUSCULAR; INTRAVENOUS; PARENTERAL at 12:09

## 2019-01-01 RX ADMIN — MORPHINE SULFATE 2 MG: 2 INJECTION, SOLUTION INTRAMUSCULAR; INTRAVENOUS at 00:51

## 2019-01-01 RX ADMIN — MORPHINE SULFATE 2 MG: 2 INJECTION, SOLUTION INTRAMUSCULAR; INTRAVENOUS at 02:54

## 2019-01-01 RX ADMIN — SODIUM CHLORIDE 250 ML: 9 INJECTION, SOLUTION INTRAVENOUS at 12:31

## 2019-01-01 RX ADMIN — SODIUM CHLORIDE, PRESERVATIVE FREE 3 ML: 5 INJECTION INTRAVENOUS at 22:44

## 2019-01-01 RX ADMIN — OXYCODONE HYDROCHLORIDE AND ACETAMINOPHEN 1 TABLET: 5; 325 TABLET ORAL at 16:26

## 2019-01-01 RX ADMIN — POTASSIUM CHLORIDE 10 MEQ: 7.46 INJECTION, SOLUTION INTRAVENOUS at 07:09

## 2019-01-01 RX ADMIN — MORPHINE SULFATE 2 MG: 2 INJECTION, SOLUTION INTRAMUSCULAR; INTRAVENOUS at 13:27

## 2019-01-01 RX ADMIN — POTASSIUM CHLORIDE 10 MEQ: 7.46 INJECTION, SOLUTION INTRAVENOUS at 05:09

## 2019-01-01 RX ADMIN — IPRATROPIUM BROMIDE AND ALBUTEROL SULFATE 3 ML: 2.5; .5 SOLUTION RESPIRATORY (INHALATION) at 00:43

## 2019-01-01 RX ADMIN — ARFORMOTEROL TARTRATE 15 MCG: 15 SOLUTION RESPIRATORY (INHALATION) at 20:09

## 2019-01-01 RX ADMIN — BUDESONIDE 0.5 MG: 0.5 SUSPENSION RESPIRATORY (INHALATION) at 20:04

## 2019-01-01 RX ADMIN — ONDANSETRON HYDROCHLORIDE 4 MG: 2 SOLUTION INTRAMUSCULAR; INTRAVENOUS at 03:21

## 2019-01-01 RX ADMIN — POTASSIUM CHLORIDE 10 MEQ: 7.46 INJECTION, SOLUTION INTRAVENOUS at 20:14

## 2019-01-01 RX ADMIN — FAMOTIDINE 10 MG: 10 INJECTION INTRAVENOUS at 13:27

## 2019-01-01 RX ADMIN — DEXAMETHASONE SODIUM PHOSPHATE 8 MG: 10 INJECTION INTRAMUSCULAR; INTRAVENOUS at 08:20

## 2019-01-01 RX ADMIN — FAMOTIDINE 10 MG: 10 INJECTION INTRAVENOUS at 21:10

## 2019-01-01 RX ADMIN — LORAZEPAM 1 MG: 2 INJECTION INTRAMUSCULAR; INTRAVENOUS at 00:21

## 2019-01-01 RX ADMIN — ARFORMOTEROL TARTRATE 15 MCG: 15 SOLUTION RESPIRATORY (INHALATION) at 19:55

## 2019-01-01 RX ADMIN — FAMOTIDINE 20 MG: 10 INJECTION INTRAVENOUS at 20:14

## 2019-01-01 RX ADMIN — HYDROMORPHONE HYDROCHLORIDE 0.5 MG: 1 INJECTION, SOLUTION INTRAMUSCULAR; INTRAVENOUS; SUBCUTANEOUS at 08:16

## 2019-01-01 RX ADMIN — MINERAL OIL 30 ML: 1000 SOLUTION ORAL at 21:11

## 2019-01-01 RX ADMIN — POTASSIUM CHLORIDE, DEXTROSE MONOHYDRATE AND SODIUM CHLORIDE 100 ML/HR: 150; 5; 450 INJECTION, SOLUTION INTRAVENOUS at 11:48

## 2019-01-01 RX ADMIN — SUGAMMADEX 60 MG: 100 INJECTION, SOLUTION INTRAVENOUS at 09:10

## 2019-01-01 RX ADMIN — MORPHINE SULFATE 2 MG: 2 INJECTION, SOLUTION INTRAMUSCULAR; INTRAVENOUS at 05:53

## 2019-01-01 RX ADMIN — MINERAL OIL 30 ML: 1000 SOLUTION ORAL at 08:30

## 2019-01-01 RX ADMIN — Medication 2 TABLET: at 11:45

## 2019-01-01 RX ADMIN — ACETAMINOPHEN 650 MG: 325 TABLET, FILM COATED ORAL at 12:03

## 2019-01-01 RX ADMIN — SODIUM CHLORIDE, PRESERVATIVE FREE 3 ML: 5 INJECTION INTRAVENOUS at 21:11

## 2019-01-01 RX ADMIN — IPRATROPIUM BROMIDE AND ALBUTEROL SULFATE 3 ML: 2.5; .5 SOLUTION RESPIRATORY (INHALATION) at 16:13

## 2019-01-01 RX ADMIN — IPRATROPIUM BROMIDE AND ALBUTEROL SULFATE 3 ML: 2.5; .5 SOLUTION RESPIRATORY (INHALATION) at 14:57

## 2019-01-01 RX ADMIN — NOREPINEPHRINE BITARTRATE 0.25 MCG/KG/MIN: 1 INJECTION, SOLUTION, CONCENTRATE INTRAVENOUS at 12:16

## 2019-01-01 RX ADMIN — POTASSIUM CHLORIDE 10 MEQ: 7.46 INJECTION, SOLUTION INTRAVENOUS at 14:10

## 2019-01-01 RX ADMIN — FAMOTIDINE 10 MG: 10 INJECTION INTRAVENOUS at 10:28

## 2019-01-01 RX ADMIN — SODIUM CHLORIDE 250 ML: 9 INJECTION, SOLUTION INTRAVENOUS at 11:32

## 2019-01-01 RX ADMIN — HYDROMORPHONE HYDROCHLORIDE 0.5 MG: 1 INJECTION, SOLUTION INTRAMUSCULAR; INTRAVENOUS; SUBCUTANEOUS at 18:21

## 2019-01-01 RX ADMIN — METHYLPREDNISOLONE SODIUM SUCCINATE 40 MG: 40 INJECTION, POWDER, LYOPHILIZED, FOR SOLUTION INTRAMUSCULAR; INTRAVENOUS at 04:24

## 2019-01-01 RX ADMIN — POTASSIUM CHLORIDE, DEXTROSE MONOHYDRATE AND SODIUM CHLORIDE 100 ML/HR: 150; 5; 450 INJECTION, SOLUTION INTRAVENOUS at 01:51

## 2019-01-01 RX ADMIN — FAMOTIDINE 20 MG: 20 TABLET, FILM COATED ORAL at 12:46

## 2019-01-01 RX ADMIN — IMMUNE GLOBULIN INFUSION (HUMAN) 10 G: 100 INJECTION, SOLUTION INTRAVENOUS; SUBCUTANEOUS at 11:48

## 2019-01-01 RX ADMIN — METHYLPREDNISOLONE SODIUM SUCCINATE 40 MG: 40 INJECTION, POWDER, LYOPHILIZED, FOR SOLUTION INTRAMUSCULAR; INTRAVENOUS at 15:12

## 2019-01-01 RX ADMIN — SODIUM CHLORIDE, PRESERVATIVE FREE 3 ML: 5 INJECTION INTRAVENOUS at 08:49

## 2019-01-01 RX ADMIN — SODIUM CHLORIDE, PRESERVATIVE FREE 3 ML: 5 INJECTION INTRAVENOUS at 10:36

## 2019-01-01 RX ADMIN — BUDESONIDE 0.5 MG: 0.5 SUSPENSION RESPIRATORY (INHALATION) at 20:18

## 2019-01-01 RX ADMIN — FENTANYL CITRATE 25 MCG: 50 INJECTION, SOLUTION INTRAMUSCULAR; INTRAVENOUS at 09:56

## 2019-01-01 RX ADMIN — IMMUNE GLOBULIN INFUSION (HUMAN) 10 G: 100 INJECTION, SOLUTION INTRAVENOUS; SUBCUTANEOUS at 13:39

## 2019-01-01 RX ADMIN — ENOXAPARIN SODIUM 40 MG: 40 INJECTION SUBCUTANEOUS at 15:20

## 2019-01-01 RX ADMIN — OXYCODONE HYDROCHLORIDE AND ACETAMINOPHEN 1 TABLET: 5; 325 TABLET ORAL at 06:58

## 2019-01-01 RX ADMIN — SODIUM CHLORIDE, PRESERVATIVE FREE 10 ML: 5 INJECTION INTRAVENOUS at 10:36

## 2019-01-01 RX ADMIN — IPRATROPIUM BROMIDE AND ALBUTEROL SULFATE 3 ML: 2.5; .5 SOLUTION RESPIRATORY (INHALATION) at 14:38

## 2019-01-01 RX ADMIN — MINERAL OIL 30 ML: 1000 SOLUTION ORAL at 10:21

## 2019-01-01 RX ADMIN — AZTREONAM 2 G: 2 INJECTION, POWDER, FOR SOLUTION INTRAMUSCULAR; INTRAVENOUS at 20:28

## 2019-01-01 RX ADMIN — IMMUNE GLOBULIN INFUSION (HUMAN) 15 G: 100 INJECTION, SOLUTION INTRAVENOUS; SUBCUTANEOUS at 12:32

## 2019-01-01 RX ADMIN — PHENYLEPHRINE HYDROCHLORIDE 250 MCG: 10 INJECTION INTRAVENOUS at 12:10

## 2019-01-01 RX ADMIN — HYDRALAZINE HYDROCHLORIDE 20 MG: 20 INJECTION INTRAMUSCULAR; INTRAVENOUS at 16:55

## 2019-01-01 RX ADMIN — POTASSIUM CHLORIDE 40 MEQ: 1.5 POWDER, FOR SOLUTION ORAL at 12:38

## 2019-01-01 RX ADMIN — BUDESONIDE 0.5 MG: 0.5 SUSPENSION RESPIRATORY (INHALATION) at 18:53

## 2019-01-01 RX ADMIN — FENTANYL CITRATE 25 MCG: 50 INJECTION, SOLUTION INTRAMUSCULAR; INTRAVENOUS at 09:43

## 2019-01-01 RX ADMIN — AZTREONAM 1 G: 1 INJECTION, POWDER, LYOPHILIZED, FOR SOLUTION INTRAMUSCULAR; INTRAVENOUS at 12:27

## 2019-01-01 RX ADMIN — ONDANSETRON HYDROCHLORIDE 4 MG: 2 SOLUTION INTRAMUSCULAR; INTRAVENOUS at 10:02

## 2019-01-01 RX ADMIN — MORPHINE SULFATE 2 MG: 2 INJECTION, SOLUTION INTRAMUSCULAR; INTRAVENOUS at 01:22

## 2019-01-01 RX ADMIN — MORPHINE SULFATE 2 MG: 2 INJECTION, SOLUTION INTRAMUSCULAR; INTRAVENOUS at 13:35

## 2019-01-01 RX ADMIN — HYDROMORPHONE HYDROCHLORIDE 0.2 MG: 1 INJECTION, SOLUTION INTRAMUSCULAR; INTRAVENOUS; SUBCUTANEOUS at 09:48

## 2019-01-01 RX ADMIN — IPRATROPIUM BROMIDE AND ALBUTEROL SULFATE 3 ML: 2.5; .5 SOLUTION RESPIRATORY (INHALATION) at 23:12

## 2019-01-01 RX ADMIN — METHYLPREDNISOLONE SODIUM SUCCINATE 40 MG: 40 INJECTION, POWDER, LYOPHILIZED, FOR SOLUTION INTRAMUSCULAR; INTRAVENOUS at 15:23

## 2019-01-01 RX ADMIN — SODIUM CHLORIDE, PRESERVATIVE FREE 3 ML: 5 INJECTION INTRAVENOUS at 09:00

## 2019-01-01 RX ADMIN — FAMOTIDINE 10 MG: 10 INJECTION INTRAVENOUS at 08:57

## 2019-01-01 RX ADMIN — ONDANSETRON HYDROCHLORIDE 4 MG: 2 SOLUTION INTRAMUSCULAR; INTRAVENOUS at 14:23

## 2019-01-01 RX ADMIN — BUDESONIDE 0.5 MG: 0.5 SUSPENSION RESPIRATORY (INHALATION) at 19:56

## 2019-01-01 RX ADMIN — FAMOTIDINE 10 MG: 10 INJECTION INTRAVENOUS at 08:48

## 2019-01-01 RX ADMIN — MORPHINE SULFATE 2 MG: 2 INJECTION, SOLUTION INTRAMUSCULAR; INTRAVENOUS at 05:04

## 2019-01-01 RX ADMIN — MORPHINE SULFATE 2 MG: 2 INJECTION, SOLUTION INTRAMUSCULAR; INTRAVENOUS at 10:44

## 2019-01-01 RX ADMIN — POTASSIUM CHLORIDE 40 MEQ: 1.5 POWDER, FOR SOLUTION ORAL at 22:33

## 2019-01-01 RX ADMIN — POTASSIUM PHOSPHATE, MONOBASIC AND POTASSIUM PHOSPHATE, DIBASIC 40 MMOL: 224; 236 INJECTION, SOLUTION INTRAVENOUS at 12:46

## 2019-01-01 RX ADMIN — LORAZEPAM 1 MG: 2 INJECTION INTRAMUSCULAR; INTRAVENOUS at 21:41

## 2019-01-01 RX ADMIN — ROCURONIUM BROMIDE 30 MG: 10 INJECTION INTRAVENOUS at 08:15

## 2019-01-01 RX ADMIN — SODIUM CHLORIDE, PRESERVATIVE FREE 3 ML: 5 INJECTION INTRAVENOUS at 20:56

## 2019-01-01 RX ADMIN — PREDNISONE 20 MG: 20 TABLET ORAL at 08:59

## 2019-01-01 RX ADMIN — PREDNISONE 20 MG: 20 TABLET ORAL at 12:46

## 2019-01-01 RX ADMIN — MINERAL OIL 30 ML: 1000 SOLUTION ORAL at 10:27

## 2019-01-01 RX ADMIN — FAMOTIDINE 10 MG: 10 INJECTION INTRAVENOUS at 10:27

## 2019-01-01 RX ADMIN — IPRATROPIUM BROMIDE AND ALBUTEROL SULFATE 3 ML: 2.5; .5 SOLUTION RESPIRATORY (INHALATION) at 20:54

## 2019-01-01 RX ADMIN — FAMOTIDINE 10 MG: 10 INJECTION INTRAVENOUS at 10:35

## 2019-01-01 RX ADMIN — MORPHINE SULFATE 2 MG: 2 INJECTION, SOLUTION INTRAMUSCULAR; INTRAVENOUS at 08:56

## 2019-01-01 RX ADMIN — SODIUM CHLORIDE, PRESERVATIVE FREE 10 ML: 5 INJECTION INTRAVENOUS at 19:27

## 2019-01-01 RX ADMIN — SODIUM CHLORIDE 100 ML/HR: 9 INJECTION, SOLUTION INTRAVENOUS at 03:38

## 2019-01-01 RX ADMIN — SODIUM CHLORIDE, PRESERVATIVE FREE 3 ML: 5 INJECTION INTRAVENOUS at 08:10

## 2019-01-01 RX ADMIN — FENTANYL CITRATE 25 MCG: 50 INJECTION INTRAMUSCULAR; INTRAVENOUS at 12:47

## 2019-01-01 RX ADMIN — ONDANSETRON HYDROCHLORIDE 4 MG: 2 SOLUTION INTRAMUSCULAR; INTRAVENOUS at 19:53

## 2019-01-01 RX ADMIN — MORPHINE SULFATE 2 MG: 2 INJECTION, SOLUTION INTRAMUSCULAR; INTRAVENOUS at 23:26

## 2019-01-01 RX ADMIN — IPRATROPIUM BROMIDE AND ALBUTEROL SULFATE 3 ML: 2.5; .5 SOLUTION RESPIRATORY (INHALATION) at 15:08

## 2019-01-01 RX ADMIN — FAMOTIDINE 10 MG: 10 INJECTION INTRAVENOUS at 20:15

## 2019-01-01 RX ADMIN — METRONIDAZOLE 500 MG: 500 INJECTION, SOLUTION INTRAVENOUS at 07:57

## 2019-01-01 RX ADMIN — IMMUNE GLOBULIN INFUSION (HUMAN) 5 G: 100 INJECTION, SOLUTION INTRAVENOUS; SUBCUTANEOUS at 13:25

## 2019-01-01 RX ADMIN — SODIUM CHLORIDE, PRESERVATIVE FREE 10 ML: 5 INJECTION INTRAVENOUS at 08:49

## 2019-01-01 RX ADMIN — I.V. FAT EMULSION 20 G: 20 EMULSION INTRAVENOUS at 17:29

## 2019-01-01 RX ADMIN — VASOPRESSIN 1 UNITS: 20 INJECTION INTRAMUSCULAR; SUBCUTANEOUS at 13:42

## 2019-01-01 RX ADMIN — ARFORMOTEROL TARTRATE 15 MCG: 15 SOLUTION RESPIRATORY (INHALATION) at 07:39

## 2019-01-01 RX ADMIN — I.V. FAT EMULSION 20 G: 20 EMULSION INTRAVENOUS at 17:59

## 2019-01-01 RX ADMIN — FAMOTIDINE 20 MG: 10 INJECTION INTRAVENOUS at 15:28

## 2019-01-01 RX ADMIN — HYDROMORPHONE HYDROCHLORIDE 0.25 MG: 1 INJECTION, SOLUTION INTRAMUSCULAR; INTRAVENOUS; SUBCUTANEOUS at 21:47

## 2019-01-01 RX ADMIN — MORPHINE SULFATE 2 MG: 2 INJECTION, SOLUTION INTRAMUSCULAR; INTRAVENOUS at 02:53

## 2019-01-01 RX ADMIN — SODIUM CHLORIDE, PRESERVATIVE FREE 3 ML: 5 INJECTION INTRAVENOUS at 09:30

## 2019-01-01 RX ADMIN — HYDROMORPHONE HYDROCHLORIDE 0.5 MG: 1 INJECTION, SOLUTION INTRAMUSCULAR; INTRAVENOUS; SUBCUTANEOUS at 20:22

## 2019-01-01 RX ADMIN — HYDROMORPHONE HYDROCHLORIDE 0.25 MG: 1 INJECTION, SOLUTION INTRAMUSCULAR; INTRAVENOUS; SUBCUTANEOUS at 22:13

## 2019-01-01 RX ADMIN — SODIUM CHLORIDE, POTASSIUM CHLORIDE, SODIUM LACTATE AND CALCIUM CHLORIDE 100 ML/HR: 600; 310; 30; 20 INJECTION, SOLUTION INTRAVENOUS at 12:58

## 2019-01-01 RX ADMIN — METHYLPREDNISOLONE SODIUM SUCCINATE 40 MG: 40 INJECTION, POWDER, LYOPHILIZED, FOR SOLUTION INTRAMUSCULAR; INTRAVENOUS at 03:05

## 2019-01-01 RX ADMIN — SUCCINYLCHOLINE CHLORIDE 160 MG: 20 INJECTION, SOLUTION INTRAMUSCULAR; INTRAVENOUS; PARENTERAL at 12:12

## 2019-01-01 RX ADMIN — HYDROMORPHONE HYDROCHLORIDE 0.25 MG: 1 INJECTION, SOLUTION INTRAMUSCULAR; INTRAVENOUS; SUBCUTANEOUS at 18:44

## 2019-01-01 RX ADMIN — ENOXAPARIN SODIUM 40 MG: 40 INJECTION SUBCUTANEOUS at 15:12

## 2019-01-01 RX ADMIN — DIPHENHYDRAMINE HYDROCHLORIDE 25 MG: 25 CAPSULE ORAL at 12:03

## 2019-01-01 RX ADMIN — ONDANSETRON HYDROCHLORIDE 4 MG: 2 SOLUTION INTRAMUSCULAR; INTRAVENOUS at 21:59

## 2019-01-01 RX ADMIN — FAMOTIDINE 20 MG: 10 INJECTION INTRAVENOUS at 20:37

## 2019-01-01 RX ADMIN — FAMOTIDINE 20 MG: 20 TABLET, FILM COATED ORAL at 10:06

## 2019-01-01 RX ADMIN — SODIUM CHLORIDE, PRESERVATIVE FREE 3 ML: 5 INJECTION INTRAVENOUS at 08:57

## 2019-01-01 RX ADMIN — TAZOBACTAM SODIUM AND PIPERACILLIN SODIUM 3.38 G: 375; 3 INJECTION, SOLUTION INTRAVENOUS at 12:21

## 2019-01-01 RX ADMIN — MORPHINE SULFATE 2 MG: 2 INJECTION, SOLUTION INTRAMUSCULAR; INTRAVENOUS at 06:26

## 2019-01-01 RX ADMIN — IPRATROPIUM BROMIDE AND ALBUTEROL SULFATE 3 ML: 2.5; .5 SOLUTION RESPIRATORY (INHALATION) at 11:41

## 2019-01-01 RX ADMIN — FAMOTIDINE 10 MG: 10 INJECTION INTRAVENOUS at 20:54

## 2019-01-01 RX ADMIN — FAMOTIDINE 20 MG: 20 TABLET, FILM COATED ORAL at 20:53

## 2019-01-01 RX ADMIN — POTASSIUM CHLORIDE: 2 INJECTION, SOLUTION, CONCENTRATE INTRAVENOUS at 18:30

## 2019-01-01 RX ADMIN — BUDESONIDE 0.5 MG: 0.5 SUSPENSION RESPIRATORY (INHALATION) at 07:52

## 2019-01-01 RX ADMIN — METRONIDAZOLE 500 MG: 500 INJECTION, SOLUTION INTRAVENOUS at 22:58

## 2019-01-01 RX ADMIN — IPRATROPIUM BROMIDE AND ALBUTEROL SULFATE 3 ML: 2.5; .5 SOLUTION RESPIRATORY (INHALATION) at 22:40

## 2019-01-01 RX ADMIN — AZTREONAM 1 G: 1 INJECTION, POWDER, LYOPHILIZED, FOR SOLUTION INTRAMUSCULAR; INTRAVENOUS at 04:24

## 2019-01-01 RX ADMIN — ONDANSETRON HYDROCHLORIDE 4 MG: 2 SOLUTION INTRAMUSCULAR; INTRAVENOUS at 18:54

## 2019-01-01 RX ADMIN — MORPHINE SULFATE 2 MG: 2 INJECTION, SOLUTION INTRAMUSCULAR; INTRAVENOUS at 10:27

## 2019-01-01 RX ADMIN — IPRATROPIUM BROMIDE AND ALBUTEROL SULFATE 3 ML: 2.5; .5 SOLUTION RESPIRATORY (INHALATION) at 17:19

## 2019-01-01 RX ADMIN — SODIUM CHLORIDE, PRESERVATIVE FREE 10 ML: 5 INJECTION INTRAVENOUS at 12:06

## 2019-01-01 RX ADMIN — SODIUM CHLORIDE, PRESERVATIVE FREE 3 ML: 5 INJECTION INTRAVENOUS at 08:13

## 2019-01-01 RX ADMIN — ARFORMOTEROL TARTRATE 15 MCG: 15 SOLUTION RESPIRATORY (INHALATION) at 20:18

## 2019-01-01 RX ADMIN — MORPHINE SULFATE 2 MG: 2 INJECTION, SOLUTION INTRAMUSCULAR; INTRAVENOUS at 21:34

## 2019-01-01 RX ADMIN — ALBUMIN (HUMAN): 0.05 SOLUTION INTRAVENOUS at 13:35

## 2019-01-01 RX ADMIN — IPRATROPIUM BROMIDE AND ALBUTEROL SULFATE 3 ML: 2.5; .5 SOLUTION RESPIRATORY (INHALATION) at 16:00

## 2019-01-01 RX ADMIN — ACETAMINOPHEN 650 MG: 325 TABLET, FILM COATED ORAL at 11:29

## 2019-01-01 RX ADMIN — ARFORMOTEROL TARTRATE 15 MCG: 15 SOLUTION RESPIRATORY (INHALATION) at 07:41

## 2019-01-01 RX ADMIN — BUDESONIDE 0.5 MG: 0.5 SUSPENSION RESPIRATORY (INHALATION) at 20:09

## 2019-01-01 RX ADMIN — FAMOTIDINE 20 MG: 10 INJECTION INTRAVENOUS at 09:03

## 2019-01-01 RX ADMIN — DEXTROSE AND SODIUM CHLORIDE 75 ML/HR: 5; 900 INJECTION, SOLUTION INTRAVENOUS at 09:27

## 2019-01-01 RX ADMIN — FAMOTIDINE 10 MG: 10 INJECTION INTRAVENOUS at 20:56

## 2019-01-01 RX ADMIN — SODIUM CHLORIDE, PRESERVATIVE FREE 3 ML: 5 INJECTION INTRAVENOUS at 11:08

## 2019-01-01 RX ADMIN — ALBUTEROL SULFATE 2.5 MG: 2.5 SOLUTION RESPIRATORY (INHALATION) at 12:39

## 2019-01-01 RX ADMIN — ARFORMOTEROL TARTRATE 15 MCG: 15 SOLUTION RESPIRATORY (INHALATION) at 07:21

## 2019-01-01 RX ADMIN — POTASSIUM CHLORIDE 40 MEQ: 1.5 POWDER, FOR SOLUTION ORAL at 08:30

## 2019-01-01 RX ADMIN — BUDESONIDE 0.5 MG: 0.5 SUSPENSION RESPIRATORY (INHALATION) at 07:06

## 2019-01-01 RX ADMIN — AZTREONAM 1 G: 1 INJECTION, POWDER, LYOPHILIZED, FOR SOLUTION INTRAMUSCULAR; INTRAVENOUS at 21:00

## 2019-01-01 RX ADMIN — I.V. FAT EMULSION 20 G: 20 EMULSION INTRAVENOUS at 18:29

## 2019-01-01 RX ADMIN — MORPHINE SULFATE 2 MG: 2 INJECTION, SOLUTION INTRAMUSCULAR; INTRAVENOUS at 19:53

## 2019-01-01 RX ADMIN — OXYCODONE HYDROCHLORIDE AND ACETAMINOPHEN 1 TABLET: 5; 325 TABLET ORAL at 01:09

## 2019-01-01 RX ADMIN — IPRATROPIUM BROMIDE AND ALBUTEROL SULFATE 3 ML: 2.5; .5 SOLUTION RESPIRATORY (INHALATION) at 07:15

## 2019-01-01 RX ADMIN — METHYLPREDNISOLONE SODIUM SUCCINATE 40 MG: 40 INJECTION, POWDER, LYOPHILIZED, FOR SOLUTION INTRAMUSCULAR; INTRAVENOUS at 14:56

## 2019-01-01 RX ADMIN — HYDROMORPHONE HYDROCHLORIDE 0.5 MG: 1 INJECTION, SOLUTION INTRAMUSCULAR; INTRAVENOUS; SUBCUTANEOUS at 23:45

## 2019-01-01 RX ADMIN — PREDNISONE 20 MG: 20 TABLET ORAL at 10:06

## 2019-01-01 RX ADMIN — HYDROMORPHONE HYDROCHLORIDE 0.2 MG: 1 INJECTION, SOLUTION INTRAMUSCULAR; INTRAVENOUS; SUBCUTANEOUS at 10:26

## 2019-01-01 RX ADMIN — ONDANSETRON HYDROCHLORIDE 4 MG: 2 SOLUTION INTRAMUSCULAR; INTRAVENOUS at 19:26

## 2019-01-01 RX ADMIN — MORPHINE SULFATE 2 MG: 2 INJECTION, SOLUTION INTRAMUSCULAR; INTRAVENOUS at 11:21

## 2019-01-01 RX ADMIN — HYDROMORPHONE HYDROCHLORIDE 0.2 MG: 1 INJECTION, SOLUTION INTRAMUSCULAR; INTRAVENOUS; SUBCUTANEOUS at 10:10

## 2019-01-01 RX ADMIN — SERTRALINE 25 MG: 25 TABLET, FILM COATED ORAL at 08:59

## 2019-01-01 RX ADMIN — SODIUM CHLORIDE 100 ML/HR: 9 INJECTION, SOLUTION INTRAVENOUS at 12:08

## 2019-01-01 RX ADMIN — SODIUM CHLORIDE, PRESERVATIVE FREE 3 ML: 5 INJECTION INTRAVENOUS at 21:48

## 2019-01-01 RX ADMIN — SODIUM CHLORIDE 500 ML: 9 INJECTION, SOLUTION INTRAVENOUS at 13:02

## 2019-01-01 RX ADMIN — MORPHINE SULFATE 2 MG: 2 INJECTION, SOLUTION INTRAMUSCULAR; INTRAVENOUS at 14:04

## 2019-01-01 RX ADMIN — IOPAMIDOL 85 ML: 612 INJECTION, SOLUTION INTRAVENOUS at 09:30

## 2019-01-01 RX ADMIN — POTASSIUM CHLORIDE 10 MEQ: 7.46 INJECTION, SOLUTION INTRAVENOUS at 22:14

## 2019-01-01 RX ADMIN — FAMOTIDINE 10 MG: 10 INJECTION INTRAVENOUS at 22:29

## 2019-01-01 RX ADMIN — BUDESONIDE 0.5 MG: 0.5 SUSPENSION RESPIRATORY (INHALATION) at 07:39

## 2019-01-01 RX ADMIN — SODIUM CHLORIDE 125 ML/HR: 9 INJECTION, SOLUTION INTRAVENOUS at 11:10

## 2019-01-01 RX ADMIN — LORAZEPAM 1 MG: 2 INJECTION INTRAMUSCULAR; INTRAVENOUS at 14:56

## 2019-01-01 RX ADMIN — IPRATROPIUM BROMIDE AND ALBUTEROL SULFATE 3 ML: 2.5; .5 SOLUTION RESPIRATORY (INHALATION) at 16:19

## 2019-01-01 RX ADMIN — SODIUM CHLORIDE, PRESERVATIVE FREE 3 ML: 5 INJECTION INTRAVENOUS at 20:37

## 2019-01-01 RX ADMIN — MORPHINE SULFATE 2 MG: 2 INJECTION, SOLUTION INTRAMUSCULAR; INTRAVENOUS at 03:21

## 2019-01-01 RX ADMIN — FENTANYL CITRATE 25 MCG: 50 INJECTION, SOLUTION INTRAMUSCULAR; INTRAVENOUS at 10:05

## 2019-01-01 RX ADMIN — ARFORMOTEROL TARTRATE 15 MCG: 15 SOLUTION RESPIRATORY (INHALATION) at 18:53

## 2019-01-01 RX ADMIN — MORPHINE SULFATE 2 MG: 2 INJECTION, SOLUTION INTRAMUSCULAR; INTRAVENOUS at 06:16

## 2019-01-01 RX ADMIN — ONDANSETRON HYDROCHLORIDE 4 MG: 2 SOLUTION INTRAMUSCULAR; INTRAVENOUS at 02:12

## 2019-01-01 RX ADMIN — METHYLPREDNISOLONE SODIUM SUCCINATE 40 MG: 40 INJECTION, POWDER, LYOPHILIZED, FOR SOLUTION INTRAMUSCULAR; INTRAVENOUS at 04:08

## 2019-01-01 RX ADMIN — FAMOTIDINE 20 MG: 10 INJECTION INTRAVENOUS at 08:13

## 2019-01-01 RX ADMIN — OXYCODONE HYDROCHLORIDE AND ACETAMINOPHEN 1 TABLET: 5; 325 TABLET ORAL at 12:09

## 2019-01-01 RX ADMIN — FENTANYL CITRATE 50 MCG: 50 INJECTION INTRAMUSCULAR; INTRAVENOUS at 08:45

## 2019-01-01 RX ADMIN — SODIUM CHLORIDE, PRESERVATIVE FREE 10 ML: 5 INJECTION INTRAVENOUS at 20:56

## 2019-01-01 RX ADMIN — SODIUM CHLORIDE, PRESERVATIVE FREE 3 ML: 5 INJECTION INTRAVENOUS at 20:27

## 2019-01-01 RX ADMIN — SODIUM CHLORIDE, PRESERVATIVE FREE 3 ML: 5 INJECTION INTRAVENOUS at 08:16

## 2019-01-01 RX ADMIN — FENTANYL CITRATE 25 MCG: 50 INJECTION, SOLUTION INTRAMUSCULAR; INTRAVENOUS at 09:35

## 2019-01-01 RX ADMIN — BUDESONIDE 0.5 MG: 0.5 SUSPENSION RESPIRATORY (INHALATION) at 07:41

## 2019-01-01 RX ADMIN — IPRATROPIUM BROMIDE AND ALBUTEROL SULFATE 3 ML: 2.5; .5 SOLUTION RESPIRATORY (INHALATION) at 14:10

## 2019-01-01 RX ADMIN — SODIUM CHLORIDE, PRESERVATIVE FREE 10 ML: 5 INJECTION INTRAVENOUS at 22:05

## 2019-01-01 RX ADMIN — ARFORMOTEROL TARTRATE 15 MCG: 15 SOLUTION RESPIRATORY (INHALATION) at 07:58

## 2019-01-01 RX ADMIN — HYDROMORPHONE HYDROCHLORIDE 0.25 MG: 1 INJECTION, SOLUTION INTRAMUSCULAR; INTRAVENOUS; SUBCUTANEOUS at 15:49

## 2019-01-01 RX ADMIN — LABETALOL 20 MG/4 ML (5 MG/ML) INTRAVENOUS SYRINGE 5 MG: at 10:05

## 2019-01-01 RX ADMIN — SODIUM CHLORIDE, POTASSIUM CHLORIDE, SODIUM LACTATE AND CALCIUM CHLORIDE 100 ML/HR: 600; 310; 30; 20 INJECTION, SOLUTION INTRAVENOUS at 04:51

## 2019-01-01 RX ADMIN — LIDOCAINE HYDROCHLORIDE 40 MG: 20 INJECTION, SOLUTION INFILTRATION; PERINEURAL at 08:15

## 2019-01-01 RX ADMIN — FAMOTIDINE 10 MG: 10 INJECTION INTRAVENOUS at 20:26

## 2019-01-01 RX ADMIN — METHYLPREDNISOLONE SODIUM SUCCINATE 40 MG: 40 INJECTION, POWDER, LYOPHILIZED, FOR SOLUTION INTRAMUSCULAR; INTRAVENOUS at 15:20

## 2019-01-01 RX ADMIN — SODIUM CHLORIDE, PRESERVATIVE FREE 10 ML: 5 INJECTION INTRAVENOUS at 18:03

## 2019-01-01 RX ADMIN — DEXTROSE AND SODIUM CHLORIDE 75 ML/HR: 5; 900 INJECTION, SOLUTION INTRAVENOUS at 02:41

## 2019-01-01 RX ADMIN — POTASSIUM CHLORIDE: 2 INJECTION, SOLUTION, CONCENTRATE INTRAVENOUS at 18:00

## 2019-01-01 RX ADMIN — BUDESONIDE 0.5 MG: 0.5 SUSPENSION RESPIRATORY (INHALATION) at 07:21

## 2019-01-01 RX ADMIN — Medication 500 UNITS: at 14:10

## 2019-01-01 RX ADMIN — MORPHINE SULFATE 2 MG: 2 INJECTION, SOLUTION INTRAMUSCULAR; INTRAVENOUS at 20:05

## 2019-01-01 RX ADMIN — BUDESONIDE 0.5 MG: 0.5 SUSPENSION RESPIRATORY (INHALATION) at 19:50

## 2019-01-01 RX ADMIN — MORPHINE SULFATE 2 MG: 2 INJECTION, SOLUTION INTRAMUSCULAR; INTRAVENOUS at 19:18

## 2019-01-01 RX ADMIN — FAMOTIDINE 10 MG: 10 INJECTION INTRAVENOUS at 20:08

## 2019-01-01 RX ADMIN — POTASSIUM CHLORIDE, DEXTROSE MONOHYDRATE AND SODIUM CHLORIDE 100 ML/HR: 150; 5; 450 INJECTION, SOLUTION INTRAVENOUS at 01:19

## 2019-01-01 RX ADMIN — METRONIDAZOLE 500 MG: 500 INJECTION, SOLUTION INTRAVENOUS at 08:13

## 2019-01-01 RX ADMIN — AZTREONAM 1 G: 1 INJECTION, POWDER, LYOPHILIZED, FOR SOLUTION INTRAMUSCULAR; INTRAVENOUS at 04:04

## 2019-01-01 RX ADMIN — HYDROMORPHONE HYDROCHLORIDE 0.25 MG: 1 INJECTION, SOLUTION INTRAMUSCULAR; INTRAVENOUS; SUBCUTANEOUS at 10:15

## 2019-01-01 RX ADMIN — DEXTROSE AND SODIUM CHLORIDE 75 ML/HR: 5; 900 INJECTION, SOLUTION INTRAVENOUS at 15:23

## 2019-01-01 RX ADMIN — ARFORMOTEROL TARTRATE 15 MCG: 15 SOLUTION RESPIRATORY (INHALATION) at 20:04

## 2019-01-01 RX ADMIN — SODIUM CHLORIDE, POTASSIUM CHLORIDE, SODIUM LACTATE AND CALCIUM CHLORIDE 9 ML/HR: 600; 310; 30; 20 INJECTION, SOLUTION INTRAVENOUS at 07:53

## 2019-01-01 RX ADMIN — MORPHINE SULFATE 2 MG: 2 INJECTION, SOLUTION INTRAMUSCULAR; INTRAVENOUS at 11:58

## 2019-01-01 RX ADMIN — SODIUM CHLORIDE, PRESERVATIVE FREE 10 ML: 5 INJECTION INTRAVENOUS at 21:11

## 2019-01-01 RX ADMIN — VASOPRESSIN 0.03 UNITS/MIN: 20 INJECTION INTRAVENOUS at 15:54

## 2019-01-01 RX ADMIN — POTASSIUM CHLORIDE: 2 INJECTION, SOLUTION, CONCENTRATE INTRAVENOUS at 17:33

## 2019-01-01 RX ADMIN — PREDNISONE 20 MG: 20 TABLET ORAL at 17:56

## 2019-01-01 RX ADMIN — LABETALOL 20 MG/4 ML (5 MG/ML) INTRAVENOUS SYRINGE 5 MG: at 10:21

## 2019-01-01 RX ADMIN — SODIUM CHLORIDE 100 ML/HR: 9 INJECTION, SOLUTION INTRAVENOUS at 17:17

## 2019-01-01 RX ADMIN — IPRATROPIUM BROMIDE AND ALBUTEROL SULFATE 3 ML: 2.5; .5 SOLUTION RESPIRATORY (INHALATION) at 15:27

## 2019-01-01 RX ADMIN — ONDANSETRON HYDROCHLORIDE 4 MG: 2 SOLUTION INTRAMUSCULAR; INTRAVENOUS at 10:39

## 2019-01-01 RX ADMIN — PROMETHAZINE HYDROCHLORIDE 6.25 MG: 25 INJECTION INTRAMUSCULAR; INTRAVENOUS at 22:33

## 2019-01-01 RX ADMIN — PROPOFOL 70 MG: 10 INJECTION, EMULSION INTRAVENOUS at 08:15

## 2019-01-01 RX ADMIN — ONDANSETRON 4 MG: 2 INJECTION INTRAMUSCULAR; INTRAVENOUS at 09:03

## 2019-01-01 RX ADMIN — IPRATROPIUM BROMIDE AND ALBUTEROL SULFATE 3 ML: 2.5; .5 SOLUTION RESPIRATORY (INHALATION) at 15:30

## 2019-01-01 RX ADMIN — IPRATROPIUM BROMIDE AND ALBUTEROL SULFATE 3 ML: 2.5; .5 SOLUTION RESPIRATORY (INHALATION) at 19:51

## 2019-01-01 RX ADMIN — POTASSIUM CHLORIDE 10 MEQ: 7.46 INJECTION, SOLUTION INTRAVENOUS at 01:51

## 2019-01-01 RX ADMIN — POTASSIUM CHLORIDE 10 MEQ: 7.46 INJECTION, SOLUTION INTRAVENOUS at 20:56

## 2019-01-01 RX ADMIN — ARFORMOTEROL TARTRATE 15 MCG: 15 SOLUTION RESPIRATORY (INHALATION) at 07:52

## 2019-01-01 RX ADMIN — SODIUM CHLORIDE, PRESERVATIVE FREE 3 ML: 5 INJECTION INTRAVENOUS at 09:11

## 2019-01-01 RX ADMIN — IOPAMIDOL 85 ML: 612 INJECTION, SOLUTION INTRAVENOUS at 13:51

## 2019-01-01 RX ADMIN — METRONIDAZOLE 500 MG: 500 INJECTION, SOLUTION INTRAVENOUS at 00:43

## 2019-01-01 RX ADMIN — IPRATROPIUM BROMIDE AND ALBUTEROL SULFATE 3 ML: 2.5; .5 SOLUTION RESPIRATORY (INHALATION) at 07:58

## 2019-01-01 RX ADMIN — POTASSIUM CHLORIDE: 2 INJECTION, SOLUTION, CONCENTRATE INTRAVENOUS at 18:02

## 2019-01-01 RX ADMIN — FAMOTIDINE 10 MG: 10 INJECTION INTRAVENOUS at 20:42

## 2019-01-01 RX ADMIN — ROCURONIUM BROMIDE 40 MG: 10 INJECTION INTRAVENOUS at 12:25

## 2019-01-01 RX ADMIN — HYDROMORPHONE HYDROCHLORIDE 0.25 MG: 1 INJECTION, SOLUTION INTRAMUSCULAR; INTRAVENOUS; SUBCUTANEOUS at 20:25

## 2019-01-01 RX ADMIN — METRONIDAZOLE 500 MG: 500 INJECTION, SOLUTION INTRAVENOUS at 18:04

## 2019-01-01 RX ADMIN — ENOXAPARIN SODIUM 40 MG: 40 INJECTION SUBCUTANEOUS at 16:12

## 2019-01-01 RX ADMIN — SODIUM CHLORIDE 500 ML: 9 INJECTION, SOLUTION INTRAVENOUS at 09:59

## 2019-01-01 RX ADMIN — DEXTROSE AND SODIUM CHLORIDE 75 ML/HR: 5; 900 INJECTION, SOLUTION INTRAVENOUS at 14:56

## 2019-01-01 RX ADMIN — ONDANSETRON HYDROCHLORIDE 4 MG: 2 SOLUTION INTRAMUSCULAR; INTRAVENOUS at 16:22

## 2019-01-01 RX ADMIN — ALBUMIN (HUMAN): 0.05 SOLUTION INTRAVENOUS at 08:32

## 2019-01-01 RX ADMIN — PHENYLEPHRINE HYDROCHLORIDE 50 MCG: 10 INJECTION INTRAVENOUS at 08:36

## 2019-01-01 RX ADMIN — SODIUM CHLORIDE, PRESERVATIVE FREE 10 ML: 5 INJECTION INTRAVENOUS at 09:03

## 2019-01-01 RX ADMIN — MORPHINE SULFATE 2 MG: 2 INJECTION, SOLUTION INTRAMUSCULAR; INTRAVENOUS at 15:27

## 2019-01-01 RX ADMIN — MORPHINE SULFATE 2 MG: 2 INJECTION, SOLUTION INTRAMUSCULAR; INTRAVENOUS at 00:04

## 2019-01-01 RX ADMIN — MORPHINE SULFATE 2 MG: 2 INJECTION, SOLUTION INTRAMUSCULAR; INTRAVENOUS at 02:12

## 2019-01-01 RX ADMIN — MORPHINE SULFATE 2 MG: 2 INJECTION, SOLUTION INTRAMUSCULAR; INTRAVENOUS at 20:08

## 2019-01-01 RX ADMIN — PHENYLEPHRINE HYDROCHLORIDE 100 MCG: 10 INJECTION INTRAVENOUS at 12:17

## 2019-01-01 RX ADMIN — MINERAL OIL 30 ML: 1000 SOLUTION ORAL at 20:05

## 2019-01-01 RX ADMIN — IPRATROPIUM BROMIDE AND ALBUTEROL SULFATE 3 ML: 2.5; .5 SOLUTION RESPIRATORY (INHALATION) at 12:13

## 2019-01-01 RX ADMIN — MORPHINE SULFATE 2 MG: 2 INJECTION, SOLUTION INTRAMUSCULAR; INTRAVENOUS at 19:27

## 2019-01-01 RX ADMIN — FAMOTIDINE 10 MG: 10 INJECTION INTRAVENOUS at 09:10

## 2019-01-01 RX ADMIN — FAMOTIDINE 20 MG: 20 TABLET, FILM COATED ORAL at 08:59

## 2019-01-01 RX ADMIN — METHYLPREDNISOLONE SODIUM SUCCINATE 40 MG: 40 INJECTION, POWDER, LYOPHILIZED, FOR SOLUTION INTRAMUSCULAR; INTRAVENOUS at 04:05

## 2019-01-01 RX ADMIN — FAMOTIDINE 10 MG: 10 INJECTION INTRAVENOUS at 20:57

## 2019-01-01 RX ADMIN — ONDANSETRON 4 MG: 2 INJECTION INTRAMUSCULAR; INTRAVENOUS at 13:02

## 2019-01-01 RX ADMIN — SODIUM CHLORIDE 250 ML: 9 INJECTION, SOLUTION INTRAVENOUS at 11:30

## 2019-01-01 RX ADMIN — PROPOFOL 50 MG: 10 INJECTION, EMULSION INTRAVENOUS at 12:09

## 2019-01-01 RX ADMIN — HYDROMORPHONE HYDROCHLORIDE 0.25 MG: 1 INJECTION, SOLUTION INTRAMUSCULAR; INTRAVENOUS; SUBCUTANEOUS at 13:43

## 2019-01-01 RX ADMIN — Medication 500 UNITS: at 06:47

## 2019-01-01 RX ADMIN — AZTREONAM 1 G: 1 INJECTION, POWDER, LYOPHILIZED, FOR SOLUTION INTRAMUSCULAR; INTRAVENOUS at 20:36

## 2019-01-01 RX ADMIN — SODIUM CHLORIDE, PRESERVATIVE FREE 3 ML: 5 INJECTION INTRAVENOUS at 21:03

## 2019-01-01 RX ADMIN — MORPHINE SULFATE 2 MG: 2 INJECTION, SOLUTION INTRAMUSCULAR; INTRAVENOUS at 19:26

## 2019-01-01 RX ADMIN — LEVOFLOXACIN 500 MG: 500 INJECTION, SOLUTION INTRAVENOUS at 23:21

## 2019-01-01 RX ADMIN — MORPHINE SULFATE 2 MG: 2 INJECTION, SOLUTION INTRAMUSCULAR; INTRAVENOUS at 18:17

## 2019-01-01 RX ADMIN — SODIUM CHLORIDE, PRESERVATIVE FREE 3 ML: 5 INJECTION INTRAVENOUS at 20:54

## 2019-01-01 RX ADMIN — IPRATROPIUM BROMIDE AND ALBUTEROL SULFATE 3 ML: 2.5; .5 SOLUTION RESPIRATORY (INHALATION) at 15:33

## 2019-01-01 RX ADMIN — MORPHINE SULFATE 2 MG: 2 INJECTION, SOLUTION INTRAMUSCULAR; INTRAVENOUS at 18:47

## 2019-01-01 RX ADMIN — METRONIDAZOLE 500 MG: 500 INJECTION, SOLUTION INTRAVENOUS at 16:34

## 2019-01-01 RX ADMIN — SODIUM CHLORIDE, PRESERVATIVE FREE 3 ML: 5 INJECTION INTRAVENOUS at 20:22

## 2019-01-01 RX ADMIN — LEVOFLOXACIN 500 MG: 500 TABLET, FILM COATED ORAL at 10:06

## 2019-01-01 RX ADMIN — IPRATROPIUM BROMIDE AND ALBUTEROL SULFATE 3 ML: 2.5; .5 SOLUTION RESPIRATORY (INHALATION) at 21:10

## 2019-01-01 RX ADMIN — FENTANYL CITRATE 25 MCG: 50 INJECTION INTRAMUSCULAR; INTRAVENOUS at 11:58

## 2019-01-01 RX ADMIN — AZTREONAM 1 G: 1 INJECTION, POWDER, LYOPHILIZED, FOR SOLUTION INTRAMUSCULAR; INTRAVENOUS at 03:05

## 2019-01-01 RX ADMIN — Medication 500 UNITS: at 14:55

## 2019-01-01 RX ADMIN — DIPHENHYDRAMINE HYDROCHLORIDE 25 MG: 50 INJECTION, SOLUTION INTRAMUSCULAR; INTRAVENOUS at 23:45

## 2019-01-01 RX ADMIN — MORPHINE SULFATE 2 MG: 2 INJECTION, SOLUTION INTRAMUSCULAR; INTRAVENOUS at 05:23

## 2019-01-01 RX ADMIN — PHENYLEPHRINE HYDROCHLORIDE 50 MCG: 10 INJECTION INTRAVENOUS at 08:25

## 2019-01-01 RX ADMIN — HEPARIN SODIUM 3000 UNITS: 1000 INJECTION, SOLUTION INTRAVENOUS; SUBCUTANEOUS at 13:09

## 2019-01-01 RX ADMIN — POTASSIUM CHLORIDE 10 MEQ: 7.46 INJECTION, SOLUTION INTRAVENOUS at 21:14

## 2019-01-01 RX ADMIN — AZTREONAM 1 G: 1 INJECTION, POWDER, LYOPHILIZED, FOR SOLUTION INTRAMUSCULAR; INTRAVENOUS at 11:59

## 2019-01-01 RX ADMIN — IPRATROPIUM BROMIDE AND ALBUTEROL SULFATE 3 ML: 2.5; .5 SOLUTION RESPIRATORY (INHALATION) at 15:42

## 2019-01-01 RX ADMIN — MINERAL OIL 30 ML: 1000 SOLUTION ORAL at 09:09

## 2019-01-01 RX ADMIN — ONDANSETRON HYDROCHLORIDE 4 MG: 2 SOLUTION INTRAMUSCULAR; INTRAVENOUS at 08:57

## 2019-01-01 RX ADMIN — ENOXAPARIN SODIUM 40 MG: 40 INJECTION SUBCUTANEOUS at 16:26

## 2019-01-01 RX ADMIN — SODIUM CHLORIDE, PRESERVATIVE FREE 10 ML: 5 INJECTION INTRAVENOUS at 14:55

## 2019-01-01 RX ADMIN — PROMETHAZINE HYDROCHLORIDE 6.25 MG: 25 INJECTION INTRAMUSCULAR; INTRAVENOUS at 05:23

## 2019-01-01 RX ADMIN — SERTRALINE 25 MG: 25 TABLET, FILM COATED ORAL at 12:46

## 2019-01-01 RX ADMIN — MORPHINE SULFATE 2 MG: 2 INJECTION, SOLUTION INTRAMUSCULAR; INTRAVENOUS at 13:03

## 2019-01-01 RX ADMIN — POTASSIUM CHLORIDE 10 MEQ: 7.46 INJECTION, SOLUTION INTRAVENOUS at 16:13

## 2019-01-01 RX ADMIN — PHENYLEPHRINE HYDROCHLORIDE 100 MCG: 10 INJECTION INTRAVENOUS at 13:31

## 2019-01-01 RX ADMIN — FAMOTIDINE 20 MG: 10 INJECTION INTRAVENOUS at 07:59

## 2019-01-01 RX ADMIN — MINERAL OIL 30 ML: 1000 SOLUTION ORAL at 20:55

## 2019-01-01 RX ADMIN — AZTREONAM 1 G: 1 INJECTION, POWDER, LYOPHILIZED, FOR SOLUTION INTRAMUSCULAR; INTRAVENOUS at 11:17

## 2019-01-01 RX ADMIN — FENTANYL CITRATE 25 MCG: 50 INJECTION INTRAMUSCULAR; INTRAVENOUS at 09:15

## 2019-01-01 RX ADMIN — HYDROMORPHONE HYDROCHLORIDE 0.2 MG: 1 INJECTION, SOLUTION INTRAMUSCULAR; INTRAVENOUS; SUBCUTANEOUS at 09:59

## 2019-01-01 RX ADMIN — ONDANSETRON 4 MG: 2 INJECTION INTRAMUSCULAR; INTRAVENOUS at 15:20

## 2019-01-01 RX ADMIN — PHENYLEPHRINE HYDROCHLORIDE 50 MCG: 10 INJECTION INTRAVENOUS at 08:57

## 2019-01-01 RX ADMIN — MORPHINE SULFATE 2 MG: 2 INJECTION, SOLUTION INTRAMUSCULAR; INTRAVENOUS at 13:58

## 2019-01-01 RX ADMIN — IMMUNE GLOBULIN INFUSION (HUMAN) 10 G: 100 INJECTION, SOLUTION INTRAVENOUS; SUBCUTANEOUS at 12:08

## 2019-01-01 RX ADMIN — IPRATROPIUM BROMIDE AND ALBUTEROL SULFATE 3 ML: 2.5; .5 SOLUTION RESPIRATORY (INHALATION) at 11:58

## 2019-01-01 RX ADMIN — SODIUM CHLORIDE, PRESERVATIVE FREE 3 ML: 5 INJECTION INTRAVENOUS at 20:42

## 2019-01-01 RX ADMIN — HYDROMORPHONE HYDROCHLORIDE 0.5 MG: 1 INJECTION, SOLUTION INTRAMUSCULAR; INTRAVENOUS; SUBCUTANEOUS at 10:02

## 2019-01-01 RX ADMIN — MINERAL OIL 30 ML: 1000 SOLUTION ORAL at 20:57

## 2019-01-01 RX ADMIN — POTASSIUM CHLORIDE 10 MEQ: 7.46 INJECTION, SOLUTION INTRAVENOUS at 15:21

## 2019-01-01 RX ADMIN — FAMOTIDINE 10 MG: 10 INJECTION INTRAVENOUS at 21:13

## 2019-01-01 RX ADMIN — ALBUMIN (HUMAN): 0.05 SOLUTION INTRAVENOUS at 12:40

## 2019-01-01 RX ADMIN — FAMOTIDINE 10 MG: 10 INJECTION INTRAVENOUS at 09:29

## 2019-01-01 RX ADMIN — SODIUM CHLORIDE, PRESERVATIVE FREE 3 ML: 5 INJECTION INTRAVENOUS at 10:10

## 2019-01-01 RX ADMIN — MORPHINE SULFATE 4 MG: 2 INJECTION, SOLUTION INTRAMUSCULAR; INTRAVENOUS at 15:12

## 2019-01-01 RX ADMIN — MORPHINE SULFATE 2 MG: 2 INJECTION, SOLUTION INTRAMUSCULAR; INTRAVENOUS at 20:56

## 2019-01-01 RX ADMIN — SODIUM CHLORIDE 100 ML/HR: 9 INJECTION, SOLUTION INTRAVENOUS at 14:47

## 2019-01-01 RX ADMIN — ACETAMINOPHEN 650 MG: 325 TABLET, FILM COATED ORAL at 16:24

## 2019-01-01 RX ADMIN — OXYCODONE HYDROCHLORIDE AND ACETAMINOPHEN 1 TABLET: 5; 325 TABLET ORAL at 20:40

## 2019-01-01 RX ADMIN — MORPHINE SULFATE 2 MG: 2 INJECTION, SOLUTION INTRAMUSCULAR; INTRAVENOUS at 05:31

## 2019-01-01 RX ADMIN — DIPHENHYDRAMINE HYDROCHLORIDE 25 MG: 25 CAPSULE ORAL at 11:29

## 2019-01-01 RX ADMIN — PREDNISONE 20 MG: 20 TABLET ORAL at 21:47

## 2019-01-01 RX ADMIN — IPRATROPIUM BROMIDE AND ALBUTEROL SULFATE 3 ML: 2.5; .5 SOLUTION RESPIRATORY (INHALATION) at 15:21

## 2019-01-01 RX ADMIN — MORPHINE SULFATE 2 MG: 2 INJECTION, SOLUTION INTRAMUSCULAR; INTRAVENOUS at 22:51

## 2019-01-01 RX ADMIN — MORPHINE SULFATE 2 MG: 2 INJECTION, SOLUTION INTRAMUSCULAR; INTRAVENOUS at 20:09

## 2019-01-01 RX ADMIN — SODIUM CHLORIDE, PRESERVATIVE FREE 3 ML: 5 INJECTION INTRAVENOUS at 20:57

## 2019-01-01 RX ADMIN — ALBUMIN (HUMAN): 0.05 SOLUTION INTRAVENOUS at 13:04

## 2019-01-01 RX ADMIN — ONDANSETRON HYDROCHLORIDE 4 MG: 2 SOLUTION INTRAMUSCULAR; INTRAVENOUS at 12:06

## 2019-01-01 RX ADMIN — DIPHENHYDRAMINE HYDROCHLORIDE 25 MG: 25 CAPSULE ORAL at 11:35

## 2019-01-01 RX ADMIN — POTASSIUM CHLORIDE 10 MEQ: 7.46 INJECTION, SOLUTION INTRAVENOUS at 06:09

## 2019-01-01 RX ADMIN — SODIUM CHLORIDE, PRESERVATIVE FREE 3 ML: 5 INJECTION INTRAVENOUS at 20:26

## 2019-01-01 RX ADMIN — NOREPINEPHRINE BITARTRATE 0.47 MCG/KG/MIN: 1 INJECTION INTRAVENOUS at 14:26

## 2019-01-01 RX ADMIN — POTASSIUM CHLORIDE, DEXTROSE MONOHYDRATE AND SODIUM CHLORIDE 100 ML/HR: 150; 5; 450 INJECTION, SOLUTION INTRAVENOUS at 08:09

## 2019-02-06 NOTE — PROGRESS NOTES
Subjective    REASON FOR FOLLOW UP:   1.  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 Moon Bowen on 10/6/2017.  Primary stereotactic radiation therapy to the left upper lobe completed 12/15/2017  2.  Lung tumor is negative for PDL 1.  Also negative for ALK, EGFR, and ROS 1 mutations.  3.  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   4.  Esophageal adenocarcinoma was negative for HER-2/elizabeth overexpression but positive for PDL 1  5.  Initiation of combined radiation and low-dose weekly carboplatin and Taxol chemotherapy for treatment of esophageal adenocarcinoma.  First treatment delivered 1/11/2018.  6.  Development of thrombocytopenia and radiation esophagitis on the visit of 2/15/2018.  7.  EGD during the hospitalization in March 2018.  Biopsies were negative for cancer.      History of Present Illness     HISTORY OF PRESENT ILLNESS: Ms. Abad is a very pleasant 68 y.o. female with the above-noted history of both lung cancer and esophageal cancer.  As noted above, she was treated with combined chemotherapy and radiation for her adenocarcinoma of the GE junction, completing concurrent chemoradiation around 3-18.  Unfortunately she developed significant toxicity to treatment including esophagitis requiring hospitalization from 03/15/2018 through 03/20/2018 for radiation esophagitis and dehydration. During her hospitalization, she underwent EGD with biopsies which showed inflammation and ulceration but no evidence of malignancy.    She is here today for scheduled IVIG.  She reports feeling fair in the office today.  Her biggest concern is decreased appetite.  She states that she simply has no appetite and she does have persistent nausea.  She does explain that the nausea is well relieved with Zofran.  Has been an ongoing issue and she has actually met with a nutritionist.  She has lost approximately pounds since her visit in  November.    Denies new symptoms such as pain, masses, bleeding, bruising or swelling.  She does report significant fatigue.  She denies fever or chills.  She denies side effects from IVIG treatment itself.    Past Medical History:   Diagnosis Date   • Adenocarcinoma of lung (CMS/HCC) 2017    HAD RADIATION    • Anxiety and depression    • Arthritis    • Benign parotid tumor 2012    removed by Dr Vickers told it was benign   • COPD (chronic obstructive pulmonary disease) (CMS/HCC)    • Depression    • Diarrhea    • Early cataract    • Emphysema of lung (CMS/HCC)    • Esophageal cancer (CMS/HCC) 2017   • History of chronic pain    • History of colon polyps    • History of pneumonia    • Hyperlipidemia    • Joint pain    • Stroke (CMS/HCC)       HEMATOLOGIC/ ONCOLOGIC HISTORY:  The patient is a 67 y.o. year old female who has a history of smoking and COPD.  She had an abnormal CT scan in February of this year showing a 3.5 cm mass in the left suprahilar area.  A repeat CT scan was performed at [facility] 7/13/2017 and show that the mass had increased in size from about 3.5 cm up to 6.5 cm.    She underwent navigational bronchoscopy and endobronchial ultrasound and biopsy with Dr. Moon Bowen on 10/6/2017.  The pathology on the biopsy showed well-differentiated adenocarcinoma.  She had a PDL 1 staining on her bronchoscopy specimen which was negative for PDL 1.  EGFR, ALK, and ROS 1 were also negative.    She was referred to the [facility] radiation [facility] and completed primary radiation to the left upper lobe lung mass.  Because of abnormal uptake in the esophagus noted on her staging PET scan, she also underwent an EGD and biopsy which unfortunately revealed adenocarcinoma of the GE junction.  Molecular studies for HER-2/elizabeth and PDL 1 on the tumor tissue showed that she was PD L1 positive and HER-2/elizabeth negative.  We recommended combined weekly low dose carbo Taxol chemotherapy and radiation.     She  had significant difficulty with treatment particularly with radiation esophagitis near the end of her course of therapy.  She required some dose delay and  completed her radiation treatments the first week of March 2018.    She reports today that she is finally beginning to feel a little better.  Her appetite is returning and she is slowly regaining her energy.      Past Surgical History:   Procedure Laterality Date   • BRONCHOSCOPY     • CHOLECYSTECTOMY  1999   • COLON SURGERY  2015    COLON POLYPS   • COLONOSCOPY  04/13/2016    TA w/low grade dysplasia x 3, serrated adenoma x 2   • COLONOSCOPY  06/14/2016    TA w/high grade dysplasia   • ENDOSCOPY N/A 12/6/2017    Malignant esophageal tumor was found at the gastroesophageal junction. PATH:   INVASIVE ADENOCARCINOMA   • ENDOSCOPY N/A 3/17/2018    At the gastroesophageal junction was ulceration and scarring from the radiation therapy and chemo, Erythematous mucosa in the stomach.  PATH:  MILD CHRONIC ACTIVE GASTRITIS   • FOOT SURGERY  10/2010    Hammertoe   • HYSTERECTOMY     • INTUBATION  8/3/2018        • ND INSJ TUNNELED CVC W/O SUBQ PORT/ AGE 3 YR/> Right 1/9/2018    Procedure: MEDIPORT PLACEMENT right;  Surgeon: Damaso Germain MD;  Location: [facility] 18/19;  Service: Vascular   • SALIVARY GLAND SURGERY      PAROTID MASS REMOVED BENIGN   • SHOULDER ARTHROSCOPY Left 1995. 2001        ALLERGIES:    Allergies   Allergen Reactions   • Penicillins Hives     Happened about 40 years ago, had rash, Tolerated 7 days of Zosyn here in 8/2018   • Bactrim [Sulfamethoxazole-Trimethoprim] Itching   • Sulfa Antibiotics Itching         Review of Systems   Constitutional: Positive for fatigue. Negative for activity change and fever.   HENT: Negative for hearing loss, nosebleeds and voice change.    Eyes: Negative for visual disturbance.   Respiratory: Negative for cough, shortness of breath and wheezing.    Cardiovascular: Negative for chest pain and palpitations.    Gastrointestinal: Positive for nausea. Negative for abdominal pain.        See HPI   Genitourinary: Negative for difficulty urinating, frequency, hematuria and urgency.   Musculoskeletal: Negative for back pain and neck pain.   Skin: Negative for rash.   Neurological: Positive for weakness. Negative for dizziness, seizures, syncope and headaches.   Hematological: Negative for adenopathy. Does not bruise/bleed easily.   Psychiatric/Behavioral: Negative for behavioral problems. The patient is not nervous/anxious.         Objective     There were no vitals filed for this visit.  Current Status 12/26/2018   ECOG score 0       Physical Exam   Constitutional: She is oriented to person, place, and time. She appears well-developed. No distress.   Weak appearing but in no acute distress.   HENT:   Head: Normocephalic.   Eyes: Conjunctivae and EOM are normal. Pupils are equal, round, and reactive to light. No scleral icterus.   Neck: Normal range of motion. Neck supple. No JVD present. No thyromegaly present.   Cardiovascular: Normal rate and regular rhythm. Exam reveals no gallop and no friction rub.   No murmur heard.  Pulmonary/Chest: Effort normal. No respiratory distress. She has no wheezes. She has no rhonchi. She has no rales.   Abdominal: Soft. She exhibits no distension and no mass. There is no tenderness.   Musculoskeletal: Normal range of motion. She exhibits no edema or deformity.   Neurological: She is alert and oriented to person, place, and time. She has normal reflexes. No cranial nerve deficit.   Skin: Skin is warm and dry. No rash noted. No erythema.   Psychiatric: She has a normal mood and affect. Her behavior is normal. Judgment normal.       No change 4/19/18    RECENT LABS:  Hematology WBC   Date Value Ref Range Status   12/26/2018 4.72 4.00 - 10.00 10*3/mm3 Final     RBC   Date Value Ref Range Status   12/26/2018 4.23 3.90 - 5.00 10*6/mm3 Final     Hemoglobin   Date Value Ref Range Status   12/26/2018  12.1 11.5 - 14.9 g/dL Final     Hematocrit   Date Value Ref Range Status   12/26/2018 36.6 34.0 - 45.0 % Final     Platelets   Date Value Ref Range Status   12/26/2018 171 150 - 375 10*3/mm3 Final        Lab Results   Component Value Date    GLUCOSE 119 11/19/2018    BUN 12 11/19/2018    CREATININE 0.95 11/19/2018    EGFRIFNONA 58 (L) 11/19/2018    BCR 12.6 11/19/2018    K 4.0 11/19/2018    CO2 26.0 11/19/2018    CALCIUM 9.1 11/19/2018    ALBUMIN 3.80 11/19/2018    AST 19 11/19/2018    ALT 10 11/19/2018         CT CAP 11/8/2018  IMPRESSION:  Progressive volume loss and consolidation in the left upper  lobe consistent with evolving post RT fibrosis and atelectasis. Residual  neoplasm in this location cannot be entirely excluded. Continued CT  follow-up is recommended. Esophageal wall thickening involving the  distal esophagus extending across the GE junction into the cardia of the  stomach has diminished significantly. There is no evidence of metastatic  disease within the chest, abdomen or pelvis.      Assessment/Plan   1.  Well differentiated adenocarcinoma presumably from lung origin in a long-time smoker.  The biopsy was made from navigational bronchoscopy and endobronchial ultrasound at [place] on 10/6/2017.  Staging PET scan showed no mediastinal adenopathy or obvious distant metastases.  She did have uptake in the lower esophagus which was evaluated with EGD and biopsy on 12/6/2017 and unfortunately was found to be adenocarcinoma at the GE junction.   · LungTumor tissue was negative for PDL 1 expression, EGFR, ALK, or ROS 1 mutations.   · She completed stereotactic radiation to the lung tumor 12/15/2017.  · Scans from 11/8/2018 showed no evidence of progressive cancer although she does have significant left upper lobe scarring from radiation.  2.  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   · Esophageal adenocarcinoma was negative for HER-2/elizabeth overexpression but positive for PDL  1.  · Initiated combined radiation therapy to the esophagus along with weekly low-dose carboplatin and Taxol chemotherapy 1/11/18. She completed treatment 3/2/18.  · No evidence of recurrent or metastatic disease on her surveillance scans 11/8/2018.  3. Emphysema due to smoking.  4. Placement of right internal jugular MediPort by Dr. Raphael Germain of vascular surgery 1/9/2018.  5.  Left lower lobe pneumonia which required hospitalization in August 2018.  6.  Hypogammaglobulinemia.  She is here today for schedule IVIG.  He has been receiving this monthly for 3 treatments.  7.  Continued weight loss and decreased appetite.  8.  Chronic nausea that is well managed with Zofran but contributes to her weight loss.    Plan  1.  I have reviewed her lab work and clinical picture with Dr. Collier today.  We will have Mrs. Abad return in 1 month for NP visit and IVIG.  She will also see an NP in 2 months, and then have scans before her visit with Dr. Collier in 3 months as already scheduled on  5/6/2019 .  2.  I have ordered 10 mg of prednisone to be taken daily in an effort to increase her appetite and help with energy.  Assess the effectiveness when she returns in 1 month.  3.  I have asked the patient to call the office in the interval with any new or worsening symptoms.

## 2019-03-06 NOTE — PROGRESS NOTES
Subjective    REASON FOR FOLLOW UP:   1.  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 Moon Bowen on 10/6/2017.  Primary stereotactic radiation therapy to the left upper lobe completed 12/15/2017  2.  Lung tumor is negative for PDL 1.  Also negative for ALK, EGFR, and ROS 1 mutations.  3.  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   4.  Esophageal adenocarcinoma was negative for HER-2/elizabeth overexpression but positive for PDL 1  5.  Initiation of combined radiation and low-dose weekly carboplatin and Taxol chemotherapy for treatment of esophageal adenocarcinoma.  First treatment delivered 1/11/2018.  6.  Development of thrombocytopenia and radiation esophagitis on the visit of 2/15/2018.  7.  EGD during the hospitalization in March 2018.  Biopsies were negative for cancer.  8.  Hypogammaglobulinemia receiving monthly IVIG.      History of Present Illness     HISTORY OF PRESENT ILLNESS: Ms. Abad is a very pleasant 68 y.o. female with the above-noted history of both lung cancer and esophageal cancer.  As noted above, she was treated with combined chemotherapy and radiation for her adenocarcinoma of the GE junction, completing concurrent chemoradiation around 3-18.  Unfortunately she developed significant toxicity to treatment including esophagitis requiring hospitalization from 03/15/2018 through 03/20/2018 for radiation esophagitis and dehydration. During her hospitalization, she underwent EGD with biopsies which showed inflammation and ulceration but no evidence of malignancy.    She is here today, 3/6/2019  for scheduled IVIG that she receives monthly for her hypogammaglobulinemia.      She reports feeling well in the office today.  She  did initiate 10 mg of prednisone daily last month and she has fortunately derived quite a bit of benefit from this.  She states that her energy level is much improved.  Her appetite is still minimal,  however, she does report a slight increase. Despite this,  she has lost approximately 2 pounds since her last visit.   Her vital signs are stable. Her CBC is stable.    Denies new symptoms such as pain, masses, bleeding, bruising or swelling.  She does report significant fatigue.  She denies fever or chills.  She denies side effects from IVIG treatment itself.    Past Medical History:   Diagnosis Date   • Adenocarcinoma of lung (CMS/HCC) 2017    HAD RADIATION    • Anxiety and depression    • Arthritis    • Benign parotid tumor 2012    removed by Dr Vickers told it was benign   • COPD (chronic obstructive pulmonary disease) (CMS/HCC)    • Depression    • Diarrhea    • Early cataract    • Emphysema of lung (CMS/HCC)    • Esophageal cancer (CMS/HCC) 2017   • History of chronic pain    • History of colon polyps    • History of pneumonia    • Hyperlipidemia    • Joint pain    • Stroke (CMS/HCC)       HEMATOLOGIC/ ONCOLOGIC HISTORY:  The patient is a 67 y.o. year old female who has a history of smoking and COPD.  She had an abnormal CT scan in February of this year showing a 3.5 cm mass in the left suprahilar area.  A repeat CT scan was performed at [facility] 7/13/2017 and show that the mass had increased in size from about 3.5 cm up to 6.5 cm.    She underwent navigational bronchoscopy and endobronchial ultrasound and biopsy with Dr. Moon Bowen on 10/6/2017.  The pathology on the biopsy showed well-differentiated adenocarcinoma.  She had a PDL 1 staining on her bronchoscopy specimen which was negative for PDL 1.  EGFR, ALK, and ROS 1 were also negative.    She was referred to the [facility] radiation Center and completed primary radiation to the left upper lobe lung mass.  Because of abnormal uptake in the esophagus noted on her staging PET scan, she also underwent an EGD and biopsy which unfortunately revealed adenocarcinoma of the GE junction.  Molecular studies for HER-2/elizabeth and PDL 1 on the tumor  tissue showed that she was PD L1 positive and HER-2/elizabeth negative.  We recommended combined weekly low dose carbo Taxol chemotherapy and radiation.     She had significant difficulty with treatment particularly with radiation esophagitis near the end of her course of therapy.  She required some dose delay and  completed her radiation treatments the first week of March 2018.    She reports today that she is finally beginning to feel a little better.  Her appetite is returning and she is slowly regaining her energy.      Past Surgical History:   Procedure Laterality Date   • BRONCHOSCOPY     • CHOLECYSTECTOMY  1999   • COLON SURGERY  2015    COLON POLYPS   • COLONOSCOPY  04/13/2016    TA w/low grade dysplasia x 3, serrated adenoma x 2   • COLONOSCOPY  06/14/2016    TA w/high grade dysplasia   • ENDOSCOPY N/A 12/6/2017    Malignant esophageal tumor was found at the gastroesophageal junction. PATH:   INVASIVE ADENOCARCINOMA   • ENDOSCOPY N/A 3/17/2018    At the gastroesophageal junction was ulceration and scarring from the radiation therapy and chemo, Erythematous mucosa in the stomach.  PATH:  MILD CHRONIC ACTIVE GASTRITIS   • FOOT SURGERY  10/2010    Hammertoe   • HYSTERECTOMY     • INTUBATION  8/3/2018        • MI INSJ TUNNELED CVC W/O SUBQ PORT/ AGE 3 YR/> Right 1/9/2018    Procedure: MEDIPORT PLACEMENT right;  Surgeon: Damaso Germain MD;  Location: [facility] OR 18/19;  Service: Vascular   • SALIVARY GLAND SURGERY      PAROTID MASS REMOVED BENIGN   • SHOULDER ARTHROSCOPY Left 1995. 2001        ALLERGIES:    Allergies   Allergen Reactions   • Penicillins Hives     Happened about 40 years ago, had rash, Tolerated 7 days of Zosyn here in 8/2018   • Bactrim [Sulfamethoxazole-Trimethoprim] Itching   • Sulfa Antibiotics Itching         Review of Systems   Constitutional: Positive for fatigue (improved). Negative for activity change and fever.   HENT: Negative for hearing loss, nosebleeds and voice change.    Eyes:  Negative for visual disturbance.   Respiratory: Negative for cough, shortness of breath and wheezing.    Cardiovascular: Negative for chest pain and palpitations.   Gastrointestinal: Negative for abdominal pain.        See HPI   Genitourinary: Negative for difficulty urinating, frequency, hematuria and urgency.   Musculoskeletal: Negative for back pain and neck pain.   Skin: Negative for rash.   Neurological: Positive for weakness (improved). Negative for dizziness, seizures, syncope and headaches.   Hematological: Negative for adenopathy. Does not bruise/bleed easily.   Psychiatric/Behavioral: Negative for behavioral problems. The patient is not nervous/anxious.         Objective     There were no vitals filed for this visit.  Current Status 2/6/2019   ECOG score 0       Physical Exam   Constitutional: She is oriented to person, place, and time. She appears well-developed. No distress.   She appears slightly stronger than last visit.   HENT:   Head: Normocephalic.   Eyes: Conjunctivae and EOM are normal. Pupils are equal, round, and reactive to light. No scleral icterus.   Neck: Normal range of motion. Neck supple. No JVD present. No thyromegaly present.   Cardiovascular: Normal rate. Exam reveals no gallop and no friction rub.   No murmur heard.  Pulmonary/Chest: Effort normal. No respiratory distress. She has no wheezes. She has no rhonchi. She has no rales.   Abdominal: Soft. She exhibits no distension and no mass. There is no tenderness.   Musculoskeletal: Normal range of motion. She exhibits no edema or deformity.   Neurological: She is alert and oriented to person, place, and time. She has normal reflexes. No cranial nerve deficit.   Skin: Skin is warm and dry. No rash noted. No erythema.   Psychiatric: She has a normal mood and affect. Her behavior is normal. Judgment normal.       No change 4/19/18    RECENT LABS:  Hematology WBC   Date Value Ref Range Status   02/06/2019 6.67 4.00 - 10.00 10*3/mm3 Final      RBC   Date Value Ref Range Status   02/06/2019 4.57 3.90 - 5.00 10*6/mm3 Final     Hemoglobin   Date Value Ref Range Status   02/06/2019 13.1 11.5 - 14.9 g/dL Final     Hematocrit   Date Value Ref Range Status   02/06/2019 38.6 34.0 - 45.0 % Final     Platelets   Date Value Ref Range Status   02/06/2019 209 150 - 375 10*3/mm3 Final        Lab Results   Component Value Date    GLUCOSE 119 11/19/2018    BUN 12 11/19/2018    CREATININE 0.95 11/19/2018    EGFRIFNONA 58 (L) 11/19/2018    BCR 12.6 11/19/2018    K 4.0 11/19/2018    CO2 26.0 11/19/2018    CALCIUM 9.1 11/19/2018    ALBUMIN 3.80 11/19/2018    AST 19 11/19/2018    ALT 10 11/19/2018         CT CAP 11/8/2018  IMPRESSION:  Progressive volume loss and consolidation in the left upper  lobe consistent with evolving post RT fibrosis and atelectasis. Residual  neoplasm in this location cannot be entirely excluded. Continued CT  follow-up is recommended. Esophageal wall thickening involving the  distal esophagus extending across the GE junction into the cardia of the  stomach has diminished significantly. There is no evidence of metastatic  disease within the chest, abdomen or pelvis.      Assessment/Plan   1.  Well differentiated adenocarcinoma presumably from lung origin in a long-time smoker.  The biopsy was made from navigational bronchoscopy and endobronchial ultrasound at [place] on 10/6/2017.  Staging PET scan showed no mediastinal adenopathy or obvious distant metastases.  She did have uptake in the lower esophagus which was evaluated with EGD and biopsy on 12/6/2017 and unfortunately was found to be adenocarcinoma at the GE junction.   · LungTumor tissue was negative for PDL 1 expression, EGFR, ALK, or ROS 1 mutations.   · She completed stereotactic radiation to the lung tumor 12/15/2017.  · Scans from 11/8/2018 showed no evidence of progressive cancer although she does have significant left upper lobe scarring from radiation.  2.  Adenocarcinoma of the GE  junction diagnosed by EGD and biopsy 12/6/2017.   · Esophageal adenocarcinoma was negative for HER-2/elizabeth overexpression but positive for PDL 1.  · Initiated combined radiation therapy to the esophagus along with weekly low-dose carboplatin and Taxol chemotherapy 1/11/18. She completed treatment 3/2/18.  · No evidence of recurrent or metastatic disease on her surveillance scans 11/8/2018.  3. Emphysema due to smoking.  4. Placement of right internal jugular MediPort by Dr. Raphael Germain of vascular surgery 1/9/2018.  5.  Left lower lobe pneumonia which required hospitalization in August 2018.  6.  Hypogammaglobulinemia.  She is here today, 3/6/2019,  for schedule IVIG.  She has been receiving this monthly.   7.  Continued weight loss and decreased appetite.  Her Appetite has improved slightly with addition of 10 mg of prednisone daily, however, she continues to lose weight.  8.  Chronic nausea that is well managed with Zofran but contributes to her weight loss.  Improved with prednisone.    Plan  1.  We will proceed with IVIG as scheduled today.  She will return as already scheduled on April  3 2019 to see Evangelina Rivera NP for IVIG and  then have CT Scans on 4/30/2019 to be reviewed with  Dr. Collier as already scheduled on  5/6/2019 .  2. Continue 10 mg of prednisone daily. I have e-scribed a refill today.  3.  I have asked the patient to call the office in the interval with any new or worsening symptoms.

## 2019-03-17 PROBLEM — N17.9 AKI (ACUTE KIDNEY INJURY) (HCC): Status: ACTIVE | Noted: 2019-01-01

## 2019-03-17 PROBLEM — K56.609 SMALL BOWEL OBSTRUCTION (HCC): Status: ACTIVE | Noted: 2019-01-01

## 2019-03-18 PROBLEM — R09.02 HYPOXEMIA: Status: ACTIVE | Noted: 2019-01-01

## 2019-03-18 PROBLEM — J96.11 CHRONIC RESPIRATORY FAILURE WITH HYPOXIA (HCC): Status: ACTIVE | Noted: 2019-01-01

## 2019-03-19 PROBLEM — J96.21 ACUTE AND CHRONIC RESPIRATORY FAILURE WITH HYPOXIA (HCC): Status: ACTIVE | Noted: 2019-01-01

## 2019-03-25 NOTE — OUTREACH NOTE
Prep Survey      Responses   Facility patient discharged from?  [facility]   Is patient eligible?  Yes   Discharge diagnosis  SBO, atypical PNA, advanced COPD   Does the patient have one of the following disease processes/diagnoses(primary or secondary)?  COPD/Pneumonia   Does the patient have Home health ordered?  Yes   What is the Home health agency?   [facility]    Is there a DME ordered?  Yes   What DME was ordered?  [employer] - [employer]   General alerts for this patient  Lung CA   Prep survey completed?  Yes          Debra Valladares RN

## 2019-03-26 NOTE — OUTREACH NOTE
COPD/PN Week 1 Survey      Responses   Facility patient discharged from?  [facility]   Does the patient have one of the following disease processes/diagnoses(primary or secondary)?  COPD/Pneumonia   Is there a successful TCM telephone encounter documented?  No   Was the primary reason for admission:  COPD exacerbation   Week 1 attempt successful?  Yes   Call start time  0913   Call end time  0930   General alerts for this patient  Lung CA   Discharge diagnosis  SBO, atypical PNA, advanced COPD   Meds reviewed with patient/caregiver?  Yes   Is the patient having any side effects they believe may be caused by any medication additions or changes?  No   Does the patient have all medications ordered at discharge?  Yes   Is the patient taking all medications as directed (includes completed medication regime)?  Yes   Medication comments  IVIG infusion scheduled for next week.  Has Thrush, she is going to call MD for medication.   Does the patient have a primary care provider?   Yes   Does the patient have an appointment with their PCP or pulmonologist within 7 days of discharge?  No   What is preventing the patient from scheduling follow up appointments within 7 days of discharge?  Haven't had time   Nursing Interventions  Educated patient on importance of making appointment, Advised patient to make appointment   Has the patient kept scheduled appointments due by today?  N/A   Comments  Dr. Taylor, PCP   What is the Home health agency?   [facility]    Has home health visited the patient within 72 hours of discharge?  Call prior to 72 hours   What DME was ordered?  [employer] - [employer]      Has all DME been delivered?  Yes   Psychosocial issues?  No   Comments  Weak and tired.   Did the patient receive a copy of their discharge instructions?  Yes   Nursing interventions  Reviewed instructions with patient   What is the patient's perception of their health status since discharge?  Improving   Nursing Interventions  Nurse  provided patient education   Are the patient's immunizations up to date?   Yes   If the patient is a current smoker, are they able to teach back resources for cessation?  -- [Sneaks one here and there has had about 3 cigarettes in the last 3 weeks.]   Is the patient/caregiver able to teach back the hierarchy of who to call/visit for symptoms/problems? PCP, Specialist, Home health nurse, Urgent Care, ED, 911  Yes   Is the patient able to teach back COPD zones?  Yes [Needs reinforcement]   Nursing interventions  Education provided on various zones   Patient reports what zone on this call?  Yellow Zone   Yellow Zone  Unable to complete daily activities   Yellow interventions  Continue to use daily medications, Use oxygen as ordered, Do not smoke, Get plenty of rest   Is the patient/caregiver able to teach back signs and symptoms of worsening condition:  Fever/chills, Shortness of breath, Chest pain   Is the patient/caregiver able to teach back importance of completing antibiotic course of treatment?  Yes   Week 1 call completed?  Yes          Dafne Arzola RN

## 2019-04-02 NOTE — PROGRESS NOTES
Subjective    REASON FOR FOLLOW UP:   1.  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 Moon Bowen on 10/6/2017.  Primary stereotactic radiation therapy to the left upper lobe completed 12/15/2017  2.  Lung tumor is negative for PDL 1.  Also negative for ALK, EGFR, and ROS 1 mutations.  3.  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   4.  Esophageal adenocarcinoma was negative for HER-2/elizabeth overexpression but positive for PDL 1  5.  Initiation of combined radiation and low-dose weekly carboplatin and Taxol chemotherapy for treatment of esophageal adenocarcinoma.  First treatment delivered 1/11/2018.  6.  Development of thrombocytopenia and radiation esophagitis on the visit of 2/15/2018.  7.  EGD during the hospitalization in March 2018.  Biopsies were negative for cancer.  8.  Hypogammaglobulinemia receiving monthly IVIG.      History of Present Illness     HISTORY OF PRESENT ILLNESS: Ms. Abad is a very pleasant 69 y.o. female with the above-noted history of both lung cancer and esophageal cancer.  As noted above, she was treated with combined chemotherapy and radiation for her adenocarcinoma of the GE junction, completing concurrent chemoradiation around 3-18.  Unfortunately she developed significant toxicity to treatment including esophagitis requiring hospitalization in March 2018 for radiation esophagitis and dehydration. During her hospitalization, she underwent EGD with biopsies which showed inflammation and ulceration but no evidence of malignancy.    The patient returns today, having again been hospitalized in March of this year, admitted to the ICU with small bowel obstruction, COPD exacerbation and possible pneumonia.    She is feeling very fatigued after extended hospital stay.  She does feel that she is breathing better now.  She just completed a steroid taper per pulmonology and is now back to prednisone 10 mg daily per  usual.    She is due today for monthly IVIG.  We do have her scheduled for repeat CT scans at the end of this month.  I will plan to keep these as scheduled since recent scans in the hospital are without contrast and complicated by probable pneumonia.    Past Medical History:   Diagnosis Date   • Adenocarcinoma of lung (CMS/HCC) 2017    HAD RADIATION    • Anxiety and depression    • Arthritis    • Benign parotid tumor 2012    removed by Dr Vickers told it was benign   • COPD (chronic obstructive pulmonary disease) (CMS/HCC)    • Depression    • Diarrhea    • Early cataract    • Emphysema of lung (CMS/HCC)    • Esophageal cancer (CMS/HCC) 2017   • History of chronic pain    • History of colon polyps    • History of pneumonia    • Hyperlipidemia    • Joint pain    • Stroke (CMS/HCC)       HEMATOLOGIC/ ONCOLOGIC HISTORY:  The patient is a 67 y.o. year old female who has a history of smoking and COPD.  She had an abnormal CT scan in February of this year showing a 3.5 cm mass in the left suprahilar area.  A repeat CT scan was performed at [facility] 7/13/2017 and show that the mass had increased in size from about 3.5 cm up to 6.5 cm.    She underwent navigational bronchoscopy and endobronchial ultrasound and biopsy with Dr. Moon Bowen on 10/6/2017.  The pathology on the biopsy showed well-differentiated adenocarcinoma.  She had a PDL 1 staining on her bronchoscopy specimen which was negative for PDL 1.  EGFR, ALK, and ROS 1 were also negative.    She was referred to the [facility] radiation Center and completed primary radiation to the left upper lobe lung mass.  Because of abnormal uptake in the esophagus noted on her staging PET scan, she also underwent an EGD and biopsy which unfortunately revealed adenocarcinoma of the GE junction.  Molecular studies for HER-2/elizabeth and PDL 1 on the tumor tissue showed that she was PD L1 positive and HER-2/elizabeth negative.  We recommended combined weekly low dose carbo  Taxol chemotherapy and radiation.     She had significant difficulty with treatment particularly with radiation esophagitis near the end of her course of therapy.  She required some dose delay and  completed her radiation treatments the first week of March 2018.    She reports today that she is finally beginning to feel a little better.  Her appetite is returning and she is slowly regaining her energy.      Past Surgical History:   Procedure Laterality Date   • BRONCHOSCOPY     • CHOLECYSTECTOMY  1999   • COLON SURGERY  2015    COLON POLYPS   • COLONOSCOPY  04/13/2016    TA w/low grade dysplasia x 3, serrated adenoma x 2   • COLONOSCOPY  06/14/2016    TA w/high grade dysplasia   • ENDOSCOPY N/A 12/6/2017    Malignant esophageal tumor was found at the gastroesophageal junction. PATH:   INVASIVE ADENOCARCINOMA   • ENDOSCOPY N/A 3/17/2018    At the gastroesophageal junction was ulceration and scarring from the radiation therapy and chemo, Erythematous mucosa in the stomach.  PATH:  MILD CHRONIC ACTIVE GASTRITIS   • FOOT SURGERY  10/2010    [place]   • HYSTERECTOMY     • INTUBATION  8/3/2018        • CA INSJ TUNNELED CVC W/O SUBQ PORT/ AGE 3 YR/> Right 1/9/2018    Procedure: MEDIPORT PLACEMENT right;  Surgeon: Damaso Germain MD;  Location: Replaced by [facility] OR 18/19;  Service: Vascular   • SALIVARY GLAND SURGERY      PAROTID MASS REMOVED BENIGN   • SHOULDER ARTHROSCOPY Left 1995. 2001        ALLERGIES:    Allergies   Allergen Reactions   • Penicillins Hives     Happened about 40 years ago, had rash, Tolerated 7 days of Zosyn here in 8/2018   • Bactrim [Sulfamethoxazole-Trimethoprim] Itching   • Sulfa Antibiotics Itching         Review of Systems   Constitutional: Positive for fatigue (worse after hospitalization). Negative for activity change and fever.   HENT: Negative for hearing loss, nosebleeds and voice change.    Eyes: Negative for visual disturbance.   Respiratory: Negative for cough, shortness of breath and  "wheezing.         See HPI. No difficulty with breathing now.   Cardiovascular: Negative for chest pain and palpitations.   Gastrointestinal: Negative for abdominal pain.        See HPI - bowels moving now, soft, no diarrhea   Genitourinary: Negative for difficulty urinating, frequency, hematuria and urgency.   Musculoskeletal: Negative for back pain and neck pain.   Skin: Negative for rash.   Neurological: Negative for dizziness, seizures, syncope, weakness and headaches.   Hematological: Negative for adenopathy. Does not bruise/bleed easily.   Psychiatric/Behavioral: Negative for behavioral problems. The patient is not nervous/anxious.         Objective     Vitals:    04/03/19 1100   BP: 158/88   Pulse: 97   Resp: 14   Temp: 98 °F (36.7 °C)   SpO2: 95%   Weight: 43.3 kg (95 lb 6.4 oz)   Height: 170.2 cm (67.01\")   PainSc:   6   PainLoc: Comment: Stomach     Current Status 4/3/2019   ECOG score 0       Physical Exam   Constitutional: She is oriented to person, place, and time. She appears well-developed. No distress.   HENT:   Head: Normocephalic.   Eyes: Conjunctivae and EOM are normal. Pupils are equal, round, and reactive to light. No scleral icterus.   Neck: Normal range of motion. Neck supple. No JVD present. No thyromegaly present.   Cardiovascular: Normal rate. Exam reveals no gallop and no friction rub.   No murmur heard.  Pulmonary/Chest: Effort normal. No respiratory distress. She has no wheezes. She has no rhonchi. She has no rales.   Abdominal: Soft. She exhibits no distension and no mass. There is no tenderness.   Musculoskeletal: Normal range of motion. She exhibits no edema or deformity.   Neurological: She is alert and oriented to person, place, and time. She has normal reflexes. No cranial nerve deficit.   Skin: Skin is warm and dry. No rash noted. No erythema.   Psychiatric: She has a normal mood and affect. Her behavior is normal. Judgment normal.       Exam unchanged 4/3/19    RECENT " LABS:  Results from last 7 days   Lab Units 04/03/19  1038   WBC 10*3/mm3 12.47*   NEUTROS ABS 10*3/mm3 11.36*   HEMOGLOBIN g/dL 12.4   HEMATOCRIT % 37.3   PLATELETS 10*3/mm3 243             RADIOGRAPHIC DATA:  CT CAP 11/8/2018  IMPRESSION:  Progressive volume loss and consolidation in the left upper  lobe consistent with evolving post RT fibrosis and atelectasis. Residual  neoplasm in this location cannot be entirely excluded. Continued CT  follow-up is recommended. Esophageal wall thickening involving the  distal esophagus extending across the GE junction into the cardia of the  stomach has diminished significantly. There is no evidence of metastatic  disease within the chest, abdomen or pelvis.      Assessment/Plan   1.  Well differentiated adenocarcinoma presumably from lung origin in a long-time smoker.  The biopsy was made from navigational bronchoscopy and endobronchial ultrasound at [facility] on 10/6/2017.  Staging PET scan showed no mediastinal adenopathy or obvious distant metastases.  She did have uptake in the lower esophagus which was evaluated with EGD and biopsy on 12/6/2017 and unfortunately was found to be adenocarcinoma at the GE junction.   · LungTumor tissue was negative for PDL 1 expression, EGFR, ALK, or ROS 1 mutations.   · She completed stereotactic radiation to the lung tumor 12/15/2017.  · Scans from 11/8/2018 showed no evidence of progressive cancer although she does have significant left upper lobe scarring from radiation.  · Due for repeat CT scans at the end of April 2019 (note, she did have a CT scan of the chest without contrast during recent hospitalization for probable pneumonia.  We will keep her scans as scheduled at the end of this month to follow-up on this and hopefully have scans with contrast, kidney function permitting).  2.  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   · Esophageal adenocarcinoma was negative for HER-2/elizabeth overexpression but  positive for PDL 1.  · Initiated combined radiation therapy to the esophagus along with weekly low-dose carboplatin and Taxol chemotherapy 1/11/18. She completed treatment 3/2/18.  · No evidence of recurrent or metastatic disease on her surveillance scans 11/8/2018.  · Again due for repeat CT scans at the end of April 2019.  3. Emphysema due to smoking.  4. Placement of right internal jugular MediPort by Dr. Raphael Germain of vascular surgery 1/9/2018.  5.  Hypogammaglobulinemia.  She is here today, 4/3/19,  for scheduled IVIG.  She has been receiving this monthly.   6.  Continued weight loss and decreased appetite.  Her Appetite has improved slightly with addition of 10 mg of prednisone daily, however, she continues to lose weight. 95 lbs today.  7.  Chronic nausea that is well managed with Zofran but contributes to her weight loss.  Improved with prednisone.  8.  Hospitalization from 3/17 to 3/24/19 with small bowel obstruction, COPD exacerbation and probable pneumonia.  Required ICU stay.  Treated conservatively with fluids and antibiotics, felt to be high surgical risk.  Patient improved now with bowels moving normally and no further respiratory symptoms.  She is quite fatigued post hospitalization.    Plan  1.  Proceed with IVIG as scheduled today.    2.  Repeat CT Scans on 4/30/2019.  3.  Return a week later for follow-up with Dr. Collier to review scans 5/6/2019 .  4.  Continue 10 mg of prednisone daily.   5.  I have asked the patient to call the office in the interval with any new or worsening symptoms.

## 2019-04-03 NOTE — OUTREACH NOTE
COPD/PN Week 2 Survey      Responses   Facility patient discharged from?  [facility]   Does the patient have one of the following disease processes/diagnoses(primary or secondary)?  COPD/Pneumonia   Was the primary reason for admission:  COPD exacerbation   Week 2 attempt successful?  No   Unsuccessful attempts  Attempt 1          José Miguel Boykin RN

## 2019-04-04 NOTE — OUTREACH NOTE
COPD/PN Week 2 Survey      Responses   Facility patient discharged from?  [facility]   Does the patient have one of the following disease processes/diagnoses(primary or secondary)?  COPD/Pneumonia   Week 2 attempt successful?  Yes   Call start time  1050   Call end time  1058   Meds reviewed with patient/caregiver?  Yes   Is the patient taking all medications as directed (includes completed medication regime)?  Yes   Medication comments  Had IVIG infusion 4/3   Comments regarding appointments  has seen PCP and NP since discharge and had infusion   Does the patient have a primary care provider?   Yes   Has the patient kept scheduled appointments due by today?  Yes   What is the patient's perception of their health status since discharge?  Improving   Is the patient able to teach back COPD zones?  Yes   Nursing interventions  Education provided on various zones   Patient reports what zone on this call?  Yellow Zone   Yellow Zone  Unable to complete daily activities, Using quick relief inhaler/nebulizer more often, Medication is not helping relieve symptoms   Yellow interventions  Continue to use daily medications, Use oxygen as ordered, Do not smoke, Get plenty of rest, Call provider immediatly if symptoms do not improve, Use other meds such as steroids or antibiotics as ordered   Week 2 call completed?  Yes   Wrap up additional comments  [facility]  was supposed to call her has never called, Triage nurse called them, spoke with Zonia, she will check on it for her.          Onelia Shea RN

## 2019-04-12 NOTE — OUTREACH NOTE
COPD/PN Week 3 Survey      Responses   Facility patient discharged from?  [facility]   Does the patient have one of the following disease processes/diagnoses(primary or secondary)?  COPD/Pneumonia   Was the primary reason for admission:  COPD exacerbation   Week 3 attempt successful?  No   Unsuccessful attempts  Attempt 1          Dafne Arzola RN

## 2019-04-14 NOTE — OUTREACH NOTE
COPD/PN Week 3 Survey      Responses   Facility patient discharged from?  [facility]   Does the patient have one of the following disease processes/diagnoses(primary or secondary)?  COPD/Pneumonia   Was the primary reason for admission:  COPD exacerbation   Week 3 attempt successful?  No   Unsuccessful attempts  Attempt 2          Dilma Castillo, RN

## 2019-04-29 PROBLEM — K56.609 SBO (SMALL BOWEL OBSTRUCTION) (HCC): Status: ACTIVE | Noted: 2019-01-01

## 2019-04-29 NOTE — TELEPHONE ENCOUNTER
----- Message from Dilma Calvillo sent at 4/29/2019  8:24 AM EDT -----  [phone]   issues with flu like symtoms, severe  Stomach pain      Pt calling because she is having severe abdominal pain with nausea and vomiting. It started last night and has continued throughout the night. She has been taking Zofran sporadically but it is not helping. She also mentioned that she had a runny bowel movement yesterday but it has been 2-3 days since she had a solid BM. She reports that her abdominal pain is all over.  D/W Evangelina Rivera NP. Per Evangelina, pt needs to go to the ER to be seen and evaluated. Informed pt and she v/u.

## 2019-05-01 NOTE — PLAN OF CARE
Problem: Patient Care Overview  Goal: Plan of Care Review  Outcome: Ongoing (interventions implemented as appropriate)   05/01/19 5022   Coping/Psychosocial   Plan of Care Reviewed With patient   OTHER   Outcome Summary non- productive occ cough, NG to lws, brown sometimes thick drainage, irrigated easily,, medicated for pain with relief, tolerating ice chips, enc ambulation     Goal: Individualization and Mutuality  Outcome: Ongoing (interventions implemented as appropriate)    Goal: Discharge Needs Assessment  Outcome: Ongoing (interventions implemented as appropriate)    Goal: Interprofessional Rounds/Family Conf  Outcome: Ongoing (interventions implemented as appropriate)      Problem: Bowel Obstruction (Adult)  Goal: Signs and Symptoms of Listed Potential Problems Will be Absent, Minimized or Managed (Bowel Obstruction)  Outcome: Ongoing (interventions implemented as appropriate)      Problem: Pain, Acute (Adult)  Goal: Acceptable Pain Control/Comfort Level  Outcome: Ongoing (interventions implemented as appropriate)      Problem: Nausea/Vomiting (Adult)  Goal: Symptom Relief  Outcome: Ongoing (interventions implemented as appropriate)    Goal: Adequate Hydration  Outcome: Ongoing (interventions implemented as appropriate)

## 2019-05-01 NOTE — PROGRESS NOTES
Follow-up small bowel obstruction    Subjective:  Feels slightly better today.  Still with some pain but nausea is improving.  She reports passing some flatus but no bowel movement.    Objective:  Patient has been afebrile, heart rate in the low 100s, blood pressure stable, oxygenation in the mid 90s on 2 L nasal cannula  General: Chronic ill appearance, no acute distress, answers most questions appropriately but occasionally seems slightly confused  Eyes: Extraocular movements are intact, no scleral icterus  Gastrointestinal: Abdomen soft and seems less tender today, no peritonitis, no mass.    Assessment and plan:  -Small bowel obstruction, partial versus complete, unclear at this time.  Patient states she has passed flatus but unclear how aware she is of her surroundings.  No evidence of bowel function to this point beyond that.  Clinically seems stable, poor surgical candidate and as such I think that we should treat her conservatively as long as possible.  No indication that surgical intervention necessary at this time.  We will continue to follow along.    Trenton Canales MD  General and Endoscopic Surgery  [facility]    [address], [address]  [place], [place], [address]  P: [phone]  F: [phone]

## 2019-05-01 NOTE — PLAN OF CARE
Problem: Patient Care Overview  Goal: Plan of Care Review  Outcome: Ongoing (interventions implemented as appropriate)   05/01/19 7686   Coping/Psychosocial   Plan of Care Reviewed With patient   Plan of Care Review   Progress no change   OTHER   Outcome Summary Tachycardic, tachypniec with SOA on mild exertion. BP on high range. Medicated for pain and nausea. NG Tube to LWS draining to green, thick gastric content. Up with assist to the BR. Urine output adequate. Slept on and off.      Goal: Individualization and Mutuality  Outcome: Ongoing (interventions implemented as appropriate)    Goal: Discharge Needs Assessment  Outcome: Ongoing (interventions implemented as appropriate)

## 2019-05-01 NOTE — TELEPHONE ENCOUNTER
----- Message from Sadie Clayton sent at 5/1/2019  2:38 PM EDT -----  Contact: [phone]  Pt calling to see if she should cancel appt for next week because she is in the hospital currently.

## 2019-05-02 NOTE — PLAN OF CARE
Problem: Patient Care Overview  Goal: Plan of Care Review  Outcome: Ongoing (interventions implemented as appropriate)   05/02/19 0508   Coping/Psychosocial   Plan of Care Reviewed With patient   Plan of Care Review   Progress no change   OTHER   Outcome Summary intermittent nausea. no emesis. NG to cont LWS with large amt brown to dark green return. [place] few ice chips po. IVF' s continure. pain controlled with IV morphine. nausea better after IV zofran     Goal: Individualization and Mutuality  Outcome: Ongoing (interventions implemented as appropriate)    Goal: Discharge Needs Assessment  Outcome: Ongoing (interventions implemented as appropriate)    Goal: Interprofessional Rounds/Family Conf  Outcome: Ongoing (interventions implemented as appropriate)      Problem: Bowel Obstruction (Adult)  Goal: Signs and Symptoms of Listed Potential Problems Will be Absent, Minimized or Managed (Bowel Obstruction)  Outcome: Ongoing (interventions implemented as appropriate)      Problem: Pain, Acute (Adult)  Goal: Acceptable Pain Control/Comfort Level  Outcome: Ongoing (interventions implemented as appropriate)      Problem: Nausea/Vomiting (Adult)  Goal: Symptom Relief  Outcome: Ongoing (interventions implemented as appropriate)    Goal: Adequate Hydration  Outcome: Ongoing (interventions implemented as appropriate)

## 2019-05-02 NOTE — PROGRESS NOTES
Follow-up small bowel obstruction    Subjective:  No significant changes.  Patient does state pain is slightly better and she feels quite hungry.  She has had minimal amount of flatus but no bowel movement.  She has been up and walking somewhat.    Objective:  Patient afebrile, heart rate 90s blood pressure 160/82  General: No acute distress, chronic ill and frail appearance  Eyes: Extraocular movements are intact, no scleral icterus  Gastrointestinal: Abdomen is softer today and only mildly distended.  There is some mild tenderness, but certainly no guarding or rebound.    Labs are reviewed.  White blood cell count remains normal at 7.45.  Hemoglobin 12.0.  Sodium is okay at 143 potassium is low at 2.8.    Assessment and plan:  -Small bowel obstruction, partial versus complete, unclear.  She is passing some flatus but clinically does not seem much different.  Very poor surgical candidate and as such unless there is a clear indication for surgery I am tempted to continue to monitor her.  I am going to add mineral oil via her nasogastric tube and hopefully that will stimulate things.  We will replace her potassium as well.  Continue to follow closely.    Trenton Canales MD  General and Endoscopic Surgery  [facility]    [address], [address]  [place], [place], [address]  P: [phone]  F: [phone]

## 2019-05-02 NOTE — PLAN OF CARE
Problem: Patient Care Overview  Goal: Plan of Care Review  Outcome: Outcome(s) achieved Date Met: 05/02/19 05/02/19 1631   Coping/Psychosocial   Plan of Care Reviewed With patient   OTHER   Outcome Summary medicated for pain x 2,NG to LWS draining greenish brown drainage, tolerating ice chips, voiding w/o difficulty     Goal: Individualization and Mutuality  Outcome: Ongoing (interventions implemented as appropriate)    Goal: Discharge Needs Assessment  Outcome: Outcome(s) achieved Date Met: 05/02/19    Goal: Interprofessional Rounds/Family Conf  Outcome: Outcome(s) achieved Date Met: 05/02/19      Problem: Pain, Acute (Adult)  Goal: Acceptable Pain Control/Comfort Level  Outcome: Ongoing (interventions implemented as appropriate)      Problem: Nausea/Vomiting (Adult)  Goal: Symptom Relief  Outcome: Ongoing (interventions implemented as appropriate)    Goal: Adequate Hydration  Outcome: Ongoing (interventions implemented as appropriate)

## 2019-05-03 NOTE — PROGRESS NOTES
Follow-up small bowel obstruction    Subjective:  Patient states she feels better today.  She passed some flatus and had a bowel movement.  Still with some abdominal discomfort and some nausea though.  NG output remains a little bilious.    Objective:  Patient afebrile with stable vitals  General: Awake alert and oriented, no acute distress  Eyes: Extraocular movements intact, no scleral icterus  Gastrointestinal: Seems less distended and less tender today, certainly no guarding or rebound, no hernia    NG output recorded as 2200 yesterday    Assessment and plan:  -Small bowel obstruction recurrent, partial versus complete unclear.  CT read as possible closed loop.  Clinically she is doing better with nasogastric decompression and mineral oil.  She had a bowel movement today but I am not convinced it is resolved just yet.  She is a poor surgical candidate.  I think that for the time being it is reasonable to watch her a little bit longer.  I will repeat labs and films in the morning.  I have encouraged the patient to continue ambulating.    Trenton Canales MD  General and Endoscopic Surgery  [facility]    [address], [address]  [place], [place], [address]  P: [phone]  F: [phone]

## 2019-05-03 NOTE — PLAN OF CARE
Problem: Patient Care Overview  Goal: Plan of Care Review  Outcome: Ongoing (interventions implemented as appropriate)   05/03/19 7762   Coping/Psychosocial   Plan of Care Reviewed With patient   OTHER   Outcome Summary vss, ng to lws draining large amount dark green drainage, leaking at connector, connector changed, tolerating ice & popsicysles, voiding w/o difficulty, seems to feel better, put on her lipstick, enc ambulation     Goal: Individualization and Mutuality  Outcome: Ongoing (interventions implemented as appropriate)      Problem: Pain, Acute (Adult)  Goal: Acceptable Pain Control/Comfort Level  Outcome: Ongoing (interventions implemented as appropriate)      Problem: Nausea/Vomiting (Adult)  Goal: Symptom Relief  Outcome: Ongoing (interventions implemented as appropriate)    Goal: Adequate Hydration  Outcome: Ongoing (interventions implemented as appropriate)      Problem: Skin Injury Risk (Adult)  Goal: Identify Related Risk Factors and Signs and Symptoms  Outcome: Ongoing (interventions implemented as appropriate)    Goal: Skin Health and Integrity  Outcome: Ongoing (interventions implemented as appropriate)

## 2019-05-03 NOTE — PLAN OF CARE
Problem: Bowel Obstruction (Adult)  Goal: Signs and Symptoms of Listed Potential Problems Will be Absent, Minimized or Managed (Bowel Obstruction)  Outcome: Ongoing (interventions implemented as appropriate)      Problem: Pain, Acute (Adult)  Goal: Acceptable Pain Control/Comfort Level  Outcome: Ongoing (interventions implemented as appropriate)      Problem: Nausea/Vomiting (Adult)  Goal: Symptom Relief  Outcome: Ongoing (interventions implemented as appropriate)    Goal: Adequate Hydration  Outcome: Ongoing (interventions implemented as appropriate)      Problem: Skin Injury Risk (Adult)  Goal: Identify Related Risk Factors and Signs and Symptoms  Outcome: Ongoing (interventions implemented as appropriate)    Goal: Skin Health and Integrity  Outcome: Ongoing (interventions implemented as appropriate)

## 2019-05-04 NOTE — PLAN OF CARE
Problem: Patient Care Overview  Goal: Plan of Care Review  Outcome: Ongoing (interventions implemented as appropriate)   05/04/19 1750   Coping/Psychosocial   Plan of Care Reviewed With patient   OTHER   Outcome Summary PATIENT CONTINUES TO BE NPO WITH EXCEPTION TO ONE POPSICLE Q SHIFT AND SMALL AMOUNTS OF ICE CHIPS. PT NGT TO INTERMITTENT LWS CONTINUES TO HAVE GREEN/BROWN OUTPUT. PT REQUIRING IV MORPHINE ON OCASSION. PT AMBULATED IN ADEN TODAY AND SAT IN CHAIR. MD PLAN TO ACCESS INFUSAPORT AND START PARENTERAL NUTRITION ON 5/5/19.       Problem: Bowel Obstruction (Adult)  Intervention: Monitor/Manage Gastrointestinal Function/Elimination   05/04/19 1750   Activity   Activity Management activity adjusted per tolerance;ambulated in aden     Intervention: Promote/Optimize Nutrition   05/04/19 1750   Nutrition Interventions   Oral Nutrition Promotion (plan for parenteral nutrition to start 5/5/19)     Intervention: Monitor/Manage Fluid Electrolyte Balance   05/04/19 1750   Nutrition Interventions   Fluid/Electrolyte Management fluids provided       Goal: Signs and Symptoms of Listed Potential Problems Will be Absent, Minimized or Managed (Bowel Obstruction)   05/04/19 1750   Goal/Outcome Evaluation   Problems Assessed (Bowel Obstruction) pain   Problems Present (Bowel Obstruction) undernutrition       Problem: Pain, Acute (Adult)  Intervention: Monitor and Manage Analgesia   05/04/19 1750   Promote Effective Bowel Elimination   Bowel Intervention ambulation promoted   Safety Management   Medication Review/Management medications reviewed     Intervention: Support/Optimize Psychosocial Response to Acute Pain   05/04/19 1750   Coping/Psychosocial Interventions   Supportive Measures active listening utilized;verbalization of feelings encouraged   Interventions   Trust Relationship/Rapport care explained       Goal: Acceptable Pain Control/Comfort Level  Outcome: Ongoing (interventions implemented as appropriate)   05/04/19  1750   Pain, Acute (Adult)   Acceptable Pain Control/Comfort Level making progress toward outcome       Problem: Nausea/Vomiting (Adult)  Intervention: Minimize Nausea Triggers/Manage Symptoms   05/04/19 1750   Monitor/Manage Hypovolemia   Nausea/Vomiting Interventions nasogastric tube to suction     Intervention: Position to Prevent Aspiration   05/04/19 1750   Positioning   Head of Bed (HOB) HOB at 30-45 degrees       Goal: Symptom Relief  Outcome: Ongoing (interventions implemented as appropriate)   05/04/19 1750   Nausea/Vomiting (Adult)   Symptom Relief making progress toward outcome     Goal: Adequate Hydration  Outcome: Ongoing (interventions implemented as appropriate)   05/04/19 1750   Nausea/Vomiting (Adult)   Adequate Hydration making progress toward outcome       Problem: Skin Injury Risk (Adult)  Goal: Identify Related Risk Factors and Signs and Symptoms  Outcome: Ongoing (interventions implemented as appropriate)   05/04/19 1750   Skin Injury Risk (Adult)   Related Risk Factors (Skin Injury Risk) hospitalization prolonged;nutritional deficiencies     Goal: Skin Health and Integrity  Outcome: Ongoing (interventions implemented as appropriate)

## 2019-05-04 NOTE — PROGRESS NOTES
Chief Complaint:    Small bowel obstruction    Subjective:    Small BM. Small flatus    Objective:    Vitals:    05/03/19 2134 05/04/19 0603 05/04/19 1004 05/04/19 1404   BP:  131/67 125/71 127/68   BP Location:  Left arm Left arm Left arm   Patient Position:  Lying Lying Lying   Pulse:  90 93 94   Resp:  16 16 16   Temp:  98.7 °F (37.1 °C) 97.4 °F (36.3 °C) 99.6 °F (37.6 °C)   TempSrc:  Oral Oral Oral   SpO2: 91% 90% 90% 91%   Weight:  39.2 kg (86 lb 6.4 oz)     Height:           Lungs: Clear  Heart: Regular  Abdomen: Soft.  Nondistended.  Bowel sounds absent.  Extremities: Warm    Labs reviewed.  WBC 9.29, Hgb 12.8, creatinine 0.60, CO2 35.7.  KUB shows persistent dilation of left upper quadrant small bowel.  No free air.      Assessment:    Small bowel obstruction    Plan:    No significant progress on imaging, but there has been evidence of GI function in terms of small bowel movement and passage of flatus.  Will continue same for now.  Probably begin TPN tomorrow morning if a diet cannot be initiated

## 2019-05-04 NOTE — PLAN OF CARE
Problem: Patient Care Overview  Goal: Plan of Care Review  Outcome: Ongoing (interventions implemented as appropriate)   05/04/19 0590   Plan of Care Review   Progress no change   OTHER   Outcome Summary ngt with large amount brown output, med for pain, vdg, decreased bowel sounds, vdg       Problem: Bowel Obstruction (Adult)  Goal: Signs and Symptoms of Listed Potential Problems Will be Absent, Minimized or Managed (Bowel Obstruction)  Outcome: Ongoing (interventions implemented as appropriate)      Problem: Pain, Acute (Adult)  Goal: Acceptable Pain Control/Comfort Level  Outcome: Ongoing (interventions implemented as appropriate)      Problem: Nausea/Vomiting (Adult)  Goal: Symptom Relief  Outcome: Ongoing (interventions implemented as appropriate)    Goal: Adequate Hydration  Outcome: Ongoing (interventions implemented as appropriate)      Problem: Skin Injury Risk (Adult)  Goal: Identify Related Risk Factors and Signs and Symptoms  Outcome: Ongoing (interventions implemented as appropriate)    Goal: Skin Health and Integrity  Outcome: Ongoing (interventions implemented as appropriate)

## 2019-05-05 NOTE — PROGRESS NOTES
"Pharmacy Consult - Total Parental Nutrition (Initial Note)    Dilma Abad has been consulted for pharmacy to dose TPN for Bowel Obstruction per Dr. Robert's request.     Relevant clinical data and objective history reviewed:  69 y.o. female 167.6 cm (66\") 38.1 kg (84 lb 1.6 oz)    Past Medical History:   Diagnosis Date   • Adenocarcinoma of lung (CMS/HCC) 2017    HAD RADIATION    • Anxiety and depression    • Arthritis    • Benign parotid tumor 2012    removed by Dr Vickers told it was benign   • Bowel obstruction (CMS/HCC)    • COPD (chronic obstructive pulmonary disease) (CMS/HCC)    • Depression    • Diarrhea    • Early cataract    • Emphysema of lung (CMS/HCC)    • Esophageal cancer (CMS/HCC) 2017   • History of chronic pain    • History of colon polyps    • History of pneumonia    • Hyperlipidemia    • Joint pain    • Stroke (CMS/HCC)      is allergic to penicillins; bactrim [sulfamethoxazole-trimethoprim]; and sulfa antibiotics.    Lab Results   Component Value Date    WBC 11.69 (H) 05/05/2019    HGB 12.1 05/05/2019    HCT 38.1 05/05/2019    MCV 89.0 05/05/2019     05/05/2019     Lab Results   Component Value Date    GLUCOSE 129 (H) 05/05/2019    BUN 10 05/05/2019    CREATININE 0.58 05/05/2019    EGFRIFNONA 102 05/05/2019    BCR 17.2 05/05/2019    CO2 32.2 (H) 05/05/2019    CALCIUM 8.7 05/05/2019    ALBUMIN 4.20 04/29/2019    AST 21 04/29/2019    ALT 18 04/29/2019       Estimated Creatinine Clearance: 38.8 mL/min (by C-G formula based on SCr of 0.58 mg/dL).    Active fluid orders: None (D'cd when TPN started)    Assessment  Ideal Body Weight: 61 kg  Daily Est. Leonel: 760-1330 kCal/Day  Estimated Fluid Requirements: 1140 to 1520 liters/day    Goal   Protein: 50 grams/day   Dextrose: 800 Kcal/day   Lipids: 100 ml of 20% lipid infusion   Volume: 1440 ml (60ml/hr over 24 hrs daily)    Plan  Will start tpn at lower than goal to taper up as patient tolerates. Will initiate the TPN with the following " macros:   Protein: 30 grams/day   Dextrose: 400 Kcal/day   Lipids: holding until other macrolytes at goal   Volume: 1400 ml (60 ml/hr over 24 hrs daily)    Based on the above labs, will add the following electrolytes/additives to the TPN.    Sodium Chloride: 70 mEq   Sodium Acetate: 0 mEq   Sodium Phosphate: 0 mEq   Potassium Chloride: 50 mEq   Potassium Acetate: 0 mEq   Potassium Phosphate: 22 mEq   Calcium Gluconate: 9 mEq   Magnesium Sulfate: 10 mEq   Multivitamin   Trace Elements    Labs to be ordered: CMP, Mg, Phos    Pharmacy will continue to follow until discontinuation.    Thank you!     Ilya Ferrer [facility]

## 2019-05-05 NOTE — NURSING NOTE
Request for access of [facility] -- chart reviewed.  Mediport was accessed sterile technique mask gloves skin was prepped with chlorprep per protocol patient [place] well without incident

## 2019-05-05 NOTE — SIGNIFICANT NOTE
05/05/19 1334   Single Lumen Implantable Port  Right Chest   Placement Date/Time: (c) (c)   Orientation: Right  Location: Chest  Inserted by: dr espino   Access Date 05/05/19   Access Time 1320   Accessed by: yung reeves rn   Catheter Size 19 G;noncoring 90 degree bend   Needle Length 3/4 in   Line Status Blood return noted;Flushed;Saline locked   Flush Performed Yes   Site Assessment Clean;Dry;Intact   Dressing Type Border Dressing;Transparent;Antimicrobial dressing/disc   Dressing Status Clean;Dry;Intact   Dressing Intervention New dressing   Dressing Change Due 05/12/19   Needle Change Due 05/12/19   Indication/Daily Review of Necessity blood product infusion;blood sampling;intravenous fluid therapy;intravenous medication therapy;total parenteral nutrition;power injection of contrast media

## 2019-05-05 NOTE — PROGRESS NOTES
"Pharmacy Consult - Levaquin    Dilma Abad has been consulted for Levaquin dosing for PNA  Pharmacy dosing per Dr Ab Mtz's request.       Relevant clinical data and objective history reviewed:  69 y.o. female 167.6 cm (66\")   39.2 kg (86 lb 6.4 oz)   Ideal body weight: 61 kg (134 lb 8.8 oz)    Active Ambulatory Problems     Diagnosis Date Noted   • Adenocarcinoma of left lung (CMS/[facility]) 10/31/2017   • Panlobular emphysema (CMS/[facility]) 10/31/2017   • Abnormal finding on imaging 11/14/2017   • Esophageal carcinoma (CMS/HCC) 12/08/2017   • Fitting and adjustment of vascular catheter 01/08/2018   • OROZCO (dyspnea on exertion) 02/09/2018   • Radiation esophagitis 02/15/2018   • Sepsis (CMS/[facility]) 07/31/2018   • Anxiety and depression 07/31/2018   • COPD (chronic obstructive pulmonary disease) (CMS/[facility]) 07/31/2018   • Hyperlipidemia 07/31/2018   • Generalized abdominal pain 08/01/2018   • Diarrhea 08/01/2018   • Pneumonia due to infectious organism 08/01/2018   • Hypogammaglobulinemia, acquired (CMS/[facility]) 08/28/2018   • Small bowel obstruction (CMS/[facility]) 03/17/2019   • COLLEEN (acute kidney injury) (CMS/[facility]) 03/17/2019   • Hypoxemia 03/18/2019   • Chronic respiratory failure with hypoxia (CMS/[facility]) 03/18/2019   • Acute and chronic respiratory failure with hypoxia (CMS/[facility]) 03/19/2019     Resolved Ambulatory Problems     Diagnosis Date Noted   • No Resolved Ambulatory Problems     Past Medical History:   Diagnosis Date   • Adenocarcinoma of lung (CMS/HCC) 2017   • Anxiety and depression    • Arthritis    • Benign parotid tumor 2012   • Bowel obstruction (CMS/[facility])    • COPD (chronic obstructive pulmonary disease) (CMS/[facility])    • Depression    • Diarrhea    • Early cataract    • Emphysema of lung (CMS/[facility])    • Esophageal cancer (CMS/[facility]) 2017   • History of chronic pain    • History of colon polyps    • History of pneumonia    • Hyperlipidemia    • Joint pain    • Stroke (CMS/[facility])      Allergies:   Allergies as of 04/29/2019 - " Reviewed 04/29/2019   Allergen Reaction Noted   • Penicillins Hives 10/06/2017   • Bactrim [sulfamethoxazole-trimethoprim] Itching 05/07/2018   • Sulfa antibiotics Itching 05/07/2018          Lab Results   Component Value Date    GLUCOSE 133 (H) 05/04/2019    CALCIUM 9.0 05/04/2019     05/04/2019    K 3.6 05/04/2019    CO2 35.7 (H) 05/04/2019    CL 92 (L) 05/04/2019    BUN 10 05/04/2019    CREATININE 0.60 05/04/2019    EGFRIFNONA 99 05/04/2019    BCR 16.7 05/04/2019    ANIONGAP 9.3 05/04/2019     Lab Results   Component Value Date    WBC 9.29 05/04/2019    HGB 12.8 05/04/2019    HCT 40.3 05/04/2019    MCV 87.4 05/04/2019     05/04/2019       Estimated Creatinine Clearance: 39.9 mL/min (by C-G formula based on SCr of 0.6 mg/dL).    I/O last 3 completed shifts:  In: 1825 [I.V.:1705; Other:120]  Out: 3350 [Urine:650; Emesis/NG output:2700]      Temp Readings from Last 3 Encounters:   05/04/19 98.7 °F (37.1 °C) (Oral)   04/03/19 98 °F (36.7 °C)   03/24/19 98.6 °F (37 °C) (Oral)         Assessment/Plan    Will start patient on Levaquin 500 mg iv q24h     Pharmacy will discontinue the Pharmacy to Dose consult at this time. Renal function will continue to be monitored and dosing adjustments will be made by pharmacy based on renal function if necessary      Alireza Maldonado, [facility]

## 2019-05-05 NOTE — PROGRESS NOTES
Name: Dilma Abad ADMIT: 2019   : 1950  PCP: Mary Taylor MD    MRN: [id number] LOS: 6 days   AGE/SEX: 69 y.o. female  ROOM: [facility]     Subjective   Subjective   Slight cough but not anything new.  Nonproductive.  Denies shortness of breath.     Objective   Objective   Temp:  [97.4 °F (36.3 °C)-99.9 °F (37.7 °C)] 97.5 °F (36.4 °C)  Heart Rate:  [] 85  Resp:  [16-24] 20  BP: (110-155)/(63-85) 110/63  SpO2:  [90 %-98 %] 95 %  on  Flow (L/min):  [3] 3;   Device (Oxygen Therapy): nasal cannula  Body mass index is 13.57 kg/m².    Physical Exam   Constitutional: She is oriented to person, place, and time. She appears cachectic. No distress.   Cardiovascular: Normal rate and regular rhythm.   No murmur heard.  Pulmonary/Chest: Effort normal. She has decreased breath sounds.   Abdominal: Soft. Bowel sounds are normal. She exhibits no distension. There is no tenderness.   Musculoskeletal: Normal range of motion. She exhibits no edema.   Neurological: She is alert and oriented to person, place, and time.   Skin: Skin is warm and dry. She is not diaphoretic.       Results Review:       I reviewed the patient's new clinical results.  Results from last 7 days   Lab Units 19  1214   WBC 10*3/mm3 11.69* 9.29 7.45 10.27   HEMOGLOBIN g/dL 12.1 12.8 12.0 13.9   PLATELETS 10*3/mm3 187 215 224 337     Results from last 7 days   Lab Units 19  0839 19  0633 19  1214   SODIUM mmol/L 137 137  --  143 138   POTASSIUM mmol/L 3.5 3.6 3.7 2.8* 3.7   CHLORIDE mmol/L 92* 92*  --  97* 92*   CO2 mmol/L 32.2* 35.7*  --  27.6 29.0   BUN mg/dL 10 10  --  13 17   CREATININE mg/dL 0.58 0.60  --  0.40* 0.82   GLUCOSE mg/dL 129* 133*  --  61* 129*   Estimated Creatinine Clearance: 38.8 mL/min (by C-G formula based on SCr of 0.58 mg/dL).  Results from last 7 days   Lab Units 19  0338 19  0444 19  0839  "05/02/19  0633 04/29/19  1214   CALCIUM mg/dL 8.7 9.0  --  8.6 10.1   ALBUMIN g/dL  --   --   --   --  4.20   MAGNESIUM mg/dL  --   --  2.0  --   --      Results from last 7 days   Lab Units 05/05/19  0338   PROCALCITONIN ng/mL 0.15     Lab Results   Component Value Date    STREPPNEUAG Positive (A) 05/05/2019    LEGANTIGENUR Negative 05/05/2019             2-VIEW CHEST     HISTORY: Shortness of breath. Previous lung cancer. Previous esophageal  cancer.     FINDINGS: There is severe COPD with hyperinflation and there is  prominent parenchymal and pleural abnormality in the upper left lung and  extending towards the hilum and to a lesser extent some scattered vague  nodularity extending into the lower left lung. Some of the more focal  changes are consistent with chronic fibrotic scarring with a possible  component of underlying neoplasm extending laterally as suggested on the  CT scan dated 03/18/2019. There is slight improvement in some  superimposed vague haziness and nodularity in the lower left chest since  the chest x-ray of 03/24/2019.     The heart size remains normal. An NG tube ends in the stomach. A  right-sided MediPort catheter ends in the SVC.        famotidine 10 mg Intravenous Q12H   ipratropium-albuterol 3 mL Nebulization 4x Daily - RT   levoFLOXacin 500 mg Intravenous Q24H   mineral oil 30 mL Oral BID   sodium chloride 3 mL Intravenous Q12H       dextrose 5 % and sodium chloride 0.45 % with KCl 20 mEq/L 75 mL/hr Last Rate: 75 mL/hr (05/04/19 0306)   NPO Diet         Assessment/Plan     Active Hospital Problems    Diagnosis  POA   • SBO (small bowel obstruction) (CMS/[facility]) [K56.609]  Yes      Resolved Hospital Problems   No resolved problems to display.       She has been diagnosed with pneumonia due to an abdominal x-ray on 5/4 showing \"worsening areas of pneumonia in the left lung.\"  She has not had any significant new respiratory symptoms.  Chest x-ray today noted above again demonstrating some " haziness and nodularity in the left lung that had improved from study done in March.  During that hospitalization in March she had a CT scan of her chest which showed some ill-defined areas in that left lung that were treated as pneumonia by Dr. Koenig but they were felt possible noninfectious at that time.  It was recommended she have repeat CT scan in 6 to 8 weeks which has not yet occurred.  Her procalcitonin is normal today but her urinary pneumococcal antigen is positive.  Due to her complicated history and atypical presentation for pneumonia will ask pulmonary to evaluate her.  Probably should obtain repeat CT scan chest to compare to study in March but will defer to pulmonary.  Continue Levaquin for now.        Cricket Nunez MD  [facility]  05/05/19  8:42 AM

## 2019-05-05 NOTE — CONSULTS
Referring Provider: Dr. Nunez  Reason for Consultation: Lung mass vs pneumonia    Patient Care Team:  Mary Taylor MD as PCP - General (Family Medicine)  Bienvenido Collier MD as Consulting Physician (Hematology and Oncology)  Daniel Oconnor MD as Referring Physician (Pulmonary Disease)  Shazia Mcdonald MD as Consulting Physician (Radiation Oncology)    Chief complaint:   Vomiting and abdominal pain    History of present illness:    Donnell Perera is a 67 year old female patient, 30 plus pack year former smoker. 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  She follows with  Dr. Collier and had a surveillance CT chest in Nov 2018 showing changes consistent with radiation . She previously had a pulmonary function testing in an outside facility which revealed an FEV1 of 37%.  She was considered  to be poor candidate for lobectomy. However, her last PFT in our clinic revealed FEV1 65%. She carries a diagnosis of COPD as well and uses Oxygen 2L/min at night. She takes Anoro for COPD and rarely require a rescue inhaler.     She was hospitalised in March 2019 for COPD exacerbation and possible pneumonia. During her stay, she underwent a CT of the chest which showed a new mass like consolidation in the SHAWNEE. She was seen by Oncology and a PET scan was recommended 2-3 weeks after discharge,    She's admitted on 4/29 for nausea, vomiting and SBO which is being treated conservatively with IV fluid and NG decompression. We are consulted by the medicine team for opinion regarding abnormal CXR and the possibility of pneumonia vs malignancy. Patient denies cough, worsening dyspnea or sputum production.     Data:  I reviewed the discharge summary by Dr. Vasques dated  3/24/2019.       Labs reviewed,  WBC 11.6; bicarb 32; neutrophils 85%; procalcitonin 0.15      Review of Systems  Constitutional: No fever or chills.    ENMT: No sinus congestion or postnasal drip.  Cardiovascular: No chest pain, palpitation or legs swelling.    Respiratory: Dyspnea on exertion. No cough or wheezing.  Gastrointestinal: No constipation or diarrhea. She has abdominal pain and nausea.   Neurology: No headache, weakness, numbness or dizziness.   Musculoskeletal: No joints pain, stiffness or swelling.   Psychiatry: No depression.  Genitourinary: No dysuria or frequent urination  Endo: No weight changes. No cold or warm intolerance.  Lymphatic: No swollen glands.  Integumentary: No rash.    History  Past Medical History:   Diagnosis Date   • Adenocarcinoma of lung (CMS/HCC) 2017    HAD RADIATION    • Anxiety and depression    • Arthritis    • Benign parotid tumor 2012    removed by Dr Vickers told it was benign   • Bowel obstruction (CMS/HCC)    • COPD (chronic obstructive pulmonary disease) (CMS/HCC)    • Depression    • Diarrhea    • Early cataract    • Emphysema of lung (CMS/HCC)    • Esophageal cancer (CMS/HCC) 2017   • History of chronic pain    • History of colon polyps    • History of pneumonia    • Hyperlipidemia    • Joint pain    • Stroke (CMS/HCC)    ,   Past Surgical History:   Procedure Laterality Date   • BRONCHOSCOPY     • CHOLECYSTECTOMY  1999   • COLON SURGERY  2015    COLON POLYPS   • COLONOSCOPY  04/13/2016    TA w/low grade dysplasia x 3, serrated adenoma x 2   • COLONOSCOPY  06/14/2016    TA w/high grade dysplasia   • ENDOSCOPY N/A 12/6/2017    Malignant esophageal tumor was found at the gastroesophageal junction. PATH:   INVASIVE ADENOCARCINOMA   • ENDOSCOPY N/A 3/17/2018    At the gastroesophageal junction was ulceration and scarring from the radiation therapy and chemo, Erythematous mucosa in the stomach.  PATH:  MILD CHRONIC ACTIVE GASTRITIS   • FOOT SURGERY  10/2010    [facility]   • HYSTERECTOMY     • INTUBATION  8/3/2018        • [place] INSJ TUNNELED CVC W/O SUBQ PORT/ AGE 3 YR/> Right 1/9/2018    Procedure: MEDIPORT PLACEMENT  right;  Surgeon: Damaso Germain MD;  Location: [facility] ;  Service: Vascular   • SALIVARY GLAND SURGERY      PAROTID MASS REMOVED BENIGN   • SHOULDER ARTHROSCOPY Left .    ,   Family History   Problem Relation Age of Onset   • Cancer Sister    • Lung cancer Sister    • Leukemia Cousin    • Heart disease Father    • Cancer Sister    • Malig Hyperthermia Neg Hx    ,   Social History     Tobacco Use   • Smoking status: Current Some Day Smoker     Packs/day: 0.00     Years: 30.00     Pack years: 0.00     Types: Cigarettes     Last attempt to quit:      Years since quittin.3   • Smokeless tobacco: Never Used   • Tobacco comment: smokes occasionally   Substance Use Topics   • Alcohol use: No   • Drug use: No   ,   Medications Prior to Admission   Medication Sig Dispense Refill Last Dose   • Cholecalciferol (VITAMIN D) 2000 units tablet Take 2,000 Units by mouth Every Other Day.   Taking   • diazePAM (VALIUM) 5 MG tablet Take 5 mg by mouth 4 (Four) Times a Day As Needed for Anxiety.   Taking   • famotidine (PEPCID) 20 MG tablet Take 1 tablet by mouth 2 (Two) Times a Day. 60 tablet 0 Taking   • HYDROcodone-acetaminophen (NORCO) 7.5-325 MG per tablet Take 1 tablet by mouth 4 (Four) Times a Day As Needed for Moderate Pain .   Taking   • megestrol (MEGACE) 20 MG tablet Take 20 mg by mouth Daily.   Taking   • Multiple Vitamins-Minerals (CENTRUM SILVER PO) Take 1 tablet by mouth Daily.   Taking   • ondansetron ODT (ZOFRAN ODT) 4 MG disintegrating tablet Take 1 tablet by mouth Every 8 (Eight) Hours As Needed for Nausea or Vomiting. 21 tablet 2 Taking   • pantoprazole (PROTONIX) 20 MG EC tablet Take 20 mg by mouth Daily.      • predniSONE (DELTASONE) 10 MG tablet Take 1 tablet by mouth Daily. To be started in 10 days once done with taper steroids 30 tablet 1 Taking   • Probiotic Product (PROBIOTIC PO) Take 1 capsule by mouth Daily.   Taking   • sertraline (ZOLOFT) 25 MG tablet Take 25 mg by mouth  Daily.   Taking   • umeclidinium-vilanterol (ANORO ELLIPTA) 62.5-25 MCG/INH aerosol powder  inhaler Inhale 1 puff Daily.   Taking   • cyanocobalamin (VITAMIN B-12) 2000 MCG tablet Take 2,000 mcg by mouth Every Other Day.   Taking   • predniSONE (DELTASONE) 20 MG tablet 1 pill bid for 5 days, then 1 daily for 5 days, then resume home prednisone regimen 15 tablet 0 Taking   , Scheduled Meds:    famotidine 10 mg Intravenous Q12H   ipratropium-albuterol 3 mL Nebulization 4x Daily - RT   levoFLOXacin 500 mg Intravenous Q24H   mineral oil 30 mL Oral BID   sodium chloride 10 mL Intravenous Q12H   sodium chloride 3 mL Intravenous Q12H   , Continuous Infusions:    Adult Central 2-in-1 TPN    Pharmacy to Dose TPN     and Allergies:  Penicillins; Bactrim [sulfamethoxazole-trimethoprim]; and Sulfa antibiotics    Objective     Vital Signs   Temp:  [97.4 °F (36.3 °C)-99.9 °F (37.7 °C)] 97.4 °F (36.3 °C)  Heart Rate:  [] 80  Resp:  [16-24] 16  BP: (110-155)/(63-85) 114/67    Physical Exam:  Constitutional: Not in acute distress. Elderly.   Eyes: Injected conjunctiva, EOMI.  ENMT: Glaser 2. No oral thrush. Tonsils grade 1  Neck: Trachea midline. No thyromegaly  Heart: RRR, no murmur  Lungs: Diminished but equal air entry bilaterally.  Non labored breathing.   Abdomen: Distended. Soft. No tenderness or dullness. No HSM.  Extremities: No cyanosis, clubbing or pitting edema: . Moves all extremities.  Neuro: Conscious, alert, oriented x3  Psych: Appropriate mood and affect.    Integumentary: No rash  Lymphatic: No palpable cervical or supraclavicular lymph nodes.      Diagnostic imaging:  I personally and independently reviewed the following images:     CXR 5/5/2019                               compared to                  3/24/2019:  Changes consistent with COPD and scarring of the left upper lobe which has slightly improved      CT chest 11/8/18                                           3/18/19:  New development of left  upper lobe masslike consolidation        Assessment   1. Mass like consolidation, SHAWNEE, appeared on CT chest 3/18/19 compared 11/8/18. Patient did not have radiation therapy during that window of time.   2. Scarring SHAWNEE, related to radiation therapy  3. Moderate COPD, no exacerbation  4. Hx of stage IIB adenoa CA SHAWNEE, s/p stereotactic radiation  5. Exertion hypoxemia  6. Small bowel obstruction    Recommendations:    Recommend PET scan 1-2 weeks after discharge. It's not adequate to do it now while she's going through other issues. Patient was supposed to have the PET on 4/30/19 and f/up with Dr. Collier on 5/6/19.   Not clear whether she had pneumonia in March but currently she does not exhibit symptoms of such and her CXR actually appears to be slightly better.    Continue maintenance Duoneb while hospitalized and she can resume Anoro as OP.     Add IS to prevent atelectasis while patient is bed rest.    Thank you for the consultation.         Nathaniel Castellon MD  05/05/19  4:17 PM

## 2019-05-05 NOTE — PLAN OF CARE
Problem: Patient Care Overview  Goal: Plan of Care Review  Outcome: Ongoing (interventions implemented as appropriate)   05/05/19 0436   Coping/Psychosocial   Plan of Care Reviewed With patient   Plan of Care Review   Progress no change   OTHER   Outcome Summary received distressed RR 24/min, Hr 118/min, in pain, medictaed with Morphine, Dr Nash informed new orders made, [facility] consulted, IV antibiotics, breathing treatment started, IVF rate decreased to 75 ml/h, CXR in am ordered, RR to 20/min, HR to 100/min, NGT to Int LWS still with large amount coffee ground - dark greenish output, had 2 popsicles during the shift, no Bm, for further care     Goal: Individualization and Mutuality  Outcome: Ongoing (interventions implemented as appropriate)      Problem: Bowel Obstruction (Adult)  Goal: Signs and Symptoms of Listed Potential Problems Will be Absent, Minimized or Managed (Bowel Obstruction)  Outcome: Ongoing (interventions implemented as appropriate)      Problem: Pain, Acute (Adult)  Goal: Acceptable Pain Control/Comfort Level  Outcome: Ongoing (interventions implemented as appropriate)      Problem: Nausea/Vomiting (Adult)  Goal: Symptom Relief  Outcome: Ongoing (interventions implemented as appropriate)    Goal: Adequate Hydration  Outcome: Ongoing (interventions implemented as appropriate)      Problem: Skin Injury Risk (Adult)  Goal: Identify Related Risk Factors and Signs and Symptoms  Outcome: Ongoing (interventions implemented as appropriate)    Goal: Skin Health and Integrity  Outcome: Ongoing (interventions implemented as appropriate)      Problem: Fall Risk (Adult)  Goal: Identify Related Risk Factors and Signs and Symptoms  Outcome: Outcome(s) achieved Date Met: 05/05/19    Goal: Absence of Fall  Outcome: Ongoing (interventions implemented as appropriate)

## 2019-05-05 NOTE — PLAN OF CARE
Problem: Patient Care Overview  Goal: Plan of Care Review  Outcome: Ongoing (interventions implemented as appropriate)   05/05/19 8072   Coping/Psychosocial   Plan of Care Reviewed With patient   Plan of Care Review   Progress improving   OTHER   Outcome Summary forgetful and accusatory of staff at times. mediport R chest accessed and TPN started. peripheral IV s/l'd for IV abx. breathing nonlabored today, NP congested cough and O2 3L. NG to LWS with large amt dark brown thick output. Pt having popcicle or Italian ice q 6 hrs. had 2 large loose BMs today tan-colored and pt reports it burns. amb in aden with asst. bed alarm and falls protocol cont'd. enc IS to 750. LHA consulted pulm MD     Goal: Individualization and Mutuality  Outcome: Ongoing (interventions implemented as appropriate)    Goal: Discharge Needs Assessment  Outcome: Ongoing (interventions implemented as appropriate)    Goal: Interprofessional Rounds/Family Conf  Outcome: Ongoing (interventions implemented as appropriate)      Problem: Bowel Obstruction (Adult)  Goal: Signs and Symptoms of Listed Potential Problems Will be Absent, Minimized or Managed (Bowel Obstruction)  Outcome: Ongoing (interventions implemented as appropriate)      Problem: Pain, Acute (Adult)  Goal: Acceptable Pain Control/Comfort Level  Outcome: Ongoing (interventions implemented as appropriate)      Problem: Nausea/Vomiting (Adult)  Goal: Symptom Relief  Outcome: Ongoing (interventions implemented as appropriate)    Goal: Adequate Hydration  Outcome: Ongoing (interventions implemented as appropriate)      Problem: Skin Injury Risk (Adult)  Goal: Identify Related Risk Factors and Signs and Symptoms  Outcome: Outcome(s) achieved Date Met: 05/05/19    Goal: Skin Health and Integrity  Outcome: Ongoing (interventions implemented as appropriate)      Problem: Fall Risk (Adult)  Goal: Absence of Fall  Outcome: Ongoing (interventions implemented as appropriate)

## 2019-05-05 NOTE — CONSULTS
[facility] Consult H&P    Patient Care Team:  Mary Taylor MD as PCP - General (Family Medicine)  Bienvenido Collier MD as Consulting Physician (Hematology and Oncology)  Daniel Oconnor MD as Referring Physician (Pulmonary Disease)  Shazia Mcdonald MD as Consulting Physician (Radiation Oncology)    Requesting Physician: Dr. Nash    Reason for Consult: Pneumonia, general medical management    History of Present Illness    This is a 69-year-old female who has had a history of recurring obstructions and is currently admitted for the same with NG tube in place.  She also has a history of adenocarcinoma of the left lung, upper lobe. She states she was having nausea and vomiting in the hours to days leading up to her admission.  I been asked to see her for evaluation of left-sided pneumonia seen on imaging today as well as general medical management.  Review of records from her hospitalization back in March of this year showed that she was treated for pneumonia with Levaquin after she had CT scan of the chest that showed consolidation/masslike lesion in the left upper lobe that was not present in November 2018.  Repeat imaging showed improvement of the area after 5 days of antibiotics which suggested infectious etiology.  Today the patient had abdominal imaging that showed worsening appearance of the left-sided consolidation.  The patient states she has had a nonproductive cough.  She denies shortness of breath beyond her baseline but then goes on to say that people have been telling her she looks short of breath since her admission.  She denies any fevers or chills.  Her nausea and vomiting has resolved and she currently has an NG tube in place.  Back in March she was on steroids but she states that these have since been discontinued and she was not on them just prior to admission to the hospital a few days ago.    Past Medical History:   Diagnosis Date   • Adenocarcinoma of lung (CMS/HCC) 2017    HAD RADIATION     • Anxiety and depression    • Arthritis    • Benign parotid tumor 2012    removed by Dr Vickers told it was benign   • Bowel obstruction (CMS/HCC)    • COPD (chronic obstructive pulmonary disease) (CMS/HCC)    • Depression    • Diarrhea    • Early cataract    • Emphysema of lung (CMS/HCC)    • Esophageal cancer (CMS/HCC) 2017   • History of chronic pain    • History of colon polyps    • History of pneumonia    • Hyperlipidemia    • Joint pain    • Stroke (CMS/HCC)      Past Surgical History:   Procedure Laterality Date   • BRONCHOSCOPY     • CHOLECYSTECTOMY     • COLON SURGERY      COLON POLYPS   • COLONOSCOPY  2016    TA w/low grade dysplasia x 3, serrated adenoma x 2   • COLONOSCOPY  2016    TA w/high grade dysplasia   • ENDOSCOPY N/A 2017    Malignant esophageal tumor was found at the gastroesophageal junction. PATH:   INVASIVE ADENOCARCINOMA   • ENDOSCOPY N/A 3/17/2018    At the gastroesophageal junction was ulceration and scarring from the radiation therapy and chemo, Erythematous mucosa in the stomach.  PATH:  MILD CHRONIC ACTIVE GASTRITIS   • FOOT SURGERY  10/2010    [facility]   • HYSTERECTOMY     • INTUBATION  8/3/2018        • [place] INSJ TUNNELED CVC W/O SUBQ PORT/ AGE 3 YR/> Right 2018    Procedure: MEDIPORT PLACEMENT right;  Surgeon: Damaso Germain MD;  Location: [facility] OR ;  Service: Vascular   • SALIVARY GLAND SURGERY      PAROTID MASS REMOVED BENIGN   • SHOULDER ARTHROSCOPY Left .      Family History   Problem Relation Age of Onset   • Cancer Sister    • Lung cancer Sister    • Leukemia Cousin    • Heart disease Father    • Cancer Sister    • Malig Hyperthermia Neg Hx      Social History     Tobacco Use   • Smoking status: Current Some Day Smoker     Packs/day: 0.00     Years: 30.00     Pack years: 0.00     Types: Cigarettes     Last attempt to quit:      Years since quittin.3   • Smokeless tobacco: Never Used   • Tobacco comment: smokes  occasionally   Substance Use Topics   • Alcohol use: No   • Drug use: No     Medications Prior to Admission   Medication Sig Dispense Refill Last Dose   • Cholecalciferol (VITAMIN D) 2000 units tablet Take 2,000 Units by mouth Every Other Day.   Taking   • diazePAM (VALIUM) 5 MG tablet Take 5 mg by mouth 4 (Four) Times a Day As Needed for Anxiety.   Taking   • famotidine (PEPCID) 20 MG tablet Take 1 tablet by mouth 2 (Two) Times a Day. 60 tablet 0 Taking   • HYDROcodone-acetaminophen (NORCO) 7.5-325 MG per tablet Take 1 tablet by mouth 4 (Four) Times a Day As Needed for Moderate Pain .   Taking   • megestrol (MEGACE) 20 MG tablet Take 20 mg by mouth Daily.   Taking   • Multiple Vitamins-Minerals (CENTRUM SILVER PO) Take 1 tablet by mouth Daily.   Taking   • ondansetron ODT (ZOFRAN ODT) 4 MG disintegrating tablet Take 1 tablet by mouth Every 8 (Eight) Hours As Needed for Nausea or Vomiting. 21 tablet 2 Taking   • pantoprazole (PROTONIX) 20 MG EC tablet Take 20 mg by mouth Daily.      • predniSONE (DELTASONE) 10 MG tablet Take 1 tablet by mouth Daily. To be started in 10 days once done with taper steroids 30 tablet 1 Taking   • Probiotic Product (PROBIOTIC PO) Take 1 capsule by mouth Daily.   Taking   • sertraline (ZOLOFT) 25 MG tablet Take 25 mg by mouth Daily.   Taking   • umeclidinium-vilanterol (ANORO ELLIPTA) 62.5-25 MCG/INH aerosol powder  inhaler Inhale 1 puff Daily.   Taking   • cyanocobalamin (VITAMIN B-12) 2000 MCG tablet Take 2,000 mcg by mouth Every Other Day.   Taking   • predniSONE (DELTASONE) 20 MG tablet 1 pill bid for 5 days, then 1 daily for 5 days, then resume home prednisone regimen 15 tablet 0 Taking     Allergies:  Penicillins; Bactrim [sulfamethoxazole-trimethoprim]; and Sulfa antibiotics    Review of Systems   Constitutional: Negative for chills and fever.   HENT: Negative for congestion and sore throat.    Eyes: Negative for visual disturbance.   Respiratory: Positive for cough. Negative for  chest tightness, shortness of breath and wheezing.         Mild, nonproductive cough   Cardiovascular: Negative for chest pain, palpitations and leg swelling.   Gastrointestinal: Negative for abdominal distention, abdominal pain, diarrhea, nausea and vomiting.   Endocrine: Negative for polydipsia and polyuria.   Genitourinary: Negative for difficulty urinating, dysuria, frequency and urgency.   Musculoskeletal: Negative for arthralgias and myalgias.   Skin: Negative for color change and rash.   Neurological: Negative for dizziness and light-headedness.        PHYSICAL EXAM    Vital Signs  tMax 24 hrs:  Temp (24hrs), Av.9 °F (37.2 °C), Min:97.4 °F (36.3 °C), Max:99.9 °F (37.7 °C)    Vitals Ranges:  Temp:  [97.4 °F (36.3 °C)-99.9 °F (37.7 °C)] 98.7 °F (37.1 °C)  Heart Rate:  [] 101  Resp:  [16-24] 18  BP: (125-155)/(67-85) 129/75    Physical Exam   Constitutional: She is oriented to person, place, and time. She appears well-developed.   Thin, frail, sickly appearing   HENT:   Head: Normocephalic and atraumatic.   NG tube in place   Eyes: EOM are normal. Pupils are equal, round, and reactive to light.   Neck: Neck supple. No tracheal deviation present.   Cardiovascular: Normal rate and regular rhythm. Exam reveals no gallop.   No murmur heard.  Pulmonary/Chest: Effort normal. No respiratory distress. She has no wheezes.   Coarse breath sounds bilaterally with no wheezing.  She does have somewhat increased work of breathing but no respiratory distress.   Abdominal: Soft. She exhibits no distension. There is no tenderness.   Bowel sounds are quiet   Musculoskeletal: She exhibits no edema or tenderness.   Neurological: She is alert and oriented to person, place, and time. No cranial nerve deficit.   Skin: Skin is warm and dry.   Nursing note and vitals reviewed.      Results Review:  Results from last 7 days   Lab Units 19  0444   WBC 10*3/mm3 9.29   HEMOGLOBIN g/dL 12.8   HEMATOCRIT % 40.3   PLATELETS  10*3/mm3 215     Results from last 7 days   Lab Units 05/04/19  0444  04/29/19  1214   SODIUM mmol/L 137   < > 138   POTASSIUM mmol/L 3.6   < > 3.7   CHLORIDE mmol/L 92*   < > 92*   CO2 mmol/L 35.7*   < > 29.0   BUN mg/dL 10   < > 17   CREATININE mg/dL 0.60   < > 0.82   CALCIUM mg/dL 9.0   < > 10.1   BILIRUBIN mg/dL  --   --  0.6   ALK PHOS U/L  --   --  68   ALT (SGPT) U/L  --   --  18   AST (SGOT) U/L  --   --  21   GLUCOSE mg/dL 133*   < > 129*    < > = values in this interval not displayed.       I reviewed the patient's new clinical results.  I reviewed the patient's new imaging results and agree with the interpretation.        SBO (small bowel obstruction) (CMS/[facility])      Assessment & Plan    Left-sided pneumonia, per imaging -she has been started on Levaquin by the admitting service and I think this is fine for now.  I will get a formal PA and lateral chest x-ray in the morning.  Also check procalcitonin with morning lab work along with a repeat CBC.  Check Legionella and strep pneumo urine antigens.  Sputum culture should she be able to get anything up.  Certainly possible that she aspirated prior to admission.  She does have a penicillin and sulfa allergy.   I feel it is less likely that she has worsening malignant process.  Will provide her with a flutter valve and incentive spirometer to improve pulmonary hygiene.  Wean oxygen as tolerated.    COPD with bullous emphysema-provide scheduled breathing treatments.  I do not see any need for steroids at this time.    Acute on chronic hypoxemic respiratory failure -currently requiring 3 L.  Continue treatment as described above and wean oxygen as tolerated.    Immunosuppressed patient    May need to consider consulting pulmonology depending on how she does.    I discussed the patients findings and my recommendations with patient    Thank you for allowing me to participate in the care of your patient.  We will continue to follow.    Ab Mtz,  MD  05/04/19  11:29 PM

## 2019-05-05 NOTE — PROGRESS NOTES
Chief Complaint:    Small bowel obstruction    Subjective:    Had another BM. Passed flatus  Shortness of breath overnight.  Appreciate assessment by Dr. Mtz.    Objective:    Vitals:    05/05/19 0738 05/05/19 1026 05/05/19 1141 05/05/19 1147   BP:  114/67     BP Location:       Patient Position:       Pulse: 85 79  80   Resp: 20 20 16 16   Temp:  97.4 °F (36.3 °C)     TempSrc:       SpO2: 95% 97%     Weight:       Height:         NGT  1500 mL out last 24 hours    Lungs: Clear  Heart: Regular  Abdomen: Soft.  Nondistended.  Bowel sounds absent.  Extremities: Warm    Labs reviewed.  WBC 11.69, Hgb 12.1, creatinine 0.58, CO2 32.2.   Procalcitonin 0.15    Assessment:    Small bowel obstruction  Possible left pneumonia    Plan:    Started on levofloxacin last night  It is encouraging that she has had some improvement in GI function, but we will need to continue nasogastric tube decompression.  Repeat KUB tomorrow.  Will need TPN. Power Port can be accessed for this.  Stop IVF when TPN begins.

## 2019-05-06 NOTE — PLAN OF CARE
Problem: Patient Care Overview  Goal: Plan of Care Review  Outcome: Ongoing (interventions implemented as appropriate)   05/06/19 1645   Coping/Psychosocial   Plan of Care Reviewed With patient;sibling   Plan of Care Review   Progress improving   OTHER   Outcome Summary TWO OF PT SISTERS AT BEDSIDE TODAY TO DISCUSS PLAN OF CARE. DR GOTTI MET WITH PATIENT AND PATIENT'S SIBLINGS TO DISCUSS PLAN OF CARE   05/06/19 1645   Coping/Psychosocial   Plan of Care Reviewed With patient;sibling   Plan of Care Review   Progress improving   OTHER   Outcome Summary TWO OF PT SISTERS AT BEDSIDE TODAY TO DISCUSS PLAN OF TREATMENT WITH DR GOTTI. PATIENT HAS DECIDED TO UNDERGO A BOWEL RESECTION TOMORROW. DR GOTTI HAS MADE PT AWARE SHE MAY KNAPP HER MIND. PT WILL BE NPO AT [facility] AND RECEIVE 2 CHG SHOWERS. SURGERY PLANNED FOR 12:45.        Goal: Discharge Needs Assessment  POSSIBLE DC NEEDS PENDING TOMORROWS BOWEL RESECTION.   05/06/19 1645   Discharge Needs Assessment   Current Discharge Risk chronically ill       Problem: Skin Injury Risk (Adult)  Goal: Skin Health and Integrity  Outcome: Ongoing (interventions implemented as appropriate)  NG TUBE DSG CHANGED PER PROTOCOL.     05/06/19 1645   Skin Injury Risk (Adult)   Skin Health and Integrity achieves outcome       Problem: Fall Risk (Adult)  Goal: Absence of Fall  Outcome: Ongoing (interventions implemented as appropriate)   05/06/19 1645   Fall Risk (Adult)   Absence of Fall making progress toward outcome

## 2019-05-06 NOTE — PROGRESS NOTES
"Pharmacy to dose TPN - Daily Progress Note  Patient: Dilma Abad  MRN#: [id number]  Attending: No name on file.  Admission Date: 429    TPN # 2 per Dr. Robert  Indication: bowel obstruction    Active Problems:    SBO (small bowel obstruction) (CMS/HCC)    Subjective/Objective  69 y.o. female 167.6 cm (66\") 38.3 kg (84 lb 6.4 oz)  Results from last 7 days   Lab Units 19  0431 19  1244   SODIUM mmol/L 135*  --    POTASSIUM mmol/L 4.0  --    CHLORIDE mmol/L 93*  --    CO2 mmol/L 34.0*  --    BUN mg/dL 12  --    CREATININE mg/dL 0.61  --    CALCIUM mg/dL 9.0  --    ALBUMIN g/dL 3.00*  --    BILIRUBIN mg/dL 0.3  --    ALK PHOS U/L 53  --    ALT (SGPT) U/L 12  --    AST (SGOT) U/L 13  --    GLUCOSE mg/dL 106*  --    MAGNESIUM mg/dL 2.1 1.7   PHOSPHORUS mg/dL 3.7 2.9   TRIGLYCERIDES mg/dL  --  69      I/O last 3 completed shifts:  In: 685 [P.O.:120; Other:365; IV Piggyback:200]  Out: 2350 [Urine:550; Emesis/NG output:1800]    Diet Orders (active) (From admission, onward)    Start     Ordered    19 1800  Adult Central 2-in-1 TPN  Continuous TPN      19 1133    19 1230  NPO Diet  Diet Effective Now     Comments:  Patient may have 1 popsicle or 1 cup of Italian ice every 6 hours.    19 1229        Additional insulin administration while previous TPN infusin units  Additional electrolyte administration while previous TPN infusing: none  Acid suppression: famotidine     Goal              Protein: 50 grams/day              Dextrose: 800 Kcal/day              Lipids: 100 ml of 20% lipid infusion              Volume: 1440 ml (60ml/hr over 24 hrs daily)    Daily Assessment  Current macronutrients: protein 30 gm/day, dextrose 400 kcal/day, lipids held until other macronutrients at goal  Fluid rate: 60 mL/hr    Plan    1. Will increase protein to 50 gm/day, dextrose to 600 kcal/day and continue to hold lipids until other macronutrients are at goal.  2. The following electrolytes have " been added to the TPN:   Sodium Chloride: 90 mEq   Sodium Acetate: 0 mEq   Potassium Chloride: 70 mEq   Potassium Acetate: 0 mEq   Potassium Phosphate: 12 mEq   Magnesium Sulfate: 10 mEq   Calcium gluconate: 9 mEq   Multivitamin   Trace Elements  2. BMP, Mg and Phos with AM labs. Will continue to follow until discontinuation.    Sharri Pan, Pharm.D.         Home

## 2019-05-06 NOTE — PROGRESS NOTES
Follow-up small bowel obstruction    Subjective:  Patient did have some bowel function this weekend but really no more since.  She continues with some abdominal pain.  NG output remains fairly high.    Review of systems:  Respiratory: Positive for mild shortness of breath, negative cough or hemoptysis    Objective:  Patient afebrile, heart rate 90s, blood pressure stable  General: Awake and alert, answers most questions appropriately but occasionally gets off topic.  No acute distress.  Neck: Frail, thin, no adenopathy appreciated, trachea midline  Eyes: Extraocular movements are intact, no scleral icterus  Gastrointestinal: Abdomen remains firm, not significantly distended but it is tender, particularly in the lower half.  No clear guarding.  No hernia felt.    Abdominal films from this morning are reviewed.  No substantial improvement.  There is still at least 1 substantially dilated loop in the pelvis.    Assessment and plan:  -Partial small bowel obstruction versus complete.  No substantial improvement after approximately 1 week conservative management.  -COPD, stable, exacerbating risk for surgical intervention for above  -Personal history of lung cancer  -Personal history of adenocarcinoma of the esophagus  -Malnutrition    Long discussion undertaken with patient and her family who are present today.  I think that she has probably reached the maximum benefit that conservative management is going to give her.  In fact I probably would have pushed earlier for operation in her case, but her significant comorbidities give me pause.  She has had multiple abdominal surgeries and it is unlikely to be a straightforward operation.  Furthermore, her frailty and pulmonary disease, as well as her apparent baseline confusion certainly put her at high risk for postoperative problems.  Still, she seems to be on board with the idea of exploration.  I do not think she is likely to get better without it, although there are  certainly multiple risks of proceeding with surgery.  I appreciate pulmonary's input.  I have her tentatively on the schedule for exploration tomorrow.    Trenton Canales MD  General and Endoscopic Surgery  [facility]    [address], [address]  [place], [place], [address]  P: [phone]  F: [phone]

## 2019-05-06 NOTE — PROGRESS NOTES
"  PROGRESS NOTE  Patient Name: Dilma Abad  Age/Sex: 69 y.o. female  : 1950  MRN: [id number]    Date of Admission: 2019  Date of Encounter Visit: 19   LOS: 7 days   Patient Care Team:  Mary Taylor MD as PCP - General (Family Medicine)  Bienvenido Collier MD as Consulting Physician (Hematology and Oncology)  Daniel Oconnor MD as Referring Physician (Pulmonary Disease)  Shazia Mcdonald MD as Consulting Physician (Radiation Oncology)    Chief Complaint:nausea, pain    Hospital course: COPD with lung CA, s/p radiation , sever COPD , presented with SBO. Conservative measures however still with no clinical recovery and will likely need surgical intervention. She does not have evidence of active pneumonia or COPD exacerbation, she is on O2.        REVIEW OF SYSTEMS:   CONSTITUTIONAL: no fever or chills  CARDIOVASCULAR: No chest pain, chest pressure or chest discomfort. No palpitations or edema.   RESPIRATORY:  shortness of breath, minimal cough, no sputum.   GASTROINTESTINAL: nausea, NG to suction.   HEMATOLOGIC: No bleeding or bruising.     Ventilator/Non-Invasive Ventilation Settings (From admission, onward)    Nasal O2 3 lpm            Vital Signs  Temp:  [97.5 °F (36.4 °C)-98.1 °F (36.7 °C)] 98.1 °F (36.7 °C)  Heart Rate:  [80-88] 88  Resp:  [16-20] 18  BP: (125-141)/(70-78) 141/78  SpO2:  [91 %-100 %] 91 %  on  Flow (L/min):  [3] 3 Device (Oxygen Therapy): nasal cannula    Intake/Output Summary (Last 24 hours) at 2019 1358  Last data filed at 2019 0900  Gross per 24 hour   Intake 280 ml   Output 2600 ml   Net -2320 ml     Flowsheet Rows      First Filed Value   Admission Height  167.6 cm (66\") Documented at 2019 1128   Admission Weight  40.8 kg (90 lb) Documented at 2019 1148        Body mass index is 13.62 kg/m².      19  0603 19  0511 19  0512   Weight: 39.2 kg (86 lb 6.4 oz) 38.1 kg (84 lb 1.6 oz) 38.3 kg (84 lb 6.4 oz)       Physical " Exam:  GEN:  No acute distress, alert, cooperative, thin, ill loooking  EYES:   Sclera clear. No icterus. PERRL. Normal EOM  ENT:   External ears/nose normal, no oral lesions, no thrush, mucous membranes moist. Nasal NG tube  NECK:  Supple, midline trachea, no JVD  LUNGS: Normal chest on inspection, decreased BS, no wheezing, positive basilar crackles (minimal).   CV:  Regular rhythm and rate. Normal S1/S2. No murmurs, gallops, or rubs noted.  ABD:  Soft, tender to palpation, hypoactive BS  EXT:  Moves all extremities well. No cyanosis. No redness. No edema.   Skin: dry, intact, no bleeding    Results Review:      Results from last 7 days   Lab Units 05/06/19  0431 05/05/19 0338 05/04/19 0444 05/03/19  0839 05/02/19  0633   SODIUM mmol/L 135* 137 137  --  143   POTASSIUM mmol/L 4.0 3.5 3.6 3.7 2.8*   CHLORIDE mmol/L 93* 92* 92*  --  97*   CO2 mmol/L 34.0* 32.2* 35.7*  --  27.6   BUN mg/dL 12 10 10  --  13   CREATININE mg/dL 0.61 0.58 0.60  --  0.40*   CALCIUM mg/dL 9.0 8.7 9.0  --  8.6   AST (SGOT) U/L 13  --   --   --   --    ALT (SGPT) U/L 12  --   --   --   --    ANION GAP mmol/L 8.0 12.8 9.3  --  18.4   ALBUMIN g/dL 3.00*  --   --   --   --                  Results from last 7 days   Lab Units 05/05/19 0338 05/04/19 0444 05/02/19  0633   WBC 10*3/mm3 11.69* 9.29 7.45   HEMOGLOBIN g/dL 12.1 12.8 12.0   HEMATOCRIT % 38.1 40.3 38.2   PLATELETS 10*3/mm3 187 215 224   MCV fL 89.0 87.4 88.2   NEUTROPHIL % % 85.8* 78.0* 77.8*   LYMPHOCYTE % % 5.0* 9.1* 9.4*   MONOCYTES % % 8.6 12.1* 12.1*   EOSINOPHIL % % 0.1* 0.3 0.1*   BASOPHIL % % 0.2 0.1 0.1   IMM GRAN % % 0.3 0.4 0.5         Results from last 7 days   Lab Units 05/06/19  0431 05/05/19  1244 05/03/19  0839   MAGNESIUM mg/dL 2.1 1.7 2.0     Results from last 7 days   Lab Units 05/05/19  1244   TRIGLYCERIDES mg/dL 69             Glucose   Date/Time Value Ref Range Status   05/06/2019 1326 109 70 - 130 mg/dL Final   05/06/2019 0631 115 70 - 130 mg/dL Final    05/06/2019 0036 168 (H) 70 - 130 mg/dL Final   05/05/2019 1732 106 70 - 130 mg/dL Final     Results from last 7 days   Lab Units 05/05/19  0338   PROCALCITONIN ng/mL 0.15     Results from last 7 days   Lab Units 05/05/19  0120   STREP PNEUMO AG  Positive*   L. PNEUMOPHILA SEROGP 1 UR AG  Negative     Results from last 7 days   Lab Units 04/29/19  1409   NITRITE UA  Negative   WBC UA /HPF 21-30*   BACTERIA UA /HPF 2+*   SQUAM EPITHEL UA /HPF 7-12*               Imaging:   Imaging Results (all)     Procedure Component Value Units Date/Time    XR Abdomen KUB [[id number]] Collected:  05/06/19 0928     Updated:  05/06/19 0940    Narrative:       KUB     HISTORY: Small bowel obstruction.     Two images of the abdomen and pelvis are provided and are correlated  with abdomen x-ray 05/04/2019 and 04/30/2019.     FINDINGS: There is a nasogastric tube with its tip in the stomach. There  are multiple surgical clips throughout the abdomen and pelvis. Some  chain sutures are observed in the pelvis as well. There is a single loop  of small bowel containing some gas and measuring 4.7 cm diameter. This  is transversely oriented in the central pelvis. There is a small volume  of gas in the rectum. Overall, there is very minimal amount of gas in  the bowel. No free air is present.       Impression:       There is a single air-filled, dilated loop of small bowel in  the central pelvis. This is slightly less dilated than on x-ray  05/04/2019. There is no evidence of free air.     This report was finalized on 5/6/2019 9:37 AM by Dr. Sebastian Hobbs M.D.       XR Chest PA & Lateral [[id number]] Collected:  05/05/19 1043     Updated:  05/05/19 1147    Narrative:       2-VIEW CHEST     HISTORY: Shortness of breath. Previous lung cancer. Previous esophageal  cancer.     FINDINGS: There is severe COPD with hyperinflation and there is  prominent parenchymal and pleural abnormality in the upper left lung and  extending towards the hilum and to  a lesser extent some scattered vague  nodularity extending into the lower left lung. Some of the more focal  changes are consistent with chronic fibrotic scarring with a possible  component of underlying neoplasm extending laterally as suggested on the  CT scan dated 03/18/2019. There is slight improvement in some  superimposed vague haziness and nodularity in the lower left chest since  the chest x-ray of 03/24/2019.     The heart size remains normal. An NG tube ends in the stomach. A  right-sided MediPort catheter ends in the SVC.     This report was finalized on 5/5/2019 11:43 AM by Dr. Jay Alejandro M.D.       XR Abdomen Flat & Upright [[id number]] Collected:  05/04/19 0931     Updated:  05/04/19 0938    Narrative:       TWO-VIEW ABDOMEN     HISTORY: Small bowel obstruction.     FINDINGS: An NG tube ends in the lower stomach and there remain several  prominent dilated air-filled small bowel loops as well as probable  multiple fluid filled small bowel loops consistent with small bowel  obstruction. This is quite similar or perhaps more prominent when  compared to the study of 4 days ago. The upright view shows no evidence  of free air. It does show worsening of areas of pneumonia in the left  lung.     This report was finalized on 5/4/2019 9:35 AM by Dr. Jay Alejandro M.D.       XR Abdomen KUB [[id number]] Collected:  04/30/19 0654     Updated:  05/03/19 0549    Narrative:       SINGLE VIEW ABDOMEN/KUB     HISTORY:sbo follow up; K56.609-Unspecified intestinal obstruction,  unspecified as to partial versus complete obstruction; R11.0-Nausea     COMPARISON: 03/17/2019.     FINDINGS: Portable supine view of the abdomen obtained. Two images were  submitted. Numerous surgical clips are seen in the more central abdomen  bilaterally, unchanged. Nasogastric tube remains in place terminating  along the greater curvature of the more distal gastric body. The urinary  bladder is distended and contains residual iodinated  contrast.      There is only minimal bowel gas present, diminished considerably from  previous. No dilated bowel is appreciated.  Some degenerative spurring  present in the thoracolumbar spine. There is generalized osteopenia.  No  suspicious calculi appreciated.             Impression:       Diminishing bowel gas, no convincing evidence of obstruction. .     This report was finalized on 5/3/2019 5:46 AM by Damaso Rosen M.D.       CT Abdomen Pelvis With Contrast [[id number]] Collected:  04/29/19 1430     Updated:  04/30/19 1026    Narrative:       CT ABDOMEN AND PELVIS WITH CONTRAST     HISTORY: Lower abdominal pain, nausea and vomiting.     TECHNIQUE: Axial CT images of the abdomen and pelvis were obtained  following administration of intravenous contrast. The patient was not  given oral contrast. Coronal and sagittal reformats were obtained.     COMPARISON: 03/17/2019     FINDINGS: There is diffuse gastric distention. In addition, there is  dilatation of a long segment of the most proximal jejunal loops  measuring up to 4.1 cm. The proximal and distal transition points are  close to each other and suggest a closed loop type of obstruction.  Distal to this the small bowel loops are completely collapsed. There  appears to have been a subtotal colectomy and the enterocolonic  anastomosis appears unremarkable.      Nodular densities are again demonstrated within the left lower lobe. No  pleural or pericardial effusion is seen. The liver does not demonstrate  any focal abnormality. The gallbladder is absent. There is intra and  extrahepatic biliary dilatation that likely represents benign biliary  ectasia status post cholecystectomy. There is diffuse pancreatic  atrophy. The spleen, bilateral adrenal gland and visualized kidneys are  normal apart from small cortical hyperdensities that are favored to  represent simple cysts. The urinary bladder is distended. There is a  small amount of layering fluid seen within the  pelvis. Moderate  atherosclerotic change is seen within the abdominal aorta and its  branches. There is diffuse wall thickening involving the distal  esophagus and the GE junction.       Impression:       1. Proximal small bowel obstruction that appears to be high-grade. The  proximal and distal transition point are in close approximation  suggesting a closed-loop type of obstruction, possibly related to  adhesions.  2.  Nodular density seen within the left lower lobe. Please refer to  recent dictation of CT chest for further recommendations.  3.  Diffuse wall thickening of the distal esophagus and GE junction. The  patient has history of esophageal cancer and follow up with upper GI  endoscopy is recommended.  4.  Additional findings as above.     These findings were discussed with Chiqui Valladares by telephone.     Radiation dose reduction techniques were utilized, including automated  exposure control and exposure modulation based on body size.     This report was finalized on 4/30/2019 10:23 AM by Dr. Delilah Mcneill M.D.             I reviewed the patient's new clinical results.  I personally viewed and interpreted the patient's imaging results:        Medication Review:     famotidine 10 mg Intravenous Q12H   ipratropium-albuterol 3 mL Nebulization 4x Daily - RT   mineral oil 30 mL Oral BID   sodium chloride 10 mL Intravenous Q12H   sodium chloride 3 mL Intravenous Q12H         Adult Central 2-in-1 TPN  Last Rate: 60 mL/hr at 05/05/19 1802   Adult Central 2-in-1 TPN     Pharmacy to Dose TPN         ASSESSMENT:   1. Mass like consolidation, SHAWNEE, appeared on CT chest 3/18/19 compared 11/8/18. Patient did not have radiation therapy during that window of time.   2. Pre-operative clearance: she is moderate risk for post op pulmonary complications  3. Scarring SHAWNEE, related to radiation therapy  4. Moderate COPD, no exacerbation  5. Hx of stage IIB adenoa CA SHAWNEE, s/p stereotactic radiation  6. Exertion  hypoxemia  7. Small bowel obstruction    PLAN:  As far as the nodules, they should not interfere with her surgery, she does however have moderate risks for post op pneumonia and ventilator dependency. Education provided on the importance of pulmonary toilets and deep breathing and coughing post op as well as the importance of early ambulation post op.   She is pulling 9001-8793 cc on the IS  Discussed with surgery,   Note and labs and films independently reviewed         Disposition: depending on hospital course    Davian Koenig MD  05/06/19  1:58 PM          Dictated utilizing Dragon dictation

## 2019-05-06 NOTE — PLAN OF CARE
Problem: Patient Care Overview  Goal: Plan of Care Review  Outcome: Ongoing (interventions implemented as appropriate)   05/06/19 0610   Plan of Care Review   Progress no change   OTHER   Outcome Summary tpn started via mediport, vdg, O2 at 3L, med for pain, tolerating ice chips and popsicles, bed alarm, confused and suspicious of staff at times, ngt inplace and draining lg amts of brown fluid, med for pain     Goal: Discharge Needs Assessment  Outcome: Ongoing (interventions implemented as appropriate)    Goal: Interprofessional Rounds/Family Conf  Outcome: Ongoing (interventions implemented as appropriate)      Problem: Bowel Obstruction (Adult)  Goal: Signs and Symptoms of Listed Potential Problems Will be Absent, Minimized or Managed (Bowel Obstruction)  Outcome: Ongoing (interventions implemented as appropriate)      Problem: Pain, Acute (Adult)  Goal: Acceptable Pain Control/Comfort Level  Outcome: Ongoing (interventions implemented as appropriate)      Problem: Nausea/Vomiting (Adult)  Goal: Symptom Relief  Outcome: Ongoing (interventions implemented as appropriate)    Goal: Adequate Hydration  Outcome: Ongoing (interventions implemented as appropriate)      Problem: Skin Injury Risk (Adult)  Goal: Skin Health and Integrity  Outcome: Ongoing (interventions implemented as appropriate)      Problem: Fall Risk (Adult)  Goal: Absence of Fall  Outcome: Ongoing (interventions implemented as appropriate)

## 2019-05-07 NOTE — OP NOTE
Operative Note :   Trenton Canales MD    Dilma Abad  1950    Procedure Date: 05/07/19    Pre-op Diagnosis:  SBO (small bowel obstruction) (CMS/[facility]) [K56.609]    Post-Op Diagnosis:  Same    Procedure:   · Exploratory laparotomy with adhesio lysis    Surgeon: Trenton Canales MD    Assistant: Lane Medina PAC    Anesthesia:  General (general endotracheal tube)    EBL:   10 mL    Specimens:   Abdominal wound fluid    Indications:  · 69-year-old lady, chronically ill with severe malnutrition, history of esophageal and lung cancer and a prior subtotal colectomy, with precise indication for subtotal colectomy being unclear was admitted around a month ago with small bowel obstruction, seem to resolve spontaneously, remained really ill for several weeks and was readmitted with recurrent small bowel obstruction.  I treated her conservatively for about a week, but she did not improve, and as such she presents for exploration today.    Findings:   · Small bowel obstruction secondary to closed loop obstruction in the more proximal small bowel  · Paucity of adhesions against the abdominal wall  · Ileorectal or ileosigmoid anastomosis in the pelvis  · Estimated 250 cm of small bowel healthy  · Remaining colon extends approximately 10 cm above the pelvic brim    Recommendations:   · Routine postoperative care    Description of procedure:    After obtaining informed consent, patient was brought to the operating room and placed under a general anesthetic.  Castillo catheter was placed.  The patient's abdomen was sterilely prepped and draped.  Pre-incisional Marcaine was instilled in the subcu space.  An incision was then made through the subcutaneous tissues, through the midline to the level of the peritoneum.  The peritoneum was carefully incised.  The abdominal wall was lifted up and space in the peritoneum was identified.  The space was enlarged enough to insert my finger and I swept and I could feel that there were minimal  adhesions against the anterior abdominal wall, and essentially none in the midline.  The midline was then widely opened and exploration was undertaken.  There were a couple of soft adhesions against the anterior abdominal wall on the patient's left side.  Essentially none on the right.  Immediately obvious was decompressed small bowel and swollen small bowel.  The small bowel was run.  I was able to identify the ileal rectal or ileosigmoid anastomosis.  There was some light adhesions of some bowel loops to this and these were taken down easily.  The distal small bowel was quite healthy and I was able to run this back to the level of the obviously dilated small bowel.  There appeared to be a closed loop type obstruction.  This was carefully taken down.  There were some rather thick fibrous bands as the source of this obstruction and these were carefully  from the mesentery and this was opened completely.  The total adhesio lysis was probably less than 30 minutes.  This was done with a combination of Metzenbaum scissors and electrocautery.  Finally I was able to run the small bowel all the way from the remaining colon through the small bowel and all the way up to the ligament of Treitz.  I was able to milk succus through the area which it obviously been the point of obstruction, and although it was slightly narrowed I did not feel that resection was warranted.  The small bowel was arranged.  I did place Seprafilm in the pelvis and on top of the small bowel.  The abdomen was irrigated.  The fascia was closed with 0 PDS suture.  Skin was closed with running 4-0 Monocryl subcuticular.  Sterile dressings were applied.  The patient tolerated this well.    Trenton Canales MD  General and Endoscopic Surgery  [facility]    [address], [address]  [place], [place], [address]  P: [phone]  F: [phone]

## 2019-05-07 NOTE — PLAN OF CARE
Problem: Patient Care Overview  Goal: Plan of Care Review  Outcome: Ongoing (interventions implemented as appropriate)   05/07/19 1320   Coping/Psychosocial   Plan of Care Reviewed With patient;sibling   Plan of Care Review   Progress improving   OTHER   Outcome Summary PT POST OP FOR LAP EXPLORATORY LAPAROTOMY TODAY. PT NPO WITH TPN AND LIPIDS, OCASSIONAL ICE CHIPS. NG TO LWS, SCANT DRAINAGE SINCE SURGERY. PT COMPLAINS OF INTERMITTENT ABD PAIN, RELIEVED WITH MORPHINE. PT OCASSIONALLY CONFUSED SITUATIONALLY. PT SISTERS WERE PRESENT AT BEDSIDE TODAY FOR SUPPORT. CONGESTED COUGH CONTINUES, PT COMPLETING I/S AND C&DB.        Problem: Nutrition, Parenteral (Adult)  Goal: Signs and Symptoms of Listed Potential Problems Will be Absent, Minimized or Managed (Nutrition, Parenteral)  Outcome: Ongoing (interventions implemented as appropriate)      Problem: Nutrition, Imbalanced: Inadequate Oral Intake (Adult)  Goal: Identify Related Risk Factors and Signs and Symptoms  Outcome: Ongoing (interventions implemented as appropriate)    Goal: Improved Oral Intake  Outcome: Ongoing (interventions implemented as appropriate)    Goal: Prevent Further Weight Loss  Outcome: Ongoing (interventions implemented as appropriate)

## 2019-05-07 NOTE — PLAN OF CARE
Problem: Patient Care Overview  Goal: Plan of Care Review  Outcome: Ongoing (interventions implemented as appropriate)   05/07/19 2480   Coping/Psychosocial   Plan of Care Reviewed With patient   Plan of Care Review   Progress no change   OTHER   Outcome Summary To go to OR today for her bowel obstruction. NPO. NG continues to LWS. Green/brown drainage from NG. Some hypoactive bowel sounds present. No c/o pain. TPN continues through implanted port. Cleansed with chlorhexidine skin prep last evening     Goal: Individualization and Mutuality  Outcome: Ongoing (interventions implemented as appropriate)    Goal: Discharge Needs Assessment  Outcome: Ongoing (interventions implemented as appropriate)    Goal: Interprofessional Rounds/Family Conf  Outcome: Ongoing (interventions implemented as appropriate)      Problem: Bowel Obstruction (Adult)  Goal: Signs and Symptoms of Listed Potential Problems Will be Absent, Minimized or Managed (Bowel Obstruction)  Outcome: Ongoing (interventions implemented as appropriate)      Problem: Pain, Acute (Adult)  Goal: Acceptable Pain Control/Comfort Level  Outcome: Ongoing (interventions implemented as appropriate)      Problem: Nausea/Vomiting (Adult)  Goal: Symptom Relief  Outcome: Ongoing (interventions implemented as appropriate)    Goal: Adequate Hydration  Outcome: Ongoing (interventions implemented as appropriate)      Problem: Skin Injury Risk (Adult)  Goal: Skin Health and Integrity  Outcome: Ongoing (interventions implemented as appropriate)      Problem: Fall Risk (Adult)  Goal: Absence of Fall  Outcome: Ongoing (interventions implemented as appropriate)

## 2019-05-07 NOTE — PROGRESS NOTES
Malnutrition Severity Assessment    Patient Name:  Dilma Abad  YOB: 1950  MRN: [id number]  Admit Date:  4/29/2019    Patient meets criteria for : Severe malnutrition        Malnutrition Type: Chronic Illness Malnutrition     Malnutrition Type (last 8 hours)      Malnutrition Severity Assessment     Row Name 05/07/19 1121       Malnutrition Severity Assessment    Malnutrition Type  Chronic Illness Malnutrition    Row Name 05/07/19 1121       Physical Signs of Malnutrition (Chronic)    Muscle Wasting  Severe temple region scooping, clavicle bone prominent, acromion process prominent, dorsal hand depresses, thin legs, calves, knee cap rounded    Fat Loss  -- ribs apparent    Row Name 05/07/19 1121       Weight Status (Chronic)    BMI  Severe (<16)    %IBW  Severe (<70%)    Weight Loss  Severe (>7.5% / 3 mo)    Row Name 05/07/19 1121       Energy Intake Status (Chronic)    Energy Intake  Severe (< or equal to 50% / > or equal to 1 mo)    Row Name 05/07/19 1121       Criteria Met (Must meet criteria for severity in at least 2 of these categories: M Wasting, Fat Loss, Fluid, Secondary Signs, Wt. Status, Intake)    Patient meets criteria for   Severe malnutrition          Electronically signed by:  Ruby Kellogg RD, LD  05/07/19 11:28 AM

## 2019-05-07 NOTE — CONSULTS
"Adult Nutrition  Assessment/PES    Patient Name:  Dilma Abad  YOB: 1950  MRN: [id number]  Admit Date:  4/29/2019    Assessment Date:  5/7/2019    Comments:  TPN f/u. S:p surgery this am.  TPN meeting assessed needs. Also completed nutrition focused physical assessment and interviewed patient's sister. Familiar with patient from outpatient oncology during her chemoradiation for esophageal cancer and lung cancer treatment.   Meets criteria for severe chronic malnutrition. Will follow for TPN and for po intake when diet is initiated.     Reason for Assessment     Row Name 05/07/19 1102          Reason for Assessment    Reason For Assessment  follow-up protocol;TF/PN     Diagnosis  -- SBO, s/p exploratory lap and adhesiolysis         Nutrition/Diet History     Row Name 05/07/19 1102          Nutrition/Diet History    Typical Food/Fluid Intake  Just back from surgery         Anthropometrics     Row Name 05/07/19 1102 05/07/19 0623       Anthropometrics    Height  167.6 cm (65.98\")  --    Weight  38.1 kg (84 lb) 5/6 weight  29.9 kg (65 lb 14.4 oz)       Ideal Body Weight (IBW)    Ideal Body Weight (IBW) (kg)  59.54  --    % Ideal Body Weight  63.99  --    % of Ideal Body Weight Assessment  less than 70%: severe deficit  --       Usual Body Weight (UBW)    Usual Body Weight  45.4 kg (100 lb)  --    % Usual Body Weight  84  --    % of Usual Body Weight Assessment  75-84% - moderate deficit  --    Weight Loss  unintentional  --    Weight Loss Time Frame  19# x 6 weeks  --       Body Mass Index (BMI)    BMI (kg/m2)  13.59  --    BMI Assessment  BMI less than 16: protein-energy malnutrition grade III  --           Labs/Tests/Procedures/Meds     Row Name 05/07/19 1117          Labs/Procedures/Meds    Lab Results Reviewed  reviewed, pertinent        Diagnostic Tests/Procedures    Diagnostic Test/Procedure Reviewed  reviewed, pertinent     Diagnostic Test/Procedures Comments  s/p adhesiolysis this am for SBO  " "      Medications    Pertinent Medications Reviewed  reviewed, pertinent         Physical Findings     Row Name 05/07/19 1117          Physical Findings    Overall Physical Appearance  loss of subcutaneous fat;loss of muscle mass;underweight     Tubes  nasogastric tube power port     Skin  -- abd inc         Estimated/Assessed Needs     Row Name 05/07/19 1118 05/07/19 1102       Calculation Measurements    Weight Used For Calculations  38.1 kg (84 lb)  --    Height  --  167.6 cm (65.98\")       KCAL/KG    14 Kcal/Kg (kcal)  533.43  --    15 Kcal/Kg (kcal)  571.53  --    18 Kcal/Kg (kcal)  685.84  --    20 Kcal/Kg (kcal)  762.04  --    25 Kcal/Kg (kcal)  952.55  --    30 Kcal/Kg (kcal)  1143.06  --    35 Kcal/Kg (kcal)  1333.57  --    40 Kcal/Kg (kcal)  1524.08  --    45 Kcal/Kg (kcal)  1714.59  --    50 Kcal/Kg (kcal)  1905.1  --       Rooks-St. Jeor Equation    RMR (Rooks-St. Jeor Equation)  912.52  --       Protein Requirements    Est Protein Requirement Amount (gms/kg)  1.2 gm protein  --    Estimated Protein Requirements (gms/day)  45.72  --       Fluid Requirements    Naomi-Wilfredo Method (over 20 kg)  2262.04  --        Nutrition Prescription Ordered     Row Name 05/07/19 1119          Nutrition Prescription PO    Current PO Diet  NPO        Nutrition Prescription PN    PN Route  Other (comment) power port     PN Current Rate (mL/hr)  60 mL/hr     Dextrose (Kcal)  800     Amino Acid (gm)  50     Lipid Concentration (%)  20%     Lipid Volume (mL)  100 mL     Lipid Frequency  Daily         Evaluation of Received Nutrient/Fluid Intake     Row Name 05/07/19 1120 05/07/19 1118       Calculation Measurements    Weight Used For Calculations  --  38.1 kg (84 lb)       Calories Evaluation    Parenteral Calories (kcal)  1200  --    % of Kcal Needs  100  --       Protein Evaluation    Parenteral Protein (gm)  50  --    % of Protein Needs  100  --       Intake Assessment    Energy/Calorie Requirement Assessment  " "meeting needs  --    Protein Requirement Assessment  meeting needs  --    Row Name 05/07/19 1102          Calculation Measurements    Height  167.6 cm (65.98\")         Evaluation of Prescribed Nutrient/Fluid Intake     Row Name 05/07/19 1118 05/07/19 1102       Calculation Measurements    Weight Used For Calculations  38.1 kg (84 lb)  --    Height  --  167.6 cm (65.98\")        Malnutrition Severity Assessment     Row Name 05/07/19 1121          Malnutrition Severity Assessment    Malnutrition Type  Chronic Illness Malnutrition        Physical Signs of Malnutrition (Chronic)    Muscle Wasting  Severe temple region scooping, clavicle bone prominent, acromion process prominent, dorsal hand depresses, thin legs, calves, knee cap rounded     Fat Loss  -- ribs apparent        Weight Status (Chronic)    BMI  Severe (<16)     %IBW  Severe (<70%)     Weight Loss  Severe (>7.5% / 3 mo)        Energy Intake Status (Chronic)    Energy Intake  Severe (< or equal to 50% / > or equal to 1 mo)        Criteria Met (Must meet criteria for severity in at least 2 of these categories: M Wasting, Fat Loss, Fluid, Secondary Signs, Wt. Status, Intake)    Patient meets criteria for   Severe malnutrition           Problem/Interventions:  Problem 1     Row Name 05/07/19 1124          Nutrition Diagnoses Problem 1    Problem 1  Malnutrition     Etiology (related to)  Medical Diagnosis     Gastrointestinal  SBO     Oncology  Lung cancer;Esophageal cancer     Signs/Symptoms (evidenced by)  NPO;BMI;% IBW;% UBW;Unintended Weight Change;Report of Mnimal PO Intake     BMI  Less than 16     Percent (%) IBW  63 %     Percent (%) UBW  84 %     Unintended Weight Change  Loss     Number of Pounds Lost  19     Weight loss time period  2 months                 Intervention Goal     Row Name 05/07/19 1125          Intervention Goal    General  Nutrition support treatment;Disease management/therapy;Meet nutritional needs for age/condition     PO  Advance " diet;Tolerate PO     TF/PN  Deliver (%) goal;Maintain TF/PN     Deliver % of Goal  100 %     Transition  PN to PO     Weight  Appropriate weight gain         Nutrition Intervention     Row Name 05/07/19 1125          Nutrition Intervention    RD/Tech Action  Care plan reviewd;Follow Tx progress         Nutrition Prescription     Row Name 05/07/19 1125          Nutrition Prescription PN    Parenteral Prescription  Continue same protocal         Education/Evaluation     Row Name 05/07/19 1125          Monitor/Evaluation    Monitor  Per protocol;I&O;Pertinent labs;PN delivery/tolerance;Weight;Skin status           Electronically signed by:  Ruby Kellogg RD, LD  05/07/19 11:26 AM

## 2019-05-07 NOTE — ANESTHESIA POSTPROCEDURE EVALUATION
"Patient: Dilma Abad    Procedure Summary     Date:  05/07/19 Room / Location:  [facility] OR  / [facility] MAIN OR    Anesthesia Start:  0806 Anesthesia Stop:  0927    Procedure:  EXPLORATORY, LYSIS OF ADHESIONS (N/A Abdomen) Diagnosis:       SBO (small bowel obstruction) (CMS/HCC)      (SBO (small bowel obstruction) (CMS/HCC) [K56.609])    Surgeon:  Trenton Canales MD Provider:  Andrey Levy MD    Anesthesia Type:  general ASA Status:  4          Anesthesia Type: general  Last vitals  BP   (!) 186/90 (05/07/19 0945)   Temp   36.9 °C (98.5 °F) (05/07/19 0925)   Pulse   87 (05/07/19 0945)   Resp   18 (05/07/19 0945)     SpO2   95 % (05/07/19 0945)     Post Anesthesia Care and Evaluation    Patient location during evaluation: bedside  Patient participation: complete - patient participated  Level of consciousness: awake and alert  Pain management: adequate  Airway patency: patent  Anesthetic complications: No anesthetic complications    Cardiovascular status: acceptable  Respiratory status: acceptable  Hydration status: acceptable    Comments: BP (!) 186/90   Pulse 87   Temp 36.9 °C (98.5 °F) (Oral)   Resp 18   Ht 167.6 cm (66\")   Wt 29.9 kg (65 lb 14.4 oz)   SpO2 95%   BMI 10.64 kg/m²       "

## 2019-05-07 NOTE — ANESTHESIA PREPROCEDURE EVALUATION
Anesthesia Evaluation     no history of anesthetic complications:  NPO Solid Status: > 8 hours  NPO Liquid Status: > 8 hours           Airway   Mallampati: II  TM distance: >3 FB  Neck ROM: full  Dental    (+) upper dentures    Pulmonary    (+) a smoker Former, lung cancer, COPD severe, shortness of breath, decreased breath sounds,   Cardiovascular - normal exam    ECG reviewed        Neuro/Psych  (-) CVA  GI/Hepatic/Renal/Endo      Musculoskeletal     Abdominal    Substance History      OB/GYN          Other      history of cancer active                  Anesthesia Plan    ASA 4     general     intravenous induction   Anesthetic plan, all risks, benefits, and alternatives have been provided, discussed and informed consent has been obtained with: patient and sibling.

## 2019-05-07 NOTE — PROGRESS NOTES
"Pharmacy to dose TPN - Daily Progress Note  Patient: Dilma Abad  MRN#: [id number]  Attending: No name on file.  Admission Date: 429    TPN # 3 per Dr. Robert  Indication: bowel obstruction    Active Problems:    SBO (small bowel obstruction) (CMS/HCC)    Subjective/Objective  69 y.o. female 167.6 cm (65.98\") 38.1 kg (84 lb)  Results from last 7 days   Lab Units 19  0433 19  0431 19  1244   SODIUM mmol/L 136 135*  --    POTASSIUM mmol/L 4.3 4.0  --    CHLORIDE mmol/L 94* 93*  --    CO2 mmol/L 30.9* 34.0*  --    BUN mg/dL 14 12  --    CREATININE mg/dL 0.63 0.61  --    CALCIUM mg/dL 9.2 9.0  --    ALBUMIN g/dL  --  3.00*  --    BILIRUBIN mg/dL  --  0.3  --    ALK PHOS U/L  --  53  --    ALT (SGPT) U/L  --  12  --    AST (SGOT) U/L  --  13  --    GLUCOSE mg/dL 127* 106*  --    MAGNESIUM mg/dL 2.2 2.1 1.7   PHOSPHORUS mg/dL 3.8 3.7 2.9   TRIGLYCERIDES mg/dL  --   --  69      I/O last 3 completed shifts:  In: 1000 [Other:180; IV Piggyback:100]  Out: 3100 [Urine:800; Emesis/NG output:2300]    Diet Orders (active) (From admission, onward)    Start     Ordered    19 1800  Adult Central 2-in-1 TPN  Continuous TPN      19 1133    19 1230  NPO Diet  Diet Effective Now     Comments:  Patient may have 1 popsicle or 1 cup of Italian ice every 6 hours.    19 1229        Additional insulin administration while previous TPN infusin units  Additional electrolyte administration while previous TPN infusing: none  Acid suppression: famotidine     Goal              Protein: 50 grams/day              Dextrose: 800 Kcal/day              Lipids: 100 ml of 20% lipid infusion              Volume: 1440 ml (60ml/hr over 24 hrs daily)    Daily Assessment  Current macronutrients: protein 50 gm/day, dextrose 600 kcal/day, lipids held until other macronutrients at goal  Fluid rate: 60 mL/hr    Plan    1. Will increase dextrose to 800 kcal/day and start lipids today.  2. The following " electrolytes have been added to the TPN:   Sodium Chloride: 100 mEq   Sodium Acetate: 0 mEq   Potassium Chloride: 80 mEq   Potassium Acetate: 0 mEq   Potassium Phosphate: 12 mEq   Magnesium Sulfate: 10 mEq   Calcium gluconate: 9 mEq   Multivitamin   Trace Elements  3. BMP, Mg and Phos with AM labs. Will continue to follow until discontinuation.    Sharri Pan, Pharm.D.

## 2019-05-07 NOTE — ANESTHESIA PROCEDURE NOTES
Airway  Urgency: elective    Airway not difficult    General Information and Staff    Patient location during procedure: OR  Anesthesiologist: Andrey Levy MD  CRNA: Marzena Avery CRNA    Indications and Patient Condition  Indications for airway management: airway protection    Preoxygenated: yes  MILS not maintained throughout  Mask difficulty assessment: 1 - vent by mask    Final Airway Details  Final airway type: endotracheal airway      Successful airway: ETT  Cuffed: yes   Successful intubation technique: direct laryngoscopy and RSI  Facilitating devices/methods: intubating stylet and cricoid pressure  Endotracheal tube insertion site: oral  Blade: Antionette  Blade size: 3  ETT size (mm): 7.0  Cormack-Lehane Classification: grade I - full view of glottis  Placement verified by: chest auscultation   Cuff volume (mL): 8  Measured from: lips  ETT to lips (cm): 20  Number of attempts at approach: 1    Additional Comments  PreO2 100% face mask, rapid sequenceIV induction with cricoid pressure, DVL x1, cords noted, tube through, cuff up, EBBSH, +etCO2, = chest movement, tube secured in place, atraumatic, teeth and lips intact as preop.

## 2019-05-07 NOTE — PROGRESS NOTES
"  PROGRESS NOTE  Patient Name: Dilma Abad  Age/Sex: 69 y.o. female  : 1950  MRN: [id number]    Date of Admission: 2019  Date of Encounter Visit: 19   LOS: 8 days   Patient Care Team:  Mary Taylor MD as PCP - General (Family Medicine)  Bienvenido Collier MD as Consulting Physician (Hematology and Oncology)  Daniel Oconnor MD as Referring Physician (Pulmonary Disease)  Shazia Mcdonald MD as Consulting Physician (Radiation Oncology)    Chief Complaint: COPD with history of lung cancer came in with small bowel obstruction status post surgery for postoperative pulmonary management    Hospital course: For the surgery today, had adhesional lysis with no evidence of any bowel and no bowel resection was necessary.  She did well postoperatively got extubated and she is off the ventilator, doing well on nasal cannula oxygen.  Denies any shortness of breath, working with incentive spirometer        REVIEW OF SYSTEMS:   CONSTITUTIONAL: no fever or chills  CARDIOVASCULAR: No chest pain, chest pressure or chest discomfort. No palpitations or edema.   RESPIRATORY: No significant shortness of breath, no productive secretion um.   GASTROINTESTINAL: Postoperative abdominal pain, no vomiting or nausea, NG tube in position.   HEMATOLOGIC: No bleeding or bruising.     Ventilator/Non-Invasive Ventilation Settings (From admission, onward)    Nasal cannula oxygen            Vital Signs  Temp:  [97.2 °F (36.2 °C)-99 °F (37.2 °C)] 97.2 °F (36.2 °C)  Heart Rate:  [75-94] 89  Resp:  [16-20] 16  BP: (136-192)/(75-95) 136/76  SpO2:  [87 %-100 %] 96 %  on  Flow (L/min):  [2-4] 3 Device (Oxygen Therapy): nasal cannula    Intake/Output Summary (Last 24 hours) at 2019 1507  Last data filed at 2019 1201  Gross per 24 hour   Intake 1520 ml   Output 1530 ml   Net -10 ml     Flowsheet Rows      First Filed Value   Admission Height  167.6 cm (66\") Documented at 2019 1128   Admission Weight  40.8 kg (90 lb) " Documented at 04/29/2019 1148        Body mass index is 13.56 kg/m².      05/06/19  0512 05/07/19  0623 05/07/19  1102   Weight: 38.3 kg (84 lb 6.4 oz) 29.9 kg (65 lb 14.4 oz) 38.1 kg (84 lb) (5/6 weight)       Physical Exam:  General:    No acute distress, alert and oriented x4, pleasant, on nasal cannula oxygen                   Head:    Normocephalic, atraumatic.   Eyes:          Conjunctivae and sclerae normal, no icterus, PERRLA   Throat:   No oral lesions, no thrush, oral mucosa moist.  She has nasogastric tube    Neck:   Supple, trachea midline.  No JVD   Lungs:     Normal chest on inspection, diminished breath sounds otherwise clear to auscultation with no wheezes, posterior he has minimal bilateral crackles which was no different from yesterday's exam.      Heart:    Regular rhythm and normal rate.  No murmurs, gallops, or rubs noted.   Abdomen:     Soft, minimally tender, incision is dry, no distention, no bowel sounds     Extremities:   No clubbing, cyanosis, or edema.     Pulses:   Pulses palpable and equal bilaterally.    Skin:   No bleeding or rash.   Neuro:   Non-focal.  Moves all extremities well.    Psychiatric:   Normal mood and affect.           Results Review:      Results from last 7 days   Lab Units 05/07/19  0433 05/06/19  0431 05/05/19  0338 05/04/19  0444 05/03/19  0839 05/02/19  0633   SODIUM mmol/L 136 135* 137 137  --  143   POTASSIUM mmol/L 4.3 4.0 3.5 3.6 3.7 2.8*   CHLORIDE mmol/L 94* 93* 92* 92*  --  97*   CO2 mmol/L 30.9* 34.0* 32.2* 35.7*  --  27.6   BUN mg/dL 14 12 10 10  --  13   CREATININE mg/dL 0.63 0.61 0.58 0.60  --  0.40*   CALCIUM mg/dL 9.2 9.0 8.7 9.0  --  8.6   AST (SGOT) U/L  --  13  --   --   --   --    ALT (SGPT) U/L  --  12  --   --   --   --    ANION GAP mmol/L 11.1 8.0 12.8 9.3  --  18.4   ALBUMIN g/dL  --  3.00*  --   --   --   --                  Results from last 7 days   Lab Units 05/05/19  0338 05/04/19  0444 05/02/19  0633   WBC 10*3/mm3 11.69* 9.29 7.45    HEMOGLOBIN g/dL 12.1 12.8 12.0   HEMATOCRIT % 38.1 40.3 38.2   PLATELETS 10*3/mm3 187 215 224   MCV fL 89.0 87.4 88.2   NEUTROPHIL % % 85.8* 78.0* 77.8*   LYMPHOCYTE % % 5.0* 9.1* 9.4*   MONOCYTES % % 8.6 12.1* 12.1*   EOSINOPHIL % % 0.1* 0.3 0.1*   BASOPHIL % % 0.2 0.1 0.1   IMM GRAN % % 0.3 0.4 0.5         Results from last 7 days   Lab Units 05/07/19  0433 05/06/19  0431 05/05/19  1244 05/03/19  0839   MAGNESIUM mg/dL 2.2 2.1 1.7 2.0     Results from last 7 days   Lab Units 05/05/19  1244   TRIGLYCERIDES mg/dL 69             Glucose   Date/Time Value Ref Range Status   05/07/2019 1131 126 70 - 130 mg/dL Final   05/07/2019 0632 134 (H) 70 - 130 mg/dL Final   05/06/2019 2354 132 (H) 70 - 130 mg/dL Final   05/06/2019 1828 128 70 - 130 mg/dL Final   05/06/2019 1326 109 70 - 130 mg/dL Final   05/06/2019 0631 115 70 - 130 mg/dL Final   05/06/2019 0036 168 (H) 70 - 130 mg/dL Final   05/05/2019 1732 106 70 - 130 mg/dL Final     Results from last 7 days   Lab Units 05/05/19  0338   PROCALCITONIN ng/mL 0.15     Results from last 7 days   Lab Units 05/05/19  0120   STREP PNEUMO AG  Positive*   L. PNEUMOPHILA SEROGP 1 UR AG  Negative                   Imaging:   Imaging Results (all)     Procedure Component Value Units Date/Time    XR Abdomen KUB [[id number]] Collected:  05/06/19 0928     Updated:  05/06/19 0940    Narrative:       KUB     HISTORY: Small bowel obstruction.     Two images of the abdomen and pelvis are provided and are correlated  with abdomen x-ray 05/04/2019 and 04/30/2019.     FINDINGS: There is a nasogastric tube with its tip in the stomach. There  are multiple surgical clips throughout the abdomen and pelvis. Some  chain sutures are observed in the pelvis as well. There is a single loop  of small bowel containing some gas and measuring 4.7 cm diameter. This  is transversely oriented in the central pelvis. There is a small volume  of gas in the rectum. Overall, there is very minimal amount of gas in  the  bowel. No free air is present.       Impression:       There is a single air-filled, dilated loop of small bowel in  the central pelvis. This is slightly less dilated than on x-ray  05/04/2019. There is no evidence of free air.     This report was finalized on 5/6/2019 9:37 AM by Dr. Sebastian Hobbs M.D.       XR Chest PA & Lateral [[id number]] Collected:  05/05/19 1043     Updated:  05/05/19 1147    Narrative:       2-VIEW CHEST     HISTORY: Shortness of breath. Previous lung cancer. Previous esophageal  cancer.     FINDINGS: There is severe COPD with hyperinflation and there is  prominent parenchymal and pleural abnormality in the upper left lung and  extending towards the hilum and to a lesser extent some scattered vague  nodularity extending into the lower left lung. Some of the more focal  changes are consistent with chronic fibrotic scarring with a possible  component of underlying neoplasm extending laterally as suggested on the  CT scan dated 03/18/2019. There is slight improvement in some  superimposed vague haziness and nodularity in the lower left chest since  the chest x-ray of 03/24/2019.     The heart size remains normal. An NG tube ends in the stomach. A  right-sided MediPort catheter ends in the SVC.     This report was finalized on 5/5/2019 11:43 AM by Dr. Jay Alejandro M.D.       XR Abdomen Flat & Upright [[id number]] Collected:  05/04/19 0931     Updated:  05/04/19 0938    Narrative:       TWO-VIEW ABDOMEN     HISTORY: Small bowel obstruction.     FINDINGS: An NG tube ends in the lower stomach and there remain several  prominent dilated air-filled small bowel loops as well as probable  multiple fluid filled small bowel loops consistent with small bowel  obstruction. This is quite similar or perhaps more prominent when  compared to the study of 4 days ago. The upright view shows no evidence  of free air. It does show worsening of areas of pneumonia in the left  lung.     This report was finalized  on 5/4/2019 9:35 AM by Dr. Jay Alejandro M.D.       XR Abdomen KUB [[id number]] Collected:  04/30/19 0654     Updated:  05/03/19 0549    Narrative:       SINGLE VIEW ABDOMEN/KUB     HISTORY:sbo follow up; K56.609-Unspecified intestinal obstruction,  unspecified as to partial versus complete obstruction; R11.0-Nausea     COMPARISON: 03/17/2019.     FINDINGS: Portable supine view of the abdomen obtained. Two images were  submitted. Numerous surgical clips are seen in the more central abdomen  bilaterally, unchanged. Nasogastric tube remains in place terminating  along the greater curvature of the more distal gastric body. The urinary  bladder is distended and contains residual iodinated contrast.      There is only minimal bowel gas present, diminished considerably from  previous. No dilated bowel is appreciated.  Some degenerative spurring  present in the thoracolumbar spine. There is generalized osteopenia.  No  suspicious calculi appreciated.             Impression:       Diminishing bowel gas, no convincing evidence of obstruction. .     This report was finalized on 5/3/2019 5:46 AM by Damaso Rosen M.D.       CT Abdomen Pelvis With Contrast [[id number]] Collected:  04/29/19 1430     Updated:  04/30/19 1026    Narrative:       CT ABDOMEN AND PELVIS WITH CONTRAST     HISTORY: Lower abdominal pain, nausea and vomiting.     TECHNIQUE: Axial CT images of the abdomen and pelvis were obtained  following administration of intravenous contrast. The patient was not  given oral contrast. Coronal and sagittal reformats were obtained.     COMPARISON: 03/17/2019     FINDINGS: There is diffuse gastric distention. In addition, there is  dilatation of a long segment of the most proximal jejunal loops  measuring up to 4.1 cm. The proximal and distal transition points are  close to each other and suggest a closed loop type of obstruction.  Distal to this the small bowel loops are completely collapsed. There  appears to have been a  subtotal colectomy and the enterocolonic  anastomosis appears unremarkable.      Nodular densities are again demonstrated within the left lower lobe. No  pleural or pericardial effusion is seen. The liver does not demonstrate  any focal abnormality. The gallbladder is absent. There is intra and  extrahepatic biliary dilatation that likely represents benign biliary  ectasia status post cholecystectomy. There is diffuse pancreatic  atrophy. The spleen, bilateral adrenal gland and visualized kidneys are  normal apart from small cortical hyperdensities that are favored to  represent simple cysts. The urinary bladder is distended. There is a  small amount of layering fluid seen within the pelvis. Moderate  atherosclerotic change is seen within the abdominal aorta and its  branches. There is diffuse wall thickening involving the distal  esophagus and the GE junction.       Impression:       1. Proximal small bowel obstruction that appears to be high-grade. The  proximal and distal transition point are in close approximation  suggesting a closed-loop type of obstruction, possibly related to  adhesions.  2.  Nodular density seen within the left lower lobe. Please refer to  recent dictation of CT chest for further recommendations.  3.  Diffuse wall thickening of the distal esophagus and GE junction. The  patient has history of esophageal cancer and follow up with upper GI  endoscopy is recommended.  4.  Additional findings as above.     These findings were discussed with Chiqui Valladares by telephone.     Radiation dose reduction techniques were utilized, including automated  exposure control and exposure modulation based on body size.     This report was finalized on 4/30/2019 10:23 AM by Dr. Delilah Mcneill M.D.             I reviewed the patient's new clinical results.  I personally viewed and interpreted the patient's imaging results:        Medication Review:     famotidine 10 mg Intravenous Q12H   fat emulsion 100 mL  Intravenous Q24H (TPN)   ipratropium-albuterol 3 mL Nebulization 4x Daily - RT   sodium chloride 10 mL Intravenous Q12H   sodium chloride 3 mL Intravenous Q12H         Adult Central 2-in-1 TPN  Last Rate: 60 mL/hr at 05/06/19 1800   Adult Central 2-in-1 TPN     lactated ringers 100 mL/hr Last Rate: 100 mL/hr (05/07/19 1258)   Pharmacy to Dose TPN         ASSESSMENT:   1. Status post exploratory laparotomy with adhesion lysis with small bowel obstruction secondary to closed loop obstruction in the proximal small bowel  2. COPD Dr. perioperative exacerbation  3. Stage IIb adenocarcinoma of the lung status post stereotactic radiation  4. Previous lung injury from radiation pneumonitis  5.  Postop hypoxemia    PLAN:  Patient is doing better than expected after the surgery, on nasal cannula oxygen, working with a deep breath and having decent lung volumes, using the incentive spirometer, no wheezing, the cough is nonproductive, and she denies any shortness of breath.  She was successfully extubated postoperatively with no respiratory complication.  And she did well from the surgical standpoint with no need for any bowel resection or which needed his adhesion lysis.  Continue with the pulmonary hygiene measures in the bronchodilator and will continue to manage, encouraged to continue working on the deep breathing exercises and the cough with expectoration's.      Discussed with patient    Disposition: Per primary team    Davian Koenig MD  05/07/19  3:07 PM          Dictated utilizing Dragon dictation

## 2019-05-08 NOTE — PROGRESS NOTES
Postoperative day #1 laparotomy with lysis of adhesions    Subjective:  Complains of soreness, but really her pain is improved compared to preoperative.  No GI function yet.  Breathing comfortably.    Objective:  Patient is afebrile, heart rate 90s blood pressure 144/86  Oxygenation mid 90s on 2 L nasal cannula  General: Awake and alert, oriented, answers appropriately  Abdomen: Flat, appropriately tender, dressings are clean    Assessment and plan:  -Small bowel obstruction, now postoperative day 1 from laparotomy and lysis of adhesions  -Mobilize as able  -Await return of GI function  -Continue TPN for severe malnutrition until GI tract again accessible and usable.  -Appreciate pulmonary assistance    Trenton Canales MD  General and Endoscopic Surgery  [facility]    [address], [address]  [place], [place], [address]  P: [phone]  F: [phone]

## 2019-05-08 NOTE — PLAN OF CARE
Problem: Patient Care Overview  Goal: Plan of Care Review   05/08/19 1413   Coping/Psychosocial   Plan of Care Reviewed With patient   OTHER   Outcome Summary Discussed/Reinforced use of IS-need for deep breathing/pna prevention-patient not very receptive at this time - will discuss again later with next tx.

## 2019-05-08 NOTE — PROGRESS NOTES
PROGRESS NOTE  Patient Name: Dilma Abad  Age/Sex: 69 y.o. female  : 1950  MRN: [id number]    Date of Admission: 2019  Date of Encounter Visit: 19   LOS: 9 days   Patient Care Team:  Mary Taylor MD as PCP - General (Family Medicine)  Bienvenido Collier MD as Consulting Physician (Hematology and Oncology)  Daniel Oconnor MD as Referring Physician (Pulmonary Disease)  Shazia Mcdonald MD as Consulting Physician (Radiation Oncology)    Chief Complaint: COPD with history of lung cancer came in with small bowel obstruction status post surgery for postoperative pulmonary management    Hospital course: Patient had a surgery yesterday and she had a smooth postoperative course, she is on nasal cannula oxygen, she has no wheeze or chest tightness or symptom of COPD exacerbation.   she was up in a chair and she did walk with the physical therapy today which is very promising.  She denies any specific congestion or difficulty in secretions.  She still had the NG tube in because of the postoperative ileus and she is not passing any gas yet        REVIEW OF SYSTEMS:   CONSTITUTIONAL: no fever or chills  CARDIOVASCULAR: No chest pain, chest pressure or chest discomfort. No palpitations or edema.   RESPIRATORY: No significant shortness of breath, no productive secretion um.   GASTROINTESTINAL: Postoperative abdominal pain, no vomiting or nausea, NG tube in position.   HEMATOLOGIC: No bleeding or bruising.     Ventilator/Non-Invasive Ventilation Settings (From admission, onward)    Nasal cannula oxygen            Vital Signs  Temp:  [97 °F (36.1 °C)-98.7 °F (37.1 °C)] 97.7 °F (36.5 °C)  Heart Rate:  [86-96] 93  Resp:  [18-20] 20  BP: (137-159)/(77-92) 144/86  SpO2:  [94 %-100 %] 96 %  on  Flow (L/min):  [2-3] 2 Device (Oxygen Therapy): nasal cannula    Intake/Output Summary (Last 24 hours) at 2019 1433  Last data filed at 2019 0924  Gross per 24 hour   Intake 470 ml   Output 1050 ml   Net  "-580 ml     Flowsheet Rows      First Filed Value   Admission Height  167.6 cm (66\") Documented at 04/29/2019 1128   Admission Weight  40.8 kg (90 lb) Documented at 04/29/2019 1148        Body mass index is 11.3 kg/m².      05/07/19  1102 05/08/19  0530 05/08/19  0811   Weight: 38.1 kg (84 lb) (5/6 weight) 30.8 kg (68 lb) 31.8 kg (70 lb)       Physical Exam:  General:    No acute distress, alert and oriented x4, pleasant, on nasal cannula oxygen                   Head:    Normocephalic, atraumatic.   Eyes:          Conjunctivae and sclerae normal, no icterus, PERRLA   Throat:   No oral lesions, no thrush, oral mucosa moist.  She has nasogastric tube    Neck:   Supple, trachea midline.  No JVD   Lungs:     Normal chest on inspection, diminished breath sounds otherwise clear to auscultation with no wheezes, posterior he has minimal bilateral crackles which was no different from yesterday's exam.      Heart:    Regular rhythm and normal rate.  No murmurs, gallops, or rubs noted.   Abdomen:     Soft, minimally tender, incision is dry, no distention, no bowel sounds     Extremities:   No clubbing, cyanosis, or edema.     Pulses:   Pulses palpable and equal bilaterally.    Skin:   No bleeding or rash.   Neuro:   Non-focal.  Moves all extremities well.    Psychiatric:   Normal mood and affect.           Results Review:      Results from last 7 days   Lab Units 05/08/19  0521 05/07/19  0433 05/06/19  0431 05/05/19  0338 05/04/19  0444 05/03/19  0839 05/02/19  0633   SODIUM mmol/L 139 136 135* 137 137  --  143   POTASSIUM mmol/L 4.7 4.3 4.0 3.5 3.6 3.7 2.8*   CHLORIDE mmol/L 100 94* 93* 92* 92*  --  97*   CO2 mmol/L 29.1* 30.9* 34.0* 32.2* 35.7*  --  27.6   BUN mg/dL 14 14 12 10 10  --  13   CREATININE mg/dL 0.58 0.63 0.61 0.58 0.60  --  0.40*   CALCIUM mg/dL 8.6 9.2 9.0 8.7 9.0  --  8.6   AST (SGOT) U/L  --   --  13  --   --   --   --    ALT (SGPT) U/L  --   --  12  --   --   --   --    ANION GAP mmol/L 9.9 11.1 8.0 12.8 " 9.3  --  18.4   ALBUMIN g/dL  --   --  3.00*  --   --   --   --                  Results from last 7 days   Lab Units 05/05/19  0338 05/04/19  0444 05/02/19  0633   WBC 10*3/mm3 11.69* 9.29 7.45   HEMOGLOBIN g/dL 12.1 12.8 12.0   HEMATOCRIT % 38.1 40.3 38.2   PLATELETS 10*3/mm3 187 215 224   MCV fL 89.0 87.4 88.2   NEUTROPHIL % % 85.8* 78.0* 77.8*   LYMPHOCYTE % % 5.0* 9.1* 9.4*   MONOCYTES % % 8.6 12.1* 12.1*   EOSINOPHIL % % 0.1* 0.3 0.1*   BASOPHIL % % 0.2 0.1 0.1   IMM GRAN % % 0.3 0.4 0.5         Results from last 7 days   Lab Units 05/08/19  0521 05/07/19  0433 05/06/19  0431 05/05/19  1244 05/03/19  0839   MAGNESIUM mg/dL 2.2 2.2 2.1 1.7 2.0     Results from last 7 days   Lab Units 05/05/19  1244   TRIGLYCERIDES mg/dL 69             Glucose   Date/Time Value Ref Range Status   05/08/2019 1135 148 (H) 70 - 130 mg/dL Final   05/08/2019 0656 141 (H) 70 - 130 mg/dL Final   05/07/2019 2107 191 (H) 70 - 130 mg/dL Final   05/07/2019 1813 136 (H) 70 - 130 mg/dL Final   05/07/2019 1131 126 70 - 130 mg/dL Final   05/07/2019 0632 134 (H) 70 - 130 mg/dL Final   05/06/2019 2354 132 (H) 70 - 130 mg/dL Final   05/06/2019 1828 128 70 - 130 mg/dL Final     Results from last 7 days   Lab Units 05/05/19  0338   PROCALCITONIN ng/mL 0.15     Results from last 7 days   Lab Units 05/07/19  0835 05/05/19  0120   WOUNDCX  No growth  --    STREP PNEUMO AG   --  Positive*   L. PNEUMOPHILA SEROGP 1 UR AG   --  Negative                   Imaging:   Imaging Results (all)     Procedure Component Value Units Date/Time    XR Abdomen KUB [[id number]] Collected:  05/06/19 0928     Updated:  05/06/19 0940    Narrative:       KUB     HISTORY: Small bowel obstruction.     Two images of the abdomen and pelvis are provided and are correlated  with abdomen x-ray 05/04/2019 and 04/30/2019.     FINDINGS: There is a nasogastric tube with its tip in the stomach. There  are multiple surgical clips throughout the abdomen and pelvis. Some  chain sutures are  observed in the pelvis as well. There is a single loop  of small bowel containing some gas and measuring 4.7 cm diameter. This  is transversely oriented in the central pelvis. There is a small volume  of gas in the rectum. Overall, there is very minimal amount of gas in  the bowel. No free air is present.       Impression:       There is a single air-filled, dilated loop of small bowel in  the central pelvis. This is slightly less dilated than on x-ray  05/04/2019. There is no evidence of free air.     This report was finalized on 5/6/2019 9:37 AM by Dr. Sebastian Hobbs M.D.       XR Chest PA & Lateral [[id number]] Collected:  05/05/19 1043     Updated:  05/05/19 1147    Narrative:       2-VIEW CHEST     HISTORY: Shortness of breath. Previous lung cancer. Previous esophageal  cancer.     FINDINGS: There is severe COPD with hyperinflation and there is  prominent parenchymal and pleural abnormality in the upper left lung and  extending towards the hilum and to a lesser extent some scattered vague  nodularity extending into the lower left lung. Some of the more focal  changes are consistent with chronic fibrotic scarring with a possible  component of underlying neoplasm extending laterally as suggested on the  CT scan dated 03/18/2019. There is slight improvement in some  superimposed vague haziness and nodularity in the lower left chest since  the chest x-ray of 03/24/2019.     The heart size remains normal. An NG tube ends in the stomach. A  right-sided MediPort catheter ends in the SVC.     This report was finalized on 5/5/2019 11:43 AM by Dr. Jay Alejandro M.D.       XR Abdomen Flat & Upright [[id number]] Collected:  05/04/19 0931     Updated:  05/04/19 0938    Narrative:       TWO-VIEW ABDOMEN     HISTORY: Small bowel obstruction.     FINDINGS: An NG tube ends in the lower stomach and there remain several  prominent dilated air-filled small bowel loops as well as probable  multiple fluid filled small bowel loops  consistent with small bowel  obstruction. This is quite similar or perhaps more prominent when  compared to the study of 4 days ago. The upright view shows no evidence  of free air. It does show worsening of areas of pneumonia in the left  lung.     This report was finalized on 5/4/2019 9:35 AM by Dr. Jay Alejandro M.D.       XR Abdomen KUB [[id number]] Collected:  04/30/19 0654     Updated:  05/03/19 0549    Narrative:       SINGLE VIEW ABDOMEN/KUB     HISTORY:sbo follow up; K56.609-Unspecified intestinal obstruction,  unspecified as to partial versus complete obstruction; R11.0-Nausea     COMPARISON: 03/17/2019.     FINDINGS: Portable supine view of the abdomen obtained. Two images were  submitted. Numerous surgical clips are seen in the more central abdomen  bilaterally, unchanged. Nasogastric tube remains in place terminating  along the greater curvature of the more distal gastric body. The urinary  bladder is distended and contains residual iodinated contrast.      There is only minimal bowel gas present, diminished considerably from  previous. No dilated bowel is appreciated.  Some degenerative spurring  present in the thoracolumbar spine. There is generalized osteopenia.  No  suspicious calculi appreciated.             Impression:       Diminishing bowel gas, no convincing evidence of obstruction. .     This report was finalized on 5/3/2019 5:46 AM by Damaso Rosen M.D.       CT Abdomen Pelvis With Contrast [[id number]] Collected:  04/29/19 1430     Updated:  04/30/19 1026    Narrative:       CT ABDOMEN AND PELVIS WITH CONTRAST     HISTORY: Lower abdominal pain, nausea and vomiting.     TECHNIQUE: Axial CT images of the abdomen and pelvis were obtained  following administration of intravenous contrast. The patient was not  given oral contrast. Coronal and sagittal reformats were obtained.     COMPARISON: 03/17/2019     FINDINGS: There is diffuse gastric distention. In addition, there is  dilatation of a long  segment of the most proximal jejunal loops  measuring up to 4.1 cm. The proximal and distal transition points are  close to each other and suggest a closed loop type of obstruction.  Distal to this the small bowel loops are completely collapsed. There  appears to have been a subtotal colectomy and the enterocolonic  anastomosis appears unremarkable.      Nodular densities are again demonstrated within the left lower lobe. No  pleural or pericardial effusion is seen. The liver does not demonstrate  any focal abnormality. The gallbladder is absent. There is intra and  extrahepatic biliary dilatation that likely represents benign biliary  ectasia status post cholecystectomy. There is diffuse pancreatic  atrophy. The spleen, bilateral adrenal gland and visualized kidneys are  normal apart from small cortical hyperdensities that are favored to  represent simple cysts. The urinary bladder is distended. There is a  small amount of layering fluid seen within the pelvis. Moderate  atherosclerotic change is seen within the abdominal aorta and its  branches. There is diffuse wall thickening involving the distal  esophagus and the GE junction.       Impression:       1. Proximal small bowel obstruction that appears to be high-grade. The  proximal and distal transition point are in close approximation  suggesting a closed-loop type of obstruction, possibly related to  adhesions.  2.  Nodular density seen within the left lower lobe. Please refer to  recent dictation of CT chest for further recommendations.  3.  Diffuse wall thickening of the distal esophagus and GE junction. The  patient has history of esophageal cancer and follow up with upper GI  endoscopy is recommended.  4.  Additional findings as above.     These findings were discussed with Chiqui Valladares by telephone.     Radiation dose reduction techniques were utilized, including automated  exposure control and exposure modulation based on body size.     This report was  finalized on 4/30/2019 10:23 AM by Dr. Delilah Mcneill M.D.             I reviewed the patient's new clinical results.  I personally viewed and interpreted the patient's imaging results:        Medication Review:     famotidine 10 mg Intravenous Q12H   fat emulsion 100 mL Intravenous Q24H (TPN)   ipratropium-albuterol 3 mL Nebulization 4x Daily - RT   sodium chloride 10 mL Intravenous Q12H   sodium chloride 3 mL Intravenous Q12H         Adult Central 2-in-1 TPN  Last Rate: 60 mL/hr at 05/07/19 1829   Adult Central 2-in-1 TPN     Pharmacy to Dose TPN         ASSESSMENT:   1. Status post exploratory laparotomy with adhesion lysis with small bowel obstruction secondary to closed loop obstruction in the proximal small bowel  2. COPD Dr. perioperative exacerbation  3. Stage IIb adenocarcinoma of the lung status post stereotactic radiation  4. Previous lung injury from radiation pneumonitis  5.  Postop hypoxemia    PLAN:  Doing very well given the underlying situation and there is no sign of any respiratory decompensation  Working with incentive spirometer as instructed  Working with physical therapy and trying to ambulate.  She still has some ileus so the nasogastric tube instilled in position but pulmonary wise she is well compensated with oxygen supplementation at a low flow.  Encouraged to continue with the good work and will follow up on any respiratory issues      Discussed with patient    Disposition: Per primary team    Davian Koenig MD  05/08/19  2:33 PM          Dictated utilizing Dragon dictation

## 2019-05-08 NOTE — PLAN OF CARE
Problem: Patient Care Overview  Goal: Plan of Care Review  Outcome: Ongoing (interventions implemented as appropriate)   05/08/19 5606   Coping/Psychosocial   Plan of Care Reviewed With patient   Plan of Care Review   Progress improving   OTHER   Outcome Summary abdomen soft and flat, no c/o nausea, dressing to abdomen with staining, NGT with small amt of tan/brown drainage, sat in chair x2 today and walked x1, SOA with activity with a moist nonproductive cough, oxygen on at 2L, lynch with adequate UOP yellow with sediment, TPN/Lipids will be infusing via mediport, monitoring blood sugars     Goal: Individualization and Mutuality  Outcome: Ongoing (interventions implemented as appropriate)    Goal: Discharge Needs Assessment  Outcome: Ongoing (interventions implemented as appropriate)    Goal: Interprofessional Rounds/Family Conf  Outcome: Ongoing (interventions implemented as appropriate)      Problem: Bowel Obstruction (Adult)  Goal: Signs and Symptoms of Listed Potential Problems Will be Absent, Minimized or Managed (Bowel Obstruction)  Outcome: Ongoing (interventions implemented as appropriate)      Problem: Pain, Acute (Adult)  Goal: Acceptable Pain Control/Comfort Level  Outcome: Ongoing (interventions implemented as appropriate)      Problem: Skin Injury Risk (Adult)  Goal: Skin Health and Integrity  Outcome: Ongoing (interventions implemented as appropriate)      Problem: Fall Risk (Adult)  Goal: Absence of Fall  Outcome: Ongoing (interventions implemented as appropriate)      Problem: Nutrition, Parenteral (Adult)  Goal: Signs and Symptoms of Listed Potential Problems Will be Absent, Minimized or Managed (Nutrition, Parenteral)  Outcome: Ongoing (interventions implemented as appropriate)      Problem: Nutrition, Imbalanced: Inadequate Oral Intake (Adult)  Goal: Improved Oral Intake  Outcome: Ongoing (interventions implemented as appropriate)    Goal: Prevent Further Weight Loss  Outcome: Ongoing  (interventions implemented as appropriate)

## 2019-05-08 NOTE — PROGRESS NOTES
"Pharmacy to dose TPN - Daily Progress Note  Patient: Dilma Abad  MRN#: [id number]  Attending: No name on file.  Admission Date: 042919    Dilma Abad is a 69 y.o. female receiving TPN for bowel obstruction.     TPN # 4 per Dr. Robert    Active Problems:    SBO (small bowel obstruction) (CMS/HCC)    Subjective/Objective  167.6 cm (65.98\")  31.8 kg (70 lb)  Body mass index is 11.3 kg/m².   Results from last 7 days   Lab Units 05/08/19  0521  05/06/19  0431 05/05/19  1244   SODIUM mmol/L 139   < > 135*  --    POTASSIUM mmol/L 4.7   < > 4.0  --    CHLORIDE mmol/L 100   < > 93*  --    CO2 mmol/L 29.1*   < > 34.0*  --    BUN mg/dL 14   < > 12  --    CREATININE mg/dL 0.58   < > 0.61  --    CALCIUM mg/dL 8.6   < > 9.0  --    ALBUMIN g/dL  --   --  3.00*  --    BILIRUBIN mg/dL  --   --  0.3  --    ALK PHOS U/L  --   --  53  --    ALT (SGPT) U/L  --   --  12  --    AST (SGOT) U/L  --   --  13  --    GLUCOSE mg/dL 142*   < > 106*  --    MAGNESIUM mg/dL 2.2   < > 2.1 1.7   PHOSPHORUS mg/dL 2.8   < > 3.7 2.9   TRIGLYCERIDES mg/dL  --   --   --  69    < > = values in this interval not displayed.        I/O last 3 completed shifts:  In: 820 [I.V.:800; Other:20]  Out: 2380 [Urine:1060; Emesis/NG output:1310; Blood:10]  Diet Orders (active) (From admission, onward)    Start     Ordered    05/07/19 1114  NPO Diet NPO Except: Ice Chips  Diet Effective Now     Comments:  Patient may have 1 popsicle or 1 cup of Italian ice every 6 hours.    05/07/19 1113        Additional insulin administration while previous TPN infusing: none  Additional electrolyte administration while previous TPN infusing: none  Acid suppression: Pepcid 10 mg IV q12h     Daily Assessment  Protein: 50 grams/day  Dextrose: 800 Kcal/day   Lipids: 100 mL of 20% lipid infusion   Fluid rate: 60 mL/hr     Plan    Continue current macronutrients (at goal). Based on the above labs, will add the following electrolytes/additives to the TPN.     Sodium chloride: " 100 mEq  Sodium phosphate: 10 mEq (added)  Potassium chloride: 70 mEq (decreased from 80 mEq)   Potassium phosphate: 12 mEq   Calcium gluconate: 9 mEq   Magnesium sulfate: 10 mEq   Multivitamin: 10 mL   Trace Elements: 1 mL     Labs: BMP, Mag, Phos with AM labs tomorrow.     Pharmacy will continue to follow and monitor daily while patient on TPN. Thank you for this consult.      Pa Prather, PharmD, NOEMI, BCPS

## 2019-05-08 NOTE — PLAN OF CARE
Problem: Patient Care Overview  Goal: Plan of Care Review   05/08/19 1721   Coping/Psychosocial   Plan of Care Reviewed With patient   Plan of Care Review   Progress improving   OTHER   Outcome Summary Breath sounds clear/pt found working on her IS this afternoon.

## 2019-05-08 NOTE — PLAN OF CARE
Problem: Patient Care Overview  Goal: Plan of Care Review  Outcome: Ongoing (interventions implemented as appropriate)   05/08/19 7411   Coping/Psychosocial   Plan of Care Reviewed With patient   Plan of Care Review   Progress improving   OTHER   Outcome Summary Pt pain controlled with IV pain meds. VSS. Castillo in place. NG tube to LWS. IV TPN and Lipids. Will cont to monitor.

## 2019-05-09 NOTE — PLAN OF CARE
Problem: Patient Care Overview  Goal: Plan of Care Review  Outcome: Ongoing (interventions implemented as appropriate)   05/09/19 2694   Coping/Psychosocial   Plan of Care Reviewed With patient   OTHER   Outcome Summary Pt presents with impaired functioanl mobility and gait secondary to generalized weakness, impaired balance, and decreased activity tolerance post-op surgical lysis of adhesion s/p SBO. Pt may benefit from skilled PT to address strength, mobility, and gait.

## 2019-05-09 NOTE — PROGRESS NOTES
"Pharmacy to dose TPN - Daily Progress Note  Patient: Dilma Abad  MRN#: [id number]  Attending: No name on file.  Admission Date: 042919    Dilma Abad is a 69 y.o. female receiving TPN for bowel obstruction.     TPN # 5 per Dr. Robert    Active Problems:    SBO (small bowel obstruction) (CMS/[facility])    Subjective/Objective  167.6 cm (65.98\")  39.1 kg (86 lb 1.6 oz)  Body mass index is 13.9 kg/m².   Results from last 7 days   Lab Units 05/09/19  0513  05/06/19  0431  05/05/19  1244   SODIUM mmol/L 138   < > 135*  --   --    POTASSIUM mmol/L 4.2   < > 4.0  --   --    CHLORIDE mmol/L 101   < > 93*  --   --    CO2 mmol/L 28.1   < > 34.0*  --   --    BUN mg/dL 13   < > 12  --   --    CREATININE mg/dL 0.47*   < > 0.61  --   --    CALCIUM mg/dL 8.5*   < > 9.0  --   --    ALBUMIN g/dL 2.90*  --  3.00*   < >  --    BILIRUBIN mg/dL  --   --  0.3  --   --    ALK PHOS U/L  --   --  53  --   --    ALT (SGPT) U/L  --   --  12  --   --    AST (SGOT) U/L  --   --  13  --   --    GLUCOSE mg/dL 144*   < > 106*  --   --    MAGNESIUM mg/dL 2.2   < > 2.1  --  1.7   PHOSPHORUS mg/dL 2.5   < > 3.7  --  2.9   TRIGLYCERIDES mg/dL  --   --   --   --  69    < > = values in this interval not displayed.        I/O last 3 completed shifts:  In: 2675 [P.O.:110; I.V.:450; Other:20]  Out: 2850 [Urine:1600; Emesis/NG output:1250]  Diet Orders (active) (From admission, onward)    Start     Ordered    05/07/19 1114  NPO Diet NPO Except: Ice Chips  Diet Effective Now     Comments:  Patient may have 1 popsicle or 1 cup of Italian ice every 6 hours.    05/07/19 1113        Additional insulin administration while previous TPN infusing: none  Additional electrolyte administration while previous TPN infusing: none  Acid suppression: Pepcid 10 mg IV q12h     Daily Assessment  Protein: 50 grams/day  Dextrose: 800 Kcal/day   Lipids: 100 mL of 20% lipid infusion   Fluid rate: 60 mL/hr     Plan    Continue current macronutrients (at goal). Based on the " above labs, will add the following electrolytes/additives to the TPN.     Sodium chloride: 100 mEq  Sodium phosphate: 20 mEq   Potassium chloride: 70 mEq   Potassium phosphate: 12 mEq   Calcium gluconate: 9 mEq   Magnesium sulfate: 10 mEq   Multivitamin: 10 mL   Trace Elements: 1 mL     Labs: BMP, Mag, Phos with AM labs tomorrow.     Pharmacy will continue to follow and monitor daily while patient on TPN. Thank you for this consult.    Sharri Pan, Pharm.D.

## 2019-05-09 NOTE — PLAN OF CARE
Problem: Patient Care Overview  Goal: Plan of Care Review  Outcome: Ongoing (interventions implemented as appropriate)   05/09/19 1503   Coping/Psychosocial   Plan of Care Reviewed With patient   Plan of Care Review   Progress improving   OTHER   Outcome Summary Dressing stained, no change. Castillo catheter remains. NGT clamped at 1400, if tolerating after 6 hours may remove. No c/o pain or nausea. Still NPO with ice chips. TPN & lipids continue. VSS.     Goal: Individualization and Mutuality  Outcome: Ongoing (interventions implemented as appropriate)    Goal: Discharge Needs Assessment  Outcome: Ongoing (interventions implemented as appropriate)    Goal: Interprofessional Rounds/Family Conf  Outcome: Ongoing (interventions implemented as appropriate)      Problem: Bowel Obstruction (Adult)  Goal: Signs and Symptoms of Listed Potential Problems Will be Absent, Minimized or Managed (Bowel Obstruction)  Outcome: Ongoing (interventions implemented as appropriate)      Problem: Pain, Acute (Adult)  Goal: Acceptable Pain Control/Comfort Level  Outcome: Ongoing (interventions implemented as appropriate)      Problem: Nausea/Vomiting (Adult)  Goal: Symptom Relief  Outcome: Ongoing (interventions implemented as appropriate)    Goal: Adequate Hydration  Outcome: Ongoing (interventions implemented as appropriate)      Problem: Skin Injury Risk (Adult)  Goal: Skin Health and Integrity  Outcome: Ongoing (interventions implemented as appropriate)      Problem: Fall Risk (Adult)  Goal: Absence of Fall  Outcome: Ongoing (interventions implemented as appropriate)      Problem: Nutrition, Parenteral (Adult)  Goal: Signs and Symptoms of Listed Potential Problems Will be Absent, Minimized or Managed (Nutrition, Parenteral)  Outcome: Ongoing (interventions implemented as appropriate)      Problem: Nutrition, Imbalanced: Inadequate Oral Intake (Adult)  Goal: Identify Related Risk Factors and Signs and Symptoms  Outcome: Outcome(s)  achieved Date Met: 05/09/19    Goal: Improved Oral Intake  Outcome: Ongoing (interventions implemented as appropriate)    Goal: Prevent Further Weight Loss  Outcome: Ongoing (interventions implemented as appropriate)      Problem: Surgery Nonspecified (Adult)  Goal: Signs and Symptoms of Listed Potential Problems Will be Absent, Minimized or Managed (Surgery Nonspecified)  Outcome: Ongoing (interventions implemented as appropriate)    Goal: Anesthesia/Sedation Recovery  Outcome: Outcome(s) achieved Date Met: 05/09/19

## 2019-05-09 NOTE — PROGRESS NOTES
Discharge Planning Assessment  [facility]     Patient Name: Dilma Abad  MRN: [id number]  Today's Date: 5/9/2019    Admit Date: 4/29/2019    Discharge Needs Assessment    No documentation.       Discharge Plan     Row Name 05/09/19 1218       Plan    Plan  Home w/o needs    Plan Comments  I spoke with pt (while a breathing tx was in progress), pt continues to plan home w/o needs at discharge, she said she feels strong when she gets up and does not feel she needs very much assistance.  I advised I would review with nurse for a order for PT eval and will follow for discharge concerns.      Loretta Jenkins RN       Loretta Jenkins, RN

## 2019-05-09 NOTE — PLAN OF CARE
Problem: Patient Care Overview  Goal: Plan of Care Review  Outcome: Ongoing (interventions implemented as appropriate)   05/09/19 1635   Coping/Psychosocial   Plan of Care Reviewed With patient   Plan of Care Review   Progress improving   OTHER   Outcome Summary Pt pain controlled with IV pain meds. VSS. NG tube to LWS. Castillo in place with output. Will cont to monitor.

## 2019-05-09 NOTE — PROGRESS NOTES
Postoperative day #2 laparotomy with lysis of adhesions    Subjective:  Feels slightly better.  Feels hungry.  No GI function yet.  Pain controlled.    Objective:  Patient afebrile, vital stable  General: Asleep but easily awakened, oriented, no acute distress  Abdomen: Soft, benign, dressings clean, bowel sounds heard    Labs reviewed.  White blood cell count normal.  Albumin surprisingly 2.9.    Assessment and plan:  -Small bowel obstruction, now 2 days out from lysis of adhesions  -Try to get nasogastric tube out today  -Continue TPN for malnutrition  -Consider diet tomorrow if tolerating nasogastric tube removal    Trenton Canales MD  General and Endoscopic Surgery  [facility], [facility]    [address], [address]  [place], [place], [address]  P: [phone]  F: [phone]

## 2019-05-09 NOTE — THERAPY EVALUATION
Acute Care - Physical Therapy Initial Evaluation  [facility]     Patient Name: Dilma Abad  : 1950  MRN: [id number]  Today's Date: 2019   Onset of Illness/Injury or Date of Surgery: 19            Admit Date: 2019    Visit Dx:     ICD-10-CM ICD-9-CM   1. SBO (small bowel obstruction) (CMS/HCC) K56.609 560.9   2. Nausea in adult R11.0 787.02   3. Generalized weakness R53.1 780.79     Patient Active Problem List   Diagnosis   • Adenocarcinoma of left lung (CMS/HCC)   • Panlobular emphysema (CMS/HCC)   • Abnormal finding on imaging   • Esophageal carcinoma (CMS/HCC)   • Fitting and adjustment of vascular catheter   • OROZCO (dyspnea on exertion)   • Radiation esophagitis   • Sepsis (CMS/HCC)   • Anxiety and depression   • COPD (chronic obstructive pulmonary disease) (CMS/HCC)   • Hyperlipidemia   • Generalized abdominal pain   • Diarrhea   • Pneumonia due to infectious organism   • Hypogammaglobulinemia, acquired (CMS/HCC)   • Small bowel obstruction (CMS/HCC)   • COLLEEN (acute kidney injury) (CMS/HCC)   • Hypoxemia   • Chronic respiratory failure with hypoxia (CMS/HCC)   • Acute and chronic respiratory failure with hypoxia (CMS/HCC)   • SBO (small bowel obstruction) (CMS/HCC)     Past Medical History:   Diagnosis Date   • Adenocarcinoma of lung (CMS/HCC) 2017    HAD RADIATION    • Anxiety and depression    • Arthritis    • Benign parotid tumor 2012    removed by Dr Vickers told it was benign   • Bowel obstruction (CMS/HCC)    • COPD (chronic obstructive pulmonary disease) (CMS/HCC)    • Depression    • Diarrhea    • Early cataract    • Emphysema of lung (CMS/HCC)    • Esophageal cancer (CMS/HCC) 2017   • History of chronic pain    • History of colon polyps    • History of pneumonia    • Hyperlipidemia    • Joint pain    • Stroke (CMS/HCC)      Past Surgical History:   Procedure Laterality Date   • BRONCHOSCOPY     • CHOLECYSTECTOMY     • COLON SURGERY      COLON POLYPS   • COLONOSCOPY   04/13/2016    TA w/low grade dysplasia x 3, serrated adenoma x 2   • COLONOSCOPY  06/14/2016    TA w/high grade dysplasia   • ENDOSCOPY N/A 12/6/2017    Malignant esophageal tumor was found at the gastroesophageal junction. PATH:   INVASIVE ADENOCARCINOMA   • ENDOSCOPY N/A 3/17/2018    At the gastroesophageal junction was ulceration and scarring from the radiation therapy and chemo, Erythematous mucosa in the stomach.  PATH:  MILD CHRONIC ACTIVE GASTRITIS   • EXPLORATORY LAPAROTOMY N/A 5/7/2019    Procedure: EXPLORATORY, LYSIS OF ADHESIONS;  Surgeon: Trenton Canales MD;  [facility];  Service: General   • FOOT SURGERY  10/2010    [place]   • HYSTERECTOMY     • INTUBATION  8/3/2018        • KS INSJ TUNNELED CVC W/O SUBQ PORT/ AGE 3 YR/> Right 1/9/2018    Procedure: MEDIPORT PLACEMENT right;  Surgeon: Damaso Germain MD;  Location: [facility] OR 18/19;  Service: Vascular   • SALIVARY GLAND SURGERY      PAROTID MASS REMOVED BENIGN   • SHOULDER ARTHROSCOPY Left 1995. 2001        PT ASSESSMENT (last 12 hours)      Physical Therapy Evaluation     Row Name 05/09/19 1429          PT Evaluation Time/Intention    Subjective Information  no complaints  -     Document Type  evaluation  -     Mode of Treatment  physical therapy  -     Patient Effort  good  -CH     Symptoms Noted During/After Treatment  none  -     Row Name 05/09/19 1429          General Information    Onset of Illness/Injury or Date of Surgery  04/29/19  -     Patient Observations  alert;cooperative;agree to therapy  -     Patient/Family Observations  pt supine in bed, no acute distress noted at rest  -     Prior Level of Function  independent:;gait;transfer;bed mobility;ADL's  -     Equipment Currently Used at Home  none  -     Pertinent History of Current Functional Problem  pt admitted with SBO and is post-op Lysis of adhesions   -     Existing Precautions/Restrictions  fall  -     Barriers to Rehab  medically  complex  -     Row Name 05/09/19 1429          Relationship/Environment    Lives With  child(sergio), adult  -     Row Name 05/09/19 1429          Resource/Environmental Concerns    Current Living Arrangements  home/apartment/condo  -[facility] Name 05/09/19 1429          Cognitive Assessment/Interventions    Additional Documentation  Cognitive Assessment/Intervention (Group)  -     Row Name 05/09/19 1429          Cognitive Assessment/Intervention- PT/OT    Orientation Status (Cognition)  oriented x 3  -     Follows Commands (Cognition)  WFL  -     Personal Safety Interventions  fall prevention program maintained;gait belt;nonskid shoes/slippers when out of bed  -     Row Name 05/09/19 1429          Bed Mobility Assessment/Treatment    Bed Mobility Assessment/Treatment  supine-sit;sit-supine  -     Supine-Sit [place] (Bed Mobility)  verbal cues;nonverbal cues (demo/gesture);contact guard  -     Sit-Supine [place] (Bed Mobility)  not tested sitting in chair  -[facility] Name 05/09/19 1429          Transfer Assessment/Treatment    Transfer Assessment/Treatment  sit-stand transfer;stand-sit transfer  -     Sit-Stand [place] (Transfers)  verbal cues;nonverbal cues (demo/gesture);minimum assist (75% patient effort);2 person assist  -     Stand-Sit [place] (Transfers)  verbal cues;nonverbal cues (demo/gesture);minimum assist (75% patient effort);2 person assist  -[facility] Name 05/09/19 1429          Gait/Stairs Assessment/Training    [place] Level (Gait)  verbal cues;nonverbal cues (demo/gesture);minimum assist (75% patient effort);2 person assist  -     Assistive Device (Gait)  -- HHA  -     Distance in Feet (Gait)  80  -     Deviations/Abnormal Patterns (Gait)  judy decreased;gait speed decreased;stride length decreased  -     Bilateral Gait Deviations  forward flexed posture  -     Row Name 05/09/19 1429          General ROM    GENERAL ROM COMMENTS  AROM WFL for  age  -[facility] Name 05/09/19 1429          MMT (Manual Muscle Testing)    General MMT Comments  generalized weakness noted with functional mobility  -[facility] Name 05/09/19 1429          Motor Assessment/Intervention    Additional Documentation  Balance (Group)  -CH     Row Name 05/09/19 1429          Balance    Balance  static standing balance;dynamic standing balance  -CH     Row Name 05/09/19 1429          Static Standing Balance    Level of [place] (Supported Standing, Static Balance)  contact guard assist  -[facility] Name 05/09/19 1429          Dynamic Standing Balance    Level of [place], Reaches Outside Midline (Standing, Dynamic Balance)  minimal assist, 75% patient effort  -CH     Row Name 05/09/19 1429          Pain Assessment    Additional Documentation  Pain Scale: Numbers Pre/Post-Treatment (Group)  -CH     Row Name 05/09/19 1429          Pain Scale: Numbers Pre/Post-Treatment    Pain Scale: Numbers, Pretreatment  0/10 - no pain abdomin is tender  -[facility] Name             Wound 05/07/19 0914 Other (See comments) abdomen incision    Wound - Properties Group Date first assessed: 05/07/19  -SD Time first assessed: 0914  -SD Side: Other (See comments)  -SD Location: abdomen  -SD Type: incision  -SD    [facility] Name 05/09/19 1429          Plan of Care Review    Plan of Care Reviewed With  patient  -[facility] Name 05/09/19 1429          Physical Therapy Clinical Impression    Patient/Family Goals Statement (PT Clinical Impression)  to go home  -     Criteria for Skilled Interventions Met (PT Clinical Impression)  treatment indicated  -     Impairments Found (describe specific impairments)  gait, locomotion, and balance;muscle performance  -     Rehab Potential (PT Clinical Summary)  good, to achieve stated therapy goals  -CH     Row Name 05/09/19 1429          Physical Therapy Goals    Bed Mobility Goal Selection (PT)  bed mobility, PT goal 1  -     Transfer Goal Selection (PT)   transfer, PT goal 1  -CH     Gait Training Goal Selection (PT)  gait training, PT goal 1  -     Row Name 05/09/19 1429          Bed Mobility Goal 1 (PT)    Activity/Assistive Device (Bed Mobility Goal 1, PT)  bed mobility activities, all  -CH     [place] Level/Cues Needed (Bed Mobility Goal 1, PT)  supervision required  -CH     Time Frame (Bed Mobility Goal 1, PT)  1 week  -     Row Name 05/09/19 1429          Transfer Goal 1 (PT)    Activity/Assistive Device (Transfer Goal 1, PT)  transfers, all  -CH     [place] Level/Cues Needed (Transfer Goal 1, PT)  supervision required  -CH     Time Frame (Transfer Goal 1, PT)  1 week  -     Row Name 05/09/19 1429          Gait Training Goal 1 (PT)    Activity/Assistive Device (Gait Training Goal 1, PT)  gait (walking locomotion)  -CH     [place] Level (Gait Training Goal 1, PT)  supervision required  -CH     Distance (Gait Goal 1, PT)  150  -CH     Time Frame (Gait Training Goal 1, PT)  1 week  -     Row Name 05/09/19 1429          Positioning and Restraints    Pre-Treatment Position  in bed  -CH     Post Treatment Position  chair  -CH     In Chair  reclined;call light within reach;encouraged to call for assist  -CH       User Key  (r) = Recorded By, (t) = Taken By, (c) = Cosigned By    Initials Name Provider Type     Neris Pritchett, PT Physical Therapist    Dilma Ervin, RN Registered Nurse        Physical Therapy Education     Title: PT OT SLP Therapies (In Progress)     Topic: Physical Therapy (In Progress)     Point: Mobility training (Done)     Learning Progress Summary           Patient Acceptance, E,TB,D, VU,NR by  at 5/9/2019  4:08 PM                   Point: Body mechanics (Done)     Learning Progress Summary           Patient Acceptance, E,TB,D, VU,NR by  at 5/9/2019  4:08 PM                   Point: Precautions (Done)     Learning Progress Summary           Patient Acceptance, E,TB,D, VU,NR by  at 5/9/2019  4:08 PM                                User Key     Initials Effective Dates Name Provider Type Discipline     04/03/18 -  Neris Pritchett PT Physical Therapist PT              PT Recommendation and Plan  Anticipated Discharge Disposition (PT): home with home health, skilled nursing facility(pt wants to go home with sister)  Planned Therapy Interventions (PT Eval): balance training, bed mobility training, gait training, home exercise program, patient/family education, transfer training  Therapy Frequency (PT Clinical Impression): daily  Outcome Summary/Treatment Plan (PT)  Anticipated Discharge Disposition (PT): home with home health, skilled nursing facility(pt wants to go home with sister)  Plan of Care Reviewed With: patient  Outcome Summary: Pt presents with impaired functioanl mobility and gait secondary to generalized weakness, impaired balance, and decreased activity tolerance post-op surgical lysis of adhesion s/p SBO. Pt may benefit from skilled PT to address strength, mobility, and gait.  Outcome Measures     Row Name 05/09/19 1600             How much help from another person do you currently need...    Turning from your back to your side while in flat bed without using bedrails?  3  -CH      Moving from lying on back to sitting on the side of a flat bed without bedrails?  3  -CH      Moving to and from a bed to a chair (including a wheelchair)?  3  -CH      Standing up from a chair using your arms (e.g., wheelchair, bedside chair)?  3  -CH      Climbing 3-5 steps with a railing?  2  -CH      To walk in hospital room?  3  -CH      AM-PAC 6 Clicks Score  17  -CH         Functional Assessment    Outcome Measure Options  AM-PAC 6 Clicks Basic Mobility (PT)  -CH        User Key  (r) = Recorded By, (t) = Taken By, (c) = Cosigned By    Initials Name Provider Type    CH Neris Pritchett, PT Physical Therapist         Time Calculation:   PT Charges     Row Name 05/09/19 8258             Time Calculation    Start Time  1410   -      Stop Time  1429  -      Time Calculation (min)  14 min  -      PT Received On  05/09/19  -      PT - Next Appointment  05/10/19  -      PT Goal Re-Cert Due Date  05/16/19  -         Time Calculation- PT    Total Timed Code Minutes- PT  8 minute(s)  -        User Key  (r) = Recorded By, (t) = Taken By, (c) = Cosigned By    Initials Name Provider Type     Neris Pritchett, PT Physical Therapist        Therapy Charges for Today     Code Description Service Date Service Provider Modifiers Qty    54333986148  PT EVAL MOD COMPLEXITY 2 5/9/2019 Neris Pritchett, PT GP 1    64181379764 HC PT THER PROC EA 15 MIN 5/9/2019 Neris Pritchett, PT GP 1    72351090496 HC PT THER SUPP EA 15 MIN 5/9/2019 Neris Pritchett, PT GP 1          PT G-Codes  Outcome Measure Options: AM-PAC 6 Clicks Basic Mobility (PT)  AM-PAC 6 Clicks Score: 17      Neris Pritchett, PT  5/9/2019

## 2019-05-10 NOTE — PLAN OF CARE
Problem: Patient Care Overview  Goal: Plan of Care Review  Outcome: Ongoing (interventions implemented as appropriate)   05/10/19 9949   Coping/Psychosocial   Plan of Care Reviewed With patient   Plan of Care Review   Progress improving   OTHER   Outcome Summary Iv pain medication given x2. Iv nausea medication given x1. Clear liq diet started. Emises x1. Worked with Pt. Up to chair and asst in halls. TPN infusing. Inc clean, dry, and intact.      Goal: Individualization and Mutuality  Outcome: Ongoing (interventions implemented as appropriate)    Goal: Discharge Needs Assessment  Outcome: Ongoing (interventions implemented as appropriate)    Goal: Interprofessional Rounds/Family Conf  Outcome: Ongoing (interventions implemented as appropriate)      Problem: Bowel Obstruction (Adult)  Goal: Signs and Symptoms of Listed Potential Problems Will be Absent, Minimized or Managed (Bowel Obstruction)  Outcome: Ongoing (interventions implemented as appropriate)      Problem: Pain, Acute (Adult)  Goal: Acceptable Pain Control/Comfort Level  Outcome: Ongoing (interventions implemented as appropriate)      Problem: Nausea/Vomiting (Adult)  Goal: Symptom Relief  Outcome: Ongoing (interventions implemented as appropriate)    Goal: Adequate Hydration  Outcome: Ongoing (interventions implemented as appropriate)      Problem: Skin Injury Risk (Adult)  Goal: Skin Health and Integrity  Outcome: Ongoing (interventions implemented as appropriate)      Problem: Fall Risk (Adult)  Goal: Absence of Fall  Outcome: Ongoing (interventions implemented as appropriate)      Problem: Nutrition, Parenteral (Adult)  Goal: Signs and Symptoms of Listed Potential Problems Will be Absent, Minimized or Managed (Nutrition, Parenteral)  Outcome: Ongoing (interventions implemented as appropriate)      Problem: Nutrition, Imbalanced: Inadequate Oral Intake (Adult)  Goal: Improved Oral Intake  Outcome: Ongoing (interventions implemented as  appropriate)    Goal: Prevent Further Weight Loss  Outcome: Ongoing (interventions implemented as appropriate)      Problem: Surgery Nonspecified (Adult)  Goal: Anesthesia/Sedation Recovery  Outcome: Ongoing (interventions implemented as appropriate)

## 2019-05-10 NOTE — THERAPY TREATMENT NOTE
Acute Care - Physical Therapy Treatment Note  [facility]     Patient Name: Dilma Abad  : 1950  MRN: [id number]  Today's Date: 5/10/2019  Onset of Illness/Injury or Date of Surgery: 19          Admit Date: 2019    Visit Dx:    ICD-10-CM ICD-9-CM   1. SBO (small bowel obstruction) (CMS/HCC) K56.609 560.9   2. Nausea in adult R11.0 787.02   3. Generalized weakness R53.1 780.79     Patient Active Problem List   Diagnosis   • Adenocarcinoma of left lung (CMS/HCC)   • Panlobular emphysema (CMS/HCC)   • Abnormal finding on imaging   • Esophageal carcinoma (CMS/HCC)   • Fitting and adjustment of vascular catheter   • OROZCO (dyspnea on exertion)   • Radiation esophagitis   • Sepsis (CMS/HCC)   • Anxiety and depression   • COPD (chronic obstructive pulmonary disease) (CMS/HCC)   • Hyperlipidemia   • Generalized abdominal pain   • Diarrhea   • Pneumonia due to infectious organism   • Hypogammaglobulinemia, acquired (CMS/HCC)   • Small bowel obstruction (CMS/HCC)   • COLLEEN (acute kidney injury) (CMS/HCC)   • Hypoxemia   • Chronic respiratory failure with hypoxia (CMS/HCC)   • Acute and chronic respiratory failure with hypoxia (CMS/HCC)   • SBO (small bowel obstruction) (CMS/HCC)       Therapy Treatment    Rehabilitation Treatment Summary     Row Name 05/10/19 1400             Treatment Time/Intention    Discipline  physical therapy assistant  -CW      Document Type  therapy note (daily note)  -CW      Subjective Information  complains of;weakness;fatigue  -CW      Mode of Treatment  physical therapy  -CW      Therapy Frequency (PT Clinical Impression)  daily  -CW      Patient Effort  good  -CW      Existing Precautions/Restrictions  fall;oxygen therapy device and L/min  -CW      Recorded by [CW] Jacek Lamb PTA 05/10/19 1415      Row Name 05/10/19 1400             Vital Signs    O2 Delivery Pre Treatment  supplemental O2  -CW      O2 Delivery Intra Treatment  supplemental O2  -CW      O2 Delivery  Post Treatment  supplemental O2  -CW      Recorded by [CW] Jacek Lamb, PTA 05/10/19 1415      Row Name 05/10/19 1400             Cognitive Assessment/Intervention- PT/OT    Orientation Status (Cognition)  oriented x 3  -CW      Follows Commands (Cognition)  WFL  -CW      Personal Safety Interventions  fall prevention program maintained;gait belt;muscle strengthening facilitated;nonskid shoes/slippers when out of bed  -CW      Recorded by [CW] Jacek Lamb, PTA 05/10/19 1415      Row Name 05/10/19 1400             Bed Mobility Assessment/Treatment    Bed Mobility Assessment/Treatment  supine-sit;sit-supine  -CW      Supine-Sit [place] (Bed Mobility)  contact guard  -CW      Sit-Supine [place] (Bed Mobility)  not tested  -CW      Assistive Device (Bed Mobility)  bed rails  -CW      Recorded by [CW] Jacek Lamb, PTA 05/10/19 1415      Row Name 05/10/19 1400             Transfer Assessment/Treatment    Transfer Assessment/Treatment  sit-stand transfer;stand-sit transfer  -CW      Recorded by [CW] Jacek Lamb, PTA 05/10/19 1415      Row Name 05/10/19 1400             Sit-Stand Transfer    Sit-Stand [place] (Transfers)  contact guard  -CW      Assistive Device (Sit-Stand Transfers)  walker, front-wheeled  -CW      Recorded by [CW] Jacek Lamb, PTA 05/10/19 1415      Row Name 05/10/19 1400             Stand-Sit Transfer    Stand-Sit [place] (Transfers)  contact guard  -CW      Assistive Device (Stand-Sit Transfers)  walker, front-wheeled  -CW      Recorded by [CW] Jacek Lamb, PTA 05/10/19 1415      Row Name 05/10/19 1400             Gait/Stairs Assessment/Training    [place] Level (Gait)  contact guard  -CW      Assistive Device (Gait)  walker, front-wheeled  -CW      Distance in Feet (Gait)  80  -CW      Pattern (Gait)  step-through  -CW      Deviations/Abnormal Patterns (Gait)  judy decreased;gait speed decreased;stride length decreased  -CW       Recorded by [CW] Jacek Lamb, PTA 05/10/19 1415      Row Name 05/10/19 1400             Positioning and Restraints    Pre-Treatment Position  in bed  -CW      Post Treatment Position  bsc  -CW      On BS commode  notified nsg;sitting;call light within reach;encouraged to call for assist  -CW      Recorded by [CW] Jacek Lamb, PTA 05/10/19 1415      Row Name                Wound 05/07/19 0914 Other (See comments) abdomen incision    Wound - Properties Group Date first assessed: 05/07/19 [SD] Time first assessed: 0914 [SD] Side: Other (See comments) [SD] Location: abdomen [SD] Type: incision [SD] Recorded by:  [SD] Dilma Queen RN 05/07/19 0914    Row Name 05/10/19 1400             Outcome Summary/Treatment Plan (PT)    Anticipated Discharge Disposition (PT)  home with home health;skilled nursing facility  -CW      Recorded by [CW] Jacek Lamb, PTA 05/10/19 1415        User Key  (r) = Recorded By, (t) = Taken By, (c) = Cosigned By    Initials Name Effective Dates Discipline    SD Dilma Queen RN 06/16/16 -  Nurse    CW Jacek Lamb, PTA 03/07/18 -  PT          Wound 05/07/19 0914 Other (See comments) abdomen incision (Active)   Dressing Appearance dried drainage 5/10/2019  8:53 AM   Closure OSMAN 5/10/2019  8:53 AM   Base dressing in place, unable to visualize 5/10/2019  8:53 AM   Drainage Characteristics/Odor serosanguineous 5/10/2019  8:53 AM   Drainage Amount scant 5/10/2019  8:53 AM   Dressing Care, Wound low-adherent 5/10/2019  8:53 AM           Physical Therapy Education     Title: PT OT SLP Therapies (Done)     Topic: Physical Therapy (Done)     Point: Mobility training (Done)     Learning Progress Summary           Patient Acceptance, E,TB, VU,DU by CW at 5/10/2019  2:16 PM    Acceptance, E,TB,D, VU,NR by  at 5/9/2019  4:08 PM                   Point: Home exercise program (Done)     Learning Progress Summary           Patient Acceptance, E,TB, VU,DU by CW at  5/10/2019  2:16 PM                   Point: Body mechanics (Done)     Learning Progress Summary           Patient Acceptance, E,TB, VU,DU by  at 5/10/2019  2:16 PM    Acceptance, E,TB,D, VU,NR by  at 5/9/2019  4:08 PM                   Point: Precautions (Done)     Learning Progress Summary           Patient Acceptance, E,TB, VU,DU by  at 5/10/2019  2:16 PM    Acceptance, E,TB,D, VU,NR by  at 5/9/2019  4:08 PM                               User Key     Initials Effective Dates Name Provider Type Discipline     04/03/18 -  Neris Pritchett, PT Physical Therapist PT     03/07/18 -  Jacek Lamb, PTA Physical Therapy Assistant PT                PT Recommendation and Plan  Anticipated Discharge Disposition (PT): home with home health, skilled nursing facility  Therapy Frequency (PT Clinical Impression): daily  Outcome Summary/Treatment Plan (PT)  Anticipated Discharge Disposition (PT): home with home health, skilled nursing facility  Plan of Care Reviewed With: patient  Progress: improving  Outcome Summary: Pt increased with activity tolerance and be dmobility safety.  Pt also improved with amb safety and technique with RWX and O2  Outcome Measures     Row Name 05/10/19 1400 05/09/19 1600          How much help from another person do you currently need...    Turning from your back to your side while in flat bed without using bedrails?  3  -CW  3  -CH     Moving from lying on back to sitting on the side of a flat bed without bedrails?  3  -CW  3  -CH     Moving to and from a bed to a chair (including a wheelchair)?  3  -CW  3  -CH     Standing up from a chair using your arms (e.g., wheelchair, bedside chair)?  3  -CW  3  -CH     Climbing 3-5 steps with a railing?  2  -CW  2  -CH     To walk in hospital room?  3  -CW  3  -CH     AM-PAC 6 Clicks Score  17  -CW  17  -CH        Functional Assessment    Outcome Measure Options  AM-PAC 6 Clicks Basic Mobility (PT)  -CW  AM-PAC 6 Clicks Basic Mobility (PT)   -       User Key  (r) = Recorded By, (t) = Taken By, (c) = Cosigned By    Initials Name Provider Type    CH Neris Pritchett, PT Physical Therapist    CW Jacek Lamb PTA Physical Therapy Assistant         Time Calculation:   PT Charges     Row Name 05/10/19 1417             Time Calculation    Start Time  1351  -CW      Stop Time  1417  -CW      Time Calculation (min)  26 min  -CW      PT Received On  05/10/19  -CW      PT - Next Appointment  05/11/19  -CW        User Key  (r) = Recorded By, (t) = Taken By, (c) = Cosigned By    Initials Name Provider Type    Jacek Gordon, JOHN Physical Therapy Assistant        Therapy Charges for Today     Code Description Service Date Service Provider Modifiers Qty    22653147114 HC PT THER PROC EA 15 MIN 5/10/2019 Jacek Lamb PTA GP 2          PT G-Codes  Outcome Measure Options: AM-PAC 6 Clicks Basic Mobility (PT)  AM-PAC 6 Clicks Score: 17    Jacek Lamb PTA  5/10/2019

## 2019-05-10 NOTE — PROGRESS NOTES
"Pharmacy to dose TPN - Daily Progress Note  Patient: Dilma Abad  MRN#: [id number]  Attending: No name on file.  Admission Date: 042919    Dilma Abad is a 69 y.o. female receiving TPN for bowel obstruction.     TPN # 6 per Dr. Robert    Active Problems:    SBO (small bowel obstruction) (CMS/HCC)    Subjective/Objective  167.6 cm (65.98\")  38.6 kg (85 lb 3.2 oz)  Body mass index is 13.76 kg/m².   Results from last 7 days   Lab Units 05/10/19  0442 05/09/19  0513  05/06/19  0431  05/05/19  1244   SODIUM mmol/L 139 138   < > 135*  --   --    POTASSIUM mmol/L 4.3 4.2   < > 4.0  --   --    CHLORIDE mmol/L 102 101   < > 93*  --   --    CO2 mmol/L 25.3 28.1   < > 34.0*  --   --    BUN mg/dL 14 13   < > 12  --   --    CREATININE mg/dL 0.49* 0.47*   < > 0.61  --   --    CALCIUM mg/dL 8.7 8.5*   < > 9.0  --   --    ALBUMIN g/dL  --  2.90*  --  3.00*   < >  --    BILIRUBIN mg/dL  --   --   --  0.3  --   --    ALK PHOS U/L  --   --   --  53  --   --    ALT (SGPT) U/L  --   --   --  12  --   --    AST (SGOT) U/L  --   --   --  13  --   --    GLUCOSE mg/dL 145* 144*   < > 106*  --   --    MAGNESIUM mg/dL 2.0 2.2   < > 2.1  --  1.7   PHOSPHORUS mg/dL 3.1 2.5   < > 3.7  --  2.9   TRIGLYCERIDES mg/dL  --   --   --   --   --  69    < > = values in this interval not displayed.        I/O last 3 completed shifts:  In: 1621 [P.O.:110]  Out: 2500 [Urine:1900; Emesis/NG output:600]  Diet Orders (active) (From admission, onward)    Start     Ordered    05/07/19 1114  NPO Diet NPO Except: Ice Chips  Diet Effective Now     Comments:  Patient may have 1 popsicle or 1 cup of Italian ice every 6 hours.    05/07/19 1113        Additional insulin administration while previous TPN infusing: none  Additional electrolyte administration while previous TPN infusing: none  Acid suppression: Pepcid 10 mg IV q12h     Daily Assessment  Protein: 50 grams/day  Dextrose: 800 Kcal/day   Lipids: 100 mL of 20% lipid infusion   Fluid rate: 60 mL/hr "     Plan    Continue current macronutrients (at goal). Based on the above labs, will add the following electrolytes/additives to the TPN.     Sodium chloride: 100 mEq  Sodium phosphate: 20 mEq   Potassium chloride: 70 mEq   Potassium phosphate: 5 mEq   Calcium gluconate: 9 mEq   Magnesium sulfate: 10 mEq   Multivitamin: 10 mL   Trace Elements: 1 mL     Labs: CMP, Mag, Phos with AM labs tomorrow.     Pharmacy will continue to follow and monitor daily while patient on TPN. Thank you for this consult.    Sharri Pan, Pharm.D.

## 2019-05-10 NOTE — PLAN OF CARE
Problem: Patient Care Overview  Goal: Plan of Care Review  Outcome: Ongoing (interventions implemented as appropriate)   05/10/19 8639   Coping/Psychosocial   Plan of Care Reviewed With patient   Plan of Care Review   Progress improving   OTHER   Outcome Summary Pt increased with activity tolerance and bed mobility safety. Pt also improved with amb safety and technique with RWX and O2

## 2019-05-10 NOTE — CONSULTS
Adult Nutrition  Assessment/PES    Patient Name:  Dilma Abad  YOB: 1950  MRN: [id number]  Admit Date:  4/29/2019    Assessment Date:  5/10/2019    Comments:  Follow up TPN. Diet advanced to clears. Will follow for tolerance as diet is advanced.     Reason for Assessment     Row Name 05/10/19 1319          Reason for Assessment    Reason For Assessment  follow-up protocol;TF/PN         Nutrition/Diet History     Row Name 05/10/19 1319          Nutrition/Diet History    Typical Food/Fluid Intake  NGT dc and advanced to CL.  Planning home at discharge.         Anthropometrics     Row Name 05/10/19 1319 05/10/19 0624       Anthropometrics    Weight  38.6 kg (85 lb 1.6 oz)  38.6 kg (85 lb 3.2 oz)        Labs/Tests/Procedures/Meds     Row Name 05/10/19 1322          Labs/Procedures/Meds    Lab Results Reviewed  reviewed, pertinent        Diagnostic Tests/Procedures    Diagnostic Test/Procedure Reviewed  reviewed, pertinent        Medications    Pertinent Medications Reviewed  reviewed, pertinent         Physical Findings     Row Name 05/10/19 1322          Physical Findings    Skin  -- abd inc         Estimated/Assessed Needs     Row Name 05/10/19 1323          Calculation Measurements    Weight Used For Calculations  38.6 kg (85 lb 1.6 oz)        KCAL/KG    30 Kcal/Kg (kcal)  1158     35 Kcal/Kg (kcal)  1351        [place]-St. Jeor Equation    RMR ([place]-St. Jeor Equation)  917.5        Fluid Requirements    RDA method 1158         Nutrition Prescription Ordered     Row Name 05/10/19 1323          Nutrition Prescription PO    Current PO Diet  Clear Liquid        Nutrition Prescription PN    PN Route  Other (comment) power port     PN Current Rate (mL/hr)  60 mL/hr     Dextrose (Kcal)  800     Amino Acid (gm)  50     Lipid Concentration (%)  20%     Lipid Volume (mL)  100 mL     Lipid Frequency  Daily         Evaluation of Received Nutrient/Fluid Intake     Row Name 05/10/19 1323          Calculation  Measurements    Weight Used For Calculations  38.6 kg (85 lb 1.6 oz)        Calories Evaluation    Parenteral Calories (kcal)  1200     % of Kcal Needs  100        Protein Evaluation    Parenteral Protein (gm)  50     % of Protein Needs  100        Intake Assessment    Energy/Calorie Requirement Assessment  meeting needs     Protein Requirement Assessment  meeting needs         Evaluation of Prescribed Nutrient/Fluid Intake     Row Name 05/10/19 1323          Calculation Measurements    Weight Used For Calculations  38.6 kg (85 lb 1.6 oz)             Problem/Interventions:            Intervention Goal     Row Name 05/10/19 1324          Intervention Goal    General  Maintain nutrition;Nutrition support treatment;Disease management/therapy     PO  Tolerate PO;Advance diet     TF/PN  Maintain TF/PN;Deliver (%) goal     Deliver % of Goal  100 %     Transition  PN to PO     Weight  Appropriate weight gain         Nutrition Intervention     Row Name 05/10/19 1324          Nutrition Intervention    RD/Tech Action  Care plan reviewd;Other (comment);Encourage intake         Nutrition Prescription     Row Name 05/10/19 1324          Nutrition Prescription PN    Parenteral Prescription  Wean parenteral         Education/Evaluation     Row Name 05/10/19 1324          Monitor/Evaluation    Monitor  Per protocol;PO intake;I&O;Pertinent labs;PN delivery/tolerance;Weight;Skin status           Electronically signed by:  Ruby Kellogg RD, LD  05/10/19 1:25 PM

## 2019-05-10 NOTE — PROGRESS NOTES
PROGRESS NOTE  Patient Name: Dilma Abad  Age/Sex: 69 y.o. female  : 1950  MRN: [id number]    Date of Admission: 2019  Date of Encounter Visit: 05/10/19   LOS: 11 days   Patient Care Team:  Mary Taylor MD as PCP - General (Family Medicine)  Bienvenido Collier MD as Consulting Physician (Hematology and Oncology)  Daniel Oconnor MD as Referring Physician (Pulmonary Disease)  Shazia Mcdonald MD as Consulting Physician (Radiation Oncology)    Chief Complaint: COPD with history of lung cancer came in with small bowel obstruction status post surgery for postoperative pulmonary management    Hospital course: Patient had a surgery on 2019 and she had a smooth postoperative course, she is on nasal cannula oxygen, she has no wheeze or chest tightness or symptom of COPD exacerbation.  patient is working with physical therapy, getting up in the bed and walking, she did have a bowel movement, she had her nasogastric tube removed, she is tolerating p.o. diet.        REVIEW OF SYSTEMS:   CONSTITUTIONAL: no fever or chills  CARDIOVASCULAR: No chest pain, chest pressure or chest discomfort. No palpitations or edema.   RESPIRATORY: No significant shortness of breath, no productive secretion.   GASTROINTESTINAL: She had good bowel movement, she is tolerating p.o., NG tube has been removed .   HEMATOLOGIC: No bleeding or bruising.     Ventilator/Non-Invasive Ventilation Settings (From admission, onward)    Nasal cannula oxygen            Vital Signs  Temp:  [97.2 °F (36.2 °C)-98.3 °F (36.8 °C)] 98.1 °F (36.7 °C)  Heart Rate:  [85-99] 85  Resp:  [16-18] 16  BP: (144-161)/(80-93) 161/91  SpO2:  [91 %-99 %] 99 %  on  Flow (L/min):  [2-3] 2 Device (Oxygen Therapy): nasal cannula    Intake/Output Summary (Last 24 hours) at 5/10/2019 1340  Last data filed at 5/10/2019 0253  Gross per 24 hour   Intake 1621 ml   Output 1550 ml   Net 71 ml     Flowsheet Rows      First Filed Value   Admission Height  167.6 cm  "(66\") Documented at 04/29/2019 1128   Admission Weight  40.8 kg (90 lb) Documented at 04/29/2019 1148        Body mass index is 13.74 kg/m².      05/09/19  0624 05/10/19  0624 05/10/19  1319   Weight: 39.1 kg (86 lb 1.6 oz) 38.6 kg (85 lb 3.2 oz) 38.6 kg (85 lb 1.6 oz)       Physical Exam:  General:    No acute distress, alert and oriented x4, pleasant, on nasal cannula oxygen                   Head:    Normocephalic, atraumatic.   Eyes:          Conjunctivae and sclerae normal, no icterus, PERRLA   Throat:   No oral lesions, no thrush, oral mucosa moist.  She has nasogastric tube    Neck:   Supple, trachea midline.  No JVD   Lungs:     Normal chest on inspection, diminished breath sounds otherwise clear to auscultation with no wheezes,       Heart:    Regular rhythm and normal rate.  No murmurs, gallops, or rubs noted.   Abdomen:     Soft, with positive bowel sounds, incision is clean     Extremities:   No clubbing, cyanosis, or edema.     Pulses:   Pulses palpable and equal bilaterally.    Skin:   No bleeding or rash.   Neuro:   Non-focal.  Moves all extremities well.    Psychiatric:   Normal mood and affect.           Results Review:      Results from last 7 days   Lab Units 05/10/19  0442 05/09/19  0513 05/08/19  0521 05/07/19  0433 05/06/19  0431 05/05/19  0338 05/04/19  0444   SODIUM mmol/L 139 138 139 136 135* 137 137   POTASSIUM mmol/L 4.3 4.2 4.7 4.3 4.0 3.5 3.6   CHLORIDE mmol/L 102 101 100 94* 93* 92* 92*   CO2 mmol/L 25.3 28.1 29.1* 30.9* 34.0* 32.2* 35.7*   BUN mg/dL 14 13 14 14 12 10 10   CREATININE mg/dL 0.49* 0.47* 0.58 0.63 0.61 0.58 0.60   CALCIUM mg/dL 8.7 8.5* 8.6 9.2 9.0 8.7 9.0   AST (SGOT) U/L  --   --   --   --  13  --   --    ALT (SGPT) U/L  --   --   --   --  12  --   --    ANION GAP mmol/L 11.7 8.9 9.9 11.1 8.0 12.8 9.3   ALBUMIN g/dL  --  2.90*  --   --  3.00*  --   --                  Results from last 7 days   Lab Units 05/09/19  0513 05/05/19  0338 05/04/19  0444   WBC 10*3/mm3 7.16 " 11.69* 9.29   HEMOGLOBIN g/dL 11.9* 12.1 12.8   HEMATOCRIT % 37.7 38.1 40.3   PLATELETS 10*3/mm3 213 187 215   MCV fL 88.3 89.0 87.4   NEUTROPHIL % % 76.5* 85.8* 78.0*   LYMPHOCYTE % % 10.2* 5.0* 9.1*   MONOCYTES % % 11.6 8.6 12.1*   EOSINOPHIL % % 0.4 0.1* 0.3   BASOPHIL % % 0.3 0.2 0.1   IMM GRAN % % 1.0* 0.3 0.4         Results from last 7 days   Lab Units 05/10/19  0442 05/09/19  0513 05/08/19  0521 05/07/19  0433 05/06/19  0431 05/05/19  1244   MAGNESIUM mg/dL 2.0 2.2 2.2 2.2 2.1 1.7     Results from last 7 days   Lab Units 05/05/19  1244   TRIGLYCERIDES mg/dL 69             Glucose   Date/Time Value Ref Range Status   05/10/2019 1132 144 (H) 70 - 130 mg/dL Final   05/10/2019 0637 135 (H) 70 - 130 mg/dL Final   05/09/2019 2206 139 (H) 70 - 130 mg/dL Final   05/09/2019 1743 137 (H) 70 - 130 mg/dL Final   05/09/2019 1123 129 70 - 130 mg/dL Final   05/09/2019 0615 136 (H) 70 - 130 mg/dL Final   05/09/2019 0017 132 (H) 70 - 130 mg/dL Final   05/08/2019 1812 118 70 - 130 mg/dL Final     Results from last 7 days   Lab Units 05/05/19  0338   PROCALCITONIN ng/mL 0.15     Results from last 7 days   Lab Units 05/07/19  0835 05/05/19  0120   WOUNDCX  No growth at 3 days  --    STREP PNEUMO AG   --  Positive*   L. PNEUMOPHILA SEROGP 1 UR AG   --  Negative                   Imaging:   Imaging Results (all)     Procedure Component Value Units Date/Time    XR Abdomen KUB [[id number]] Collected:  05/06/19 0928     Updated:  05/06/19 0940    Narrative:       KUB     HISTORY: Small bowel obstruction.     Two images of the abdomen and pelvis are provided and are correlated  with abdomen x-ray 05/04/2019 and 04/30/2019.     FINDINGS: There is a nasogastric tube with its tip in the stomach. There  are multiple surgical clips throughout the abdomen and pelvis. Some  chain sutures are observed in the pelvis as well. There is a single loop  of small bowel containing some gas and measuring 4.7 cm diameter. This  is transversely oriented  in the central pelvis. There is a small volume  of gas in the rectum. Overall, there is very minimal amount of gas in  the bowel. No free air is present.       Impression:       There is a single air-filled, dilated loop of small bowel in  the central pelvis. This is slightly less dilated than on x-ray  05/04/2019. There is no evidence of free air.     This report was finalized on 5/6/2019 9:37 AM by Dr. Sebastian Hobbs M.D.       XR Chest PA & Lateral [[id number]] Collected:  05/05/19 1043     Updated:  05/05/19 1147    Narrative:       2-VIEW CHEST     HISTORY: Shortness of breath. Previous lung cancer. Previous esophageal  cancer.     FINDINGS: There is severe COPD with hyperinflation and there is  prominent parenchymal and pleural abnormality in the upper left lung and  extending towards the hilum and to a lesser extent some scattered vague  nodularity extending into the lower left lung. Some of the more focal  changes are consistent with chronic fibrotic scarring with a possible  component of underlying neoplasm extending laterally as suggested on the  CT scan dated 03/18/2019. There is slight improvement in some  superimposed vague haziness and nodularity in the lower left chest since  the chest x-ray of 03/24/2019.     The heart size remains normal. An NG tube ends in the stomach. A  right-sided MediPort catheter ends in the SVC.     This report was finalized on 5/5/2019 11:43 AM by Dr. Jay Alejandro M.D.       XR Abdomen Flat & Upright [[id number]] Collected:  05/04/19 0931     Updated:  05/04/19 0938    Narrative:       TWO-VIEW ABDOMEN     HISTORY: Small bowel obstruction.     FINDINGS: An NG tube ends in the lower stomach and there remain several  prominent dilated air-filled small bowel loops as well as probable  multiple fluid filled small bowel loops consistent with small bowel  obstruction. This is quite similar or perhaps more prominent when  compared to the study of 4 days ago. The upright view  shows no evidence  of free air. It does show worsening of areas of pneumonia in the left  lung.     This report was finalized on 5/4/2019 9:35 AM by Dr. Jay Alejandro M.D.       XR Abdomen KUB [[id number]] Collected:  04/30/19 0654     Updated:  05/03/19 0549    Narrative:       SINGLE VIEW ABDOMEN/KUB     HISTORY:sbo follow up; K56.609-Unspecified intestinal obstruction,  unspecified as to partial versus complete obstruction; R11.0-Nausea     COMPARISON: 03/17/2019.     FINDINGS: Portable supine view of the abdomen obtained. Two images were  submitted. Numerous surgical clips are seen in the more central abdomen  bilaterally, unchanged. Nasogastric tube remains in place terminating  along the greater curvature of the more distal gastric body. The urinary  bladder is distended and contains residual iodinated contrast.      There is only minimal bowel gas present, diminished considerably from  previous. No dilated bowel is appreciated.  Some degenerative spurring  present in the thoracolumbar spine. There is generalized osteopenia.  No  suspicious calculi appreciated.             Impression:       Diminishing bowel gas, no convincing evidence of obstruction. .     This report was finalized on 5/3/2019 5:46 AM by Damaso Rosen M.D.       CT Abdomen Pelvis With Contrast [[id number]] Collected:  04/29/19 1430     Updated:  04/30/19 1026    Narrative:       CT ABDOMEN AND PELVIS WITH CONTRAST     HISTORY: Lower abdominal pain, nausea and vomiting.     TECHNIQUE: Axial CT images of the abdomen and pelvis were obtained  following administration of intravenous contrast. The patient was not  given oral contrast. Coronal and sagittal reformats were obtained.     COMPARISON: 03/17/2019     FINDINGS: There is diffuse gastric distention. In addition, there is  dilatation of a long segment of the most proximal jejunal loops  measuring up to 4.1 cm. The proximal and distal transition points are  close to each other and suggest a  closed loop type of obstruction.  Distal to this the small bowel loops are completely collapsed. There  appears to have been a subtotal colectomy and the enterocolonic  anastomosis appears unremarkable.      Nodular densities are again demonstrated within the left lower lobe. No  pleural or pericardial effusion is seen. The liver does not demonstrate  any focal abnormality. The gallbladder is absent. There is intra and  extrahepatic biliary dilatation that likely represents benign biliary  ectasia status post cholecystectomy. There is diffuse pancreatic  atrophy. The spleen, bilateral adrenal gland and visualized kidneys are  normal apart from small cortical hyperdensities that are favored to  represent simple cysts. The urinary bladder is distended. There is a  small amount of layering fluid seen within the pelvis. Moderate  atherosclerotic change is seen within the abdominal aorta and its  branches. There is diffuse wall thickening involving the distal  esophagus and the GE junction.       Impression:       1. Proximal small bowel obstruction that appears to be high-grade. The  proximal and distal transition point are in close approximation  suggesting a closed-loop type of obstruction, possibly related to  adhesions.  2.  Nodular density seen within the left lower lobe. Please refer to  recent dictation of CT chest for further recommendations.  3.  Diffuse wall thickening of the distal esophagus and GE junction. The  patient has history of esophageal cancer and follow up with upper GI  endoscopy is recommended.  4.  Additional findings as above.     These findings were discussed with Chiqui Valladares by telephone.     Radiation dose reduction techniques were utilized, including automated  exposure control and exposure modulation based on body size.     This report was finalized on 4/30/2019 10:23 AM by Dr. Delilah Mcneill M.D.             I reviewed the patient's new clinical results.  I personally viewed and  interpreted the patient's imaging results:        Medication Review:     famotidine 10 mg Intravenous Q12H   fat emulsion 100 mL Intravenous Q24H (TPN)   ipratropium-albuterol 3 mL Nebulization 4x Daily - RT   sodium chloride 10 mL Intravenous Q12H   sodium chloride 3 mL Intravenous Q12H         Adult Central 2-in-1 TPN  Last Rate: 60 mL/hr at 05/09/19 1800   Adult Central 2-in-1 TPN     Pharmacy to Dose TPN         ASSESSMENT:   1. Status post exploratory laparotomy with adhesion lysis with small bowel obstruction secondary to closed loop obstruction in the proximal small bowel  2. COPD Dr. perioperative exacerbation  3. Stage IIb adenocarcinoma of the lung status post stereotactic radiation  4. Previous lung injury from radiation pneumonitis  5.  Postop hypoxemia    PLAN:  Still doing very well from the respiratory standpoint given her underlying lung disease  Sh patient is doing very well, will follow up as needed    Discussed with patient    Disposition: Per primary team    Davian Koenig MD  05/10/19  1:40 PM          Dictated utilizing Dragon dictation   OBSERVATION

## 2019-05-10 NOTE — PLAN OF CARE
Problem: Patient Care Overview  Goal: Plan of Care Review  Outcome: Ongoing (interventions implemented as appropriate)   05/10/19 2959   Coping/Psychosocial   Plan of Care Reviewed With patient   Plan of Care Review   Progress improving   OTHER   Outcome Summary Pt pain controlled with IV pain meds. VSS. NG tube removed, pt tolerating well. no nausea or vomiting. TPN and Lipids infusing through chest port. Ambulate short distance with assist of two. Will cont to monitor.

## 2019-05-11 NOTE — ANESTHESIA PROCEDURE NOTES
Arterial Line    Pre-sedation assessment completed: 5/11/2019 11:49 AM    Patient reassessed immediately prior to procedure    Patient location during procedure: holding area  Start time: 5/11/2019 11:50 AM  Stop Time:5/11/2019 11:55 AM       Line placed for hemodynamic monitoring.  Performed By   Anesthesiologist: Jayant Castellano MD  Preanesthetic Checklist  Completed: patient identified, site marked, surgical consent, pre-op evaluation, timeout performed, IV checked, risks and benefits discussed and monitors and equipment checked  Arterial Line Prep   Sterile Tech: cap, gloves, mask and sterile barriers  Prep: ChloraPrep  Patient monitoring: blood pressure monitoring, continuous pulse oximetry and EKG  Arterial Line Procedure   Laterality:left  Location:  radial artery  Catheter size: 20 G   Guidance: ultrasound guided  PROCEDURE NOTE/ULTRASOUND INTERPRETATION.  Using ultrasound guidance the potential vascular sites for insertion of the catheter were visualized to determine the patency of the vessel to be used for vascular access.  After selecting the appropriate site for insertion, the needle was visualized under ultrasound being inserted into the radial artery, followed by ultrasound confirmation of wire and catheter placement. There were no abnormalities seen on ultrasound; an image was taken; and the patient tolerated the procedure with no complications.   Number of attempts: 1  Successful placement: yes          Post Assessment   Dressing Type: occlusive dressing applied, secured with tape and wrist guard applied.   Complications no  Circ/Move/Sens Assessment: unchanged.   Patient Tolerance: patient tolerated the procedure well with no apparent complications  Additional Notes  Ultrasound used to view appropriate anatomy, for needle guidance into artery, and for confirmation of appropriate placement. A picture was taken and is available on the chart.

## 2019-05-11 NOTE — OP NOTE
Operative Note :   Trenton Canales MD    Dilma Abad  1950    Procedure Date: 05/11/19    Pre-op Diagnosis:  Ischemic bowel    Post-Op Diagnosis:  Same    Procedure:   · Exploratory laparotomy    Surgeon: Trenton Canales MD    Assistant: Jorge Crooks    Anesthesia:  General (general endotracheal tube)    EBL:   minimal    Specimens:   none    Indications:  · 69-year-old lady with multiple medical problems who is 4 days out from an expiratory laparotomy for small bowel obstruction was doing fairly well until yesterday evening when she became increasingly tachycardic and complaining of increased abdominal pain.  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  As such she was brought to the operating room for exploration.    Findings:   · Ischemia, but not necrosis of essentially the entire small bowel    Recommendations:   · We will have to watch carefully to see whether or not this ischemia progresses to necrosis    Description of procedure:    After obtaining informed consent from the patient and discussing in detail with the family she was brought to the operating room and placed under a general anesthetic.  Nasogastric tube was placed.  Her abdomen was sterilely prepped and draped.  Her prior incision was opened at the skin level and the fascial incision was then widely opened.  Immediately upon opening the abdomen there was a foul odor consistent with ischemic bowel.  The small bowel was run and essentially the entirety of it had an ischemic but not grossly necrotic appearance.  It was flaccid and I could not Doppler signal in the smaller mesenteric vessels anywhere.  Dr. Germain was kind enough to come in and evaluate her.  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  At the conclusion of his procedure I again ran the small bowel from the ligament of Treitz all the way to her ileal rectal anastomosis.  There was no hole within the bowel and no evidence of leakage of intestinal contents.  There was no bleeding.  The stomach appeared slightly pale but there was no evidence of perforation of the stomach or duodenum.  The abdomen was then thoroughly irrigated.  The fascia was then closed with 0 PDS suture.  The skin was closed with staples.  She was brought back to the recovery room in guarded condition.    Trenton Canales MD  General and Endoscopic Surgery  [facility]    [address], [address]  [place], [place], [address]  P: [phone]  F: [phone]

## 2019-05-11 NOTE — CONSULTS
Patient Name: Dilma Abad Account #: [id number]    MRN: [id number] Admission Date: 4/29/2019      Consulting Service: Vascular Surgery Date of Evaluation: May 11, 2019    Requesting Provider: Dr STELLA Canales MD    CHIEF COMPLAINT: Acute mesenteric ischemia  HPI: Dilma Abad is a 69 y.o. female is being seen for a consultation and evaluation/management of acute mesenteric ischemia.  The patient has had a recent lysis of adhesions and doing doing well until she developed severe tachycardia and increasing abdominal pain out of proportion to exam this morning.  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  Concerns for acute mesenteric ischemia have been raised and I agree with Dr. Delgadillo that exploration and possible revascularization is her only hope of survival.  We discussed the situation with the patient and he did with the family preoperatively.  We will pursue exploration and attempted revascularization if the outlook is reasonable.    PAST MEDICAL HISTORY:   Past Medical History:   Diagnosis Date   • Adenocarcinoma of lung (CMS/HCC) 2017    HAD RADIATION    • Anxiety and depression    • Arthritis    • Benign parotid tumor 2012    removed by Dr Vickers told it was benign   • Bowel obstruction (CMS/HCC)    • COPD (chronic obstructive pulmonary disease) (CMS/HCC)    • Depression    • Diarrhea    • Early cataract    • Emphysema of lung (CMS/HCC)    • Esophageal cancer (CMS/HCC) 2017   • History of chronic pain    • History of colon polyps    • History of pneumonia    • Hyperlipidemia    • Joint pain    • Stroke (CMS/HCC)       PAST SURGICAL HISTORY:   Past Surgical History:   Procedure Laterality Date   • BRONCHOSCOPY     • CHOLECYSTECTOMY  1999   • COLON SURGERY  2015    COLON POLYPS   • COLONOSCOPY  04/13/2016    TA w/low grade dysplasia x 3, serrated adenoma x 2   • COLONOSCOPY  06/14/2016    TA w/high grade dysplasia   • ENDOSCOPY N/A  2017    Malignant esophageal tumor was found at the gastroesophageal junction. PATH:   INVASIVE ADENOCARCINOMA   • ENDOSCOPY N/A 3/17/2018    At the gastroesophageal junction was ulceration and scarring from the radiation therapy and chemo, Erythematous mucosa in the stomach.  PATH:  MILD CHRONIC ACTIVE GASTRITIS   • EXPLORATORY LAPAROTOMY N/A 2019    Procedure: EXPLORATORY, LYSIS OF ADHESIONS;  Surgeon: Trenton Canales MD;  Location: [facility] OR;  Service: General   • FOOT SURGERY  10/2010    [place]   • HYSTERECTOMY     • INTUBATION  8/3/2018        • NE INSJ TUNNELED CVC W/O SUBQ PORT/ AGE 3 YR/> Right 2018    Procedure: MEDIPORT PLACEMENT right;  Surgeon: Damaso Germain MD;  Location: [facility] OR ;  Service: Vascular   • SALIVARY GLAND SURGERY      PAROTID MASS REMOVED BENIGN   • SHOULDER ARTHROSCOPY Left .       FAMILY HISTORY:   Family History   Problem Relation Age of Onset   • Cancer Sister    • Lung cancer Sister    • Leukemia Cousin    • Heart disease Father    • Cancer Sister    • Malig Hyperthermia Neg Hx       SOCIAL HISTORY:   Social History     Tobacco Use   • Smoking status: Former Smoker     Packs/day: 0.00     Years: 30.00     Pack years: 0.00     Types: Cigarettes     Last attempt to quit: 1973     Years since quittin.3   • Smokeless tobacco: Never Used   • Tobacco comment: smokes occasionally   Substance Use Topics   • Alcohol use: No   • Drug use: No      MEDICATIONS:   No current facility-administered medications on file prior to encounter.      Current Outpatient Medications on File Prior to Encounter   Medication Sig Dispense Refill   • Cholecalciferol (VITAMIN D) 2000 units tablet Take 2,000 Units by mouth Every Other Day.     • diazePAM (VALIUM) 5 MG tablet Take 5 mg by mouth 4 (Four) Times a Day As Needed for Anxiety.     • famotidine (PEPCID) 20 MG tablet Take 1 tablet by mouth 2 (Two) Times a Day. 60 tablet 0   • HYDROcodone-acetaminophen  (NORCO) 7.5-325 MG per tablet Take 1 tablet by mouth 4 (Four) Times a Day As Needed for Moderate Pain .     • megestrol (MEGACE) 20 MG tablet Take 20 mg by mouth Daily.     • Multiple Vitamins-Minerals (CENTRUM SILVER PO) Take 1 tablet by mouth Daily.     • ondansetron ODT (ZOFRAN ODT) 4 MG disintegrating tablet Take 1 tablet by mouth Every 8 (Eight) Hours As Needed for Nausea or Vomiting. 21 tablet 2   • pantoprazole (PROTONIX) 20 MG EC tablet Take 20 mg by mouth Daily.     • predniSONE (DELTASONE) 10 MG tablet Take 1 tablet by mouth Daily. To be started in 10 days once done with taper steroids 30 tablet 1   • Probiotic Product (PROBIOTIC PO) Take 1 capsule by mouth Daily.     • sertraline (ZOLOFT) 25 MG tablet Take 25 mg by mouth Daily.     • umeclidinium-vilanterol (ANORO ELLIPTA) 62.5-25 MCG/INH aerosol powder  inhaler Inhale 1 puff Daily.     • cyanocobalamin (VITAMIN B-12) 2000 MCG tablet Take 2,000 mcg by mouth Every Other Day.     • predniSONE (DELTASONE) 20 MG tablet 1 pill bid for 5 days, then 1 daily for 5 days, then resume home prednisone regimen 15 tablet 0             ALLERGIES: Penicillins; Bactrim [sulfamethoxazole-trimethoprim]; and Sulfa antibiotics   COMPLETE REVIEW OF SYSTEMS:     ENT ROS: negative  Cardiovascular ROS: no chest pain or dyspnea on exertion  Respiratory ROS: no cough, shortness of breath, or wheezing  Gastrointestinal ROS: positive for - abdominal pain  Neurological ROS: no TIA or stroke symptoms  Genito-Urinary ROS: no dysuria, trouble voiding, or hematuria  Musculoskeletal ROS: negative  Dermatological ROS: negative  Psychological ROS: negative      PHYSICAL EXAM:   Patient Vitals for the past 24 hrs:   BP Temp Temp src Pulse Resp SpO2 Weight   05/11/19 1127 -- -- -- 108 (!) 38 -- --   05/11/19 1120 95/68 97.5 °F (36.4 °C) Temporal -- -- -- --   05/11/19 0743 148/90 97.3 °F (36.3 °C) Oral 109 22 95 % --   05/11/19 0717 -- -- -- 109 22 97 % --   05/11/19 0713 -- -- -- 107 22 98 %  --   05/11/19 0655 -- -- -- -- -- -- 36.2 kg (79 lb 14.4 oz)   05/11/19 0628 150/82 -- -- 105 -- 97 % --   05/11/19 0428 160/96 -- -- 104 -- 98 % --   05/11/19 0358 -- -- -- 102 -- -- --   05/11/19 0335 170/100 -- -- (!) 124 -- 97 % --   05/11/19 0305 -- -- -- (!) 128 -- -- --   05/11/19 0227 176/100 -- -- (!) 141 26 99 % --   05/11/19 0219 -- 98.2 °F (36.8 °C) Oral (!) 141 24 98 % --   05/11/19 0111 (!) 180/106 97.9 °F (36.6 °C) Oral (!) 140 26 99 % --   05/11/19 0000 174/88 -- -- (!) 130 24 100 % --   05/10/19 2149 170/90 96.9 °F (36.1 °C) Oral 116 24 99 % --   05/10/19 2054 -- -- -- 109 20 100 % --   05/10/19 1750 178/98 98.1 °F (36.7 °C) Oral 115 18 95 % --   05/10/19 1617 -- -- -- 102 18 -- --   05/10/19 1613 -- -- -- 102 18 96 % --        General appearance: oriented to person, place, and time, in severe distress, ill-appearing and chronically ill appearing.  Neurological exam reveals alert, oriented, normal speech, no focal findings or movement disorder noted.  ENT exam reveals - ENT exam normal, no neck nodes or sinus tenderness.  CVS exam: Severe sinus tachycardia.  Chest: clear to auscultation, no wheezes, rales or rhonchi, symmetric air entry.  Abdominal exam: tenderness noted diffusely with severe rebound and guarding.  Examination of the feet reveals warm, good capillary refill.        LABS:      Results Review:       I reviewed the patient's new clinical results.  Results from last 7 days   Lab Units 05/09/19  0513 05/05/19  0338   WBC 10*3/mm3 7.16 11.69*   HEMOGLOBIN g/dL 11.9* 12.1   PLATELETS 10*3/mm3 213 187     Results from last 7 days   Lab Units 05/11/19  0523 05/11/19  0014 05/10/19  0442 05/09/19  0513 05/08/19  0521 05/07/19  0433 05/06/19  0431   SODIUM mmol/L 138 136 139 138 139 136 135*   POTASSIUM mmol/L 4.3 4.5 4.3 4.2 4.7 4.3 4.0   CHLORIDE mmol/L 102 100 102 101 100 94* 93*   CO2 mmol/L 21.5* 21.7* 25.3 28.1 29.1* 30.9* 34.0*   BUN mg/dL 22 19 14 13 14 14 12   CREATININE mg/dL 0.57  0.56* 0.49* 0.47* 0.58 0.63 0.61   GLUCOSE mg/dL 184* 249* 145* 144* 142* 127* 106*   Estimated Creatinine Clearance: 36.9 mL/min (by C-G formula based on SCr of 0.57 mg/dL).  Results from last 7 days   Lab Units 05/11/19  0523 05/11/19  0014 05/10/19  0442 05/09/19  0513 05/08/19  0521 05/07/19  0433 05/06/19  0431 05/05/19  1244   CALCIUM mg/dL 9.0 9.4 8.7 8.5* 8.6 9.2 9.0  --    ALBUMIN g/dL 3.50  --   --  2.90*  --   --  3.00*  --    MAGNESIUM mg/dL 1.8  --  2.0 2.2 2.2 2.2 2.1 1.7   PHOSPHORUS mg/dL 3.1  --  3.1 2.5 2.8 3.8 3.7 2.9           The following radiologic or non-invasive studies have been reviewed by me: CT scan is current and prior, reviewed reports and images    Active Hospital Problems    Diagnosis  POA   • SBO (small bowel obstruction) (CMS/[facility]) [K56.604]  Yes      Resolved Hospital Problems   No resolved problems to display.         ASSESSMENT/PLAN: 69 y.o. female with acute mesenteric ischemia with superior mesenteric artery acute occlusion.  I suspect this is a chronic stenosis that is gone on to complete occlusion with her illnesses.  Attempts at exploration and revascularization will be made in accordance with her wishes.  She is extremely ill and outlook appears far from certain.  Will discuss with family when they are available.      I discussed the plan with the patient and she is agreeable to the plan of care at this point. Thank you for this consult.     Damaso Germain MD   05/11/19

## 2019-05-11 NOTE — PROGRESS NOTES
Follow-up on CT    Reviewed the CT and discussed with radiology.  There is obvious extensive free air, more than would be expected 4 days postop.  The etiology of this is unclear.  Furthermore, there is what appears to be at least a partial obstruction in the superior mesenteric artery.  I discussed this with Dr. Germain, and although it was present to a lesser extent on her earlier CT, it is clearly worse now.  This makes me think to perhaps intestinal ischemia is the source of her increased pain and possible free air.  At any rate, she obviously needs to be brought back for urgent exploration.  I explained this to the patient and contacted her family as well.  I think that this leaves her with a fairly grave prognosis and I discussed this with the patient's family.    Trenton Canales MD  General and Endoscopic Surgery  [facility]    [address], [address]  [place], [place], [address]  P: [phone]  F: [phone]

## 2019-05-11 NOTE — ANESTHESIA PROCEDURE NOTES
Airway  Urgency: elective    Date/Time: 5/11/2019 12:13 PM  Airway not difficult    General Information and Staff    Patient location during procedure: OR  Anesthesiologist: Jayant Castellano MD  CRNA: Mirna Alaniz CRNA    Indications and Patient Condition  Indications for airway management: airway protection    Preoxygenated: yes  MILS not maintained throughout  Mask difficulty assessment: 0 - not attempted    Final Airway Details  Final airway type: endotracheal airway      Successful airway: ETT  Cuffed: yes   Successful intubation technique: direct laryngoscopy and RSI  Endotracheal tube insertion site: oral  Blade: Antionette  Blade size: 3  Cormack-Lehane Classification: grade I - full view of glottis  Placement verified by: chest auscultation and capnometry   Measured from: lips  Number of attempts at approach: 1    Additional Comments  Pt preoxygenated prior to induction, easy mask airway, atraumatic intubation,+ ETCO2, + bs bilat,  ETT secured and connected to ventilator.

## 2019-05-11 NOTE — NURSING NOTE
Dr valdo lazar here talking about end of life care with the family and her status. Pt is on maximum neosynephrine and norepinephrine at maximum, lactated ringers at 1000/hr vaspressin  Gtt coming from pharmacy.

## 2019-05-11 NOTE — CONSULTS
"             S:  Called to recovery room urgently as patient's blood pressure was 50/30 systolics 30s but atomic at the bedside.  Patient on multiple vasopressor medications max.  PH7.0.  Ventilator reviewed and adjusted in attempt to pull off more CO2.  Oxygenation became more difficult as well as ventilation.  Patient is mottled ill-appearing unresponsive.  Family at bedside included patient's sisters.  They desire not to pursue further care.  Patient did have an adult living son no  no parents living.  I contact him over the phone he arrived at bedside.  He also expressed the desire to let his mother pass in peace understand that we will no longer provide medications to continue her suffering and only focus on pain medications.    O:  BP 97/43   Pulse 93   Temp 97.5 °F (36.4 °C) (Axillary)   Resp 16   Ht 167.6 cm (65.98\")   Wt 36.2 kg (79 lb 14.4 oz)   SpO2 92%   BMI 12.90 kg/m²   Patient is ill-appearing she has mottled skin  She is intubated  Port right upper chest with IVs accessed  Diminished breath sounds bilaterally  Midline incision tense abdomen  Bear hugger on patient to keep her warm.  Poor peripheral pulses    Results from last 7 days   Lab Units 05/09/19  0513 05/05/19  0338   WBC 10*3/mm3 7.16 11.69*   HEMOGLOBIN g/dL 11.9* 12.1   PLATELETS 10*3/mm3 213 187     Results from last 7 days   Lab Units 05/11/19  0523 05/11/19  0014 05/10/19  0442 05/09/19  0513 05/08/19  0521 05/07/19  0433 05/06/19  0431 05/05/19  1244   SODIUM mmol/L 138 136 139 138 139 136 135*  --    POTASSIUM mmol/L 4.3 4.5 4.3 4.2 4.7 4.3 4.0  --    CHLORIDE mmol/L 102 100 102 101 100 94* 93*  --    CO2 mmol/L 21.5* 21.7* 25.3 28.1 29.1* 30.9* 34.0*  --    BUN mg/dL 22 19 14 13 14 14 12  --    CREATININE mg/dL 0.57 0.56* 0.49* 0.47* 0.58 0.63 0.61  --    GLUCOSE mg/dL 184* 249* 145* 144* 142* 127* 106*  --    CALCIUM mg/dL 9.0 9.4 8.7 8.5* 8.6 9.2 9.0  --    MAGNESIUM mg/dL 1.8  --  2.0 2.2 2.2 2.2 2.1 1.7   PHOSPHORUS " mg/dL 3.1  --  3.1 2.5 2.8 3.8 3.7 2.9   Estimated Creatinine Clearance: 36.9 mL/min (by C-G formula based on SCr of 0.57 mg/dL).    A/P:  Status post ex lap with lysis of adhesions small bowel obstruction  COPD  Stage IIb adenocarcinoma lung  Previous history of radiation pneumonitis  Mesenteric ischemia  Suspect severe lactic acidosis  Shock  Acute hypoxic and hypercapnic respiratory failure      Fortunately discussed with family at bedside grave prognosis.  Family elected to pursue comfort measures only with terminal extubation and medicines for comfort.  Over 40 minutes of critical care time spent discussing patient's care with consulting providers family at bedside nursing as well as reviewing patient's lab.    Family expressed their comfort and understanding of care plan.      CCT 40 minutes.      Patient is now full palliative, I have discussed with bedside nursing and have placed full palliative orders into the computer to focus on comfort measures only and relayed this information to primary attending.      Mauro Burns MD  [facility]  05/11/19  4:25 PM

## 2019-05-11 NOTE — PLAN OF CARE
Problem: Patient Care Overview  Goal: Plan of Care Review  Outcome: Ongoing (interventions implemented as appropriate)   05/11/19 0455   Coping/Psychosocial   Plan of Care Reviewed With patient   Plan of Care Review   Progress no change   OTHER   Outcome Summary Patient transferred from [facility] d/t elevated BP and HR unaffected by administration of pain medication. Started on IV Lopressor every 6 hours PRN, which has been given x1 with improvements in HR and BP. Family at bedside. Will continue to monitor.     Goal: Individualization and Mutuality  Outcome: Ongoing (interventions implemented as appropriate)    Goal: Discharge Needs Assessment  Outcome: Ongoing (interventions implemented as appropriate)    Goal: Interprofessional Rounds/Family Conf  Outcome: Ongoing (interventions implemented as appropriate)      Problem: Bowel Obstruction (Adult)  Goal: Signs and Symptoms of Listed Potential Problems Will be Absent, Minimized or Managed (Bowel Obstruction)  Outcome: Ongoing (interventions implemented as appropriate)      Problem: Pain, Acute (Adult)  Goal: Acceptable Pain Control/Comfort Level  Outcome: Ongoing (interventions implemented as appropriate)      Problem: Nausea/Vomiting (Adult)  Goal: Symptom Relief  Outcome: Ongoing (interventions implemented as appropriate)    Goal: Adequate Hydration  Outcome: Ongoing (interventions implemented as appropriate)      Problem: Skin Injury Risk (Adult)  Goal: Skin Health and Integrity  Outcome: Ongoing (interventions implemented as appropriate)      Problem: Fall Risk (Adult)  Goal: Absence of Fall  Outcome: Ongoing (interventions implemented as appropriate)      Problem: Nutrition, Parenteral (Adult)  Goal: Signs and Symptoms of Listed Potential Problems Will be Absent, Minimized or Managed (Nutrition, Parenteral)  Outcome: Ongoing (interventions implemented as appropriate)      Problem: Nutrition, Imbalanced: Inadequate Oral Intake (Adult)  Goal: Improved Oral  Intake  Outcome: Ongoing (interventions implemented as appropriate)    Goal: Prevent Further Weight Loss  Outcome: Ongoing (interventions implemented as appropriate)

## 2019-05-11 NOTE — NURSING NOTE
Pt extubated @ 1620 per Dr Mauro Andrade. Family at bedside as patients heart stopped. Dr Onesimo Andrade, Dr Germain, Dr helm notified.

## 2019-05-11 NOTE — PROGRESS NOTES
Postoperative day #4 laparotomy for small bowel obstruction    Subjective:  Patient had a rough night last night.  I saw her around 630 last evening (although I neglected to write a note at that time), and she looks pretty good to me at that time.  However, starting later in the evening she became more tachycardic and was complaining of more pain.  She seems to be intermittently confused.  We tried some Dilaudid without any substantial improvement in her pain.  Her heart rate went into the 130s and 140s and as such she was transferred to a monitored bed.  Her blood pressure was also elevated.  Chemistries were okay and EKG was unremarkable, except for tachycardia.  She continues to pass flatus and have bowel movements.  She had a couple episodes of small amounts of emesis, although the patient says that it both times she was doing something else and does not feel that she was nauseated before or after these periods.    Objective:  Patient has been afebrile.  Heart rate was in the 80s for part of yesterday, but got up into the 130s to 140s overnight.  Currently 105.  Current blood pressure 150/105.  Respiratory rate 26.  Oxygen saturations in the high 90s on 1 L nasal cannula.  General: Mild distress, chronic ill appearance.  Answers most questions appropriately, but occasionally confused.  Abdomen: Nondistended, incision looks good.  Appropriately tender, but no guarding or peritonitis.  Positive bowel sounds.    Labs from last night reviewed.  Chemistry looks okay.    Assessment and plan:  -Small bowel obstruction, now postoperative day #4, apparently recovering some GI function.  Patient states she is anxious to eat today, but I am not entirely certain she is ready.  She is passing flatus and moving her bowels.  -Tachycardia, etiology unclear.  Her urine output has been good and she does not strike me as being dehydrated.  Renal function is preserved.  Unclear if this may be stress or anxiety related.  She has  been getting substantial pain medication.  I am going to repeat a CT of her abdomen and pelvis to make sure there is nothing else we are missing.  No clear sign of any infection at this point.  -Tachypnea, patient certainly has chronic pulmonary problems.  Pulmonary has signed off for the moment, but if she exhibits any signs of worsening we will asked him to revisit.    Trenton Canales MD  General and Endoscopic Surgery  [facility]    [address], [address]  [place], [place], [address]  P: [phone]  F: [phone]

## 2019-05-11 NOTE — OP NOTE
Operative Note  Date of Admission:  4/29/2019  OR Date: 5/11/2019    Pre-op Diagnosis:   Acute mesenteric ischemia    Post-Op Diagnosis Codes:    same    Procedure:   1) superior mesenteric artery exposure and arteriotomy, aortogram and superior mesenteric arteriogram, superior mesenteric artery angioplasty and stent placement    Surgeon: Raphael Germain MD    Assistant: Dr. Trenton Canales MD and they provided critical assistance during the case including suctioning, exposure, retraction, and reduction of blood loss.    Anesthesia: General    Staff:   Circulator: Ramona Amaya RN; Sera Cooley RN  Scrub Person: Elina Buckley; Alize Chowdhury  Assistant: Jorge Crooks CSA  Vascular Radiology Technician: Ailyn Obrien    Estimated Blood Loss: minimal    Specimens:   Order Name Source Comment Collection Info Order Time   LACTIC ACID, PLASMA   Collected By: Luiza Long RN 5/11/2019  2:40 PM   TYPE AND SCREEN   Collected By: Sharri Gresham RN 5/11/2019 11:35 AM        Complications: None    Findings: See dictation    Indications:    The patient is an 69 y.o. female seen for evaluation of acute mesenteric ischemia.  The patient has been found to have what appears to be occlusion of a pre-existing stenosis within the origin of the superior mesenteric artery based on serial CAT scans.  She is clinically extremely ill and is undergoing exploratory laparotomy by general surgery.  We are evaluating her intraoperatively as well as preoperatively with plans to intervene if possible.  Patient's family understand and agree with the plan.       Procedure:    During exploratory laparotomy the root of the mesentery was exposed and dissected out by myself and Dr. Canales.  Finding of branch suitable for access I placed a micro-access needle and wire.  The wire across the lesion proximally and allowed passage of a 4 Algerian imaging catheter across the lesion into the aorta.  Angiogram was performed of the  aorta directly.  High-grade stenosis proximally at the SMA was visualized.  Passing a wire safely through the existing catheter we then placed a 6 Japanese sheath in the branch vessel of the SMA and performed retrograde angiogram of the superior mesenteric artery.  Following this a 5 mm right 40 mm balloon was used to angioplasty across the lesion.  With this performed significant improvement in flow was seen but it residual stenosis versus dissection remained.  7 mm x 30 mm stent was deployed across the lesion from the origin of the SMA over the stenosis.  Angioplasty was 6 mm balloon review the leaves all lesion and flow patterns were brisk at the major proximal vasculature and branches.  Following this catheters and wires were removed and closure of the superior mesenteric artery branch with interrupted 6-0 Prolene sutures were performed.  She underwent heparinization with 3000 units of heparin during the procedure and this was not reversed.  At completion patient had hemostasis assured and closure of the exploratory laparotomy was performed by general surgery.    Radiographic Findings:  Angiographic findings include widely patent aortogram with high-grade stenosis and partial occlusion of the superior mesenteric artery approximately 2 cm beyond its inflow.  Some linear thrombus seen.  Flow patterns in the distal arcades of the superior mesenteric artery unable to be visualized initially.  Angioplasty with 5 mm balloon shows improvement in flow patterns at the entirety of the SMA system.  Stent placement of the 7 mm x 30 mm protégé stent followed by 6 mm angioplasty shows market improvement in flow to the proximal arcades as visualized.  No flow-limiting lesions remain no complicating features are seen.      Active Hospital Problems    Diagnosis  POA   • SBO (small bowel obstruction) (CMS/[facility]) [K56.609]  Yes      Resolved Hospital Problems   No resolved problems to display.      Damaso Germain MD     Date:  5/11/2019  Time: 6:54 PM

## 2019-05-11 NOTE — ANESTHESIA POSTPROCEDURE EVALUATION
Patient: Dilma Abad    Procedure Summary     Date:  05/11/19 Room / Location:  [facility] OR 11 / [facility] MAIN OR    Anesthesia Start:  1207 Anesthesia Stop:  1411    Procedure:  EXPLORATORY LAPAROTOMY, AORTAGRAM AND SUPERIOR MESENTERY ARTEREOGRAM WITH SMA ANGIOPLASTY STENT (N/A Abdomen) Diagnosis:       SBO (small bowel obstruction) (CMS/HCC)      (SBO (small bowel obstruction) (CMS/HCC) [K56.609])    Surgeon:  Trenton Canales MD Provider:  Jayant Castellano MD    Anesthesia Type:  general ASA Status:  4 - Emergent          Anesthesia Type: general  Last vitals  BP   97/43 (05/11/19 1545)   Temp   36.4 °C (97.5 °F) (05/11/19 1545)   Pulse   93 (05/11/19 1545)   Resp   16 (05/11/19 1409)     SpO2   92 % (05/11/19 1540)     Post Anesthesia Care and Evaluation      Comments: The family elected to withdraw care, and the patient passed away in PACU.

## 2019-05-11 NOTE — PROGRESS NOTES
"Pharmacy to dose TPN - Daily Progress Note  Patient: Dilma Abad  MRN#: [id number]  Attending: No name on file.  Admission Date: 429    TPN # 7 per Dr Robert  Indication: bowel obstruction    Active Problems:  Postoperative day #4 laparotomy for small bowel obstruction    Subjective/Objective  69 y.o. female 167.6 cm (65.98\") 36.2 kg (79 lb 14.4 oz)  Results from last 7 days   Lab Units 19  0523  19  1244   SODIUM mmol/L 138   < >  --    POTASSIUM mmol/L 4.3   < >  --    CHLORIDE mmol/L 102   < >  --    CO2 mmol/L 21.5*   < >  --    BUN mg/dL 22   < >  --    CREATININE mg/dL 0.57   < >  --    CALCIUM mg/dL 9.0   < >  --    ALBUMIN g/dL 3.50   < >  --    BILIRUBIN mg/dL 0.5   < >  --    ALK PHOS U/L 120*   < >  --    ALT (SGPT) U/L 68*   < >  --    AST (SGOT) U/L 54*   < >  --    GLUCOSE mg/dL 184*   < >  --    MAGNESIUM mg/dL 1.8   < > 1.7   PHOSPHORUS mg/dL 3.1   < > 2.9   TRIGLYCERIDES mg/dL  --   --  69    < > = values in this interval not displayed.      I/O last 3 completed shifts:  In: 50 [P.O.:50]  Out: 2200 [Urine:1750; Emesis/NG output:450]    Diet Orders (active) (From admission, onward)    Start     Ordered    05/10/19 1722  NPO Diet  Diet Effective Now     Comments:  Ice chips    05/10/19 172        Additional insulin administration while previous TPN infusin units  Additional electrolyte administration while previous TPN infusing: none  Acid suppression: famotidine 10mg iv q 12 hrs    Daily Assessment  Protein: 50 grams/day  Dextrose: 800 Kcal/day   Lipids: 100 mL of 20% lipid infusion   Fluid rate: 60 mL/hr      Plan    1. Continue current macronutrients (at goal). Based on the above labs, will add the following electrolytes/additives to the TPN    2. Based on the above labs, will add the following electrolytes/additives to the TPN.                  Sodium Chloride: 100 mEq   Sodium Acetate: 0 mEq   Sodium Phosphate: 20 mEq   Potassium Chloride: 20 mEq   Potassium Acetate: 50 " mEq   Potassium Phosphate: 10 mEq   Calcium Gluconate: 9 mEq   Magnesium Sulfate: 10 mEq   Multivitamin   Trace Elements  3.BMP, Mg, PO4, and triglyceride levels in am.  Pharmacy will continue to follow daily while on TPN and adjust as needed.    Thank you,  Uma Rowan, PharmD

## 2019-05-12 LAB — BACTERIA SPEC ANAEROBE CULT: NORMAL

## 2020-10-17 NOTE — PROGRESS NOTES
PROGRESS NOTE  Patient Name: Dilma Abad  Age/Sex: 69 y.o. female  : 1950  MRN: [id number]    Date of Admission: 2019  Date of Encounter Visit: 19   LOS: 10 days   Patient Care Team:  Mary Taylor MD as PCP - General (Family Medicine)  Bienvenido Collier MD as Consulting Physician (Hematology and Oncology)  Daniel Oconnor MD as Referring Physician (Pulmonary Disease)  Shazia Mcdonald MD as Consulting Physician (Radiation Oncology)    Chief Complaint: COPD with history of lung cancer came in with small bowel obstruction status post surgery for postoperative pulmonary management    Hospital course: Patient had a surgery yesterday and she had a smooth postoperative course, she is on nasal cannula oxygen, she has no wheeze or chest tightness or symptom of COPD exacerbation.  patient is working with physical therapy, getting up in the bed and walking, still has the NG and because she is not passing any cast but she did have positive bowel sounds this morning.        REVIEW OF SYSTEMS:   CONSTITUTIONAL: no fever or chills  CARDIOVASCULAR: No chest pain, chest pressure or chest discomfort. No palpitations or edema.   RESPIRATORY: No significant shortness of breath, no productive secretion um.   GASTROINTESTINAL: Postoperative abdominal pain, no vomiting or nausea, NG tube in position.   HEMATOLOGIC: No bleeding or bruising.     Ventilator/Non-Invasive Ventilation Settings (From admission, onward)    Nasal cannula oxygen            Vital Signs  Temp:  [98.1 °F (36.7 °C)-99.3 °F (37.4 °C)] 98.2 °F (36.8 °C)  Heart Rate:  [92-98] 98  Resp:  [17-20] 18  BP: (132-158)/(71-87) 155/87  SpO2:  [91 %-99 %] 94 %  on  Flow (L/min):  [2] 2 Device (Oxygen Therapy): nasal cannula    Intake/Output Summary (Last 24 hours) at 2019 1139  Last data filed at 2019 0624  Gross per 24 hour   Intake 2205 ml   Output 2100 ml   Net 105 ml     Flowsheet Rows      First Filed Value   Admission Height  167.6  "cm (66\") Documented at 04/29/2019 1128   Admission Weight  40.8 kg (90 lb) Documented at 04/29/2019 1148        Body mass index is 13.9 kg/m².      05/08/19  0530 05/08/19  0811 05/09/19  0624   Weight: 30.8 kg (68 lb) 31.8 kg (70 lb) 39.1 kg (86 lb 1.6 oz)       Physical Exam:  General:    No acute distress, alert and oriented x4, pleasant, on nasal cannula oxygen                   Head:    Normocephalic, atraumatic.   Eyes:          Conjunctivae and sclerae normal, no icterus, PERRLA   Throat:   No oral lesions, no thrush, oral mucosa moist.  She has nasogastric tube    Neck:   Supple, trachea midline.  No JVD   Lungs:     Normal chest on inspection, diminished breath sounds otherwise clear to auscultation with no wheezes, posterior he has minimal bilateral crackles which was no different from yesterday's exam.      Heart:    Regular rhythm and normal rate.  No murmurs, gallops, or rubs noted.   Abdomen:     Soft, minimally tender, incision is dry, no distention, good bowel sounds    Extremities:   No clubbing, cyanosis, or edema.     Pulses:   Pulses palpable and equal bilaterally.    Skin:   No bleeding or rash.   Neuro:   Non-focal.  Moves all extremities well.    Psychiatric:   Normal mood and affect.           Results Review:      Results from last 7 days   Lab Units 05/09/19  0513 05/08/19  0521 05/07/19  0433 05/06/19  0431 05/05/19  0338 05/04/19  0444 05/03/19  0839   SODIUM mmol/L 138 139 136 135* 137 137  --    POTASSIUM mmol/L 4.2 4.7 4.3 4.0 3.5 3.6 3.7   CHLORIDE mmol/L 101 100 94* 93* 92* 92*  --    CO2 mmol/L 28.1 29.1* 30.9* 34.0* 32.2* 35.7*  --    BUN mg/dL 13 14 14 12 10 10  --    CREATININE mg/dL 0.47* 0.58 0.63 0.61 0.58 0.60  --    CALCIUM mg/dL 8.5* 8.6 9.2 9.0 8.7 9.0  --    AST (SGOT) U/L  --   --   --  13  --   --   --    ALT (SGPT) U/L  --   --   --  12  --   --   --    ANION GAP mmol/L 8.9 9.9 11.1 8.0 12.8 9.3  --    ALBUMIN g/dL 2.90*  --   --  3.00*  --   --   --                "   Results from last 7 days   Lab Units 05/09/19  0513 05/05/19  0338 05/04/19  0444   WBC 10*3/mm3 7.16 11.69* 9.29   HEMOGLOBIN g/dL 11.9* 12.1 12.8   HEMATOCRIT % 37.7 38.1 40.3   PLATELETS 10*3/mm3 213 187 215   MCV fL 88.3 89.0 87.4   NEUTROPHIL % % 76.5* 85.8* 78.0*   LYMPHOCYTE % % 10.2* 5.0* 9.1*   MONOCYTES % % 11.6 8.6 12.1*   EOSINOPHIL % % 0.4 0.1* 0.3   BASOPHIL % % 0.3 0.2 0.1   IMM GRAN % % 1.0* 0.3 0.4         Results from last 7 days   Lab Units 05/09/19  0513 05/08/19  0521 05/07/19  0433 05/06/19  0431 05/05/19  1244 05/03/19  0839   MAGNESIUM mg/dL 2.2 2.2 2.2 2.1 1.7 2.0     Results from last 7 days   Lab Units 05/05/19  1244   TRIGLYCERIDES mg/dL 69             Glucose   Date/Time Value Ref Range Status   05/09/2019 1123 129 70 - 130 mg/dL Final   05/09/2019 0615 136 (H) 70 - 130 mg/dL Final   05/09/2019 0017 132 (H) 70 - 130 mg/dL Final   05/08/2019 1812 118 70 - 130 mg/dL Final   05/08/2019 1135 148 (H) 70 - 130 mg/dL Final   05/08/2019 0656 141 (H) 70 - 130 mg/dL Final   05/07/2019 2107 191 (H) 70 - 130 mg/dL Final   05/07/2019 1813 136 (H) 70 - 130 mg/dL Final     Results from last 7 days   Lab Units 05/05/19  0338   PROCALCITONIN ng/mL 0.15     Results from last 7 days   Lab Units 05/07/19  0835 05/05/19  0120   WOUNDCX  No growth at 2 days  --    STREP PNEUMO AG   --  Positive*   L. PNEUMOPHILA SEROGP 1 UR AG   --  Negative                   Imaging:   Imaging Results (all)     Procedure Component Value Units Date/Time    XR Abdomen KUB [[id number]] Collected:  05/06/19 0928     Updated:  05/06/19 0940    Narrative:       KUB     HISTORY: Small bowel obstruction.     Two images of the abdomen and pelvis are provided and are correlated  with abdomen x-ray 05/04/2019 and 04/30/2019.     FINDINGS: There is a nasogastric tube with its tip in the stomach. There  are multiple surgical clips throughout the abdomen and pelvis. Some  chain sutures are observed in the pelvis as well. There is a  single loop  of small bowel containing some gas and measuring 4.7 cm diameter. This  is transversely oriented in the central pelvis. There is a small volume  of gas in the rectum. Overall, there is very minimal amount of gas in  the bowel. No free air is present.       Impression:       There is a single air-filled, dilated loop of small bowel in  the central pelvis. This is slightly less dilated than on x-ray  05/04/2019. There is no evidence of free air.     This report was finalized on 5/6/2019 9:37 AM by Dr. Sebastian Hobbs M.D.       XR Chest PA & Lateral [[id number]] Collected:  05/05/19 1043     Updated:  05/05/19 1147    Narrative:       2-VIEW CHEST     HISTORY: Shortness of breath. Previous lung cancer. Previous esophageal  cancer.     FINDINGS: There is severe COPD with hyperinflation and there is  prominent parenchymal and pleural abnormality in the upper left lung and  extending towards the hilum and to a lesser extent some scattered vague  nodularity extending into the lower left lung. Some of the more focal  changes are consistent with chronic fibrotic scarring with a possible  component of underlying neoplasm extending laterally as suggested on the  CT scan dated 03/18/2019. There is slight improvement in some  superimposed vague haziness and nodularity in the lower left chest since  the chest x-ray of 03/24/2019.     The heart size remains normal. An NG tube ends in the stomach. A  right-sided MediPort catheter ends in the SVC.     This report was finalized on 5/5/2019 11:43 AM by Dr. Jay Alejandro M.D.       XR Abdomen Flat & Upright [[id number]] Collected:  05/04/19 0931     Updated:  05/04/19 0938    Narrative:       TWO-VIEW ABDOMEN     HISTORY: Small bowel obstruction.     FINDINGS: An NG tube ends in the lower stomach and there remain several  prominent dilated air-filled small bowel loops as well as probable  multiple fluid filled small bowel loops consistent with small bowel  obstruction.  This is quite similar or perhaps more prominent when  compared to the study of 4 days ago. The upright view shows no evidence  of free air. It does show worsening of areas of pneumonia in the left  lung.     This report was finalized on 5/4/2019 9:35 AM by Dr. Jay Alejandro M.D.       XR Abdomen KUB [[id number]] Collected:  04/30/19 0654     Updated:  05/03/19 0549    Narrative:       SINGLE VIEW ABDOMEN/KUB     HISTORY:sbo follow up; K56.609-Unspecified intestinal obstruction,  unspecified as to partial versus complete obstruction; R11.0-Nausea     COMPARISON: 03/17/2019.     FINDINGS: Portable supine view of the abdomen obtained. Two images were  submitted. Numerous surgical clips are seen in the more central abdomen  bilaterally, unchanged. Nasogastric tube remains in place terminating  along the greater curvature of the more distal gastric body. The urinary  bladder is distended and contains residual iodinated contrast.      There is only minimal bowel gas present, diminished considerably from  previous. No dilated bowel is appreciated.  Some degenerative spurring  present in the thoracolumbar spine. There is generalized osteopenia.  No  suspicious calculi appreciated.             Impression:       Diminishing bowel gas, no convincing evidence of obstruction. .     This report was finalized on 5/3/2019 5:46 AM by Damaso Rosen M.D.       CT Abdomen Pelvis With Contrast [[id number]] Collected:  04/29/19 1430     Updated:  04/30/19 1026    Narrative:       CT ABDOMEN AND PELVIS WITH CONTRAST     HISTORY: Lower abdominal pain, nausea and vomiting.     TECHNIQUE: Axial CT images of the abdomen and pelvis were obtained  following administration of intravenous contrast. The patient was not  given oral contrast. Coronal and sagittal reformats were obtained.     COMPARISON: 03/17/2019     FINDINGS: There is diffuse gastric distention. In addition, there is  dilatation of a long segment of the most proximal jejunal  loops  measuring up to 4.1 cm. The proximal and distal transition points are  close to each other and suggest a closed loop type of obstruction.  Distal to this the small bowel loops are completely collapsed. There  appears to have been a subtotal colectomy and the enterocolonic  anastomosis appears unremarkable.      Nodular densities are again demonstrated within the left lower lobe. No  pleural or pericardial effusion is seen. The liver does not demonstrate  any focal abnormality. The gallbladder is absent. There is intra and  extrahepatic biliary dilatation that likely represents benign biliary  ectasia status post cholecystectomy. There is diffuse pancreatic  atrophy. The spleen, bilateral adrenal gland and visualized kidneys are  normal apart from small cortical hyperdensities that are favored to  represent simple cysts. The urinary bladder is distended. There is a  small amount of layering fluid seen within the pelvis. Moderate  atherosclerotic change is seen within the abdominal aorta and its  branches. There is diffuse wall thickening involving the distal  esophagus and the GE junction.       Impression:       1. Proximal small bowel obstruction that appears to be high-grade. The  proximal and distal transition point are in close approximation  suggesting a closed-loop type of obstruction, possibly related to  adhesions.  2.  Nodular density seen within the left lower lobe. Please refer to  recent dictation of CT chest for further recommendations.  3.  Diffuse wall thickening of the distal esophagus and GE junction. The  patient has history of esophageal cancer and follow up with upper GI  endoscopy is recommended.  4.  Additional findings as above.     These findings were discussed with Chiqui Valladares by telephone.     Radiation dose reduction techniques were utilized, including automated  exposure control and exposure modulation based on body size.     This report was finalized on 4/30/2019 10:23 AM by  Dr. Delilah Mcneill M.D.             I reviewed the patient's new clinical results.  I personally viewed and interpreted the patient's imaging results:        Medication Review:     famotidine 10 mg Intravenous Q12H   fat emulsion 100 mL Intravenous Q24H (TPN)   ipratropium-albuterol 3 mL Nebulization 4x Daily - RT   sodium chloride 10 mL Intravenous Q12H   sodium chloride 3 mL Intravenous Q12H         Adult Central 2-in-1 TPN  Last Rate: 60 mL/hr at 05/08/19 1830   Adult Central 2-in-1 TPN     Pharmacy to Dose TPN         ASSESSMENT:   1. Status post exploratory laparotomy with adhesion lysis with small bowel obstruction secondary to closed loop obstruction in the proximal small bowel  2. COPD Dr. perioperative exacerbation  3. Stage IIb adenocarcinoma of the lung status post stereotactic radiation  4. Previous lung injury from radiation pneumonitis  5.  Postop hypoxemia    PLAN:  Still doing very well from the respiratory standpoint given her underlying lung disease  She had audible bowel sounds today, hopefully she will be able to get rid of the nasogastric tube soon.  Continue with the current measures continue with the incentive spirometer and with the current inhaler, she did not have any significant respiratory decompensation.  She is cleared for discharge to rehab from the pulmonary standpoint if she continues to have stable lung function    Discussed with patient    Disposition: Per primary team    Davian Koenig MD  05/09/19  11:39 AM          Dictated utilizing Dragon dictation   no

## 2023-09-19 NOTE — PROGRESS NOTES
LOS: 4 days   Patient Care Team:  Mary Taylor MD as PCP - General (Family Medicine)  Bienvenido Collier MD as Consulting Physician (Hematology and Oncology)  Daniel Oconnor MD as Referring Physician (Pulmonary Disease)  Shazia Mcdonald MD as Consulting Physician (Radiation Oncology)    Chief Complaint:  Follow-up respiratory failure, pneumonia, COPD with exacerbation of lung cancer    Interval History:   Patient was on mechanical ventilation with assist control mode.  She was switched to spontaneous trial.  She tolerated well and was ultimately extubated.  She has mild secretions from the ET tube.  She was evaluated before and after extubation.  She appeared to be comfortable after extubation.  She was sedated initially.  Pressors were weaned off.     REVIEW OF SYSTEMS:   No reports of nausea, vomiting or diarrhea.  Tube feeding tolerated.  No fever overnight.  Patient was following commands when off sedation.    Ventilator/Non-Invasive Ventilation Settings     Start     Ordered    08/03/18 0021  Ventilator - AC/VC; (26); 100; 90%; 5; 500  Continuous,   Status:  Canceled     Comments:  MAY INCREASE PEAK PRESSURE ALARM TO 55.   Question Answer Comment   Vent Mode AC/VC    Breath rate  26   FiO2 100    FiO2 titrate for Sp02% =/> 90%    PEEP 5    Tidal Volume 500        08/03/18 0022    08/02/18 2354  Ventilator - AC/VC; (22); 100; 90%; 5; 450  Continuous,   Status:  Canceled     Question Answer Comment   Vent Mode AC/VC    Breath rate  22   FiO2 100    FiO2 titrate for Sp02% =/> 90%    PEEP 5    Tidal Volume 450        08/02/18 2354            Vital Signs  Temp:  [97.7 °F (36.5 °C)-98.4 °F (36.9 °C)] 97.8 °F (36.6 °C)  Heart Rate:  [54-95] 95  Resp:  [22-24] 24  BP: ()/(51-97) 150/79  FiO2 (%):  [40 %] 40 %    Intake/Output Summary (Last 24 hours) at 08/04/18 1516  Last data filed at 08/04/18 0514   Gross per 24 hour   Intake              435 ml   Output   Patient Education [x] [facility] Visit Number: 3/3     []  Pre-op Wt Loss Goal (as ordered on referral):   [] [facility] Visit:     Current Wt: 234#  Current BMI: 37.2 kg/m2                                                                      Barriers to learning:  [x]None evident  []Acuity of illness  []Cognitive defects  []Cultural barriers  []Desire/Motivation  []Difficulty concentrating  []Emotional state  []Financial concerns  []Hearing deficit  []Language barrier  []Literacy  []Memory problems  []Vision impairment     Home caregiver present for session   []YES  [x] NO       Teaching methods:  []Demonstration  [x]Explanation  []Printed materials  []Teach back  []Virtual/web based         Verbalizes understanding Demonstrates  Needs further teaching Needs practice/ supervision Comments    Bariatric Surgery Diet  [] [] [] []    Clear liquid [] [] [] []    Full liquid [] [] [] []    Weight Reduction [x] [] [] []    Other Diet [] [] [] []          Additional Learner (s) Present                                                                  []Spouse   []Daughter    []  Son  []Family member   []Friend   []Grandfather   []Grandmother   []Father   []Mother   []Other       Expected Compliance:  [x]Good  []Fair  []Poor    Additional Information:    Pt with additional 3# wt loss since last visit. Pt reports smaller portions and limiting some CHOs. However, this doesn't appear to be consistent. Pt is drinking adequate water. Pt choosing less sugars.     Plan:  Prioritize eating to protein 1st, fruit/vegetable 2nd, starchy CHO 3rd.   Daily practice of bariatric eating behaviors.      "           1350 ml   Net             -915 ml     Flowsheet Rows      First Filed Value   Admission Height  170.2 cm (67\") Documented at 07/31/2018 2120   Admission Weight  50.8 kg (112 lb) Documented at 07/31/2018 2149          Physical Exam:   General Appearance:    Alert, cooperative, Cachectic    Lungs:     Decreased air entry overall.  Crackles on the left side.  No wheezing     Heart:    Regular rhythm and normal rate, normal S1 and S2, no            murmur   HEENT:    Oral thrush    Abdomen:     Soft.  Positive bowel sounds.  No tenderness    Neuro:   Conscious, alert, oriented x3   Extremities:   Moves all extremities well, no edema, no cyanosis, no             Redness          Results Review:          Results from last 7 days  Lab Units 08/04/18  0329 08/03/18  0521 08/02/18 2327 08/02/18  0525   SODIUM mmol/L 142 134*  --  139   POTASSIUM mmol/L 4.2 4.7 4.4 3.8   CHLORIDE mmol/L 102 98  --  106   CO2 mmol/L 21.6* 21.2*  --  23.1   BUN mg/dL 22 14  --  10   CREATININE mg/dL 0.69 0.85  --  0.55*   GLUCOSE mg/dL 139* 161*  --  111*   CALCIUM mg/dL 8.4* 8.4*  --  8.1*       Results from last 7 days  Lab Units 08/02/18  2327   TROPONIN T ng/mL 0.054*       Results from last 7 days  Lab Units 08/04/18  0329 08/03/18  0521 08/02/18  2328   WBC 10*3/mm3 7.47 19.17* 18.16*   HEMOGLOBIN g/dL 9.3* 10.5* 11.3*   HEMATOCRIT % 28.7* 31.1* 34.5*   PLATELETS 10*3/mm3 184 234 265           Results from last 7 days  Lab Units 08/02/18  2327   PROBNP pg/mL 6,525.0*       I reviewed the patient's new clinical results.  I personally viewed and interpreted the patient's CXR        Medication Review:     enoxaparin 40 mg Subcutaneous Q24H   famotidine 20 mg Intravenous Q12H   ipratropium-albuterol 3 mL Nebulization 4x Daily - RT   lactobacillus acidophilus 1 capsule Oral Daily   lidocaine 1 patch Transdermal Q24H   methylPREDNISolone sodium succinate 60 mg Intravenous Q12H   piperacillin-tazobactam 3.375 g Intravenous Q8H "         norepinephrine 0.02-0.3 mcg/kg/min Last Rate: Stopped (08/04/18 7347)   Pharmacy to Dose Zosyn     propofol 5-50 mcg/kg/min Last Rate: Stopped (08/04/18 8582)       Diagnostic imaging:  I personally and independently reviewed the Following images:  Extensive consolidation in the lingula and left lower lobe.      Review of the PFT dated 10/31/17    Assessment:    1. Left upper and lower lobe pneumonia  2. Adenocarcinoma left lung status post radiation (completed 12/15/17) and chemotherapy, diagnosed by bronch 10/6/17  3. Acute hypercapnic and hypoxic respiratory failure, placed on mechanical ventilation on 8/2/18, On oxygen 2 L/m at night at baseline  4. Acute on chronic anemia  5. COPD exacerbation with predominant upper lobe emphysema (FEV1:37-->56%; DLCO 33% on PFT 10/31/17)  6. Lactic acidosis, resolved  7. 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 Oral candidiasis    Plan   Perform spontaneous breathing trial.  Patient tolerated well.  Follow-up ABG done and noted.  Patient was evaluated before and after extubation.  After extubation she was on 2 L oxygen was tolerating.      Failed bedside swallow eval.  We will get a speech consult.  Continue tube feeding the meanwhile.    Stop vancomycin.  MRSA screen negative.  Blood culture negative so far.  Sputum culture was not collected. continue empiric Zosyn.    I'll decrease the dose of Solu-Medrol to 40 mg twice a day.  She does not have wheezing at this point.    Switch her bronchodilators from albuterol puffs to DuoNeb.    Start nystatin for oral diagnoses          Nathaniel Castellon MD  08/04/18  3:16 PM      Time: Critical care 34 min      This note was dictated utilizing Dragon dictation

## (undated) DEVICE — APPL CHLORAPREP W/TINT 10.5ML PERC STRL

## (undated) DEVICE — ANTIBACTERIAL UNDYED BRAIDED (POLYGLACTIN 910), SYNTHETIC ABSORBABLE SUTURE: Brand: COATED VICRYL

## (undated) DEVICE — ADHS LIQ MASTISOL 2/3ML

## (undated) DEVICE — BITEBLOCK OMNI BLOC

## (undated) DEVICE — CANNULA,ADULT,SOFT-TOUCH,7'TUBE,UC: Brand: PENDING

## (undated) DEVICE — TBG 02 CRUSH RESIST LF CLR 7FT

## (undated) DEVICE — STPLR SKIN VISISTAT WD 35CT

## (undated) DEVICE — CULT AER/ANAEROB FASTIDIOUS BACT

## (undated) DEVICE — LOU MINOR PROCEDURE: Brand: MEDLINE INDUSTRIES, INC.

## (undated) DEVICE — FRCP BX RADJAW4 NDL 2.8 240CM LG OG BX40

## (undated) DEVICE — ADHS SKIN DERMABOND TOP ADVANCED

## (undated) DEVICE — ST ACC MICROPUNCTURE STFF .018 ECHO/PLDM/TP 4F/10CM 21G/7CM

## (undated) DEVICE — WOUND RETRACTOR AND PROTECTOR: Brand: ALEXIS WOUND PROTECTOR-RETRACTOR

## (undated) DEVICE — GLV SURG BIOGEL LTX PF 8 1/2

## (undated) DEVICE — DRP C/ARM 41X74IN

## (undated) DEVICE — TUBING, SUCTION, 1/4" X 10', STRAIGHT: Brand: MEDLINE

## (undated) DEVICE — NDL HYPO ECLPS SFTY 18G 1 1/2IN

## (undated) DEVICE — PINNACLE R/O II HIFLO INTRODUCER SHEATH WITH RADIOPAQUE MARKER: Brand: PINNACLE

## (undated) DEVICE — SUT PROLN 3/0 SH D/A 36IN 8522H

## (undated) DEVICE — TOWEL,OR,DSP,ST,BLUE,STD,4/PK,20PK/CS: Brand: MEDLINE

## (undated) DEVICE — 3M™ STERI-STRIP™ REINFORCED ADHESIVE SKIN CLOSURES, R1547, 1/2 IN X 4 IN (12 MM X 100 MM), 6 STRIPS/ENVELOPE: Brand: 3M™ STERI-STRIP™

## (undated) DEVICE — FRCP BIOP RADLJAW4 HOT 2.2X240 BX40

## (undated) DEVICE — MEDI-VAC YANKAUER SUCTION HANDLE W/BULBOUS TIP: Brand: CARDINAL HEALTH

## (undated) DEVICE — ELECTRD BLD EXT EDGE/INSUL 6IN

## (undated) DEVICE — GW HYDRPHLC STD ANG .035IN 180CM

## (undated) DEVICE — NDL HYPO PRECISIONGLIDE REG 25G 1 1/2

## (undated) DEVICE — TOTAL TRAY, 16FR 10ML SIL FOLEY, URN: Brand: MEDLINE

## (undated) DEVICE — SUT MNCRYL PLS ANTIB UD 4/0 PS2 18IN

## (undated) DEVICE — APPL CHLORAPREP W/TINT 26ML ORNG

## (undated) DEVICE — DRSNG WND BORDR/ADHS NONADHR/GZ LF 4X14IN STRL

## (undated) DEVICE — MARKR SKIN W/RULR AND LBL

## (undated) DEVICE — CVR PROB 96IN LF STRL

## (undated) DEVICE — DECANT BG O JET

## (undated) DEVICE — SUT SILK 3/0 SH CR5 18IN C0135

## (undated) DEVICE — SUT VIC 2/0 TIES 18IN J111T

## (undated) DEVICE — CANN NASL CO2 TRULINK W/O2 A/

## (undated) DEVICE — PTA BALLOON DILATATION CATHETER: Brand: MUSTANG™

## (undated) DEVICE — Device: Brand: DEFENDO AIR/WATER/SUCTION AND BIOPSY VALVE

## (undated) DEVICE — SUT SILK 2/0 TIES 18IN A185H

## (undated) DEVICE — BARRIER, ABSORBABLE, ADHESION: Brand: SEPRAFILM® PROCEDURE PACK

## (undated) DEVICE — GOWN,NON-REINFORCED,SIRUS,SET IN SLV,XXL: Brand: MEDLINE

## (undated) DEVICE — COVER,MAYO STAND,STERILE: Brand: MEDLINE

## (undated) DEVICE — DRSNG WND GZ PAD BORDERED 4X8IN STRL

## (undated) DEVICE — SYR LUERLOK 20CC

## (undated) DEVICE — GLV SURG BIOGEL LTX PF 7 1/2

## (undated) DEVICE — Device

## (undated) DEVICE — PK PROC MAJ 40

## (undated) DEVICE — INFLATION DEVICE: Brand: ENCORE™ 26

## (undated) DEVICE — THE TORRENT IRRIGATION SCOPE CONNECTOR IS USED WITH THE TORRENT IRRIGATION TUBING TO PROVIDE IRRIGATION FLUIDS SUCH AS STERILE WATER DURING GASTROINTESTINAL ENDOSCOPIC PROCEDURES WHEN USED IN CONJUNCTION WITH AN IRRIGATION PUMP (OR ELECTROSURGICAL UNIT).: Brand: TORRENT

## (undated) DEVICE — SUT PDS 0 CT1 36IN Z346H

## (undated) DEVICE — SCANLAN® SUTURE BOOT™ INSTRUMENT JAW COVERS - ORIGINAL YELLOW, STANDARD PKG (5 PAIR/CARTRIDGE, 1 CARTRIDGE/PKG): Brand: SCANLAN® SUTURE BOOT™ INSTRUMENT JAW COVERS